# Patient Record
Sex: MALE | Race: WHITE | Employment: OTHER | URBAN - METROPOLITAN AREA
[De-identification: names, ages, dates, MRNs, and addresses within clinical notes are randomized per-mention and may not be internally consistent; named-entity substitution may affect disease eponyms.]

---

## 2017-01-03 RX ORDER — OMEPRAZOLE 40 MG/1
CAPSULE, DELAYED RELEASE ORAL
Qty: 30 CAPSULE | Refills: 2 | Status: SHIPPED | OUTPATIENT
Start: 2017-01-03 | End: 2017-03-30 | Stop reason: SDUPTHER

## 2017-01-12 RX ORDER — SIMVASTATIN 20 MG
TABLET ORAL
Qty: 30 TABLET | Refills: 2 | Status: SHIPPED | OUTPATIENT
Start: 2017-01-12 | End: 2017-03-10 | Stop reason: DRUGHIGH

## 2017-01-26 DIAGNOSIS — I10 ESSENTIAL HYPERTENSION: ICD-10-CM

## 2017-01-26 RX ORDER — LISINOPRIL 5 MG/1
5 TABLET ORAL DAILY
Qty: 30 TABLET | Refills: 3 | Status: SHIPPED | OUTPATIENT
Start: 2017-01-26 | End: 2017-06-06 | Stop reason: DRUGHIGH

## 2017-01-26 RX ORDER — LISINOPRIL 10 MG/1
10 TABLET ORAL DAILY
Qty: 30 TABLET | Refills: 3 | Status: SHIPPED | OUTPATIENT
Start: 2017-01-26 | End: 2017-06-06 | Stop reason: DRUGHIGH

## 2017-02-23 ENCOUNTER — HOSPITAL ENCOUNTER (OUTPATIENT)
Age: 62
Discharge: HOME | End: 2017-02-23
Payer: COMMERCIAL

## 2017-02-23 DIAGNOSIS — E78.5 HYPERLIPIDEMIA, UNSPECIFIED HYPERLIPIDEMIA TYPE: Primary | ICD-10-CM

## 2017-02-23 DIAGNOSIS — I10 ESSENTIAL HYPERTENSION: ICD-10-CM

## 2017-02-23 DIAGNOSIS — E11.9 TYPE 2 DIABETES MELLITUS WITHOUT COMPLICATION, WITHOUT LONG-TERM CURRENT USE OF INSULIN (HCC): ICD-10-CM

## 2017-02-23 DIAGNOSIS — E78.5 HYPERLIPIDEMIA, UNSPECIFIED HYPERLIPIDEMIA TYPE: ICD-10-CM

## 2017-02-23 LAB
ALBUMIN SERPL-MCNC: 4.5 G/DL (ref 3.5–5.2)
ALBUMIN/GLOBULIN RATIO: ABNORMAL (ref 1–2.5)
ALP BLD-CCNC: 75 U/L (ref 40–129)
ALT SERPL-CCNC: 51 U/L (ref 5–41)
ANION GAP SERPL CALCULATED.3IONS-SCNC: 12 MMOL/L (ref 9–17)
AST SERPL-CCNC: 26 U/L
BILIRUB SERPL-MCNC: 0.78 MG/DL (ref 0.3–1.2)
BUN BLDV-MCNC: 18 MG/DL (ref 8–23)
BUN/CREAT BLD: ABNORMAL (ref 9–20)
CALCIUM SERPL-MCNC: 9.5 MG/DL (ref 8.6–10.4)
CHLORIDE BLD-SCNC: 100 MMOL/L (ref 98–107)
CHOLESTEROL/HDL RATIO: 6.1
CHOLESTEROL: 212 MG/DL
CO2: 27 MMOL/L (ref 20–31)
CREAT SERPL-MCNC: 0.92 MG/DL (ref 0.7–1.2)
GFR AFRICAN AMERICAN: >60 ML/MIN
GFR NON-AFRICAN AMERICAN: >60 ML/MIN
GFR SERPL CREATININE-BSD FRML MDRD: ABNORMAL ML/MIN/{1.73_M2}
GFR SERPL CREATININE-BSD FRML MDRD: ABNORMAL ML/MIN/{1.73_M2}
GLUCOSE BLD-MCNC: 190 MG/DL (ref 70–99)
HDLC SERPL-MCNC: 35 MG/DL
LDL CHOLESTEROL: 125 MG/DL (ref 0–130)
POTASSIUM SERPL-SCNC: 4.7 MMOL/L (ref 3.7–5.3)
SODIUM BLD-SCNC: 139 MMOL/L (ref 135–144)
TOTAL PROTEIN: 7.3 G/DL (ref 6.4–8.3)
TRIGL SERPL-MCNC: 258 MG/DL
VLDLC SERPL CALC-MCNC: ABNORMAL MG/DL (ref 1–30)

## 2017-02-23 PROCEDURE — 80053 COMPREHEN METABOLIC PANEL: CPT

## 2017-02-23 PROCEDURE — 36415 COLL VENOUS BLD VENIPUNCTURE: CPT

## 2017-02-23 PROCEDURE — 80061 LIPID PANEL: CPT

## 2017-03-10 ENCOUNTER — HOSPITAL ENCOUNTER (OUTPATIENT)
Dept: GENERAL RADIOLOGY | Age: 62
Discharge: HOME | End: 2017-03-10
Payer: COMMERCIAL

## 2017-03-10 ENCOUNTER — HOSPITAL ENCOUNTER (OUTPATIENT)
Dept: PAIN MANAGEMENT | Age: 62
Discharge: HOME | End: 2017-03-10
Payer: COMMERCIAL

## 2017-03-10 VITALS
SYSTOLIC BLOOD PRESSURE: 122 MMHG | HEIGHT: 68 IN | WEIGHT: 210 LBS | HEART RATE: 112 BPM | BODY MASS INDEX: 31.83 KG/M2 | OXYGEN SATURATION: 94 % | TEMPERATURE: 98.4 F | DIASTOLIC BLOOD PRESSURE: 97 MMHG | RESPIRATION RATE: 12 BRPM

## 2017-03-10 DIAGNOSIS — M47.816 OSTEOARTHRITIS OF LUMBAR SPINE, UNSPECIFIED SPINAL OSTEOARTHRITIS COMPLICATION STATUS: Primary | ICD-10-CM

## 2017-03-10 DIAGNOSIS — M48.061 LUMBAR SPINAL STENOSIS: ICD-10-CM

## 2017-03-10 DIAGNOSIS — M51.36 DEGENERATIVE DISC DISEASE, LUMBAR: ICD-10-CM

## 2017-03-10 DIAGNOSIS — M54.16 LUMBAR RADICULOPATHY, CHRONIC: ICD-10-CM

## 2017-03-10 PROCEDURE — 3209999900 FLUORO FOR SURGICAL PROCEDURES

## 2017-03-10 PROCEDURE — 6360000002 HC RX W HCPCS

## 2017-03-10 PROCEDURE — 62327 NJX INTERLAMINAR LMBR/SAC: CPT

## 2017-03-10 PROCEDURE — 6360000002 HC RX W HCPCS: Performed by: PAIN MEDICINE

## 2017-03-10 PROCEDURE — 62323 NJX INTERLAMINAR LMBR/SAC: CPT | Performed by: PAIN MEDICINE

## 2017-03-10 PROCEDURE — 6360000004 HC RX CONTRAST MEDICATION

## 2017-03-10 RX ORDER — SIMVASTATIN 40 MG
40 TABLET ORAL NIGHTLY
COMMUNITY
End: 2017-03-28 | Stop reason: SDUPTHER

## 2017-03-10 RX ORDER — MIDAZOLAM HYDROCHLORIDE 1 MG/ML
INJECTION INTRAMUSCULAR; INTRAVENOUS
Status: COMPLETED | OUTPATIENT
Start: 2017-03-10 | End: 2017-03-10

## 2017-03-10 RX ADMIN — MIDAZOLAM 2 MG: 1 INJECTION INTRAMUSCULAR; INTRAVENOUS at 08:25

## 2017-03-10 ASSESSMENT — PAIN - FUNCTIONAL ASSESSMENT: PAIN_FUNCTIONAL_ASSESSMENT: 0-10

## 2017-03-13 ENCOUNTER — TELEPHONE (OUTPATIENT)
Dept: PAIN MANAGEMENT | Age: 62
End: 2017-03-13

## 2017-03-24 ENCOUNTER — HOSPITAL ENCOUNTER (OUTPATIENT)
Dept: PAIN MANAGEMENT | Age: 62
Discharge: HOME | End: 2017-03-24
Payer: COMMERCIAL

## 2017-03-24 VITALS
SYSTOLIC BLOOD PRESSURE: 125 MMHG | HEART RATE: 107 BPM | OXYGEN SATURATION: 96 % | HEIGHT: 68 IN | WEIGHT: 215 LBS | DIASTOLIC BLOOD PRESSURE: 90 MMHG | TEMPERATURE: 98.6 F | BODY MASS INDEX: 32.58 KG/M2 | RESPIRATION RATE: 20 BRPM

## 2017-03-24 DIAGNOSIS — M47.816 OSTEOARTHRITIS OF LUMBAR SPINE, UNSPECIFIED SPINAL OSTEOARTHRITIS COMPLICATION STATUS: Primary | ICD-10-CM

## 2017-03-24 DIAGNOSIS — M54.16 LUMBAR RADICULOPATHY, CHRONIC: ICD-10-CM

## 2017-03-24 DIAGNOSIS — M51.36 DEGENERATIVE DISC DISEASE, LUMBAR: ICD-10-CM

## 2017-03-24 DIAGNOSIS — M43.10 PARS DEFECT WITH SPONDYLOLISTHESIS: ICD-10-CM

## 2017-03-24 DIAGNOSIS — M43.00 PARS DEFECT WITH SPONDYLOLISTHESIS: ICD-10-CM

## 2017-03-24 DIAGNOSIS — M48.061 LUMBAR SPINAL STENOSIS: ICD-10-CM

## 2017-03-24 PROCEDURE — 99204 OFFICE O/P NEW MOD 45 MIN: CPT | Performed by: PAIN MEDICINE

## 2017-03-24 PROCEDURE — 99213 OFFICE O/P EST LOW 20 MIN: CPT

## 2017-03-24 ASSESSMENT — PAIN DESCRIPTION - PAIN TYPE: TYPE: CHRONIC PAIN

## 2017-03-24 ASSESSMENT — PAIN DESCRIPTION - LOCATION: LOCATION: BACK;LEG

## 2017-03-24 ASSESSMENT — PAIN DESCRIPTION - ONSET: ONSET: ON-GOING

## 2017-03-24 ASSESSMENT — PAIN DESCRIPTION - PROGRESSION: CLINICAL_PROGRESSION: GRADUALLY WORSENING

## 2017-03-24 ASSESSMENT — PAIN DESCRIPTION - FREQUENCY: FREQUENCY: CONTINUOUS

## 2017-03-24 ASSESSMENT — ENCOUNTER SYMPTOMS
BLURRED VISION: 1
BACK PAIN: 1

## 2017-03-24 ASSESSMENT — PAIN DESCRIPTION - ORIENTATION: ORIENTATION: RIGHT;LEFT;LOWER

## 2017-03-24 ASSESSMENT — PAIN SCALES - GENERAL: PAINLEVEL_OUTOF10: 1

## 2017-03-24 ASSESSMENT — PAIN DESCRIPTION - DIRECTION: RADIATING_TOWARDS: LEFT LEG

## 2017-04-18 ENCOUNTER — HOSPITAL ENCOUNTER (OUTPATIENT)
Age: 62
Discharge: HOME | End: 2017-04-18
Payer: COMMERCIAL

## 2017-04-18 DIAGNOSIS — Z12.5 PROSTATE CANCER SCREENING: ICD-10-CM

## 2017-04-18 LAB — PROSTATE SPECIFIC ANTIGEN: 2.01 UG/L

## 2017-04-18 PROCEDURE — G0103 PSA SCREENING: HCPCS

## 2017-04-18 PROCEDURE — 36415 COLL VENOUS BLD VENIPUNCTURE: CPT

## 2017-05-05 ENCOUNTER — OFFICE VISIT (OUTPATIENT)
Dept: UROLOGY | Age: 62
End: 2017-05-05
Payer: COMMERCIAL

## 2017-05-05 VITALS
BODY MASS INDEX: 33.68 KG/M2 | HEART RATE: 128 BPM | SYSTOLIC BLOOD PRESSURE: 127 MMHG | TEMPERATURE: 98.1 F | HEIGHT: 68 IN | WEIGHT: 222.2 LBS | DIASTOLIC BLOOD PRESSURE: 88 MMHG

## 2017-05-05 DIAGNOSIS — N40.1 BPH WITH OBSTRUCTION/LOWER URINARY TRACT SYMPTOMS: Primary | ICD-10-CM

## 2017-05-05 DIAGNOSIS — N13.8 BPH WITH OBSTRUCTION/LOWER URINARY TRACT SYMPTOMS: Primary | ICD-10-CM

## 2017-05-05 DIAGNOSIS — N52.8 OTHER MALE ERECTILE DYSFUNCTION: ICD-10-CM

## 2017-05-05 DIAGNOSIS — Z12.5 SPECIAL SCREENING FOR MALIGNANT NEOPLASM OF PROSTATE: ICD-10-CM

## 2017-05-05 PROCEDURE — 99214 OFFICE O/P EST MOD 30 MIN: CPT | Performed by: UROLOGY

## 2017-05-05 RX ORDER — TAMSULOSIN HYDROCHLORIDE 0.4 MG/1
0.4 CAPSULE ORAL 2 TIMES DAILY
Qty: 180 CAPSULE | Refills: 3 | Status: SHIPPED | OUTPATIENT
Start: 2017-05-05 | End: 2018-05-07 | Stop reason: SDUPTHER

## 2017-05-05 RX ORDER — FINASTERIDE 5 MG/1
5 TABLET, FILM COATED ORAL DAILY
Qty: 90 TABLET | Refills: 3 | Status: SHIPPED | OUTPATIENT
Start: 2017-05-05 | End: 2018-05-25 | Stop reason: SDUPTHER

## 2017-05-05 ASSESSMENT — ENCOUNTER SYMPTOMS
ABDOMINAL PAIN: 0
EYE PAIN: 0
BACK PAIN: 0
COLOR CHANGE: 0
NAUSEA: 0
VOMITING: 0
WHEEZING: 0
EYE REDNESS: 0
SHORTNESS OF BREATH: 0
COUGH: 0

## 2017-05-26 ENCOUNTER — HOSPITAL ENCOUNTER (OUTPATIENT)
Dept: GENERAL RADIOLOGY | Age: 62
Discharge: HOME | End: 2017-05-26
Payer: COMMERCIAL

## 2017-05-26 ENCOUNTER — HOSPITAL ENCOUNTER (OUTPATIENT)
Dept: PAIN MANAGEMENT | Age: 62
Discharge: HOME | End: 2017-05-26
Payer: COMMERCIAL

## 2017-05-26 VITALS
HEIGHT: 68 IN | HEART RATE: 112 BPM | TEMPERATURE: 98.2 F | OXYGEN SATURATION: 96 % | DIASTOLIC BLOOD PRESSURE: 92 MMHG | BODY MASS INDEX: 33.65 KG/M2 | RESPIRATION RATE: 18 BRPM | SYSTOLIC BLOOD PRESSURE: 135 MMHG | WEIGHT: 222 LBS

## 2017-05-26 DIAGNOSIS — M48.061 LUMBAR SPINAL STENOSIS: ICD-10-CM

## 2017-05-26 DIAGNOSIS — M47.816 OSTEOARTHRITIS OF LUMBAR SPINE, UNSPECIFIED SPINAL OSTEOARTHRITIS COMPLICATION STATUS: ICD-10-CM

## 2017-05-26 DIAGNOSIS — M51.36 DEGENERATIVE DISC DISEASE, LUMBAR: ICD-10-CM

## 2017-05-26 DIAGNOSIS — N40.1 BENIGN NON-NODULAR PROSTATIC HYPERPLASIA WITH LOWER URINARY TRACT SYMPTOMS: ICD-10-CM

## 2017-05-26 DIAGNOSIS — R39.12 WEAK URINARY STREAM: ICD-10-CM

## 2017-05-26 DIAGNOSIS — M54.16 LUMBAR RADICULOPATHY, CHRONIC: Primary | ICD-10-CM

## 2017-05-26 PROCEDURE — 62327 NJX INTERLAMINAR LMBR/SAC: CPT

## 2017-05-26 PROCEDURE — 3209999900 FLUORO FOR SURGICAL PROCEDURES

## 2017-05-26 PROCEDURE — 62323 NJX INTERLAMINAR LMBR/SAC: CPT | Performed by: PAIN MEDICINE

## 2017-05-26 RX ORDER — TRAMADOL HYDROCHLORIDE 50 MG/1
50 TABLET ORAL EVERY 6 HOURS PRN
Qty: 120 TABLET | Refills: 0 | Status: SHIPPED | OUTPATIENT
Start: 2017-05-26 | End: 2017-06-05

## 2017-05-26 ASSESSMENT — ACTIVITIES OF DAILY LIVING (ADL): EFFECT OF PAIN ON DAILY ACTIVITIES: UNABLE TO WALK ONE BLOCK WITHOUT SEVERE PAIN

## 2017-05-26 ASSESSMENT — PAIN - FUNCTIONAL ASSESSMENT
PAIN_FUNCTIONAL_ASSESSMENT: 0-10
PAIN_FUNCTIONAL_ASSESSMENT: 0-10

## 2017-06-06 PROBLEM — M15.9 OSTEOARTHRITIS OF MULTIPLE JOINTS: Status: ACTIVE | Noted: 2017-06-06

## 2017-06-13 ENCOUNTER — HOSPITAL ENCOUNTER (OUTPATIENT)
Dept: PAIN MANAGEMENT | Age: 62
Discharge: HOME | End: 2017-06-13
Payer: COMMERCIAL

## 2017-06-13 DIAGNOSIS — M54.16 LUMBAR RADICULOPATHY, CHRONIC: Primary | ICD-10-CM

## 2017-06-13 DIAGNOSIS — M43.00 PARS DEFECT WITH SPONDYLOLISTHESIS: ICD-10-CM

## 2017-06-13 DIAGNOSIS — M47.816 OSTEOARTHRITIS OF LUMBAR SPINE, UNSPECIFIED SPINAL OSTEOARTHRITIS COMPLICATION STATUS: ICD-10-CM

## 2017-06-13 DIAGNOSIS — M48.061 LUMBAR SPINAL STENOSIS: ICD-10-CM

## 2017-06-13 DIAGNOSIS — M43.10 PARS DEFECT WITH SPONDYLOLISTHESIS: ICD-10-CM

## 2017-06-13 DIAGNOSIS — M51.36 DEGENERATIVE DISC DISEASE, LUMBAR: ICD-10-CM

## 2017-06-13 PROCEDURE — 99213 OFFICE O/P EST LOW 20 MIN: CPT

## 2017-06-13 PROCEDURE — 99213 OFFICE O/P EST LOW 20 MIN: CPT | Performed by: NURSE PRACTITIONER

## 2017-08-31 ENCOUNTER — HOSPITAL ENCOUNTER (OUTPATIENT)
Dept: PAIN MANAGEMENT | Age: 62
Discharge: HOME | End: 2017-08-31
Payer: COMMERCIAL

## 2017-08-31 VITALS
OXYGEN SATURATION: 99 % | TEMPERATURE: 97.9 F | HEART RATE: 107 BPM | RESPIRATION RATE: 17 BRPM | HEIGHT: 68 IN | DIASTOLIC BLOOD PRESSURE: 76 MMHG | WEIGHT: 220 LBS | BODY MASS INDEX: 33.34 KG/M2 | SYSTOLIC BLOOD PRESSURE: 126 MMHG

## 2017-08-31 DIAGNOSIS — M51.36 DEGENERATIVE DISC DISEASE, LUMBAR: ICD-10-CM

## 2017-08-31 DIAGNOSIS — M47.816 OSTEOARTHRITIS OF LUMBAR SPINE, UNSPECIFIED SPINAL OSTEOARTHRITIS COMPLICATION STATUS: ICD-10-CM

## 2017-08-31 DIAGNOSIS — M43.00 PARS DEFECT WITH SPONDYLOLISTHESIS: ICD-10-CM

## 2017-08-31 DIAGNOSIS — M48.061 LUMBAR SPINAL STENOSIS: ICD-10-CM

## 2017-08-31 DIAGNOSIS — M43.10 PARS DEFECT WITH SPONDYLOLISTHESIS: ICD-10-CM

## 2017-08-31 DIAGNOSIS — M54.16 LUMBAR RADICULOPATHY, CHRONIC: Primary | ICD-10-CM

## 2017-08-31 PROCEDURE — 99213 OFFICE O/P EST LOW 20 MIN: CPT

## 2017-08-31 PROCEDURE — 99214 OFFICE O/P EST MOD 30 MIN: CPT | Performed by: PAIN MEDICINE

## 2017-08-31 ASSESSMENT — ENCOUNTER SYMPTOMS
NAUSEA: 0
HEARTBURN: 1
VOMITING: 0
BLURRED VISION: 0
COUGH: 1
CONSTIPATION: 0
PHOTOPHOBIA: 0
BACK PAIN: 1
SHORTNESS OF BREATH: 0
EYES NEGATIVE: 1

## 2017-08-31 ASSESSMENT — PAIN DESCRIPTION - PAIN TYPE: TYPE: CHRONIC PAIN

## 2017-08-31 ASSESSMENT — PAIN DESCRIPTION - LOCATION: LOCATION: BACK;LEG

## 2017-08-31 ASSESSMENT — PAIN DESCRIPTION - ORIENTATION: ORIENTATION: RIGHT;LOWER

## 2017-08-31 ASSESSMENT — PAIN DESCRIPTION - PROGRESSION: CLINICAL_PROGRESSION: GRADUALLY WORSENING

## 2017-08-31 ASSESSMENT — PAIN SCALES - GENERAL: PAINLEVEL_OUTOF10: 1

## 2017-08-31 ASSESSMENT — PAIN DESCRIPTION - ONSET: ONSET: ON-GOING

## 2017-09-20 ENCOUNTER — OFFICE VISIT (OUTPATIENT)
Dept: GASTROENTEROLOGY | Age: 62
End: 2017-09-20
Payer: COMMERCIAL

## 2017-09-20 VITALS
HEIGHT: 68 IN | SYSTOLIC BLOOD PRESSURE: 129 MMHG | BODY MASS INDEX: 31.71 KG/M2 | OXYGEN SATURATION: 96 % | TEMPERATURE: 97.5 F | HEART RATE: 109 BPM | WEIGHT: 209.2 LBS | DIASTOLIC BLOOD PRESSURE: 88 MMHG

## 2017-09-20 DIAGNOSIS — K76.0 STEATOSIS OF LIVER: Primary | ICD-10-CM

## 2017-09-20 DIAGNOSIS — K21.9 GASTROESOPHAGEAL REFLUX DISEASE WITHOUT ESOPHAGITIS: ICD-10-CM

## 2017-09-20 DIAGNOSIS — R74.8 ELEVATED LIVER ENZYMES: ICD-10-CM

## 2017-09-20 PROCEDURE — 99213 OFFICE O/P EST LOW 20 MIN: CPT | Performed by: INTERNAL MEDICINE

## 2017-09-20 ASSESSMENT — ENCOUNTER SYMPTOMS
VOICE CHANGE: 0
SHORTNESS OF BREATH: 0
ABDOMINAL DISTENTION: 0
RESPIRATORY NEGATIVE: 1
VOMITING: 0
GASTROINTESTINAL NEGATIVE: 1
WHEEZING: 0
NAUSEA: 0
CONSTIPATION: 0
SINUS PRESSURE: 0
COUGH: 0
TROUBLE SWALLOWING: 0
RECTAL PAIN: 0
SORE THROAT: 0
RHINORRHEA: 0
FACIAL SWELLING: 0
DIARRHEA: 0
BACK PAIN: 1
BLOOD IN STOOL: 0
ABDOMINAL PAIN: 0
ANAL BLEEDING: 0

## 2017-10-23 PROBLEM — I10 ESSENTIAL HYPERTENSION: Status: ACTIVE | Noted: 2017-10-23

## 2018-02-21 DIAGNOSIS — N40.1 BPH WITH OBSTRUCTION/LOWER URINARY TRACT SYMPTOMS: ICD-10-CM

## 2018-02-21 DIAGNOSIS — N13.8 BPH WITH OBSTRUCTION/LOWER URINARY TRACT SYMPTOMS: ICD-10-CM

## 2018-02-24 ENCOUNTER — HOSPITAL ENCOUNTER (OUTPATIENT)
Age: 63
Discharge: HOME | End: 2018-02-24
Payer: COMMERCIAL

## 2018-02-24 DIAGNOSIS — K21.9 GASTROESOPHAGEAL REFLUX DISEASE WITHOUT ESOPHAGITIS: ICD-10-CM

## 2018-02-24 DIAGNOSIS — E78.5 HYPERLIPIDEMIA, UNSPECIFIED HYPERLIPIDEMIA TYPE: ICD-10-CM

## 2018-02-24 DIAGNOSIS — M15.9 OSTEOARTHRITIS OF MULTIPLE JOINTS, UNSPECIFIED OSTEOARTHRITIS TYPE: ICD-10-CM

## 2018-02-24 DIAGNOSIS — E11.9 TYPE 2 DIABETES MELLITUS WITHOUT COMPLICATION, WITHOUT LONG-TERM CURRENT USE OF INSULIN (HCC): ICD-10-CM

## 2018-02-24 DIAGNOSIS — R20.9 COLD EXTREMITIES: ICD-10-CM

## 2018-02-24 DIAGNOSIS — I10 ESSENTIAL HYPERTENSION: ICD-10-CM

## 2018-02-24 LAB
ALBUMIN SERPL-MCNC: 4.6 G/DL (ref 3.5–5.2)
ALBUMIN/GLOBULIN RATIO: ABNORMAL (ref 1–2.5)
ALP BLD-CCNC: 69 U/L (ref 40–129)
ALT SERPL-CCNC: 67 U/L (ref 5–41)
ANION GAP SERPL CALCULATED.3IONS-SCNC: 11 MMOL/L (ref 9–17)
AST SERPL-CCNC: 32 U/L
BILIRUB SERPL-MCNC: 1 MG/DL (ref 0.3–1.2)
BUN BLDV-MCNC: 18 MG/DL (ref 8–23)
BUN/CREAT BLD: ABNORMAL (ref 9–20)
CALCIUM SERPL-MCNC: 10.2 MG/DL (ref 8.6–10.4)
CHLORIDE BLD-SCNC: 96 MMOL/L (ref 98–107)
CHOLESTEROL/HDL RATIO: 4.3
CHOLESTEROL: 152 MG/DL
CO2: 30 MMOL/L (ref 20–31)
CREAT SERPL-MCNC: 1.08 MG/DL (ref 0.7–1.2)
FOLATE: >20 NG/ML
GFR AFRICAN AMERICAN: >60 ML/MIN
GFR NON-AFRICAN AMERICAN: >60 ML/MIN
GFR SERPL CREATININE-BSD FRML MDRD: ABNORMAL ML/MIN/{1.73_M2}
GFR SERPL CREATININE-BSD FRML MDRD: ABNORMAL ML/MIN/{1.73_M2}
GLUCOSE BLD-MCNC: 154 MG/DL (ref 70–99)
HCT VFR BLD CALC: 47.3 % (ref 41–53)
HDLC SERPL-MCNC: 35 MG/DL
HEMOGLOBIN: 16.2 G/DL (ref 13.5–17.5)
IRON SATURATION: 38 % (ref 20–55)
IRON: 108 UG/DL (ref 59–158)
LDL CHOLESTEROL: 66 MG/DL (ref 0–130)
MCH RBC QN AUTO: 30.7 PG (ref 26–34)
MCHC RBC AUTO-ENTMCNC: 34.1 G/DL (ref 31–37)
MCV RBC AUTO: 90.1 FL (ref 80–100)
NRBC AUTOMATED: NORMAL PER 100 WBC
PDW BLD-RTO: 14.3 % (ref 11.5–14.9)
PLATELET # BLD: 206 K/UL (ref 150–450)
PMV BLD AUTO: 8.4 FL (ref 6–12)
POTASSIUM SERPL-SCNC: 4.9 MMOL/L (ref 3.7–5.3)
RBC # BLD: 5.26 M/UL (ref 4.5–5.9)
RHEUMATOID FACTOR: <10 IU/ML
SODIUM BLD-SCNC: 137 MMOL/L (ref 135–144)
THYROXINE, FREE: 1 NG/DL (ref 0.93–1.7)
TOTAL IRON BINDING CAPACITY: 287 UG/DL (ref 250–450)
TOTAL PROTEIN: 7.3 G/DL (ref 6.4–8.3)
TRIGL SERPL-MCNC: 255 MG/DL
TSH SERPL DL<=0.05 MIU/L-ACNC: 2.09 MIU/L (ref 0.3–5)
UNSATURATED IRON BINDING CAPACITY: 179 UG/DL (ref 112–347)
VITAMIN B-12: 633 PG/ML (ref 232–1245)
VLDLC SERPL CALC-MCNC: ABNORMAL MG/DL (ref 1–30)
WBC # BLD: 7.2 K/UL (ref 3.5–11)

## 2018-02-24 PROCEDURE — 85027 COMPLETE CBC AUTOMATED: CPT

## 2018-02-24 PROCEDURE — 82607 VITAMIN B-12: CPT

## 2018-02-24 PROCEDURE — 80053 COMPREHEN METABOLIC PANEL: CPT

## 2018-02-24 PROCEDURE — 84439 ASSAY OF FREE THYROXINE: CPT

## 2018-02-24 PROCEDURE — 84443 ASSAY THYROID STIM HORMONE: CPT

## 2018-02-24 PROCEDURE — 86235 NUCLEAR ANTIGEN ANTIBODY: CPT

## 2018-02-24 PROCEDURE — 80061 LIPID PANEL: CPT

## 2018-02-24 PROCEDURE — 36415 COLL VENOUS BLD VENIPUNCTURE: CPT

## 2018-02-24 PROCEDURE — 83550 IRON BINDING TEST: CPT

## 2018-02-24 PROCEDURE — 86038 ANTINUCLEAR ANTIBODIES: CPT

## 2018-02-24 PROCEDURE — 83540 ASSAY OF IRON: CPT

## 2018-02-24 PROCEDURE — 86431 RHEUMATOID FACTOR QUANT: CPT

## 2018-02-24 PROCEDURE — 82746 ASSAY OF FOLIC ACID SERUM: CPT

## 2018-02-26 RX ORDER — FINASTERIDE 5 MG/1
5 TABLET, FILM COATED ORAL DAILY
Qty: 90 TABLET | Refills: 0 | OUTPATIENT
Start: 2018-02-26

## 2018-02-28 LAB
ANTI-NUCLEAR ANTIBODY (ANA): POSITIVE
ANTI-SCLERODERMA: 12 U/ML

## 2018-04-30 ENCOUNTER — HOSPITAL ENCOUNTER (OUTPATIENT)
Dept: ULTRASOUND IMAGING | Age: 63
Discharge: HOME | End: 2018-05-02
Payer: COMMERCIAL

## 2018-04-30 DIAGNOSIS — R74.8 ELEVATED LIVER ENZYMES: ICD-10-CM

## 2018-04-30 DIAGNOSIS — E78.2 MIXED HYPERLIPIDEMIA: ICD-10-CM

## 2018-04-30 DIAGNOSIS — K76.0 STEATOSIS OF LIVER: ICD-10-CM

## 2018-04-30 PROCEDURE — 76705 ECHO EXAM OF ABDOMEN: CPT

## 2018-05-07 DIAGNOSIS — N40.1 BPH WITH OBSTRUCTION/LOWER URINARY TRACT SYMPTOMS: ICD-10-CM

## 2018-05-07 DIAGNOSIS — N13.8 BPH WITH OBSTRUCTION/LOWER URINARY TRACT SYMPTOMS: ICD-10-CM

## 2018-05-08 RX ORDER — TAMSULOSIN HYDROCHLORIDE 0.4 MG/1
CAPSULE ORAL
Qty: 180 CAPSULE | Refills: 0 | Status: SHIPPED | OUTPATIENT
Start: 2018-05-08 | End: 2018-08-04 | Stop reason: SDUPTHER

## 2018-05-10 ENCOUNTER — TELEPHONE (OUTPATIENT)
Dept: UROLOGY | Age: 63
End: 2018-05-10

## 2018-05-14 ENCOUNTER — HOSPITAL ENCOUNTER (OUTPATIENT)
Age: 63
Discharge: HOME | End: 2018-05-14
Payer: COMMERCIAL

## 2018-05-14 DIAGNOSIS — Z12.5 SCREENING FOR PROSTATE CANCER: Primary | ICD-10-CM

## 2018-05-14 LAB — PROSTATE SPECIFIC ANTIGEN: 1.99 UG/L

## 2018-05-14 PROCEDURE — G0103 PSA SCREENING: HCPCS

## 2018-05-14 PROCEDURE — 36415 COLL VENOUS BLD VENIPUNCTURE: CPT

## 2018-05-15 ENCOUNTER — OFFICE VISIT (OUTPATIENT)
Dept: UROLOGY | Age: 63
End: 2018-05-15
Payer: COMMERCIAL

## 2018-05-15 VITALS
BODY MASS INDEX: 31.98 KG/M2 | WEIGHT: 211 LBS | HEART RATE: 99 BPM | HEIGHT: 68 IN | TEMPERATURE: 98.4 F | DIASTOLIC BLOOD PRESSURE: 86 MMHG | SYSTOLIC BLOOD PRESSURE: 125 MMHG

## 2018-05-15 DIAGNOSIS — R35.1 NOCTURIA: Primary | ICD-10-CM

## 2018-05-15 DIAGNOSIS — N40.1 BPH WITH OBSTRUCTION/LOWER URINARY TRACT SYMPTOMS: ICD-10-CM

## 2018-05-15 DIAGNOSIS — N13.8 BPH WITH OBSTRUCTION/LOWER URINARY TRACT SYMPTOMS: ICD-10-CM

## 2018-05-15 DIAGNOSIS — Z12.5 ENCOUNTER FOR SCREENING FOR MALIGNANT NEOPLASM OF PROSTATE: ICD-10-CM

## 2018-05-15 PROCEDURE — 99213 OFFICE O/P EST LOW 20 MIN: CPT | Performed by: UROLOGY

## 2018-05-15 RX ORDER — GABAPENTIN 300 MG/1
CAPSULE ORAL
Refills: 1 | COMMUNITY
Start: 2018-04-16 | End: 2019-02-01

## 2018-05-15 ASSESSMENT — ENCOUNTER SYMPTOMS
COUGH: 0
WHEEZING: 0
BACK PAIN: 1
VOMITING: 0
ABDOMINAL PAIN: 0
EYE REDNESS: 0
NAUSEA: 0
EYE PAIN: 0
SHORTNESS OF BREATH: 0
COLOR CHANGE: 0

## 2018-05-25 ENCOUNTER — TELEPHONE (OUTPATIENT)
Dept: UROLOGY | Age: 63
End: 2018-05-25

## 2018-05-25 DIAGNOSIS — N13.8 BPH WITH OBSTRUCTION/LOWER URINARY TRACT SYMPTOMS: ICD-10-CM

## 2018-05-25 DIAGNOSIS — N40.1 BPH WITH OBSTRUCTION/LOWER URINARY TRACT SYMPTOMS: ICD-10-CM

## 2018-05-25 RX ORDER — FINASTERIDE 5 MG/1
5 TABLET, FILM COATED ORAL DAILY
Qty: 90 TABLET | Refills: 3 | Status: SHIPPED | OUTPATIENT
Start: 2018-05-25 | End: 2019-05-22 | Stop reason: SDUPTHER

## 2018-08-04 DIAGNOSIS — N13.8 BPH WITH OBSTRUCTION/LOWER URINARY TRACT SYMPTOMS: ICD-10-CM

## 2018-08-04 DIAGNOSIS — N40.1 BPH WITH OBSTRUCTION/LOWER URINARY TRACT SYMPTOMS: ICD-10-CM

## 2018-08-07 RX ORDER — TAMSULOSIN HYDROCHLORIDE 0.4 MG/1
CAPSULE ORAL
Qty: 180 CAPSULE | Refills: 0 | Status: SHIPPED | OUTPATIENT
Start: 2018-08-07 | End: 2018-11-11 | Stop reason: SDUPTHER

## 2018-08-28 ENCOUNTER — HOSPITAL ENCOUNTER (OUTPATIENT)
Age: 63
Setting detail: SPECIMEN
Discharge: HOME | End: 2018-08-28
Payer: COMMERCIAL

## 2018-08-28 DIAGNOSIS — E11.9 TYPE 2 DIABETES MELLITUS WITHOUT COMPLICATION, WITHOUT LONG-TERM CURRENT USE OF INSULIN (HCC): ICD-10-CM

## 2018-08-28 LAB
CREATININE URINE: 144 MG/DL (ref 39–259)
MICROALBUMIN/CREAT 24H UR: <12 MG/L
MICROALBUMIN/CREAT UR-RTO: NORMAL MCG/MG CREAT

## 2018-09-24 ENCOUNTER — HOSPITAL ENCOUNTER (OUTPATIENT)
Age: 63
Discharge: HOME | End: 2018-09-24
Payer: COMMERCIAL

## 2018-09-24 ENCOUNTER — OFFICE VISIT (OUTPATIENT)
Dept: GASTROENTEROLOGY | Age: 63
End: 2018-09-24
Payer: COMMERCIAL

## 2018-09-24 ENCOUNTER — TELEPHONE (OUTPATIENT)
Dept: GASTROENTEROLOGY | Age: 63
End: 2018-09-24

## 2018-09-24 VITALS
DIASTOLIC BLOOD PRESSURE: 87 MMHG | SYSTOLIC BLOOD PRESSURE: 145 MMHG | BODY MASS INDEX: 31.78 KG/M2 | HEART RATE: 85 BPM | WEIGHT: 209 LBS

## 2018-09-24 DIAGNOSIS — K76.0 STEATOSIS OF LIVER: Primary | ICD-10-CM

## 2018-09-24 DIAGNOSIS — K76.0 STEATOSIS OF LIVER: ICD-10-CM

## 2018-09-24 LAB
ALBUMIN SERPL-MCNC: 4.8 G/DL (ref 3.5–5.2)
ALBUMIN/GLOBULIN RATIO: ABNORMAL (ref 1–2.5)
ALP BLD-CCNC: 68 U/L (ref 40–129)
ALT SERPL-CCNC: 73 U/L (ref 5–41)
AST SERPL-CCNC: 39 U/L
BILIRUB SERPL-MCNC: 0.32 MG/DL (ref 0.3–1.2)
BILIRUBIN DIRECT: 0.11 MG/DL
BILIRUBIN, INDIRECT: 0.21 MG/DL (ref 0–1)
GLOBULIN: ABNORMAL G/DL (ref 1.5–3.8)
TOTAL PROTEIN: 7.5 G/DL (ref 6.4–8.3)

## 2018-09-24 PROCEDURE — 99213 OFFICE O/P EST LOW 20 MIN: CPT | Performed by: INTERNAL MEDICINE

## 2018-09-24 PROCEDURE — 36415 COLL VENOUS BLD VENIPUNCTURE: CPT

## 2018-09-24 PROCEDURE — 80076 HEPATIC FUNCTION PANEL: CPT

## 2018-09-24 ASSESSMENT — ENCOUNTER SYMPTOMS
DIARRHEA: 0
ABDOMINAL DISTENTION: 0
COUGH: 1
BACK PAIN: 1
BLOOD IN STOOL: 0
TROUBLE SWALLOWING: 0
ABDOMINAL PAIN: 0
RECTAL PAIN: 0
NAUSEA: 0
CONSTIPATION: 0
SINUS PRESSURE: 0
VOMITING: 0
SINUS PAIN: 0
ANAL BLEEDING: 0
SORE THROAT: 0
CHEST TIGHTNESS: 0
SHORTNESS OF BREATH: 0

## 2018-09-24 NOTE — PROGRESS NOTES
disturbance. The patient is not nervous/anxious. Objective:   Physical Exam   Constitutional: He is oriented to person, place, and time. He appears well-developed and well-nourished. No distress. HENT:   Head: Normocephalic and atraumatic. Mouth/Throat: No oropharyngeal exudate. Eyes: Pupils are equal, round, and reactive to light. Conjunctivae are normal. No scleral icterus. Neck: Normal range of motion. Neck supple. No tracheal deviation present. No thyromegaly present. Cardiovascular: Normal rate, regular rhythm, normal heart sounds and intact distal pulses. No murmur heard. Pulmonary/Chest: Effort normal and breath sounds normal. No respiratory distress. He has no wheezes. He has no rales. Abdominal: Soft. Bowel sounds are normal. He exhibits no distension, no ascites and no mass. There is no hepatomegaly. There is no tenderness. There is no guarding. No hernia. No peripheral signs of ch. Liver disease   Genitourinary: Rectum normal.   Musculoskeletal: He exhibits no edema. Lymphadenopathy:     He has no cervical adenopathy. Neurological: He is alert and oriented to person, place, and time. No cranial nerve deficit. Skin: Skin is warm and dry. No rash noted. No erythema. Psychiatric: Thought content normal.   Nursing note and vitals reviewed. Assessment:       Diagnosis Orders   1. Steatosis of liver  Hepatic Function Panel    Hepatic Function Panel           Plan:            Patient is advised to cut down calories and lose weight. Discussed with him regarding beneficial effect of daily exercise, active lifestyle etc.  Also advised to continue vitamin E therapy. Advised to have liver battery in the next 1 year. In between if he has any issues to contact me.     Sean Avery LPN

## 2018-09-24 NOTE — TELEPHONE ENCOUNTER
Writer ANGELINA for pt to get his 1 yr labs done before his appt. Writer advised if pt needs to cancel or reschedule to call our office.

## 2018-10-25 ENCOUNTER — OFFICE VISIT (OUTPATIENT)
Dept: ORTHOPEDIC SURGERY | Age: 63
End: 2018-10-25
Payer: COMMERCIAL

## 2018-10-25 VITALS — HEIGHT: 68 IN | BODY MASS INDEX: 31.67 KG/M2 | WEIGHT: 209 LBS

## 2018-10-25 DIAGNOSIS — G89.29 CHRONIC LOW BACK PAIN, UNSPECIFIED BACK PAIN LATERALITY, WITH SCIATICA PRESENCE UNSPECIFIED: ICD-10-CM

## 2018-10-25 DIAGNOSIS — M51.36 DEGENERATIVE DISC DISEASE, LUMBAR: ICD-10-CM

## 2018-10-25 DIAGNOSIS — M54.16 LUMBAR RADICULOPATHY, CHRONIC: Primary | ICD-10-CM

## 2018-10-25 DIAGNOSIS — M54.5 CHRONIC LOW BACK PAIN, UNSPECIFIED BACK PAIN LATERALITY, WITH SCIATICA PRESENCE UNSPECIFIED: ICD-10-CM

## 2018-10-25 DIAGNOSIS — M48.062 SPINAL STENOSIS OF LUMBAR REGION WITH NEUROGENIC CLAUDICATION: ICD-10-CM

## 2018-10-25 DIAGNOSIS — M51.26 LUMBAR DISC HERNIATION: ICD-10-CM

## 2018-10-25 PROBLEM — M54.50 CHRONIC LOW BACK PAIN: Status: ACTIVE | Noted: 2018-10-25

## 2018-10-25 PROCEDURE — 99203 OFFICE O/P NEW LOW 30 MIN: CPT | Performed by: ORTHOPAEDIC SURGERY

## 2018-10-25 ASSESSMENT — ENCOUNTER SYMPTOMS: BACK PAIN: 1

## 2018-10-25 NOTE — PROGRESS NOTES
Subjective:      Patient ID: Guru Guo is a 61 y.o. male. Back Pain   This is a chronic problem. The current episode started more than 1 year ago. The problem occurs constantly. The problem has been gradually worsening since onset. The pain is present in the lumbar spine and gluteal. The quality of the pain is described as aching and burning. The pain radiates to the right foot and left foot. The pain is severe. The pain is the same all the time. The symptoms are aggravated by standing, position, bending and twisting. Stiffness is present all day. Risk factors include obesity. He has tried analgesics and NSAIDs for the symptoms. The treatment provided mild relief. Patient with history of chronic low back pain. Patient's pain is progressively worsened until is been quite severe over the last year. Over the last year the patient has had intermittent right greater than left radicular leg pain to his foot. Patient approximately 1 year ago went through physical therapy with no benefit whatsoever    Patient subsequently has been trialed with a series of epidural steroid injections without substantial relief    Review of Systems   Musculoskeletal: Positive for back pain. All other systems reviewed and are negative. Objective:   Physical Exam   Constitutional: He is oriented to person, place, and time. He appears well-developed and well-nourished. HENT:   Head: Normocephalic and atraumatic. Eyes: Conjunctivae and EOM are normal.   Neck: Normal range of motion. Pulmonary/Chest: Effort normal. No respiratory distress. Neurological: He is alert and oriented to person, place, and time. He has normal strength. No sensory deficit. Normal gait   Skin: Skin is warm and dry. Psychiatric: His behavior is normal. Thought content normal.   Nursing note and vitals reviewed.     AP lateral with lateral flexion extension lumbar films obtained reviewed compared with images from 9 years ago patient was significantly progressive lumbar degenerative disc disease multilevel lumbar disc space collapse high-grade facet arthritis especially at L4 5 region    Prior MRI 2009 [facility] shows moderate to moderately severe lumbar spinal stenosis at L4 5    MRI 2016 [facility] open low-quality [facility] shows moderately severe lumbar spinal stenosis L3 4 likely severe lumbar spinal stenosis L4 5  Assessment:      Encounter Diagnoses   Name Primary?     Lumbar radiculopathy, chronic Yes    Lumbar disc herniation     Spinal stenosis of lumbar region with neurogenic claudication     Degenerative disc disease, lumbar     Chronic low back pain, unspecified back pain laterality, with sciatica presence unspecified      Significantly progressing low back pain worsening neurogenic claudication and radicular leg pain    Radiographs of shown substantial worsening of patient's lumbar spondylosis degenerative disc disease disc space collapse    MRI from 2 years ago shows likely patient to be a moderate surgical candidate secondary to stenosis at 3 for an L4 5      Plan:      Recommend CT myelogram as complementary study to 3year-old MRI    Patient likely a candidate for L3 to 5 PLIF        Gentry Jarrell MD

## 2018-11-07 ENCOUNTER — HOSPITAL ENCOUNTER (OUTPATIENT)
Dept: CT IMAGING | Age: 63
Discharge: HOME | End: 2018-11-09
Payer: COMMERCIAL

## 2018-11-07 ENCOUNTER — HOSPITAL ENCOUNTER (OUTPATIENT)
Dept: INTERVENTIONAL RADIOLOGY/VASCULAR | Age: 63
Discharge: HOME | End: 2018-11-09
Payer: COMMERCIAL

## 2018-11-07 VITALS
SYSTOLIC BLOOD PRESSURE: 127 MMHG | OXYGEN SATURATION: 95 % | HEART RATE: 100 BPM | BODY MASS INDEX: 31.22 KG/M2 | DIASTOLIC BLOOD PRESSURE: 86 MMHG | TEMPERATURE: 98.4 F | HEIGHT: 68 IN | WEIGHT: 206 LBS | RESPIRATION RATE: 17 BRPM

## 2018-11-07 VITALS
HEART RATE: 101 BPM | DIASTOLIC BLOOD PRESSURE: 91 MMHG | SYSTOLIC BLOOD PRESSURE: 125 MMHG | OXYGEN SATURATION: 98 % | RESPIRATION RATE: 14 BRPM | TEMPERATURE: 97.7 F

## 2018-11-07 DIAGNOSIS — G89.29 CHRONIC LOW BACK PAIN, UNSPECIFIED BACK PAIN LATERALITY, WITH SCIATICA PRESENCE UNSPECIFIED: ICD-10-CM

## 2018-11-07 DIAGNOSIS — M51.36 DEGENERATIVE DISC DISEASE, LUMBAR: ICD-10-CM

## 2018-11-07 DIAGNOSIS — M54.16 LUMBAR RADICULOPATHY, CHRONIC: ICD-10-CM

## 2018-11-07 DIAGNOSIS — M54.5 CHRONIC LOW BACK PAIN, UNSPECIFIED BACK PAIN LATERALITY, WITH SCIATICA PRESENCE UNSPECIFIED: ICD-10-CM

## 2018-11-07 DIAGNOSIS — M48.062 SPINAL STENOSIS OF LUMBAR REGION WITH NEUROGENIC CLAUDICATION: ICD-10-CM

## 2018-11-07 DIAGNOSIS — M51.26 LUMBAR DISC HERNIATION: ICD-10-CM

## 2018-11-07 LAB
INR BLD: 1
PARTIAL THROMBOPLASTIN TIME: 25.7 SEC (ref 23–31)
PLATELET # BLD: 207 K/UL (ref 130–400)
PROTHROMBIN TIME: 10.6 SEC (ref 9.7–11.6)

## 2018-11-07 PROCEDURE — 62304 MYELOGRAPHY LUMBAR INJECTION: CPT

## 2018-11-07 PROCEDURE — 85610 PROTHROMBIN TIME: CPT

## 2018-11-07 PROCEDURE — 72132 CT LUMBAR SPINE W/DYE: CPT

## 2018-11-07 PROCEDURE — 2709999900 IR MYELOGRAM LUMBOSACRAL

## 2018-11-07 PROCEDURE — 7100000010 HC PHASE II RECOVERY - FIRST 15 MIN

## 2018-11-07 PROCEDURE — 7100000011 HC PHASE II RECOVERY - ADDTL 15 MIN

## 2018-11-07 PROCEDURE — 2500000003 HC RX 250 WO HCPCS: Performed by: RADIOLOGY

## 2018-11-07 PROCEDURE — 85049 AUTOMATED PLATELET COUNT: CPT

## 2018-11-07 PROCEDURE — 36415 COLL VENOUS BLD VENIPUNCTURE: CPT

## 2018-11-07 PROCEDURE — 6360000004 HC RX CONTRAST MEDICATION: Performed by: RADIOLOGY

## 2018-11-07 PROCEDURE — 85730 THROMBOPLASTIN TIME PARTIAL: CPT

## 2018-11-07 PROCEDURE — 2580000003 HC RX 258: Performed by: RADIOLOGY

## 2018-11-07 RX ORDER — SODIUM CHLORIDE 9 MG/ML
INJECTION, SOLUTION INTRAVENOUS CONTINUOUS
Status: DISCONTINUED | OUTPATIENT
Start: 2018-11-07 | End: 2018-11-10 | Stop reason: HOSPADM

## 2018-11-07 RX ORDER — SODIUM CHLORIDE 9 MG/ML
INJECTION, SOLUTION INTRAVENOUS CONTINUOUS
Status: CANCELLED | OUTPATIENT
Start: 2018-11-07

## 2018-11-07 RX ORDER — SODIUM CHLORIDE 0.9 % (FLUSH) 0.9 %
10 SYRINGE (ML) INJECTION PRN
Status: DISCONTINUED | OUTPATIENT
Start: 2018-11-07 | End: 2018-11-10 | Stop reason: HOSPADM

## 2018-11-07 RX ORDER — ACETAMINOPHEN 325 MG/1
650 TABLET ORAL EVERY 4 HOURS PRN
Status: DISCONTINUED | OUTPATIENT
Start: 2018-11-07 | End: 2018-11-10 | Stop reason: HOSPADM

## 2018-11-07 RX ORDER — OXYCODONE HYDROCHLORIDE AND ACETAMINOPHEN 5; 325 MG/1; MG/1
1 TABLET ORAL EVERY 4 HOURS PRN
Status: DISCONTINUED | OUTPATIENT
Start: 2018-11-07 | End: 2018-11-10 | Stop reason: HOSPADM

## 2018-11-07 RX ORDER — OXYCODONE HYDROCHLORIDE AND ACETAMINOPHEN 5; 325 MG/1; MG/1
2 TABLET ORAL EVERY 4 HOURS PRN
Status: DISCONTINUED | OUTPATIENT
Start: 2018-11-07 | End: 2018-11-10 | Stop reason: HOSPADM

## 2018-11-07 RX ORDER — LIDOCAINE HYDROCHLORIDE 10 MG/ML
INJECTION, SOLUTION EPIDURAL; INFILTRATION; INTRACAUDAL; PERINEURAL
Status: COMPLETED | OUTPATIENT
Start: 2018-11-07 | End: 2018-11-07

## 2018-11-07 RX ORDER — SODIUM CHLORIDE 0.9 % (FLUSH) 0.9 %
10 SYRINGE (ML) INJECTION PRN
Status: CANCELLED | OUTPATIENT
Start: 2018-11-07

## 2018-11-07 RX ADMIN — SODIUM CHLORIDE: 9 INJECTION, SOLUTION INTRAVENOUS at 08:36

## 2018-11-07 RX ADMIN — IOPAMIDOL 14 ML: 408 INJECTION, SOLUTION INTRATHECAL at 10:40

## 2018-11-07 RX ADMIN — LIDOCAINE HYDROCHLORIDE 5 ML: 10 INJECTION, SOLUTION EPIDURAL; INFILTRATION; INTRACAUDAL; PERINEURAL at 10:40

## 2018-11-07 ASSESSMENT — PAIN DESCRIPTION - PAIN TYPE
TYPE: CHRONIC PAIN

## 2018-11-07 ASSESSMENT — PAIN SCALES - GENERAL
PAINLEVEL_OUTOF10: 1
PAINLEVEL_OUTOF10: 4
PAINLEVEL_OUTOF10: 1

## 2018-11-07 ASSESSMENT — PAIN DESCRIPTION - ORIENTATION
ORIENTATION: LOWER

## 2018-11-07 ASSESSMENT — PAIN DESCRIPTION - LOCATION
LOCATION: BACK

## 2018-11-07 ASSESSMENT — PAIN - FUNCTIONAL ASSESSMENT: PAIN_FUNCTIONAL_ASSESSMENT: 0-10

## 2018-11-08 ENCOUNTER — OFFICE VISIT (OUTPATIENT)
Dept: ORTHOPEDIC SURGERY | Age: 63
End: 2018-11-08
Payer: COMMERCIAL

## 2018-11-08 DIAGNOSIS — M54.5 CHRONIC LOW BACK PAIN, UNSPECIFIED BACK PAIN LATERALITY, WITH SCIATICA PRESENCE UNSPECIFIED: ICD-10-CM

## 2018-11-08 DIAGNOSIS — M43.10 PARS DEFECT WITH SPONDYLOLISTHESIS: Primary | ICD-10-CM

## 2018-11-08 DIAGNOSIS — M54.16 LUMBAR RADICULOPATHY, CHRONIC: ICD-10-CM

## 2018-11-08 DIAGNOSIS — G89.29 CHRONIC LOW BACK PAIN, UNSPECIFIED BACK PAIN LATERALITY, WITH SCIATICA PRESENCE UNSPECIFIED: ICD-10-CM

## 2018-11-08 DIAGNOSIS — M51.36 DEGENERATIVE DISC DISEASE, LUMBAR: ICD-10-CM

## 2018-11-08 DIAGNOSIS — M43.00 PARS DEFECT WITH SPONDYLOLISTHESIS: Primary | ICD-10-CM

## 2018-11-08 DIAGNOSIS — M48.061 SPINAL STENOSIS OF LUMBAR REGION, UNSPECIFIED WHETHER NEUROGENIC CLAUDICATION PRESENT: ICD-10-CM

## 2018-11-08 PROCEDURE — 99213 OFFICE O/P EST LOW 20 MIN: CPT | Performed by: ORTHOPAEDIC SURGERY

## 2018-11-28 ENCOUNTER — HOSPITAL ENCOUNTER (OUTPATIENT)
Dept: PREADMISSION TESTING | Age: 63
Discharge: HOME | End: 2018-12-02
Payer: COMMERCIAL

## 2018-11-28 VITALS
SYSTOLIC BLOOD PRESSURE: 147 MMHG | WEIGHT: 212 LBS | HEIGHT: 68 IN | TEMPERATURE: 98.1 F | RESPIRATION RATE: 16 BRPM | HEART RATE: 107 BPM | DIASTOLIC BLOOD PRESSURE: 101 MMHG | BODY MASS INDEX: 32.13 KG/M2

## 2018-11-28 LAB
ABSOLUTE EOS #: 0.2 K/UL (ref 0–0.4)
ABSOLUTE IMMATURE GRANULOCYTE: ABNORMAL K/UL (ref 0–0.3)
ABSOLUTE LYMPH #: 1.5 K/UL (ref 1–4.8)
ABSOLUTE MONO #: 0.8 K/UL (ref 0.1–1.3)
ANION GAP SERPL CALCULATED.3IONS-SCNC: 14 MMOL/L (ref 9–17)
BASOPHILS # BLD: 1 % (ref 0–2)
BASOPHILS ABSOLUTE: 0.1 K/UL (ref 0–0.2)
BUN BLDV-MCNC: 20 MG/DL (ref 8–23)
BUN/CREAT BLD: ABNORMAL (ref 9–20)
CALCIUM SERPL-MCNC: 11.4 MG/DL (ref 8.6–10.4)
CHLORIDE BLD-SCNC: 99 MMOL/L (ref 98–107)
CO2: 25 MMOL/L (ref 20–31)
CREAT SERPL-MCNC: 0.91 MG/DL (ref 0.7–1.2)
DIFFERENTIAL TYPE: ABNORMAL
EKG ATRIAL RATE: 105 BPM
EKG P AXIS: 59 DEGREES
EKG P-R INTERVAL: 148 MS
EKG Q-T INTERVAL: 328 MS
EKG QRS DURATION: 104 MS
EKG QTC CALCULATION (BAZETT): 433 MS
EKG R AXIS: 33 DEGREES
EKG T AXIS: 44 DEGREES
EKG VENTRICULAR RATE: 105 BPM
EOSINOPHILS RELATIVE PERCENT: 3 % (ref 0–4)
GFR AFRICAN AMERICAN: >60 ML/MIN
GFR NON-AFRICAN AMERICAN: >60 ML/MIN
GFR SERPL CREATININE-BSD FRML MDRD: ABNORMAL ML/MIN/{1.73_M2}
GFR SERPL CREATININE-BSD FRML MDRD: ABNORMAL ML/MIN/{1.73_M2}
GLUCOSE BLD-MCNC: 265 MG/DL (ref 70–99)
HCT VFR BLD CALC: 47 % (ref 41–53)
HEMOGLOBIN: 16 G/DL (ref 13.5–17.5)
IMMATURE GRANULOCYTES: ABNORMAL %
LYMPHOCYTES # BLD: 15 % (ref 24–44)
MCH RBC QN AUTO: 30.5 PG (ref 26–34)
MCHC RBC AUTO-ENTMCNC: 34.1 G/DL (ref 31–37)
MCV RBC AUTO: 89.5 FL (ref 80–100)
MONOCYTES # BLD: 8 % (ref 1–7)
NRBC AUTOMATED: ABNORMAL PER 100 WBC
PDW BLD-RTO: 14.2 % (ref 11.5–14.9)
PLATELET # BLD: 241 K/UL (ref 150–450)
PLATELET ESTIMATE: ABNORMAL
PMV BLD AUTO: 8.5 FL (ref 6–12)
POTASSIUM SERPL-SCNC: 4.2 MMOL/L (ref 3.7–5.3)
RBC # BLD: 5.25 M/UL (ref 4.5–5.9)
RBC # BLD: ABNORMAL 10*6/UL
SEG NEUTROPHILS: 73 % (ref 36–66)
SEGMENTED NEUTROPHILS ABSOLUTE COUNT: 7.4 K/UL (ref 1.3–9.1)
SODIUM BLD-SCNC: 138 MMOL/L (ref 135–144)
WBC # BLD: 10 K/UL (ref 3.5–11)
WBC # BLD: ABNORMAL 10*3/UL

## 2018-11-28 PROCEDURE — 36415 COLL VENOUS BLD VENIPUNCTURE: CPT

## 2018-11-28 PROCEDURE — 80048 BASIC METABOLIC PNL TOTAL CA: CPT

## 2018-11-28 PROCEDURE — 93005 ELECTROCARDIOGRAM TRACING: CPT

## 2018-11-28 PROCEDURE — 85025 COMPLETE CBC W/AUTO DIFF WBC: CPT

## 2018-11-28 ASSESSMENT — PAIN DESCRIPTION - LOCATION: LOCATION: BACK;EAR

## 2018-11-28 ASSESSMENT — PAIN DESCRIPTION - ORIENTATION: ORIENTATION: LEFT

## 2018-11-28 ASSESSMENT — PAIN SCALES - GENERAL: PAINLEVEL_OUTOF10: 4

## 2018-11-28 ASSESSMENT — PAIN DESCRIPTION - PAIN TYPE: TYPE: ACUTE PAIN;CHRONIC PAIN

## 2018-11-28 NOTE — H&P
HISTORY and [address]       NAME:  Eliu Jaimes  MRN: [id number]   YOB: 1955   Date: 11/28/2018   Age: 61 y.o. Gender: male       COMPLAINT AND PRESENT HISTORY:     Eliu Jaimes is 61 y.o.,  male, undergoing preadmission testing for Lumbar Stenosis, L3-S1. Pt has DDD, C/O of pain in the lumbar spine area, radiates to the lower limbs worse on the Rt side, lightning bolts. The symptoms started 40 years. No recent falls or trauma. No redness, swelling or rashes. Pt C/O of pain, deformity, limitation in the range of motion. Pt denies any other symptoms.     PAST MEDICAL HISTORY     Past Medical History:   Diagnosis Date    Anesthesia     phrenic nerve damaged lt side    BPH (benign prostatic hyperplasia)     Chronic back pain     Diabetes mellitus ([facility] [address]     Difficult intubation     per pt throat collapses, lt side phrenic nerve was damaged    Ear infection     LT, being treated    GERD (gastroesophageal reflux disease)     Headache(784.0)     Hypercholesteremia     Hyperlipidemia     Hypertension     Osteoarthritis     all joints    PONV (postoperative nausea and vomiting)     Sleep apnea     BIPAP    Unspecified sleep apnea      SURGICAL HISTORY       Past Surgical History:   Procedure Laterality Date    BICEPS TENDON REPAIR Left     COLONOSCOPY  11 14 14    normal    ELBOW FRACTURE SURGERY Right     right arm/fractur    ENDOSCOPY, COLON, DIAGNOSTIC      several times    FRACTURE SURGERY Right     elbow    KNEE ARTHROSCOPY Bilateral     LIVER BIOPSY  8/14/2015    moderate steatosis, grade 2, lobular and portal inflammation: Grade 1 total score =3, fibrosis score 1A    OTHER SURGICAL HISTORY  11/07/2018    lumbar myelogram    ROTATOR CUFF REPAIR Right     SPINE SURGERY      neck fustion        FAMILY HISTORY       Family History   Problem Relation Age of Onset    Heart Disease Mother     Cancer Father         bladder   

## 2018-12-17 ENCOUNTER — HOSPITAL ENCOUNTER (OUTPATIENT)
Dept: NUCLEAR MEDICINE | Age: 63
Discharge: HOME | End: 2018-12-19
Payer: COMMERCIAL

## 2018-12-17 ENCOUNTER — HOSPITAL ENCOUNTER (OUTPATIENT)
Dept: NON INVASIVE DIAGNOSTICS | Age: 63
Discharge: HOME | End: 2018-12-17
Payer: COMMERCIAL

## 2018-12-17 VITALS — WEIGHT: 212 LBS | HEIGHT: 68 IN | BODY MASS INDEX: 32.13 KG/M2

## 2018-12-17 DIAGNOSIS — Z01.818 PRE-OP EXAM: ICD-10-CM

## 2018-12-17 DIAGNOSIS — I10 HYPERTENSION, UNSPECIFIED TYPE: ICD-10-CM

## 2018-12-17 DIAGNOSIS — R06.02 SOB (SHORTNESS OF BREATH): ICD-10-CM

## 2018-12-17 LAB
LV EF: 50 %
LVEF MODALITY: NORMAL

## 2018-12-17 PROCEDURE — 3430000000 HC RX DIAGNOSTIC RADIOPHARMACEUTICAL: Performed by: INTERNAL MEDICINE

## 2018-12-17 PROCEDURE — A9500 TC99M SESTAMIBI: HCPCS | Performed by: INTERNAL MEDICINE

## 2018-12-17 PROCEDURE — 93017 CV STRESS TEST TRACING ONLY: CPT

## 2018-12-17 PROCEDURE — 2580000003 HC RX 258: Performed by: INTERNAL MEDICINE

## 2018-12-17 PROCEDURE — 2580000003 HC RX 258: Performed by: PHYSICIAN ASSISTANT

## 2018-12-17 PROCEDURE — 6360000002 HC RX W HCPCS: Performed by: PHYSICIAN ASSISTANT

## 2018-12-17 PROCEDURE — 78452 HT MUSCLE IMAGE SPECT MULT: CPT

## 2018-12-17 RX ORDER — 0.9 % SODIUM CHLORIDE 0.9 %
250 INTRAVENOUS SOLUTION INTRAVENOUS ONCE
Status: DISCONTINUED | OUTPATIENT
Start: 2018-12-17 | End: 2018-12-20 | Stop reason: HOSPADM

## 2018-12-17 RX ORDER — SODIUM CHLORIDE 0.9 % (FLUSH) 0.9 %
10 SYRINGE (ML) INJECTION PRN
Status: ACTIVE | OUTPATIENT
Start: 2018-12-17 | End: 2018-12-18

## 2018-12-17 RX ORDER — SODIUM CHLORIDE 0.9 % (FLUSH) 0.9 %
10 SYRINGE (ML) INJECTION PRN
Status: DISCONTINUED | OUTPATIENT
Start: 2018-12-17 | End: 2018-12-20 | Stop reason: HOSPADM

## 2018-12-17 RX ORDER — AMINOPHYLLINE DIHYDRATE 25 MG/ML
100 INJECTION, SOLUTION INTRAVENOUS
Status: ACTIVE | OUTPATIENT
Start: 2018-12-17 | End: 2018-12-17

## 2018-12-17 RX ORDER — METOPROLOL TARTRATE 5 MG/5ML
2.5 INJECTION INTRAVENOUS PRN
Status: ACTIVE | OUTPATIENT
Start: 2018-12-17 | End: 2018-12-18

## 2018-12-17 RX ORDER — NITROGLYCERIN 0.4 MG/1
0.4 TABLET SUBLINGUAL EVERY 5 MIN PRN
Status: ACTIVE | OUTPATIENT
Start: 2018-12-17 | End: 2018-12-18

## 2018-12-17 RX ADMIN — TETRAKIS(2-METHOXYISOBUTYLISOCYANIDE)COPPER(I) TETRAFLUOROBORATE 31.1 MILLICURIE: 1 INJECTION, POWDER, LYOPHILIZED, FOR SOLUTION INTRAVENOUS at 09:35

## 2018-12-17 RX ADMIN — Medication 10 ML: at 09:31

## 2018-12-17 RX ADMIN — REGADENOSON 0.4 MG: 0.08 INJECTION, SOLUTION INTRAVENOUS at 09:31

## 2018-12-17 RX ADMIN — TETRAKIS(2-METHOXYISOBUTYLISOCYANIDE)COPPER(I) TETRAFLUOROBORATE 12.6 MILLICURIE: 1 INJECTION, POWDER, LYOPHILIZED, FOR SOLUTION INTRAVENOUS at 07:27

## 2018-12-17 RX ADMIN — Medication 10 ML: at 07:27

## 2018-12-17 NOTE — PROCEDURES
[address] [address]                              CARDIAC STRESS TEST    PATIENT NAME: Kirt Ac                :        1955  MED REC NO:   [id number]                              ROOM:  ACCOUNT NO:   [de-identified]                           ADMIT DATE: 2018  PROVIDER:     Vera Piña    DATE OF STUDY:  2018    ORDERING PROVIDER: Sienna Peres DO  PRIMARY CARE PROVIDER: Veda Comer  INTERPRETING PHYSICIAN: Navin Perez MD  LEXISCAN STRESS TEST  INDICATION:  HTN / DYSPNEA    INTERPRETATION:  Resting heart rate:  96 bpm.  Resting blood pressure:  133/95 mmhg. Resting Ekg:  Normal.  Stress heart response:  Normal response. Blood pressure response:  Appropriate. Stress Ekgs:  Normal.  No chest pain during stress. No ischemic Ekg changes. IMPRESSION:  NEGATIVE LEXISCAN STRESS TEST. THE NUCLEAR SCAN TO FOLLOW.           Sherin Argueta    D: 2018 11:18:09       T: 2018 11:19:04     SIOBHAN/JASPER  Job#: [id number]     Doc#: Unknown    CC:    (Retain this field even if not dictated or not decipherable)

## 2019-01-02 ENCOUNTER — HOSPITAL ENCOUNTER (OUTPATIENT)
Dept: CARDIAC CATH/INVASIVE PROCEDURES | Age: 64
Discharge: HOME | End: 2019-01-03
Attending: INTERNAL MEDICINE | Admitting: INTERNAL MEDICINE
Payer: COMMERCIAL

## 2019-01-02 DIAGNOSIS — Z98.890 S/P CARDIAC CATHETERIZATION: ICD-10-CM

## 2019-01-02 LAB
BUN BLDV-MCNC: 20 MG/DL (ref 8–23)
GFR NON-AFRICAN AMERICAN: >60 ML/MIN
GFR SERPL CREATININE-BSD FRML MDRD: >60 ML/MIN
GFR SERPL CREATININE-BSD FRML MDRD: NORMAL ML/MIN/{1.73_M2}
GLUCOSE BLD-MCNC: 166 MG/DL (ref 74–100)
GLUCOSE BLD-MCNC: 167 MG/DL (ref 75–110)
PLATELET # BLD: 217 K/UL (ref 138–453)
POC CHLORIDE: 104 MMOL/L (ref 98–107)
POC CREATININE: 1.17 MG/DL (ref 0.51–1.19)
POC HEMATOCRIT: 47 % (ref 41–53)
POC HEMOGLOBIN: 16 G/DL (ref 13.5–17.5)
POC POTASSIUM: 4.9 MMOL/L (ref 3.5–4.5)
POC SODIUM: 141 MMOL/L (ref 138–146)
TROPONIN INTERP: NORMAL
TROPONIN T: NORMAL NG/ML
TROPONIN, HIGH SENSITIVITY: 18 NG/L (ref 0–22)

## 2019-01-02 PROCEDURE — C1769 GUIDE WIRE: HCPCS

## 2019-01-02 PROCEDURE — 85014 HEMATOCRIT: CPT

## 2019-01-02 PROCEDURE — 6360000004 HC RX CONTRAST MEDICATION

## 2019-01-02 PROCEDURE — 2709999900 HC NON-CHARGEABLE SUPPLY

## 2019-01-02 PROCEDURE — 2500000003 HC RX 250 WO HCPCS

## 2019-01-02 PROCEDURE — C1887 CATHETER, GUIDING: HCPCS

## 2019-01-02 PROCEDURE — 93458 L HRT ARTERY/VENTRICLE ANGIO: CPT | Performed by: INTERNAL MEDICINE

## 2019-01-02 PROCEDURE — 85049 AUTOMATED PLATELET COUNT: CPT

## 2019-01-02 PROCEDURE — 84132 ASSAY OF SERUM POTASSIUM: CPT

## 2019-01-02 PROCEDURE — 6370000000 HC RX 637 (ALT 250 FOR IP)

## 2019-01-02 PROCEDURE — C1876 STENT, NON-COA/NON-COV W/DEL: HCPCS

## 2019-01-02 PROCEDURE — 2580000003 HC RX 258: Performed by: INTERNAL MEDICINE

## 2019-01-02 PROCEDURE — 82947 ASSAY GLUCOSE BLOOD QUANT: CPT

## 2019-01-02 PROCEDURE — 7100000011 HC PHASE II RECOVERY - ADDTL 15 MIN

## 2019-01-02 PROCEDURE — C1725 CATH, TRANSLUMIN NON-LASER: HCPCS

## 2019-01-02 PROCEDURE — 92928 PRQ TCAT PLMT NTRAC ST 1 LES: CPT | Performed by: INTERNAL MEDICINE

## 2019-01-02 PROCEDURE — 7100000010 HC PHASE II RECOVERY - FIRST 15 MIN

## 2019-01-02 PROCEDURE — 82435 ASSAY OF BLOOD CHLORIDE: CPT

## 2019-01-02 PROCEDURE — 84520 ASSAY OF UREA NITROGEN: CPT

## 2019-01-02 PROCEDURE — 84484 ASSAY OF TROPONIN QUANT: CPT

## 2019-01-02 PROCEDURE — 6360000002 HC RX W HCPCS

## 2019-01-02 PROCEDURE — C1894 INTRO/SHEATH, NON-LASER: HCPCS

## 2019-01-02 PROCEDURE — 82565 ASSAY OF CREATININE: CPT

## 2019-01-02 PROCEDURE — 84295 ASSAY OF SERUM SODIUM: CPT

## 2019-01-02 RX ORDER — NORTRIPTYLINE HYDROCHLORIDE 25 MG/1
50 CAPSULE ORAL 2 TIMES DAILY
Status: DISCONTINUED | OUTPATIENT
Start: 2019-01-02 | End: 2019-01-03 | Stop reason: HOSPADM

## 2019-01-02 RX ORDER — PANTOPRAZOLE SODIUM 40 MG/1
40 TABLET, DELAYED RELEASE ORAL
Status: DISCONTINUED | OUTPATIENT
Start: 2019-01-02 | End: 2019-01-03 | Stop reason: HOSPADM

## 2019-01-02 RX ORDER — ATORVASTATIN CALCIUM 80 MG/1
80 TABLET, FILM COATED ORAL NIGHTLY
Status: DISCONTINUED | OUTPATIENT
Start: 2019-01-02 | End: 2019-01-03 | Stop reason: HOSPADM

## 2019-01-02 RX ORDER — TAMSULOSIN HYDROCHLORIDE 0.4 MG/1
0.4 CAPSULE ORAL DAILY
Status: DISCONTINUED | OUTPATIENT
Start: 2019-01-03 | End: 2019-01-03 | Stop reason: HOSPADM

## 2019-01-02 RX ORDER — METOPROLOL SUCCINATE 50 MG/1
50 TABLET, EXTENDED RELEASE ORAL DAILY
COMMUNITY
End: 2019-02-01 | Stop reason: DRUGHIGH

## 2019-01-02 RX ORDER — ACETAMINOPHEN 325 MG/1
650 TABLET ORAL EVERY 4 HOURS PRN
Status: DISCONTINUED | OUTPATIENT
Start: 2019-01-02 | End: 2019-01-03 | Stop reason: HOSPADM

## 2019-01-02 RX ORDER — FENOFIBRATE 160 MG/1
160 TABLET ORAL DAILY
Status: DISCONTINUED | OUTPATIENT
Start: 2019-01-03 | End: 2019-01-03 | Stop reason: HOSPADM

## 2019-01-02 RX ORDER — ASPIRIN 325 MG
325 TABLET ORAL ONCE
Status: COMPLETED | OUTPATIENT
Start: 2019-01-02 | End: 2019-01-02

## 2019-01-02 RX ORDER — FINASTERIDE 5 MG/1
5 TABLET, FILM COATED ORAL DAILY
Status: DISCONTINUED | OUTPATIENT
Start: 2019-01-02 | End: 2019-01-03 | Stop reason: HOSPADM

## 2019-01-02 RX ORDER — LISINOPRIL AND HYDROCHLOROTHIAZIDE 20; 12.5 MG/1; MG/1
1 TABLET ORAL DAILY
Status: DISCONTINUED | OUTPATIENT
Start: 2019-01-02 | End: 2019-01-02

## 2019-01-02 RX ORDER — ASPIRIN 325 MG
325 TABLET ORAL ONCE
Status: CANCELLED | OUTPATIENT
Start: 2019-01-02 | End: 2019-01-02

## 2019-01-02 RX ORDER — CYCLOBENZAPRINE HCL 10 MG
10 TABLET ORAL DAILY
Status: DISCONTINUED | OUTPATIENT
Start: 2019-01-02 | End: 2019-01-03 | Stop reason: HOSPADM

## 2019-01-02 RX ORDER — METOPROLOL SUCCINATE 50 MG/1
50 TABLET, EXTENDED RELEASE ORAL DAILY
Status: DISCONTINUED | OUTPATIENT
Start: 2019-01-02 | End: 2019-01-03 | Stop reason: HOSPADM

## 2019-01-02 RX ORDER — ASPIRIN 81 MG/1
81 TABLET, CHEWABLE ORAL DAILY
Status: DISCONTINUED | OUTPATIENT
Start: 2019-01-03 | End: 2019-01-03 | Stop reason: HOSPADM

## 2019-01-02 RX ORDER — SODIUM CHLORIDE 0.9 % (FLUSH) 0.9 %
10 SYRINGE (ML) INJECTION EVERY 12 HOURS SCHEDULED
Status: DISCONTINUED | OUTPATIENT
Start: 2019-01-02 | End: 2019-01-03 | Stop reason: HOSPADM

## 2019-01-02 RX ORDER — SODIUM CHLORIDE 9 MG/ML
INJECTION, SOLUTION INTRAVENOUS CONTINUOUS
Status: DISCONTINUED | OUTPATIENT
Start: 2019-01-02 | End: 2019-01-03 | Stop reason: HOSPADM

## 2019-01-02 RX ORDER — LISINOPRIL AND HYDROCHLOROTHIAZIDE 20; 12.5 MG/1; MG/1
1 TABLET ORAL DAILY
Status: DISCONTINUED | OUTPATIENT
Start: 2019-01-02 | End: 2019-01-03 | Stop reason: HOSPADM

## 2019-01-02 RX ORDER — SODIUM CHLORIDE 0.9 % (FLUSH) 0.9 %
10 SYRINGE (ML) INJECTION PRN
Status: DISCONTINUED | OUTPATIENT
Start: 2019-01-02 | End: 2019-01-03 | Stop reason: HOSPADM

## 2019-01-02 RX ORDER — GABAPENTIN 300 MG/1
300 CAPSULE ORAL 3 TIMES DAILY
Status: DISCONTINUED | OUTPATIENT
Start: 2019-01-02 | End: 2019-01-03 | Stop reason: HOSPADM

## 2019-01-02 RX ADMIN — Medication 10 ML: at 21:06

## 2019-01-02 RX ADMIN — Medication 325 MG: at 15:26

## 2019-01-02 RX ADMIN — SODIUM CHLORIDE: 9 INJECTION, SOLUTION INTRAVENOUS at 14:54

## 2019-01-03 VITALS
RESPIRATION RATE: 18 BRPM | BODY MASS INDEX: 32.78 KG/M2 | WEIGHT: 216.3 LBS | SYSTOLIC BLOOD PRESSURE: 128 MMHG | OXYGEN SATURATION: 97 % | HEIGHT: 68 IN | HEART RATE: 83 BPM | TEMPERATURE: 97.2 F | DIASTOLIC BLOOD PRESSURE: 89 MMHG

## 2019-01-03 LAB — GLUCOSE BLD-MCNC: 200 MG/DL (ref 75–110)

## 2019-01-03 PROCEDURE — 6370000000 HC RX 637 (ALT 250 FOR IP): Performed by: INTERNAL MEDICINE

## 2019-01-03 PROCEDURE — 82947 ASSAY GLUCOSE BLOOD QUANT: CPT

## 2019-01-03 PROCEDURE — 2580000003 HC RX 258: Performed by: INTERNAL MEDICINE

## 2019-01-03 RX ORDER — ASPIRIN 81 MG/1
81 TABLET, CHEWABLE ORAL DAILY
Qty: 30 TABLET | Refills: 3 | Status: ON HOLD | OUTPATIENT
Start: 2019-01-04 | End: 2022-05-31 | Stop reason: HOSPADM

## 2019-01-03 RX ORDER — ATORVASTATIN CALCIUM 80 MG/1
80 TABLET, FILM COATED ORAL NIGHTLY
Qty: 30 TABLET | Refills: 3 | Status: SHIPPED | OUTPATIENT
Start: 2019-01-03 | End: 2019-02-01 | Stop reason: ALTCHOICE

## 2019-01-03 RX ORDER — NITROGLYCERIN 0.4 MG/1
0.4 TABLET SUBLINGUAL EVERY 5 MIN PRN
Qty: 25 TABLET | Refills: 3 | Status: SHIPPED | OUTPATIENT
Start: 2019-01-03 | End: 2021-05-05

## 2019-01-03 RX ADMIN — TAMSULOSIN HYDROCHLORIDE 0.4 MG: 0.4 CAPSULE ORAL at 08:55

## 2019-01-03 RX ADMIN — NORTRIPTYLINE HYDROCHLORIDE 50 MG: 25 CAPSULE ORAL at 08:57

## 2019-01-03 RX ADMIN — METOPROLOL SUCCINATE 50 MG: 50 TABLET, EXTENDED RELEASE ORAL at 08:54

## 2019-01-03 RX ADMIN — LISINOPRIL AND HYDROCHLOROTHIAZIDE 1 TABLET: 12.5; 2 TABLET ORAL at 00:26

## 2019-01-03 RX ADMIN — CYCLOBENZAPRINE 10 MG: 10 TABLET, FILM COATED ORAL at 09:00

## 2019-01-03 RX ADMIN — FENOFIBRATE 160 MG: 160 TABLET ORAL at 08:56

## 2019-01-03 RX ADMIN — FINASTERIDE 5 MG: 5 TABLET, FILM COATED ORAL at 08:55

## 2019-01-03 RX ADMIN — Medication 10 ML: at 08:15

## 2019-01-03 RX ADMIN — GABAPENTIN 300 MG: 300 CAPSULE ORAL at 08:56

## 2019-01-03 RX ADMIN — LISINOPRIL AND HYDROCHLOROTHIAZIDE 1 TABLET: 12.5; 2 TABLET ORAL at 09:00

## 2019-01-03 RX ADMIN — PANTOPRAZOLE SODIUM 40 MG: 40 TABLET, DELAYED RELEASE ORAL at 09:01

## 2019-01-03 RX ADMIN — Medication 81 MG: at 08:58

## 2019-01-03 RX ADMIN — TICAGRELOR 90 MG: 90 TABLET ORAL at 08:58

## 2019-02-01 ENCOUNTER — HOSPITAL ENCOUNTER (OUTPATIENT)
Dept: CARDIAC REHAB | Age: 64
Setting detail: THERAPIES SERIES
Discharge: HOME | End: 2019-02-01
Payer: COMMERCIAL

## 2019-02-01 VITALS — HEIGHT: 68 IN | WEIGHT: 217.8 LBS | BODY MASS INDEX: 33.01 KG/M2

## 2019-02-01 PROCEDURE — 93798 PHYS/QHP OP CAR RHAB W/ECG: CPT

## 2019-02-01 RX ORDER — GABAPENTIN 300 MG/1
600 CAPSULE ORAL 3 TIMES DAILY
COMMUNITY

## 2019-02-01 RX ORDER — SIMVASTATIN 40 MG
40 TABLET ORAL NIGHTLY
COMMUNITY
End: 2019-02-28 | Stop reason: ALTCHOICE

## 2019-02-01 RX ORDER — METOPROLOL SUCCINATE 100 MG/1
50 TABLET, EXTENDED RELEASE ORAL DAILY
COMMUNITY
End: 2022-09-15 | Stop reason: SDUPTHER

## 2019-02-01 ASSESSMENT — PATIENT HEALTH QUESTIONNAIRE - PHQ9: SUM OF ALL RESPONSES TO PHQ QUESTIONS 1-9: 9

## 2019-02-04 ENCOUNTER — HOSPITAL ENCOUNTER (OUTPATIENT)
Dept: CARDIAC REHAB | Age: 64
Setting detail: THERAPIES SERIES
Discharge: HOME | End: 2019-02-04
Payer: COMMERCIAL

## 2019-02-04 VITALS — WEIGHT: 217.8 LBS | BODY MASS INDEX: 33.12 KG/M2

## 2019-02-04 PROCEDURE — 93798 PHYS/QHP OP CAR RHAB W/ECG: CPT

## 2019-02-06 ENCOUNTER — HOSPITAL ENCOUNTER (OUTPATIENT)
Dept: CARDIAC REHAB | Age: 64
Setting detail: THERAPIES SERIES
Discharge: HOME | End: 2019-02-06
Payer: COMMERCIAL

## 2019-02-06 VITALS — WEIGHT: 217.9 LBS | BODY MASS INDEX: 33.13 KG/M2

## 2019-02-06 PROCEDURE — 93798 PHYS/QHP OP CAR RHAB W/ECG: CPT

## 2019-02-08 ENCOUNTER — HOSPITAL ENCOUNTER (OUTPATIENT)
Dept: CARDIAC REHAB | Age: 64
Setting detail: THERAPIES SERIES
Discharge: HOME | End: 2019-02-08
Payer: COMMERCIAL

## 2019-02-08 VITALS — BODY MASS INDEX: 32.83 KG/M2 | WEIGHT: 215.9 LBS

## 2019-02-08 PROCEDURE — 93798 PHYS/QHP OP CAR RHAB W/ECG: CPT

## 2019-02-11 ENCOUNTER — HOSPITAL ENCOUNTER (OUTPATIENT)
Dept: CARDIAC REHAB | Age: 64
Setting detail: THERAPIES SERIES
Discharge: HOME | End: 2019-02-11
Payer: COMMERCIAL

## 2019-02-11 VITALS — WEIGHT: 215.2 LBS | BODY MASS INDEX: 32.72 KG/M2

## 2019-02-11 PROCEDURE — 93798 PHYS/QHP OP CAR RHAB W/ECG: CPT

## 2019-02-13 ENCOUNTER — HOSPITAL ENCOUNTER (OUTPATIENT)
Dept: CARDIAC REHAB | Age: 64
Setting detail: THERAPIES SERIES
Discharge: HOME | End: 2019-02-13
Payer: COMMERCIAL

## 2019-02-13 VITALS — BODY MASS INDEX: 32.23 KG/M2 | WEIGHT: 211.9 LBS

## 2019-02-13 PROCEDURE — 93798 PHYS/QHP OP CAR RHAB W/ECG: CPT

## 2019-02-15 ENCOUNTER — HOSPITAL ENCOUNTER (OUTPATIENT)
Dept: CARDIAC REHAB | Age: 64
Setting detail: THERAPIES SERIES
Discharge: HOME | End: 2019-02-15
Payer: COMMERCIAL

## 2019-02-15 VITALS — BODY MASS INDEX: 32.12 KG/M2 | WEIGHT: 211.2 LBS

## 2019-02-15 PROCEDURE — 93798 PHYS/QHP OP CAR RHAB W/ECG: CPT

## 2019-02-18 ENCOUNTER — HOSPITAL ENCOUNTER (OUTPATIENT)
Dept: CARDIAC REHAB | Age: 64
Setting detail: THERAPIES SERIES
Discharge: HOME | End: 2019-02-18
Payer: COMMERCIAL

## 2019-02-18 VITALS — BODY MASS INDEX: 32.24 KG/M2 | WEIGHT: 212 LBS

## 2019-02-18 PROCEDURE — 93798 PHYS/QHP OP CAR RHAB W/ECG: CPT

## 2019-02-20 ENCOUNTER — HOSPITAL ENCOUNTER (OUTPATIENT)
Dept: CARDIAC REHAB | Age: 64
Setting detail: THERAPIES SERIES
Discharge: HOME | End: 2019-02-20
Payer: COMMERCIAL

## 2019-02-20 VITALS — WEIGHT: 213 LBS | BODY MASS INDEX: 32.39 KG/M2

## 2019-02-20 PROCEDURE — 93798 PHYS/QHP OP CAR RHAB W/ECG: CPT

## 2019-02-22 ENCOUNTER — HOSPITAL ENCOUNTER (OUTPATIENT)
Dept: CARDIAC REHAB | Age: 64
Setting detail: THERAPIES SERIES
Discharge: HOME | End: 2019-02-22
Payer: COMMERCIAL

## 2019-02-22 VITALS — WEIGHT: 212 LBS | BODY MASS INDEX: 32.13 KG/M2 | HEIGHT: 68 IN

## 2019-02-22 PROCEDURE — 93798 PHYS/QHP OP CAR RHAB W/ECG: CPT

## 2019-02-26 ENCOUNTER — HOSPITAL ENCOUNTER (OUTPATIENT)
Age: 64
Discharge: HOME | End: 2019-02-26
Payer: COMMERCIAL

## 2019-02-26 PROCEDURE — 9900000061 HC CARDIAC REHAB PHASE 3 - 5 VISTS

## 2019-02-27 ENCOUNTER — APPOINTMENT (OUTPATIENT)
Dept: CARDIAC REHAB | Age: 64
End: 2019-02-27
Payer: COMMERCIAL

## 2019-03-02 ENCOUNTER — HOSPITAL ENCOUNTER (OUTPATIENT)
Age: 64
Discharge: HOME | End: 2019-03-02
Payer: COMMERCIAL

## 2019-03-02 DIAGNOSIS — E78.5 HYPERLIPIDEMIA, UNSPECIFIED HYPERLIPIDEMIA TYPE: ICD-10-CM

## 2019-03-02 DIAGNOSIS — K21.9 GASTROESOPHAGEAL REFLUX DISEASE WITHOUT ESOPHAGITIS: ICD-10-CM

## 2019-03-02 DIAGNOSIS — E11.9 TYPE 2 DIABETES MELLITUS WITHOUT COMPLICATION, WITHOUT LONG-TERM CURRENT USE OF INSULIN (HCC): ICD-10-CM

## 2019-03-02 DIAGNOSIS — M15.9 OSTEOARTHRITIS OF MULTIPLE JOINTS, UNSPECIFIED OSTEOARTHRITIS TYPE: ICD-10-CM

## 2019-03-02 DIAGNOSIS — E55.9 VITAMIN D INSUFFICIENCY: ICD-10-CM

## 2019-03-02 DIAGNOSIS — K76.0 STEATOSIS OF LIVER: ICD-10-CM

## 2019-03-02 LAB
ABSOLUTE EOS #: 0.5 K/UL (ref 0–0.4)
ABSOLUTE IMMATURE GRANULOCYTE: ABNORMAL K/UL (ref 0–0.3)
ABSOLUTE LYMPH #: 1.6 K/UL (ref 1–4.8)
ABSOLUTE MONO #: 0.6 K/UL (ref 0.1–1.3)
ALBUMIN SERPL-MCNC: 4.3 G/DL (ref 3.5–5.2)
ALBUMIN/GLOBULIN RATIO: ABNORMAL (ref 1–2.5)
ALP BLD-CCNC: 77 U/L (ref 40–129)
ALT SERPL-CCNC: 77 U/L (ref 5–41)
ANION GAP SERPL CALCULATED.3IONS-SCNC: 10 MMOL/L (ref 9–17)
AST SERPL-CCNC: 37 U/L
BASOPHILS # BLD: 1 % (ref 0–2)
BASOPHILS ABSOLUTE: 0.1 K/UL (ref 0–0.2)
BILIRUB SERPL-MCNC: 0.39 MG/DL (ref 0.3–1.2)
BUN BLDV-MCNC: 25 MG/DL (ref 8–23)
BUN/CREAT BLD: ABNORMAL (ref 9–20)
CALCIUM SERPL-MCNC: 10.9 MG/DL (ref 8.6–10.4)
CHLORIDE BLD-SCNC: 102 MMOL/L (ref 98–107)
CHOLESTEROL/HDL RATIO: 7.8
CHOLESTEROL: 194 MG/DL
CO2: 26 MMOL/L (ref 20–31)
CREAT SERPL-MCNC: 1.26 MG/DL (ref 0.7–1.2)
DIFFERENTIAL TYPE: ABNORMAL
EOSINOPHILS RELATIVE PERCENT: 6 % (ref 0–4)
GFR AFRICAN AMERICAN: >60 ML/MIN
GFR NON-AFRICAN AMERICAN: 58 ML/MIN
GFR SERPL CREATININE-BSD FRML MDRD: ABNORMAL ML/MIN/{1.73_M2}
GFR SERPL CREATININE-BSD FRML MDRD: ABNORMAL ML/MIN/{1.73_M2}
GLUCOSE BLD-MCNC: 173 MG/DL (ref 70–99)
HCT VFR BLD CALC: 48 % (ref 41–53)
HDLC SERPL-MCNC: 25 MG/DL
HEMOGLOBIN: 16 G/DL (ref 13.5–17.5)
IMMATURE GRANULOCYTES: ABNORMAL %
LDL CHOLESTEROL DIRECT: 100 MG/DL
LDL CHOLESTEROL: ABNORMAL MG/DL (ref 0–130)
LYMPHOCYTES # BLD: 17 % (ref 24–44)
MCH RBC QN AUTO: 30.4 PG (ref 26–34)
MCHC RBC AUTO-ENTMCNC: 33.3 G/DL (ref 31–37)
MCV RBC AUTO: 91.2 FL (ref 80–100)
MONOCYTES # BLD: 7 % (ref 1–7)
NRBC AUTOMATED: ABNORMAL PER 100 WBC
PDW BLD-RTO: 14.6 % (ref 11.5–14.9)
PLATELET # BLD: 261 K/UL (ref 150–450)
PLATELET ESTIMATE: ABNORMAL
PMV BLD AUTO: 8.5 FL (ref 6–12)
POTASSIUM SERPL-SCNC: 4.9 MMOL/L (ref 3.7–5.3)
RBC # BLD: 5.26 M/UL (ref 4.5–5.9)
RBC # BLD: ABNORMAL 10*6/UL
SEG NEUTROPHILS: 69 % (ref 36–66)
SEGMENTED NEUTROPHILS ABSOLUTE COUNT: 6.6 K/UL (ref 1.3–9.1)
SODIUM BLD-SCNC: 138 MMOL/L (ref 135–144)
TOTAL PROTEIN: 7.4 G/DL (ref 6.4–8.3)
TRIGL SERPL-MCNC: 603 MG/DL
VITAMIN D 25-HYDROXY: 18.2 NG/ML (ref 30–100)
VLDLC SERPL CALC-MCNC: ABNORMAL MG/DL (ref 1–30)
WBC # BLD: 9.3 K/UL (ref 3.5–11)
WBC # BLD: ABNORMAL 10*3/UL

## 2019-03-02 PROCEDURE — 85025 COMPLETE CBC W/AUTO DIFF WBC: CPT

## 2019-03-02 PROCEDURE — 83721 ASSAY OF BLOOD LIPOPROTEIN: CPT

## 2019-03-02 PROCEDURE — 80061 LIPID PANEL: CPT

## 2019-03-02 PROCEDURE — 36415 COLL VENOUS BLD VENIPUNCTURE: CPT

## 2019-03-02 PROCEDURE — 80053 COMPREHEN METABOLIC PANEL: CPT

## 2019-03-02 PROCEDURE — 82306 VITAMIN D 25 HYDROXY: CPT

## 2019-03-19 ENCOUNTER — HOSPITAL ENCOUNTER (OUTPATIENT)
Age: 64
Discharge: HOME | End: 2019-03-19
Payer: COMMERCIAL

## 2019-03-19 PROCEDURE — 9900000061 HC CARDIAC REHAB PHASE 3 - 5 VISTS

## 2019-04-11 ENCOUNTER — OFFICE VISIT (OUTPATIENT)
Dept: ORTHOPEDIC SURGERY | Age: 64
End: 2019-04-11
Payer: COMMERCIAL

## 2019-04-11 DIAGNOSIS — M54.5 CHRONIC LOW BACK PAIN, UNSPECIFIED BACK PAIN LATERALITY, WITH SCIATICA PRESENCE UNSPECIFIED: ICD-10-CM

## 2019-04-11 DIAGNOSIS — M48.061 SPINAL STENOSIS OF LUMBAR REGION, UNSPECIFIED WHETHER NEUROGENIC CLAUDICATION PRESENT: ICD-10-CM

## 2019-04-11 DIAGNOSIS — M51.26 LUMBAR DISC HERNIATION: ICD-10-CM

## 2019-04-11 DIAGNOSIS — M51.36 DEGENERATIVE DISC DISEASE, LUMBAR: Primary | ICD-10-CM

## 2019-04-11 DIAGNOSIS — G89.29 CHRONIC LOW BACK PAIN, UNSPECIFIED BACK PAIN LATERALITY, WITH SCIATICA PRESENCE UNSPECIFIED: ICD-10-CM

## 2019-04-11 DIAGNOSIS — M54.16 LUMBAR RADICULOPATHY, CHRONIC: ICD-10-CM

## 2019-04-11 PROCEDURE — 99213 OFFICE O/P EST LOW 20 MIN: CPT | Performed by: ORTHOPAEDIC SURGERY

## 2019-04-12 NOTE — PROGRESS NOTES
Subjective:      Patient ID: Maria Elena Allan is a 61 y.o. male. HPI  Refer to my previous clinic notes    Patient is here follow-up low back pain neurogenic claudication    Patient remains significantly symptomatic    Patient's surgery was canceled due to recent cardiac stent in need to be on antiplatelet drugs cardiology would not clear him    Patient is wishing for me to over -rule cardiology. Patient is miserable    Patient has failed physical therapy and lumbar epidural steroid injections in the past    Lumbar epidural steroid injections provided transient relief of pain    Insurance reportedly claimed that the patient had not done physical therapy prior to injections therefore did not pay for the injections and the patient was stuck with a $6,000  bill  Review of Systems    Objective:   Physical Exam   Constitutional: He is oriented to person, place, and time. He appears well-developed and well-nourished. HENT:   Head: Normocephalic and atraumatic. Eyes: Conjunctivae and EOM are normal.   Neck: Normal range of motion. Pulmonary/Chest: Effort normal. No respiratory distress. Neurological: He is alert and oriented to person, place, and time. He has normal strength. No sensory deficit. Normal gait   Skin: Skin is warm and dry. Psychiatric: His behavior is normal. Thought content normal.   Nursing note and vitals reviewed. Assessment:      Encounter Diagnoses   Name Primary?  Degenerative disc disease, lumbar Yes    Spinal stenosis of lumbar region, unspecified whether neurogenic claudication present     Lumbar radiculopathy, chronic     Chronic low back pain, unspecified back pain laterality, with sciatica presence unspecified     Lumbar disc herniation      Patient remains a candidate for surgical intervention.     Unfortunately I cannot over rule cardiology to move this surgery forward    Patient is scheduled to be seen by cardiology on June 13      Plan:      Follow-up on about Jazmin 15        Sparkle Mccormick MD

## 2019-04-18 ENCOUNTER — HOSPITAL ENCOUNTER (OUTPATIENT)
Age: 64
Discharge: HOME | End: 2019-04-18
Payer: COMMERCIAL

## 2019-04-18 PROCEDURE — 9900000061 HC CARDIAC REHAB PHASE 3 - 5 VISTS

## 2019-05-22 DIAGNOSIS — N13.8 BPH WITH OBSTRUCTION/LOWER URINARY TRACT SYMPTOMS: ICD-10-CM

## 2019-05-22 DIAGNOSIS — Z12.5 SCREENING FOR PROSTATE CANCER: Primary | ICD-10-CM

## 2019-05-22 DIAGNOSIS — N40.1 BPH WITH OBSTRUCTION/LOWER URINARY TRACT SYMPTOMS: ICD-10-CM

## 2019-05-24 ENCOUNTER — HOSPITAL ENCOUNTER (OUTPATIENT)
Age: 64
Discharge: HOME | End: 2019-05-24
Payer: COMMERCIAL

## 2019-05-24 DIAGNOSIS — Z12.5 SCREENING FOR PROSTATE CANCER: ICD-10-CM

## 2019-05-24 LAB — PROSTATE SPECIFIC ANTIGEN: 1.55 UG/L

## 2019-05-24 PROCEDURE — 36415 COLL VENOUS BLD VENIPUNCTURE: CPT

## 2019-05-24 PROCEDURE — G0103 PSA SCREENING: HCPCS

## 2019-05-24 RX ORDER — FINASTERIDE 5 MG/1
TABLET, FILM COATED ORAL
Qty: 90 TABLET | Refills: 3 | Status: SHIPPED | OUTPATIENT
Start: 2019-05-24 | End: 2020-07-07 | Stop reason: SDUPTHER

## 2019-05-28 ENCOUNTER — OFFICE VISIT (OUTPATIENT)
Dept: UROLOGY | Age: 64
End: 2019-05-28
Payer: COMMERCIAL

## 2019-05-28 VITALS
HEART RATE: 104 BPM | SYSTOLIC BLOOD PRESSURE: 122 MMHG | HEIGHT: 68 IN | BODY MASS INDEX: 32.1 KG/M2 | WEIGHT: 211.8 LBS | DIASTOLIC BLOOD PRESSURE: 86 MMHG | TEMPERATURE: 98.1 F

## 2019-05-28 DIAGNOSIS — Z12.5 PROSTATE CANCER SCREENING: ICD-10-CM

## 2019-05-28 DIAGNOSIS — R33.9 INCOMPLETE EMPTYING OF BLADDER: Primary | ICD-10-CM

## 2019-05-28 DIAGNOSIS — R35.1 NOCTURIA: ICD-10-CM

## 2019-05-28 DIAGNOSIS — R32 ENURESIS: ICD-10-CM

## 2019-05-28 PROCEDURE — 51798 US URINE CAPACITY MEASURE: CPT | Performed by: UROLOGY

## 2019-05-28 PROCEDURE — 99214 OFFICE O/P EST MOD 30 MIN: CPT | Performed by: UROLOGY

## 2019-05-28 ASSESSMENT — ENCOUNTER SYMPTOMS
VOMITING: 0
EYE REDNESS: 0
COLOR CHANGE: 0
SHORTNESS OF BREATH: 0
COUGH: 0
BACK PAIN: 1
EYE PAIN: 0
NAUSEA: 0
ABDOMINAL PAIN: 0
WHEEZING: 0

## 2019-05-28 NOTE — PROGRESS NOTES
Paternal Aunt     Cancer Paternal Aunt      No outpatient medications have been marked as taking for the 5/28/19 encounter (Office Visit) with Ron Payne MD.       Pcn [penicillins] and Codeine  Social History     Tobacco Use   Smoking Status Never Smoker   Smokeless Tobacco Never Used     (Ifpatient a smoker, smoking cessation counseling offered)    Social History     Substance and Sexual Activity   Alcohol Use No    Alcohol/week: 0.0 oz       REVIEW OF SYSTEMS:  Review of Systems    Physical Exam:      Vitals:    05/28/19 1049   BP: 122/86   Pulse: 104   Temp: 98.1 °F (36.7 °C)     Body mass index is 32.44 kg/m². Patient is a 61 y.o. male in no acute distress and alert and oriented to person, place and time. Physical Exam  Constitutional: Patient in no acute distress. Neuro: Alert and oriented to person, place and time. Psych: Mood normal, affect normal  Skin: No rash noted  Lungs: Respiratory effort is normal  Cardiovascular: Warm & Pink  Abdomen: Soft, non-tender, non-distended with no CVA,  No flank tenderness,  Or hepatosplenomegaly   Lymphatics: No palpablelymphadenopathy. Bladder non-tender and not distended. Musculoskeletal: Normal gait and station  Testiscles: Normal, bilaterally  Prostate:  40 grams, smooth, no nodules. Assessment and Plan      1. Incomplete emptying of bladder    2. Enuresis    3. Nocturia    4. Prostate cancer screening           Plan:          Doing well  Continue combination therapy. Restrict fluids before bed to help with nocturia and enuresis. Return in about 1 year (around 5/28/2020) for Labs. Prescriptions Ordered:  No orders of the defined types were placed in this encounter.     Orders Placed:  Orders Placed This Encounter   Procedures    PSA, Diagnostic     Standing Status:   Future     Standing Expiration Date:   5/27/2020    [place] MEASUREMENT,POST-VOID RESIDUAL VOLUME BY US,NON-IMAGING           Ron Payne MD    Agree with the ROS entered by the MA.

## 2019-05-28 NOTE — LETTER
[facility]  [facility] [place]  [address]  [address]  [address] [place] [id number]  Dept: [phone]  Dept Fax: [phone]        5/28/19    Patient: Lauro Mari  YOB: 1955    Dear Lisseth Pierce PA-C,    I had the pleasure of seeing one of your patients, Jenelle Said today in the office today. Below are the relevant portions of my assessment and plan of care. IMPRESSION:     1. Incomplete emptying of bladder    2. Enuresis    3. Nocturia    4. Prostate cancer screening        PLAN:        Doing well  Continue combination therapy. Restrict fluids before bed to help with nocturia and enuresis. Return in about 1 year (around 5/28/2020) for Labs. Prescriptions Ordered:  No orders of the defined types were placed in this encounter. Orders Placed:  Orders Placed This Encounter   Procedures    PSA, Diagnostic     Standing Status:   Future     Standing Expiration Date:   5/27/2020    AL MEASUREMENT,POST-VOID RESIDUAL VOLUME BY US,NON-IMAGING        Thank you for allowing me to participate in the care of this patient. I will keep you updated on this patient's follow up and I look forward to serving you and your patients again in the future.         Sharmaine Salcedo MD

## 2019-05-28 NOTE — PROGRESS NOTES
Review of Systems   Constitutional: Negative for appetite change, chills and fever. Eyes: Negative for pain, redness and visual disturbance. Respiratory: Negative for cough, shortness of breath and wheezing. Cardiovascular: Negative for chest pain and leg swelling. Gastrointestinal: Negative for abdominal pain, nausea and vomiting. Genitourinary: Positive for urgency (some). Negative for difficulty urinating, discharge, dysuria, flank pain, frequency, hematuria, scrotal swelling and testicular pain. Musculoskeletal: Positive for back pain and myalgias. Negative for joint swelling. Skin: Negative for color change, rash and wound. Neurological: Negative for dizziness, tremors and numbness. Hematological: Negative for adenopathy. Does not bruise/bleed easily.

## 2019-06-13 ENCOUNTER — OFFICE VISIT (OUTPATIENT)
Dept: ORTHOPEDIC SURGERY | Age: 64
End: 2019-06-13
Payer: COMMERCIAL

## 2019-06-13 DIAGNOSIS — M51.36 DEGENERATIVE DISC DISEASE, LUMBAR: Primary | ICD-10-CM

## 2019-06-13 DIAGNOSIS — M43.00 PARS DEFECT WITH SPONDYLOLISTHESIS: ICD-10-CM

## 2019-06-13 DIAGNOSIS — M48.061 SPINAL STENOSIS OF LUMBAR REGION, UNSPECIFIED WHETHER NEUROGENIC CLAUDICATION PRESENT: ICD-10-CM

## 2019-06-13 DIAGNOSIS — M51.26 LUMBAR DISC HERNIATION: ICD-10-CM

## 2019-06-13 DIAGNOSIS — M48.062 SPINAL STENOSIS OF LUMBAR REGION WITH NEUROGENIC CLAUDICATION: ICD-10-CM

## 2019-06-13 DIAGNOSIS — M54.16 LUMBAR RADICULOPATHY, CHRONIC: ICD-10-CM

## 2019-06-13 DIAGNOSIS — M54.5 CHRONIC LOW BACK PAIN, UNSPECIFIED BACK PAIN LATERALITY, WITH SCIATICA PRESENCE UNSPECIFIED: ICD-10-CM

## 2019-06-13 DIAGNOSIS — M43.10 PARS DEFECT WITH SPONDYLOLISTHESIS: ICD-10-CM

## 2019-06-13 DIAGNOSIS — G89.29 CHRONIC LOW BACK PAIN, UNSPECIFIED BACK PAIN LATERALITY, WITH SCIATICA PRESENCE UNSPECIFIED: ICD-10-CM

## 2019-06-13 PROCEDURE — 99213 OFFICE O/P EST LOW 20 MIN: CPT | Performed by: ORTHOPAEDIC SURGERY

## 2019-06-13 NOTE — PROGRESS NOTES
Subjective:      Patient ID: Dallas Noel is a 61 y.o. male. HPI  Refer to d all my previous clinic notes    History and physical exam unchanged with the exception that the patient is now been cleared by cardiology     continues to have severe low back pain neurogenic claudication and by radicular leg pain  Review of Systems    Objective:   Physical Exam   Constitutional: He is oriented to person, place, and time. He appears well-developed and well-nourished. HENT:   Head: Normocephalic and atraumatic. Eyes: Conjunctivae and EOM are normal.   Neck: Normal range of motion. Pulmonary/Chest: Effort normal. No respiratory distress. Neurological: He is alert and oriented to person, place, and time. He has normal strength. No sensory deficit. Normal gait   Skin: Skin is warm and dry. Psychiatric: His behavior is normal. Thought content normal.   Nursing note and vitals reviewed. T myelogram is again reviewed patient with severe developmental stenosis L3-4 L4-5 with a spondylitic spondylolisthesis L5-S1    Assessment:      Encounter Diagnoses   Name Primary?     Degenerative disc disease, lumbar Yes    Spinal stenosis of lumbar region, unspecified whether neurogenic claudication present     Lumbar radiculopathy, chronic     Chronic low back pain, unspecified back pain laterality, with sciatica presence unspecified     Lumbar disc herniation     Pars defect with spondylolisthesis     Spinal stenosis of lumbar region with neurogenic claudication            Plan:      Haleigh Portillo to reschedule surgery    Risk benefits complications again discussed particularly risks of catastrophic event        Rishabh Nicholas MD

## 2019-06-26 ENCOUNTER — HOSPITAL ENCOUNTER (OUTPATIENT)
Dept: PREADMISSION TESTING | Age: 64
Discharge: HOME | End: 2019-06-30
Payer: COMMERCIAL

## 2019-06-26 VITALS
DIASTOLIC BLOOD PRESSURE: 60 MMHG | HEART RATE: 101 BPM | HEIGHT: 68 IN | RESPIRATION RATE: 20 BRPM | TEMPERATURE: 98 F | SYSTOLIC BLOOD PRESSURE: 111 MMHG | WEIGHT: 207 LBS | OXYGEN SATURATION: 97 % | BODY MASS INDEX: 31.37 KG/M2

## 2019-06-26 LAB
ABSOLUTE EOS #: 0.8 K/UL (ref 0–0.4)
ABSOLUTE IMMATURE GRANULOCYTE: ABNORMAL K/UL (ref 0–0.3)
ABSOLUTE LYMPH #: 2 K/UL (ref 1–4.8)
ABSOLUTE MONO #: 0.7 K/UL (ref 0.1–1.3)
ANION GAP SERPL CALCULATED.3IONS-SCNC: 13 MMOL/L (ref 9–17)
BASOPHILS # BLD: 1 % (ref 0–2)
BASOPHILS ABSOLUTE: 0.1 K/UL (ref 0–0.2)
BUN BLDV-MCNC: 33 MG/DL (ref 8–23)
BUN/CREAT BLD: ABNORMAL (ref 9–20)
CALCIUM SERPL-MCNC: 12 MG/DL (ref 8.6–10.4)
CHLORIDE BLD-SCNC: 101 MMOL/L (ref 98–107)
CO2: 26 MMOL/L (ref 20–31)
CREAT SERPL-MCNC: 1.52 MG/DL (ref 0.7–1.2)
DIFFERENTIAL TYPE: ABNORMAL
EOSINOPHILS RELATIVE PERCENT: 10 % (ref 0–4)
GFR AFRICAN AMERICAN: 56 ML/MIN
GFR NON-AFRICAN AMERICAN: 46 ML/MIN
GFR SERPL CREATININE-BSD FRML MDRD: ABNORMAL ML/MIN/{1.73_M2}
GFR SERPL CREATININE-BSD FRML MDRD: ABNORMAL ML/MIN/{1.73_M2}
GLUCOSE BLD-MCNC: 134 MG/DL (ref 70–99)
HCT VFR BLD CALC: 51.1 % (ref 41–53)
HEMOGLOBIN: 16.9 G/DL (ref 13.5–17.5)
IMMATURE GRANULOCYTES: ABNORMAL %
LYMPHOCYTES # BLD: 24 % (ref 24–44)
MCH RBC QN AUTO: 30 PG (ref 26–34)
MCHC RBC AUTO-ENTMCNC: 33.1 G/DL (ref 31–37)
MCV RBC AUTO: 90.4 FL (ref 80–100)
MONOCYTES # BLD: 9 % (ref 1–7)
NRBC AUTOMATED: ABNORMAL PER 100 WBC
PDW BLD-RTO: 15.4 % (ref 11.5–14.9)
PLATELET # BLD: 309 K/UL (ref 150–450)
PLATELET ESTIMATE: ABNORMAL
PMV BLD AUTO: 8.7 FL (ref 6–12)
POTASSIUM SERPL-SCNC: 5.6 MMOL/L (ref 3.7–5.3)
RBC # BLD: 5.65 M/UL (ref 4.5–5.9)
RBC # BLD: ABNORMAL 10*6/UL
SEG NEUTROPHILS: 56 % (ref 36–66)
SEGMENTED NEUTROPHILS ABSOLUTE COUNT: 4.5 K/UL (ref 1.3–9.1)
SODIUM BLD-SCNC: 140 MMOL/L (ref 135–144)
WBC # BLD: 8.2 K/UL (ref 3.5–11)
WBC # BLD: ABNORMAL 10*3/UL

## 2019-06-26 PROCEDURE — 80048 BASIC METABOLIC PNL TOTAL CA: CPT

## 2019-06-26 PROCEDURE — 36415 COLL VENOUS BLD VENIPUNCTURE: CPT

## 2019-06-26 PROCEDURE — 85025 COMPLETE CBC W/AUTO DIFF WBC: CPT

## 2019-06-26 SDOH — HEALTH STABILITY: MENTAL HEALTH: HOW OFTEN DO YOU HAVE A DRINK CONTAINING ALCOHOL?: NEVER

## 2019-06-26 NOTE — H&P (VIEW-ONLY)
violence:     Fear of current or ex partner: None     Emotionally abused: None     Physically abused: None     Forced sexual activity: None   Other Topics Concern    None   Social History Narrative    None           REVIEW OF SYSTEMS      Allergies   Allergen Reactions    Pcn [Penicillins] Swelling     face    Codeine Nausea And Vomiting     Abd. pain       Current Outpatient Medications on File Prior to Encounter   Medication Sig Dispense Refill    VASCEPA 1 g CAPS capsule TAKE 2 CAPSULES BY MOUTH TWO TIMES DAILY 120 capsule 3    omeprazole (PRILOSEC) 40 MG delayed release capsule TAKE 1 CAPSULE BY MOUTH EVERY DAY 90 capsule 0    lisinopril-hydrochlorothiazide (PRINZIDE;ZESTORETIC) 20-12.5 MG per tablet TAKE 1 TABLET BY MOUTH EVERY DAY 90 tablet 0    [employer] COENZYME Q-10 100 MG CAPS capsule TAKE 1 CAPSULE BY MOUTH EVERY DAY 30 capsule 2    pravastatin (PRAVACHOL) 40 MG tablet TAKE 1 TABLET BY MOUTH EVERY DAY 30 tablet 2    finasteride (PROSCAR) 5 MG tablet TAKE 1 TABLET BY MOUTH EVERY DAY 90 tablet 3    JARDIANCE 25 MG tablet TAKE 1 TABLET BY MOUTH EVERY DAY 30 tablet 2    metFORMIN (GLUCOPHAGE) 1000 MG tablet TAKE 1 TABLET BY MOUTH TWICE A DAY (WITH MEALS) 180 tablet 0    simvastatin (ZOCOR) 40 MG tablet TAKE 1 TABLET BY MOUTH EVERY DAY AT NIGHT 30 tablet 3    gabapentin (NEURONTIN) 300 MG capsule Take 600 mg by mouth 2 times daily. [place] Seed metoprolol succinate (TOPROL XL) 100 MG extended release tablet Take 100 mg by mouth daily      aspirin 81 MG chewable tablet Take 1 tablet by mouth daily 30 tablet 3    ticagrelor (BRILINTA) 90 MG TABS tablet Take 1 tablet by mouth 2 times daily 60 tablet 12    nitroGLYCERIN (NITROSTAT) 0.4 MG SL tablet Place 1 tablet under the tongue every 5 minutes as needed for Chest pain up to max of 3 total doses.  If no relief after 1 dose, call 911. 25 tablet 3    tamsulosin (FLOMAX) 0.4 MG capsule TAKE 1 CAPSULE BY MOUTH TWICE A  capsule 2    fenofibrate 160 MG

## 2019-06-26 NOTE — H&P
HISTORY and [address]       NAME:  Sohail Benito  MRN: [id number]   YOB: 1955   Date: 6/26/2019   Age: 59 y.o. Gender: male       COMPLAINT AND PRESENT HISTORY:     Sohail Benito is 59 y.o.,  male, undergoing preadmission testing for final stenosis, lumbar radiculopathy, chronic, chronic low back pain with sciatica and lumbar disc herniation. Patient will be having LUMBAR LAMINECTOMY FUSION POSTERIOR - L3 SACRUM POSTERIOR LUMBAR DECOMPRESSION FUSION. Pt has DDD, C/O of pain in the lumbar spine area, radiates to the lower limbs worse on the Rt  side. Patient has failed physical therapy and lumbar epidural steroid injections in the past.  The symptoms started 20 years ago. Pt complains of shooting , shocky pain when it comes. States that standing up straight walking or bending over are more aggravating. Patient states that when he sits down or lies down it does help more with the pain. No recent falls or trauma.       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    Ear infection     LT, being treated    Fatty liver     Gallstones     GERD (gastroesophageal reflux disease)     Headache(784.0)     History of MI (myocardial infarction)     Hypercholesteremia     Hyperlipidemia     Hypertension     Kidney stones     Osteoarthritis     all joints    PONV (postoperative nausea and vomiting)     Sleep apnea     BIPAP    Unspecified sleep apnea        Pt denies any history of Diabetes mellitus type 2, hypertension, stroke, heart disease, COPD, Asthma, GERD, HLD, Cancer, Seizures,Thyroid disease, Kidney Disease, Hepatitis, TB, violence:     Fear of current or ex partner: None     Emotionally abused: None     Physically abused: None     Forced sexual activity: None   Other Topics Concern    None   Social History Narrative    None           REVIEW OF SYSTEMS      Allergies   Allergen Reactions    Pcn [Penicillins] Swelling     face    Codeine Nausea And Vomiting     Abd. pain       Current Outpatient Medications on File Prior to Encounter   Medication Sig Dispense Refill    VASCEPA 1 g CAPS capsule TAKE 2 CAPSULES BY MOUTH TWO TIMES DAILY 120 capsule 3    omeprazole (PRILOSEC) 40 MG delayed release capsule TAKE 1 CAPSULE BY MOUTH EVERY DAY 90 capsule 0    lisinopril-hydrochlorothiazide (PRINZIDE;ZESTORETIC) 20-12.5 MG per tablet TAKE 1 TABLET BY MOUTH EVERY DAY 90 tablet 0    [employer]S COENZYME Q-10 100 MG CAPS capsule TAKE 1 CAPSULE BY MOUTH EVERY DAY 30 capsule 2    pravastatin (PRAVACHOL) 40 MG tablet TAKE 1 TABLET BY MOUTH EVERY DAY 30 tablet 2    finasteride (PROSCAR) 5 MG tablet TAKE 1 TABLET BY MOUTH EVERY DAY 90 tablet 3    JARDIANCE 25 MG tablet TAKE 1 TABLET BY MOUTH EVERY DAY 30 tablet 2    metFORMIN (GLUCOPHAGE) 1000 MG tablet TAKE 1 TABLET BY MOUTH TWICE A DAY (WITH MEALS) 180 tablet 0    simvastatin (ZOCOR) 40 MG tablet TAKE 1 TABLET BY MOUTH EVERY DAY AT NIGHT 30 tablet 3    gabapentin (NEURONTIN) 300 MG capsule Take 600 mg by mouth 2 times daily. Rena Bee metoprolol succinate (TOPROL XL) 100 MG extended release tablet Take 100 mg by mouth daily      aspirin 81 MG chewable tablet Take 1 tablet by mouth daily 30 tablet 3    ticagrelor (BRILINTA) 90 MG TABS tablet Take 1 tablet by mouth 2 times daily 60 tablet 12    nitroGLYCERIN (NITROSTAT) 0.4 MG SL tablet Place 1 tablet under the tongue every 5 minutes as needed for Chest pain up to max of 3 total doses.  If no relief after 1 dose, call 911. 25 tablet 3    tamsulosin (FLOMAX) 0.4 MG capsule TAKE 1 CAPSULE BY MOUTH TWICE A  capsule 2    fenofibrate 160 MG

## 2019-06-27 ENCOUNTER — ANESTHESIA EVENT (OUTPATIENT)
Dept: OPERATING ROOM | Age: 64
DRG: 453 | End: 2019-06-27
Payer: COMMERCIAL

## 2019-06-27 RX ORDER — GLYCOPYRROLATE 0.2 MG/ML
0.1 INJECTION INTRAMUSCULAR; INTRAVENOUS ONCE
Status: CANCELLED | OUTPATIENT
Start: 2019-06-27 | End: 2019-06-27

## 2019-06-27 RX ORDER — SODIUM CHLORIDE, SODIUM LACTATE, POTASSIUM CHLORIDE, CALCIUM CHLORIDE 600; 310; 30; 20 MG/100ML; MG/100ML; MG/100ML; MG/100ML
INJECTION, SOLUTION INTRAVENOUS CONTINUOUS
Status: CANCELLED | OUTPATIENT
Start: 2019-06-27

## 2019-06-27 RX ORDER — SODIUM CHLORIDE 0.9 % (FLUSH) 0.9 %
10 SYRINGE (ML) INJECTION EVERY 12 HOURS SCHEDULED
Status: CANCELLED | OUTPATIENT
Start: 2019-06-27

## 2019-06-27 RX ORDER — SODIUM CHLORIDE 0.9 % (FLUSH) 0.9 %
10 SYRINGE (ML) INJECTION PRN
Status: CANCELLED | OUTPATIENT
Start: 2019-06-27

## 2019-06-27 RX ORDER — LIDOCAINE HYDROCHLORIDE 10 MG/ML
1 INJECTION, SOLUTION EPIDURAL; INFILTRATION; INTRACAUDAL; PERINEURAL
Status: CANCELLED | OUTPATIENT
Start: 2019-06-27 | End: 2019-06-27

## 2019-06-27 RX ORDER — SCOLOPAMINE TRANSDERMAL SYSTEM 1 MG/1
1 PATCH, EXTENDED RELEASE TRANSDERMAL
Status: CANCELLED | OUTPATIENT
Start: 2019-06-27 | End: 2019-06-30

## 2019-07-10 ENCOUNTER — APPOINTMENT (OUTPATIENT)
Dept: GENERAL RADIOLOGY | Age: 64
DRG: 453 | End: 2019-07-10
Attending: ORTHOPAEDIC SURGERY
Payer: COMMERCIAL

## 2019-07-10 ENCOUNTER — HOSPITAL ENCOUNTER (INPATIENT)
Age: 64
LOS: 9 days | Discharge: NURSING, REHAB OR LONG-TERM CARE | DRG: 453 | End: 2019-07-19
Attending: ORTHOPAEDIC SURGERY | Admitting: ORTHOPAEDIC SURGERY
Payer: COMMERCIAL

## 2019-07-10 ENCOUNTER — ANESTHESIA (OUTPATIENT)
Dept: OPERATING ROOM | Age: 64
DRG: 453 | End: 2019-07-10
Payer: COMMERCIAL

## 2019-07-10 VITALS — TEMPERATURE: 100 F | OXYGEN SATURATION: 95 % | SYSTOLIC BLOOD PRESSURE: 102 MMHG | DIASTOLIC BLOOD PRESSURE: 59 MMHG

## 2019-07-10 DIAGNOSIS — M51.26 LUMBAR DISC HERNIATION: Primary | ICD-10-CM

## 2019-07-10 PROBLEM — M54.9 BACK PAIN: Status: ACTIVE | Noted: 2019-07-10

## 2019-07-10 LAB
-: ABNORMAL
AMORPHOUS: ABNORMAL
BACTERIA: ABNORMAL
BILIRUBIN URINE: NEGATIVE
CASTS UA: ABNORMAL /LPF
COLOR: YELLOW
COMMENT UA: ABNORMAL
CRYSTALS, UA: ABNORMAL /HPF
EPITHELIAL CELLS UA: ABNORMAL /HPF
GLUCOSE BLD-MCNC: 200 MG/DL (ref 75–110)
GLUCOSE BLD-MCNC: 208 MG/DL (ref 75–110)
GLUCOSE URINE: ABNORMAL
KETONES, URINE: NEGATIVE
LEUKOCYTE ESTERASE, URINE: NEGATIVE
MUCUS: ABNORMAL
NITRITE, URINE: NEGATIVE
OTHER OBSERVATIONS UA: ABNORMAL
PH UA: 6.5 (ref 5–8)
POTASSIUM SERPL-SCNC: 4.6 MMOL/L (ref 3.7–5.3)
PROTEIN UA: ABNORMAL
RBC UA: ABNORMAL /HPF
RENAL EPITHELIAL, UA: ABNORMAL /HPF
SPECIFIC GRAVITY UA: 1.04 (ref 1–1.03)
TRICHOMONAS: ABNORMAL
TURBIDITY: CLEAR
URINE HGB: ABNORMAL
UROBILINOGEN, URINE: NORMAL
WBC UA: ABNORMAL /HPF
YEAST: ABNORMAL

## 2019-07-10 PROCEDURE — 2580000003 HC RX 258: Performed by: ORTHOPAEDIC SURGERY

## 2019-07-10 PROCEDURE — 3700000001 HC ADD 15 MINUTES (ANESTHESIA): Performed by: ORTHOPAEDIC SURGERY

## 2019-07-10 PROCEDURE — 2580000003 HC RX 258: Performed by: ANESTHESIOLOGY

## 2019-07-10 PROCEDURE — 22634 ARTHRD CMBN 1NTRSPC EA ADDL: CPT | Performed by: ORTHOPAEDIC SURGERY

## 2019-07-10 PROCEDURE — 0SG30AJ FUSION OF LUMBOSACRAL JOINT WITH INTERBODY FUSION DEVICE, POSTERIOR APPROACH, ANTERIOR COLUMN, OPEN APPROACH: ICD-10-PCS | Performed by: ORTHOPAEDIC SURGERY

## 2019-07-10 PROCEDURE — 2780000010 HC IMPLANT OTHER: Performed by: ORTHOPAEDIC SURGERY

## 2019-07-10 PROCEDURE — 6360000002 HC RX W HCPCS: Performed by: ORTHOPAEDIC SURGERY

## 2019-07-10 PROCEDURE — 2500000003 HC RX 250 WO HCPCS: Performed by: NURSE ANESTHETIST, CERTIFIED REGISTERED

## 2019-07-10 PROCEDURE — 2580000003 HC RX 258: Performed by: NURSE ANESTHETIST, CERTIFIED REGISTERED

## 2019-07-10 PROCEDURE — 22853 INSJ BIOMECHANICAL DEVICE: CPT | Performed by: ORTHOPAEDIC SURGERY

## 2019-07-10 PROCEDURE — 3600000003 HC SURGERY LEVEL 3 BASE: Performed by: ORTHOPAEDIC SURGERY

## 2019-07-10 PROCEDURE — 36415 COLL VENOUS BLD VENIPUNCTURE: CPT

## 2019-07-10 PROCEDURE — 3209999900 FLUORO FOR SURGICAL PROCEDURES

## 2019-07-10 PROCEDURE — 6370000000 HC RX 637 (ALT 250 FOR IP): Performed by: ORTHOPAEDIC SURGERY

## 2019-07-10 PROCEDURE — 84132 ASSAY OF SERUM POTASSIUM: CPT

## 2019-07-10 PROCEDURE — 22633 ARTHRD CMBN 1NTRSPC LUMBAR: CPT | Performed by: ORTHOPAEDIC SURGERY

## 2019-07-10 PROCEDURE — 82947 ASSAY GLUCOSE BLOOD QUANT: CPT

## 2019-07-10 PROCEDURE — 6370000000 HC RX 637 (ALT 250 FOR IP): Performed by: ANESTHESIOLOGY

## 2019-07-10 PROCEDURE — 3700000000 HC ANESTHESIA ATTENDED CARE: Performed by: ORTHOPAEDIC SURGERY

## 2019-07-10 PROCEDURE — 6360000002 HC RX W HCPCS: Performed by: ANESTHESIOLOGY

## 2019-07-10 PROCEDURE — C1713 ANCHOR/SCREW BN/BN,TIS/BN: HCPCS | Performed by: ORTHOPAEDIC SURGERY

## 2019-07-10 PROCEDURE — 0SG10AJ FUSION OF 2 OR MORE LUMBAR VERTEBRAL JOINTS WITH INTERBODY FUSION DEVICE, POSTERIOR APPROACH, ANTERIOR COLUMN, OPEN APPROACH: ICD-10-PCS | Performed by: ORTHOPAEDIC SURGERY

## 2019-07-10 PROCEDURE — 2709999900 HC NON-CHARGEABLE SUPPLY: Performed by: ORTHOPAEDIC SURGERY

## 2019-07-10 PROCEDURE — 0SG1071 FUSION OF 2 OR MORE LUMBAR VERTEBRAL JOINTS WITH AUTOLOGOUS TISSUE SUBSTITUTE, POSTERIOR APPROACH, POSTERIOR COLUMN, OPEN APPROACH: ICD-10-PCS | Performed by: ORTHOPAEDIC SURGERY

## 2019-07-10 PROCEDURE — 01NB0ZZ RELEASE LUMBAR NERVE, OPEN APPROACH: ICD-10-PCS | Performed by: ORTHOPAEDIC SURGERY

## 2019-07-10 PROCEDURE — 1200000000 HC SEMI PRIVATE

## 2019-07-10 PROCEDURE — 22842 INSERT SPINE FIXATION DEVICE: CPT | Performed by: ORTHOPAEDIC SURGERY

## 2019-07-10 PROCEDURE — 72100 X-RAY EXAM L-S SPINE 2/3 VWS: CPT

## 2019-07-10 PROCEDURE — 2720000010 HC SURG SUPPLY STERILE: Performed by: ORTHOPAEDIC SURGERY

## 2019-07-10 PROCEDURE — 7100000000 HC PACU RECOVERY - FIRST 15 MIN: Performed by: ORTHOPAEDIC SURGERY

## 2019-07-10 PROCEDURE — 0SG3071 FUSION OF LUMBOSACRAL JOINT WITH AUTOLOGOUS TISSUE SUBSTITUTE, POSTERIOR APPROACH, POSTERIOR COLUMN, OPEN APPROACH: ICD-10-PCS | Performed by: ORTHOPAEDIC SURGERY

## 2019-07-10 PROCEDURE — 7100000001 HC PACU RECOVERY - ADDTL 15 MIN: Performed by: ORTHOPAEDIC SURGERY

## 2019-07-10 PROCEDURE — 81001 URINALYSIS AUTO W/SCOPE: CPT

## 2019-07-10 PROCEDURE — 6360000002 HC RX W HCPCS: Performed by: NURSE ANESTHETIST, CERTIFIED REGISTERED

## 2019-07-10 PROCEDURE — 3600000013 HC SURGERY LEVEL 3 ADDTL 15MIN: Performed by: ORTHOPAEDIC SURGERY

## 2019-07-10 DEVICE — REVERE LOCKING CAP
Type: IMPLANTABLE DEVICE | Site: SPINE LUMBAR | Status: FUNCTIONAL
Brand: REVERE

## 2019-07-10 DEVICE — RISE SPACER 10X22MM, 8-14MM, 10&DEG;
Type: IMPLANTABLE DEVICE | Site: SPINE LUMBAR | Status: FUNCTIONAL
Brand: RISE

## 2019-07-10 DEVICE — 5.5MM CURVED ROD, 95MM
Type: IMPLANTABLE DEVICE | Site: SPINE LUMBAR | Status: FUNCTIONAL
Brand: REVERE

## 2019-07-10 DEVICE — 6.5MM REVERE PEDICLE SCREW 40MM
Type: IMPLANTABLE DEVICE | Site: SPINE LUMBAR | Status: FUNCTIONAL
Brand: REVERE

## 2019-07-10 DEVICE — 6.5MM REVERE PEDICLE SCREW 45MM
Type: IMPLANTABLE DEVICE | Site: SPINE LUMBAR | Status: FUNCTIONAL
Brand: REVERE

## 2019-07-10 DEVICE — GRAFT BNE CHIP 30 CC FD CORTICAL CANC: Type: IMPLANTABLE DEVICE | Site: SPINE LUMBAR | Status: FUNCTIONAL

## 2019-07-10 DEVICE — RISE SPACER 10X22MM, 11-17MM, 15&DEG;
Type: IMPLANTABLE DEVICE | Site: SPINE LUMBAR | Status: FUNCTIONAL
Brand: RISE

## 2019-07-10 RX ORDER — SODIUM CHLORIDE, SODIUM LACTATE, POTASSIUM CHLORIDE, CALCIUM CHLORIDE 600; 310; 30; 20 MG/100ML; MG/100ML; MG/100ML; MG/100ML
INJECTION, SOLUTION INTRAVENOUS CONTINUOUS
Status: DISCONTINUED | OUTPATIENT
Start: 2019-07-10 | End: 2019-07-10

## 2019-07-10 RX ORDER — MEPERIDINE HYDROCHLORIDE 50 MG/ML
12.5 INJECTION INTRAMUSCULAR; INTRAVENOUS; SUBCUTANEOUS EVERY 5 MIN PRN
Status: DISCONTINUED | OUTPATIENT
Start: 2019-07-10 | End: 2019-07-10 | Stop reason: HOSPADM

## 2019-07-10 RX ORDER — NORTRIPTYLINE HYDROCHLORIDE 50 MG/1
50 CAPSULE ORAL 2 TIMES DAILY
Status: DISCONTINUED | OUTPATIENT
Start: 2019-07-10 | End: 2019-07-19 | Stop reason: HOSPADM

## 2019-07-10 RX ORDER — MORPHINE SULFATE 2 MG/ML
2 INJECTION, SOLUTION INTRAMUSCULAR; INTRAVENOUS
Status: DISCONTINUED | OUTPATIENT
Start: 2019-07-10 | End: 2019-07-13

## 2019-07-10 RX ORDER — LISINOPRIL AND HYDROCHLOROTHIAZIDE 20; 12.5 MG/1; MG/1
1 TABLET ORAL DAILY
Status: DISCONTINUED | OUTPATIENT
Start: 2019-07-11 | End: 2019-07-10 | Stop reason: CLARIF

## 2019-07-10 RX ORDER — SODIUM CHLORIDE 0.9 % (FLUSH) 0.9 %
10 SYRINGE (ML) INJECTION EVERY 12 HOURS SCHEDULED
Status: DISCONTINUED | OUTPATIENT
Start: 2019-07-10 | End: 2019-07-19 | Stop reason: HOSPADM

## 2019-07-10 RX ORDER — MIDAZOLAM HYDROCHLORIDE 1 MG/ML
INJECTION INTRAMUSCULAR; INTRAVENOUS PRN
Status: DISCONTINUED | OUTPATIENT
Start: 2019-07-10 | End: 2019-07-10 | Stop reason: SDUPTHER

## 2019-07-10 RX ORDER — FINASTERIDE 5 MG/1
5 TABLET, FILM COATED ORAL DAILY
Status: DISCONTINUED | OUTPATIENT
Start: 2019-07-11 | End: 2019-07-19 | Stop reason: HOSPADM

## 2019-07-10 RX ORDER — NITROGLYCERIN 0.4 MG/1
0.4 TABLET SUBLINGUAL EVERY 5 MIN PRN
Status: DISCONTINUED | OUTPATIENT
Start: 2019-07-10 | End: 2019-07-19 | Stop reason: HOSPADM

## 2019-07-10 RX ORDER — OXYCODONE HYDROCHLORIDE 10 MG/1
10 TABLET ORAL EVERY 4 HOURS PRN
Status: DISCONTINUED | OUTPATIENT
Start: 2019-07-10 | End: 2019-07-13

## 2019-07-10 RX ORDER — ONDANSETRON 2 MG/ML
4 INJECTION INTRAMUSCULAR; INTRAVENOUS EVERY 6 HOURS PRN
Status: DISCONTINUED | OUTPATIENT
Start: 2019-07-10 | End: 2019-07-19 | Stop reason: HOSPADM

## 2019-07-10 RX ORDER — TRANEXAMIC ACID 100 MG/ML
INJECTION, SOLUTION INTRAVENOUS PRN
Status: DISCONTINUED | OUTPATIENT
Start: 2019-07-10 | End: 2019-07-10 | Stop reason: SDUPTHER

## 2019-07-10 RX ORDER — GABAPENTIN 300 MG/1
600 CAPSULE ORAL 2 TIMES DAILY
Status: DISCONTINUED | OUTPATIENT
Start: 2019-07-10 | End: 2019-07-19 | Stop reason: HOSPADM

## 2019-07-10 RX ORDER — DEXAMETHASONE SODIUM PHOSPHATE 4 MG/ML
INJECTION, SOLUTION INTRA-ARTICULAR; INTRALESIONAL; INTRAMUSCULAR; INTRAVENOUS; SOFT TISSUE PRN
Status: DISCONTINUED | OUTPATIENT
Start: 2019-07-10 | End: 2019-07-10 | Stop reason: SDUPTHER

## 2019-07-10 RX ORDER — SIMVASTATIN 40 MG
40 TABLET ORAL NIGHTLY
Status: DISCONTINUED | OUTPATIENT
Start: 2019-07-10 | End: 2019-07-19 | Stop reason: HOSPADM

## 2019-07-10 RX ORDER — SUCCINYLCHOLINE/SOD CL,ISO/PF 200MG/10ML
SYRINGE (ML) INTRAVENOUS PRN
Status: DISCONTINUED | OUTPATIENT
Start: 2019-07-10 | End: 2019-07-10 | Stop reason: SDUPTHER

## 2019-07-10 RX ORDER — ROCURONIUM BROMIDE 10 MG/ML
INJECTION, SOLUTION INTRAVENOUS PRN
Status: DISCONTINUED | OUTPATIENT
Start: 2019-07-10 | End: 2019-07-10 | Stop reason: SDUPTHER

## 2019-07-10 RX ORDER — PRAVASTATIN SODIUM 40 MG
1 TABLET ORAL DAILY
Status: DISCONTINUED | OUTPATIENT
Start: 2019-07-11 | End: 2019-07-11

## 2019-07-10 RX ORDER — ICOSAPENT ETHYL 1000 MG/1
2000 CAPSULE ORAL 2 TIMES DAILY
Status: DISCONTINUED | OUTPATIENT
Start: 2019-07-10 | End: 2019-07-11 | Stop reason: RX

## 2019-07-10 RX ORDER — MORPHINE SULFATE 2 MG/ML
2 INJECTION, SOLUTION INTRAMUSCULAR; INTRAVENOUS EVERY 5 MIN PRN
Status: DISCONTINUED | OUTPATIENT
Start: 2019-07-10 | End: 2019-07-10 | Stop reason: HOSPADM

## 2019-07-10 RX ORDER — PROPOFOL 10 MG/ML
INJECTION, EMULSION INTRAVENOUS CONTINUOUS PRN
Status: DISCONTINUED | OUTPATIENT
Start: 2019-07-10 | End: 2019-07-10 | Stop reason: SDUPTHER

## 2019-07-10 RX ORDER — CYCLOBENZAPRINE HCL 10 MG
10 TABLET ORAL 3 TIMES DAILY PRN
Status: DISCONTINUED | OUTPATIENT
Start: 2019-07-10 | End: 2019-07-14

## 2019-07-10 RX ORDER — SCOLOPAMINE TRANSDERMAL SYSTEM 1 MG/1
1 PATCH, EXTENDED RELEASE TRANSDERMAL
Status: DISCONTINUED | OUTPATIENT
Start: 2019-07-10 | End: 2019-07-13

## 2019-07-10 RX ORDER — FENOFIBRATE 160 MG/1
160 TABLET ORAL DAILY
Status: DISCONTINUED | OUTPATIENT
Start: 2019-07-11 | End: 2019-07-19 | Stop reason: HOSPADM

## 2019-07-10 RX ORDER — LIDOCAINE HYDROCHLORIDE 10 MG/ML
1 INJECTION, SOLUTION EPIDURAL; INFILTRATION; INTRACAUDAL; PERINEURAL
Status: DISCONTINUED | OUTPATIENT
Start: 2019-07-10 | End: 2019-07-10 | Stop reason: HOSPADM

## 2019-07-10 RX ORDER — FENTANYL CITRATE 50 UG/ML
INJECTION, SOLUTION INTRAMUSCULAR; INTRAVENOUS PRN
Status: DISCONTINUED | OUTPATIENT
Start: 2019-07-10 | End: 2019-07-10 | Stop reason: SDUPTHER

## 2019-07-10 RX ORDER — PANTOPRAZOLE SODIUM 40 MG/1
40 TABLET, DELAYED RELEASE ORAL
Status: DISCONTINUED | OUTPATIENT
Start: 2019-07-11 | End: 2019-07-19 | Stop reason: HOSPADM

## 2019-07-10 RX ORDER — EPHEDRINE SULFATE/0.9% NACL/PF 50 MG/5 ML
SYRINGE (ML) INTRAVENOUS PRN
Status: DISCONTINUED | OUTPATIENT
Start: 2019-07-10 | End: 2019-07-10 | Stop reason: SDUPTHER

## 2019-07-10 RX ORDER — LABETALOL 20 MG/4 ML (5 MG/ML) INTRAVENOUS SYRINGE
5 EVERY 10 MIN PRN
Status: DISCONTINUED | OUTPATIENT
Start: 2019-07-10 | End: 2019-07-10 | Stop reason: HOSPADM

## 2019-07-10 RX ORDER — ONDANSETRON 2 MG/ML
INJECTION INTRAMUSCULAR; INTRAVENOUS PRN
Status: DISCONTINUED | OUTPATIENT
Start: 2019-07-10 | End: 2019-07-10 | Stop reason: SDUPTHER

## 2019-07-10 RX ORDER — HETASTARCH 6 G/100ML
INJECTION, SOLUTION INTRAVENOUS PRN
Status: DISCONTINUED | OUTPATIENT
Start: 2019-07-10 | End: 2019-07-10 | Stop reason: SDUPTHER

## 2019-07-10 RX ORDER — GLYCOPYRROLATE 1 MG/5 ML
0.1 SYRINGE (ML) INTRAVENOUS ONCE
Status: DISCONTINUED | OUTPATIENT
Start: 2019-07-10 | End: 2019-07-10

## 2019-07-10 RX ORDER — HYDROCHLOROTHIAZIDE 12.5 MG/1
12.5 CAPSULE, GELATIN COATED ORAL DAILY
Status: DISCONTINUED | OUTPATIENT
Start: 2019-07-11 | End: 2019-07-11

## 2019-07-10 RX ORDER — OXYCODONE HYDROCHLORIDE 5 MG/1
5 TABLET ORAL EVERY 4 HOURS PRN
Status: DISCONTINUED | OUTPATIENT
Start: 2019-07-10 | End: 2019-07-13

## 2019-07-10 RX ORDER — METOPROLOL TARTRATE 5 MG/5ML
INJECTION INTRAVENOUS PRN
Status: DISCONTINUED | OUTPATIENT
Start: 2019-07-10 | End: 2019-07-10 | Stop reason: SDUPTHER

## 2019-07-10 RX ORDER — LISINOPRIL 20 MG/1
20 TABLET ORAL DAILY
Status: DISCONTINUED | OUTPATIENT
Start: 2019-07-11 | End: 2019-07-11

## 2019-07-10 RX ORDER — GLYCOPYRROLATE 0.2 MG/ML
0.1 INJECTION INTRAMUSCULAR; INTRAVENOUS ONCE
Status: DISCONTINUED | OUTPATIENT
Start: 2019-07-10 | End: 2019-07-10 | Stop reason: CLARIF

## 2019-07-10 RX ORDER — TAMSULOSIN HYDROCHLORIDE 0.4 MG/1
0.4 CAPSULE ORAL DAILY
Status: DISCONTINUED | OUTPATIENT
Start: 2019-07-11 | End: 2019-07-19 | Stop reason: HOSPADM

## 2019-07-10 RX ORDER — DIPHENHYDRAMINE HYDROCHLORIDE 50 MG/ML
12.5 INJECTION INTRAMUSCULAR; INTRAVENOUS
Status: DISCONTINUED | OUTPATIENT
Start: 2019-07-10 | End: 2019-07-10 | Stop reason: HOSPADM

## 2019-07-10 RX ORDER — SODIUM CHLORIDE 0.9 % (FLUSH) 0.9 %
10 SYRINGE (ML) INJECTION EVERY 12 HOURS SCHEDULED
Status: DISCONTINUED | OUTPATIENT
Start: 2019-07-10 | End: 2019-07-10 | Stop reason: HOSPADM

## 2019-07-10 RX ORDER — VITAMIN E 268 MG
400 CAPSULE ORAL DAILY
Status: DISCONTINUED | OUTPATIENT
Start: 2019-07-11 | End: 2019-07-19 | Stop reason: HOSPADM

## 2019-07-10 RX ORDER — DOCUSATE SODIUM 100 MG/1
100 CAPSULE, LIQUID FILLED ORAL 2 TIMES DAILY
Status: DISCONTINUED | OUTPATIENT
Start: 2019-07-10 | End: 2019-07-19 | Stop reason: HOSPADM

## 2019-07-10 RX ORDER — PHENYLEPHRINE HYDROCHLORIDE 10 MG/ML
INJECTION INTRAVENOUS PRN
Status: DISCONTINUED | OUTPATIENT
Start: 2019-07-10 | End: 2019-07-10

## 2019-07-10 RX ORDER — PROPOFOL 10 MG/ML
INJECTION, EMULSION INTRAVENOUS PRN
Status: DISCONTINUED | OUTPATIENT
Start: 2019-07-10 | End: 2019-07-10 | Stop reason: SDUPTHER

## 2019-07-10 RX ORDER — ONDANSETRON 2 MG/ML
4 INJECTION INTRAMUSCULAR; INTRAVENOUS
Status: DISCONTINUED | OUTPATIENT
Start: 2019-07-10 | End: 2019-07-10 | Stop reason: HOSPADM

## 2019-07-10 RX ORDER — SODIUM CHLORIDE 0.9 % (FLUSH) 0.9 %
10 SYRINGE (ML) INJECTION PRN
Status: DISCONTINUED | OUTPATIENT
Start: 2019-07-10 | End: 2019-07-19 | Stop reason: HOSPADM

## 2019-07-10 RX ORDER — CEFAZOLIN SODIUM 1 G/3ML
INJECTION, POWDER, FOR SOLUTION INTRAMUSCULAR; INTRAVENOUS PRN
Status: DISCONTINUED | OUTPATIENT
Start: 2019-07-10 | End: 2019-07-10 | Stop reason: SDUPTHER

## 2019-07-10 RX ORDER — METOPROLOL SUCCINATE 100 MG/1
100 TABLET, EXTENDED RELEASE ORAL DAILY
Status: DISCONTINUED | OUTPATIENT
Start: 2019-07-11 | End: 2019-07-19 | Stop reason: HOSPADM

## 2019-07-10 RX ORDER — ASPIRIN 81 MG/1
81 TABLET, CHEWABLE ORAL DAILY
Status: DISCONTINUED | OUTPATIENT
Start: 2019-07-11 | End: 2019-07-11

## 2019-07-10 RX ORDER — MORPHINE SULFATE 4 MG/ML
4 INJECTION, SOLUTION INTRAMUSCULAR; INTRAVENOUS
Status: DISCONTINUED | OUTPATIENT
Start: 2019-07-10 | End: 2019-07-13

## 2019-07-10 RX ORDER — SODIUM CHLORIDE 0.9 % (FLUSH) 0.9 %
10 SYRINGE (ML) INJECTION PRN
Status: DISCONTINUED | OUTPATIENT
Start: 2019-07-10 | End: 2019-07-10 | Stop reason: HOSPADM

## 2019-07-10 RX ORDER — HYDROMORPHONE HCL 110MG/55ML
PATIENT CONTROLLED ANALGESIA SYRINGE INTRAVENOUS PRN
Status: DISCONTINUED | OUTPATIENT
Start: 2019-07-10 | End: 2019-07-10 | Stop reason: SDUPTHER

## 2019-07-10 RX ORDER — LIDOCAINE HYDROCHLORIDE 10 MG/ML
INJECTION, SOLUTION EPIDURAL; INFILTRATION; INTRACAUDAL; PERINEURAL PRN
Status: DISCONTINUED | OUTPATIENT
Start: 2019-07-10 | End: 2019-07-10 | Stop reason: SDUPTHER

## 2019-07-10 RX ADMIN — METOROPROLOL TARTRATE 2 MG: 5 INJECTION, SOLUTION INTRAVENOUS at 14:20

## 2019-07-10 RX ADMIN — HETASTARCH IN SODIUM CHLORIDE 1 ML: 6; .9 INJECTION, SOLUTION INTRAVENOUS at 12:58

## 2019-07-10 RX ADMIN — HETASTARCH IN SODIUM CHLORIDE 100 ML: 6; .9 INJECTION, SOLUTION INTRAVENOUS at 14:30

## 2019-07-10 RX ADMIN — SODIUM CHLORIDE, POTASSIUM CHLORIDE, SODIUM LACTATE AND CALCIUM CHLORIDE: 600; 310; 30; 20 INJECTION, SOLUTION INTRAVENOUS at 17:46

## 2019-07-10 RX ADMIN — HYDROMORPHONE HYDROCHLORIDE 0.4 MG: 2 INJECTION, SOLUTION INTRAMUSCULAR; INTRAVENOUS; SUBCUTANEOUS at 14:49

## 2019-07-10 RX ADMIN — Medication 10 MG: at 12:32

## 2019-07-10 RX ADMIN — HYDROMORPHONE HYDROCHLORIDE 0.4 MG: 2 INJECTION, SOLUTION INTRAMUSCULAR; INTRAVENOUS; SUBCUTANEOUS at 14:06

## 2019-07-10 RX ADMIN — CEFAZOLIN 2000 MG: 1 INJECTION, POWDER, FOR SOLUTION INTRAMUSCULAR; INTRAVENOUS at 14:22

## 2019-07-10 RX ADMIN — PROPOFOL 75 MCG/KG/MIN: 10 INJECTION, EMULSION INTRAVENOUS at 11:05

## 2019-07-10 RX ADMIN — FENTANYL CITRATE 75 MCG: 50 INJECTION, SOLUTION INTRAMUSCULAR; INTRAVENOUS at 10:20

## 2019-07-10 RX ADMIN — MEPERIDINE HYDROCHLORIDE 12.5 MG: 50 INJECTION, SOLUTION INTRAMUSCULAR; INTRAVENOUS; SUBCUTANEOUS at 19:30

## 2019-07-10 RX ADMIN — FENTANYL CITRATE 50 MCG: 50 INJECTION, SOLUTION INTRAMUSCULAR; INTRAVENOUS at 15:36

## 2019-07-10 RX ADMIN — ONDANSETRON 4 MG: 2 INJECTION INTRAMUSCULAR; INTRAVENOUS at 16:50

## 2019-07-10 RX ADMIN — LIDOCAINE HYDROCHLORIDE 50 MG: 10 INJECTION, SOLUTION EPIDURAL; INFILTRATION; INTRACAUDAL; PERINEURAL at 10:25

## 2019-07-10 RX ADMIN — HYDROMORPHONE HYDROCHLORIDE 0.4 MG: 2 INJECTION, SOLUTION INTRAMUSCULAR; INTRAVENOUS; SUBCUTANEOUS at 13:45

## 2019-07-10 RX ADMIN — TRANEXAMIC ACID 1000 MG: 100 INJECTION, SOLUTION INTRAVENOUS at 11:07

## 2019-07-10 RX ADMIN — METOROPROLOL TARTRATE 1 MG: 5 INJECTION, SOLUTION INTRAVENOUS at 14:28

## 2019-07-10 RX ADMIN — Medication 10 ML: at 22:35

## 2019-07-10 RX ADMIN — Medication 2 G: at 22:34

## 2019-07-10 RX ADMIN — ROCURONIUM BROMIDE 5 MG: 10 INJECTION INTRAVENOUS at 10:25

## 2019-07-10 RX ADMIN — GABAPENTIN 600 MG: 300 CAPSULE ORAL at 22:34

## 2019-07-10 RX ADMIN — PHENYLEPHRINE HYDROCHLORIDE 40 MCG/MIN: 10 INJECTION INTRAVENOUS at 10:38

## 2019-07-10 RX ADMIN — SODIUM CHLORIDE, POTASSIUM CHLORIDE, SODIUM LACTATE AND CALCIUM CHLORIDE: 600; 310; 30; 20 INJECTION, SOLUTION INTRAVENOUS at 17:11

## 2019-07-10 RX ADMIN — PHENYLEPHRINE HYDROCHLORIDE 40 MCG/MIN: 10 INJECTION INTRAVENOUS at 15:35

## 2019-07-10 RX ADMIN — HYDROMORPHONE HYDROCHLORIDE 0.4 MG: 2 INJECTION, SOLUTION INTRAMUSCULAR; INTRAVENOUS; SUBCUTANEOUS at 15:03

## 2019-07-10 RX ADMIN — HETASTARCH IN SODIUM CHLORIDE 200 ML: 6; .9 INJECTION, SOLUTION INTRAVENOUS at 16:22

## 2019-07-10 RX ADMIN — FENTANYL CITRATE 50 MCG: 50 INJECTION, SOLUTION INTRAMUSCULAR; INTRAVENOUS at 15:57

## 2019-07-10 RX ADMIN — FENTANYL CITRATE 25 MCG: 50 INJECTION, SOLUTION INTRAMUSCULAR; INTRAVENOUS at 11:18

## 2019-07-10 RX ADMIN — PROPOFOL 40 MG: 10 INJECTION, EMULSION INTRAVENOUS at 10:55

## 2019-07-10 RX ADMIN — SODIUM CHLORIDE, POTASSIUM CHLORIDE, SODIUM LACTATE AND CALCIUM CHLORIDE: 600; 310; 30; 20 INJECTION, SOLUTION INTRAVENOUS at 10:40

## 2019-07-10 RX ADMIN — DEXAMETHASONE SODIUM PHOSPHATE 4 MG: 4 INJECTION, SOLUTION INTRA-ARTICULAR; INTRALESIONAL; INTRAMUSCULAR; INTRAVENOUS; SOFT TISSUE at 11:36

## 2019-07-10 RX ADMIN — SODIUM CHLORIDE, POTASSIUM CHLORIDE, SODIUM LACTATE AND CALCIUM CHLORIDE: 600; 310; 30; 20 INJECTION, SOLUTION INTRAVENOUS at 12:57

## 2019-07-10 RX ADMIN — MEPERIDINE HYDROCHLORIDE 12.5 MG: 50 INJECTION, SOLUTION INTRAMUSCULAR; INTRAVENOUS; SUBCUTANEOUS at 19:15

## 2019-07-10 RX ADMIN — TRANEXAMIC ACID 1000 MG: 100 INJECTION, SOLUTION INTRAVENOUS at 14:31

## 2019-07-10 RX ADMIN — METOROPROLOL TARTRATE 2 MG: 5 INJECTION, SOLUTION INTRAVENOUS at 14:10

## 2019-07-10 RX ADMIN — DOCUSATE SODIUM 100 MG: 100 CAPSULE, LIQUID FILLED ORAL at 22:34

## 2019-07-10 RX ADMIN — Medication 2 G: at 10:32

## 2019-07-10 RX ADMIN — ROCURONIUM BROMIDE 10 MG: 10 INJECTION INTRAVENOUS at 10:35

## 2019-07-10 RX ADMIN — MORPHINE SULFATE 2 MG: 2 INJECTION, SOLUTION INTRAMUSCULAR; INTRAVENOUS at 22:34

## 2019-07-10 RX ADMIN — MIDAZOLAM 2 MG: 1 INJECTION INTRAMUSCULAR; INTRAVENOUS at 10:20

## 2019-07-10 RX ADMIN — PROPOFOL 160 MG: 10 INJECTION, EMULSION INTRAVENOUS at 10:25

## 2019-07-10 RX ADMIN — FENTANYL CITRATE 50 MCG: 50 INJECTION, SOLUTION INTRAMUSCULAR; INTRAVENOUS at 15:41

## 2019-07-10 RX ADMIN — HYDROMORPHONE HYDROCHLORIDE 0.4 MG: 2 INJECTION, SOLUTION INTRAMUSCULAR; INTRAVENOUS; SUBCUTANEOUS at 14:32

## 2019-07-10 RX ADMIN — HETASTARCH IN SODIUM CHLORIDE 199 ML: 6; .9 INJECTION, SOLUTION INTRAVENOUS at 14:00

## 2019-07-10 RX ADMIN — Medication 160 MG: at 10:25

## 2019-07-10 RX ADMIN — Medication 5 MG: at 12:18

## 2019-07-10 RX ADMIN — FENTANYL CITRATE 50 MCG: 50 INJECTION, SOLUTION INTRAMUSCULAR; INTRAVENOUS at 16:59

## 2019-07-10 RX ADMIN — SODIUM CHLORIDE, POTASSIUM CHLORIDE, SODIUM LACTATE AND CALCIUM CHLORIDE: 600; 310; 30; 20 INJECTION, SOLUTION INTRAVENOUS at 08:04

## 2019-07-10 RX ADMIN — NORTRIPTYLINE HYDROCHLORIDE 50 MG: 50 CAPSULE ORAL at 22:34

## 2019-07-10 ASSESSMENT — PULMONARY FUNCTION TESTS
PIF_VALUE: 27
PIF_VALUE: 3
PIF_VALUE: 29
PIF_VALUE: 28
PIF_VALUE: 28
PIF_VALUE: 27
PIF_VALUE: 27
PIF_VALUE: 5
PIF_VALUE: 28
PIF_VALUE: 26
PIF_VALUE: 28
PIF_VALUE: 5
PIF_VALUE: 20
PIF_VALUE: 27
PIF_VALUE: 26
PIF_VALUE: 28
PIF_VALUE: 26
PIF_VALUE: 27
PIF_VALUE: 28
PIF_VALUE: 23
PIF_VALUE: 26
PIF_VALUE: 28
PIF_VALUE: 28
PIF_VALUE: 27
PIF_VALUE: 26
PIF_VALUE: 26
PIF_VALUE: 23
PIF_VALUE: 27
PIF_VALUE: 28
PIF_VALUE: 28
PIF_VALUE: 26
PIF_VALUE: 27
PIF_VALUE: 27
PIF_VALUE: 29
PIF_VALUE: 27
PIF_VALUE: 28
PIF_VALUE: 28
PIF_VALUE: 27
PIF_VALUE: 28
PIF_VALUE: 21
PIF_VALUE: 28
PIF_VALUE: 28
PIF_VALUE: 4
PIF_VALUE: 27
PIF_VALUE: 28
PIF_VALUE: 27
PIF_VALUE: 29
PIF_VALUE: 28
PIF_VALUE: 28
PIF_VALUE: 4
PIF_VALUE: 28
PIF_VALUE: 27
PIF_VALUE: 27
PIF_VALUE: 29
PIF_VALUE: 27
PIF_VALUE: 0
PIF_VALUE: 30
PIF_VALUE: 28
PIF_VALUE: 28
PIF_VALUE: 4
PIF_VALUE: 27
PIF_VALUE: 27
PIF_VALUE: 28
PIF_VALUE: 4
PIF_VALUE: 27
PIF_VALUE: 27
PIF_VALUE: 28
PIF_VALUE: 24
PIF_VALUE: 32
PIF_VALUE: 29
PIF_VALUE: 27
PIF_VALUE: 28
PIF_VALUE: 28
PIF_VALUE: 27
PIF_VALUE: 27
PIF_VALUE: 0
PIF_VALUE: 27
PIF_VALUE: 17
PIF_VALUE: 27
PIF_VALUE: 27
PIF_VALUE: 26
PIF_VALUE: 21
PIF_VALUE: 27
PIF_VALUE: 26
PIF_VALUE: 27
PIF_VALUE: 28
PIF_VALUE: 26
PIF_VALUE: 28
PIF_VALUE: 29
PIF_VALUE: 26
PIF_VALUE: 5
PIF_VALUE: 27
PIF_VALUE: 20
PIF_VALUE: 28
PIF_VALUE: 27
PIF_VALUE: 27
PIF_VALUE: 29
PIF_VALUE: 28
PIF_VALUE: 27
PIF_VALUE: 27
PIF_VALUE: 5
PIF_VALUE: 28
PIF_VALUE: 27
PIF_VALUE: 28
PIF_VALUE: 27
PIF_VALUE: 26
PIF_VALUE: 28
PIF_VALUE: 27
PIF_VALUE: 26
PIF_VALUE: 28
PIF_VALUE: 28
PIF_VALUE: 27
PIF_VALUE: 4
PIF_VALUE: 29
PIF_VALUE: 17
PIF_VALUE: 26
PIF_VALUE: 28
PIF_VALUE: 28
PIF_VALUE: 21
PIF_VALUE: 27
PIF_VALUE: 26
PIF_VALUE: 26
PIF_VALUE: 20
PIF_VALUE: 27
PIF_VALUE: 28
PIF_VALUE: 27
PIF_VALUE: 28
PIF_VALUE: 23
PIF_VALUE: 27
PIF_VALUE: 28
PIF_VALUE: 27
PIF_VALUE: 28
PIF_VALUE: 27
PIF_VALUE: 28
PIF_VALUE: 26
PIF_VALUE: 28
PIF_VALUE: 27
PIF_VALUE: 27
PIF_VALUE: 5
PIF_VALUE: 28
PIF_VALUE: 27
PIF_VALUE: 29
PIF_VALUE: 27
PIF_VALUE: 27
PIF_VALUE: 28
PIF_VALUE: 28
PIF_VALUE: 5
PIF_VALUE: 17
PIF_VALUE: 28
PIF_VALUE: 27
PIF_VALUE: 27
PIF_VALUE: 15
PIF_VALUE: 28
PIF_VALUE: 28
PIF_VALUE: 27
PIF_VALUE: 26
PIF_VALUE: 26
PIF_VALUE: 28
PIF_VALUE: 28
PIF_VALUE: 29
PIF_VALUE: 28
PIF_VALUE: 27
PIF_VALUE: 26
PIF_VALUE: 27
PIF_VALUE: 15
PIF_VALUE: 27
PIF_VALUE: 27
PIF_VALUE: 28
PIF_VALUE: 27
PIF_VALUE: 28
PIF_VALUE: 28
PIF_VALUE: 5
PIF_VALUE: 27
PIF_VALUE: 28
PIF_VALUE: 26
PIF_VALUE: 27
PIF_VALUE: 20
PIF_VALUE: 28
PIF_VALUE: 27
PIF_VALUE: 28
PIF_VALUE: 28
PIF_VALUE: 20
PIF_VALUE: 27
PIF_VALUE: 27
PIF_VALUE: 20
PIF_VALUE: 27
PIF_VALUE: 26
PIF_VALUE: 26
PIF_VALUE: 28
PIF_VALUE: 27
PIF_VALUE: 28
PIF_VALUE: 26
PIF_VALUE: 28
PIF_VALUE: 5
PIF_VALUE: 29
PIF_VALUE: 28
PIF_VALUE: 27
PIF_VALUE: 28
PIF_VALUE: 23
PIF_VALUE: 22
PIF_VALUE: 27
PIF_VALUE: 18
PIF_VALUE: 28
PIF_VALUE: 15
PIF_VALUE: 29
PIF_VALUE: 26
PIF_VALUE: 27
PIF_VALUE: 27
PIF_VALUE: 24
PIF_VALUE: 27
PIF_VALUE: 5
PIF_VALUE: 28
PIF_VALUE: 30
PIF_VALUE: 27
PIF_VALUE: 28
PIF_VALUE: 27
PIF_VALUE: 28
PIF_VALUE: 26
PIF_VALUE: 27
PIF_VALUE: 28
PIF_VALUE: 21
PIF_VALUE: 27
PIF_VALUE: 28
PIF_VALUE: 27
PIF_VALUE: 27
PIF_VALUE: 18
PIF_VALUE: 17
PIF_VALUE: 24
PIF_VALUE: 28
PIF_VALUE: 23
PIF_VALUE: 27
PIF_VALUE: 4
PIF_VALUE: 26
PIF_VALUE: 16
PIF_VALUE: 27
PIF_VALUE: 1
PIF_VALUE: 27
PIF_VALUE: 27
PIF_VALUE: 28
PIF_VALUE: 28
PIF_VALUE: 27
PIF_VALUE: 27
PIF_VALUE: 28
PIF_VALUE: 6
PIF_VALUE: 28
PIF_VALUE: 28
PIF_VALUE: 27
PIF_VALUE: 29
PIF_VALUE: 27
PIF_VALUE: 28
PIF_VALUE: 17
PIF_VALUE: 4
PIF_VALUE: 28
PIF_VALUE: 27
PIF_VALUE: 27
PIF_VALUE: 24
PIF_VALUE: 24
PIF_VALUE: 26
PIF_VALUE: 24
PIF_VALUE: 28
PIF_VALUE: 24
PIF_VALUE: 27
PIF_VALUE: 5
PIF_VALUE: 27
PIF_VALUE: 23
PIF_VALUE: 1
PIF_VALUE: 28
PIF_VALUE: 27
PIF_VALUE: 28
PIF_VALUE: 27
PIF_VALUE: 27
PIF_VALUE: 28
PIF_VALUE: 28
PIF_VALUE: 17
PIF_VALUE: 27
PIF_VALUE: 28
PIF_VALUE: 25
PIF_VALUE: 30
PIF_VALUE: 28
PIF_VALUE: 27
PIF_VALUE: 28
PIF_VALUE: 28
PIF_VALUE: 17
PIF_VALUE: 28
PIF_VALUE: 26
PIF_VALUE: 27
PIF_VALUE: 34
PIF_VALUE: 28
PIF_VALUE: 27
PIF_VALUE: 27
PIF_VALUE: 28
PIF_VALUE: 32
PIF_VALUE: 28
PIF_VALUE: 5
PIF_VALUE: 28
PIF_VALUE: 16
PIF_VALUE: 28
PIF_VALUE: 27
PIF_VALUE: 28
PIF_VALUE: 4
PIF_VALUE: 29
PIF_VALUE: 28
PIF_VALUE: 28
PIF_VALUE: 24
PIF_VALUE: 27
PIF_VALUE: 2
PIF_VALUE: 5
PIF_VALUE: 28
PIF_VALUE: 18
PIF_VALUE: 28
PIF_VALUE: 27
PIF_VALUE: 27
PIF_VALUE: 17
PIF_VALUE: 26
PIF_VALUE: 28
PIF_VALUE: 18
PIF_VALUE: 27
PIF_VALUE: 27
PIF_VALUE: 26
PIF_VALUE: 27
PIF_VALUE: 27
PIF_VALUE: 17
PIF_VALUE: 28
PIF_VALUE: 26
PIF_VALUE: 5
PIF_VALUE: 28
PIF_VALUE: 28
PIF_VALUE: 4
PIF_VALUE: 27
PIF_VALUE: 6
PIF_VALUE: 28
PIF_VALUE: 27
PIF_VALUE: 27
PIF_VALUE: 28
PIF_VALUE: 20
PIF_VALUE: 28
PIF_VALUE: 5
PIF_VALUE: 4
PIF_VALUE: 27
PIF_VALUE: 25
PIF_VALUE: 27
PIF_VALUE: 28
PIF_VALUE: 4
PIF_VALUE: 27
PIF_VALUE: 17
PIF_VALUE: 27
PIF_VALUE: 28
PIF_VALUE: 4
PIF_VALUE: 28
PIF_VALUE: 29
PIF_VALUE: 5
PIF_VALUE: 27
PIF_VALUE: 28
PIF_VALUE: 28
PIF_VALUE: 1
PIF_VALUE: 5
PIF_VALUE: 24
PIF_VALUE: 27
PIF_VALUE: 26
PIF_VALUE: 27
PIF_VALUE: 28
PIF_VALUE: 27
PIF_VALUE: 17
PIF_VALUE: 26
PIF_VALUE: 27
PIF_VALUE: 28
PIF_VALUE: 27
PIF_VALUE: 0
PIF_VALUE: 26
PIF_VALUE: 28
PIF_VALUE: 29
PIF_VALUE: 27
PIF_VALUE: 20
PIF_VALUE: 13
PIF_VALUE: 28
PIF_VALUE: 20
PIF_VALUE: 21
PIF_VALUE: 5
PIF_VALUE: 5
PIF_VALUE: 17
PIF_VALUE: 28
PIF_VALUE: 28
PIF_VALUE: 27
PIF_VALUE: 29
PIF_VALUE: 27
PIF_VALUE: 15
PIF_VALUE: 28
PIF_VALUE: 8
PIF_VALUE: 28
PIF_VALUE: 28
PIF_VALUE: 27
PIF_VALUE: 28
PIF_VALUE: 1
PIF_VALUE: 27
PIF_VALUE: 27

## 2019-07-10 ASSESSMENT — ENCOUNTER SYMPTOMS
STRIDOR: 0
SHORTNESS OF BREATH: 0

## 2019-07-10 ASSESSMENT — PAIN DESCRIPTION - PAIN TYPE: TYPE: SURGICAL PAIN

## 2019-07-10 ASSESSMENT — PAIN - FUNCTIONAL ASSESSMENT: PAIN_FUNCTIONAL_ASSESSMENT: 0-10

## 2019-07-10 ASSESSMENT — LIFESTYLE VARIABLES: SMOKING_STATUS: 0

## 2019-07-10 ASSESSMENT — PAIN SCALES - GENERAL
PAINLEVEL_OUTOF10: 5
PAINLEVEL_OUTOF10: 5
PAINLEVEL_OUTOF10: 6

## 2019-07-10 ASSESSMENT — PAIN DESCRIPTION - DESCRIPTORS: DESCRIPTORS: ACHING

## 2019-07-10 ASSESSMENT — PAIN DESCRIPTION - LOCATION: LOCATION: BACK

## 2019-07-10 NOTE — ANESTHESIA POSTPROCEDURE EVALUATION
Department of Anesthesiology  Postprocedure Note    Patient: Aryan Woodard  MRN: [id number]  YOB: 1955  Date of evaluation: 7/10/2019  Time:  7:21 PM     Procedure Summary     Date:  07/10/19 Room / Location:  [address] / [address]    Anesthesia Start:  1020 Anesthesia Stop:  1802    Procedure:  LUMBAR LAMINECTOMY FUSION POSTERIOR - L3 SACRUM [address] (N/A ) Diagnosis:  (SPONDYLOLISTHESIS)    Surgeon:  José Antonio Edward MD Responsible Provider:  Hernando Marshall MD    Anesthesia Type:  general ASA Status:  3          Anesthesia Type: general    Robert Phase I: Robert Score: 3    Robert Phase II:      Last vitals: Reviewed and per EMR flowsheets.        Anesthesia Post Evaluation    Comments: POST- ANESTHESIA EVALUATION       Pt Name: Aryan Woodard  MRN: [id number]  [place]: 1955  Date of evaluation: 7/10/2019  Time:  7:21 PM      /78   Pulse 110   Temp 97.9 °F (36.6 °C) (Infrared)   Resp 26   Ht 5' 8\" (1.727 m)   Wt 207 lb (93.9 kg)   SpO2 92%   BMI 31.47 kg/m²      Consciousness Level  Awake  Cardiopulmonary Status  Stable  Pain Adequately Treated YES  Nausea / Vomiting  NO  Adequate Hydration  YES  Anesthesia Related Complications NONE      Electronically signed by Ruddy Mobley MD on 7/10/2019 at 7:21 PM

## 2019-07-10 NOTE — ANESTHESIA PRE PROCEDURE
Last Dose    0.9 % sodium chloride infusion   Intravenous Continuous Diane Narvaez MD   Stopped at 08/13/15 1300       Allergies:     Allergies   Allergen Reactions    Pcn [Penicillins] Swelling     face    Codeine Nausea And Vomiting     Abd. pain       Problem List:    Patient Active Problem List   Diagnosis Code    Hyperlipidemia E78.5    Prostate disease N42.9    Osteoarthritis M19.90    Headache R51    GERD (gastroesophageal reflux disease) K21.9    Sleep apnea G47.30    Elevated liver enzymes R74.8    BPH (benign prostatic hyperplasia) N40.0    Steatosis of liver K76.0    Type 2 diabetes mellitus without complication, without long-term current use of insulin (HCC) E11.9    Lumbar radiculopathy, chronic M54.16    Osteoarthritis of lumbar spine M47.816    Osteoarthritis of multiple joints M15.9    Degenerative disc disease, lumbar M51.36    Lumbar spinal stenosis M48.061    Pars defect with spondylolisthesis M43.00, M43.10    Essential hypertension I10    Lumbar disc herniation M51.26    Chronic low back pain M54.5, G89.29    S/P cardiac catheterization Z98.890       Past Medical History:        Diagnosis Date    Abdominal hernia     Anesthesia     phrenic nerve damaged lt side    BPH (benign prostatic hyperplasia)     CAD (coronary artery disease)     Chronic back pain     Diabetes mellitus (HCC)     Type 2    Difficult intubation     per pt throat collapses, lt side phrenic nerve was damaged    Ear infection     LT, being treated    Fatty liver     Gallstones     GERD (gastroesophageal reflux disease)     Headache(784.0)     History of MI (myocardial infarction)     Hypercholesteremia     Hyperlipidemia     Hypertension     Kidney stones     Osteoarthritis     all joints    PONV (postoperative nausea and vomiting)     Sleep apnea     BIPAP    Unspecified sleep apnea        Past Surgical History:        Procedure Laterality Date    BICEPS TENDON REPAIR Left 06/26/2019    CO2 26 06/26/2019    BUN 33 06/26/2019    CREATININE 1.52 06/26/2019    GFRAA 56 06/26/2019    LABGLOM 46 06/26/2019    GLUCOSE 134 06/26/2019    GLUCOSE 116 01/28/2012    PROT 7.4 03/02/2019    CALCIUM 12.0 06/26/2019    BILITOT 0.39 03/02/2019    ALKPHOS 77 03/02/2019    AST 37 03/02/2019    ALT 77 03/02/2019       POC Tests: No results for input(s): POCGLU, POCNA, POCK, POCCL, POCBUN, POCHEMO, POCHCT in the last 72 hours. Coags:   Lab Results   Component Value Date    PROTIME 10.6 11/07/2018    INR 1.0 11/07/2018    APTT 25.7 11/07/2018       HCG (If Applicable): No results found for: PREGTESTUR, PREGSERUM, HCG, HCGQUANT     ABGs: No results found for: PHART, PO2ART, UWE9MQQ, OPU6HUE, BEART, U3EINITJ     Type & Screen (If Applicable):  No results found for: LABABO, [address]    Anesthesia Evaluation  Patient summary reviewed   history of anesthetic complications: PONV.   Airway: Mallampati: III  TM distance: >3 FB   Neck ROM: full  Mouth opening: > = 3 FB Dental: normal exam         Pulmonary:normal exam  breath sounds clear to auscultation  (+) sleep apnea:      (-) pneumonia, COPD, asthma, shortness of breath, recent URI, rhonchi, wheezes, rales, stridor and not a current smoker                           Cardiovascular:  Exercise tolerance: good (>4 METS),   (+) hypertension: no interval change, CAD: no interval change,     (-) pacemaker, valvular problems/murmurs, past MI, CABG/stent, dysrhythmias,  angina,  CHF, orthopnea, PND,  TINOCO, murmur, weak pulses,  friction rub, systolic click, carotid bruit,  JVD and peripheral edema    ECG reviewed  Rhythm: regular  Rate: normal           Beta Blocker:  Dose within 24 Hrs         Neuro/Psych:   (+) neuromuscular disease:, headaches:,    (-) seizures, TIA, CVA, psychiatric history and depression/anxiety            GI/Hepatic/Renal:   (+) GERD: no interval change, liver disease:,      (-) hiatal hernia, PUD, hepatitis, no renal disease, bowel prep and no

## 2019-07-11 LAB
ABSOLUTE EOS #: 0 K/UL (ref 0–0.4)
ABSOLUTE IMMATURE GRANULOCYTE: ABNORMAL K/UL (ref 0–0.3)
ABSOLUTE LYMPH #: 0.9 K/UL (ref 1–4.8)
ABSOLUTE MONO #: 1.2 K/UL (ref 0.1–1.3)
ALLEN TEST: ABNORMAL
ANION GAP SERPL CALCULATED.3IONS-SCNC: 9 MMOL/L (ref 9–17)
BASOPHILS # BLD: 0 % (ref 0–2)
BASOPHILS ABSOLUTE: 0 K/UL (ref 0–0.2)
BUN BLDV-MCNC: 18 MG/DL (ref 8–23)
BUN/CREAT BLD: ABNORMAL (ref 9–20)
CALCIUM SERPL-MCNC: 9.9 MG/DL (ref 8.6–10.4)
CARBOXYHEMOGLOBIN: 2.3 % (ref 0–5)
CHLORIDE BLD-SCNC: 97 MMOL/L (ref 98–107)
CO2: 27 MMOL/L (ref 20–31)
CREAT SERPL-MCNC: 1.1 MG/DL (ref 0.7–1.2)
CREAT SERPL-MCNC: 1.25 MG/DL (ref 0.7–1.2)
DIFFERENTIAL TYPE: ABNORMAL
EOSINOPHILS RELATIVE PERCENT: 0 % (ref 0–4)
FIO2: 21
GFR AFRICAN AMERICAN: >60 ML/MIN
GFR AFRICAN AMERICAN: >60 ML/MIN
GFR NON-AFRICAN AMERICAN: 58 ML/MIN
GFR NON-AFRICAN AMERICAN: >60 ML/MIN
GFR SERPL CREATININE-BSD FRML MDRD: ABNORMAL ML/MIN/{1.73_M2}
GFR SERPL CREATININE-BSD FRML MDRD: ABNORMAL ML/MIN/{1.73_M2}
GFR SERPL CREATININE-BSD FRML MDRD: NORMAL ML/MIN/{1.73_M2}
GFR SERPL CREATININE-BSD FRML MDRD: NORMAL ML/MIN/{1.73_M2}
GLUCOSE BLD-MCNC: 202 MG/DL (ref 75–110)
GLUCOSE BLD-MCNC: 245 MG/DL (ref 70–99)
HCO3 ARTERIAL: 27.6 MMOL/L (ref 22–26)
HCT VFR BLD CALC: 33 % (ref 41–53)
HCT VFR BLD CALC: 35.1 % (ref 41–53)
HEMOGLOBIN: 10.8 G/DL (ref 13.5–17.5)
HEMOGLOBIN: 11.5 G/DL (ref 13.5–17.5)
IMMATURE GRANULOCYTES: ABNORMAL %
LYMPHOCYTES # BLD: 7 % (ref 24–44)
MCH RBC QN AUTO: 29.2 PG (ref 26–34)
MCH RBC QN AUTO: 29.3 PG (ref 26–34)
MCHC RBC AUTO-ENTMCNC: 32.7 G/DL (ref 31–37)
MCHC RBC AUTO-ENTMCNC: 32.9 G/DL (ref 31–37)
MCV RBC AUTO: 89.2 FL (ref 80–100)
MCV RBC AUTO: 89.2 FL (ref 80–100)
METHEMOGLOBIN: 0.6 % (ref 0–1.9)
MODE: ABNORMAL
MONOCYTES # BLD: 10 % (ref 1–7)
NEGATIVE BASE EXCESS, ART: ABNORMAL MMOL/L (ref 0–2)
NOTIFICATION TIME: ABNORMAL
NOTIFICATION: ABNORMAL
NRBC AUTOMATED: ABNORMAL PER 100 WBC
NRBC AUTOMATED: ABNORMAL PER 100 WBC
O2 DEVICE/FLOW/%: ABNORMAL
O2 SAT, ARTERIAL: 79.9 % (ref 95–98)
OXYHEMOGLOBIN: ABNORMAL % (ref 95–98)
PATIENT TEMP: 37
PCO2 ARTERIAL: 42.9 MMHG (ref 35–45)
PCO2, ART, TEMP ADJ: ABNORMAL (ref 35–45)
PDW BLD-RTO: 14.7 % (ref 11.5–14.9)
PDW BLD-RTO: 14.8 % (ref 11.5–14.9)
PEEP/CPAP: ABNORMAL
PH ARTERIAL: 7.42 (ref 7.35–7.45)
PH, ART, TEMP ADJ: ABNORMAL (ref 7.35–7.45)
PLATELET # BLD: 232 K/UL (ref 150–450)
PLATELET # BLD: 270 K/UL (ref 150–450)
PLATELET ESTIMATE: ABNORMAL
PMV BLD AUTO: 7.7 FL (ref 6–12)
PMV BLD AUTO: 8.2 FL (ref 6–12)
PO2 ARTERIAL: 43.6 MMHG (ref 80–100)
PO2, ART, TEMP ADJ: ABNORMAL MMHG (ref 80–100)
POSITIVE BASE EXCESS, ART: 3 MMOL/L (ref 0–2)
POTASSIUM SERPL-SCNC: 4.7 MMOL/L (ref 3.7–5.3)
PSV: ABNORMAL
PT. POSITION: ABNORMAL
RBC # BLD: 3.7 M/UL (ref 4.5–5.9)
RBC # BLD: 3.93 M/UL (ref 4.5–5.9)
RBC # BLD: ABNORMAL 10*6/UL
RESPIRATORY RATE: 12
SAMPLE SITE: ABNORMAL
SEG NEUTROPHILS: 83 % (ref 36–66)
SEGMENTED NEUTROPHILS ABSOLUTE COUNT: 10.4 K/UL (ref 1.3–9.1)
SET RATE: ABNORMAL
SODIUM BLD-SCNC: 133 MMOL/L (ref 135–144)
TEXT FOR RESPIRATORY: ABNORMAL
TOTAL HB: ABNORMAL G/DL (ref 12–16)
TOTAL RATE: ABNORMAL
VT: ABNORMAL
WBC # BLD: 12.1 K/UL (ref 3.5–11)
WBC # BLD: 12.6 K/UL (ref 3.5–11)
WBC # BLD: ABNORMAL 10*3/UL

## 2019-07-11 PROCEDURE — 97162 PT EVAL MOD COMPLEX 30 MIN: CPT

## 2019-07-11 PROCEDURE — 82565 ASSAY OF CREATININE: CPT

## 2019-07-11 PROCEDURE — 97530 THERAPEUTIC ACTIVITIES: CPT

## 2019-07-11 PROCEDURE — 1200000000 HC SEMI PRIVATE

## 2019-07-11 PROCEDURE — 99223 1ST HOSP IP/OBS HIGH 75: CPT | Performed by: INTERNAL MEDICINE

## 2019-07-11 PROCEDURE — 82947 ASSAY GLUCOSE BLOOD QUANT: CPT

## 2019-07-11 PROCEDURE — 36415 COLL VENOUS BLD VENIPUNCTURE: CPT

## 2019-07-11 PROCEDURE — 85025 COMPLETE CBC W/AUTO DIFF WBC: CPT

## 2019-07-11 PROCEDURE — 2580000003 HC RX 258: Performed by: NURSE PRACTITIONER

## 2019-07-11 PROCEDURE — 97166 OT EVAL MOD COMPLEX 45 MIN: CPT

## 2019-07-11 PROCEDURE — 36600 WITHDRAWAL OF ARTERIAL BLOOD: CPT

## 2019-07-11 PROCEDURE — 82805 BLOOD GASES W/O2 SATURATION: CPT

## 2019-07-11 PROCEDURE — 6370000000 HC RX 637 (ALT 250 FOR IP): Performed by: ORTHOPAEDIC SURGERY

## 2019-07-11 PROCEDURE — 2580000003 HC RX 258: Performed by: INTERNAL MEDICINE

## 2019-07-11 PROCEDURE — 6360000002 HC RX W HCPCS: Performed by: ORTHOPAEDIC SURGERY

## 2019-07-11 PROCEDURE — 99024 POSTOP FOLLOW-UP VISIT: CPT | Performed by: ORTHOPAEDIC SURGERY

## 2019-07-11 PROCEDURE — 85027 COMPLETE CBC AUTOMATED: CPT

## 2019-07-11 PROCEDURE — 97116 GAIT TRAINING THERAPY: CPT

## 2019-07-11 PROCEDURE — 80048 BASIC METABOLIC PNL TOTAL CA: CPT

## 2019-07-11 PROCEDURE — 2500000003 HC RX 250 WO HCPCS: Performed by: INTERNAL MEDICINE

## 2019-07-11 PROCEDURE — 2580000003 HC RX 258: Performed by: ORTHOPAEDIC SURGERY

## 2019-07-11 RX ORDER — DEXTROSE MONOHYDRATE 50 MG/ML
100 INJECTION, SOLUTION INTRAVENOUS PRN
Status: DISCONTINUED | OUTPATIENT
Start: 2019-07-11 | End: 2019-07-19 | Stop reason: HOSPADM

## 2019-07-11 RX ORDER — METOPROLOL TARTRATE 5 MG/5ML
5 INJECTION INTRAVENOUS ONCE
Status: COMPLETED | OUTPATIENT
Start: 2019-07-11 | End: 2019-07-11

## 2019-07-11 RX ORDER — 0.9 % SODIUM CHLORIDE 0.9 %
1000 INTRAVENOUS SOLUTION INTRAVENOUS ONCE
Status: DISCONTINUED | OUTPATIENT
Start: 2019-07-11 | End: 2019-07-19 | Stop reason: HOSPADM

## 2019-07-11 RX ORDER — NICOTINE POLACRILEX 4 MG
15 LOZENGE BUCCAL PRN
Status: DISCONTINUED | OUTPATIENT
Start: 2019-07-11 | End: 2019-07-19 | Stop reason: HOSPADM

## 2019-07-11 RX ORDER — 0.9 % SODIUM CHLORIDE 0.9 %
500 INTRAVENOUS SOLUTION INTRAVENOUS ONCE
Status: COMPLETED | OUTPATIENT
Start: 2019-07-11 | End: 2019-07-11

## 2019-07-11 RX ORDER — DEXTROSE MONOHYDRATE 25 G/50ML
12.5 INJECTION, SOLUTION INTRAVENOUS PRN
Status: DISCONTINUED | OUTPATIENT
Start: 2019-07-11 | End: 2019-07-19 | Stop reason: HOSPADM

## 2019-07-11 RX ORDER — 0.9 % SODIUM CHLORIDE 0.9 %
1000 INTRAVENOUS SOLUTION INTRAVENOUS ONCE
Status: COMPLETED | OUTPATIENT
Start: 2019-07-11 | End: 2019-07-11

## 2019-07-11 RX ADMIN — GABAPENTIN 600 MG: 300 CAPSULE ORAL at 20:59

## 2019-07-11 RX ADMIN — OXYCODONE HYDROCHLORIDE 10 MG: 10 TABLET ORAL at 14:40

## 2019-07-11 RX ADMIN — PANTOPRAZOLE SODIUM 40 MG: 40 TABLET, DELAYED RELEASE ORAL at 06:38

## 2019-07-11 RX ADMIN — DOCUSATE SODIUM 100 MG: 100 CAPSULE, LIQUID FILLED ORAL at 08:21

## 2019-07-11 RX ADMIN — Medication 100 MG: at 10:41

## 2019-07-11 RX ADMIN — FINASTERIDE 5 MG: 5 TABLET, FILM COATED ORAL at 08:21

## 2019-07-11 RX ADMIN — HYDROCHLOROTHIAZIDE 12.5 MG: 12.5 CAPSULE ORAL at 08:21

## 2019-07-11 RX ADMIN — NORTRIPTYLINE HYDROCHLORIDE 50 MG: 50 CAPSULE ORAL at 10:39

## 2019-07-11 RX ADMIN — Medication 2 G: at 08:16

## 2019-07-11 RX ADMIN — Medication 10 ML: at 21:00

## 2019-07-11 RX ADMIN — CYCLOBENZAPRINE HYDROCHLORIDE 10 MG: 10 TABLET, FILM COATED ORAL at 03:17

## 2019-07-11 RX ADMIN — MORPHINE SULFATE 4 MG: 4 INJECTION, SOLUTION INTRAMUSCULAR; INTRAVENOUS at 06:37

## 2019-07-11 RX ADMIN — METOPROLOL TARTRATE 5 MG: 1 INJECTION, SOLUTION INTRAVENOUS at 21:00

## 2019-07-11 RX ADMIN — FENOFIBRATE 160 MG: 160 TABLET ORAL at 08:21

## 2019-07-11 RX ADMIN — OXYCODONE HYDROCHLORIDE 10 MG: 10 TABLET ORAL at 10:37

## 2019-07-11 RX ADMIN — TICAGRELOR 90 MG: 90 TABLET ORAL at 10:40

## 2019-07-11 RX ADMIN — MORPHINE SULFATE 4 MG: 4 INJECTION, SOLUTION INTRAMUSCULAR; INTRAVENOUS at 16:54

## 2019-07-11 RX ADMIN — ASPIRIN 81 MG 81 MG: 81 TABLET ORAL at 08:18

## 2019-07-11 RX ADMIN — OXYCODONE HYDROCHLORIDE 10 MG: 10 TABLET ORAL at 05:03

## 2019-07-11 RX ADMIN — SIMVASTATIN 40 MG: 40 TABLET, FILM COATED ORAL at 21:00

## 2019-07-11 RX ADMIN — GABAPENTIN 600 MG: 300 CAPSULE ORAL at 08:21

## 2019-07-11 RX ADMIN — OXYCODONE HYDROCHLORIDE 10 MG: 10 TABLET ORAL at 00:25

## 2019-07-11 RX ADMIN — METOPROLOL TARTRATE 5 MG: 1 INJECTION, SOLUTION INTRAVENOUS at 14:58

## 2019-07-11 RX ADMIN — MORPHINE SULFATE 4 MG: 4 INJECTION, SOLUTION INTRAMUSCULAR; INTRAVENOUS at 03:17

## 2019-07-11 RX ADMIN — DOCUSATE SODIUM 100 MG: 100 CAPSULE, LIQUID FILLED ORAL at 21:00

## 2019-07-11 RX ADMIN — NORTRIPTYLINE HYDROCHLORIDE 50 MG: 50 CAPSULE ORAL at 21:02

## 2019-07-11 RX ADMIN — VITAMIN E CAP 400 UNIT 400 UNITS: 400 CAP at 10:40

## 2019-07-11 RX ADMIN — METOPROLOL SUCCINATE 100 MG: 100 TABLET, EXTENDED RELEASE ORAL at 08:18

## 2019-07-11 RX ADMIN — LISINOPRIL 20 MG: 20 TABLET ORAL at 08:21

## 2019-07-11 RX ADMIN — SODIUM CHLORIDE 500 ML: 9 INJECTION, SOLUTION INTRAVENOUS at 06:38

## 2019-07-11 RX ADMIN — OXYCODONE HYDROCHLORIDE 10 MG: 10 TABLET ORAL at 20:59

## 2019-07-11 RX ADMIN — MORPHINE SULFATE 4 MG: 4 INJECTION, SOLUTION INTRAMUSCULAR; INTRAVENOUS at 01:11

## 2019-07-11 RX ADMIN — SODIUM CHLORIDE 1000 ML: 9 INJECTION, SOLUTION INTRAVENOUS at 14:42

## 2019-07-11 RX ADMIN — TAMSULOSIN HYDROCHLORIDE 0.4 MG: 0.4 CAPSULE ORAL at 08:21

## 2019-07-11 RX ADMIN — METFORMIN HYDROCHLORIDE 1000 MG: 1000 TABLET ORAL at 18:12

## 2019-07-11 RX ADMIN — MORPHINE SULFATE 2 MG: 2 INJECTION, SOLUTION INTRAMUSCULAR; INTRAVENOUS at 09:42

## 2019-07-11 ASSESSMENT — PAIN SCALES - GENERAL
PAINLEVEL_OUTOF10: 9
PAINLEVEL_OUTOF10: 10
PAINLEVEL_OUTOF10: 10
PAINLEVEL_OUTOF10: 7
PAINLEVEL_OUTOF10: 8
PAINLEVEL_OUTOF10: 8
PAINLEVEL_OUTOF10: 5
PAINLEVEL_OUTOF10: 5
PAINLEVEL_OUTOF10: 7
PAINLEVEL_OUTOF10: 5
PAINLEVEL_OUTOF10: 7
PAINLEVEL_OUTOF10: 10
PAINLEVEL_OUTOF10: 8
PAINLEVEL_OUTOF10: 8
PAINLEVEL_OUTOF10: 9
PAINLEVEL_OUTOF10: 10
PAINLEVEL_OUTOF10: 5
PAINLEVEL_OUTOF10: 7
PAINLEVEL_OUTOF10: 9

## 2019-07-11 ASSESSMENT — PAIN DESCRIPTION - DESCRIPTORS
DESCRIPTORS: ACHING;THROBBING

## 2019-07-11 ASSESSMENT — PAIN DESCRIPTION - LOCATION
LOCATION: BACK

## 2019-07-11 ASSESSMENT — PAIN DESCRIPTION - PROGRESSION
CLINICAL_PROGRESSION: NOT CHANGED

## 2019-07-11 ASSESSMENT — PAIN DESCRIPTION - PAIN TYPE
TYPE: ACUTE PAIN;SURGICAL PAIN
TYPE: SURGICAL PAIN

## 2019-07-11 ASSESSMENT — PAIN DESCRIPTION - ORIENTATION
ORIENTATION: LOWER

## 2019-07-11 ASSESSMENT — PAIN - FUNCTIONAL ASSESSMENT
PAIN_FUNCTIONAL_ASSESSMENT: PREVENTS OR INTERFERES SOME ACTIVE ACTIVITIES AND ADLS
PAIN_FUNCTIONAL_ASSESSMENT: PREVENTS OR INTERFERES SOME ACTIVE ACTIVITIES AND ADLS

## 2019-07-11 ASSESSMENT — PAIN DESCRIPTION - FREQUENCY
FREQUENCY: CONTINUOUS

## 2019-07-11 ASSESSMENT — PAIN DESCRIPTION - ONSET: ONSET: ON-GOING

## 2019-07-11 NOTE — PROGRESS NOTES
[address] [facility]    INPATIENT OCCUPATIONAL THERAPY  PROGRESS NOTE  Date: 2019  Patient Name: Braxton Simons      Room: 5600/8157-26  MRN: [id number]    : 1955  (62 y.o.) Gender: male     Discharge Recommendations:  Subacute/Skilled Nursing Facility(unless pain tolerance and mobility significantly improves by discharge)     Referring Practitioner: Dr. Raffy Hollingsworth  Diagnosis: L3-S1 PLIF  General  Chart Reviewed: Yes  Patient assessed for rehabilitation services?: Yes  Family / Caregiver Present: Yes(pt spouse present upon entry however exited room for session, spouse was educated on DC recommendation upon return)  Referring Practitioner: Dr. Raffy Hollingsworth  Diagnosis: L3-S1 PLIF    Restrictions  Restrictions/Precautions: Up as Tolerated, Fall Risk(room air. )  Implants present? : Metal implants(L3-L5 Lumbar fusion stenosis, neck fusion, 2 stents, R arm )  Other position/activity restrictions: Avoid excessive bending, lifting, and twisting  Required Braces or Orthoses?: No      Subjective  Subjective: \"well I'm glad you think so, cause I don't\" pt states in regards to therapists stating he is tolerating session well. Pt c significant improvement since AM eval.   Comments: Pt pleasant and cooperative. Patient Currently in Pain: Yes  Pain Level: 9(9 reported in supine position upon entry, decreased to 5/10 once seated EOB )  Pain Location: Back  Pain Orientation: Lower        Objective  Cognition  Overall Cognitive Status: WFL  Bed mobility  Rolling to Left: Moderate assistance  Supine to Sit: Moderate assistance;2 Person assistance  Sit to Supine: Unable to assess(pt remained in bedside chair. )  Scooting: Minimal assistance  Comment: Pt req increased time for mobility due to high level of pain; improved tolerance since this AM per evaluation and therapist report.  Use of janine pad and bed rails c slightly heightened HOB assisted pt   Balance  Sitting Balance: Contact guard assistance(initially min A; improved to SBA-CGA. Pt sat EOB ~5 min )  Standing Balance: Minimal assistance(A x 2, cues for upright posture. )  Standing Balance  Time: ~5 min, 2-3 min   Activity: static standing EOB c B UE on RW; RN changed bandage during standing trial, additional stand completed for stand pivot transfer to bedside chair. Comment: cues for proper technique and hand/foot placement. Transfers  Stand Pivot Transfers: Minimal assistance;2 Person assistance  Sit to stand: Moderate assistance;Minimal assistance;2 Person assistance  Stand to sit: Minimal assistance; Moderate assistance;2 Person assistance  Transfer Comments: Initial standing req mod A x2, progressed to min A x2 c second standing trial.     Assessment  Performance deficits / Impairments: Decreased functional mobility ; Decreased ADL status; Decreased endurance;Decreased balance;Decreased high-level IADLs  Assessment: Upon sitting pt EOB, noted significant bleeding from incision site-bandage heavily soiled. Notified RN Shannon Parents who addressed and donned clean dressing. Prognosis: Good  Discharge Recommendations: Subacute/Skilled Nursing Facility(unless pain tolerance and mobility significantly improves by)  Activity Tolerance: Patient limited by pain; Patient Tolerated treatment well  Safety Devices in place: Yes  Type of devices: Patient at risk for falls;Call light within reach; Left in chair;Nurse notified;Gait belt           Plan  Safety Devices  Safety Devices in place: Yes  Type of devices: Patient at risk for falls, Call light within reach, Left in chair, Nurse notified, Gait belt  Plan  Times per week: 5-7  Times per day: Twice a day  Current Treatment Recommendations: Balance Training, Functional Mobility Training, Endurance Training, Pain Management, Safety Education & Training, Patient/Caregiver Education & Training, Equipment Evaluation, Education, & procurement, Self-Care / ADL, Home Management Training      Goals  Short term goals  Time Frame

## 2019-07-11 NOTE — DISCHARGE INSTR - COC
REPAIR Right     SPINE SURGERY      neck fustion        Immunization History:   Immunization History   Administered Date(s) Administered    Influenza Vaccine, unspecified formulation 10/15/2018    Influenza Virus Vaccine 02/01/2016    Influenza, Taffy Mast, 3 yrs and older, IM, PF (Fluzone 3 yrs and older or Afluria 5 yrs and older) 09/06/2016, 10/23/2017    Pneumococcal Polysaccharide ([id number]) 10/23/2017       Active Problems:  Patient Active Problem List   Diagnosis Code    Hyperlipidemia E78.5    Prostate disease N42.9    Osteoarthritis M19.90    Headache R51    GERD (gastroesophageal reflux disease) K21.9    Sleep apnea G47.30    Elevated liver enzymes R74.8    BPH (benign prostatic hyperplasia) N40.0    Steatosis of liver K76.0    Type 2 diabetes mellitus without complication, without long-term current use of insulin (HCC) E11.9    Lumbar radiculopathy, chronic M54.16    Osteoarthritis of lumbar spine M47.816    Osteoarthritis of multiple joints M15.9    Degenerative disc disease, lumbar M51.36    Lumbar spinal stenosis M48.061    Pars defect with spondylolisthesis M43.00, M43.10    Essential hypertension I10    Lumbar disc herniation M51.26    Chronic low back pain M54.5, G89.29    S/P cardiac catheterization Z98.890    Back pain M54.9       Isolation/Infection:   Isolation          No Isolation            Nurse Assessment:  Last Vital Signs: /84   Pulse 128   Temp 98 °F (36.7 °C) (Oral)   Resp 20   Ht 5' 8\" (1.727 m)   Wt 217 lb 9.5 oz (98.7 kg)   SpO2 92%   BMI 33.09 kg/m²     Last documented pain score (0-10 scale): Pain Level: 7  Last Weight:   Wt Readings from Last 1 Encounters:   07/11/19 217 lb 9.5 oz (98.7 kg)     Mental Status:  oriented and alert    IV Access:  - None    Nursing Mobility/ADLs:  Walking   Assisted  Transfer  Assisted  Bathing  Assisted  Dressing  Assisted  Toileting  Assisted  Feeding  Independent  Med Admin  Independent  Med Delivery whole    Wound Care Documentation and Therapy:        Elimination:  Continence:   · Bowel: Yes  · Bladder: Yes  Urinary Catheter: None   Colostomy/Ileostomy/Ileal Conduit: No       Date of Last BM: 7/16/19    Intake/Output Summary (Last 24 hours) at 7/11/2019 1102  Last data filed at 7/11/2019 0504  Gross per 24 hour   Intake 4180 ml   Output 3300 ml   Net 880 ml     I/O last 3 completed shifts: In: 5180 [P.O.:480; I.V.:4700]  Out: 9616 [Urine:2420; Blood:900]    Safety Concerns: At Risk for Falls    Impairments/Disabilities:      None    Nutrition Therapy:  Current Nutrition Therapy:   - Oral Diet:  General    Routes of Feeding: Oral  Liquids: No Restrictions  Daily Fluid Restriction: no  Last Modified Barium Swallow with Video (Video Swallowing Test): not done    Treatments at the Time of Hospital Discharge:   Respiratory Treatments: ***  Oxygen Therapy:  is not on home oxygen therapy. Ventilator:    - No ventilator support    Rehab Therapies: Physical Therapy and Occupational Therapy  Weight Bearing Status/Restrictions: No weight bearing restirctions  Other Medical Equipment (for information only, NOT a DME order): Walker commode  Other Treatments: skilled nursing assessment per protocol medication education . Pt is S/P L3-S PLIF. Please make patient an appointment with their PCP within 7 days of discharge from your facility. Please refer patient to [facility] when discharged from your facility for home health services.     Patient's personal belongings (please select all that are sent with patient):  Glasses    RN SIGNATURE:  Electronically signed by Dieter Chris RN on 7/18/19 at 11:04 AM    CASE MANAGEMENT/SOCIAL WORK SECTION    Inpatient Status Date: 7/10/19    Readmission Risk Assessment Score:  Readmission Risk              Risk of Unplanned Readmission:        11           Discharging to Facility/ [facility]

## 2019-07-11 NOTE — PROGRESS NOTES
Physical Therapy    Facility/Department: [facility]  Initial Assessment    NAME: Yumiko Andrade  : 1955  MRN: [id number]    Date of Service: 2019    Discharge Recommendations:  Subacute/Skilled Nursing Facility(HOme if pain controll and mobility improved in 1 to 2 days. )   PT Equipment Recommendations  Equipment Needed: (TBD)    Assessment   Body structures, Functions, Activity limitations: Decreased functional mobility ; Decreased strength;Decreased balance; Increased Pain  Assessment: Pt was limited by pain in low back. Pt required max assist for bed mobility and was NOT able to successfully transfer from supine to sit with max assist x 3 due to pain. Treatment Diagnosis: Decreased functional mobility due to pain in low back due to Lumbar   Specific instructions for Next Treatment: 19: Progress bed mobility, progress trasnfers with RW, attempt gait with RW  Prognosis: Fair  Decision Making: Medium Complexity  History: per hx   Exam: ROM, MMT, attempted transfers, and bed mobility  Clinical Presentation: Pt was in bed calm and cooperative when PT entered room. Pt was on 2L of O2 via NC, and had general anesthesia. Pt has dressing on back incision. Patient Education: per POC  Barriers to Learning: none  REQUIRES PT FOLLOW UP: Yes  Activity Tolerance  Activity Tolerance: Patient limited by pain; Patient limited by fatigue;Patient limited by endurance       Patient Diagnosis(es): There were no encounter diagnoses.      has a past medical history of Abdominal hernia, Anesthesia, BPH (benign prostatic hyperplasia), CAD (coronary artery disease), Chronic back pain, Diabetes mellitus ([facility] [address] Difficult intubation, Ear infection, Fatty liver, Gallstones, GERD (gastroesophageal reflux disease), Headache(784.0), History of MI (myocardial infarction), Hypercholesteremia, Hyperlipidemia, Hypertension, Kidney stones, Osteoarthritis, PONV (postoperative nausea and vomiting), Sleep apnea, and Unspecified sleep apnea.   has a past surgical history that includes Spine surgery; Knee arthroscopy (Bilateral); Biceps tendon repair (Left); fracture surgery (Right); Colonoscopy (11 14 14); liver biopsy (8/14/2015); Rotator cuff repair (Right); Elbow fracture surgery (Right); other surgical history (11/07/2018); Endoscopy, colon, diagnostic; Cardiac catheterization (01/02/2019); and lumbar fusion (N/A, 7/10/2019). Restrictions  Restrictions/Precautions  Restrictions/Precautions: Up as Tolerated, Fall Risk(room air. )  Required Braces or Orthoses?: No  Implants present? : Metal implants(L3-L5 Lumbar fusion stenosis, neck fusion, 2 stents, R arm )  Position Activity Restriction  Other position/activity restrictions: Avoid excessive bending, lifting, and twisting  Vision/Hearing  Vision: Impaired  Vision Exceptions: Wears glasses at all times  Hearing: Exceptions to [facility]  Hearing Exceptions: Hard of hearing/hearing concerns(L ear ache)     Subjective  General  Patient assessed for rehabilitation services?: Yes  Additional Pertinent Hx: Albertina Pedraza is 59 y.o., male, admitted on 7/10/19 for stenosis, lumbar radiculopathy, chronic low back pain with sciatica, and lumbar disc herniation. Patient had LUMBAR LAMINECTOMY FUSION POSTERIOR - L3 to SACRUM POSTERIOR LUMBAR DECOMPRESSION FUSION performed by Dr. David Martínez on 7/10/19. Pt had DDD, C/O of pain in the lumbar spine area, radiates to the lower limbs worse on the Rt side.  Patient has failed physical therapy and had lumbar epidural steroid injections in the past.   Response To Previous Treatment: Not applicable  Family / Caregiver Present: No  Referring Practitioner: Dr. Manjula Sarabia  Referral Date : 07/10/19  Diagnosis: LUMBAR LAMINECTOMY FUSION POSTERIOR - L3 to SACRUM POSTERIOR LUMBAR DECOMPRESSION FUSION  Follows Commands: Within Functional Limits  General Comment  Comments: Pt reports he got his pain meds 5 to 10 minutes ago, agreeable to try to move.  Subjective  Subjective: Pt reports a few months ago he fell and pt reports he stumbles a lot. Pain Screening  Patient Currently in Pain: Yes  Pain Assessment  Pain Assessment: 0-10  Pain Level: 8  Pain Type: Surgical pain  Pain Location: Back  Pain Orientation: Lower  Pain Descriptors: Aching; Throbbing  Pain Frequency: Continuous  Pain Onset: On-going  Clinical Progression: Not changed  Functional Pain Assessment: Prevents or interferes some active activities and ADLs  Non-Pharmaceutical Pain Intervention(s): Ambulation/Increased Activity;Repositioned  Response to Pain Intervention: Patient Satisfied  Vital Signs  Patient Currently in Pain: Yes       Orientation  Orientation  Overall Orientation Status: Within Functional Limits  Social/Functional History  Social/Functional History  Lives With: Spouse  Type of Home: House  Home Layout: Two level, Able to Live on Main level with bedroom/bathroom  Home Access: Stairs to enter with rails  Entrance Stairs - Number of Steps: 5  Entrance Stairs - Rails: Both  Bathroom Shower/Tub: Walk-in shower, Doors  Bathroom Toilet: Standard  Bathroom Equipment: (pt reports is going to put hand held shower head in)  Bathroom Accessibility: Walker accessible  Home Equipment: Standard walker, Long-handled shoehorn, Sock aid  Receives Help From: Family(grandson is moving in and is going to be able to help 1x/wee)  ADL Assistance: Independent(uses sock aid sometimes )  Homemaking Assistance: Needs assistance(wife does cooking, cleaning, and laundry)  Homemaking Responsibilities: No(wife does cooking, cleaning, and laundry)  Ambulation Assistance: Independent  Transfer Assistance: Independent  Active : Yes  Mode of Transportation: SUV(Difficulty getting in and out of SUV)  Occupation: Retired  Type of occupation:    Cognition        Objective     Observation/Palpation  Posture: Fair  Observation: Pt was in bed calm and cooperative when PT entered room.  Pt was on 2L of O2 via NC, and had general anesthesia. Pt has dressing on back incision. Edema: no edema noted.     AROM RLE (degrees)  RLE AROM: WFL  RLE General AROM: Hip, knee, and ankle assessed  AROM LLE (degrees)  LLE AROM : WFL  LLE General AROM: Hip, knee, and ankle assessed  AROM RUE (degrees)  RUE General AROM: See OT assessment  AROM LUE (degrees)  LUE General AROM: See OT assessment  Strength RLE  Strength RLE: Exception  R Hip Flexion: 3/5  R Hip ABduction: 3+/5  R Hip ADduction: 3+/5  R Knee Flexion: 4-/5  R Knee Extension: 4-/5  R Ankle Dorsiflexion: 3+/5  R Ankle Plantar flexion: 3+/5  Strength LLE  Strength LLE: Exception  L Hip Flexion: 3/5  L Hip ABduction: 3+/5  L Hip ADduction: 3+/5  L Knee Flexion: 4-/5  L Knee Extension: 4-/5  L Ankle Dorsiflexion: 3+/5  L Ankle Plantar Flexion: 3+/5  Strength RUE  Comment: See OT assessment  Strength LUE  Comment: See OT assessment     Sensation  Overall Sensation Status: Impaired(Numbness and tingling in hands and feet (carpal tunnel) )  Bed mobility  Rolling to Left: Maximum assistance  Supine to Sit: Unable to assess(Attempted with Ax3 but pt unable to complete 2* pain)  Sit to Supine: Unable to assess(Attempted with Ax3 but pt unable to complete 2* pain)  Scooting: Unable to assess(due to pain and unable to sit at the EOB)  Transfers  Sit to Stand: Unable to assess(unable to assess due to pt's back pain)  Stand to sit: Unable to assess(unable to assess due to pt's back pain)  Bed to Chair: Unable to assess(unable to assess due to pt's back pain)  Ambulation  Ambulation?: No(unable to assess due to pt's back pain)  Stairs/Curb  Stairs?: No(unable to assess due to pt's back pain)     Balance  Posture: Poor(unable to assess due to pt's back pain)  Sitting - Static: Poor(unable to assess due to pt's back pain)  Sitting - Dynamic: Poor(unable to assess due to pt's back pain)  Standing - Static: Poor(unable to assess due to pt's back pain)  Standing - Dynamic: Poor(unable

## 2019-07-11 NOTE — PROGRESS NOTES
[address]    Occupational Therapy Evaluation  Date: 19  Patient Name: Shila Pavon       Room: 8872/5315-06  MRN: [id number]  Account: [de-identified]   : 1955  (62 y.o.) Gender: male     Discharge Recommendations:  Home with assist PRN(once pain controlled)    Referring Practitioner: Dr. Ayde Cash  Diagnosis: L3-S1 PLIF    Treatment Diagnosis: Impaired self-care status  Past Medical History:  has a past medical history of Abdominal hernia, Anesthesia, BPH (benign prostatic hyperplasia), CAD (coronary artery disease), Chronic back pain, Diabetes mellitus ([facility] [address] Difficult intubation, Ear infection, Fatty liver, Gallstones, GERD (gastroesophageal reflux disease), Headache(784.0), History of MI (myocardial infarction), Hypercholesteremia, Hyperlipidemia, Hypertension, Kidney stones, Osteoarthritis, PONV (postoperative nausea and vomiting), Sleep apnea, and Unspecified sleep apnea. Past Surgical History:   has a past surgical history that includes Spine surgery; Knee arthroscopy (Bilateral); Biceps tendon repair (Left); fracture surgery (Right); Colonoscopy (14); liver biopsy (2015); Rotator cuff repair (Right); Elbow fracture surgery (Right); other surgical history (2018); Endoscopy, colon, diagnostic; Cardiac catheterization (2019); and lumbar fusion (N/A, 7/10/2019).     Restrictions  Restrictions/Precautions: Up as Tolerated, Fall Risk(2L of O2 via NC)  Implants present? : Metal implants(L3-L5 Lumbar fusion stenosis, neck fusion, 2 stents, R arm )  Other position/activity restrictions: Avoid excessive bending, lifting, and twisting  Required Braces or Orthoses?: No     Vitals  Temp: 98 °F (36.7 °C)  Pulse: 128  Resp: 20  BP: 132/84  Height: 5' 8\" (172.7 cm)  Weight: 217 lb 9.5 oz (98.7 kg)  BMI (Calculated): 33.2  Oxygen Therapy  SpO2: 92 %  Pulse Oximeter Device Mode: Continuous  O2 Device: Nasal cannula  O2 Flow Rate (L/min): 4 L/min  Level of Consciousness: Decreased endurance, Decreased balance, Decreased high-level IADLs  Treatment Diagnosis: Impaired self-care status  Prognosis: Good  Decision Making: Medium Complexity  Patient Education: OT POC, log-rolling technique, pain mgt, activity promotion  REQUIRES OT FOLLOW UP: Yes  Discharge Recommendations: Home with assist PRN(once pain controlled)  Activity Tolerance: Patient limited by pain    Goals  Patient Goals   Patient goals : Return home with A as needed. Short term goals  Time Frame for Short term goals: By Discharge  Short term goal 1: Pt will complete bed mobility with Min A and tolerate sitting EOB for 5+ minutes while completing a functional task. Short term goal 2: Pt will actively participate in self-care tasks and complete UB with set-up A only and LB with SBA using AE/modified techniques as needed. Short term goal 3: Pt will complete toilet transfer and toileting with SBA and Good safety using appropriate DME. Short term goal 4: Pt will V/D good understanding of back care principles and joint protection techniques as they relate to home routines. Short term goal 5: Pt will actively participate in 15-20 minutes of therapeutic exercise/activity to promote increased independence and safety with self-care and mobility.     Plan  Safety Devices  Safety Devices in place: Yes  Type of devices: Patient at risk for falls, Left in bed, Call light within reach, Nurse notified     Plan  Times per week: 5-7  Times per day: Twice a day  Current Treatment Recommendations: Balance Training, Functional Mobility Training, Endurance Training, Pain Management, Safety Education & Training, Patient/Caregiver Education & Training, Equipment Evaluation, Education, & procurement, Self-Care / ADL, Home Management Training    Equipment Recommendations  Equipment Needed: (TBD)  OT Individual Minutes  Time In: 3427  Time Out: 5865  Minutes: 32    Electronically signed by Flakita Dey on 7/11/19 at 10:54 AM

## 2019-07-11 NOTE — PROGRESS NOTES
history that includes Spine surgery; Knee arthroscopy (Bilateral); Biceps tendon repair (Left); fracture surgery (Right); Colonoscopy (11 14 14); liver biopsy (8/14/2015); Rotator cuff repair (Right); Elbow fracture surgery (Right); other surgical history (11/07/2018); Endoscopy, colon, diagnostic; Cardiac catheterization (01/02/2019); and lumbar fusion (N/A, 7/10/2019). Restrictions  Restrictions/Precautions  Restrictions/Precautions: Up as Tolerated, Fall Risk(room air. )  Required Braces or Orthoses?: No  Implants present? : Metal implants(L3-L5 Lumbar fusion stenosis, neck fusion, 2 stents, R arm )  Position Activity Restriction  Other position/activity restrictions: Avoid excessive bending, lifting, and twisting  Subjective   General  Additional Pertinent Hx: Denver Membreno is 59 y.o., male, admitted on 7/10/19 for stenosis, lumbar radiculopathy, chronic low back pain with sciatica, and lumbar disc herniation. Patient had LUMBAR LAMINECTOMY FUSION POSTERIOR - L3 to SACRUM POSTERIOR LUMBAR DECOMPRESSION FUSION performed by Dr. Dennie Nim on 7/10/19. Pt had DDD, C/O of pain in the lumbar spine area, radiates to the lower limbs worse on the Rt side. Patient has failed physical therapy and had lumbar epidural steroid injections in the past.   Response To Previous Treatment: Not applicable  Family / Caregiver Present: No  Referring Practitioner: Dr. Foster Freitas: Pt reports a few months ago he fell and pt reports he stumbles a lot. General Comment  Comments: Pt reports he got his pain meds 5 to 10 minutes ago, agreeable to try to move. Pain Screening  Patient Currently in Pain: Yes  Pain Assessment  Pain Assessment: 0-10  Pain Level: 9  Patient's Stated Pain Goal: (Decreased to 5/10 when sat at EOB.)  Pain Type: Acute pain;Surgical pain  Pain Location: Back  Pain Orientation: Lower  Pain Descriptors: Aching; Throbbing  Pain Frequency: Continuous  Clinical Progression: Not

## 2019-07-12 ENCOUNTER — APPOINTMENT (OUTPATIENT)
Dept: GENERAL RADIOLOGY | Age: 64
DRG: 453 | End: 2019-07-12
Attending: ORTHOPAEDIC SURGERY
Payer: COMMERCIAL

## 2019-07-12 LAB
GLUCOSE BLD-MCNC: 167 MG/DL (ref 75–110)
GLUCOSE BLD-MCNC: 180 MG/DL (ref 75–110)
GLUCOSE BLD-MCNC: 188 MG/DL (ref 75–110)
GLUCOSE BLD-MCNC: 216 MG/DL (ref 75–110)
HCT VFR BLD CALC: 33.2 % (ref 41–53)
HEMOGLOBIN: 10.8 G/DL (ref 13.5–17.5)
MCH RBC QN AUTO: 29.4 PG (ref 26–34)
MCHC RBC AUTO-ENTMCNC: 32.5 G/DL (ref 31–37)
MCV RBC AUTO: 90.7 FL (ref 80–100)
NRBC AUTOMATED: ABNORMAL PER 100 WBC
PDW BLD-RTO: 14.8 % (ref 11.5–14.9)
PLATELET # BLD: 241 K/UL (ref 150–450)
PMV BLD AUTO: 7.8 FL (ref 6–12)
RBC # BLD: 3.66 M/UL (ref 4.5–5.9)
TSH SERPL DL<=0.05 MIU/L-ACNC: 0.85 MIU/L (ref 0.3–5)
WBC # BLD: 12.7 K/UL (ref 3.5–11)

## 2019-07-12 PROCEDURE — 6360000002 HC RX W HCPCS: Performed by: INTERNAL MEDICINE

## 2019-07-12 PROCEDURE — 99024 POSTOP FOLLOW-UP VISIT: CPT | Performed by: ORTHOPAEDIC SURGERY

## 2019-07-12 PROCEDURE — 71046 X-RAY EXAM CHEST 2 VIEWS: CPT

## 2019-07-12 PROCEDURE — 99233 SBSQ HOSP IP/OBS HIGH 50: CPT | Performed by: INTERNAL MEDICINE

## 2019-07-12 PROCEDURE — 85027 COMPLETE CBC AUTOMATED: CPT

## 2019-07-12 PROCEDURE — 97530 THERAPEUTIC ACTIVITIES: CPT

## 2019-07-12 PROCEDURE — 6360000002 HC RX W HCPCS: Performed by: ORTHOPAEDIC SURGERY

## 2019-07-12 PROCEDURE — 36415 COLL VENOUS BLD VENIPUNCTURE: CPT

## 2019-07-12 PROCEDURE — 1200000000 HC SEMI PRIVATE

## 2019-07-12 PROCEDURE — 6370000000 HC RX 637 (ALT 250 FOR IP): Performed by: ORTHOPAEDIC SURGERY

## 2019-07-12 PROCEDURE — 97110 THERAPEUTIC EXERCISES: CPT

## 2019-07-12 PROCEDURE — 84443 ASSAY THYROID STIM HORMONE: CPT

## 2019-07-12 PROCEDURE — 97116 GAIT TRAINING THERAPY: CPT

## 2019-07-12 PROCEDURE — 6370000000 HC RX 637 (ALT 250 FOR IP): Performed by: INTERNAL MEDICINE

## 2019-07-12 PROCEDURE — 2580000003 HC RX 258: Performed by: ORTHOPAEDIC SURGERY

## 2019-07-12 PROCEDURE — 82947 ASSAY GLUCOSE BLOOD QUANT: CPT

## 2019-07-12 PROCEDURE — 97535 SELF CARE MNGMENT TRAINING: CPT

## 2019-07-12 RX ORDER — IPRATROPIUM BROMIDE AND ALBUTEROL SULFATE 2.5; .5 MG/3ML; MG/3ML
1 SOLUTION RESPIRATORY (INHALATION)
Status: DISCONTINUED | OUTPATIENT
Start: 2019-07-13 | End: 2019-07-16

## 2019-07-12 RX ORDER — LEVOFLOXACIN 5 MG/ML
750 INJECTION, SOLUTION INTRAVENOUS EVERY 24 HOURS
Status: DISCONTINUED | OUTPATIENT
Start: 2019-07-12 | End: 2019-07-15

## 2019-07-12 RX ORDER — LEVOFLOXACIN 25 MG/ML
750 INJECTION INTRAVENOUS DAILY
Status: DISCONTINUED | OUTPATIENT
Start: 2019-07-12 | End: 2019-07-12 | Stop reason: CLARIF

## 2019-07-12 RX ADMIN — OXYCODONE HYDROCHLORIDE 10 MG: 10 TABLET ORAL at 08:06

## 2019-07-12 RX ADMIN — FINASTERIDE 5 MG: 5 TABLET, FILM COATED ORAL at 08:38

## 2019-07-12 RX ADMIN — DOCUSATE SODIUM 100 MG: 100 CAPSULE, LIQUID FILLED ORAL at 22:01

## 2019-07-12 RX ADMIN — LEVOFLOXACIN 750 MG: 5 INJECTION, SOLUTION INTRAVENOUS at 23:01

## 2019-07-12 RX ADMIN — OXYCODONE HYDROCHLORIDE 10 MG: 10 TABLET ORAL at 22:01

## 2019-07-12 RX ADMIN — SIMVASTATIN 40 MG: 40 TABLET, FILM COATED ORAL at 22:01

## 2019-07-12 RX ADMIN — MORPHINE SULFATE 4 MG: 4 INJECTION, SOLUTION INTRAMUSCULAR; INTRAVENOUS at 05:00

## 2019-07-12 RX ADMIN — INSULIN LISPRO 1 UNITS: 100 INJECTION, SOLUTION INTRAVENOUS; SUBCUTANEOUS at 08:41

## 2019-07-12 RX ADMIN — MORPHINE SULFATE 2 MG: 2 INJECTION, SOLUTION INTRAMUSCULAR; INTRAVENOUS at 09:49

## 2019-07-12 RX ADMIN — METOPROLOL SUCCINATE 100 MG: 100 TABLET, EXTENDED RELEASE ORAL at 08:38

## 2019-07-12 RX ADMIN — OXYCODONE HYDROCHLORIDE 10 MG: 10 TABLET ORAL at 12:09

## 2019-07-12 RX ADMIN — NORTRIPTYLINE HYDROCHLORIDE 50 MG: 50 CAPSULE ORAL at 08:40

## 2019-07-12 RX ADMIN — DOCUSATE SODIUM 100 MG: 100 CAPSULE, LIQUID FILLED ORAL at 08:39

## 2019-07-12 RX ADMIN — MORPHINE SULFATE 4 MG: 4 INJECTION, SOLUTION INTRAMUSCULAR; INTRAVENOUS at 23:01

## 2019-07-12 RX ADMIN — GABAPENTIN 600 MG: 300 CAPSULE ORAL at 08:39

## 2019-07-12 RX ADMIN — OXYCODONE HYDROCHLORIDE 10 MG: 10 TABLET ORAL at 03:18

## 2019-07-12 RX ADMIN — GABAPENTIN 600 MG: 300 CAPSULE ORAL at 22:01

## 2019-07-12 RX ADMIN — PANTOPRAZOLE SODIUM 40 MG: 40 TABLET, DELAYED RELEASE ORAL at 05:00

## 2019-07-12 RX ADMIN — Medication 10 ML: at 08:39

## 2019-07-12 RX ADMIN — Medication 10 ML: at 08:40

## 2019-07-12 RX ADMIN — FENOFIBRATE 160 MG: 160 TABLET ORAL at 08:38

## 2019-07-12 RX ADMIN — NORTRIPTYLINE HYDROCHLORIDE 50 MG: 50 CAPSULE ORAL at 22:03

## 2019-07-12 RX ADMIN — MAGNESIUM CITRATE 296 ML: 1.75 LIQUID ORAL at 17:52

## 2019-07-12 RX ADMIN — OXYCODONE HYDROCHLORIDE 10 MG: 10 TABLET ORAL at 16:54

## 2019-07-12 RX ADMIN — TAMSULOSIN HYDROCHLORIDE 0.4 MG: 0.4 CAPSULE ORAL at 08:38

## 2019-07-12 RX ADMIN — MORPHINE SULFATE 2 MG: 2 INJECTION, SOLUTION INTRAMUSCULAR; INTRAVENOUS at 14:39

## 2019-07-12 RX ADMIN — VITAMIN E CAP 400 UNIT 400 UNITS: 400 CAP at 08:40

## 2019-07-12 ASSESSMENT — PAIN DESCRIPTION - DESCRIPTORS
DESCRIPTORS: SHARP
DESCRIPTORS: SHARP

## 2019-07-12 ASSESSMENT — PAIN SCALES - GENERAL
PAINLEVEL_OUTOF10: 7
PAINLEVEL_OUTOF10: 4
PAINLEVEL_OUTOF10: 8
PAINLEVEL_OUTOF10: 6
PAINLEVEL_OUTOF10: 7
PAINLEVEL_OUTOF10: 6
PAINLEVEL_OUTOF10: 7
PAINLEVEL_OUTOF10: 6
PAINLEVEL_OUTOF10: 7
PAINLEVEL_OUTOF10: 6
PAINLEVEL_OUTOF10: 7
PAINLEVEL_OUTOF10: 7

## 2019-07-12 ASSESSMENT — PAIN DESCRIPTION - PROGRESSION
CLINICAL_PROGRESSION: NOT CHANGED
CLINICAL_PROGRESSION: NOT CHANGED

## 2019-07-12 ASSESSMENT — PAIN DESCRIPTION - LOCATION
LOCATION: BACK

## 2019-07-12 ASSESSMENT — PAIN DESCRIPTION - PAIN TYPE
TYPE: SURGICAL PAIN

## 2019-07-12 ASSESSMENT — PAIN DESCRIPTION - ORIENTATION
ORIENTATION: LOWER
ORIENTATION: LOWER

## 2019-07-12 ASSESSMENT — PAIN DESCRIPTION - ONSET: ONSET: ON-GOING

## 2019-07-12 ASSESSMENT — PAIN DESCRIPTION - FREQUENCY: FREQUENCY: INTERMITTENT

## 2019-07-12 ASSESSMENT — PAIN - FUNCTIONAL ASSESSMENT: PAIN_FUNCTIONAL_ASSESSMENT: PREVENTS OR INTERFERES SOME ACTIVE ACTIVITIES AND ADLS

## 2019-07-12 NOTE — PLAN OF CARE
Pt continues to move poorly in bed. Medicated with oxycodone for pain and after pt swallows pill  he requests morphine also  Explained to him that he would have to wait and hour for morphine. Safety maintained.

## 2019-07-12 NOTE — PLAN OF CARE
Problem: Pain:  Goal: Pain level will decrease  Description  Pain level will decrease  Outcome: Ongoing  Goal: Control of acute pain  Description  Control of acute pain  Outcome: Ongoing  Goal: Control of chronic pain  Description  Control of chronic pain  Outcome: Ongoing     Problem: Musculor/Skeletal Functional Status  Goal: Highest potential functional level  Outcome: Ongoing  Goal: Absence of falls  Outcome: Ongoing     Problem: Falls - Risk of:  Goal: Will remain free from falls  Description  Will remain free from falls  Outcome: Ongoing  Goal: Absence of physical injury  Description  Absence of physical injury  Outcome: Ongoing

## 2019-07-12 NOTE — PROGRESS NOTES
improved to SBA-CGA. )  Standing Balance: Minimal assistance(A x 2, cues for upright posture. )  Standing Balance  Time: ~5 min, 2-3 min   Activity: static standing EOB c B UE on RW; RN changed bandage during standing trial, additional stand completed for stand pivot transfer to bedside chair. Comment: cues for proper technique and hand/foot placement; pt prefers to tranfer c forearms on RW and demo max difficulty utilizing proper hand placement  Functional Mobility  Functional - Mobility Device: Rolling Walker  Activity: Other  Assist Level: Minimal assistance(x2)  Functional Mobility Comments: VC for safety/RW mgt   ADL  Feeding: Independent  Grooming: Setup;Stand by assistance  UE Bathing: Moderate assistance;Minimal assistance  LE Bathing: Maximum assistance;Dependent/Total  UE Dressing: Moderate assistance;Minimal assistance  LE Dressing: Maximum assistance  Toileting: Dependent/Total(Fragoso)  Additional Comments: Per clinical reasoning unless otherwise noted. Pt completed UBD while EOB c Tamera to tie gown and thread over R shoulder. Pt able to tolerate washing face c unilateral UE support. Writer A pt c grooming beard c excessive food present per pt; upon cleansing writer notd scabs within chin hairs and redness; RN notified, gentle wash provided only for increased skin protection. AROM only for footi mgt this date. Educated on weighted silver to A c self feeding 2* pt noting increased hand tremors, pt with fair insight. Transfers  Stand Pivot Transfers: Minimal assistance;2 Person assistance  Sit to stand: 2 Person assistance(MaxAx1 plus MinAx1)  Stand to sit: Minimal assistance; Moderate assistance;2 Person assistance  Transfer Comments: Initial standing req mod A x2, progressed to min A x2 c second standing trial.            Additional Activities: Education on icing before/after therapies/mobilities for pain mgt; pt V understanding and requested ice at completion of tx      Vision  Patient Visual Report: No visual complaint reported. Assessment  Performance deficits / Impairments: Decreased functional mobility ; Decreased ADL status; Decreased endurance;Decreased balance;Decreased high-level IADLs  Assessment: Upon sitting pt EOB, noted significant bleeding from incision site-bandage heavily soiled. Notified RN Jorgito Pitts who addressed and donned clean dressing. Prognosis: Good  Discharge Recommendations: Subacute/Skilled Nursing Facility(unless pain tolerance and mobility significantly improves by)  Activity Tolerance: Patient limited by pain; Patient Tolerated treatment well  Safety Devices in place: Yes  Type of devices: Patient at risk for falls;Call light within reach; Left in chair;Nurse notified;Gait belt  Equipment Recommendations  Equipment Needed: (TBD)          Patient Education:  Patient Education: OT POC, log-rolling technique, pain mgt, activity promotion  Learner:patient  Method: demonstration and explanation       Outcome: needs reinforcement     Plan  Safety Devices  Safety Devices in place: Yes  Type of devices: Patient at risk for falls, Call light within reach, Left in chair, Nurse notified, Gait belt  Plan  Times per week: 5-7  Times per day: Twice a day  Current Treatment Recommendations: Balance Training, Functional Mobility Training, Endurance Training, Pain Management, Safety Education & Training, Patient/Caregiver Education & Training, Equipment Evaluation, Education, & procurement, Self-Care / ADL, Home Management Training      Goals  Short term goals  Time Frame for Short term goals: By Discharge  Short term goal 1: Pt will complete bed mobility with Min A and tolerate sitting EOB for 5+ minutes while completing a functional task. Short term goal 2: Pt will actively participate in self-care tasks and complete UB with set-up A only and LB with SBA using AE/modified techniques as needed.   Short term goal 3: Pt will complete toilet transfer and toileting with SBA and Good safety using appropriate DME. Short term goal 4: Pt will V/D good understanding of back care principles and joint protection techniques as they relate to home routines. Short term goal 5: Pt will actively participate in 15-20 minutes of therapeutic exercise/activity to promote increased independence and safety with self-care and mobility.     OT Individual Minutes  Time In: 5019  Time Out: 5054  Minutes: 43      Electronically signed by AMBER Hui on 7/12/19 at 5:21 PM

## 2019-07-13 LAB
ABSOLUTE EOS #: 0.1 K/UL (ref 0–0.4)
ABSOLUTE IMMATURE GRANULOCYTE: ABNORMAL K/UL (ref 0–0.3)
ABSOLUTE LYMPH #: 0.7 K/UL (ref 1–4.8)
ABSOLUTE MONO #: 0.8 K/UL (ref 0.1–1.3)
ALLEN TEST: ABNORMAL
ANION GAP SERPL CALCULATED.3IONS-SCNC: 13 MMOL/L (ref 9–17)
BASOPHILS # BLD: 0 % (ref 0–2)
BASOPHILS ABSOLUTE: 0 K/UL (ref 0–0.2)
BUN BLDV-MCNC: 18 MG/DL (ref 8–23)
BUN/CREAT BLD: ABNORMAL (ref 9–20)
CALCIUM SERPL-MCNC: 10.1 MG/DL (ref 8.6–10.4)
CARBOXYHEMOGLOBIN: 1.6 % (ref 0–5)
CHLORIDE BLD-SCNC: 96 MMOL/L (ref 98–107)
CO2: 25 MMOL/L (ref 20–31)
CREAT SERPL-MCNC: 0.88 MG/DL (ref 0.7–1.2)
DIFFERENTIAL TYPE: ABNORMAL
EOSINOPHILS RELATIVE PERCENT: 1 % (ref 0–4)
FIO2: ABNORMAL
GFR AFRICAN AMERICAN: >60 ML/MIN
GFR NON-AFRICAN AMERICAN: >60 ML/MIN
GFR SERPL CREATININE-BSD FRML MDRD: ABNORMAL ML/MIN/{1.73_M2}
GFR SERPL CREATININE-BSD FRML MDRD: ABNORMAL ML/MIN/{1.73_M2}
GLUCOSE BLD-MCNC: 145 MG/DL (ref 75–110)
GLUCOSE BLD-MCNC: 147 MG/DL (ref 75–110)
GLUCOSE BLD-MCNC: 157 MG/DL (ref 75–110)
GLUCOSE BLD-MCNC: 160 MG/DL (ref 70–99)
GLUCOSE BLD-MCNC: 160 MG/DL (ref 75–110)
GLUCOSE BLD-MCNC: 205 MG/DL (ref 75–110)
GLUCOSE BLD-MCNC: 207 MG/DL (ref 75–110)
HCO3 ARTERIAL: 29.7 MMOL/L (ref 22–26)
HCT VFR BLD CALC: 32 % (ref 41–53)
HEMOGLOBIN: 10.5 G/DL (ref 13.5–17.5)
IMMATURE GRANULOCYTES: ABNORMAL %
LYMPHOCYTES # BLD: 5 % (ref 24–44)
MCH RBC QN AUTO: 29.5 PG (ref 26–34)
MCHC RBC AUTO-ENTMCNC: 32.9 G/DL (ref 31–37)
MCV RBC AUTO: 89.8 FL (ref 80–100)
METHEMOGLOBIN: 0.4 % (ref 0–1.9)
MODE: ABNORMAL
MONOCYTES # BLD: 7 % (ref 1–7)
NEGATIVE BASE EXCESS, ART: ABNORMAL MMOL/L (ref 0–2)
NOTIFICATION TIME: ABNORMAL
NOTIFICATION: ABNORMAL
NRBC AUTOMATED: ABNORMAL PER 100 WBC
O2 DEVICE/FLOW/%: ABNORMAL
O2 SAT, ARTERIAL: 91.9 % (ref 95–98)
OXYHEMOGLOBIN: ABNORMAL % (ref 95–98)
PATIENT TEMP: 37
PCO2 ARTERIAL: 51.6 MMHG (ref 35–45)
PCO2, ART, TEMP ADJ: ABNORMAL (ref 35–45)
PDW BLD-RTO: 14.6 % (ref 11.5–14.9)
PEEP/CPAP: ABNORMAL
PH ARTERIAL: 7.37 (ref 7.35–7.45)
PH, ART, TEMP ADJ: ABNORMAL (ref 7.35–7.45)
PLATELET # BLD: 257 K/UL (ref 150–450)
PLATELET ESTIMATE: ABNORMAL
PMV BLD AUTO: 7.7 FL (ref 6–12)
PO2 ARTERIAL: 68.5 MMHG (ref 80–100)
PO2, ART, TEMP ADJ: ABNORMAL MMHG (ref 80–100)
POSITIVE BASE EXCESS, ART: 4.3 MMOL/L (ref 0–2)
POTASSIUM SERPL-SCNC: 4.3 MMOL/L (ref 3.7–5.3)
PSV: ABNORMAL
PT. POSITION: ABNORMAL
RBC # BLD: 3.56 M/UL (ref 4.5–5.9)
RBC # BLD: ABNORMAL 10*6/UL
RESPIRATORY RATE: ABNORMAL
SAMPLE SITE: ABNORMAL
SEG NEUTROPHILS: 87 % (ref 36–66)
SEGMENTED NEUTROPHILS ABSOLUTE COUNT: 11.3 K/UL (ref 1.3–9.1)
SET RATE: ABNORMAL
SODIUM BLD-SCNC: 134 MMOL/L (ref 135–144)
TEXT FOR RESPIRATORY: ABNORMAL
TOTAL HB: ABNORMAL G/DL (ref 12–16)
TOTAL RATE: ABNORMAL
VT: ABNORMAL
WBC # BLD: 13 K/UL (ref 3.5–11)
WBC # BLD: ABNORMAL 10*3/UL

## 2019-07-13 PROCEDURE — 82947 ASSAY GLUCOSE BLOOD QUANT: CPT

## 2019-07-13 PROCEDURE — 85025 COMPLETE CBC W/AUTO DIFF WBC: CPT

## 2019-07-13 PROCEDURE — 2060000000 HC ICU INTERMEDIATE R&B

## 2019-07-13 PROCEDURE — 97110 THERAPEUTIC EXERCISES: CPT

## 2019-07-13 PROCEDURE — 6370000000 HC RX 637 (ALT 250 FOR IP): Performed by: ORTHOPAEDIC SURGERY

## 2019-07-13 PROCEDURE — 82805 BLOOD GASES W/O2 SATURATION: CPT

## 2019-07-13 PROCEDURE — 80048 BASIC METABOLIC PNL TOTAL CA: CPT

## 2019-07-13 PROCEDURE — 36600 WITHDRAWAL OF ARTERIAL BLOOD: CPT

## 2019-07-13 PROCEDURE — 97116 GAIT TRAINING THERAPY: CPT

## 2019-07-13 PROCEDURE — 99232 SBSQ HOSP IP/OBS MODERATE 35: CPT | Performed by: INTERNAL MEDICINE

## 2019-07-13 PROCEDURE — 36415 COLL VENOUS BLD VENIPUNCTURE: CPT

## 2019-07-13 PROCEDURE — 94640 AIRWAY INHALATION TREATMENT: CPT

## 2019-07-13 PROCEDURE — 97530 THERAPEUTIC ACTIVITIES: CPT

## 2019-07-13 PROCEDURE — 94761 N-INVAS EAR/PLS OXIMETRY MLT: CPT

## 2019-07-13 PROCEDURE — 2580000003 HC RX 258: Performed by: ORTHOPAEDIC SURGERY

## 2019-07-13 PROCEDURE — 6370000000 HC RX 637 (ALT 250 FOR IP): Performed by: INTERNAL MEDICINE

## 2019-07-13 PROCEDURE — 2700000000 HC OXYGEN THERAPY PER DAY

## 2019-07-13 PROCEDURE — 99024 POSTOP FOLLOW-UP VISIT: CPT | Performed by: ORTHOPAEDIC SURGERY

## 2019-07-13 RX ORDER — GUAIFENESIN 600 MG/1
600 TABLET, EXTENDED RELEASE ORAL 2 TIMES DAILY
Status: DISCONTINUED | OUTPATIENT
Start: 2019-07-13 | End: 2019-07-19 | Stop reason: HOSPADM

## 2019-07-13 RX ORDER — SODIUM PHOSPHATE, DIBASIC AND SODIUM PHOSPHATE, MONOBASIC 7; 19 G/133ML; G/133ML
1 ENEMA RECTAL
Status: COMPLETED | OUTPATIENT
Start: 2019-07-13 | End: 2019-07-13

## 2019-07-13 RX ADMIN — PANTOPRAZOLE SODIUM 40 MG: 40 TABLET, DELAYED RELEASE ORAL at 06:12

## 2019-07-13 RX ADMIN — GUAIFENESIN 600 MG: 600 TABLET, EXTENDED RELEASE ORAL at 22:39

## 2019-07-13 RX ADMIN — MAGNESIUM CITRATE 296 ML: 1.75 LIQUID ORAL at 12:13

## 2019-07-13 RX ADMIN — INSULIN LISPRO 1 UNITS: 100 INJECTION, SOLUTION INTRAVENOUS; SUBCUTANEOUS at 08:06

## 2019-07-13 RX ADMIN — IPRATROPIUM BROMIDE AND ALBUTEROL SULFATE 1 AMPULE: .5; 3 SOLUTION RESPIRATORY (INHALATION) at 07:22

## 2019-07-13 RX ADMIN — FINASTERIDE 5 MG: 5 TABLET, FILM COATED ORAL at 09:30

## 2019-07-13 RX ADMIN — OXYCODONE HYDROCHLORIDE 10 MG: 10 TABLET ORAL at 16:32

## 2019-07-13 RX ADMIN — INSULIN LISPRO 1 UNITS: 100 INJECTION, SOLUTION INTRAVENOUS; SUBCUTANEOUS at 22:40

## 2019-07-13 RX ADMIN — DILTIAZEM HYDROCHLORIDE 30 MG: 30 TABLET, FILM COATED ORAL at 06:12

## 2019-07-13 RX ADMIN — FENOFIBRATE 160 MG: 160 TABLET ORAL at 09:33

## 2019-07-13 RX ADMIN — GUAIFENESIN 600 MG: 600 TABLET, EXTENDED RELEASE ORAL at 12:18

## 2019-07-13 RX ADMIN — TAMSULOSIN HYDROCHLORIDE 0.4 MG: 0.4 CAPSULE ORAL at 09:30

## 2019-07-13 RX ADMIN — IPRATROPIUM BROMIDE AND ALBUTEROL SULFATE 1 AMPULE: .5; 3 SOLUTION RESPIRATORY (INHALATION) at 18:03

## 2019-07-13 RX ADMIN — DOCUSATE SODIUM 100 MG: 100 CAPSULE, LIQUID FILLED ORAL at 22:40

## 2019-07-13 RX ADMIN — DILTIAZEM HYDROCHLORIDE 30 MG: 30 TABLET, FILM COATED ORAL at 12:13

## 2019-07-13 RX ADMIN — IPRATROPIUM BROMIDE AND ALBUTEROL SULFATE 1 AMPULE: .5; 3 SOLUTION RESPIRATORY (INHALATION) at 15:00

## 2019-07-13 RX ADMIN — OXYCODONE HYDROCHLORIDE 10 MG: 10 TABLET ORAL at 10:39

## 2019-07-13 RX ADMIN — INSULIN LISPRO 2 UNITS: 100 INJECTION, SOLUTION INTRAVENOUS; SUBCUTANEOUS at 16:38

## 2019-07-13 RX ADMIN — GABAPENTIN 600 MG: 300 CAPSULE ORAL at 09:30

## 2019-07-13 RX ADMIN — NORTRIPTYLINE HYDROCHLORIDE 50 MG: 50 CAPSULE ORAL at 09:30

## 2019-07-13 RX ADMIN — INSULIN LISPRO 2 UNITS: 100 INJECTION, SOLUTION INTRAVENOUS; SUBCUTANEOUS at 12:13

## 2019-07-13 RX ADMIN — NORTRIPTYLINE HYDROCHLORIDE 50 MG: 50 CAPSULE ORAL at 22:48

## 2019-07-13 RX ADMIN — METOPROLOL SUCCINATE 100 MG: 100 TABLET, EXTENDED RELEASE ORAL at 09:29

## 2019-07-13 RX ADMIN — Medication 10 ML: at 22:41

## 2019-07-13 RX ADMIN — Medication 10 ML: at 09:35

## 2019-07-13 RX ADMIN — SODIUM PHOSPHATE 1 ENEMA: 7; 19 ENEMA RECTAL at 22:51

## 2019-07-13 RX ADMIN — CYCLOBENZAPRINE HYDROCHLORIDE 10 MG: 10 TABLET, FILM COATED ORAL at 12:13

## 2019-07-13 RX ADMIN — DILTIAZEM HYDROCHLORIDE 30 MG: 30 TABLET, FILM COATED ORAL at 18:39

## 2019-07-13 RX ADMIN — SIMVASTATIN 40 MG: 40 TABLET, FILM COATED ORAL at 22:40

## 2019-07-13 RX ADMIN — OXYCODONE HYDROCHLORIDE 10 MG: 10 TABLET ORAL at 06:11

## 2019-07-13 RX ADMIN — VITAMIN E CAP 400 UNIT 400 UNITS: 400 CAP at 09:35

## 2019-07-13 RX ADMIN — DOCUSATE SODIUM 100 MG: 100 CAPSULE, LIQUID FILLED ORAL at 09:30

## 2019-07-13 RX ADMIN — CYCLOBENZAPRINE HYDROCHLORIDE 10 MG: 10 TABLET, FILM COATED ORAL at 16:32

## 2019-07-13 RX ADMIN — GABAPENTIN 600 MG: 300 CAPSULE ORAL at 22:39

## 2019-07-13 ASSESSMENT — PAIN - FUNCTIONAL ASSESSMENT: PAIN_FUNCTIONAL_ASSESSMENT: PREVENTS OR INTERFERES WITH MANY ACTIVE NOT PASSIVE ACTIVITIES

## 2019-07-13 ASSESSMENT — PAIN SCALES - GENERAL
PAINLEVEL_OUTOF10: 2
PAINLEVEL_OUTOF10: 8
PAINLEVEL_OUTOF10: 4
PAINLEVEL_OUTOF10: 5
PAINLEVEL_OUTOF10: 6
PAINLEVEL_OUTOF10: 6
PAINLEVEL_OUTOF10: 7
PAINLEVEL_OUTOF10: 7

## 2019-07-13 ASSESSMENT — PAIN DESCRIPTION - ORIENTATION
ORIENTATION_2: RIGHT
ORIENTATION: LOWER
ORIENTATION: LOWER

## 2019-07-13 ASSESSMENT — PAIN DESCRIPTION - PAIN TYPE
TYPE_2: CHRONIC PAIN
TYPE: SURGICAL PAIN

## 2019-07-13 ASSESSMENT — PAIN DESCRIPTION - DESCRIPTORS
DESCRIPTORS: SHARP
DESCRIPTORS_2: ACHING;POUNDING;SHOOTING
DESCRIPTORS: SHARP
DESCRIPTORS: CONSTANT;SHARP;SHOOTING
DESCRIPTORS: SHARP

## 2019-07-13 ASSESSMENT — PAIN DESCRIPTION - ONSET
ONSET: ON-GOING
ONSET: ON-GOING

## 2019-07-13 ASSESSMENT — PAIN DESCRIPTION - LOCATION
LOCATION: BACK
LOCATION_2: KNEE
LOCATION: BACK

## 2019-07-13 ASSESSMENT — PAIN DESCRIPTION - DURATION: DURATION_2: INTERMITTENT

## 2019-07-13 ASSESSMENT — PAIN DESCRIPTION - FREQUENCY
FREQUENCY: INTERMITTENT

## 2019-07-13 ASSESSMENT — PAIN DESCRIPTION - INTENSITY: RATING_2: 0

## 2019-07-13 NOTE — PROGRESS NOTES
[address] [facility].    Date:   7/12/2019  Patient name:  Sohail Benito  Date of admission:  7/10/2019  7:30 AM  MRN:   [id number]  YOB: 1955      Cc post op     HPI   Pt admitted post back sx        HPI   1) Location/Symptom post op tachycardia   2) Timing/Onset: 1 day   3) Context/Setting: post op , pain 7/10 , uses metoprolol   4) Quality: ABG showed hypoxia uses cpap at home   5) Duration: continuous   6) Modifying Factors: hx of HTN CAD  7) Severity: moderate     Pt tachycardic   CXR left basilar infiltrate vs atelectasis   Low grade fever     Past Medical History:   Diagnosis Date    Abdominal hernia     Anesthesia     phrenic nerve damaged lt side    BPH (benign prostatic hyperplasia)     CAD (coronary artery disease)     Chronic back pain     Diabetes mellitus ([address]     Type 2    Difficult intubation     per pt throat collapses, lt side phrenic nerve was damaged    Ear infection     LT, being treated    Fatty liver     Gallstones     GERD (gastroesophageal reflux disease)     Headache(784.0)     History of MI (myocardial infarction)     Hypercholesteremia     Hyperlipidemia     Hypertension     Kidney stones     Osteoarthritis     all joints    PONV (postoperative nausea and vomiting)     Sleep apnea     BIPAP    Unspecified sleep apnea      Family History   Problem Relation Age of Onset    Heart Disease Mother     Cancer Father         bladder    Arthritis Sister     Cancer Paternal Aunt     Cancer Paternal Aunt       reports that he has never smoked. He has never used smokeless tobacco. He reports that he does not drink alcohol or use drugs.   Past Medical History:   Diagnosis Date    Abdominal hernia     Anesthesia     phrenic nerve damaged lt side    BPH (benign prostatic hyperplasia)     CAD (coronary artery disease)     Chronic back pain     Diabetes mellitus ([address]     Type 2    Difficult intubation     per pt throat collapses, lt side phrenic nerve was damaged    Ear infection     LT, being treated    Fatty liver     Gallstones     GERD (gastroesophageal reflux disease)     Headache(784.0)     History of MI (myocardial infarction)     Hypercholesteremia     Hyperlipidemia     Hypertension     Kidney stones     Osteoarthritis     all joints    PONV (postoperative nausea and vomiting)     Sleep apnea     BIPAP    Unspecified sleep apnea      Allergies   Allergen Reactions    Pcn [Penicillins] Swelling     face    Codeine Nausea And Vomiting     Abd. pain       Review of systems    Constitutional: Negative for fever and fatigue. Respiratory: Negative for cough and shortness of breath. Cardiovascular: Negative for chest pain, palpitations and leg swelling. Gastrointestinal: Negative for abdominal pain, diarrhea and blood in stool. Endocrine: Negative for cold intolerance. Genitourinary: Negative for dysuria and frequency. Musculoskeletal: Negative for  arthralgias. Skin: Negative for color change and rash. Neurological: Negative for dizziness and headaches. Psychiatric/Behavioral: Negative for confusion. ROS  Physical exam     /73   Pulse 120   Temp 98.8 °F (37.1 °C) (Oral)   Resp 16   Ht 5' 8\" (1.727 m)   Wt 217 lb 9.5 oz (98.7 kg)   SpO2 97%   BMI 33.09 kg/m²      General appearance: well nourished in no distress  Eyes:  JODI   Head: AT/NC  ENT NAD   Neck: Trachea midline; supple  Lungs: normal effort, clear to auscultation. CVS sinus with no murmurs. Vasc No JVP, no carotid bruit  Abdomen: Soft, non-tender; no masses or HSM,   Extremities: no edema; no digital cyanosis or clubbing. Musculoskeletal NO joint effusion or synovitis  Skin: No rash or ulcers  Neurologic: Cranial nerves II-XII grossly intact; no motor deficit.   Psych: Appropriate affect, alert and oriented to person, place and time  NO lymphadenopathy            Relevant Labs/Imaging     CBC:   Recent Labs     07/12/19  0651   WBC 12.7*   HGB 10.8*        BMP:    Recent Labs     07/11/19  0602 07/11/19 1925   NA  --  133*   K  --  4.7   CL  --  97*   CO2  --  27   BUN  --  18   CREATININE 1.10 1.25*   GLUCOSE  --  245*     S. Calcium:  Recent Labs     07/11/19 1925   CALCIUM 9.9     Glycosylated hemoglobin A1C: No results for input(s): LABA1C in the last 72 hours. BNP:No results for input(s): BNP in the last 72 hours. Assessment / Plan      Patient Active Problem List   Diagnosis    Hyperlipidemia    Prostate disease    Osteoarthritis    Headache    GERD (gastroesophageal reflux disease)    Sleep apnea    Elevated liver enzymes    BPH (benign prostatic hyperplasia)    Steatosis of liver    Type 2 diabetes mellitus without complication, without long-term current use of insulin (HCC)    Lumbar radiculopathy, chronic    Osteoarthritis of lumbar spine    Osteoarthritis of multiple joints    Degenerative disc disease, lumbar    Lumbar spinal stenosis    Pars defect with spondylolisthesis    Essential hypertension    Lumbar disc herniation    Chronic low back pain    S/P cardiac catheterization    Back pain    Postoperative hypotension       A/P  Post op   Hypotension tachycardia   IVF   Also hypoxia   Use cpap - home use     Will follow     7/12  Today [facility] pneumonia [facility] [address], [address] [place] [address].    Phone [phone]   Fax: [phone]  Answering Service: [phone]

## 2019-07-13 NOTE — PROGRESS NOTES
by Dr. David Martínez on 7/10/19. Pt had DDD, C/O of pain in the lumbar spine area, radiates to the lower limbs worse on the Rt side. Patient has failed physical therapy and had lumbar epidural steroid injections in the past.     Overall Orientation Status: Within Functional Limits  Restrictions/Precautions  Restrictions/Precautions: Up as Tolerated; Fall Risk(room air. )  Required Braces or Orthoses?: No  Implants present? : Metal implants(L3-L5 Lumbar fusion stenosis, neck fusion, 2 stents, R arm)  Position Activity Restriction  Other position/activity restrictions: Avoid excessive bending, lifting, and twisting       Comments: nursing approves treatment    Vital Signs  Patient Currently in Pain: Yes  Pain Level: 6  Pain Type: Surgical pain  Pain Location: Back  Pain Descriptors: Constant; Michelle Sherrie; Shooting  Functional Pain Assessment: Prevents or interferes with many active not passive activities  Non-Pharmaceutical Pain Intervention(s): (distraction)     Oxygen Therapy  O2 Device: Nasal cannula  O2 Flow Rate (L/min): 3 L/min          Bed Mobility:   Bed Mobility  Rolling: Minimal assistance(left handrail)  Supine to Sit: Moderate assistance(left hand rail instruction log roll)  Sit to Supine: Maximal assistance(LE clearance)  Scooting: Minimal assistance  Bed mobility  Scooting: Minimal assistance    Transfers:  Sit to Stand: Contact guard assistance;2 Person Assistance(safety, increased time instruction hand placement)  Stand to sit: Contact guard assistance;2 Person Assistance(safety instruction decreased trunk flexion)  Bed to Chair: Contact guard assistance;2 Person Assistance(safety RW slow guarded movements)      Ambulation 1  Surface: level tile  Device: Rolling Walker  Assistance: 2 Person assistance;Contact guard assistance  Quality of Gait: Pt needs extra time to stand up straight, initally moderately stooped with B hips/knees flexed, but able to improve on posture gradually, but still slight kyphotic . Pt requires extra time to complete d/t pain. Pt require vc's for RW management. Pt unable to move RW with smooth movements d/t pain and requiring BUE support. Gait Deviations: Decreased step length; Increased IDRIS; Slow Hien;Decreased step height  Distance: 4 steps to pivot and 4 steps side step  Comments: Moves very slow due to pain. Posture: Poor  Sitting - Static: Fair;-  Sitting - Dynamic: Poor;+  Standing - Static: Poor;+  Standing - Dynamic: Poor  Comments: sitting balance assessed on EOB. Standing balance assessed with RW. Other exercises?: Yes  Other exercises 1: supine AROM bilateral LE   Other exercises 2: seated AROM bilateral LE x 10  Other Activities  Comment: Pt requires increase time d/t pain. Activity Tolerance: Patient limited by pain; Patient limited by fatigue;Patient limited by endurance     Other Comments  Comments: coordinated PM treatment with meds    Assessment  Activity Tolerance: Patient limited by pain; Patient limited by fatigue;Patient limited by endurance   Body structures, Functions, Activity limitations: Decreased functional mobility ; Decreased strength;Decreased balance; Increased Pain  Assessment: Pt requiring two person assist at this time. Pt would benefit from additional Pt to increase strength, endurance, and independence with functional mobility. Prognosis: Fair  Discharge Recommendations: Subacute/Skilled Nursing Facility(Home if pain controll and mobility improved in 1 to 2 days.)     Type of devices: All fall risk precautions in place;Call light within reach; Patient at risk for falls;Gait belt;Nurse notified; Left in chair;Left in bed     Plan  Times per week: BID 2-3 days   Times per day: Twice a day  Current Treatment Recommendations: Strengthening, Functional Mobility Training, Home Exercise Program, Equipment Evaluation, Education, & procurement, Transfer Training, Gait Training, Safety Education & Training, ROM, Balance Training, Endurance Training, Stair training, Pain Management, Patient/Caregiver Education & Training, Positioning    Patient Education  New Education Provided: POC,   Learner:patient  Method: explanation       Outcome: needs reinforcement     Goals  Short term goals  Time Frame for Short term goals: BID 2-3 days   Short term goal 1: Patient will demonstrate ability to Roll to the L with mod assist x 1 to improve mobility  Short term goal 2: Patient will demonstrate ability to transfer from supine to sit with mod assist x 1 to to improve mobility  Short term goal 3: Patient will demonstrate ability to transfer from sit to stand and bed to chair with RW and mod assist x 1 to to improve mobility  Short term goal 4: Patient will demonstrate ability to ambulate at least 50ft with RW and min assist x 1 to improve stability   Short term goal 5: Patient will demonstrate Fair + static and dynamic sitting and standing balance with RW and min assist x 1 to improve stabiltiy when abulating. Short term goal 6: Patient will demonstrate ability to tolerate 30 minutes of therapy and 10 minutes of sitting at the EOB with CGA.   Short term goal 7: Patient will demonstrate ability to ascend/descend 5 steps with min assist x 1 inorder to enter home with bilateral HR    PT Individual Minutes  Time In: (P) 1410  Time Out: (P) 1440  Minutes: (P) 30    Electronically signed by Marshall Mcburney, PTA on 7/13/19 at 2:35 PM         07/13/19 1154 07/13/19 1435   PT Individual Minutes   Time In 2195 1410   Time Out 0917 1440   Minutes 45 28

## 2019-07-13 NOTE — PROGRESS NOTES
Writer woke patient up to give medications, Patient was difficult to arouse. Patient was then staring off at ceiling and mumbling words. Vital signs were checked and . Mary Kay LEWIS clinical lead notified of change and at bedside to assess patient.

## 2019-07-13 NOTE — OP NOTE
[address]                 [address] [facility], [address]                                OPERATIVE REPORT    PATIENT NAME: Héctor Torres                :        1955  MED REC NO:   [id number]                              ROOM:       2064  ACCOUNT NO:   [de-identified]                           ADMIT DATE: 07/10/2019  PROVIDER:     Chidi García    DATE OF PROCEDURE:  07/10/2019    PREOPERATIVE DIAGNOSIS:  Lumbar spinal stenosis at L3-L4 and L4-L5 with  foraminal stenosis and spondylolytic spondylolisthesis at L5-S1. POSTOPERATIVE DIAGNOSIS:  Lumbar spinal stenosis at L3-L4 and L4-L5 with  foraminal stenosis and spondylolytic spondylolisthesis at L5-S1. PROCEDURE PERFORMED:  L3-L4 and L4-L5 posterior decompression; L5-S1  Mcallister decompression L3-L4, L4-L5, and L5-S1 posterior interbody fusion,  application of vertebral body fusion cage at all three levels. The  patient with also posterior segmental instrumentation at L3 to the  sacrum, local autograft, allograft bone grafting, fluoroscopic  assistance. ESTIMATED BLOOD LOSS:  900. COMPLICATIONS ARISING DURING THE OPERATION:  None noted. FINDINGS:  1.  Standard wide decompression was essentially almost fully removing  the facet joints at L3-L4 and L4-L5 to obtain sufficiently lateral  access to perform the PLIF. 2.  Mcallister decompression with complete removal of lamina and decompression  of spondylolytic cartilage and bone at L5-S1 through the foramen. 3.  Globus screw robin construct with rise intervertebral body fusion  cages. 4.  Fluoroscopy utilized for level localization instrumentation, pedicle  screw placement. Final AP and lateral fluoroscopic images showed  acceptable disc space height restoration, lumbar lordosis restoration,  and graft hardware placement. Neuromonitoring performed throughout the case, no significant  abnormalities.   All screws were stimulated post placement, found to disc behind the cage to the  posterior aspect of the disc space. Lateral gutters were then grafted with the remainder of the local  autograft as well as crushed cortical cancellous allograft. One gram of  vancomycin was placed in the depths of wound. Deep fascia was  reapproximated with #2 Vicryl suture, subcuticular layer with 2-0 Vicryl  followed by skin staples. Sterile dressing was applied. The patient  was awakened from anesthesia, taken to recovery room in stable  condition. Complications arise during the operation, none noted.         JUDSON Guevara    D: 07/12/2019 9:09:39       T: 07/12/2019 9:20:27     [email or web address]  Job#: [id number]     Doc#: [id number]    CC:

## 2019-07-13 NOTE — PROGRESS NOTES
[facility]                                                                                  Urology Progress Note            Subjective: Follow-up urinary retention prostate hypertrophy    Patient Vitals for the past 24 hrs:   BP Temp Temp src Pulse Resp SpO2   07/13/19 0200 135/84 98 °F (36.7 °C) Oral 110 14 --   07/13/19 0014 112/72 98.2 °F (36.8 °C) Oral 128 16 94 %   07/12/19 2015 120/72 98.7 °F (37.1 °C) Oral 120 16 --   07/12/19 1823 110/73 98.8 °F (37.1 °C) Oral 120 16 97 %   07/12/19 1457 111/64 97.5 °F (36.4 °C) Oral 124 16 100 %   07/12/19 1231 90/61 98.9 °F (37.2 °C) Oral 125 16 100 %   07/12/19 0738 108/68 100.4 °F (38 °C) Oral 122 16 98 %       Intake/Output Summary (Last 24 hours) at 7/13/2019 0721  Last data filed at 7/12/2019 1410  Gross per 24 hour   Intake --   Output 800 ml   Net -800 ml       Recent Labs     07/11/19 1925 07/12/19  0651 07/13/19  0603   WBC 12.6* 12.7* 13.0*   HGB 10.8* 10.8* 10.5*   HCT 33.0* 33.2* 32.0*   MCV 89.2 90.7 89.8    241 257     Recent Labs     07/10/19  0908 07/11/19  0602 07/11/19 1925 07/13/19  0603   NA  --   --  133* 134*   K 4.6  --  4.7 4.3   CL  --   --  97* 96*   CO2  --   --  27 25   BUN  --   --  18 18   CREATININE  --  1.10 1.25* 0.88       Recent Labs     07/10/19  1710   COLORU YELLOW   PHUR 6.5   WBCUA 2 TO 5   RBCUA 50    MUCUS NOT REPORTED   TRICHOMONAS NOT REPORTED   YEAST NOT REPORTED   BACTERIA FEW*   SPECGRAV 1.040*   LEUKOCYTESUR NEGATIVE   UROBILINOGEN Normal   BILIRUBINUR NEGATIVE       Additional Lab/culture results:    Physical Exam: Patient is status post back surgery, he has a history of prostate hypertrophy and bladder outlet obstruction presently with an indwelling Fragoso no evidence of any problems associated with the Fragoso    He continues to have significant pain today and has expressed difficulty with any change of position.     Also noted significant constipation, patient unable to

## 2019-07-14 LAB
ABSOLUTE EOS #: 0.3 K/UL (ref 0–0.4)
ABSOLUTE IMMATURE GRANULOCYTE: ABNORMAL K/UL (ref 0–0.3)
ABSOLUTE LYMPH #: 0.9 K/UL (ref 1–4.8)
ABSOLUTE MONO #: 0.8 K/UL (ref 0.1–1.3)
ANION GAP SERPL CALCULATED.3IONS-SCNC: 11 MMOL/L (ref 9–17)
BASOPHILS # BLD: 0 % (ref 0–2)
BASOPHILS ABSOLUTE: 0 K/UL (ref 0–0.2)
BUN BLDV-MCNC: 29 MG/DL (ref 8–23)
BUN/CREAT BLD: ABNORMAL (ref 9–20)
CALCIUM SERPL-MCNC: 10 MG/DL (ref 8.6–10.4)
CHLORIDE BLD-SCNC: 97 MMOL/L (ref 98–107)
CO2: 27 MMOL/L (ref 20–31)
CREAT SERPL-MCNC: 1 MG/DL (ref 0.7–1.2)
DIFFERENTIAL TYPE: ABNORMAL
EOSINOPHILS RELATIVE PERCENT: 2 % (ref 0–4)
GFR AFRICAN AMERICAN: >60 ML/MIN
GFR NON-AFRICAN AMERICAN: >60 ML/MIN
GFR SERPL CREATININE-BSD FRML MDRD: ABNORMAL ML/MIN/{1.73_M2}
GFR SERPL CREATININE-BSD FRML MDRD: ABNORMAL ML/MIN/{1.73_M2}
GLUCOSE BLD-MCNC: 128 MG/DL (ref 75–110)
GLUCOSE BLD-MCNC: 131 MG/DL (ref 75–110)
GLUCOSE BLD-MCNC: 137 MG/DL (ref 75–110)
GLUCOSE BLD-MCNC: 161 MG/DL (ref 70–99)
GLUCOSE BLD-MCNC: 166 MG/DL (ref 75–110)
HCT VFR BLD CALC: 30.9 % (ref 41–53)
HEMOGLOBIN: 10.2 G/DL (ref 13.5–17.5)
IMMATURE GRANULOCYTES: ABNORMAL %
LYMPHOCYTES # BLD: 8 % (ref 24–44)
MCH RBC QN AUTO: 29.7 PG (ref 26–34)
MCHC RBC AUTO-ENTMCNC: 32.9 G/DL (ref 31–37)
MCV RBC AUTO: 90.4 FL (ref 80–100)
MONOCYTES # BLD: 6 % (ref 1–7)
NRBC AUTOMATED: ABNORMAL PER 100 WBC
PDW BLD-RTO: 14.6 % (ref 11.5–14.9)
PLATELET # BLD: 293 K/UL (ref 150–450)
PLATELET ESTIMATE: ABNORMAL
PMV BLD AUTO: 7.7 FL (ref 6–12)
POTASSIUM SERPL-SCNC: 5 MMOL/L (ref 3.7–5.3)
RBC # BLD: 3.42 M/UL (ref 4.5–5.9)
RBC # BLD: ABNORMAL 10*6/UL
SEG NEUTROPHILS: 84 % (ref 36–66)
SEGMENTED NEUTROPHILS ABSOLUTE COUNT: 10.4 K/UL (ref 1.3–9.1)
SODIUM BLD-SCNC: 135 MMOL/L (ref 135–144)
WBC # BLD: 12.4 K/UL (ref 3.5–11)
WBC # BLD: ABNORMAL 10*3/UL

## 2019-07-14 PROCEDURE — 2580000003 HC RX 258: Performed by: ORTHOPAEDIC SURGERY

## 2019-07-14 PROCEDURE — 97110 THERAPEUTIC EXERCISES: CPT

## 2019-07-14 PROCEDURE — 80048 BASIC METABOLIC PNL TOTAL CA: CPT

## 2019-07-14 PROCEDURE — 82947 ASSAY GLUCOSE BLOOD QUANT: CPT

## 2019-07-14 PROCEDURE — 97530 THERAPEUTIC ACTIVITIES: CPT

## 2019-07-14 PROCEDURE — 6370000000 HC RX 637 (ALT 250 FOR IP): Performed by: INTERNAL MEDICINE

## 2019-07-14 PROCEDURE — 94762 N-INVAS EAR/PLS OXIMTRY CONT: CPT

## 2019-07-14 PROCEDURE — 94761 N-INVAS EAR/PLS OXIMETRY MLT: CPT

## 2019-07-14 PROCEDURE — 85025 COMPLETE CBC W/AUTO DIFF WBC: CPT

## 2019-07-14 PROCEDURE — 2700000000 HC OXYGEN THERAPY PER DAY

## 2019-07-14 PROCEDURE — 36415 COLL VENOUS BLD VENIPUNCTURE: CPT

## 2019-07-14 PROCEDURE — 2580000003 HC RX 258: Performed by: INTERNAL MEDICINE

## 2019-07-14 PROCEDURE — 2060000000 HC ICU INTERMEDIATE R&B

## 2019-07-14 PROCEDURE — 94640 AIRWAY INHALATION TREATMENT: CPT

## 2019-07-14 PROCEDURE — 97535 SELF CARE MNGMENT TRAINING: CPT

## 2019-07-14 PROCEDURE — 6360000002 HC RX W HCPCS: Performed by: INTERNAL MEDICINE

## 2019-07-14 PROCEDURE — 99024 POSTOP FOLLOW-UP VISIT: CPT | Performed by: ORTHOPAEDIC SURGERY

## 2019-07-14 PROCEDURE — 99232 SBSQ HOSP IP/OBS MODERATE 35: CPT | Performed by: INTERNAL MEDICINE

## 2019-07-14 PROCEDURE — 6370000000 HC RX 637 (ALT 250 FOR IP): Performed by: ORTHOPAEDIC SURGERY

## 2019-07-14 RX ORDER — ACETAMINOPHEN 500 MG
500 TABLET ORAL EVERY 6 HOURS PRN
Status: DISCONTINUED | OUTPATIENT
Start: 2019-07-14 | End: 2019-07-19 | Stop reason: HOSPADM

## 2019-07-14 RX ORDER — SODIUM CHLORIDE 9 MG/ML
INJECTION, SOLUTION INTRAVENOUS CONTINUOUS
Status: DISCONTINUED | OUTPATIENT
Start: 2019-07-14 | End: 2019-07-16

## 2019-07-14 RX ADMIN — FINASTERIDE 5 MG: 5 TABLET, FILM COATED ORAL at 08:43

## 2019-07-14 RX ADMIN — IPRATROPIUM BROMIDE AND ALBUTEROL SULFATE 1 AMPULE: .5; 3 SOLUTION RESPIRATORY (INHALATION) at 07:03

## 2019-07-14 RX ADMIN — NORTRIPTYLINE HYDROCHLORIDE 50 MG: 50 CAPSULE ORAL at 10:06

## 2019-07-14 RX ADMIN — SODIUM CHLORIDE: 9 INJECTION, SOLUTION INTRAVENOUS at 22:16

## 2019-07-14 RX ADMIN — METOPROLOL SUCCINATE 100 MG: 100 TABLET, EXTENDED RELEASE ORAL at 08:43

## 2019-07-14 RX ADMIN — IPRATROPIUM BROMIDE AND ALBUTEROL SULFATE 1 AMPULE: .5; 3 SOLUTION RESPIRATORY (INHALATION) at 15:26

## 2019-07-14 RX ADMIN — TAMSULOSIN HYDROCHLORIDE 0.4 MG: 0.4 CAPSULE ORAL at 08:43

## 2019-07-14 RX ADMIN — FENOFIBRATE 160 MG: 160 TABLET ORAL at 08:43

## 2019-07-14 RX ADMIN — GABAPENTIN 600 MG: 300 CAPSULE ORAL at 08:43

## 2019-07-14 RX ADMIN — DILTIAZEM HYDROCHLORIDE 30 MG: 30 TABLET, FILM COATED ORAL at 00:58

## 2019-07-14 RX ADMIN — LEVOFLOXACIN 750 MG: 5 INJECTION, SOLUTION INTRAVENOUS at 23:25

## 2019-07-14 RX ADMIN — LEVOFLOXACIN 750 MG: 5 INJECTION, SOLUTION INTRAVENOUS at 00:37

## 2019-07-14 RX ADMIN — GABAPENTIN 600 MG: 300 CAPSULE ORAL at 23:18

## 2019-07-14 RX ADMIN — NORTRIPTYLINE HYDROCHLORIDE 50 MG: 50 CAPSULE ORAL at 23:18

## 2019-07-14 RX ADMIN — DILTIAZEM HYDROCHLORIDE 30 MG: 30 TABLET, FILM COATED ORAL at 23:18

## 2019-07-14 RX ADMIN — IPRATROPIUM BROMIDE AND ALBUTEROL SULFATE 1 AMPULE: .5; 3 SOLUTION RESPIRATORY (INHALATION) at 11:21

## 2019-07-14 RX ADMIN — DOCUSATE SODIUM 100 MG: 100 CAPSULE, LIQUID FILLED ORAL at 23:18

## 2019-07-14 RX ADMIN — SODIUM CHLORIDE: 9 INJECTION, SOLUTION INTRAVENOUS at 11:57

## 2019-07-14 RX ADMIN — VITAMIN E CAP 400 UNIT 400 UNITS: 400 CAP at 08:46

## 2019-07-14 RX ADMIN — DOCUSATE SODIUM 100 MG: 100 CAPSULE, LIQUID FILLED ORAL at 08:43

## 2019-07-14 RX ADMIN — IPRATROPIUM BROMIDE AND ALBUTEROL SULFATE 1 AMPULE: .5; 3 SOLUTION RESPIRATORY (INHALATION) at 19:53

## 2019-07-14 RX ADMIN — Medication 10 ML: at 08:44

## 2019-07-14 RX ADMIN — GUAIFENESIN 600 MG: 600 TABLET, EXTENDED RELEASE ORAL at 23:18

## 2019-07-14 RX ADMIN — GUAIFENESIN 600 MG: 600 TABLET, EXTENDED RELEASE ORAL at 08:43

## 2019-07-14 RX ADMIN — SIMVASTATIN 40 MG: 40 TABLET, FILM COATED ORAL at 23:18

## 2019-07-14 RX ADMIN — ACETAMINOPHEN 500 MG: 500 TABLET, FILM COATED ORAL at 16:33

## 2019-07-14 ASSESSMENT — PAIN DESCRIPTION - LOCATION
LOCATION: BACK
LOCATION: BACK

## 2019-07-14 ASSESSMENT — PAIN DESCRIPTION - PAIN TYPE
TYPE: SURGICAL PAIN
TYPE: SURGICAL PAIN

## 2019-07-14 ASSESSMENT — PAIN DESCRIPTION - ORIENTATION
ORIENTATION: LOWER
ORIENTATION: LOWER

## 2019-07-14 ASSESSMENT — PAIN SCALES - GENERAL
PAINLEVEL_OUTOF10: 0
PAINLEVEL_OUTOF10: 4
PAINLEVEL_OUTOF10: 2
PAINLEVEL_OUTOF10: 4
PAINLEVEL_OUTOF10: 4

## 2019-07-14 ASSESSMENT — PAIN DESCRIPTION - PROGRESSION: CLINICAL_PROGRESSION: NOT CHANGED

## 2019-07-14 NOTE — PROGRESS NOTES
assistance(pt utilizes rail correctly)  Comment: VCs for log rolling tech and reaching for bed rail, c F return  Balance  Sitting Balance: Stand by assistance(sitting EOB)  Standing Balance: Contact guard assistance(A x 2, VCs for upright posture. )  Standing Balance  Time: 2-3 min x 3 c RW  Activity: bed<>BSC, bed>chair transfers  Comment: VCs for proper technique and hand placement; pt prefers to tranfer c forearms on RW, no LOB noted  Functional Mobility  Functional - Mobility Device: Rolling Walker  Activity: Other  Assist Level: Minimal assistance(x2)  Functional Mobility Comments: VC for safety/RW mgt   ADL  Feeding: Independent  Grooming: Setup;Stand by assistance  UE Bathing: Moderate assistance;Minimal assistance  LE Bathing: Maximum assistance  UE Dressing: Minimal assistance  LE Dressing: Maximum assistance(footies)  Toileting: Dependent/Total([place])  Additional Comments: Per clinical reasoning unless otherwise noted. Pt washed sitting in chair. Transfers  Sit to stand: 2 Person assistance(min A x1, CGA x 1)  Stand to sit: 2 Person assistance;Contact guard assistance(CGA x 2)  Transfer Comments: VCs for proper technique and hand placement; pt prefers to tranfer c forearms on RW, no LOB noted  Toilet Transfers  Toilet - Technique: Ambulating  Equipment Used: Standard bedside commode  Toilet Transfer: Contact guard assistance(A x 2)  Toilet Transfers Comments: VCs for proper technique and hand placement                  Vision  Patient Visual Report: No visual complaint reported. UB exercises: BUE, 2# free wt, 4 exercises to promote increased independence and safety with self-care and mobility. Assessment  Performance deficits / Impairments: Decreased functional mobility ; Decreased ADL status; Decreased endurance;Decreased balance;Decreased high-level IADLs  Assessment: CGA x 2 for func mobility, pt demo hallucinations this date, RN aware  Prognosis: Good  Discharge Recommendations: Subacute/Skilled Nursing Facility(unless pain tolerance and mobility significantly improves by)  Activity Tolerance: Patient Tolerated treatment well  Safety Devices in place: Yes  Type of devices: Patient at risk for falls;Call light within reach; Left in chair;Nurse notified;Gait belt(family in room, bi PAP placed at end of tx per RN request)  Equipment Recommendations  Equipment Needed: (TBD)          Patient Education:  Patient Education: OT POC, review log-rolling technique, pain mgt, RW safety and tech durung ffunc mobility and transfers, breathing tech for pain mgt  Learner:patient  Method: demonstration and explanation       Outcome: acknowledged understanding, demonstrated understanding and needs reinforcement     Plan  Safety Devices  Safety Devices in place: Yes  Type of devices: Patient at risk for falls, Call light within reach, Left in chair, Nurse notified, Gait belt(family in room, bi PAP placed at end of tx per RN request)  Plan  Times per week: 5-7  Times per day: Twice a day  Current Treatment Recommendations: Balance Training, Functional Mobility Training, Endurance Training, Pain Management, Safety Education & Training, Patient/Caregiver Education & Training, Equipment Evaluation, Education, & procurement, Self-Care / ADL, Home Management Training      Goals  Short term goals  Time Frame for Short term goals: By Discharge  Short term goal 1: Pt will complete bed mobility with Min A and tolerate sitting EOB for 5+ minutes while completing a functional task. Short term goal 2: Pt will actively participate in self-care tasks and complete UB with set-up A only and LB with SBA using AE/modified techniques as needed. Short term goal 3: Pt will complete toilet transfer and toileting with SBA and Good safety using appropriate DME. Short term goal 4: Pt will V/D good understanding of back care principles and joint protection techniques as they relate to home routines.   Short term goal 5: Pt will actively participate in 15-20 minutes of therapeutic exercise/activity to promote increased independence and safety with self-care and mobility.         Electronically signed by AMBER Avila on 7/14/19 at 10:23 AM            07/14/19 1007 07/14/19 1008 07/14/19 1521   OT Individual Minutes   Time In [id number] 1500   Time Out 0915 [id number]   Minutes 06 31 82

## 2019-07-14 NOTE — CARE COORDINATION
ONGOING DISCHARGE PLAN:    LSW following for discharge planning to SNF [facility] with referral made 7/12. Weekend PT/OT notes are recommending SNF. POD #4 L3-S PLIF. Active orders for IV Levaquin (Pneumonia). Will continue to follow for additional discharge needs.     Electronically signed by Ramo Reyes RN on 7/14/2019 at 10:37 AM

## 2019-07-15 ENCOUNTER — APPOINTMENT (OUTPATIENT)
Dept: GENERAL RADIOLOGY | Age: 64
DRG: 453 | End: 2019-07-15
Attending: ORTHOPAEDIC SURGERY
Payer: COMMERCIAL

## 2019-07-15 LAB
GLUCOSE BLD-MCNC: 119 MG/DL (ref 75–110)
GLUCOSE BLD-MCNC: 156 MG/DL (ref 75–110)
GLUCOSE BLD-MCNC: 194 MG/DL (ref 75–110)
GLUCOSE BLD-MCNC: 200 MG/DL (ref 75–110)

## 2019-07-15 PROCEDURE — 94761 N-INVAS EAR/PLS OXIMETRY MLT: CPT

## 2019-07-15 PROCEDURE — 97110 THERAPEUTIC EXERCISES: CPT

## 2019-07-15 PROCEDURE — 97530 THERAPEUTIC ACTIVITIES: CPT

## 2019-07-15 PROCEDURE — 94640 AIRWAY INHALATION TREATMENT: CPT

## 2019-07-15 PROCEDURE — 82947 ASSAY GLUCOSE BLOOD QUANT: CPT

## 2019-07-15 PROCEDURE — 71045 X-RAY EXAM CHEST 1 VIEW: CPT

## 2019-07-15 PROCEDURE — 94760 N-INVAS EAR/PLS OXIMETRY 1: CPT

## 2019-07-15 PROCEDURE — 2700000000 HC OXYGEN THERAPY PER DAY

## 2019-07-15 PROCEDURE — 2580000003 HC RX 258: Performed by: ORTHOPAEDIC SURGERY

## 2019-07-15 PROCEDURE — 99232 SBSQ HOSP IP/OBS MODERATE 35: CPT | Performed by: INTERNAL MEDICINE

## 2019-07-15 PROCEDURE — 51798 US URINE CAPACITY MEASURE: CPT

## 2019-07-15 PROCEDURE — 6370000000 HC RX 637 (ALT 250 FOR IP): Performed by: INTERNAL MEDICINE

## 2019-07-15 PROCEDURE — 97116 GAIT TRAINING THERAPY: CPT

## 2019-07-15 PROCEDURE — 6370000000 HC RX 637 (ALT 250 FOR IP): Performed by: ORTHOPAEDIC SURGERY

## 2019-07-15 PROCEDURE — 97535 SELF CARE MNGMENT TRAINING: CPT

## 2019-07-15 PROCEDURE — 2060000000 HC ICU INTERMEDIATE R&B

## 2019-07-15 RX ORDER — LEVOFLOXACIN 750 MG/1
750 TABLET ORAL DAILY
Status: DISCONTINUED | OUTPATIENT
Start: 2019-07-15 | End: 2019-07-19 | Stop reason: HOSPADM

## 2019-07-15 RX ADMIN — INSULIN LISPRO 2 UNITS: 100 INJECTION, SOLUTION INTRAVENOUS; SUBCUTANEOUS at 11:55

## 2019-07-15 RX ADMIN — DILTIAZEM HYDROCHLORIDE 30 MG: 30 TABLET, FILM COATED ORAL at 08:58

## 2019-07-15 RX ADMIN — IPRATROPIUM BROMIDE AND ALBUTEROL SULFATE 1 AMPULE: .5; 3 SOLUTION RESPIRATORY (INHALATION) at 22:10

## 2019-07-15 RX ADMIN — DILTIAZEM HYDROCHLORIDE 30 MG: 30 TABLET, FILM COATED ORAL at 20:03

## 2019-07-15 RX ADMIN — DOCUSATE SODIUM 100 MG: 100 CAPSULE, LIQUID FILLED ORAL at 20:03

## 2019-07-15 RX ADMIN — Medication 10 ML: at 20:04

## 2019-07-15 RX ADMIN — ACETAMINOPHEN 500 MG: 500 TABLET, FILM COATED ORAL at 06:16

## 2019-07-15 RX ADMIN — METOPROLOL SUCCINATE 100 MG: 100 TABLET, EXTENDED RELEASE ORAL at 08:57

## 2019-07-15 RX ADMIN — LEVOFLOXACIN 750 MG: 750 TABLET, FILM COATED ORAL at 14:45

## 2019-07-15 RX ADMIN — INSULIN LISPRO 1 UNITS: 100 INJECTION, SOLUTION INTRAVENOUS; SUBCUTANEOUS at 17:47

## 2019-07-15 RX ADMIN — FINASTERIDE 5 MG: 5 TABLET, FILM COATED ORAL at 08:57

## 2019-07-15 RX ADMIN — VITAMIN E CAP 400 UNIT 400 UNITS: 400 CAP at 08:59

## 2019-07-15 RX ADMIN — ACETAMINOPHEN 500 MG: 500 TABLET, FILM COATED ORAL at 17:43

## 2019-07-15 RX ADMIN — NORTRIPTYLINE HYDROCHLORIDE 50 MG: 50 CAPSULE ORAL at 08:58

## 2019-07-15 RX ADMIN — IPRATROPIUM BROMIDE AND ALBUTEROL SULFATE 1 AMPULE: .5; 3 SOLUTION RESPIRATORY (INHALATION) at 08:16

## 2019-07-15 RX ADMIN — GUAIFENESIN 600 MG: 600 TABLET, EXTENDED RELEASE ORAL at 08:57

## 2019-07-15 RX ADMIN — IPRATROPIUM BROMIDE AND ALBUTEROL SULFATE 1 AMPULE: .5; 3 SOLUTION RESPIRATORY (INHALATION) at 15:31

## 2019-07-15 RX ADMIN — FENOFIBRATE 160 MG: 160 TABLET ORAL at 08:58

## 2019-07-15 RX ADMIN — INSULIN LISPRO 1 UNITS: 100 INJECTION, SOLUTION INTRAVENOUS; SUBCUTANEOUS at 20:51

## 2019-07-15 RX ADMIN — SIMVASTATIN 40 MG: 40 TABLET, FILM COATED ORAL at 20:03

## 2019-07-15 RX ADMIN — TAMSULOSIN HYDROCHLORIDE 0.4 MG: 0.4 CAPSULE ORAL at 08:57

## 2019-07-15 RX ADMIN — GUAIFENESIN 600 MG: 600 TABLET, EXTENDED RELEASE ORAL at 20:03

## 2019-07-15 RX ADMIN — GABAPENTIN 600 MG: 300 CAPSULE ORAL at 08:57

## 2019-07-15 RX ADMIN — GABAPENTIN 600 MG: 300 CAPSULE ORAL at 20:03

## 2019-07-15 RX ADMIN — NORTRIPTYLINE HYDROCHLORIDE 50 MG: 50 CAPSULE ORAL at 20:04

## 2019-07-15 RX ADMIN — DOCUSATE SODIUM 100 MG: 100 CAPSULE, LIQUID FILLED ORAL at 08:57

## 2019-07-15 RX ADMIN — PANTOPRAZOLE SODIUM 40 MG: 40 TABLET, DELAYED RELEASE ORAL at 06:16

## 2019-07-15 RX ADMIN — IPRATROPIUM BROMIDE AND ALBUTEROL SULFATE 1 AMPULE: .5; 3 SOLUTION RESPIRATORY (INHALATION) at 12:11

## 2019-07-15 ASSESSMENT — PAIN DESCRIPTION - ORIENTATION
ORIENTATION: LOWER
ORIENTATION: LOWER

## 2019-07-15 ASSESSMENT — PAIN DESCRIPTION - PAIN TYPE
TYPE: SURGICAL PAIN
TYPE: SURGICAL PAIN

## 2019-07-15 ASSESSMENT — PAIN DESCRIPTION - LOCATION
LOCATION: BACK
LOCATION: BACK

## 2019-07-15 ASSESSMENT — PAIN SCALES - GENERAL
PAINLEVEL_OUTOF10: 6
PAINLEVEL_OUTOF10: 5
PAINLEVEL_OUTOF10: 6

## 2019-07-15 ASSESSMENT — PAIN DESCRIPTION - PROGRESSION: CLINICAL_PROGRESSION: NOT CHANGED

## 2019-07-15 NOTE — PROGRESS NOTES
[email or web address]    Date:   7/15/2019  Patient name:  Asad Charles  Date of admission:  7/10/2019  7:30 AM  MRN:   [id number]  YOB: 1955      Cc post op     HPI   Pt admitted post back sx        HPI   1) Location/Symptom post op tachycardia   2) Timing/Onset: 1 day   3) Context/Setting: post op , pain 7/10 , uses metoprolol   4) Quality: ABG showed hypoxia uses cpap at home   5) Duration: continuous   6) Modifying Factors: hx of HTN CAD  7) Severity: moderate     Pt tachycardic , yesterday pco2 52  CXR left basilar infiltrate vs atelectasis   Today sleepy did not use cpap at night well      7/15     ms better   no fever   wbc 12     Past Medical History:   Diagnosis Date    Abdominal hernia     Anesthesia     phrenic nerve damaged lt side    BPH (benign prostatic hyperplasia)     CAD (coronary artery disease)     Chronic back pain     Diabetes mellitus ([address]     Type 2    Difficult intubation     per pt throat collapses, lt side phrenic nerve was damaged    Ear infection     LT, being treated    Fatty liver     Gallstones     GERD (gastroesophageal reflux disease)     Headache(784.0)     History of MI (myocardial infarction)     Hypercholesteremia     Hyperlipidemia     Hypertension     Kidney stones     Osteoarthritis     all joints    PONV (postoperative nausea and vomiting)     Sleep apnea     BIPAP    Unspecified sleep apnea        Review of systems    Constitutional: Negative for fever and fatigue. Respiratory: Negative for cough  + shortness of breath. Cardiovascular: Negative for chest pain, palpitations and leg swelling. Gastrointestinal: Negative for abdominal pain, diarrhea and blood in stool.        ROS  Physical exam     /83   Pulse 96   Temp 98.4 °F (36.9 °C) (Oral)   Resp 18   Ht 5' 8\" (1.727 m)   Wt 217 lb 9.5 oz (98.7 kg)   SpO2 97%   BMI 33.09 kg/m²      General [address]    Phone [phone]   Fax: [phone]  Answering Service: [phone]

## 2019-07-15 NOTE — PROGRESS NOTES
pain and leg swelling. Gastrointestinal: Negative for nausea, vomiting and abdominal pain. Endocrine: Negative for polydipsia and polyphagia. Genitourinary: Negative for dysuria, frequency, hematuria, flank pain and difficulty urinating. Musculoskeletal: Negative for myalgias, back pain and joint swelling. Skin: Negative for color change, rash and wound. Allergic/Immunologic: Negative for environmental allergies and food allergies. Neurological: Negative for dizziness, tremors and numbness. Hematological: Negative for adenopathy. Does not bruise/bleed easily. Psychiatric/Behavioral: Negative for confusion and dysphoric mood. The patient is not nervous/anxious. Patient Vitals for the past 24 hrs:   BP Temp Temp src Pulse Resp SpO2   07/15/19 0816 -- -- -- -- 18 95 %   07/15/19 0745 119/83 98.4 °F (36.9 °C) Oral 96 18 100 %   07/14/19 2342 109/72 98.1 °F (36.7 °C) Oral 100 16 100 %   07/14/19 1954 -- -- -- -- 16 92 %   07/14/19 1858 115/71 98.2 °F (36.8 °C) Oral 88 18 97 %   07/14/19 1526 -- -- -- -- 16 93 %   07/14/19 1354 105/70 99.6 °F (37.6 °C) Axillary 94 16 94 %   07/14/19 1121 -- -- -- -- 16 93 %       Intake/Output Summary (Last 24 hours) at 7/15/2019 1117  Last data filed at 7/15/2019 1048  Gross per 24 hour   Intake 450 ml   Output 4550 ml   Net -4100 ml       Recent Labs     07/13/19  0603 07/14/19  0418   WBC 13.0* 12.4*   HGB 10.5* 10.2*   HCT 32.0* 30.9*   MCV 89.8 90.4    293     Recent Labs     07/13/19  0603 07/14/19  0418   * 135   K 4.3 5.0   CL 96* 97*   CO2 25 27   BUN 18 29*   CREATININE 0.88 1.00       No results for input(s): COLORU, PHUR, LABCAST, WBCUA, RBCUA, MUCUS, TRICHOMONAS, YEAST, BACTERIA, CLARITYU, SPECGRAV, LEUKOCYTESUR, UROBILINOGEN, BILIRUBINUR, BLOODU in the last 72 hours. Invalid input(s): NITRATE, GLUCOSEUKETONESUAMORPHOUS    Additional Lab/culture results:    Physical Exam:  Constitutional: Patient in no acute distress;    Neuro: alert and

## 2019-07-15 NOTE — PROGRESS NOTES
for safe hand placement, vc's for upright posture  Ambulation 2  Surface - 2: level tile  Device 2: Rolling Walker  Other Apparatus 2: Wheelchair follow  Assistance 2: Minimal assistance(x1)  Quality of Gait 2: Wide IDRIS, standing outside of RW, not following safety instructions to stay inside RW, SOB with amb while on 2L, max instruction to keep RW closer as not to fall  Gait Deviations: Increased IDRIS;Shuffles;Decreased step length;Decreased step height  Distance: 15ft  Comments: max vc's for safe use of RW, pt prefers to lean over RW with forearms on RW for support     Stairs/Curb  Stairs?: No                                                     Posture: Poor  Sitting - Static: Good  Sitting - Dynamic: Fair;+  Standing - Static: Good;-  Standing - Dynamic: Fair  Comments: seated EOB, standing with RW     Other exercises 1: Dangle EOB 20-25 min. Other exercises 2: Sit to Stand x2(AM & PM)  Other exercises 3: Static Standing ~ 5min. (PM standing 2min.)  Other exercises 4: Bed Mobility with log rolling technique, HOB lowered ~25 degrees  Other Activities  Comment: Pt requires increase time d/t pain. Activity Tolerance: Patient limited by pain; Patient limited by endurance          Assessment  Activity Tolerance: Patient limited by pain; Patient limited by endurance   Body structures, Functions, Activity limitations: Decreased functional mobility ; Decreased strength;Decreased balance;Decreased endurance;Decreased safe awareness  Prognosis: Fair  Discharge Recommendations: Subacute/Skilled Nursing Facility     Type of devices: Call light within reach; Left in chair;Gait belt;Nurse notified  Restraints  Initially in place: No     Plan  Times per week: BID  Times per day: Twice a day  Current Treatment Recommendations: Balance Training, Functional Mobility Training, Endurance Training, Transfer Training, Gait Training, Safety Education & Training    Patient Education  New Education Provided: Importance of Icing

## 2019-07-15 NOTE — PROGRESS NOTES
PULMONARY PROGRESS NOTE:    Interval History: SHANT, atelectasis hypoxia    Shortness of Breath: no  Cough: no  Sputum: no  Hemoptysis: no  Chest Pain: no  Fever: no  Swelling Feet: no  Headache: no  Nausea, Emesis, Abdominal Pain: no  Diarrhea:   Constipation:     +++ back pain - with minimal movement and positioning  + Diaphoresis  Denies chills    Events since last visit:     PAST MEDICAL HISTORY:    Smoking:     PHYSICAL EXAMINATION:  tmax 99.6  General : Awake, alert, oriented to time, place, and person  Neck - supple, no lymphadenopathy, JVD not raised  Heart - regular rhythm, S1 and S2 normal; no additional sounds heard  Lungs - Air Entry- fair bilaterally; breath sounds : vesicular 96% on 3 liters - decreased to 2 liters  Abdomen - soft, no tenderness  Upper Extremities  - no cyanosis, mottling; edema : absent  Lower Extremities: no cyanosis, mottling; edema : absent    Current Laboratory, Radiologic, Microbiologic, and Diagnostic studies reviewed    ASSESSMENT / PLAN:  Dx SHANT - on BIPAP - home machine        Nocturnal hypoxia        Chronic elevated left hemidiaphragm  atelectasis  Post op day # 5 L3-L5 PLIF  Enc mobility - up in chair several hrs yesterday  Enc IS  NOX on PAP - 30 min desaturation   CXR - appears improved - still with LLL opacity     Plan of care discussed with Dr Bruno Armstrong, APRN - CNP     REASON FOR VISIT: shant, atelectasis, hypoxia  I examined the patient myself  The assessment and Plan in the note per my discussion with NP    Electronically signed by Chris Bhardwaj on 07/15/19 [employer] and print the 'Prescriptions from this Visit' Report

## 2019-07-16 LAB
GLUCOSE BLD-MCNC: 132 MG/DL (ref 75–110)
GLUCOSE BLD-MCNC: 139 MG/DL (ref 75–110)
GLUCOSE BLD-MCNC: 157 MG/DL (ref 75–110)
GLUCOSE BLD-MCNC: 252 MG/DL (ref 75–110)

## 2019-07-16 PROCEDURE — 2580000003 HC RX 258: Performed by: INTERNAL MEDICINE

## 2019-07-16 PROCEDURE — 94761 N-INVAS EAR/PLS OXIMETRY MLT: CPT

## 2019-07-16 PROCEDURE — 6370000000 HC RX 637 (ALT 250 FOR IP): Performed by: INTERNAL MEDICINE

## 2019-07-16 PROCEDURE — 6370000000 HC RX 637 (ALT 250 FOR IP): Performed by: ORTHOPAEDIC SURGERY

## 2019-07-16 PROCEDURE — 99232 SBSQ HOSP IP/OBS MODERATE 35: CPT | Performed by: INTERNAL MEDICINE

## 2019-07-16 PROCEDURE — 1200000000 HC SEMI PRIVATE

## 2019-07-16 PROCEDURE — 94640 AIRWAY INHALATION TREATMENT: CPT

## 2019-07-16 PROCEDURE — 82947 ASSAY GLUCOSE BLOOD QUANT: CPT

## 2019-07-16 PROCEDURE — 97535 SELF CARE MNGMENT TRAINING: CPT

## 2019-07-16 PROCEDURE — 99024 POSTOP FOLLOW-UP VISIT: CPT | Performed by: ORTHOPAEDIC SURGERY

## 2019-07-16 PROCEDURE — 97530 THERAPEUTIC ACTIVITIES: CPT

## 2019-07-16 PROCEDURE — 97116 GAIT TRAINING THERAPY: CPT

## 2019-07-16 PROCEDURE — 97110 THERAPEUTIC EXERCISES: CPT

## 2019-07-16 RX ORDER — HYDROCODONE BITARTRATE AND ACETAMINOPHEN 5; 325 MG/1; MG/1
1 TABLET ORAL EVERY 6 HOURS PRN
Status: DISCONTINUED | OUTPATIENT
Start: 2019-07-16 | End: 2019-07-19 | Stop reason: HOSPADM

## 2019-07-16 RX ORDER — SODIUM CHLORIDE FOR INHALATION 0.9 %
3 VIAL, NEBULIZER (ML) INHALATION EVERY 4 HOURS PRN
Status: DISCONTINUED | OUTPATIENT
Start: 2019-07-16 | End: 2019-07-19 | Stop reason: HOSPADM

## 2019-07-16 RX ORDER — LEVALBUTEROL 1.25 MG/.5ML
1.25 SOLUTION, CONCENTRATE RESPIRATORY (INHALATION) EVERY 4 HOURS PRN
Status: DISCONTINUED | OUTPATIENT
Start: 2019-07-16 | End: 2019-07-19 | Stop reason: HOSPADM

## 2019-07-16 RX ORDER — DILTIAZEM HYDROCHLORIDE 60 MG/1
60 TABLET, FILM COATED ORAL EVERY 12 HOURS SCHEDULED
Status: DISCONTINUED | OUTPATIENT
Start: 2019-07-16 | End: 2019-07-19 | Stop reason: HOSPADM

## 2019-07-16 RX ADMIN — FENOFIBRATE 160 MG: 160 TABLET ORAL at 09:35

## 2019-07-16 RX ADMIN — FINASTERIDE 5 MG: 5 TABLET, FILM COATED ORAL at 09:36

## 2019-07-16 RX ADMIN — GUAIFENESIN 600 MG: 600 TABLET, EXTENDED RELEASE ORAL at 09:36

## 2019-07-16 RX ADMIN — HYDROCODONE BITARTRATE AND ACETAMINOPHEN 1 TABLET: 5; 325 TABLET ORAL at 18:17

## 2019-07-16 RX ADMIN — TAMSULOSIN HYDROCHLORIDE 0.4 MG: 0.4 CAPSULE ORAL at 09:36

## 2019-07-16 RX ADMIN — ACETAMINOPHEN 500 MG: 500 TABLET, FILM COATED ORAL at 09:36

## 2019-07-16 RX ADMIN — GUAIFENESIN 600 MG: 600 TABLET, EXTENDED RELEASE ORAL at 22:01

## 2019-07-16 RX ADMIN — DILTIAZEM HYDROCHLORIDE 30 MG: 30 TABLET, FILM COATED ORAL at 09:36

## 2019-07-16 RX ADMIN — VITAMIN E CAP 400 UNIT 400 UNITS: 400 CAP at 09:43

## 2019-07-16 RX ADMIN — METOPROLOL SUCCINATE 100 MG: 100 TABLET, EXTENDED RELEASE ORAL at 09:35

## 2019-07-16 RX ADMIN — GABAPENTIN 600 MG: 300 CAPSULE ORAL at 09:36

## 2019-07-16 RX ADMIN — SODIUM CHLORIDE: 9 INJECTION, SOLUTION INTRAVENOUS at 09:40

## 2019-07-16 RX ADMIN — IPRATROPIUM BROMIDE AND ALBUTEROL SULFATE 1 AMPULE: .5; 3 SOLUTION RESPIRATORY (INHALATION) at 09:21

## 2019-07-16 RX ADMIN — DOCUSATE SODIUM 100 MG: 100 CAPSULE, LIQUID FILLED ORAL at 09:35

## 2019-07-16 RX ADMIN — PANTOPRAZOLE SODIUM 40 MG: 40 TABLET, DELAYED RELEASE ORAL at 06:00

## 2019-07-16 RX ADMIN — NORTRIPTYLINE HYDROCHLORIDE 50 MG: 50 CAPSULE ORAL at 09:43

## 2019-07-16 RX ADMIN — DILTIAZEM HYDROCHLORIDE 60 MG: 60 TABLET, FILM COATED ORAL at 22:01

## 2019-07-16 RX ADMIN — GABAPENTIN 600 MG: 300 CAPSULE ORAL at 22:02

## 2019-07-16 RX ADMIN — SIMVASTATIN 40 MG: 40 TABLET, FILM COATED ORAL at 22:01

## 2019-07-16 RX ADMIN — DOCUSATE SODIUM 100 MG: 100 CAPSULE, LIQUID FILLED ORAL at 22:03

## 2019-07-16 RX ADMIN — LEVOFLOXACIN 750 MG: 750 TABLET, FILM COATED ORAL at 09:36

## 2019-07-16 ASSESSMENT — PAIN DESCRIPTION - PAIN TYPE
TYPE: SURGICAL PAIN
TYPE: SURGICAL PAIN

## 2019-07-16 ASSESSMENT — PAIN SCALES - GENERAL
PAINLEVEL_OUTOF10: 7
PAINLEVEL_OUTOF10: 4
PAINLEVEL_OUTOF10: 6
PAINLEVEL_OUTOF10: 2
PAINLEVEL_OUTOF10: 6
PAINLEVEL_OUTOF10: 4

## 2019-07-16 ASSESSMENT — PAIN DESCRIPTION - PROGRESSION
CLINICAL_PROGRESSION: GRADUALLY IMPROVING
CLINICAL_PROGRESSION: GRADUALLY IMPROVING

## 2019-07-16 ASSESSMENT — PAIN DESCRIPTION - LOCATION
LOCATION: BACK
LOCATION: BACK

## 2019-07-16 ASSESSMENT — PAIN DESCRIPTION - ORIENTATION
ORIENTATION: LOWER
ORIENTATION: LOWER

## 2019-07-16 NOTE — PLAN OF CARE
Problem: Pain:  Goal: Pain level will decrease  Description  Pain level will decrease  7/16/2019 1524 by Lucila Gonzalez RN  Outcome: Ongoing  7/16/2019 0415 by Teola Curling, RN  Outcome: Ongoing  Goal: Control of acute pain  Description  Control of acute pain  7/16/2019 1524 by Lucila Gonzalez RN  Outcome: Ongoing  7/16/2019 0415 by Teola Curling, RN  Outcome: Ongoing  Goal: Control of chronic pain  Description  Control of chronic pain  7/16/2019 1524 by Lucila Gonzalez RN  Outcome: Ongoing  7/16/2019 0415 by Teola Curling, RN  Outcome: Ongoing     Problem: Musculor/Skeletal Functional Status  Goal: Highest potential functional level  7/16/2019 1524 by Lucila Gonzalez RN  Outcome: Ongoing  7/16/2019 0415 by Teola Curling, RN  Outcome: Ongoing  Goal: Absence of falls  7/16/2019 1524 by Lucila Gonzalez RN  Outcome: Ongoing  7/16/2019 0415 by Teola Curling, RN  Outcome: Ongoing     Problem: Falls - Risk of:  Goal: Will remain free from falls  Description  Will remain free from falls  7/16/2019 1524 by Lucila Gonzalez RN  Outcome: Ongoing  7/16/2019 0415 by Teola Curling, RN  Outcome: Ongoing  Goal: Absence of physical injury  Description  Absence of physical injury  7/16/2019 1524 by Lucila Gonzalez RN  Outcome: Ongoing  7/16/2019 0415 by Teola Curling, RN  Outcome: Ongoing

## 2019-07-16 NOTE — PROGRESS NOTES
demo G initiation of seqeunce; continues to require VC for breathing tech  Balance  Sitting Balance: Stand by assistance(sitting EOB)  Standing Balance: Contact guard assistance(A x 2, VCs for upright posture. )  Standing Balance  Time: 5min; 30sec unilateral support; 2sec unsupported  Activity: static stand>mobility; standing in place c RW 0-1 UE support  Comment: VCs for proper technique and hand placement; pt prefers to transfer c forearms on RW, no LOB noted; max difficulty noted standing unsupported; improved posture noted this date  Functional Mobility  Functional - Mobility Device: Rolling Walker  Activity: Other  Assist Level: Minimal assistance(x2)  Functional Mobility Comments: VC for safety/RW mgt   ADL  Feeding: Independent  Grooming: Setup;Stand by assistance  UE Bathing: Minimal assistance  LE Bathing: Maximum assistance(will require A c buttocks/below knees/scrotal thoroughness)  UE Dressing: Minimal assistance  LE Dressing: Moderate assistance(Pt will require A c threading BLE into clothing; footies SBA)  Toileting: Moderate assistance([place])  Additional Comments: LBD this date: While EOB pt completed footie management utilizing Wide plastic sock aid c single rope; VC for tech to setup including fxl use of BUE; pt able to complete c increased time and VC only. From seated EOB pt required A to stabilize handheld urinal as well as A to empty; pt is able to position handheld urinate appropriately while seate in bedside chair. Remainder per clincal reasoning. Education provided on shower safety/ADLs and progression c mobility/fxl I.      Transfers  Stand Pivot Transfers: Minimal assistance;2 Person assistance  Sit to stand: 2 Person assistance;Maximum assistance  Stand to sit: 2 Person assistance;Minimal assistance  Transfer Comments: icing completed prior to tx for pain mgt; pt continues to require VC for hand placement/breathing tech and requires increased time to transition hands to/from RW; Requiring Modx2 from EOB, Maxx2 from bedside chair           Additional Activities: Continued Education on icing before/after therapies/mobilities for pain mgt; Pt V/D G return this date; RN to enocouraged icing PRN      Vision  Patient Visual Report: No visual complaint reported. PM: transfer training to increase ease a hand transition c f return noted but increased control for stand to sits; pt returned to bed demo increased ease c log roll tech but noted increased discomfort c protective waffle mattress deflated. VC for EC a repositioning and movements of unilteral LE c G return noted    Assessment  Performance deficits / Impairments: Decreased functional mobility ; Decreased ADL status; Decreased endurance;Decreased balance;Decreased high-level IADLs  Assessment: resume OT POC  Prognosis: Good  Discharge Recommendations: Subacute/Skilled Nursing Facility  Activity Tolerance: Patient Tolerated treatment well  Safety Devices in place: Yes  Type of devices: Patient at risk for falls;Call light within reach; Left in chair;Nurse notified;Gait bel  tEquipment Recommendations  Equipment Needed: (TBD)          Patient Education:  Patient Education: OT POC, review log-rolling technique, pain mgt, RW safety and tech durung ffunc mobility and transfers  Learner:patient and significant other  Method: demonstration and explanation       Outcome: needs reinforcement     Plan  Safety Devices  Safety Devices in place: Yes  Type of devices: Patient at risk for falls, Call light within reach, Left in chair, Nurse notified, Gait belt(family in room, bi PAP placed at end of tx per RN request)  Plan  Times per week: 5-7  Times per day: Twice a day  Current Treatment Recommendations: Balance Training, Functional Mobility Training, Endurance Training, Pain Management, Safety Education & Training, Patient/Caregiver Education & Training, Equipment Evaluation, Education, & procurement, Self-Care / ADL, Home Management Training      Goals  Short

## 2019-07-16 NOTE — PROGRESS NOTES
No events O/N. Back sore. Denies any fevers chills or sweats. Indicates his noticing some improvement with PT.     /76   Pulse 103   Temp 98.6 °F (37 °C) (Oral)   Resp 18   Ht 5' 8\" (1.727 m)   Wt 217 lb 9.5 oz (98.7 kg)   SpO2 98%   BMI 33.09 kg/m²   Back dressing intact    A/P: sp L3-5 PLIF  - Better pain control.  Started on norco 5/325  - Continue to mobilize with PT  - Continue medical management  - Dispo: to SNF once arrangements finalized

## 2019-07-16 NOTE — PROGRESS NOTES
SW spoke to Mandy Kendrick from [place]. She completed onsite visit. Facility is still awaiting pre-cert. It is expected on Wednesday. This worker started 80 in [address].

## 2019-07-17 ENCOUNTER — APPOINTMENT (OUTPATIENT)
Dept: GENERAL RADIOLOGY | Age: 64
DRG: 453 | End: 2019-07-17
Attending: ORTHOPAEDIC SURGERY
Payer: COMMERCIAL

## 2019-07-17 LAB
GLUCOSE BLD-MCNC: 144 MG/DL (ref 75–110)
GLUCOSE BLD-MCNC: 153 MG/DL (ref 75–110)
GLUCOSE BLD-MCNC: 162 MG/DL (ref 75–110)
GLUCOSE BLD-MCNC: 190 MG/DL (ref 75–110)

## 2019-07-17 PROCEDURE — 97530 THERAPEUTIC ACTIVITIES: CPT

## 2019-07-17 PROCEDURE — 6370000000 HC RX 637 (ALT 250 FOR IP): Performed by: ORTHOPAEDIC SURGERY

## 2019-07-17 PROCEDURE — 6370000000 HC RX 637 (ALT 250 FOR IP): Performed by: INTERNAL MEDICINE

## 2019-07-17 PROCEDURE — 97110 THERAPEUTIC EXERCISES: CPT

## 2019-07-17 PROCEDURE — 82947 ASSAY GLUCOSE BLOOD QUANT: CPT

## 2019-07-17 PROCEDURE — 99232 SBSQ HOSP IP/OBS MODERATE 35: CPT | Performed by: INTERNAL MEDICINE

## 2019-07-17 PROCEDURE — 97116 GAIT TRAINING THERAPY: CPT

## 2019-07-17 PROCEDURE — 71045 X-RAY EXAM CHEST 1 VIEW: CPT

## 2019-07-17 PROCEDURE — 97535 SELF CARE MNGMENT TRAINING: CPT

## 2019-07-17 PROCEDURE — 2580000003 HC RX 258: Performed by: ORTHOPAEDIC SURGERY

## 2019-07-17 PROCEDURE — 1200000000 HC SEMI PRIVATE

## 2019-07-17 RX ADMIN — NORTRIPTYLINE HYDROCHLORIDE 50 MG: 50 CAPSULE ORAL at 22:15

## 2019-07-17 RX ADMIN — INSULIN LISPRO 1 UNITS: 100 INJECTION, SOLUTION INTRAVENOUS; SUBCUTANEOUS at 22:07

## 2019-07-17 RX ADMIN — GABAPENTIN 600 MG: 300 CAPSULE ORAL at 22:05

## 2019-07-17 RX ADMIN — GUAIFENESIN 600 MG: 600 TABLET, EXTENDED RELEASE ORAL at 08:35

## 2019-07-17 RX ADMIN — INSULIN LISPRO 1 UNITS: 100 INJECTION, SOLUTION INTRAVENOUS; SUBCUTANEOUS at 16:47

## 2019-07-17 RX ADMIN — HYDROCODONE BITARTRATE AND ACETAMINOPHEN 1 TABLET: 5; 325 TABLET ORAL at 08:27

## 2019-07-17 RX ADMIN — INSULIN LISPRO 1 UNITS: 100 INJECTION, SOLUTION INTRAVENOUS; SUBCUTANEOUS at 11:41

## 2019-07-17 RX ADMIN — DOCUSATE SODIUM 100 MG: 100 CAPSULE, LIQUID FILLED ORAL at 22:05

## 2019-07-17 RX ADMIN — METOPROLOL SUCCINATE 100 MG: 100 TABLET, EXTENDED RELEASE ORAL at 08:35

## 2019-07-17 RX ADMIN — INSULIN LISPRO 1 UNITS: 100 INJECTION, SOLUTION INTRAVENOUS; SUBCUTANEOUS at 08:26

## 2019-07-17 RX ADMIN — GABAPENTIN 600 MG: 300 CAPSULE ORAL at 08:34

## 2019-07-17 RX ADMIN — DOCUSATE SODIUM 100 MG: 100 CAPSULE, LIQUID FILLED ORAL at 08:34

## 2019-07-17 RX ADMIN — NORTRIPTYLINE HYDROCHLORIDE 50 MG: 50 CAPSULE ORAL at 00:33

## 2019-07-17 RX ADMIN — SIMVASTATIN 40 MG: 40 TABLET, FILM COATED ORAL at 22:06

## 2019-07-17 RX ADMIN — DILTIAZEM HYDROCHLORIDE 60 MG: 60 TABLET, FILM COATED ORAL at 22:06

## 2019-07-17 RX ADMIN — FINASTERIDE 5 MG: 5 TABLET, FILM COATED ORAL at 08:33

## 2019-07-17 RX ADMIN — DILTIAZEM HYDROCHLORIDE 60 MG: 60 TABLET, FILM COATED ORAL at 08:34

## 2019-07-17 RX ADMIN — HYDROCODONE BITARTRATE AND ACETAMINOPHEN 1 TABLET: 5; 325 TABLET ORAL at 14:49

## 2019-07-17 RX ADMIN — GUAIFENESIN 600 MG: 600 TABLET, EXTENDED RELEASE ORAL at 22:06

## 2019-07-17 RX ADMIN — VITAMIN E CAP 400 UNIT 400 UNITS: 400 CAP at 08:38

## 2019-07-17 RX ADMIN — TAMSULOSIN HYDROCHLORIDE 0.4 MG: 0.4 CAPSULE ORAL at 08:55

## 2019-07-17 RX ADMIN — NORTRIPTYLINE HYDROCHLORIDE 50 MG: 50 CAPSULE ORAL at 08:38

## 2019-07-17 RX ADMIN — PANTOPRAZOLE SODIUM 40 MG: 40 TABLET, DELAYED RELEASE ORAL at 05:49

## 2019-07-17 RX ADMIN — LEVOFLOXACIN 750 MG: 750 TABLET, FILM COATED ORAL at 08:33

## 2019-07-17 RX ADMIN — FENOFIBRATE 160 MG: 160 TABLET ORAL at 08:33

## 2019-07-17 RX ADMIN — Medication 10 ML: at 08:39

## 2019-07-17 ASSESSMENT — PAIN DESCRIPTION - PROGRESSION: CLINICAL_PROGRESSION: GRADUALLY IMPROVING

## 2019-07-17 ASSESSMENT — PAIN SCALES - GENERAL
PAINLEVEL_OUTOF10: 4
PAINLEVEL_OUTOF10: 2
PAINLEVEL_OUTOF10: 5
PAINLEVEL_OUTOF10: 4
PAINLEVEL_OUTOF10: 3

## 2019-07-17 ASSESSMENT — PAIN DESCRIPTION - ORIENTATION
ORIENTATION: LOWER

## 2019-07-17 ASSESSMENT — PAIN DESCRIPTION - LOCATION
LOCATION: BACK
LOCATION: BACK
LOCATION_2: KNEE
LOCATION: BACK

## 2019-07-17 ASSESSMENT — PAIN DESCRIPTION - FREQUENCY: FREQUENCY: INTERMITTENT

## 2019-07-17 ASSESSMENT — PAIN DESCRIPTION - PAIN TYPE
TYPE: SURGICAL PAIN
TYPE_2: CHRONIC PAIN

## 2019-07-17 ASSESSMENT — PAIN DESCRIPTION - ONSET: ONSET: ON-GOING

## 2019-07-17 ASSESSMENT — PAIN DESCRIPTION - DESCRIPTORS
DESCRIPTORS: SHARP
DESCRIPTORS: SHARP

## 2019-07-17 NOTE — CARE COORDINATION
LSW is following for patient to discharge to [facility]. If precert is obtained tonight, they will notify the nurse. The grandson is a  and the will provide transport. At discharge, call report to [phone] and fax [address] to [phone]. POD #7 L3-S PLIF. PT/OT recommends skilled nursing facility. [facility] IS SIGNED AND NEEDS COMPLETED. Will follow for discharge needs.

## 2019-07-17 NOTE — CARE COORDINATION
Social Work-spoke with [facility]. They have not received precert. They anticipate precert today.  Ray Romero

## 2019-07-17 NOTE — PLAN OF CARE
Problem: Pain:  Goal: Control of acute pain  Description  Control of acute pain  7/17/2019 0512 by Rayshawn Jaeger RN  Outcome: Ongoing  7/16/2019 1524 by Michelle Dean RN  Outcome: Ongoing     Problem: Falls - Risk of:  Goal: Will remain free from falls  Description  Will remain free from falls  7/17/2019 0512 by Rayshawn Jaeger RN  Outcome: Ongoing  7/16/2019 1524 by Michelle Dean RN  Outcome: Ongoing

## 2019-07-18 LAB
GLUCOSE BLD-MCNC: 116 MG/DL (ref 75–110)
GLUCOSE BLD-MCNC: 136 MG/DL (ref 75–110)
GLUCOSE BLD-MCNC: 145 MG/DL (ref 75–110)
GLUCOSE BLD-MCNC: 233 MG/DL (ref 75–110)

## 2019-07-18 PROCEDURE — 1200000000 HC SEMI PRIVATE

## 2019-07-18 PROCEDURE — 97110 THERAPEUTIC EXERCISES: CPT

## 2019-07-18 PROCEDURE — 6370000000 HC RX 637 (ALT 250 FOR IP): Performed by: INTERNAL MEDICINE

## 2019-07-18 PROCEDURE — 82947 ASSAY GLUCOSE BLOOD QUANT: CPT

## 2019-07-18 PROCEDURE — 99231 SBSQ HOSP IP/OBS SF/LOW 25: CPT | Performed by: INTERNAL MEDICINE

## 2019-07-18 PROCEDURE — 97530 THERAPEUTIC ACTIVITIES: CPT

## 2019-07-18 PROCEDURE — 6370000000 HC RX 637 (ALT 250 FOR IP): Performed by: ORTHOPAEDIC SURGERY

## 2019-07-18 PROCEDURE — 97116 GAIT TRAINING THERAPY: CPT

## 2019-07-18 RX ADMIN — GABAPENTIN 600 MG: 300 CAPSULE ORAL at 07:47

## 2019-07-18 RX ADMIN — HYDROCODONE BITARTRATE AND ACETAMINOPHEN 1 TABLET: 5; 325 TABLET ORAL at 06:20

## 2019-07-18 RX ADMIN — NORTRIPTYLINE HYDROCHLORIDE 50 MG: 50 CAPSULE ORAL at 21:32

## 2019-07-18 RX ADMIN — NORTRIPTYLINE HYDROCHLORIDE 50 MG: 50 CAPSULE ORAL at 07:47

## 2019-07-18 RX ADMIN — INSULIN LISPRO 1 UNITS: 100 INJECTION, SOLUTION INTRAVENOUS; SUBCUTANEOUS at 21:36

## 2019-07-18 RX ADMIN — LEVOFLOXACIN 750 MG: 750 TABLET, FILM COATED ORAL at 07:47

## 2019-07-18 RX ADMIN — METOPROLOL SUCCINATE 100 MG: 100 TABLET, EXTENDED RELEASE ORAL at 07:47

## 2019-07-18 RX ADMIN — DILTIAZEM HYDROCHLORIDE 60 MG: 60 TABLET, FILM COATED ORAL at 07:46

## 2019-07-18 RX ADMIN — DILTIAZEM HYDROCHLORIDE 60 MG: 60 TABLET, FILM COATED ORAL at 21:32

## 2019-07-18 RX ADMIN — PANTOPRAZOLE SODIUM 40 MG: 40 TABLET, DELAYED RELEASE ORAL at 06:10

## 2019-07-18 RX ADMIN — GUAIFENESIN 600 MG: 600 TABLET, EXTENDED RELEASE ORAL at 21:30

## 2019-07-18 RX ADMIN — HYDROCODONE BITARTRATE AND ACETAMINOPHEN 1 TABLET: 5; 325 TABLET ORAL at 19:04

## 2019-07-18 RX ADMIN — DOCUSATE SODIUM 100 MG: 100 CAPSULE, LIQUID FILLED ORAL at 21:30

## 2019-07-18 RX ADMIN — INSULIN LISPRO 1 UNITS: 100 INJECTION, SOLUTION INTRAVENOUS; SUBCUTANEOUS at 07:50

## 2019-07-18 RX ADMIN — GABAPENTIN 600 MG: 300 CAPSULE ORAL at 21:30

## 2019-07-18 RX ADMIN — TAMSULOSIN HYDROCHLORIDE 0.4 MG: 0.4 CAPSULE ORAL at 07:47

## 2019-07-18 RX ADMIN — HYDROCODONE BITARTRATE AND ACETAMINOPHEN 1 TABLET: 5; 325 TABLET ORAL at 12:21

## 2019-07-18 RX ADMIN — DOCUSATE SODIUM 100 MG: 100 CAPSULE, LIQUID FILLED ORAL at 07:48

## 2019-07-18 RX ADMIN — FINASTERIDE 5 MG: 5 TABLET, FILM COATED ORAL at 07:47

## 2019-07-18 RX ADMIN — GUAIFENESIN 600 MG: 600 TABLET, EXTENDED RELEASE ORAL at 07:47

## 2019-07-18 RX ADMIN — VITAMIN E CAP 400 UNIT 400 UNITS: 400 CAP at 07:47

## 2019-07-18 RX ADMIN — SIMVASTATIN 40 MG: 40 TABLET, FILM COATED ORAL at 21:30

## 2019-07-18 RX ADMIN — FENOFIBRATE 160 MG: 160 TABLET ORAL at 07:48

## 2019-07-18 ASSESSMENT — PAIN DESCRIPTION - PROGRESSION: CLINICAL_PROGRESSION: GRADUALLY IMPROVING

## 2019-07-18 ASSESSMENT — PAIN SCALES - GENERAL
PAINLEVEL_OUTOF10: 3
PAINLEVEL_OUTOF10: 7
PAINLEVEL_OUTOF10: 4
PAINLEVEL_OUTOF10: 2
PAINLEVEL_OUTOF10: 1
PAINLEVEL_OUTOF10: 6

## 2019-07-18 ASSESSMENT — PAIN DESCRIPTION - PAIN TYPE
TYPE: SURGICAL PAIN
TYPE: SURGICAL PAIN

## 2019-07-18 ASSESSMENT — PAIN DESCRIPTION - LOCATION
LOCATION: BACK
LOCATION: BACK

## 2019-07-18 ASSESSMENT — PAIN DESCRIPTION - ORIENTATION
ORIENTATION: LOWER
ORIENTATION: LOWER

## 2019-07-18 ASSESSMENT — PAIN DESCRIPTION - DIRECTION: RADIATING_TOWARDS: RIGHT BUTTOCK.

## 2019-07-19 VITALS
DIASTOLIC BLOOD PRESSURE: 69 MMHG | SYSTOLIC BLOOD PRESSURE: 117 MMHG | HEIGHT: 68 IN | RESPIRATION RATE: 18 BRPM | TEMPERATURE: 98.2 F | WEIGHT: 217.59 LBS | BODY MASS INDEX: 32.98 KG/M2 | HEART RATE: 91 BPM | OXYGEN SATURATION: 97 %

## 2019-07-19 LAB
GLUCOSE BLD-MCNC: 127 MG/DL (ref 75–110)
GLUCOSE BLD-MCNC: 144 MG/DL (ref 75–110)

## 2019-07-19 PROCEDURE — 6370000000 HC RX 637 (ALT 250 FOR IP): Performed by: INTERNAL MEDICINE

## 2019-07-19 PROCEDURE — 6370000000 HC RX 637 (ALT 250 FOR IP): Performed by: ORTHOPAEDIC SURGERY

## 2019-07-19 PROCEDURE — 99231 SBSQ HOSP IP/OBS SF/LOW 25: CPT | Performed by: INTERNAL MEDICINE

## 2019-07-19 PROCEDURE — 82947 ASSAY GLUCOSE BLOOD QUANT: CPT

## 2019-07-19 PROCEDURE — 97110 THERAPEUTIC EXERCISES: CPT

## 2019-07-19 PROCEDURE — 97116 GAIT TRAINING THERAPY: CPT

## 2019-07-19 RX ORDER — HYDROCODONE BITARTRATE AND ACETAMINOPHEN 5; 325 MG/1; MG/1
1 TABLET ORAL EVERY 6 HOURS PRN
Qty: 42 TABLET | Refills: 0 | Status: SHIPPED | OUTPATIENT
Start: 2019-07-19 | End: 2019-07-26

## 2019-07-19 RX ADMIN — PANTOPRAZOLE SODIUM 40 MG: 40 TABLET, DELAYED RELEASE ORAL at 06:11

## 2019-07-19 RX ADMIN — FINASTERIDE 5 MG: 5 TABLET, FILM COATED ORAL at 08:05

## 2019-07-19 RX ADMIN — GUAIFENESIN 600 MG: 600 TABLET, EXTENDED RELEASE ORAL at 08:06

## 2019-07-19 RX ADMIN — VITAMIN E CAP 400 UNIT 400 UNITS: 400 CAP at 08:05

## 2019-07-19 RX ADMIN — METOPROLOL SUCCINATE 100 MG: 100 TABLET, EXTENDED RELEASE ORAL at 08:05

## 2019-07-19 RX ADMIN — TAMSULOSIN HYDROCHLORIDE 0.4 MG: 0.4 CAPSULE ORAL at 08:06

## 2019-07-19 RX ADMIN — HYDROCODONE BITARTRATE AND ACETAMINOPHEN 1 TABLET: 5; 325 TABLET ORAL at 11:44

## 2019-07-19 RX ADMIN — GABAPENTIN 600 MG: 300 CAPSULE ORAL at 08:06

## 2019-07-19 RX ADMIN — HYDROCODONE BITARTRATE AND ACETAMINOPHEN 1 TABLET: 5; 325 TABLET ORAL at 06:12

## 2019-07-19 RX ADMIN — DILTIAZEM HYDROCHLORIDE 60 MG: 60 TABLET, FILM COATED ORAL at 08:05

## 2019-07-19 RX ADMIN — FENOFIBRATE 160 MG: 160 TABLET ORAL at 08:05

## 2019-07-19 RX ADMIN — LEVOFLOXACIN 750 MG: 750 TABLET, FILM COATED ORAL at 08:06

## 2019-07-19 RX ADMIN — DOCUSATE SODIUM 100 MG: 100 CAPSULE, LIQUID FILLED ORAL at 08:05

## 2019-07-19 RX ADMIN — NORTRIPTYLINE HYDROCHLORIDE 50 MG: 50 CAPSULE ORAL at 08:06

## 2019-07-19 ASSESSMENT — PAIN DESCRIPTION - ORIENTATION: ORIENTATION: LOWER;RIGHT

## 2019-07-19 ASSESSMENT — PAIN DESCRIPTION - LOCATION: LOCATION: BACK

## 2019-07-19 ASSESSMENT — PAIN SCALES - GENERAL
PAINLEVEL_OUTOF10: 4

## 2019-07-19 ASSESSMENT — PAIN DESCRIPTION - PAIN TYPE: TYPE: SURGICAL PAIN

## 2019-07-19 NOTE — CONSULTS
[address] [facility]    Date:   7/11/2019  Patient name:  Asad Charles  Date of admission:  7/10/2019  7:30 AM  MRN:   [id number]  YOB: 1955      Cc post op     HPI   Pt admitted post back sx        HPI   1) Location/Symptom post op tachycardia   2) Timing/Onset: 1 day   3) Context/Setting: post op , pain 7/10 , uses metoprolol   4) Quality: ABG showed hypoxia uses cpap at home   5) Duration: continuous   6) Modifying Factors: hx of HTN CAD  7) Severity: moderate       Past Medical History:   Diagnosis Date    Abdominal hernia     Anesthesia     phrenic nerve damaged lt side    BPH (benign prostatic hyperplasia)     CAD (coronary artery disease)     Chronic back pain     Diabetes mellitus ([address]     Type 2    Difficult intubation     per pt throat collapses, lt side phrenic nerve was damaged    Ear infection     LT, being treated    Fatty liver     Gallstones     GERD (gastroesophageal reflux disease)     Headache(784.0)     History of MI (myocardial infarction)     Hypercholesteremia     Hyperlipidemia     Hypertension     Kidney stones     Osteoarthritis     all joints    PONV (postoperative nausea and vomiting)     Sleep apnea     BIPAP    Unspecified sleep apnea      Family History   Problem Relation Age of Onset    Heart Disease Mother     Cancer Father         bladder    Arthritis Sister     Cancer Paternal Aunt     Cancer Paternal Aunt       reports that he has never smoked. He has never used smokeless tobacco. He reports that he does not drink alcohol or use drugs.   Past Medical History:   Diagnosis Date    Abdominal hernia     Anesthesia     phrenic nerve damaged lt side    BPH (benign prostatic hyperplasia)     CAD (coronary artery disease)     Chronic back pain     Diabetes mellitus ([address]     Type 2    Difficult intubation     per pt throat collapses, lt side phrenic nerve was damaged
nebulizer, HYDROcodone 5 mg - acetaminophen, acetaminophen, glucose, dextrose, glucagon (rDNA), dextrose, nitroGLYCERIN, sodium chloride flush, ondansetron    Data:     Past Medical History:   has a past medical history of Abdominal hernia, Anesthesia, BPH (benign prostatic hyperplasia), CAD (coronary artery disease), Chronic back pain, Diabetes mellitus ([address] Difficult intubation, Ear infection, Fatty liver, Gallstones, GERD (gastroesophageal reflux disease), Headache(784.0), History of MI (myocardial infarction), Hypercholesteremia, Hyperlipidemia, Hypertension, Kidney stones, Osteoarthritis, PONV (postoperative nausea and vomiting), Sleep apnea, and Unspecified sleep apnea. Social History:   reports that he has never smoked. He has never used smokeless tobacco. He reports that he does not drink alcohol or use drugs. Family History:   Family History   Problem Relation Age of Onset    Heart Disease Mother     Cancer Father         bladder    Arthritis Sister     Cancer Paternal Aunt     Cancer Paternal Aunt        Vitals:  /69   Pulse 91   Temp 98.2 °F (36.8 °C) (Oral)   Resp 18   Ht 5' 8\" (1.727 m)   Wt 217 lb 9.5 oz (98.7 kg)   SpO2 97%   BMI 33.09 kg/m²   Temp (24hrs), Av.4 °F (36.9 °C), Min:97.8 °F (36.6 °C), Max:99.1 °F (37.3 °C)    Recent Labs     19  1609 19  2132 19  0637 19  1107   POCGLU 136* 233* 127* 144*       I/O (24Hr): Intake/Output Summary (Last 24 hours) at 2019 1116  Last data filed at 2019 0657  Gross per 24 hour   Intake --   Output 2725 ml   Net -2725 ml       Labs:    Hematology:  No results for input(s): WBC, RBC, HGB, HCT, MCV, MCH, MCHC, RDW, PLT, MPV, SEGS, LYMPHOPCT, MONOPCT, EOSRELPCT, BASOPCT, SEDRATE, CRP, PROTIME, APTT, INR, DDIMER, LABABSO, CEA, , TREPG, URICACID in the last 72 hours.   Chemistry:  No results for input(s): NA, K, CL, CO2, GLUCOSE, BUN, CREATININE, MG, ANIONGAP, LABGLOM, GFRAA, CALCIUM, CAION,
Degenerative disc disease, lumbar [M51.36]     Lumbar spinal stenosis [M48.061]     Pars defect with spondylolisthesis [M43.00, M43.10]     BPH (benign prostatic hyperplasia) [N40.0] 06/26/2015       Plan:        3 60-year-old  gentleman admitted for elective surgery for laminectomy  2. Postop stay complicated by hypoxia or tachycardia treated for possible pneumonia cultures negative on antibiotics  3.  Back to baseline from pulmonary standpoint very limited mobility due to back pain I recommended DVT prophylaxis for at least 2-3 weeks or when he is low risk okay to ECF today okay with primary  Pain control is poor recommend increasing to Norco  Cardizem dose adjusted    Rodney Anand MD  7/17/2019  11:00 AM
Vitro route daily As needed.     Social History:   Social History     Socioeconomic History    Marital status:      Spouse name: Not on file    Number of children: Not on file    Years of education: Not on file    Highest education level: Not on file   Occupational History    Occupation: retired   Social Needs    Financial resource strain: Not on file    Food insecurity:     Worry: Not on file     Inability: Not on file   x.ai needs:     Medical: Not on file     Non-medical: Not on file   Tobacco Use    Smoking status: Never Smoker    Smokeless tobacco: Never Used   Substance and Sexual Activity    Alcohol use: Never     Alcohol/week: 0.0 standard drinks     Frequency: Never    Drug use: Never    Sexual activity: Not on file   Lifestyle    Physical activity:     Days per week: Not on file     Minutes per session: Not on file    Stress: Not on file   Relationships    Social connections:     Talks on phone: Not on file     Gets together: Not on file     Attends Denominational service: Not on file     Active member of club or organization: Not on file     Attends meetings of clubs or organizations: Not on file     Relationship status: Not on file    Intimate partner violence:     Fear of current or ex partner: Not on file     Emotionally abused: Not on file     Physically abused: Not on file     Forced sexual activity: Not on file   Other Topics Concern    Not on file   Social History Narrative    Not on file       Family History:   Family History   Problem Relation Age of Onset    Heart Disease Mother     Cancer Father         bladder    Arthritis Sister     Cancer Paternal Aunt     Cancer Paternal Aunt        Review of Systems  Fever/ chills - no  Chest pain - no  Cough - no  Expectoration / hemoptysis - no  shortness of breath - denies  Headache - no  Sinus drainage/ sore throat - no  abdominal pain - no  Swelling feet - no  Nausea/ vomiting/ diarrhea/ constipation -

## 2019-07-19 NOTE — PROGRESS NOTES
[facility] was able to obtain pre-cert. Pt's grandson will transport pt (he works as ).  time is noon. 7000 was previously completed. Orders sent to facility.

## 2019-07-19 NOTE — CARE COORDINATION
ONGOING DISCHARGE PLAN:    Plan remains for LSW to continue to follow for DC to SNF, [facility]. PT/OT rec SNF. Awaiting Pre-Cert, hopeful to get today & DC. Per Nursing, Family is hoping to have Pt. DC by At[facility], for [place] is a  & they want to provide transport. Will continue to follow along w/ LSW for additional discharge needs.     Electronically signed by Betina Tyler RN on 7/19/2019 at 8:48 AM

## 2019-07-23 ENCOUNTER — OFFICE VISIT (OUTPATIENT)
Dept: ORTHOPEDIC SURGERY | Age: 64
End: 2019-07-23

## 2019-07-23 ENCOUNTER — HOSPITAL ENCOUNTER (OUTPATIENT)
Age: 64
Setting detail: SPECIMEN
Discharge: HOME | End: 2019-07-23
Payer: COMMERCIAL

## 2019-07-23 DIAGNOSIS — M51.36 DEGENERATIVE DISC DISEASE, LUMBAR: Primary | ICD-10-CM

## 2019-07-23 DIAGNOSIS — M54.5 CHRONIC LOW BACK PAIN, UNSPECIFIED BACK PAIN LATERALITY, WITH SCIATICA PRESENCE UNSPECIFIED: ICD-10-CM

## 2019-07-23 DIAGNOSIS — M54.16 LUMBAR RADICULOPATHY, CHRONIC: ICD-10-CM

## 2019-07-23 DIAGNOSIS — M51.26 LUMBAR DISC HERNIATION: ICD-10-CM

## 2019-07-23 DIAGNOSIS — G89.29 CHRONIC LOW BACK PAIN, UNSPECIFIED BACK PAIN LATERALITY, WITH SCIATICA PRESENCE UNSPECIFIED: ICD-10-CM

## 2019-07-23 DIAGNOSIS — M48.061 SPINAL STENOSIS OF LUMBAR REGION, UNSPECIFIED WHETHER NEUROGENIC CLAUDICATION PRESENT: ICD-10-CM

## 2019-07-23 LAB
ANION GAP SERPL CALCULATED.3IONS-SCNC: 15 MMOL/L (ref 9–17)
BUN BLDV-MCNC: 32 MG/DL (ref 8–23)
BUN/CREAT BLD: 24 (ref 9–20)
CALCIUM SERPL-MCNC: 11.1 MG/DL (ref 8.6–10.4)
CHLORIDE BLD-SCNC: 99 MMOL/L (ref 98–107)
CO2: 24 MMOL/L (ref 20–31)
CREAT SERPL-MCNC: 1.33 MG/DL (ref 0.7–1.2)
GFR AFRICAN AMERICAN: >60 ML/MIN
GFR NON-AFRICAN AMERICAN: 54 ML/MIN
GFR SERPL CREATININE-BSD FRML MDRD: ABNORMAL ML/MIN/{1.73_M2}
GFR SERPL CREATININE-BSD FRML MDRD: ABNORMAL ML/MIN/{1.73_M2}
GLUCOSE BLD-MCNC: 203 MG/DL (ref 70–99)
HCT VFR BLD CALC: 40.4 % (ref 40.7–50.3)
HEMOGLOBIN: 12.5 G/DL (ref 13–17)
MCH RBC QN AUTO: 28.5 PG (ref 25.2–33.5)
MCHC RBC AUTO-ENTMCNC: 30.9 G/DL (ref 28.4–34.8)
MCV RBC AUTO: 92 FL (ref 82.6–102.9)
NRBC AUTOMATED: 0 PER 100 WBC
PDW BLD-RTO: 14.1 % (ref 11.8–14.4)
PLATELET # BLD: 819 K/UL (ref 138–453)
PMV BLD AUTO: 9.5 FL (ref 8.1–13.5)
POTASSIUM SERPL-SCNC: 4.6 MMOL/L (ref 3.7–5.3)
RBC # BLD: 4.39 M/UL (ref 4.21–5.77)
SODIUM BLD-SCNC: 138 MMOL/L (ref 135–144)
WBC # BLD: 12.1 K/UL (ref 3.5–11.3)

## 2019-07-23 PROCEDURE — 99024 POSTOP FOLLOW-UP VISIT: CPT | Performed by: ORTHOPAEDIC SURGERY

## 2019-07-23 PROCEDURE — 36415 COLL VENOUS BLD VENIPUNCTURE: CPT

## 2019-07-23 PROCEDURE — P9603 ONE-WAY ALLOW PRORATED MILES: HCPCS

## 2019-07-23 PROCEDURE — 85027 COMPLETE CBC AUTOMATED: CPT

## 2019-07-23 PROCEDURE — 80048 BASIC METABOLIC PNL TOTAL CA: CPT

## 2019-08-15 ENCOUNTER — TELEPHONE (OUTPATIENT)
Dept: UROLOGY | Age: 64
End: 2019-08-15

## 2019-08-21 DIAGNOSIS — M51.26 LUMBAR DISC HERNIATION: ICD-10-CM

## 2019-08-21 DIAGNOSIS — M48.061 SPINAL STENOSIS OF LUMBAR REGION, UNSPECIFIED WHETHER NEUROGENIC CLAUDICATION PRESENT: ICD-10-CM

## 2019-08-21 DIAGNOSIS — M54.16 LUMBAR RADICULOPATHY, CHRONIC: ICD-10-CM

## 2019-08-21 DIAGNOSIS — M54.5 CHRONIC LOW BACK PAIN, UNSPECIFIED BACK PAIN LATERALITY, WITH SCIATICA PRESENCE UNSPECIFIED: ICD-10-CM

## 2019-08-21 DIAGNOSIS — M51.36 DEGENERATIVE DISC DISEASE, LUMBAR: Primary | ICD-10-CM

## 2019-08-21 DIAGNOSIS — G89.29 CHRONIC LOW BACK PAIN, UNSPECIFIED BACK PAIN LATERALITY, WITH SCIATICA PRESENCE UNSPECIFIED: ICD-10-CM

## 2019-08-22 ENCOUNTER — OFFICE VISIT (OUTPATIENT)
Dept: ORTHOPEDIC SURGERY | Age: 64
End: 2019-08-22

## 2019-08-22 DIAGNOSIS — M48.061 SPINAL STENOSIS OF LUMBAR REGION, UNSPECIFIED WHETHER NEUROGENIC CLAUDICATION PRESENT: ICD-10-CM

## 2019-08-22 DIAGNOSIS — G89.29 CHRONIC LOW BACK PAIN, UNSPECIFIED BACK PAIN LATERALITY, WITH SCIATICA PRESENCE UNSPECIFIED: ICD-10-CM

## 2019-08-22 DIAGNOSIS — M51.36 DEGENERATIVE DISC DISEASE, LUMBAR: Primary | ICD-10-CM

## 2019-08-22 DIAGNOSIS — M51.26 LUMBAR DISC HERNIATION: ICD-10-CM

## 2019-08-22 DIAGNOSIS — M54.5 CHRONIC LOW BACK PAIN, UNSPECIFIED BACK PAIN LATERALITY, WITH SCIATICA PRESENCE UNSPECIFIED: ICD-10-CM

## 2019-08-22 DIAGNOSIS — M54.16 LUMBAR RADICULOPATHY, CHRONIC: ICD-10-CM

## 2019-08-22 PROCEDURE — 99024 POSTOP FOLLOW-UP VISIT: CPT | Performed by: ORTHOPAEDIC SURGERY

## 2019-09-24 DIAGNOSIS — N13.8 BPH WITH OBSTRUCTION/LOWER URINARY TRACT SYMPTOMS: ICD-10-CM

## 2019-09-24 DIAGNOSIS — N40.1 BPH WITH OBSTRUCTION/LOWER URINARY TRACT SYMPTOMS: ICD-10-CM

## 2019-09-24 RX ORDER — TAMSULOSIN HYDROCHLORIDE 0.4 MG/1
CAPSULE ORAL
Qty: 180 CAPSULE | Refills: 2 | Status: SHIPPED | OUTPATIENT
Start: 2019-09-24 | End: 2020-06-22

## 2019-09-26 ENCOUNTER — HOSPITAL ENCOUNTER (OUTPATIENT)
Age: 64
Setting detail: SPECIMEN
Discharge: HOME | End: 2019-09-26
Payer: COMMERCIAL

## 2019-09-26 ENCOUNTER — OFFICE VISIT (OUTPATIENT)
Dept: GASTROENTEROLOGY | Age: 64
End: 2019-09-26
Payer: COMMERCIAL

## 2019-09-26 VITALS
SYSTOLIC BLOOD PRESSURE: 97 MMHG | HEART RATE: 100 BPM | DIASTOLIC BLOOD PRESSURE: 74 MMHG | BODY MASS INDEX: 31.58 KG/M2 | WEIGHT: 207.7 LBS

## 2019-09-26 DIAGNOSIS — K76.0 STEATOSIS OF LIVER: ICD-10-CM

## 2019-09-26 DIAGNOSIS — K76.0 STEATOSIS OF LIVER: Primary | ICD-10-CM

## 2019-09-26 DIAGNOSIS — K21.9 GASTROESOPHAGEAL REFLUX DISEASE WITHOUT ESOPHAGITIS: ICD-10-CM

## 2019-09-26 LAB
ALBUMIN SERPL-MCNC: 4.4 G/DL (ref 3.5–5.2)
ALBUMIN/GLOBULIN RATIO: ABNORMAL (ref 1–2.5)
ALP BLD-CCNC: 105 U/L (ref 40–129)
ALT SERPL-CCNC: 46 U/L (ref 5–41)
AST SERPL-CCNC: 29 U/L
BILIRUB SERPL-MCNC: 0.21 MG/DL (ref 0.3–1.2)
BILIRUBIN DIRECT: 0.08 MG/DL
BILIRUBIN, INDIRECT: 0.13 MG/DL (ref 0–1)
GLOBULIN: ABNORMAL G/DL (ref 1.5–3.8)
TOTAL PROTEIN: 7.2 G/DL (ref 6.4–8.3)

## 2019-09-26 PROCEDURE — 99213 OFFICE O/P EST LOW 20 MIN: CPT | Performed by: INTERNAL MEDICINE

## 2019-09-26 PROCEDURE — 36415 COLL VENOUS BLD VENIPUNCTURE: CPT

## 2019-09-26 PROCEDURE — 80076 HEPATIC FUNCTION PANEL: CPT

## 2019-09-26 ASSESSMENT — ENCOUNTER SYMPTOMS
VOMITING: 0
BACK PAIN: 1
CONSTIPATION: 1
RESPIRATORY NEGATIVE: 1
WHEEZING: 0
ABDOMINAL PAIN: 0
BLOOD IN STOOL: 0
ANAL BLEEDING: 0
DIARRHEA: 1
SINUS PRESSURE: 0
ABDOMINAL DISTENTION: 0
COUGH: 0
RECTAL PAIN: 0
NAUSEA: 0
TROUBLE SWALLOWING: 0
SORE THROAT: 0
VOICE CHANGE: 0
CHOKING: 0

## 2019-09-26 NOTE — PROGRESS NOTES
light-headedness and numbness. Hematological: Bruises/bleeds easily. Psychiatric/Behavioral: Negative. Negative for sleep disturbance. The patient is not nervous/anxious. Objective:   Physical Exam   Constitutional: He is oriented to person, place, and time. He appears well-developed and well-nourished. No distress. HENT:   Head: Normocephalic and atraumatic. Mouth/Throat: No oropharyngeal exudate. Eyes: Pupils are equal, round, and reactive to light. Conjunctivae are normal. No scleral icterus. Neck: Normal range of motion. Neck supple. No tracheal deviation present. No thyromegaly present. Cardiovascular: Normal rate, regular rhythm, normal heart sounds and intact distal pulses. No murmur heard. Pulmonary/Chest: Effort normal and breath sounds normal. No respiratory distress. He has no wheezes. He has no rales. Abdominal: Soft. Bowel sounds are normal. He exhibits no distension, no ascites and no mass. There is no hepatomegaly. There is no tenderness. There is no guarding. No hernia. No peripheral signs of ch. Liver disease   Genitourinary: Rectum normal.   Musculoskeletal: He exhibits no edema. Lymphadenopathy:     He has no cervical adenopathy. Neurological: He is alert and oriented to person, place, and time. No cranial nerve deficit. Skin: Skin is warm and dry. No rash noted. No erythema. Psychiatric: Thought content normal.   Nursing note and vitals reviewed. Assessment:       Diagnosis Orders   1. Steatosis of liver  Hepatic Function Panel    Hepatic Function Panel   2. Gastroesophageal reflux disease without esophagitis             Plan:      Advised to have liver battery and to call me for the results. Discussed with the patient regarding further management of fatty liver. Discussed natural history and possible development of cirrhosis etc.  At present he is not interested to have liver biopsy done.   Advised active lifestyle, calorie restriction, daily exercise and to lose at least 10% of the body weight. If he does well we will see him in the next 1 year.

## 2019-09-27 ENCOUNTER — HOSPITAL ENCOUNTER (OUTPATIENT)
Age: 64
Setting detail: SPECIMEN
Discharge: HOME | End: 2019-09-27
Payer: COMMERCIAL

## 2019-09-27 DIAGNOSIS — E11.9 TYPE 2 DIABETES MELLITUS WITHOUT COMPLICATION, WITHOUT LONG-TERM CURRENT USE OF INSULIN (HCC): ICD-10-CM

## 2019-09-27 LAB
CREATININE URINE: 129.1 MG/DL (ref 39–259)
MICROALBUMIN/CREAT 24H UR: <12 MG/L
MICROALBUMIN/CREAT UR-RTO: NORMAL MCG/MG CREAT

## 2019-09-27 RX ORDER — DICLOFENAC SODIUM 75 MG/1
75 TABLET, DELAYED RELEASE ORAL 2 TIMES DAILY
Status: ON HOLD | COMMUNITY
End: 2022-05-18

## 2019-10-11 ENCOUNTER — TELEPHONE (OUTPATIENT)
Facility: CLINIC | Age: 64
End: 2019-10-11

## 2019-10-30 ENCOUNTER — HOSPITAL ENCOUNTER (OUTPATIENT)
Age: 64
Discharge: HOME | End: 2019-10-30
Payer: COMMERCIAL

## 2019-10-30 DIAGNOSIS — E78.2 MIXED HYPERLIPIDEMIA: ICD-10-CM

## 2019-10-30 DIAGNOSIS — I10 ESSENTIAL HYPERTENSION: ICD-10-CM

## 2019-10-30 DIAGNOSIS — E11.9 TYPE 2 DIABETES MELLITUS WITHOUT COMPLICATION, WITHOUT LONG-TERM CURRENT USE OF INSULIN (HCC): ICD-10-CM

## 2019-10-30 DIAGNOSIS — E55.9 VITAMIN D INSUFFICIENCY: ICD-10-CM

## 2019-10-30 DIAGNOSIS — K21.9 GASTROESOPHAGEAL REFLUX DISEASE WITHOUT ESOPHAGITIS: ICD-10-CM

## 2019-10-30 LAB
ABSOLUTE EOS #: 0.5 K/UL (ref 0–0.4)
ABSOLUTE IMMATURE GRANULOCYTE: ABNORMAL K/UL (ref 0–0.3)
ABSOLUTE LYMPH #: 1.39 K/UL (ref 1–4.8)
ABSOLUTE MONO #: 0.5 K/UL (ref 0.1–1.3)
ALBUMIN SERPL-MCNC: 4.5 G/DL (ref 3.5–5.2)
ALBUMIN/GLOBULIN RATIO: ABNORMAL (ref 1–2.5)
ALP BLD-CCNC: 92 U/L (ref 40–129)
ALT SERPL-CCNC: 35 U/L (ref 5–41)
ANION GAP SERPL CALCULATED.3IONS-SCNC: 10 MMOL/L (ref 9–17)
AST SERPL-CCNC: 27 U/L
BASOPHILS # BLD: 1 % (ref 0–2)
BASOPHILS ABSOLUTE: 0.06 K/UL (ref 0–0.2)
BILIRUB SERPL-MCNC: 0.59 MG/DL (ref 0.3–1.2)
BUN BLDV-MCNC: 17 MG/DL (ref 8–23)
BUN/CREAT BLD: ABNORMAL (ref 9–20)
CALCIUM SERPL-MCNC: 10.9 MG/DL (ref 8.6–10.4)
CHLORIDE BLD-SCNC: 106 MMOL/L (ref 98–107)
CHOLESTEROL/HDL RATIO: 4.7
CHOLESTEROL: 142 MG/DL
CO2: 26 MMOL/L (ref 20–31)
CREAT SERPL-MCNC: 1.02 MG/DL (ref 0.7–1.2)
DIFFERENTIAL TYPE: ABNORMAL
EOSINOPHILS RELATIVE PERCENT: 8 % (ref 0–4)
ESTIMATED AVERAGE GLUCOSE: 160 MG/DL
GFR AFRICAN AMERICAN: >60 ML/MIN
GFR NON-AFRICAN AMERICAN: >60 ML/MIN
GFR SERPL CREATININE-BSD FRML MDRD: ABNORMAL ML/MIN/{1.73_M2}
GFR SERPL CREATININE-BSD FRML MDRD: ABNORMAL ML/MIN/{1.73_M2}
GLUCOSE BLD-MCNC: 132 MG/DL (ref 70–99)
HBA1C MFR BLD: 7.2 % (ref 4–6)
HCT VFR BLD CALC: 41 % (ref 41–53)
HDLC SERPL-MCNC: 30 MG/DL
HEMOGLOBIN: 12.7 G/DL (ref 13.5–17.5)
IMMATURE GRANULOCYTES: ABNORMAL %
LDL CHOLESTEROL: 69 MG/DL (ref 0–130)
LYMPHOCYTES # BLD: 22 % (ref 24–44)
MCH RBC QN AUTO: 24.4 PG (ref 26–34)
MCHC RBC AUTO-ENTMCNC: 30.9 G/DL (ref 31–37)
MCV RBC AUTO: 78.8 FL (ref 80–100)
MONOCYTES # BLD: 8 % (ref 1–7)
MORPHOLOGY: ABNORMAL
NRBC AUTOMATED: ABNORMAL PER 100 WBC
PDW BLD-RTO: 16.6 % (ref 11.5–14.9)
PLATELET # BLD: 314 K/UL (ref 150–450)
PLATELET ESTIMATE: ABNORMAL
PMV BLD AUTO: 7.8 FL (ref 6–12)
POTASSIUM SERPL-SCNC: 5.4 MMOL/L (ref 3.7–5.3)
RBC # BLD: 5.21 M/UL (ref 4.5–5.9)
RBC # BLD: ABNORMAL 10*6/UL
SEG NEUTROPHILS: 61 % (ref 36–66)
SEGMENTED NEUTROPHILS ABSOLUTE COUNT: 3.85 K/UL (ref 1.3–9.1)
SODIUM BLD-SCNC: 142 MMOL/L (ref 135–144)
TOTAL PROTEIN: 7.2 G/DL (ref 6.4–8.3)
TRIGL SERPL-MCNC: 216 MG/DL
VITAMIN D 25-HYDROXY: 17.3 NG/ML (ref 30–100)
VLDLC SERPL CALC-MCNC: ABNORMAL MG/DL (ref 1–30)
WBC # BLD: 6.3 K/UL (ref 3.5–11)
WBC # BLD: ABNORMAL 10*3/UL

## 2019-10-30 PROCEDURE — 85025 COMPLETE CBC W/AUTO DIFF WBC: CPT

## 2019-10-30 PROCEDURE — 80061 LIPID PANEL: CPT

## 2019-10-30 PROCEDURE — 36415 COLL VENOUS BLD VENIPUNCTURE: CPT

## 2019-10-30 PROCEDURE — 83036 HEMOGLOBIN GLYCOSYLATED A1C: CPT

## 2019-10-30 PROCEDURE — 80053 COMPREHEN METABOLIC PANEL: CPT

## 2019-10-30 PROCEDURE — 82306 VITAMIN D 25 HYDROXY: CPT

## 2019-11-18 DIAGNOSIS — M54.16 LUMBAR RADICULOPATHY, CHRONIC: ICD-10-CM

## 2019-11-18 DIAGNOSIS — M51.36 DEGENERATIVE DISC DISEASE, LUMBAR: Primary | ICD-10-CM

## 2019-11-18 DIAGNOSIS — M48.061 SPINAL STENOSIS OF LUMBAR REGION, UNSPECIFIED WHETHER NEUROGENIC CLAUDICATION PRESENT: ICD-10-CM

## 2019-11-18 DIAGNOSIS — M51.26 LUMBAR DISC HERNIATION: ICD-10-CM

## 2019-11-19 ENCOUNTER — OFFICE VISIT (OUTPATIENT)
Dept: ORTHOPEDIC SURGERY | Age: 64
End: 2019-11-19
Payer: COMMERCIAL

## 2019-11-19 DIAGNOSIS — M54.16 LUMBAR RADICULOPATHY, CHRONIC: ICD-10-CM

## 2019-11-19 DIAGNOSIS — M51.36 DEGENERATIVE DISC DISEASE, LUMBAR: Primary | ICD-10-CM

## 2019-11-19 DIAGNOSIS — M51.26 LUMBAR DISC HERNIATION: ICD-10-CM

## 2019-11-19 DIAGNOSIS — M48.061 SPINAL STENOSIS OF LUMBAR REGION, UNSPECIFIED WHETHER NEUROGENIC CLAUDICATION PRESENT: ICD-10-CM

## 2019-11-19 PROCEDURE — 99213 OFFICE O/P EST LOW 20 MIN: CPT | Performed by: ORTHOPAEDIC SURGERY

## 2020-01-07 ENCOUNTER — TELEPHONE (OUTPATIENT)
Dept: ORTHOPEDIC SURGERY | Age: 65
End: 2020-01-07

## 2020-01-07 NOTE — TELEPHONE ENCOUNTER
Patient and his wife came in today to meet with me to try and help them out with a bill from [employer] for a bill for over $13,000.00. that they received for the neuromonitoring during his 7/10/2019 surgery with Dr. Bob Gallardo. I tried to call the billing number with [employer] and it gave me a machine to leave a message that someone would call me back in 48 hours. I did not leave a message. I called our scheduling number for [employer] and they transferred me to [facility] the patient advocate for them. We spoke with her and she was going to reach out to the billing office and have them call me about doing a member appeal on the claim. She stated they did an appeal and lost the appeal and now the member needs to do the appeal.    We waited a few minutes for the billing office to reach out to us, since the patient was in my office. Michelle Ramos the President of [employer] actually called us back and stated to us to disregard the bill at this time. They are going to do another appeal,as [employer] to process the claim as in network verus processing as out of network. She stated they may need the patient to sign a form giving them permission to file the appeal on the behalf of the member, Jordin Granado agreed he would be happy to sign the form. Annamaria Pina stated she will check to see if a signature is needed and if so, she would call patient's wife and let her know and then drop the form in the mail for the signature. If no signature is necessary, they will proceed with the next level of appeal.    Patient and his wife were very happy with the progress done within the time we spent together. I did let the them know to reach out again if they have any other issues and to let me know when it is finally resolved.

## 2020-01-21 ENCOUNTER — OFFICE VISIT (OUTPATIENT)
Dept: ORTHOPEDIC SURGERY | Age: 65
End: 2020-01-21
Payer: COMMERCIAL

## 2020-01-21 PROBLEM — G56.03 BILATERAL CARPAL TUNNEL SYNDROME: Status: ACTIVE | Noted: 2020-01-21

## 2020-01-21 PROBLEM — M47.12 CERVICAL SPONDYLOSIS WITH MYELOPATHY: Status: ACTIVE | Noted: 2020-01-21

## 2020-01-21 PROBLEM — M54.2 NECK PAIN: Status: ACTIVE | Noted: 2020-01-21

## 2020-01-21 PROCEDURE — 99213 OFFICE O/P EST LOW 20 MIN: CPT | Performed by: ORTHOPAEDIC SURGERY

## 2020-01-21 NOTE — PROGRESS NOTES
Patient ID: Gina Staff is a 59 y.o. male    Chief Compliant:  Chief Complaint   Patient presents with    Follow-up     lumbar fusion         History of Present Illness:  59 y.o. male who presents for 6 week history of acute low back pain. Patient is status post  L3-Sacrum PLIF on 7/10/19. Patient was putting backsplash in his kitchen in December 2019 when he exacerbated his low back pain. Patient has shooting pain across his lower back with no numbness, weakness, or tingling down his lower extremities. He has been having intermittent pain that has made daily activities difficulty, such as bending to tie his shoes. He states he has chronic numbness to his hands and fingers. He notes that he has carpal tunnel syndrome and arthritis. Numbness to the first 3 digits and partial fourth digit bilaterally. Review of Systems   Constitutional: Negative for fever, sweats, weight loss, recent injury, or recent illness  Neurological: Negative for  headaches, numbness, or focal weakness. Integumentary: Negative for abrasion, laceration, rash, ecchymosis. Musculoskeletal: Positive for Follow-up (lumbar fusion )         Physical Exam:  Constitutional: Patient is oriented to person, place, and time. Patient appears well-developed and well nourished. HENT: Negative otherwise noted  Head: Normocephalic and Atraumatic  Nose: Normal  Eyes: Conjunctivae and EOM are normal  Neck: Normal range of motion Neck supple. Respiratory/Cardio: Effort normal. No respiratory distress. Musculoskeletal: Slow to get up out of a chair. Neurological: Patient is alert and oriented to person, place, and time. Normal strenght. No sensory deficit. Skin: Skin is warm and dry  Psychiatric: Behavior is normal. Thought content normal.  Nursing note and vitals reviewed.      Physical exam reveals a rather profound median nerve dysesthesia consistent with carpal tunnel syndrome    Patient is ambulating with a short striated Disp: 60 tablet, Rfl: 12    nitroGLYCERIN (NITROSTAT) 0.4 MG SL tablet, Place 1 tablet under the tongue every 5 minutes as needed for Chest pain up to max of 3 total doses. If no relief after 1 dose, call 911., Disp: 25 tablet, Rfl: 3    E-400 400 UNITS capsule, TAKE TWO CAPSULES BY MOUTH DAILY, Disp: 60 capsule, Rfl: 10    cyclobenzaprine (FLEXERIL) 10 MG tablet, Take 10 mg by mouth 2 times daily as needed , Disp: , Rfl:     nortriptyline (PAMELOR) 50 MG capsule, Take 50 mg by mouth 2 times daily , Disp: , Rfl:     glucose blood VI test strips (ASCENSIA AUTODISC VI;ONE TOUCH ULTRA TEST VI) strip, 1 each by In Vitro route daily As needed. , Disp: 100 each, Rfl: 3  Allergies   Allergen Reactions    Pcn [Penicillins] Swelling     face    Codeine Nausea And Vomiting     Abd. pain     Social History     Socioeconomic History    Marital status:      Spouse name: Not on file    Number of children: Not on file    Years of education: Not on file    Highest education level: Not on file   Occupational History    Occupation: retired   Social Needs    Financial resource strain: Not on file    Food insecurity:     Worry: Not on file     Inability: Not on file   [place] needs:     Medical: Not on file     Non-medical: Not on file   Tobacco Use    Smoking status: Never Smoker    Smokeless tobacco: Never Used   Substance and Sexual Activity    Alcohol use: Never     Alcohol/week: 0.0 standard drinks     Frequency: Never    Drug use: Never    Sexual activity: Not on file   Lifestyle    Physical activity:     Days per week: Not on file     Minutes per session: Not on file    Stress: Not on file   Relationships    Social connections:     Talks on phone: Not on file     Gets together: Not on file     Attends Tenriism service: Not on file     Active member of club or organization: Not on file     Attends meetings of clubs or organizations: Not on file     Relationship status: Not on file   Ciarra Bio complaints predominately of low back pain with severe guarding versus weakness on sit to stand    PT    MRI of cervical and lumbar spine. Follow up after results. Scribe Attestation:  By signing my name below, Sergio Sender, attest that this documentation has been prepared under the direction and in the presence of Dr. Kayleigh Hare. Electronically signed: Estelle Kelly MD, Scribe, 1/21/20     Please note that this chart was generated using voice recognition Dragon dictation software. Although every effort was made to ensure the accuracy of this automated transcription, some errors in transcription may have occurred.

## 2020-01-23 RX ORDER — HYDROCODONE BITARTRATE AND ACETAMINOPHEN 5; 325 MG/1; MG/1
1 TABLET ORAL
Qty: 42 TABLET | Refills: 0 | Status: CANCELLED | OUTPATIENT
Start: 2020-01-23 | End: 2020-01-30

## 2020-01-23 RX ORDER — HYDROCODONE BITARTRATE AND ACETAMINOPHEN 5; 325 MG/1; MG/1
1 TABLET ORAL EVERY 6 HOURS PRN
Qty: 28 TABLET | Refills: 0 | Status: SHIPPED | OUTPATIENT
Start: 2020-01-23 | End: 2020-05-06 | Stop reason: SDUPTHER

## 2020-01-25 ENCOUNTER — HOSPITAL ENCOUNTER (OUTPATIENT)
Dept: MRI IMAGING | Age: 65
Discharge: HOME | End: 2020-01-27
Payer: COMMERCIAL

## 2020-01-25 PROCEDURE — 72141 MRI NECK SPINE W/O DYE: CPT

## 2020-01-25 PROCEDURE — 72148 MRI LUMBAR SPINE W/O DYE: CPT

## 2020-01-30 ENCOUNTER — OFFICE VISIT (OUTPATIENT)
Dept: ORTHOPEDIC SURGERY | Age: 65
End: 2020-01-30
Payer: COMMERCIAL

## 2020-01-30 PROBLEM — M48.02 SPINAL STENOSIS IN CERVICAL REGION: Status: ACTIVE | Noted: 2020-01-30

## 2020-01-30 PROBLEM — M43.16 SPONDYLOLISTHESIS, LUMBAR REGION: Status: ACTIVE | Noted: 2020-01-30

## 2020-01-30 PROBLEM — M43.12 SPONDYLOLISTHESIS, CERVICAL REGION: Status: ACTIVE | Noted: 2020-01-30

## 2020-01-30 PROBLEM — M54.12 CERVICAL RADICULOPATHY: Status: ACTIVE | Noted: 2020-01-30

## 2020-01-30 PROBLEM — M48.02 FORAMINAL STENOSIS OF CERVICAL REGION: Status: ACTIVE | Noted: 2020-01-30

## 2020-01-30 PROCEDURE — 99213 OFFICE O/P EST LOW 20 MIN: CPT | Performed by: ORTHOPAEDIC SURGERY

## 2020-01-30 NOTE — PROGRESS NOTES
Patient ID: Benjamin Dos Santos is a 59 y.o. male. Chief Complaint   Patient presents with    Follow-up     mri lumbar         HPI     Please refer to my previous clinic notes    With respect to the patient's low back is acute aggravation of pain and radicular pain seems to be quite a bit better although he is still getting pain into the right buttocks region.     Patient is having ongoing fairly profound numbness and tingling into his hands    Past Medical History:   Diagnosis Date    Abdominal hernia     Anesthesia     phrenic nerve damaged lt side    BPH (benign prostatic hyperplasia)     CAD (coronary artery disease)     Chronic back pain     Diabetes mellitus ([address]     Type 2    Difficult intubation     per pt throat collapses, lt side phrenic nerve was damaged    Ear infection     LT, being treated    Fatty liver     Gallstones     GERD (gastroesophageal reflux disease)     Headache(784.0)     History of MI (myocardial infarction)     Hypercholesteremia     Hyperlipidemia     Hypertension     Kidney stones     Osteoarthritis     all joints    PONV (postoperative nausea and vomiting)     Sleep apnea     BIPAP    Unspecified sleep apnea      Past Surgical History:   Procedure Laterality Date    BACK SURGERY  07/11/2019    BICEPS TENDON REPAIR Left     CARDIAC CATHETERIZATION  01/02/2019    with Dr. Meme Streeter, Stents X2    COLONOSCOPY  11 14 14    normal    ELBOW FRACTURE SURGERY Right     right arm/fractur    ENDOSCOPY, COLON, DIAGNOSTIC      several times    FRACTURE SURGERY Right     elbow    KNEE ARTHROSCOPY Bilateral     LIVER BIOPSY  8/14/2015    moderate steatosis, grade 2, lobular and portal inflammation: Grade 1 total score =3, fibrosis score 1A    LUMBAR FUSION N/A 7/10/2019    LUMBAR LAMINECTOMY FUSION POSTERIOR - L3 SACRUM POSTERIOR LUMBAR DECOMPRESSION  & FUSION performed by Rayo Mcneal MD at [address]  11/07/2018    lumbar myelogram Spondylolisthesis, cervical region     Foraminal stenosis of cervical region     Spondylolisthesis, lumbar region     Lumbar radiculopathy, chronic     Spinal stenosis of lumbar region, unspecified whether neurogenic claudication present     Neck pain     Bilateral carpal tunnel syndrome     Cervical radiculopathy      Improving low back and radicular leg pain    Ongoing fairly profound dysesthesia to the hands    Unclear as to whether dysesthesia is secondary to cervical foraminal stenosis or carpal tunnel syndrome        No diagnosis found. Plan:     Recommend EMGs of the upper extremity    Patient at this time reluctant to have anything done as he is having difficulties with insurance company approval of his prior scans    We will work with the patient and the insurance companies I connected him with my     Follow-up 3 weeks    No orders of the defined types were placed in this encounter. Regan Maurice MD    Please note that this chart was generated using voicerecognition Dragon dictation software. Although every effort was made to ensurethe accuracy of this automated transcription, some errors in transcription may haveoccurred.

## 2020-02-04 ENCOUNTER — TELEPHONE (OUTPATIENT)
Dept: ORTHOPEDIC SURGERY | Age: 65
End: 2020-02-04

## 2020-02-04 NOTE — TELEPHONE ENCOUNTER
When patient was here for his last visit with Dr. Lisa Pope, he brought in [place] denial for both MRI's, [id number] & [id number]. I made a few phone calls on whom to talk to in [address] [place] [address] and was told that the denial team in [facility] doesn't take in calling phone calls. Today I was working on another patient's denied surgery and found out whom the director of pre-cert is in [facility], so I emailed him the information on this patient. I called the patient wife and let her know what I did and let her know if she receives a bill for the MRI's to contact me, so I can make a few calls. She also stated she just received more information on the MRI from her insurance and something on Physical Therapy denials, she is going to drop off tomorrow to the office for me to add to his chart and look at.

## 2020-02-20 ENCOUNTER — OFFICE VISIT (OUTPATIENT)
Dept: ORTHOPEDIC SURGERY | Age: 65
End: 2020-02-20
Payer: COMMERCIAL

## 2020-02-20 PROCEDURE — 99213 OFFICE O/P EST LOW 20 MIN: CPT | Performed by: ORTHOPAEDIC SURGERY

## 2020-02-20 NOTE — PROGRESS NOTES
capsule, Rfl: 2    lisinopril-hydrochlorothiazide (PRINZIDE;ZESTORETIC) 20-12.5 MG per tablet, TAKE 1 TABLET BY MOUTH EVERY DAY, Disp: 90 tablet, Rfl: 0    finasteride (PROSCAR) 5 MG tablet, TAKE 1 TABLET BY MOUTH EVERY DAY, Disp: 90 tablet, Rfl: 3    gabapentin (NEURONTIN) 300 MG capsule, Take 600 mg by mouth 2 times daily. ., Disp: , Rfl:     metoprolol succinate (TOPROL XL) 100 MG extended release tablet, Take 25 mg by mouth daily , Disp: , Rfl:     aspirin 81 MG chewable tablet, Take 1 tablet by mouth daily, Disp: 30 tablet, Rfl: 3    ticagrelor (BRILINTA) 90 MG TABS tablet, Take 1 tablet by mouth 2 times daily, Disp: 60 tablet, Rfl: 12    nitroGLYCERIN (NITROSTAT) 0.4 MG SL tablet, Place 1 tablet under the tongue every 5 minutes as needed for Chest pain up to max of 3 total doses. If no relief after 1 dose, call 911., Disp: 25 tablet, Rfl: 3    E-400 400 UNITS capsule, TAKE TWO CAPSULES BY MOUTH DAILY, Disp: 60 capsule, Rfl: 10    cyclobenzaprine (FLEXERIL) 10 MG tablet, Take 10 mg by mouth 2 times daily as needed , Disp: , Rfl:     nortriptyline (PAMELOR) 50 MG capsule, Take 50 mg by mouth 2 times daily , Disp: , Rfl:     glucose blood VI test strips (ASCENSIA AUTODISC VI;ONE TOUCH ULTRA TEST VI) strip, 1 each by In Vitro route daily As needed. , Disp: 100 each, Rfl: 3  Allergies   Allergen Reactions    Morphine Other (See Comments)     Becomes very mean     Pcn [Penicillins] Swelling     face    Tramadol Itching    Codeine Nausea And Vomiting     Abd. pain     Social History     Socioeconomic History    Marital status:      Spouse name: Not on file    Number of children: Not on file    Years of education: Not on file    Highest education level: Not on file   Occupational History    Occupation: retired   Social Needs    Financial resource strain: Not on file    Food insecurity:     Worry: Not on file     Inability: Not on file   [place] needs:     Medical: Not on file Non-medical: Not on file   Tobacco Use    Smoking status: Never Smoker    Smokeless tobacco: Never Used   Substance and Sexual Activity    Alcohol use: Never     Alcohol/week: 0.0 standard drinks     Frequency: Never    Drug use: Never    Sexual activity: Not on file   Lifestyle    Physical activity:     Days per week: Not on file     Minutes per session: Not on file    Stress: Not on file   Relationships    Social connections:     Talks on phone: Not on file     Gets together: Not on file     Attends Yazidism service: Not on file     Active member of club or organization: Not on file     Attends meetings of clubs or organizations: Not on file     Relationship status: Not on file    Intimate partner violence:     Fear of current or ex partner: Not on file     Emotionally abused: Not on file     Physically abused: Not on file     Forced sexual activity: Not on file   Other Topics Concern    Not on file   Social History Narrative    Not on file     Past Medical History:   Diagnosis Date    Abdominal hernia     Anesthesia     phrenic nerve damaged lt side    BPH (benign prostatic hyperplasia)     CAD (coronary artery disease)     Chronic back pain     Diabetes mellitus ([address]     Type 2    Difficult intubation     per pt throat collapses, lt side phrenic nerve was damaged    Ear infection     LT, being treated    Fatty liver     Gallstones     GERD (gastroesophageal reflux disease)     Headache(784.0)     History of MI (myocardial infarction)     Hypercholesteremia     Hyperlipidemia     Hypertension     Kidney stones     Osteoarthritis     all joints    PONV (postoperative nausea and vomiting)     Sleep apnea     BIPAP    Unspecified sleep apnea      Past Surgical History:   Procedure Laterality Date    BACK SURGERY  07/11/2019    BICEPS TENDON REPAIR Left     CARDIAC CATHETERIZATION  01/02/2019    with Dr. Raven Cho, Stents X2    COLONOSCOPY  11 14 14    normal    ELBOW FRACTURE

## 2020-03-02 NOTE — TELEPHONE ENCOUNTER
Patient's wife did bring the denial from [address] for [address] 1/23/20 & 1/24/20, I just have been very busy and today was the first I was able to look at it. I just scanned into [employer]. I called over to [facility] to see if they have already sent everything over to [address] they wanted last five daily therapy and progress notes sent over to them. They also wanted the most recent therapy re-evaluation and an updated plan of care. I spoke with Shots and she has everything printed and she will fax it all over to "[employer]. I asked if there is anything that she needs from us and stated \"No\", she believes she has everything. I'm closing this encounter.

## 2020-05-01 ENCOUNTER — HOSPITAL ENCOUNTER (OUTPATIENT)
Age: 65
Discharge: HOME | End: 2020-05-01
Payer: COMMERCIAL

## 2020-05-01 LAB
ABSOLUTE EOS #: 0.59 K/UL (ref 0–0.4)
ABSOLUTE IMMATURE GRANULOCYTE: ABNORMAL K/UL (ref 0–0.3)
ABSOLUTE LYMPH #: 1.36 K/UL (ref 1–4.8)
ABSOLUTE MONO #: 0.18 K/UL (ref 0.1–1.3)
ALBUMIN SERPL-MCNC: 4.3 G/DL (ref 3.5–5.2)
ALBUMIN/GLOBULIN RATIO: ABNORMAL (ref 1–2.5)
ALP BLD-CCNC: 81 U/L (ref 40–129)
ALT SERPL-CCNC: 49 U/L (ref 5–41)
ANION GAP SERPL CALCULATED.3IONS-SCNC: 11 MMOL/L (ref 9–17)
AST SERPL-CCNC: 31 U/L
BASOPHILS # BLD: 0 % (ref 0–2)
BASOPHILS ABSOLUTE: 0 K/UL (ref 0–0.2)
BILIRUB SERPL-MCNC: 0.39 MG/DL (ref 0.3–1.2)
BUN BLDV-MCNC: 20 MG/DL (ref 8–23)
BUN/CREAT BLD: ABNORMAL (ref 9–20)
CALCIUM SERPL-MCNC: 10.5 MG/DL (ref 8.6–10.4)
CHLORIDE BLD-SCNC: 103 MMOL/L (ref 98–107)
CHOLESTEROL/HDL RATIO: 4.4
CHOLESTEROL: 141 MG/DL
CO2: 26 MMOL/L (ref 20–31)
CREAT SERPL-MCNC: 1.19 MG/DL (ref 0.7–1.2)
DIFFERENTIAL TYPE: ABNORMAL
EOSINOPHILS RELATIVE PERCENT: 10 % (ref 0–4)
ESTIMATED AVERAGE GLUCOSE: 180 MG/DL
GFR AFRICAN AMERICAN: >60 ML/MIN
GFR NON-AFRICAN AMERICAN: >60 ML/MIN
GFR SERPL CREATININE-BSD FRML MDRD: ABNORMAL ML/MIN/{1.73_M2}
GFR SERPL CREATININE-BSD FRML MDRD: ABNORMAL ML/MIN/{1.73_M2}
GLUCOSE BLD-MCNC: 129 MG/DL (ref 70–99)
HBA1C MFR BLD: 7.9 % (ref 4–6)
HCT VFR BLD CALC: 42.3 % (ref 41–53)
HDLC SERPL-MCNC: 32 MG/DL
HEMOGLOBIN: 13.1 G/DL (ref 13.5–17.5)
IMMATURE GRANULOCYTES: ABNORMAL %
LDL CHOLESTEROL: 73 MG/DL (ref 0–130)
LYMPHOCYTES # BLD: 23 % (ref 24–44)
MCH RBC QN AUTO: 23.9 PG (ref 26–34)
MCHC RBC AUTO-ENTMCNC: 31 G/DL (ref 31–37)
MCV RBC AUTO: 77.3 FL (ref 80–100)
MONOCYTES # BLD: 3 % (ref 1–7)
MORPHOLOGY: NORMAL
NRBC AUTOMATED: ABNORMAL PER 100 WBC
PDW BLD-RTO: 19.5 % (ref 11.5–14.9)
PLATELET # BLD: 303 K/UL (ref 150–450)
PLATELET ESTIMATE: ABNORMAL
PMV BLD AUTO: 8 FL (ref 6–12)
POTASSIUM SERPL-SCNC: 4.5 MMOL/L (ref 3.7–5.3)
RBC # BLD: 5.48 M/UL (ref 4.5–5.9)
RBC # BLD: ABNORMAL 10*6/UL
SEG NEUTROPHILS: 64 % (ref 36–66)
SEGMENTED NEUTROPHILS ABSOLUTE COUNT: 3.77 K/UL (ref 1.3–9.1)
SODIUM BLD-SCNC: 140 MMOL/L (ref 135–144)
TOTAL PROTEIN: 7.1 G/DL (ref 6.4–8.3)
TRIGL SERPL-MCNC: 181 MG/DL
VITAMIN D 25-HYDROXY: 21.9 NG/ML (ref 30–100)
VLDLC SERPL CALC-MCNC: ABNORMAL MG/DL (ref 1–30)
WBC # BLD: 5.9 K/UL (ref 3.5–11)
WBC # BLD: ABNORMAL 10*3/UL

## 2020-05-01 PROCEDURE — 80061 LIPID PANEL: CPT

## 2020-05-01 PROCEDURE — 80053 COMPREHEN METABOLIC PANEL: CPT

## 2020-05-01 PROCEDURE — 85025 COMPLETE CBC W/AUTO DIFF WBC: CPT

## 2020-05-01 PROCEDURE — 83036 HEMOGLOBIN GLYCOSYLATED A1C: CPT

## 2020-05-01 PROCEDURE — 36415 COLL VENOUS BLD VENIPUNCTURE: CPT

## 2020-05-01 PROCEDURE — 82306 VITAMIN D 25 HYDROXY: CPT

## 2020-05-06 RX ORDER — HYDROCODONE BITARTRATE AND ACETAMINOPHEN 5; 325 MG/1; MG/1
1 TABLET ORAL EVERY 6 HOURS PRN
Qty: 18 TABLET | Refills: 0 | Status: SHIPPED | OUTPATIENT
Start: 2020-05-06 | End: 2020-05-13

## 2020-05-06 NOTE — TELEPHONE ENCOUNTER
Patient calling in for refill on pain medication, he stated he had a bad week and wondering if he can get anything stronger.    He was last seen on 1/30/20    LRF was 1/23/20, Norco 5-325 #28 tabs for Lumbar Herniation, Stenosis, DDD-Lumbar

## 2020-06-26 RX ORDER — TAMSULOSIN HYDROCHLORIDE 0.4 MG/1
CAPSULE ORAL
Qty: 180 CAPSULE | Refills: 3 | Status: SHIPPED | OUTPATIENT
Start: 2020-06-26 | End: 2020-07-07 | Stop reason: SDUPTHER

## 2020-06-30 RX ORDER — FINASTERIDE 5 MG/1
TABLET, FILM COATED ORAL
Qty: 90 TABLET | Refills: 3 | OUTPATIENT
Start: 2020-06-30

## 2020-07-02 ENCOUNTER — TELEPHONE (OUTPATIENT)
Dept: UROLOGY | Age: 65
End: 2020-07-02

## 2020-07-02 NOTE — TELEPHONE ENCOUNTER
Called pt spoke to pt wife regarding Lab work needing to be done. Pt wife stated Ishmael Christina is getting it done tomorrow. \" informed pt wife if unable to complete appointment will need to be rescheduled. verbal understanding given call ended.

## 2020-07-03 ENCOUNTER — HOSPITAL ENCOUNTER (OUTPATIENT)
Age: 65
Discharge: HOME | End: 2020-07-03
Payer: MEDICARE

## 2020-07-03 LAB — PROSTATE SPECIFIC ANTIGEN: 1.75 UG/L

## 2020-07-03 PROCEDURE — 36415 COLL VENOUS BLD VENIPUNCTURE: CPT

## 2020-07-03 PROCEDURE — 84153 ASSAY OF PSA TOTAL: CPT

## 2020-07-07 ENCOUNTER — OFFICE VISIT (OUTPATIENT)
Dept: UROLOGY | Age: 65
End: 2020-07-07
Payer: MEDICARE

## 2020-07-07 VITALS — TEMPERATURE: 98.7 F

## 2020-07-07 PROCEDURE — 1036F TOBACCO NON-USER: CPT | Performed by: UROLOGY

## 2020-07-07 PROCEDURE — G8427 DOCREV CUR MEDS BY ELIG CLIN: HCPCS | Performed by: UROLOGY

## 2020-07-07 PROCEDURE — 99214 OFFICE O/P EST MOD 30 MIN: CPT | Performed by: UROLOGY

## 2020-07-07 PROCEDURE — 4040F PNEUMOC VAC/ADMIN/RCVD: CPT | Performed by: UROLOGY

## 2020-07-07 PROCEDURE — 3017F COLORECTAL CA SCREEN DOC REV: CPT | Performed by: UROLOGY

## 2020-07-07 PROCEDURE — 1123F ACP DISCUSS/DSCN MKR DOCD: CPT | Performed by: UROLOGY

## 2020-07-07 PROCEDURE — G8417 CALC BMI ABV UP PARAM F/U: HCPCS | Performed by: UROLOGY

## 2020-07-07 RX ORDER — LISINOPRIL 10 MG/1
TABLET ORAL
COMMUNITY
Start: 2020-07-01 | End: 2020-08-10 | Stop reason: SDUPTHER

## 2020-07-07 RX ORDER — FINASTERIDE 5 MG/1
TABLET, FILM COATED ORAL
Qty: 90 TABLET | Refills: 3 | Status: SHIPPED | OUTPATIENT
Start: 2020-07-07 | End: 2021-07-15

## 2020-07-07 RX ORDER — GABAPENTIN 300 MG/1
CAPSULE ORAL
COMMUNITY
End: 2020-07-07

## 2020-07-07 RX ORDER — TAMSULOSIN HYDROCHLORIDE 0.4 MG/1
CAPSULE ORAL
Qty: 180 CAPSULE | Refills: 3 | Status: SHIPPED | OUTPATIENT
Start: 2020-07-07 | End: 2021-07-22

## 2020-07-07 ASSESSMENT — ENCOUNTER SYMPTOMS
WHEEZING: 0
NAUSEA: 0
ABDOMINAL PAIN: 0
EYE REDNESS: 0
COUGH: 0
BACK PAIN: 0
SHORTNESS OF BREATH: 0
EYE PAIN: 0
COLOR CHANGE: 0
VOMITING: 0

## 2020-07-07 NOTE — PROGRESS NOTES
[facility]  [facility] - [facility]  [facility] [address] [address]  Dept:  Alphonso Wallace [facility] Urology Office Note - Established    Patient:  Koby Roe  YOB: 1955  Date: 7/7/2020    The patient is a 72 y.o. male who presents todayfor evaluation of the following problems:   Chief Complaint   Patient presents with    Benign Prostatic Hypertrophy     yearly psa and med refills        HPI  Here for follow up on BPH. He continues Finasteride daily and Flomax BID. He knows if he misses a pill. He doesnt feel like his urinary symptoms have worsened in the last yr. He ha sno hematuria, no dysuria, no UTI. Lifelong non-smoker. He thinks he may have passed a stone a few days ago. He has slow flow and a stinging that subsided without treatment on its own. He has no family Hx of kidney or prostate cancer. His Father and Uncle both were Dx with bladder cancer and both were smokers. He is happy with voiding on combo therapy. No side effects. Summary of old records: N/A    Additional History: N/A    Procedures Today: N/A    Urinalysis today:  No results found for this visit on 07/07/20.   Last several PSA's:  Lab Results   Component Value Date    PSA 1.75 07/03/2020    PSA 1.55 05/24/2019    PSA 1.99 05/14/2018     Last total testosterone:  No results found for: TESTOSTERONE    AUA Symptom Score (7/7/2020):  INCOMPLETE EMPTYING: How often have you had the sensation of not emptying your bladder?: Not at all  FREQUENCY: How often do you have to urinate less than every two hours?: Not at all  INTERMITTENCY: How often have you found you stopped and started again several times when you urinated?: Not at all  URGENCY: How often have you found it difficult to postpone urination?: Not at all  WEAK STREAM: How often have you had a weak urinary stream?: Less than Half the time  STRAINING: How often have you had to strain to start  urination?: Not at all  NOCTURIA: How many times did you typically get up at night to uriniate?: 2 Times  TOTAL I-PSS SCORE[de-identified] 4  How would you feel if you were to spend the rest of your life with your urinary condition?: Mixe    Last BUN and creatinine:  Lab Results   Component Value Date    BUN 20 05/01/2020     Lab Results   Component Value Date    CREATININE 1.19 05/01/2020       Additional Lab/Culture results: none    Imaging Reviewed during this Office Visit: none  (results were independently reviewed by physician and radiology report verified)    PAST MEDICAL, FAMILY AND SOCIAL HISTORY UPDATE:  Past Medical History:   Diagnosis Date    Abdominal hernia     Anesthesia     phrenic nerve damaged lt side    BPH (benign prostatic hyperplasia)     CAD (coronary artery disease)     Chronic back pain     Diabetes mellitus ([facility] [address]     Type 2    Difficult intubation     per pt throat collapses, lt side phrenic nerve was damaged    Ear infection     LT, being treated    Fatty liver     Gallstones     GERD (gastroesophageal reflux disease)     Headache(784.0)     History of MI (myocardial infarction)     Hypercholesteremia     Hyperlipidemia     Hypertension     Kidney stones     Osteoarthritis     all joints    PONV (postoperative nausea and vomiting)     Sleep apnea     BIPAP    Unspecified sleep apnea      Past Surgical History:   Procedure Laterality Date    BACK SURGERY  07/11/2019    BICEPS TENDON REPAIR Left     CARDIAC CATHETERIZATION  01/02/2019    with Dr. Rosemarie Abbott, Stents X2    COLONOSCOPY  11 15 14    normal    ELBOW FRACTURE SURGERY Right     right arm/fractur    ENDOSCOPY, COLON, DIAGNOSTIC      several times    FRACTURE SURGERY Right     elbow    KNEE ARTHROSCOPY Bilateral     LIVER BIOPSY  8/14/2015    moderate steatosis, grade 2, lobular and portal inflammation: Grade 1 total score =3, fibrosis score 1A    LUMBAR FUSION N/A 7/10/2019    LUMBAR LAMINECTOMY FUSION POSTERIOR - L3 SACRUM POSTERIOR LUMBAR DECOMPRESSION  & FUSION performed by Yaneth Mueller MD at [address]  11/07/2018    lumbar myelogram    ROTATOR CUFF REPAIR Right     SPINE SURGERY      neck fustion      Family History   Problem Relation Age of Onset    Heart Disease Mother     Cancer Father         bladder    Arthritis Sister     Cancer Paternal Aunt     Cancer Paternal Aunt      Outpatient Medications Marked as Taking for the 7/7/20 encounter (Office Visit) with Jorge Cannon MD   Medication Sig Dispense Refill    coenzyme Q-10 100 MG capsule TAKE ONE CAPSULE BY MOUTH DAILY 30 capsule 3    lisinopril (PRINIVIL;ZESTRIL) 10 MG tablet       tamsulosin (FLOMAX) 0.4 MG capsule 1 tablet PO  capsule 3    finasteride (PROSCAR) 5 MG tablet TAKE 1 TABLET BY MOUTH EVERY DAY 90 tablet 3    metFORMIN (GLUCOPHAGE) 1000 MG tablet TAKE 1 TABLET BY MOUTH TWICE A DAY (WITH MEALS) 180 tablet 2    glimepiride (AMARYL) 2 MG tablet Take 1 tablet by mouth every morning 30 tablet 3    clotrimazole-betamethasone (LOTRISONE) 1-0.05 % cream Apply topically 2 times daily. 45 g 0    vitamin D (ERGOCALCIFEROL) 1.25 MG (95884 UT) CAPS capsule TAKE 1 CAPSULE BY MOUTH ONE TIME PER WEEK 12 capsule 1    JARDIANCE 25 MG tablet TAKE 1 TABLET BY MOUTH EVERY DAY 30 tablet 2    VASCEPA 1 g CAPS capsule TAKE 2 CAPSULES BY MOUTH TWO TIMES DAILY 120 capsule 3    pravastatin (PRAVACHOL) 40 MG tablet TAKE 1 TABLET BY MOUTH EVERY DAY 90 tablet 2    fenofibrate 160 MG tablet TAKE 1 TABLET BY MOUTH EVERY DAY 90 tablet 3    omeprazole (PRILOSEC) 40 MG delayed release capsule TAKE 1 CAPSULE BY MOUTH EVERY DAY 90 capsule 3    diclofenac (VOLTAREN) 75 MG EC tablet Take 75 mg by mouth 2 times daily      lisinopril-hydrochlorothiazide (PRINZIDE;ZESTORETIC) 20-12.5 MG per tablet TAKE 1 TABLET BY MOUTH EVERY DAY 90 tablet 0    gabapentin (NEURONTIN) 300 MG capsule Take 600 mg by mouth 2 times daily. Lanae Ganser metoprolol succinate (TOPROL XL) 100 MG extended release tablet Take 25 mg by mouth daily       aspirin 81 MG chewable tablet Take 1 tablet by mouth daily 30 tablet 3    ticagrelor (BRILINTA) 90 MG TABS tablet Take 1 tablet by mouth 2 times daily 60 tablet 12    nitroGLYCERIN (NITROSTAT) 0.4 MG SL tablet Place 1 tablet under the tongue every 5 minutes as needed for Chest pain up to max of 3 total doses. If no relief after 1 dose, call 911. 25 tablet 3    E-400 400 UNITS capsule TAKE TWO CAPSULES BY MOUTH DAILY 60 capsule 10    nortriptyline (PAMELOR) 50 MG capsule Take 50 mg by mouth 2 times daily       glucose blood VI test strips (ASCENSIA AUTODISC VI;ONE TOUCH ULTRA TEST VI) strip 1 each by In Vitro route daily As needed. 100 each 3       Morphine; Pcn [penicillins]; Tramadol; and Codeine  Social History     Tobacco Use   Smoking Status Never Smoker   Smokeless Tobacco Never Used     (Ifpatient a smoker, smoking cessation counseling offered)    Social History     Substance and Sexual Activity   Alcohol Use Never    Alcohol/week: 0.0 standard drinks    Frequency: Never       REVIEW OF SYSTEMS:  Review of Systems    Physical Exam:      Vitals:    07/07/20 1317   Temp: 98.7 °F (37.1 °C)     There is no height or weight on file to calculate BMI. Patient is a 72 y.o. male in no acute distress and alert and oriented to person, place and time. Physical Exam  Constitutional: Patient in no acute distress. Neuro: Alert and oriented to person, place and time. Psych: Mood normal, affect normal  Skin: No rash noted  Lungs: Respiratory effort is normal  Cardiovascular: Warm & Pink  Abdomen: Soft, non-tender, non-distended with no CVA,  No flank tenderness,  Or hepatosplenomegaly   Lymphatics: No palpablelymphadenopathy. Bladder non-tender and not distended. Musculoskeletal: Normal gait and station  Testiscles: Normal, bilaterally  Prostate:  40 grams, smooth, no nodules. Assessment and Plan      1. Nocturia    2.  BPH with obstruction/lower urinary tract symptoms    3. Prostate cancer screening           Plan:          Continue Flomax and proscar  Restrict fluids before javier for nocturia. PSA/KAIDEN normal.  Return in about 1 year (around 2021) for Labs. Prescriptions Ordered:  Orders Placed This Encounter   Medications    tamsulosin (FLOMAX) 0.4 MG capsule     Si tablet PO BID     Dispense:  180 capsule     Refill:  3    finasteride (PROSCAR) 5 MG tablet     Sig: TAKE 1 TABLET BY MOUTH EVERY DAY     Dispense:  90 tablet     Refill:  3     Orders Placed:  Orders Placed This Encounter   Procedures    PSA, Diagnostic     Standing Status:   Future     Standing Expiration Date:   2021           Saba Shore MD    Agree with the ROS entered by the MA.

## 2020-08-31 ENCOUNTER — HOSPITAL ENCOUNTER (OUTPATIENT)
Age: 65
Discharge: HOME | End: 2020-08-31
Payer: MEDICARE

## 2020-08-31 LAB
CALCIUM IONIZED: 1.45 MMOL/L (ref 1.13–1.33)
PTH INTACT: 66.45 PG/ML (ref 15–65)
VITAMIN D 25-HYDROXY: 22.6 NG/ML (ref 30–100)

## 2020-08-31 PROCEDURE — 83970 ASSAY OF PARATHORMONE: CPT

## 2020-08-31 PROCEDURE — 36415 COLL VENOUS BLD VENIPUNCTURE: CPT

## 2020-08-31 PROCEDURE — 82306 VITAMIN D 25 HYDROXY: CPT

## 2020-08-31 PROCEDURE — 82330 ASSAY OF CALCIUM: CPT

## 2020-08-31 PROCEDURE — 83519 RIA NONANTIBODY: CPT

## 2020-09-07 LAB — PTH RELATED PEPTIDE: 2.1 PMOL/L (ref 0–2.3)

## 2020-10-23 ENCOUNTER — HOSPITAL ENCOUNTER (OUTPATIENT)
Dept: GENERAL RADIOLOGY | Age: 65
Discharge: HOME | End: 2020-10-25
Payer: MEDICARE

## 2020-10-23 ENCOUNTER — HOSPITAL ENCOUNTER (OUTPATIENT)
Dept: WOMENS IMAGING | Age: 65
Discharge: HOME | End: 2020-10-25
Payer: MEDICARE

## 2020-10-23 ENCOUNTER — HOSPITAL ENCOUNTER (OUTPATIENT)
Age: 65
Discharge: HOME | End: 2020-10-25
Payer: MEDICARE

## 2020-10-23 PROCEDURE — 72100 X-RAY EXAM L-S SPINE 2/3 VWS: CPT

## 2020-10-23 PROCEDURE — 72072 X-RAY EXAM THORAC SPINE 3VWS: CPT

## 2020-10-23 PROCEDURE — 77080 DXA BONE DENSITY AXIAL: CPT

## 2020-10-26 ENCOUNTER — HOSPITAL ENCOUNTER (OUTPATIENT)
Dept: NUCLEAR MEDICINE | Age: 65
Discharge: HOME | End: 2020-10-28
Payer: MEDICARE

## 2020-10-26 PROCEDURE — A9500 TC99M SESTAMIBI: HCPCS | Performed by: INTERNAL MEDICINE

## 2020-10-26 PROCEDURE — 78071 PARATHYRD PLANAR W/WO SUBTRJ: CPT

## 2020-10-26 PROCEDURE — 3430000000 HC RX DIAGNOSTIC RADIOPHARMACEUTICAL: Performed by: INTERNAL MEDICINE

## 2020-10-26 PROCEDURE — 2580000003 HC RX 258: Performed by: INTERNAL MEDICINE

## 2020-10-26 RX ORDER — SODIUM CHLORIDE 0.9 % (FLUSH) 0.9 %
10 SYRINGE (ML) INJECTION PRN
Status: DISCONTINUED | OUTPATIENT
Start: 2020-10-26 | End: 2020-10-29 | Stop reason: HOSPADM

## 2020-10-26 RX ADMIN — TETRAKIS(2-METHOXYISOBUTYLISOCYANIDE)COPPER(I) TETRAFLUOROBORATE 22 MILLICURIE: 1 INJECTION, POWDER, LYOPHILIZED, FOR SOLUTION INTRAVENOUS at 07:50

## 2020-10-26 RX ADMIN — SODIUM CHLORIDE, PRESERVATIVE FREE 10 ML: 5 INJECTION INTRAVENOUS at 07:50

## 2020-10-28 PROCEDURE — 6370000000 HC RX 637 (ALT 250 FOR IP)

## 2020-12-28 ENCOUNTER — HOSPITAL ENCOUNTER (OUTPATIENT)
Age: 65
Discharge: HOME | End: 2020-12-28
Payer: MEDICARE

## 2020-12-28 LAB
ABSOLUTE EOS #: 0.67 K/UL (ref 0–0.4)
ABSOLUTE IMMATURE GRANULOCYTE: ABNORMAL K/UL (ref 0–0.3)
ABSOLUTE LYMPH #: 1.26 K/UL (ref 1–4.8)
ABSOLUTE MONO #: 0.52 K/UL (ref 0.1–1.3)
ALBUMIN SERPL-MCNC: 4.4 G/DL (ref 3.5–5.2)
ALBUMIN/GLOBULIN RATIO: ABNORMAL (ref 1–2.5)
ALP BLD-CCNC: 94 U/L (ref 40–129)
ALT SERPL-CCNC: 40 U/L (ref 5–41)
ANION GAP SERPL CALCULATED.3IONS-SCNC: 8 MMOL/L (ref 9–17)
AST SERPL-CCNC: 25 U/L
BASOPHILS # BLD: 1 % (ref 0–2)
BASOPHILS ABSOLUTE: 0.07 K/UL (ref 0–0.2)
BILIRUB SERPL-MCNC: 0.4 MG/DL (ref 0.3–1.2)
BUN BLDV-MCNC: 19 MG/DL (ref 8–23)
BUN/CREAT BLD: ABNORMAL (ref 9–20)
CALCIUM SERPL-MCNC: 11 MG/DL (ref 8.6–10.4)
CALCIUM SERPL-MCNC: 11.2 MG/DL (ref 8.6–10.4)
CHLORIDE BLD-SCNC: 104 MMOL/L (ref 98–107)
CHOLESTEROL/HDL RATIO: 4
CHOLESTEROL: 135 MG/DL
CO2: 28 MMOL/L (ref 20–31)
CREAT SERPL-MCNC: 1.21 MG/DL (ref 0.7–1.2)
CREATININE URINE: 84.7 MG/DL (ref 39–259)
DIFFERENTIAL TYPE: ABNORMAL
EOSINOPHILS RELATIVE PERCENT: 9 % (ref 0–4)
ESTIMATED AVERAGE GLUCOSE: 151 MG/DL
GFR AFRICAN AMERICAN: >60 ML/MIN
GFR NON-AFRICAN AMERICAN: >60 ML/MIN
GFR SERPL CREATININE-BSD FRML MDRD: ABNORMAL ML/MIN/{1.73_M2}
GFR SERPL CREATININE-BSD FRML MDRD: ABNORMAL ML/MIN/{1.73_M2}
GLUCOSE BLD-MCNC: 154 MG/DL (ref 70–99)
HBA1C MFR BLD: 6.9 % (ref 4–6)
HCT VFR BLD CALC: 43.7 % (ref 41–53)
HDLC SERPL-MCNC: 34 MG/DL
HEMOGLOBIN: 13.5 G/DL (ref 13.5–17.5)
IMMATURE GRANULOCYTES: ABNORMAL %
LDL CHOLESTEROL: 59 MG/DL (ref 0–130)
LYMPHOCYTES # BLD: 17 % (ref 24–44)
MCH RBC QN AUTO: 24.5 PG (ref 26–34)
MCHC RBC AUTO-ENTMCNC: 31 G/DL (ref 31–37)
MCV RBC AUTO: 79 FL (ref 80–100)
MICROALBUMIN/CREAT 24H UR: <12 MG/L
MICROALBUMIN/CREAT UR-RTO: NORMAL MCG/MG CREAT
MONOCYTES # BLD: 7 % (ref 1–7)
MORPHOLOGY: ABNORMAL
NRBC AUTOMATED: ABNORMAL PER 100 WBC
PDW BLD-RTO: 18.1 % (ref 11.5–14.9)
PLATELET # BLD: 315 K/UL (ref 150–450)
PLATELET ESTIMATE: ABNORMAL
PMV BLD AUTO: 7.8 FL (ref 6–12)
POTASSIUM SERPL-SCNC: 4.5 MMOL/L (ref 3.7–5.3)
PTH INTACT: 90.48 PG/ML (ref 15–65)
RBC # BLD: 5.52 M/UL (ref 4.5–5.9)
RBC # BLD: ABNORMAL 10*6/UL
SEG NEUTROPHILS: 66 % (ref 36–66)
SEGMENTED NEUTROPHILS ABSOLUTE COUNT: 4.88 K/UL (ref 1.3–9.1)
SODIUM BLD-SCNC: 140 MMOL/L (ref 135–144)
TOTAL PROTEIN: 7 G/DL (ref 6.4–8.3)
TRIGL SERPL-MCNC: 211 MG/DL
VITAMIN D 25-HYDROXY: 21 NG/ML (ref 30–100)
VLDLC SERPL CALC-MCNC: ABNORMAL MG/DL (ref 1–30)
WBC # BLD: 7.4 K/UL (ref 3.5–11)
WBC # BLD: ABNORMAL 10*3/UL

## 2020-12-28 PROCEDURE — 82306 VITAMIN D 25 HYDROXY: CPT

## 2020-12-28 PROCEDURE — 83970 ASSAY OF PARATHORMONE: CPT

## 2020-12-28 PROCEDURE — 82570 ASSAY OF URINE CREATININE: CPT

## 2020-12-28 PROCEDURE — 83036 HEMOGLOBIN GLYCOSYLATED A1C: CPT

## 2020-12-28 PROCEDURE — 36415 COLL VENOUS BLD VENIPUNCTURE: CPT

## 2020-12-28 PROCEDURE — 82043 UR ALBUMIN QUANTITATIVE: CPT

## 2020-12-28 PROCEDURE — 82310 ASSAY OF CALCIUM: CPT

## 2020-12-28 PROCEDURE — 85025 COMPLETE CBC W/AUTO DIFF WBC: CPT

## 2020-12-28 PROCEDURE — 80061 LIPID PANEL: CPT

## 2020-12-28 PROCEDURE — 80053 COMPREHEN METABOLIC PANEL: CPT

## 2021-06-30 ENCOUNTER — TELEPHONE (OUTPATIENT)
Dept: UROLOGY | Age: 66
End: 2021-06-30

## 2021-06-30 DIAGNOSIS — N13.8 BPH WITH OBSTRUCTION/LOWER URINARY TRACT SYMPTOMS: Primary | ICD-10-CM

## 2021-06-30 DIAGNOSIS — N40.1 BPH WITH OBSTRUCTION/LOWER URINARY TRACT SYMPTOMS: Primary | ICD-10-CM

## 2021-06-30 DIAGNOSIS — Z12.5 PROSTATE CANCER SCREENING: ICD-10-CM

## 2021-07-12 DIAGNOSIS — N40.1 BPH WITH OBSTRUCTION/LOWER URINARY TRACT SYMPTOMS: ICD-10-CM

## 2021-07-12 DIAGNOSIS — N13.8 BPH WITH OBSTRUCTION/LOWER URINARY TRACT SYMPTOMS: ICD-10-CM

## 2021-07-15 RX ORDER — FINASTERIDE 5 MG/1
TABLET, FILM COATED ORAL
Qty: 90 TABLET | Refills: 2 | Status: SHIPPED | OUTPATIENT
Start: 2021-07-15 | End: 2022-04-25 | Stop reason: SDUPTHER

## 2021-07-19 ENCOUNTER — HOSPITAL ENCOUNTER (OUTPATIENT)
Age: 66
Discharge: HOME | End: 2021-07-19
Payer: MEDICARE

## 2021-07-19 DIAGNOSIS — N13.8 BPH WITH OBSTRUCTION/LOWER URINARY TRACT SYMPTOMS: ICD-10-CM

## 2021-07-19 DIAGNOSIS — Z12.5 PROSTATE CANCER SCREENING: ICD-10-CM

## 2021-07-19 DIAGNOSIS — N40.1 BPH WITH OBSTRUCTION/LOWER URINARY TRACT SYMPTOMS: ICD-10-CM

## 2021-07-19 LAB — PROSTATE SPECIFIC ANTIGEN: 1.54 UG/L

## 2021-07-19 PROCEDURE — G0103 PSA SCREENING: HCPCS

## 2021-07-19 PROCEDURE — 36415 COLL VENOUS BLD VENIPUNCTURE: CPT

## 2021-07-22 RX ORDER — TAMSULOSIN HYDROCHLORIDE 0.4 MG/1
CAPSULE ORAL
Qty: 180 CAPSULE | Refills: 2 | Status: SHIPPED | OUTPATIENT
Start: 2021-07-22 | End: 2022-06-09 | Stop reason: SDUPTHER

## 2021-08-24 ENCOUNTER — OFFICE VISIT (OUTPATIENT)
Dept: UROLOGY | Age: 66
End: 2021-08-24
Payer: MEDICARE

## 2021-08-24 VITALS
BODY MASS INDEX: 31.52 KG/M2 | TEMPERATURE: 96.5 F | HEIGHT: 68 IN | DIASTOLIC BLOOD PRESSURE: 86 MMHG | SYSTOLIC BLOOD PRESSURE: 142 MMHG | HEART RATE: 104 BPM | WEIGHT: 208 LBS

## 2021-08-24 DIAGNOSIS — N40.1 BPH WITH OBSTRUCTION/LOWER URINARY TRACT SYMPTOMS: Primary | ICD-10-CM

## 2021-08-24 DIAGNOSIS — N13.8 BPH WITH OBSTRUCTION/LOWER URINARY TRACT SYMPTOMS: Primary | ICD-10-CM

## 2021-08-24 DIAGNOSIS — Z12.5 PROSTATE CANCER SCREENING: ICD-10-CM

## 2021-08-24 PROCEDURE — 1123F ACP DISCUSS/DSCN MKR DOCD: CPT | Performed by: UROLOGY

## 2021-08-24 PROCEDURE — G8417 CALC BMI ABV UP PARAM F/U: HCPCS | Performed by: UROLOGY

## 2021-08-24 PROCEDURE — G8427 DOCREV CUR MEDS BY ELIG CLIN: HCPCS | Performed by: UROLOGY

## 2021-08-24 PROCEDURE — 3017F COLORECTAL CA SCREEN DOC REV: CPT | Performed by: UROLOGY

## 2021-08-24 PROCEDURE — 4040F PNEUMOC VAC/ADMIN/RCVD: CPT | Performed by: UROLOGY

## 2021-08-24 PROCEDURE — 1036F TOBACCO NON-USER: CPT | Performed by: UROLOGY

## 2021-08-24 PROCEDURE — 99214 OFFICE O/P EST MOD 30 MIN: CPT | Performed by: UROLOGY

## 2021-08-24 RX ORDER — CINACALCET 30 MG/1
30 TABLET, FILM COATED ORAL DAILY
COMMUNITY
End: 2022-08-02 | Stop reason: ALTCHOICE

## 2021-08-24 ASSESSMENT — ENCOUNTER SYMPTOMS
BACK PAIN: 0
VOMITING: 0
EYE REDNESS: 0
EYES NEGATIVE: 1
WHEEZING: 0
COUGH: 0
NAUSEA: 0
EYE PAIN: 0
SHORTNESS OF BREATH: 0
CONSTIPATION: 0
GASTROINTESTINAL NEGATIVE: 1
DIARRHEA: 0
ABDOMINAL PAIN: 0
RESPIRATORY NEGATIVE: 1

## 2021-08-24 NOTE — PROGRESS NOTES
(myocardial infarction)     Hypercholesteremia     Hyperlipidemia     Hypertension     Kidney stones     Osteoarthritis     all joints    PONV (postoperative nausea and vomiting)     Sleep apnea     BIPAP    Unspecified sleep apnea      Past Surgical History:   Procedure Laterality Date    BACK SURGERY  07/11/2019    BICEPS TENDON REPAIR Left     CARDIAC CATHETERIZATION  01/02/2019    with Dr. Day Mccormick, Stents X2    COLONOSCOPY  11 14 14    normal    ELBOW FRACTURE SURGERY Right     right arm/fractur    ENDOSCOPY, COLON, DIAGNOSTIC      several times    FRACTURE SURGERY Right     elbow    KNEE ARTHROSCOPY Bilateral     LIVER BIOPSY  8/14/2015    moderate steatosis, grade 2, lobular and portal inflammation: Grade 1 total score =3, fibrosis score 1A    LUMBAR FUSION N/A 7/10/2019    LUMBAR LAMINECTOMY FUSION POSTERIOR - L3 SACRUM POSTERIOR LUMBAR DECOMPRESSION  & FUSION performed by Marisela Salinas MD at [address]  11/07/2018    lumbar myelogram    ROTATOR CUFF REPAIR Right     SPINE SURGERY      neck fustion      Family History   Problem Relation Age of Onset    Heart Disease Mother     Cancer Father         bladder    Arthritis Sister     Cancer Paternal Aunt     Cancer Paternal Aunt      Outpatient Medications Marked as Taking for the 8/24/21 encounter (Office Visit) with Betzaida Watt MD   Medication Sig Dispense Refill    cinacalcet (SENSIPAR) 30 MG tablet Take 30 mg by mouth daily      tamsulosin (FLOMAX) 0.4 MG capsule TAKE ONE CAPSULE BY MOUTH TWICE A  capsule 2    finasteride (PROSCAR) 5 MG tablet TAKE ONE TABLET BY MOUTH DAILY 90 tablet 2    fenofibrate (TRIGLIDE) 160 MG tablet TAKE ONE TABLET BY MOUTH DAILY 90 tablet 0    ezetimibe (ZETIA) 10 MG tablet TAKE ONE TABLET BY MOUTH DAILY 90 tablet 1    vitamin D (ERGOCALCIFEROL) 1.25 MG (02628 UT) CAPS capsule TAKE ONE CAPSULE BY MOUTH ONCE WEEKLY 12 capsule 2    coenzyme Q-10 100 MG capsule TAKE ONE CAPSULE BY MOUTH DAILY 30 capsule 5    clopidogrel (PLAVIX) 75 MG tablet Take 75 mg by mouth daily      metFORMIN (GLUCOPHAGE) 1000 MG tablet TAKE ONE TABLET BY MOUTH TWICE A DAY WITH A MEAL 180 tablet 1    JARDIANCE 25 MG tablet TAKE ONE TABLET BY MOUTH DAILY 90 tablet 1    pravastatin (PRAVACHOL) 40 MG tablet TAKE ONE TABLET BY MOUTH DAILY 90 tablet 2    omeprazole (PRILOSEC) 40 MG delayed release capsule TAKE 1 CAPSULE BY MOUTH EVERY DAY 90 capsule 3    lisinopril (PRINIVIL;ZESTRIL) 10 MG tablet Take 1 tablet by mouth daily 30 tablet 2    diclofenac (VOLTAREN) 75 MG EC tablet Take 75 mg by mouth 2 times daily      gabapentin (NEURONTIN) 300 MG capsule Take 600 mg by mouth 2 times daily. Vonda Grates metoprolol succinate (TOPROL XL) 100 MG extended release tablet Take 25 mg by mouth daily       aspirin 81 MG chewable tablet Take 1 tablet by mouth daily 30 tablet 3    E-400 400 UNITS capsule TAKE TWO CAPSULES BY MOUTH DAILY 60 capsule 10    nortriptyline (PAMELOR) 50 MG capsule Take 50 mg by mouth 2 times daily          Morphine, Pcn [penicillins], Tramadol, and Codeine  Social History     Tobacco Use   Smoking Status Never Smoker   Smokeless Tobacco Never Used     (Ifpatient a smoker, smoking cessation counseling offered)    Social History     Substance and Sexual Activity   Alcohol Use Never    Alcohol/week: 0.0 standard drinks       REVIEW OF SYSTEMS:  Review of Systems    Physical Exam:      Vitals:    08/24/21 1111   BP: (!) 142/86   Pulse: 104   Temp: 96.5 °F (35.8 °C)     Body mass index is 31.64 kg/m². Patient is a 77 y.o. male in no acute distress and alert and oriented to person, place and time. Physical Exam  Constitutional: Patient in no acute distress. Neuro: Alert and oriented to person, place and time.   Psych: Mood normal, affect normal  Lungs: Respiratory effort is normal  Cardiovascular: Warm & Pink  Abdomen: Soft, non-tender, non-distended with no CVA,  No flank tenderness,  Or hepatosplenomegaly   Lymphatics: No palpablelymphadenopathy. Bladder non-tender and not distended. Musculoskeletal: Normal gait and station      Assessment and Plan      1. BPH with obstruction/lower urinary tract symptoms    2. Prostate cancer screening           Plan:          Doing well with Flomax and Finasteride. Continue combination therapy at this time. PSA normal.   F/U in 1 year with a PSA. Return in about 1 year (around 8/24/2022) for Labs. Prescriptions Ordered:  No orders of the defined types were placed in this encounter. Orders Placed:  Orders Placed This Encounter   Procedures    PSA, Diagnostic     Standing Status:   Future     Standing Expiration Date:   8/24/2022           Melita Tuttle MD    Agree with the ROS entered by the MA.

## 2021-08-24 NOTE — PROGRESS NOTES
Review of Systems   Constitutional: Negative. Negative for appetite change, chills and fatigue. Eyes: Negative. Negative for pain, redness and visual disturbance. Respiratory: Negative. Negative for cough, shortness of breath and wheezing. Cardiovascular: Negative. Negative for chest pain and leg swelling. Gastrointestinal: Negative. Negative for abdominal pain, constipation, diarrhea, nausea and vomiting. Genitourinary: Negative. Negative for difficulty urinating, dysuria, flank pain, frequency, hematuria and urgency. Musculoskeletal: Negative for back pain, joint swelling and myalgias. Skin: Negative. Negative for rash and wound. Neurological: Negative for dizziness, weakness and numbness. Hematological: Does not bruise/bleed easily.

## 2021-08-24 NOTE — LETTER
[address] [address]  Dept: [phone]  Dept Fax: [phone]        8/24/21    Patient: Anshu Abdi  YOB: 1955    Dear Mica Cifuentes PA-C,    I had the pleasure of seeing one of your patients, India Leon today in the office today. Below are the relevant portions of my assessment and plan of care. IMPRESSION:  1. BPH with obstruction/lower urinary tract symptoms    2. Prostate cancer screening        PLAN:  Doing well with Flomax and Finasteride. Continue combination therapy at this time. PSA normal.   F/U in 1 year with a PSA. Return in about 1 year (around 8/24/2022) for Labs. Prescriptions Ordered:  No orders of the defined types were placed in this encounter. Orders Placed:  Orders Placed This Encounter   Procedures    PSA, Diagnostic     Standing Status:   Future     Standing Expiration Date:   8/24/2022        Thank you for allowing me to participate in the care of this patient. I will keep you updated on this patient's follow up and I look forward to serving you and your patients again in the future.         Dino Reid MD

## 2021-09-03 ENCOUNTER — HOSPITAL ENCOUNTER (OUTPATIENT)
Age: 66
Discharge: HOME | End: 2021-09-03
Payer: MEDICARE

## 2021-09-03 DIAGNOSIS — K21.9 GASTROESOPHAGEAL REFLUX DISEASE WITHOUT ESOPHAGITIS: ICD-10-CM

## 2021-09-03 DIAGNOSIS — E55.9 VITAMIN D DEFICIENCY: ICD-10-CM

## 2021-09-03 DIAGNOSIS — K76.0 STEATOSIS OF LIVER: ICD-10-CM

## 2021-09-03 DIAGNOSIS — E11.9 TYPE 2 DIABETES MELLITUS WITHOUT COMPLICATION, WITHOUT LONG-TERM CURRENT USE OF INSULIN (HCC): ICD-10-CM

## 2021-09-03 DIAGNOSIS — E78.2 MIXED HYPERLIPIDEMIA: ICD-10-CM

## 2021-09-03 DIAGNOSIS — I10 ESSENTIAL HYPERTENSION: ICD-10-CM

## 2021-09-03 LAB
ABSOLUTE EOS #: 0.57 K/UL (ref 0–0.4)
ABSOLUTE IMMATURE GRANULOCYTE: ABNORMAL K/UL (ref 0–0.3)
ABSOLUTE LYMPH #: 1.07 K/UL (ref 1–4.8)
ABSOLUTE MONO #: 0.5 K/UL (ref 0.1–1.3)
ALBUMIN SERPL-MCNC: 4.6 G/DL (ref 3.5–5.2)
ALBUMIN/GLOBULIN RATIO: ABNORMAL (ref 1–2.5)
ALP BLD-CCNC: 91 U/L (ref 40–129)
ALT SERPL-CCNC: 39 U/L (ref 5–41)
ANION GAP SERPL CALCULATED.3IONS-SCNC: 8 MMOL/L (ref 9–17)
AST SERPL-CCNC: 25 U/L
BASOPHILS # BLD: 1 % (ref 0–2)
BASOPHILS ABSOLUTE: 0.06 K/UL (ref 0–0.2)
BILIRUB SERPL-MCNC: 0.41 MG/DL (ref 0.3–1.2)
BUN BLDV-MCNC: 20 MG/DL (ref 8–23)
BUN/CREAT BLD: ABNORMAL (ref 9–20)
CALCIUM SERPL-MCNC: 10.7 MG/DL (ref 8.6–10.4)
CHLORIDE BLD-SCNC: 104 MMOL/L (ref 98–107)
CHOLESTEROL/HDL RATIO: 4.8
CHOLESTEROL: 143 MG/DL
CO2: 26 MMOL/L (ref 20–31)
CREAT SERPL-MCNC: 1.16 MG/DL (ref 0.7–1.2)
DIFFERENTIAL TYPE: ABNORMAL
EOSINOPHILS RELATIVE PERCENT: 9 % (ref 0–4)
ESTIMATED AVERAGE GLUCOSE: 174 MG/DL
GFR AFRICAN AMERICAN: >60 ML/MIN
GFR NON-AFRICAN AMERICAN: >60 ML/MIN
GFR SERPL CREATININE-BSD FRML MDRD: ABNORMAL ML/MIN/{1.73_M2}
GFR SERPL CREATININE-BSD FRML MDRD: ABNORMAL ML/MIN/{1.73_M2}
GLUCOSE BLD-MCNC: 161 MG/DL (ref 70–99)
HBA1C MFR BLD: 7.7 % (ref 4–6)
HCT VFR BLD CALC: 39.3 % (ref 41–53)
HDLC SERPL-MCNC: 30 MG/DL
HEMOGLOBIN: 12.2 G/DL (ref 13.5–17.5)
IMMATURE GRANULOCYTES: ABNORMAL %
LDL CHOLESTEROL: 59 MG/DL (ref 0–130)
LYMPHOCYTES # BLD: 17 % (ref 24–44)
MCH RBC QN AUTO: 23.5 PG (ref 26–34)
MCHC RBC AUTO-ENTMCNC: 31.1 G/DL (ref 31–37)
MCV RBC AUTO: 75.5 FL (ref 80–100)
MONOCYTES # BLD: 8 % (ref 1–7)
MORPHOLOGY: ABNORMAL
NRBC AUTOMATED: ABNORMAL PER 100 WBC
PDW BLD-RTO: 19 % (ref 11.5–14.9)
PLATELET # BLD: 303 K/UL (ref 150–450)
PLATELET ESTIMATE: ABNORMAL
PMV BLD AUTO: 7.3 FL (ref 6–12)
POTASSIUM SERPL-SCNC: 5.1 MMOL/L (ref 3.7–5.3)
RBC # BLD: 5.2 M/UL (ref 4.5–5.9)
RBC # BLD: ABNORMAL 10*6/UL
SEG NEUTROPHILS: 65 % (ref 36–66)
SEGMENTED NEUTROPHILS ABSOLUTE COUNT: 4.1 K/UL (ref 1.3–9.1)
SODIUM BLD-SCNC: 138 MMOL/L (ref 135–144)
TOTAL PROTEIN: 6.9 G/DL (ref 6.4–8.3)
TRIGL SERPL-MCNC: 271 MG/DL
VITAMIN D 25-HYDROXY: 18.1 NG/ML (ref 30–100)
VLDLC SERPL CALC-MCNC: ABNORMAL MG/DL (ref 1–30)
WBC # BLD: 6.3 K/UL (ref 3.5–11)
WBC # BLD: ABNORMAL 10*3/UL

## 2021-09-03 PROCEDURE — 80061 LIPID PANEL: CPT

## 2021-09-03 PROCEDURE — 80053 COMPREHEN METABOLIC PANEL: CPT

## 2021-09-03 PROCEDURE — 36415 COLL VENOUS BLD VENIPUNCTURE: CPT

## 2021-09-03 PROCEDURE — 82306 VITAMIN D 25 HYDROXY: CPT

## 2021-09-03 PROCEDURE — 83036 HEMOGLOBIN GLYCOSYLATED A1C: CPT

## 2021-09-03 PROCEDURE — 85025 COMPLETE CBC W/AUTO DIFF WBC: CPT

## 2021-10-20 ENCOUNTER — HOSPITAL ENCOUNTER (OUTPATIENT)
Age: 66
Discharge: HOME | End: 2021-10-20
Payer: MEDICARE

## 2021-10-20 DIAGNOSIS — D64.9 ANEMIA, UNSPECIFIED TYPE: ICD-10-CM

## 2021-10-20 LAB
CALCIUM SERPL-MCNC: 10.3 MG/DL (ref 8.6–10.4)
FOLATE: 11 NG/ML
IRON SATURATION: 9 % (ref 20–55)
IRON: 36 UG/DL (ref 59–158)
PTH INTACT: 79.55 PG/ML (ref 15–65)
TOTAL IRON BINDING CAPACITY: 392 UG/DL (ref 250–450)
UNSATURATED IRON BINDING CAPACITY: 356 UG/DL (ref 112–347)
VITAMIN B-12: 426 PG/ML (ref 232–1245)

## 2021-10-20 PROCEDURE — 82607 VITAMIN B-12: CPT

## 2021-10-20 PROCEDURE — 83970 ASSAY OF PARATHORMONE: CPT

## 2021-10-20 PROCEDURE — 83540 ASSAY OF IRON: CPT

## 2021-10-20 PROCEDURE — 82746 ASSAY OF FOLIC ACID SERUM: CPT

## 2021-10-20 PROCEDURE — 36415 COLL VENOUS BLD VENIPUNCTURE: CPT

## 2021-10-20 PROCEDURE — 82310 ASSAY OF CALCIUM: CPT

## 2021-10-20 PROCEDURE — 83550 IRON BINDING TEST: CPT

## 2022-03-29 ENCOUNTER — OFFICE VISIT (OUTPATIENT)
Dept: ORTHOPEDIC SURGERY | Age: 67
End: 2022-03-29
Payer: MEDICARE

## 2022-03-29 VITALS — WEIGHT: 209 LBS | HEIGHT: 68 IN | BODY MASS INDEX: 31.67 KG/M2 | RESPIRATION RATE: 14 BRPM

## 2022-03-29 DIAGNOSIS — M48.061 SPINAL STENOSIS OF LUMBAR REGION, UNSPECIFIED WHETHER NEUROGENIC CLAUDICATION PRESENT: ICD-10-CM

## 2022-03-29 DIAGNOSIS — M54.16 LUMBAR RADICULOPATHY, CHRONIC: ICD-10-CM

## 2022-03-29 DIAGNOSIS — M51.36 DEGENERATIVE DISC DISEASE, LUMBAR: ICD-10-CM

## 2022-03-29 DIAGNOSIS — M51.26 LUMBAR DISC HERNIATION: Primary | ICD-10-CM

## 2022-03-29 PROCEDURE — 99213 OFFICE O/P EST LOW 20 MIN: CPT | Performed by: ORTHOPAEDIC SURGERY

## 2022-03-29 PROCEDURE — 1123F ACP DISCUSS/DSCN MKR DOCD: CPT | Performed by: ORTHOPAEDIC SURGERY

## 2022-03-29 PROCEDURE — G8427 DOCREV CUR MEDS BY ELIG CLIN: HCPCS | Performed by: ORTHOPAEDIC SURGERY

## 2022-03-29 PROCEDURE — G8484 FLU IMMUNIZE NO ADMIN: HCPCS | Performed by: ORTHOPAEDIC SURGERY

## 2022-03-29 PROCEDURE — 3017F COLORECTAL CA SCREEN DOC REV: CPT | Performed by: ORTHOPAEDIC SURGERY

## 2022-03-29 PROCEDURE — 1036F TOBACCO NON-USER: CPT | Performed by: ORTHOPAEDIC SURGERY

## 2022-03-29 PROCEDURE — G8417 CALC BMI ABV UP PARAM F/U: HCPCS | Performed by: ORTHOPAEDIC SURGERY

## 2022-03-29 PROCEDURE — 4040F PNEUMOC VAC/ADMIN/RCVD: CPT | Performed by: ORTHOPAEDIC SURGERY

## 2022-03-29 NOTE — PROGRESS NOTES
Patient ID: Hope Serrano is a 77 y.o. male    Chief Compliant:  Chief Complaint   Patient presents with    Lower Back Pain        Diagnostic imaging:  AP lateral lumbar spine status post L3 to sacrum PLIF some progressive L2-3 degenerative disc disease hips mild arthritis      Assessment and Plan:  1. Lumbar disc herniation    2. Spinal stenosis of lumbar region, unspecified whether neurogenic claudication present    3. Degenerative disc disease, lumbar      New onset left radicular leg pain ongoing 4 months    CT myelogram lumbar spine    Flexeril    Follow up after CT myelogram      HPI:  This is a 77 y.o. male who presents to the clinic today for lower back pain. Hx L3-sacrum PLIF 2019    Patient with lower back and left radicular leg pain ongoing since November of 2021. He reports his pain is constant and is interfering with his ability to sleep. Prior to his surgery in 2019, he had right radicular leg pain with neurogenic claudication. Patient admits occasional groin pain. Patient notes moderate relief after a steroid injection in his knee          Review of Systems   All other systems reviewed and are negative.       Past History:    Current Outpatient Medications:     vitamin D (ERGOCALCIFEROL) 1.25 MG (10378 UT) CAPS capsule, TAKE ONE CAPSULE BY MOUTH ONCE WEEKLY, Disp: 12 capsule, Rfl: 2    fenofibrate (TRIGLIDE) 160 MG tablet, TAKE ONE TABLET BY MOUTH DAILY, Disp: 90 tablet, Rfl: 1    ezetimibe (ZETIA) 10 MG tablet, TAKE ONE TABLET BY MOUTH DAILY, Disp: 90 tablet, Rfl: 1    coenzyme Q-10 100 MG capsule, TAKE ONE CAPSULE BY MOUTH DAILY, Disp: 30 capsule, Rfl: 5    metFORMIN (GLUCOPHAGE) 1000 MG tablet, TAKE ONE TABLET BY MOUTH TWICE A DAY WITH MEALS, Disp: 180 tablet, Rfl: 1    omeprazole (PRILOSEC) 40 MG delayed release capsule, TAKE ONE CAPSULE BY MOUTH DAILY, Disp: 90 capsule, Rfl: 3    pravastatin (PRAVACHOL) 40 MG tablet, TAKE ONE TABLET BY MOUTH DAILY, Disp: 90 tablet, Rfl: 2    ferrous sulfate (IRON 325) 325 (65 Fe) MG tablet, Take 1 tablet by mouth daily (with breakfast), Disp: 90 tablet, Rfl: 1    HYDROcodone-acetaminophen (NORCO) 5-325 MG per tablet, hydrocodone 5 mg-acetaminophen 325 mg tablet  TAKE ONE TABLET BY MOUTH EVERY 4 HOURS AS NEEDED, Disp: , Rfl:     cinacalcet (SENSIPAR) 30 MG tablet, Take 30 mg by mouth daily, Disp: , Rfl:     tamsulosin (FLOMAX) 0.4 MG capsule, TAKE ONE CAPSULE BY MOUTH TWICE A DAY, Disp: 180 capsule, Rfl: 2    finasteride (PROSCAR) 5 MG tablet, TAKE ONE TABLET BY MOUTH DAILY, Disp: 90 tablet, Rfl: 2    lisinopril (PRINIVIL;ZESTRIL) 10 MG tablet, Take 1 tablet by mouth daily, Disp: 30 tablet, Rfl: 2    diclofenac (VOLTAREN) 75 MG EC tablet, Take 75 mg by mouth 2 times daily, Disp: , Rfl:     gabapentin (NEURONTIN) 300 MG capsule, Take 600 mg by mouth 2 times daily. ., Disp: , Rfl:     metoprolol succinate (TOPROL XL) 100 MG extended release tablet, Take 25 mg by mouth daily , Disp: , Rfl:     aspirin 81 MG chewable tablet, Take 1 tablet by mouth daily, Disp: 30 tablet, Rfl: 3    E-400 400 UNITS capsule, TAKE TWO CAPSULES BY MOUTH DAILY, Disp: 60 capsule, Rfl: 10    nortriptyline (PAMELOR) 50 MG capsule, Take 50 mg by mouth 2 times daily , Disp: , Rfl:   Allergies   Allergen Reactions    Morphine Other (See Comments)     Becomes very mean     Pcn [Penicillins] Swelling     face    Tramadol Itching    Codeine Nausea And Vomiting     Abd. pain     Social History     Socioeconomic History    Marital status:      Spouse name: Not on file    Number of children: Not on file    Years of education: Not on file    Highest education level: Not on file   Occupational History    Occupation: retired   Tobacco Use    Smoking status: Never Smoker    Smokeless tobacco: Never Used   Vaping Use    Vaping Use: Never used   Substance and Sexual Activity    Alcohol use: Never     Alcohol/week: 0.0 standard drinks    Drug use: Never    Sexual activity: Not on file   Other Topics Concern    Not on file   Social History Narrative    Not on file     Social Determinants of Health     Financial Resource Strain: Low Risk     Difficulty of Paying Living Expenses: Not hard at all   Food Insecurity: No Food Insecurity    Worried About Running Out of Food in the Last Year: Never true    [address] in the Last Year: Never true   Transportation Needs:     Lack of Transportation (Medical): Not on file    Lack of Transportation (Non-Medical):  Not on file   Physical Activity:     Days of Exercise per Week: Not on file    Minutes of Exercise per Session: Not on file   Stress:     Feeling of Stress : Not on file   Social Connections:     Frequency of Communication with Friends and Family: Not on file    Frequency of Social Gatherings with Friends and Family: Not on file    Attends Hindu Services: Not on file    Active Member of [address] [place] [address] [employer] or Organizations: Not on file    Attends Club or Organization Meetings: Not on file    Marital Status: Not on file   Intimate Partner Violence:     Fear of Current or Ex-Partner: Not on file    Emotionally Abused: Not on file    Physically Abused: Not on file    Sexually Abused: Not on file   Housing Stability:     Unable to Pay for Housing in the Last Year: Not on file    Number of [place] in the Last Year: Not on file    Unstable Housing in the Last Year: Not on file     Past Medical History:   Diagnosis Date    Abdominal hernia     Anesthesia     phrenic nerve damaged lt side    BPH (benign prostatic hyperplasia)     CAD (coronary artery disease)     Chronic back pain     Diabetes mellitus ([facility] [address]     Type 2    Difficult intubation     per pt throat collapses, lt side phrenic nerve was damaged    Ear infection     LT, being treated    Fatty liver     Gallstones     GERD (gastroesophageal reflux disease)     Headache(784.0)     History of MI (myocardial infarction)     Hypercholesteremia     Hyperlipidemia     Hypertension     Kidney stones     Osteoarthritis     all joints    PONV (postoperative nausea and vomiting)     Sleep apnea     BIPAP    Unspecified sleep apnea      Past Surgical History:   Procedure Laterality Date    BACK SURGERY  07/11/2019    BICEPS TENDON REPAIR Left     CARDIAC CATHETERIZATION  01/02/2019    with Dr. Domenic Jaquez, Stents X2    COLONOSCOPY  11 14 14    normal    ELBOW FRACTURE SURGERY Right     right arm/fractur    ENDOSCOPY, COLON, DIAGNOSTIC      several times    FRACTURE SURGERY Right     elbow    KNEE ARTHROSCOPY Bilateral     LIVER BIOPSY  8/14/2015    moderate steatosis, grade 2, lobular and portal inflammation: Grade 1 total score =3, fibrosis score 1A    LUMBAR FUSION N/A 7/10/2019    LUMBAR LAMINECTOMY FUSION POSTERIOR - L3 SACRUM POSTERIOR LUMBAR DECOMPRESSION  & FUSION performed by Gilmer Villagran MD at [address] [facility] [address]  11/07/2018    lumbar myelogram    ROTATOR CUFF REPAIR Right     SPINE SURGERY      neck fustion      Family History   Problem Relation Age of Onset    Heart Disease Mother     Cancer Father         bladder    Arthritis Sister     Cancer Paternal Aunt     Cancer Paternal Aunt         Physical Exam:  Vitals signs and nursing note reviewed. Constitutional:       Appearance: well-developed. HENT:      Head: Normocephalic and atraumatic. Nose: Nose normal.   Eyes:      Conjunctiva/sclera: Conjunctivae normal.   Neck:      Musculoskeletal: Normal range of motion and neck supple. Pulmonary:      Effort: Pulmonary effort is normal. No respiratory distress. Musculoskeletal:      Comments: Normal gait     Skin:     General: Skin is warm and dry. Neurological:      Mental Status: Alert and oriented to person, place, and time. Sensory: No sensory deficit. Psychiatric:         Behavior: Behavior normal.         Thought Content:  Thought content normal.        Provider Attestation:  Laura Covarrubias, personally performed the services described in this documentation. All medical record entries made by the scribe were at my direction and in my presence. I have reviewed the chart and discharge instructions and agree that the records reflect my personal performance and is accurate and complete. Dahlia Austin MD 3/29/22     Scribe Attestation:  By signing my name below, Jan Maldonado, attest that this documentation has been prepared under the direction and in the presence of Dr. Terry Martell. Electronically signed: Rene Hammond, 3/29/22     Please note that this chart was generated using voice recognition Dragon dictation software. Although every effort was made to ensure the accuracy of this automated transcription, some errors in transcription may have occurred.

## 2022-04-05 ENCOUNTER — HOSPITAL ENCOUNTER (OUTPATIENT)
Age: 67
Discharge: HOME | End: 2022-04-05
Payer: MEDICARE

## 2022-04-05 DIAGNOSIS — E55.9 VITAMIN D INSUFFICIENCY: ICD-10-CM

## 2022-04-05 DIAGNOSIS — I10 ESSENTIAL HYPERTENSION: ICD-10-CM

## 2022-04-05 DIAGNOSIS — E78.2 MIXED HYPERLIPIDEMIA: ICD-10-CM

## 2022-04-05 DIAGNOSIS — E11.9 TYPE 2 DIABETES MELLITUS WITHOUT COMPLICATION, WITHOUT LONG-TERM CURRENT USE OF INSULIN (HCC): ICD-10-CM

## 2022-04-05 DIAGNOSIS — D50.8 IRON DEFICIENCY ANEMIA SECONDARY TO INADEQUATE DIETARY IRON INTAKE: ICD-10-CM

## 2022-04-05 DIAGNOSIS — K21.9 GASTROESOPHAGEAL REFLUX DISEASE WITHOUT ESOPHAGITIS: ICD-10-CM

## 2022-04-05 LAB
ABSOLUTE EOS #: 0.3 K/UL (ref 0–0.4)
ABSOLUTE LYMPH #: 1.1 K/UL (ref 1–4.8)
ABSOLUTE MONO #: 0.5 K/UL (ref 0.1–1.3)
ALBUMIN SERPL-MCNC: 4.4 G/DL (ref 3.5–5.2)
ALP BLD-CCNC: 92 U/L (ref 40–129)
ALT SERPL-CCNC: 27 U/L (ref 5–41)
ANION GAP SERPL CALCULATED.3IONS-SCNC: 10 MMOL/L (ref 9–17)
AST SERPL-CCNC: 17 U/L
BASOPHILS # BLD: 1 % (ref 0–2)
BASOPHILS ABSOLUTE: 0 K/UL (ref 0–0.2)
BILIRUB SERPL-MCNC: 0.42 MG/DL (ref 0.3–1.2)
BUN BLDV-MCNC: 22 MG/DL (ref 8–23)
CALCIUM SERPL-MCNC: 10.6 MG/DL (ref 8.6–10.4)
CHLORIDE BLD-SCNC: 103 MMOL/L (ref 98–107)
CHOLESTEROL/HDL RATIO: 3.5
CHOLESTEROL: 130 MG/DL
CO2: 28 MMOL/L (ref 20–31)
CREAT SERPL-MCNC: 1.23 MG/DL (ref 0.7–1.2)
EOSINOPHILS RELATIVE PERCENT: 5 % (ref 0–4)
ESTIMATED AVERAGE GLUCOSE: 137 MG/DL
GFR AFRICAN AMERICAN: >60 ML/MIN
GFR NON-AFRICAN AMERICAN: 59 ML/MIN
GFR SERPL CREATININE-BSD FRML MDRD: ABNORMAL ML/MIN/{1.73_M2}
GLUCOSE BLD-MCNC: 116 MG/DL (ref 70–99)
HBA1C MFR BLD: 6.4 % (ref 4–6)
HCT VFR BLD CALC: 45.4 % (ref 41–53)
HDLC SERPL-MCNC: 37 MG/DL
HEMOGLOBIN: 15.2 G/DL (ref 13.5–17.5)
IRON SATURATION: 32 % (ref 20–55)
IRON: 103 UG/DL (ref 59–158)
LDL CHOLESTEROL: 60 MG/DL (ref 0–130)
LYMPHOCYTES # BLD: 16 % (ref 24–44)
MCH RBC QN AUTO: 30 PG (ref 26–34)
MCHC RBC AUTO-ENTMCNC: 33.6 G/DL (ref 31–37)
MCV RBC AUTO: 89.5 FL (ref 80–100)
MONOCYTES # BLD: 7 % (ref 1–7)
PDW BLD-RTO: 15.2 % (ref 11.5–14.9)
PLATELET # BLD: 201 K/UL (ref 150–450)
PMV BLD AUTO: 7.9 FL (ref 6–12)
POTASSIUM SERPL-SCNC: 4.7 MMOL/L (ref 3.7–5.3)
RBC # BLD: 5.08 M/UL (ref 4.5–5.9)
SEG NEUTROPHILS: 71 % (ref 36–66)
SEGMENTED NEUTROPHILS ABSOLUTE COUNT: 4.7 K/UL (ref 1.3–9.1)
SODIUM BLD-SCNC: 141 MMOL/L (ref 135–144)
TOTAL IRON BINDING CAPACITY: 319 UG/DL (ref 250–450)
TOTAL PROTEIN: 6.5 G/DL (ref 6.4–8.3)
TRIGL SERPL-MCNC: 163 MG/DL
UNSATURATED IRON BINDING CAPACITY: 216 UG/DL (ref 112–347)
VITAMIN D 25-HYDROXY: 32.9 NG/ML
WBC # BLD: 6.6 K/UL (ref 3.5–11)

## 2022-04-05 PROCEDURE — 80053 COMPREHEN METABOLIC PANEL: CPT

## 2022-04-05 PROCEDURE — 83550 IRON BINDING TEST: CPT

## 2022-04-05 PROCEDURE — 82306 VITAMIN D 25 HYDROXY: CPT

## 2022-04-05 PROCEDURE — 83540 ASSAY OF IRON: CPT

## 2022-04-05 PROCEDURE — 83036 HEMOGLOBIN GLYCOSYLATED A1C: CPT

## 2022-04-05 PROCEDURE — 85025 COMPLETE CBC W/AUTO DIFF WBC: CPT

## 2022-04-05 PROCEDURE — 80061 LIPID PANEL: CPT

## 2022-04-05 PROCEDURE — 36415 COLL VENOUS BLD VENIPUNCTURE: CPT

## 2022-04-12 ENCOUNTER — HOSPITAL ENCOUNTER (OUTPATIENT)
Dept: CT IMAGING | Age: 67
Discharge: HOME | End: 2022-04-14
Payer: MEDICARE

## 2022-04-12 ENCOUNTER — HOSPITAL ENCOUNTER (OUTPATIENT)
Dept: INTERVENTIONAL RADIOLOGY/VASCULAR | Age: 67
Discharge: HOME | End: 2022-04-14
Payer: MEDICARE

## 2022-04-12 VITALS
HEIGHT: 68 IN | DIASTOLIC BLOOD PRESSURE: 90 MMHG | BODY MASS INDEX: 31.37 KG/M2 | RESPIRATION RATE: 12 BRPM | WEIGHT: 207 LBS | TEMPERATURE: 97.1 F | SYSTOLIC BLOOD PRESSURE: 163 MMHG | HEART RATE: 83 BPM | OXYGEN SATURATION: 97 %

## 2022-04-12 DIAGNOSIS — M51.36 DEGENERATIVE DISC DISEASE, LUMBAR: ICD-10-CM

## 2022-04-12 DIAGNOSIS — M51.26 LUMBAR DISC HERNIATION: ICD-10-CM

## 2022-04-12 DIAGNOSIS — M48.061 SPINAL STENOSIS OF LUMBAR REGION, UNSPECIFIED WHETHER NEUROGENIC CLAUDICATION PRESENT: ICD-10-CM

## 2022-04-12 DIAGNOSIS — M54.16 LUMBAR RADICULOPATHY, CHRONIC: ICD-10-CM

## 2022-04-12 LAB
INR BLD: 1
PLATELET # BLD: 203 K/UL (ref 150–450)
PROTHROMBIN TIME: 13.4 SEC (ref 11.8–14.6)
REASON FOR REJECTION: NORMAL
ZZ NTE CLEAN UP: ORDERED TEST: NORMAL
ZZ NTE WITH NAME CLEAN UP: SPECIMEN SOURCE: NORMAL

## 2022-04-12 PROCEDURE — 7100000010 HC PHASE II RECOVERY - FIRST 15 MIN

## 2022-04-12 PROCEDURE — 36415 COLL VENOUS BLD VENIPUNCTURE: CPT

## 2022-04-12 PROCEDURE — 2709999900 IR MYELOGRAM LUMBOSACRAL

## 2022-04-12 PROCEDURE — 7100000011 HC PHASE II RECOVERY - ADDTL 15 MIN

## 2022-04-12 PROCEDURE — 85610 PROTHROMBIN TIME: CPT

## 2022-04-12 PROCEDURE — 72132 CT LUMBAR SPINE W/DYE: CPT

## 2022-04-12 PROCEDURE — 85049 AUTOMATED PLATELET COUNT: CPT

## 2022-04-12 PROCEDURE — 6360000004 HC RX CONTRAST MEDICATION: Performed by: ORTHOPAEDIC SURGERY

## 2022-04-12 PROCEDURE — 62304 MYELOGRAPHY LUMBAR INJECTION: CPT

## 2022-04-12 RX ORDER — SODIUM CHLORIDE 9 MG/ML
INJECTION, SOLUTION INTRAVENOUS CONTINUOUS
Status: DISCONTINUED | OUTPATIENT
Start: 2022-04-12 | End: 2022-04-15 | Stop reason: HOSPADM

## 2022-04-12 RX ORDER — ACETAMINOPHEN 325 MG/1
650 TABLET ORAL EVERY 4 HOURS PRN
Status: DISCONTINUED | OUTPATIENT
Start: 2022-04-12 | End: 2022-04-15 | Stop reason: HOSPADM

## 2022-04-12 RX ADMIN — IOPAMIDOL 15 ML: 408 INJECTION, SOLUTION INTRATHECAL at 11:28

## 2022-04-12 ASSESSMENT — ENCOUNTER SYMPTOMS
DIARRHEA: 1
CHEST TIGHTNESS: 0
TROUBLE SWALLOWING: 0
SINUS PRESSURE: 0
CONSTIPATION: 0
APNEA: 0
BLOOD IN STOOL: 0
SHORTNESS OF BREATH: 0
WHEEZING: 0
ABDOMINAL DISTENTION: 0
COUGH: 0
RHINORRHEA: 0
NAUSEA: 0
SINUS PAIN: 0
VOMITING: 0
SORE THROAT: 0
ABDOMINAL PAIN: 0

## 2022-04-12 ASSESSMENT — PAIN DESCRIPTION - ORIENTATION: ORIENTATION: LOWER

## 2022-04-12 ASSESSMENT — PAIN DESCRIPTION - DESCRIPTORS: DESCRIPTORS: SHARP

## 2022-04-12 ASSESSMENT — PAIN - FUNCTIONAL ASSESSMENT: PAIN_FUNCTIONAL_ASSESSMENT: 0-10

## 2022-04-12 ASSESSMENT — PAIN SCALES - GENERAL
PAINLEVEL_OUTOF10: 4
PAINLEVEL_OUTOF10: 5

## 2022-04-12 ASSESSMENT — PAIN DESCRIPTION - LOCATION: LOCATION: BACK

## 2022-04-12 NOTE — H&P
HISTORY and [address] [id number]       NAME:  Becki Sprague  MRN: [id number]   YOB: 1955   Date: 4/12/2022   Age: 77 y.o. Gender: male       COMPLAINT AND PRESENT HISTORY:     Becki Sprague  is a 77 y.o. male presenting today for an IR MYELOGRAM LUMBOSACRAL. Pt reports low back pain radiating to his left leg, ongoing since fall 2021. Pt states his pain is often worse at night and he has trouble sleeping. He underwent a PLIF in 2019 for back pain with right leg radiation and neurogenic claudication. Pt rates pain is 2/10 in severity, aching in nature, with moments it can elevate to 10/10 sharp pain. He does report a few falls, the most recent was about a week ago- denies any loss of consciousness or head injury. He denies any numbness in his lower extremities. He does report loose stools for the past month, denies any bloody or tarry stools. Denies any loss of bladder control. Pt has a PMHX significant for CAD, DM2, HLD, HTN, SHANT-Bipap, MI-stents x2. Lab Results     LABA1C 6.4 (H) 04/05/2022          Pt does not wear dentures. Pt denies any hx of MRSA infection  Pt does take plavix, last dose yesterday. Volterin was last night, ASA was 5 days ago. Pt does report a hx of PONV  Pt denies any acute symptoms of illness at this time including no SOB, CP, fever, URI or UTI symptoms. RECENT IMAGING    No results found.      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    Ear infection     LT, being treated    Fatty liver     Gallstones     GERD (gastroesophageal reflux disease)     Headache(784.0)     History of MI (myocardial infarction)     Hypercholesteremia     Hyperlipidemia     Hypertension     Kidney stones     Osteoarthritis     all joints    PONV (postoperative nausea and vomiting)     Sleep apnea     BIPAP    Unspecified sleep apnea        SURGICAL HISTORY       Past Surgical History:   Procedure Laterality Date    BACK SURGERY  07/11/2019    BICEPS TENDON REPAIR Left     CARDIAC CATHETERIZATION  01/02/2019    with Dr. Heber Arthur, Stents X2    COLONOSCOPY  11 14 14    normal    ELBOW FRACTURE SURGERY Right     right arm/fractur    ENDOSCOPY, COLON, DIAGNOSTIC      several times    FRACTURE SURGERY Right     elbow    KNEE ARTHROSCOPY Bilateral     LIVER BIOPSY  8/14/2015    moderate steatosis, grade 2, lobular and portal inflammation: Grade 1 total score =3, fibrosis score 1A    LUMBAR FUSION N/A 7/10/2019    LUMBAR LAMINECTOMY FUSION POSTERIOR - L3 SACRUM POSTERIOR LUMBAR DECOMPRESSION  & FUSION performed by Monalisa Mukherjee MD at [facility]  11/07/2018    lumbar myelogram    ROTATOR CUFF REPAIR Right     SPINE SURGERY      neck fustion        FAMILY HISTORY       Family History   Problem Relation Age of Onset    Heart Disease Mother     Cancer Father         bladder    Arthritis Sister     Cancer Paternal Aunt     Cancer Paternal Aunt        SOCIAL HISTORY       Social History     Socioeconomic History    Marital status:      Spouse name: Not on file    Number of children: Not on file    Years of education: Not on file    Highest education level: Not on file   Occupational History    Occupation: retired   Tobacco Use    Smoking status: Never Smoker    Smokeless tobacco: Never Used   Vaping Use    Vaping Use: Never used   Substance and Sexual Activity    Alcohol use: Never     Alcohol/week: 0.0 standard drinks    Drug use: Never    Sexual activity: Not on file   Other Topics Concern    Not on file   Social History Narrative    Not on file     Social Determinants of Health Financial Resource Strain: Low Risk     Difficulty of Paying Living Expenses: Not hard at all   Food Insecurity: No Food Insecurity    Worried About Running Out of Food in the Last Year: Never true    Darryl of Food in the Last Year: Never true   Transportation Needs: No Transportation Needs    Lack of Transportation (Medical): No    Lack of Transportation (Non-Medical):  No   Physical Activity:     Days of Exercise per Week: Not on file    Minutes of Exercise per Session: Not on file   Stress:     Feeling of Stress : Not on file   Social Connections:     Frequency of Communication with Friends and Family: Not on file    Frequency of Social Gatherings with Friends and Family: Not on file    Attends Jehovah's witness Services: Not on file    Active Member of [address] [place] [address] [email or web address] or Organizations: Not on file    Attends Club or Organization Meetings: Not on file    Marital Status: Not on file   Intimate Partner Violence:     Fear of Current or Ex-Partner: Not on file    Emotionally Abused: Not on file    Physically Abused: Not on file    Sexually Abused: Not on file   Housing Stability:     Unable to Pay for Housing in the Last Year: Not on file    Number of [place] in the Last Year: Not on file    Unstable Housing in the Last Year: Not on file           REVIEW OF SYSTEMS      Allergies   Allergen Reactions    Morphine Other (See Comments)     Becomes very mean     Pcn [Penicillins] Swelling     face    Tramadol Itching    Codeine Nausea And Vomiting     Abd. pain       Current Outpatient Medications on File Prior to Encounter   Medication Sig Dispense Refill    clopidogrel (PLAVIX) 75 MG tablet Take 75 mg by mouth daily      vitamin D (ERGOCALCIFEROL) 1.25 MG (99119 UT) CAPS capsule TAKE ONE CAPSULE BY MOUTH ONCE WEEKLY 12 capsule 2    fenofibrate (TRIGLIDE) 160 MG tablet TAKE ONE TABLET BY MOUTH DAILY 90 tablet 1    ezetimibe (ZETIA) 10 MG tablet TAKE ONE TABLET BY MOUTH DAILY 90 tablet 1    coenzyme Q-10 100 MG capsule TAKE ONE CAPSULE BY MOUTH DAILY 30 capsule 5    metFORMIN (GLUCOPHAGE) 1000 MG tablet TAKE ONE TABLET BY MOUTH TWICE A DAY WITH MEALS 180 tablet 1    omeprazole (PRILOSEC) 40 MG delayed release capsule TAKE ONE CAPSULE BY MOUTH DAILY 90 capsule 3    pravastatin (PRAVACHOL) 40 MG tablet TAKE ONE TABLET BY MOUTH DAILY 90 tablet 2    ferrous sulfate (IRON 325) 325 (65 Fe) MG tablet Take 1 tablet by mouth daily (with breakfast) 90 tablet 1    cinacalcet (SENSIPAR) 30 MG tablet Take 30 mg by mouth daily      tamsulosin (FLOMAX) 0.4 MG capsule TAKE ONE CAPSULE BY MOUTH TWICE A  capsule 2    finasteride (PROSCAR) 5 MG tablet TAKE ONE TABLET BY MOUTH DAILY 90 tablet 2    lisinopril (PRINIVIL;ZESTRIL) 10 MG tablet Take 1 tablet by mouth daily 30 tablet 2    diclofenac (VOLTAREN) 75 MG EC tablet Take 75 mg by mouth 2 times daily      gabapentin (NEURONTIN) 300 MG capsule Take 600 mg by mouth 3 times daily.  metoprolol succinate (TOPROL XL) 100 MG extended release tablet Take 25 mg by mouth daily       aspirin 81 MG chewable tablet Take 1 tablet by mouth daily 30 tablet 3    E-400 400 UNITS capsule TAKE TWO CAPSULES BY MOUTH DAILY 60 capsule 10    nortriptyline (PAMELOR) 50 MG capsule Take 50 mg by mouth 2 times daily        No current facility-administered medications on file prior to encounter. Review of Systems   Constitutional: Negative for chills, diaphoresis, fatigue and fever. HENT: Negative for congestion, dental problem, ear pain, postnasal drip, rhinorrhea, sinus pressure, sinus pain, sore throat and trouble swallowing. Eyes: Positive for visual disturbance.        +glasses    Respiratory: Negative for apnea, cough, chest tightness, shortness of breath and wheezing. Cardiovascular: Negative for chest pain, palpitations and leg swelling. Gastrointestinal: Positive for diarrhea.  Negative for abdominal distention, abdominal pain, blood in stool, constipation, nausea and vomiting. Genitourinary: Negative for decreased urine volume, difficulty urinating, dysuria, flank pain, frequency and hematuria. Musculoskeletal:        SEE HPI   Skin: Positive for wound. Negative for rash. Scattered scabbed areas to bilateral hands. Neurological: Positive for dizziness and headaches. Negative for weakness and numbness. Sometimes when BP is low. Hematological: Bruises/bleeds easily. On blood thinners. Psychiatric/Behavioral: Negative for agitation and confusion. The patient is not nervous/anxious. See HPI    GENERAL PHYSICAL EXAM:     Vitals: BP (!) 171/99   Pulse 77   Temp 96.2 °F (35.7 °C) (Infrared)   Resp 14   Ht 5' 8\" (1.727 m)   Wt 207 lb (93.9 kg)   SpO2 96%   BMI 31.47 kg/m²  Body mass index is 31.47 kg/m². Physical Exam  Vitals (ELEVATED BP NOTED, PT DID NOT TAKE ANY BP MEDS TODAY  ) reviewed. Constitutional:       General: He is not in acute distress. Appearance: Normal appearance. He is well-developed. He is obese. He is not ill-appearing or toxic-appearing. HENT:      Head: Normocephalic and atraumatic. Mouth/Throat:      Mouth: Mucous membranes are dry. Pharynx: Oropharynx is clear. No oropharyngeal exudate or posterior oropharyngeal erythema. Eyes:      Extraocular Movements: Extraocular movements intact. Conjunctiva/sclera: Conjunctivae normal.      Pupils: Pupils are equal, round, and reactive to light. Comments: +glasses   Cardiovascular:      Rate and Rhythm: Normal rate and regular rhythm. Pulses: Normal pulses. Heart sounds: Normal heart sounds. No murmur heard. No friction rub. No gallop. Pulmonary:      Effort: Pulmonary effort is normal.      Breath sounds: Normal breath sounds. No wheezing. Abdominal:      General: Bowel sounds are normal. There is no distension. Palpations: Abdomen is soft. Tenderness:  There is no abdominal tenderness. There is no guarding or rebound. Musculoskeletal:         General: No swelling. Normal range of motion. Cervical back: Normal range of motion and neck supple. No rigidity or tenderness. Right lower leg: No edema. Left lower leg: No edema. Skin:     General: Skin is warm and dry. Findings: No erythema. Neurological:      General: No focal deficit present. Mental Status: He is alert and oriented to person, place, and time. Mental status is at baseline. Sensory: No sensory deficit. Gait: Gait abnormal.   Psychiatric:         Mood and Affect: Mood normal.         Behavior: Behavior normal.         Thought Content:  Thought content normal.         Judgment: Judgment normal.                                                                                         PROVISIONAL DIAGNOSES / SURGERY:    IR myelogram     Patient Active Problem List    Diagnosis Date Noted    Spinal stenosis in cervical region 01/30/2020    Spondylolisthesis, lumbar region 01/30/2020    Foraminal stenosis of cervical region 01/30/2020    Cervical radiculopathy 01/30/2020    Neck pain 01/21/2020    Cervical spondylosis with myelopathy 01/21/2020    Bilateral carpal tunnel syndrome 01/21/2020    Postoperative hypotension     Back pain 07/10/2019    S/P cardiac catheterization 01/02/2019    Lumbar disc herniation 10/25/2018    Chronic low back pain 10/25/2018    Essential hypertension 10/23/2017    Degenerative disc disease, lumbar     Lumbar spinal stenosis     Pars defect with spondylolisthesis     Osteoarthritis of multiple joints 06/06/2017    Osteoarthritis of lumbar spine     Lumbar radiculopathy, chronic     Type 2 diabetes mellitus without complication, without long-term current use of insulin ([facility] [address] 09/06/2016    Steatosis of liver 09/01/2015    BPH (benign prostatic hyperplasia) 06/26/2015    Elevated liver enzymes 09/23/2014    Hyperlipidemia     Prostate disease  Osteoarthritis     Headache     GERD (gastroesophageal reflux disease)     Sleep apnea                Enzo Zhang, SERA - CNP on 4/12/2022 at 8:29 AM

## 2022-04-12 NOTE — PROGRESS NOTES
Prone on table. Low back is prepped and draped. Numbed and accessed by Dr Melvin Tejada. Injected with 15 ml of Isovue-m 200. Needle removed, band aid applied. Report to [facility].  To CT

## 2022-04-12 NOTE — OP NOTE
Brief Postoperative Note    Augustina Wilkins  YOB: 1955  [id number]    Pre-operative Diagnosis: back pain    Post-operative Diagnosis: Same    Procedure: myelogram    Anesthesia: Local    Surgeons/Assistants: Saravanan Can MD     Estimated Blood Loss: minimal    Complications: none immediate    Specimens: were not obtained      Electronically signed by Saravanan Can MD on 4/12/2022 at 11:34 AM

## 2022-04-19 ENCOUNTER — OFFICE VISIT (OUTPATIENT)
Dept: ORTHOPEDIC SURGERY | Age: 67
End: 2022-04-19
Payer: MEDICARE

## 2022-04-19 VITALS — HEIGHT: 68 IN | RESPIRATION RATE: 14 BRPM | BODY MASS INDEX: 31.37 KG/M2 | WEIGHT: 207 LBS

## 2022-04-19 DIAGNOSIS — M51.26 LUMBAR DISC HERNIATION: Primary | ICD-10-CM

## 2022-04-19 DIAGNOSIS — M51.36 DEGENERATIVE DISC DISEASE, LUMBAR: ICD-10-CM

## 2022-04-19 DIAGNOSIS — M48.061 SPINAL STENOSIS OF LUMBAR REGION, UNSPECIFIED WHETHER NEUROGENIC CLAUDICATION PRESENT: ICD-10-CM

## 2022-04-19 DIAGNOSIS — M54.16 LUMBAR RADICULOPATHY, CHRONIC: ICD-10-CM

## 2022-04-19 PROCEDURE — 1036F TOBACCO NON-USER: CPT | Performed by: ORTHOPAEDIC SURGERY

## 2022-04-19 PROCEDURE — G8427 DOCREV CUR MEDS BY ELIG CLIN: HCPCS | Performed by: ORTHOPAEDIC SURGERY

## 2022-04-19 PROCEDURE — 99213 OFFICE O/P EST LOW 20 MIN: CPT | Performed by: ORTHOPAEDIC SURGERY

## 2022-04-19 PROCEDURE — G8417 CALC BMI ABV UP PARAM F/U: HCPCS | Performed by: ORTHOPAEDIC SURGERY

## 2022-04-19 PROCEDURE — 1123F ACP DISCUSS/DSCN MKR DOCD: CPT | Performed by: ORTHOPAEDIC SURGERY

## 2022-04-19 PROCEDURE — 4040F PNEUMOC VAC/ADMIN/RCVD: CPT | Performed by: ORTHOPAEDIC SURGERY

## 2022-04-19 PROCEDURE — 3017F COLORECTAL CA SCREEN DOC REV: CPT | Performed by: ORTHOPAEDIC SURGERY

## 2022-04-19 NOTE — PROGRESS NOTES
Patient ID: Komal Metz is a 77 y.o. male    Chief Compliant:  Chief Complaint   Patient presents with    Lower Back Pain        Diagnostic imaging:    CT myelogram lumbar spine reveals very very severe lumbar spinal stenosis L2-3 status post 3 to sacrum PLIF    Assessment and Plan:  1. Lumbar disc herniation    2. Spinal stenosis of lumbar region, unspecified whether neurogenic claudication present    3. Degenerative disc disease, lumbar    4. Lumbar radiculopathy, chronic          Plan for L2-3 PLIF revision instrumentation L3-5    We discussed risks of surgery and recovery process. Risks include infection, bleeding, neurologic injury, systemic and anesthetic complications, no relief of pain, need for future surgery. Follow up 2 weeks after surgery    HPI:  This is a 77 y.o. male who presents to the clinic today for CT myelogram results. Patient notes ongoing lower back and left radicular leg pain ongoing for 5 months. Patient reports his pain is interfering with his ADLs. Patient notes limited lumbar rotation. Patient with hx of failure of LESI and PT prior to his last surgery. Review of Systems   All other systems reviewed and are negative.       Past History:    Current Outpatient Medications:     clopidogrel (PLAVIX) 75 MG tablet, Take 75 mg by mouth daily, Disp: , Rfl:     vitamin D (ERGOCALCIFEROL) 1.25 MG (25317 UT) CAPS capsule, TAKE ONE CAPSULE BY MOUTH ONCE WEEKLY, Disp: 12 capsule, Rfl: 2    fenofibrate (TRIGLIDE) 160 MG tablet, TAKE ONE TABLET BY MOUTH DAILY, Disp: 90 tablet, Rfl: 1    ezetimibe (ZETIA) 10 MG tablet, TAKE ONE TABLET BY MOUTH DAILY, Disp: 90 tablet, Rfl: 1    coenzyme Q-10 100 MG capsule, TAKE ONE CAPSULE BY MOUTH DAILY, Disp: 30 capsule, Rfl: 5    metFORMIN (GLUCOPHAGE) 1000 MG tablet, TAKE ONE TABLET BY MOUTH TWICE A DAY WITH MEALS, Disp: 180 tablet, Rfl: 1    omeprazole (PRILOSEC) 40 MG delayed release capsule, TAKE ONE CAPSULE BY MOUTH DAILY, Disp: 90 capsule, Rfl: 3    pravastatin (PRAVACHOL) 40 MG tablet, TAKE ONE TABLET BY MOUTH DAILY, Disp: 90 tablet, Rfl: 2    ferrous sulfate (IRON 325) 325 (65 Fe) MG tablet, Take 1 tablet by mouth daily (with breakfast), Disp: 90 tablet, Rfl: 1    cinacalcet (SENSIPAR) 30 MG tablet, Take 30 mg by mouth daily, Disp: , Rfl:     tamsulosin (FLOMAX) 0.4 MG capsule, TAKE ONE CAPSULE BY MOUTH TWICE A DAY, Disp: 180 capsule, Rfl: 2    finasteride (PROSCAR) 5 MG tablet, TAKE ONE TABLET BY MOUTH DAILY, Disp: 90 tablet, Rfl: 2    lisinopril (PRINIVIL;ZESTRIL) 10 MG tablet, Take 1 tablet by mouth daily, Disp: 30 tablet, Rfl: 2    diclofenac (VOLTAREN) 75 MG EC tablet, Take 75 mg by mouth 2 times daily, Disp: , Rfl:     gabapentin (NEURONTIN) 300 MG capsule, Take 600 mg by mouth 3 times daily.  , Disp: , Rfl:     metoprolol succinate (TOPROL XL) 100 MG extended release tablet, Take 25 mg by mouth daily , Disp: , Rfl:     aspirin 81 MG chewable tablet, Take 1 tablet by mouth daily, Disp: 30 tablet, Rfl: 3    E-400 400 UNITS capsule, TAKE TWO CAPSULES BY MOUTH DAILY, Disp: 60 capsule, Rfl: 10    nortriptyline (PAMELOR) 50 MG capsule, Take 50 mg by mouth 2 times daily , Disp: , Rfl:   Allergies   Allergen Reactions    Morphine Other (See Comments)     Becomes very mean     Pcn [Penicillins] Swelling     face    Tramadol Itching    Codeine Nausea And Vomiting     Abd. pain     Social History     Socioeconomic History    Marital status:      Spouse name: Not on file    Number of children: Not on file    Years of education: Not on file    Highest education level: Not on file   Occupational History    Occupation: retired   Tobacco Use    Smoking status: Never Smoker    Smokeless tobacco: Never Used   Vaping Use    Vaping Use: Never used   Substance and Sexual Activity    Alcohol use: Never     Alcohol/week: 0.0 standard drinks    Drug use: Never    Sexual activity: Not on file   Other Topics Concern    Not on file   Social History Narrative    Not on file     Social Determinants of Health     Financial Resource Strain: Low Risk     Difficulty of Paying Living Expenses: Not hard at all   Food Insecurity: No Food Insecurity    Worried About Running Out of Food in the Last Year: Never true    [address] in the Last Year: Never true   Transportation Needs: No Transportation Needs    Lack of Transportation (Medical): No    Lack of Transportation (Non-Medical):  No   Physical Activity:     Days of Exercise per Week: Not on file    Minutes of Exercise per Session: Not on file   Stress:     Feeling of Stress : Not on file   Social Connections:     Frequency of Communication with Friends and Family: Not on file    Frequency of Social Gatherings with Friends and Family: Not on file    Attends Mandaen Services: Not on file    Active Member of [address] [place] [address] or Organizations: Not on file    Attends Club or Organization Meetings: Not on file    Marital Status: Not on file   Intimate Partner Violence:     Fear of Current or Ex-Partner: Not on file    Emotionally Abused: Not on file    Physically Abused: Not on file    Sexually Abused: Not on file   Housing Stability:     Unable to Pay for Housing in the Last Year: Not on file    Number of [place] in the Last Year: Not on file    Unstable Housing in the Last Year: Not on file     Past Medical History:   Diagnosis Date    Abdominal hernia     Anesthesia     phrenic nerve damaged lt side    BPH (benign prostatic hyperplasia)     CAD (coronary artery disease)     Chronic back pain     Diabetes mellitus ([facility] [address]     Type 2    Difficult intubation     per pt throat collapses, lt side phrenic nerve was damaged    Ear infection     LT, being treated    Fatty liver     Gallstones     GERD (gastroesophageal reflux disease)     Headache(784.0)     History of MI (myocardial infarction)     Hypercholesteremia     Hyperlipidemia     Hypertension     Kidney this documentation. All medical record entries made by the scribe were at my direction and in my presence. I have reviewed the chart and discharge instructions and agree that the records reflect my personal performance and is accurate and complete. Alyse Soto MD 4/19/22       Scribe Attestation:  By signing my name below, Lise Man, attest that this documentation has been prepared under the direction and in the presence of Dr. Todd Abreu. Electronically signed: Rene Echevarria, 4/19/22     Please note that this chart was generated using voice recognition Dragon dictation software. Although every effort was made to ensure the accuracy of this automated transcription, some errors in transcription may have occurred.

## 2022-04-25 DIAGNOSIS — N13.8 BPH WITH OBSTRUCTION/LOWER URINARY TRACT SYMPTOMS: ICD-10-CM

## 2022-04-25 DIAGNOSIS — N40.1 BPH WITH OBSTRUCTION/LOWER URINARY TRACT SYMPTOMS: ICD-10-CM

## 2022-04-25 RX ORDER — FINASTERIDE 5 MG/1
TABLET, FILM COATED ORAL
Qty: 90 TABLET | Refills: 2 | Status: SHIPPED | OUTPATIENT
Start: 2022-04-25

## 2022-04-25 NOTE — TELEPHONE ENCOUNTER
Orders per Dr. Olivier Moctezuma, patient completely out of medication. Patient notified that refill sent.

## 2022-05-04 ENCOUNTER — HOSPITAL ENCOUNTER (OUTPATIENT)
Dept: PREADMISSION TESTING | Age: 67
Discharge: HOME | End: 2022-05-08
Payer: MEDICARE

## 2022-05-04 VITALS
RESPIRATION RATE: 18 BRPM | DIASTOLIC BLOOD PRESSURE: 97 MMHG | HEART RATE: 78 BPM | SYSTOLIC BLOOD PRESSURE: 153 MMHG | OXYGEN SATURATION: 100 % | WEIGHT: 206 LBS | TEMPERATURE: 98.2 F | BODY MASS INDEX: 31.22 KG/M2 | HEIGHT: 68 IN

## 2022-05-04 DIAGNOSIS — Z01.818 PREOP EXAMINATION: ICD-10-CM

## 2022-05-04 LAB
ANION GAP SERPL CALCULATED.3IONS-SCNC: 7 MMOL/L (ref 9–17)
BUN BLDV-MCNC: 22 MG/DL (ref 8–23)
CALCIUM SERPL-MCNC: 10.7 MG/DL (ref 8.6–10.4)
CHLORIDE BLD-SCNC: 102 MMOL/L (ref 98–107)
CO2: 29 MMOL/L (ref 20–31)
CREAT SERPL-MCNC: 1.1 MG/DL (ref 0.7–1.2)
GFR AFRICAN AMERICAN: >60 ML/MIN
GFR NON-AFRICAN AMERICAN: >60 ML/MIN
GFR SERPL CREATININE-BSD FRML MDRD: ABNORMAL ML/MIN/{1.73_M2}
GLUCOSE BLD-MCNC: 138 MG/DL (ref 70–99)
HCT VFR BLD CALC: 44.4 % (ref 41–53)
HEMOGLOBIN: 15.2 G/DL (ref 13.5–17.5)
MCH RBC QN AUTO: 30.4 PG (ref 26–34)
MCHC RBC AUTO-ENTMCNC: 34.1 G/DL (ref 31–37)
MCV RBC AUTO: 89.2 FL (ref 80–100)
PDW BLD-RTO: 14.3 % (ref 11.5–14.9)
PLATELET # BLD: 210 K/UL (ref 150–450)
PMV BLD AUTO: 7.5 FL (ref 6–12)
POTASSIUM SERPL-SCNC: 5 MMOL/L (ref 3.7–5.3)
RBC # BLD: 4.98 M/UL (ref 4.5–5.9)
SODIUM BLD-SCNC: 138 MMOL/L (ref 135–144)
WBC # BLD: 7.6 K/UL (ref 3.5–11)

## 2022-05-04 PROCEDURE — 85027 COMPLETE CBC AUTOMATED: CPT

## 2022-05-04 PROCEDURE — 80048 BASIC METABOLIC PNL TOTAL CA: CPT

## 2022-05-04 PROCEDURE — APPSS60 APP SPLIT SHARED TIME 46-60 MINUTES: Performed by: NURSE PRACTITIONER

## 2022-05-04 PROCEDURE — 36415 COLL VENOUS BLD VENIPUNCTURE: CPT

## 2022-05-04 PROCEDURE — 93005 ELECTROCARDIOGRAM TRACING: CPT | Performed by: NURSE PRACTITIONER

## 2022-05-04 ASSESSMENT — ENCOUNTER SYMPTOMS
GASTROINTESTINAL NEGATIVE: 1
BACK PAIN: 1
APNEA: 1

## 2022-05-04 NOTE — PROGRESS NOTES
Patient had PAT appt on 5-4 for sx on 5-18. Pt has history of difficult intubation. Dr. Lenward Goodpasture examined patient during PAT visit.

## 2022-05-04 NOTE — H&P (VIEW-ONLY)
HISTORY and [address]       NAME:  Uriah Davis  MRN: [id number]   YOB: 1955   Date: 5/4/2022   Age: 77 y.o. Gender: male     COMPLAINT AND PRESENT HISTORY:   Uriah Davis is 77 y.o.,  male, presents for pre-anesthesia/admission testing for L2-3 POSTERIOR LUMBAR DECOMPRESSION & INSTRUMENTED FUSION POSSIBLE REVISION INSTRUMENTATION L3-5 per Dr. Mayelin Ascencio    Primary dx: LUMBAR [place]. HPI:  Uriah Davis is 77 y.o.,  male, undergoing preadmission testing for Lumbar Stenosis. Present today with his wife. Pt has had lower back surgery LUMBAR LAMINECTOMY FUSION POSTERIOR - L3 SACRUM POSTERIOR LUMBAR DECOMPRESSION  & FUSION performed by Mayelin Ascencio three years ago. After one year and half he feel pain in the left lower back , radiated to the left leg . Pt describe his pain as electric shock in the lower back and goes down to his legs for one second or more. Pain getting worse over the time with limitation in the range of motion. Pt denies feeling of numbness or tingling in the left leg. Pt states he had couple fall due to imbalance gait. No redness or swelling in the left lower back or left leg. Pt denies any loss of bladder or bowel control. Pain aggravated by walking or standing . Pain elevated by changing the position , pt denies taking any  pain medication some time he use heating pad which it did help. Pt had PT done after the last surgery for about one month with improving at that time. Pt states he had CT myelogram two weeks ago which showed Severe spinal stenosis L2-L3.  No significant spinal stenosis at the surgical levels. Pt denies fever/chills, chest pain or SOB.      Testing completed r/t condition:  CT LUMBAR SPINE W CONTRAST      FINDINGS:   BONES/ALIGNMENT: There appear to be 6 lumbar-type vertebral bodies.  To   maintain consistency with previous reports the lowest mobile segment will be   referred to as L5-S1 with the site of hardware fusion L3 through S1 with   apparent hypoplastic ribs at T12.  Vertebral body heights are maintained. Slight levoconvex curvature of the spine.  Previous PLIF L3-S1 with bilateral   pedicle screws, posterior fusion rods, and metallic intervertebral disc   prostheses.  There is subtle lucency surrounding the screws at L3 which could   indicate changes of loosening.  Since prior exams there is progression of   advanced disc degenerative changes at L2-L3 with near complete loss of disc   space height, endplate sclerosis, osteophytes, vacuum disc phenomena. Endplate irregularity and cystic changes are probably related to advanced   disc degenerative changes favored over an infectious process.  The vertebral   body heights are maintained. No osseous destructive lesion is seen. Degenerative changes with partial bony bridging of the SI joints.  Bilateral   pars defects at L5 as noted previously with minimal spondylolisthesis.       SOFT TISSUES: No paraspinal mass is seen.  Prominent prostate gland.  Small   amount of soft tissue density along the posterior margin of the thecal sac at   the surgical levels.       L1-L2: There is no significant disc protrusion, central spinal canal stenosis   or neural foraminal narrowing.       L2-L3: Advanced disc degenerative changes.  Diffuse disc osteophyte complex,   bilateral facet arthropathy, ligamentum flavum thickening result in severe   spinal stenosis and bilateral lateral recess stenosis.  Moderate neural   foraminal stenoses, greater on the left.  Little contrast is shown below this   level until the patient placed in an upright position.       L3-L4: Posterior osteophytes with slight mass effect on the ventral aspect of   thecal sac.  Previous posterior decompression without significant spinal   stenosis.       L4-L5: Previous posterior decompression without significant spinal stenosis.       L5-S1: Postoperative changes without significant spinal stenosis.         Impression   Lumbar spondylotic changes with previous posterior decompression and fusion   L3-S1.       Severe spinal stenosis L2-L3.  No significant spinal stenosis at the surgical   levels.       Minimal lucency adjacent to the screws at L3.           Review of additional significant medical hx:  CAD, MI, HLD, HTN, PT HAS CARDIAC CATHETERIZATION 2019 WITH STENTS x2.  Pt denies chest pain , palpitation, dizziness, or syncope Pt following up with Dr Lindon [facility] and he has an appointment with him next week. Currently medication r/t condition :  PLAVIX,  PRAVASTATIN , LISINOPRIL , TOPROL XL, ASPRIN 81 MG. 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 Dr Abimael Perdomo was in the exam room and he examine the pt     Denies hx of MRSA infection. Denies hx of blood clots. Denies hx of any personal or family hx of complications w/anesthesia. Pt has PONV.      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    Ear infection     LT, being treated    Fatty liver     Gallstones     GERD (gastroesophageal reflux disease)     Headache(784.0)     History of MI (myocardial infarction)     Hypercholesteremia     Hyperlipidemia     Hyperparathyroidism ([address]  Hypertension     Kidney stones     Osteoarthritis     all joints    PONV (postoperative nausea and vomiting)     Sleep apnea     BIPAP nightly    Unspecified sleep apnea        SURGICAL HISTORY       Past Surgical History:   Procedure Laterality Date    BACK SURGERY  07/11/2019    BICEPS TENDON REPAIR Left     CARDIAC CATHETERIZATION  01/02/2019    with Dr. Juliana Tang, Stents X2    CARPAL TUNNEL RELEASE Right     CERVICAL FUSION      COLONOSCOPY  11 14 14    normal    ELBOW FRACTURE SURGERY Right     right arm/fracture    ENDOSCOPY, COLON, DIAGNOSTIC      several times    FRACTURE SURGERY Right     elbow    KNEE ARTHROSCOPY Bilateral     LIVER BIOPSY  8/14/2015    moderate steatosis, grade 2, lobular and portal inflammation: Grade 1 total score =3, fibrosis score 1A    LUMBAR FUSION N/A 7/10/2019    LUMBAR LAMINECTOMY FUSION POSTERIOR - L3 SACRUM POSTERIOR LUMBAR DECOMPRESSION  & FUSION performed by Lizzeth Bueno MD at [facility]  11/07/2018    lumbar myelogram    ROTATOR CUFF REPAIR Right        SOCIAL HISTORY       Social History     Socioeconomic History    Marital status:      Spouse name: None    Number of children: None    Years of education: None    Highest education level: None   Occupational History    Occupation: retired   Tobacco Use    Smoking status: Never Smoker    Smokeless tobacco: Never Used   Vaping Use    Vaping Use: Never used   Substance and Sexual Activity    Alcohol use: Never     Alcohol/week: 0.0 standard drinks    Drug use: Never    Sexual activity: None   Other Topics Concern    None   Social History Narrative    None     Social Determinants of Health     Financial Resource Strain: Low Risk     Difficulty of Paying Living Expenses: Not hard at all   Food Insecurity: No Food Insecurity    Worried About Running Out of Food in the Last Year: Never true    Darryl of Food in the Last Year: Never true   Transportation Needs: No Transportation Needs    Lack of Transportation (Medical): No    Lack of Transportation (Non-Medical):  No   Physical Activity:     Days of Exercise per Week: Not on file    Minutes of Exercise per Session: Not on file   Stress:     Feeling of Stress : Not on file   Social Connections:     Frequency of Communication with Friends and Family: Not on file    Frequency of Social Gatherings with Friends and Family: Not on file    Attends Bahai Services: Not on file    Active Member of Clubs or Organizations: Not on file    Attends Club or Organization Meetings: Not on file    Marital Status: Not on file   Intimate Partner Violence:     Fear of Current or Ex-Partner: Not on file    Emotionally Abused: Not on file    Physically Abused: Not on file    Sexually Abused: Not on file   Housing Stability:     Unable to Pay for Housing in the Last Year: Not on file    Number of Places Lived in the Last Year: Not on file    Unstable Housing in the Last Year: Not on file       REVIEW OF SYSTEMS      Allergies   Allergen Reactions    Morphine Other (See Comments)     Becomes very mean     Pcn [Penicillins] Swelling     face    Tramadol Itching    Codeine Nausea And Vomiting     Abd. pain       Current Outpatient Medications on File Prior to Encounter   Medication Sig Dispense Refill    FEROSUL 325 (65 Fe) MG tablet TAKE ONE TABLET BY MOUTH DAILY WITH BREAKFAST 90 tablet 1    finasteride (PROSCAR) 5 MG tablet TAKE ONE TABLET BY MOUTH DAILY 90 tablet 2    clopidogrel (PLAVIX) 75 MG tablet Take 75 mg by mouth daily      vitamin D (ERGOCALCIFEROL) 1.25 MG (42889 UT) CAPS capsule TAKE ONE CAPSULE BY MOUTH ONCE WEEKLY 12 capsule 2    fenofibrate (TRIGLIDE) 160 MG tablet TAKE ONE TABLET BY MOUTH DAILY 90 tablet 1    ezetimibe (ZETIA) 10 MG tablet TAKE ONE TABLET BY MOUTH DAILY 90 tablet 1    coenzyme Q-10 100 MG capsule TAKE ONE CAPSULE BY MOUTH DAILY 30 capsule 5    metFORMIN (GLUCOPHAGE) 1000 MG tablet TAKE ONE TABLET BY MOUTH TWICE A DAY WITH MEALS 180 tablet 1    omeprazole (PRILOSEC) 40 MG delayed release capsule TAKE ONE CAPSULE BY MOUTH DAILY 90 capsule 3    pravastatin (PRAVACHOL) 40 MG tablet TAKE ONE TABLET BY MOUTH DAILY 90 tablet 2    cinacalcet (SENSIPAR) 30 MG tablet Take 30 mg by mouth daily      tamsulosin (FLOMAX) 0.4 MG capsule TAKE ONE CAPSULE BY MOUTH TWICE A  capsule 2    lisinopril (PRINIVIL;ZESTRIL) 10 MG tablet Take 1 tablet by mouth daily 30 tablet 2    diclofenac (VOLTAREN) 75 MG EC tablet Take 75 mg by mouth 2 times daily      gabapentin (NEURONTIN) 300 MG capsule Take 600 mg by mouth 3 times daily.  metoprolol succinate (TOPROL XL) 100 MG extended release tablet Take 50 mg by mouth daily       aspirin 81 MG chewable tablet Take 1 tablet by mouth daily 30 tablet 3    E-400 400 UNITS capsule TAKE TWO CAPSULES BY MOUTH DAILY 60 capsule 10    nortriptyline (PAMELOR) 50 MG capsule Take 50 mg by mouth 2 times daily        No current facility-administered medications on file prior to encounter. Review of Systems   Constitutional: Positive for activity change. HENT: Positive for hearing loss. RIGHT EAR    Eyes: Positive for visual disturbance. EYE GLASSES   Respiratory: Positive for apnea. Cardiovascular: Negative. Gastrointestinal: Negative. History of loose BM    Genitourinary: Negative. Musculoskeletal: Positive for arthralgias and back pain. Skin: Negative. Neurological: Positive for weakness. Hematological: Bruises/bleeds easily. Psychiatric/Behavioral: Positive for sleep disturbance. Due to back pain        GENERAL PHYSICAL EXAM     Vitals: BP (!) 153/97   Pulse 78   Temp 98.2 °F (36.8 °C)   Resp 18   Ht 5' 8\" (1.727 m)   Wt 206 lb (93.4 kg)   SpO2 100%   BMI 31.32 kg/m²               Physical Exam  Constitutional:       General: He is not in acute distress. Appearance: Normal appearance. He is obese. He is not ill-appearing. HENT:      Head: Normocephalic. Right Ear: External ear normal.      Left Ear: External ear normal.      Nose: Nose normal.      Mouth/Throat:      Mouth: Mucous membranes are moist.   Eyes:      General:         Right eye: No discharge. Left eye: No discharge. Cardiovascular:      Rate and Rhythm: Normal rate and regular rhythm. Pulses: Normal pulses. Radial pulses are 2+ on the right side and 2+ on the left side. Dorsalis pedis pulses are 2+ on the right side and 2+ on the left side. Posterior tibial pulses are 2+ on the right side and 2+ on the left side. Heart sounds: Normal heart sounds. No murmur heard. No gallop. Comments: hypertensive  Pulmonary:      Effort: Pulmonary effort is normal. No respiratory distress. Breath sounds: Normal breath sounds. No wheezing or rales. Abdominal:      General: Bowel sounds are normal. There is no distension. Palpations: Abdomen is soft. There is no mass. Tenderness: There is no abdominal tenderness. Musculoskeletal:         General: Tenderness present. No swelling. Cervical back: Normal range of motion. Right lower leg: No edema. Left lower leg: No edema. Comments: Lower back tenderness with palpitation, limited back ROM. Skin intact, no redness. Skin:     General: Skin is warm and dry. Findings: No erythema. Neurological:      General: No focal deficit present. Mental Status: He is alert and oriented to person, place, and time. Motor: No weakness. Gait: Gait abnormal.      Comments: Pt has Shuffling gait , appears as he dragging his feet as he walk.    Psychiatric:         Mood and Affect: Mood normal.         Behavior: Behavior normal.       LAB REVIEW     Lab Results   Component Value Date    WBC 7.6 05/04/2022    HGB 15.2 05/04/2022    HCT 44.4 05/04/2022    MCV 89.2 05/04/2022     05/04/2022     Lab Results Component Value Date     05/04/2022    K 5.0 05/04/2022     05/04/2022    CO2 29 05/04/2022    BUN 22 05/04/2022    CREATININE 1.10 05/04/2022    GLUCOSE 138 05/04/2022    GLUCOSE 116 01/28/2012    CALCIUM 10.7 05/04/2022        PRELIMINARY EKG REVIEW, DATE:      ECG  5/4/2022  NORMAL SINUS RHYTHM  NORMAL ECG    SURGERY / PROVISIONAL DIAGNOSES:      L2-3 POSTERIOR LUMBAR DECOMPRESSION & INSTRUMENTED FUSION POSSIBLE REVISION INSTRUMENTATION L3-5    LUMBAR DISC HERNIATION    Patient Active Problem List    Diagnosis Date Noted    Spinal stenosis in cervical region 01/30/2020    Spondylolisthesis, lumbar region 01/30/2020    Foraminal stenosis of cervical region 01/30/2020    Cervical radiculopathy 01/30/2020    Neck pain 01/21/2020    Cervical spondylosis with myelopathy 01/21/2020    Bilateral carpal tunnel syndrome 01/21/2020    Postoperative hypotension     Back pain 07/10/2019    S/P cardiac catheterization 01/02/2019    Lumbar disc herniation 10/25/2018    Chronic low back pain 10/25/2018    Essential hypertension 10/23/2017    Degenerative disc disease, lumbar     Lumbar spinal stenosis     Pars defect with spondylolisthesis     Osteoarthritis of multiple joints 06/06/2017    Osteoarthritis of lumbar spine     Lumbar radiculopathy, chronic     Type 2 diabetes mellitus without complication, without long-term current use of insulin ([address] 09/06/2016    Steatosis of liver 09/01/2015    BPH (benign prostatic hyperplasia) 06/26/2015    Elevated liver enzymes 09/23/2014    Hyperlipidemia     Prostate disease     Osteoarthritis     Headache     GERD (gastroesophageal reflux disease)     Sleep apnea            Dr. Zahra Horton, anesthesia, was contacted and informed of patient's history and planned surgery. cardiac clearance requested.   Surgery scheduling will notify Dr. Stephanie Jones office who will be responsible for making sure the clearance is obtained and is in the chart for surgery.     Total time spent on encounter- PAT provider minutes: 41-50 minutes     SERA Gaytan CNP on 5/4/2022 at 12:15 PM

## 2022-05-04 NOTE — H&P
HISTORY and [id number]       NAME:  Mohit Andre  MRN: [id number]   YOB: 1955   Date: 5/4/2022   Age: 77 y.o. Gender: male     COMPLAINT AND PRESENT HISTORY:   Mohit Andre is 77 y.o.,  male, presents for pre-anesthesia/admission testing for L2-3 POSTERIOR LUMBAR DECOMPRESSION & INSTRUMENTED FUSION POSSIBLE REVISION INSTRUMENTATION L3-5 per Dr. Sai Baker    Primary dx: LUMBAR [place]. HPI:  Mohit Andre is 77 y.o.,  male, undergoing preadmission testing for Lumbar Stenosis. Present today with his wife. Pt has had lower back surgery LUMBAR LAMINECTOMY FUSION POSTERIOR - L3 SACRUM POSTERIOR LUMBAR DECOMPRESSION  & FUSION performed by Sai Baker three years ago. After one year and half he feel pain in the left lower back , radiated to the left leg . Pt describe his pain as electric shock in the lower back and goes down to his legs for one second or more. Pain getting worse over the time with limitation in the range of motion. Pt denies feeling of numbness or tingling in the left leg. Pt states he had couple fall due to imbalance gait. No redness or swelling in the left lower back or left leg. Pt denies any loss of bladder or bowel control. Pain aggravated by walking or standing . Pain elevated by changing the position , pt denies taking any  pain medication some time he use heating pad which it did help. Pt had PT done after the last surgery for about one month with improving at that time. Pt states he had CT myelogram two weeks ago which showed Severe spinal stenosis L2-L3.  No significant spinal stenosis at the surgical levels. Pt denies fever/chills, chest pain or SOB.      Testing completed r/t condition:  CT LUMBAR SPINE W CONTRAST      FINDINGS:   BONES/ALIGNMENT: There appear to be 6 lumbar-type vertebral bodies.  To   maintain consistency with previous reports the lowest mobile segment will be   referred to as L5-S1 with the site of hardware fusion L3 through S1 with   apparent hypoplastic ribs at T12.  Vertebral body heights are maintained. Slight levoconvex curvature of the spine.  Previous PLIF L3-S1 with bilateral   pedicle screws, posterior fusion rods, and metallic intervertebral disc   prostheses.  There is subtle lucency surrounding the screws at L3 which could   indicate changes of loosening.  Since prior exams there is progression of   advanced disc degenerative changes at L2-L3 with near complete loss of disc   space height, endplate sclerosis, osteophytes, vacuum disc phenomena. Endplate irregularity and cystic changes are probably related to advanced   disc degenerative changes favored over an infectious process.  The vertebral   body heights are maintained. No osseous destructive lesion is seen. Degenerative changes with partial bony bridging of the SI joints.  Bilateral   pars defects at L5 as noted previously with minimal spondylolisthesis.       SOFT TISSUES: No paraspinal mass is seen.  Prominent prostate gland.  Small   amount of soft tissue density along the posterior margin of the thecal sac at   the surgical levels.       L1-L2: There is no significant disc protrusion, central spinal canal stenosis   or neural foraminal narrowing.       L2-L3: Advanced disc degenerative changes.  Diffuse disc osteophyte complex,   bilateral facet arthropathy, ligamentum flavum thickening result in severe   spinal stenosis and bilateral lateral recess stenosis.  Moderate neural   foraminal stenoses, greater on the left.  Little contrast is shown below this   level until the patient placed in an upright position.       L3-L4: Posterior osteophytes with slight mass effect on the ventral aspect of   thecal sac.  Previous posterior decompression without significant spinal   stenosis.       L4-L5: Previous posterior decompression without significant spinal stenosis.       L5-S1: Postoperative changes without significant spinal stenosis.         Impression   Lumbar spondylotic changes with previous posterior decompression and fusion   L3-S1.       Severe spinal stenosis L2-L3.  No significant spinal stenosis at the surgical   levels.       Minimal lucency adjacent to the screws at L3.           Review of additional significant medical hx:  CAD, MI, HLD, HTN, PT HAS CARDIAC CATHETERIZATION 2019 WITH STENTS x2.  Pt denies chest pain , palpitation, dizziness, or syncope Pt following up with Dr Wade [facility] and he has an appointment with him next week. Currently medication r/t condition :  PLAVIX,  PRAVASTATIN , LISINOPRIL , TOPROL XL, ASPRIN 81 MG. 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 Dr Saadia Brooks was in the exam room and he examine the pt     Denies hx of MRSA infection. Denies hx of blood clots. Denies hx of any personal or family hx of complications w/anesthesia. Pt has PONV.      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    Ear infection     LT, being treated    Fatty liver     Gallstones     GERD (gastroesophageal reflux disease)     Headache(784.0)     History of MI (myocardial infarction)     Hypercholesteremia     Hyperlipidemia     Hyperparathyroidism ([address]  Hypertension     Kidney stones     Osteoarthritis     all joints    PONV (postoperative nausea and vomiting)     Sleep apnea     BIPAP nightly    Unspecified sleep apnea        SURGICAL HISTORY       Past Surgical History:   Procedure Laterality Date    BACK SURGERY  07/11/2019    BICEPS TENDON REPAIR Left     CARDIAC CATHETERIZATION  01/02/2019    with Dr. Jonnie Mittal, Stents X2    CARPAL TUNNEL RELEASE Right     CERVICAL FUSION      COLONOSCOPY  11 14 14    normal    ELBOW FRACTURE SURGERY Right     right arm/fracture    ENDOSCOPY, COLON, DIAGNOSTIC      several times    FRACTURE SURGERY Right     elbow    KNEE ARTHROSCOPY Bilateral     LIVER BIOPSY  8/14/2015    moderate steatosis, grade 2, lobular and portal inflammation: Grade 1 total score =3, fibrosis score 1A    LUMBAR FUSION N/A 7/10/2019    LUMBAR LAMINECTOMY FUSION POSTERIOR - L3 SACRUM POSTERIOR LUMBAR DECOMPRESSION  & FUSION performed by Becky Le MD at [facility] [address]  11/07/2018    lumbar myelogram    ROTATOR CUFF REPAIR Right        SOCIAL HISTORY       Social History     Socioeconomic History    Marital status:      Spouse name: None    Number of children: None    Years of education: None    Highest education level: None   Occupational History    Occupation: retired   Tobacco Use    Smoking status: Never Smoker    Smokeless tobacco: Never Used   Vaping Use    Vaping Use: Never used   Substance and Sexual Activity    Alcohol use: Never     Alcohol/week: 0.0 standard drinks    Drug use: Never    Sexual activity: None   Other Topics Concern    None   Social History Narrative    None     Social Determinants of Health     Financial Resource Strain: Low Risk     Difficulty of Paying Living Expenses: Not hard at all   Food Insecurity: No Food Insecurity    Worried About Running Out of Food in the Last Year: Never true    Darryl of Food in the Last Year: Never true   Transportation Needs: No Transportation Needs    Lack of Transportation (Medical): No    Lack of Transportation (Non-Medical):  No   Physical Activity:     Days of Exercise per Week: Not on file    Minutes of Exercise per Session: Not on file   Stress:     Feeling of Stress : Not on file   Social Connections:     Frequency of Communication with Friends and Family: Not on file    Frequency of Social Gatherings with Friends and Family: Not on file    Attends Jainism Services: Not on file    Active Member of Clubs or Organizations: Not on file    Attends Club or Organization Meetings: Not on file    Marital Status: Not on file   Intimate Partner Violence:     Fear of Current or Ex-Partner: Not on file    Emotionally Abused: Not on file    Physically Abused: Not on file    Sexually Abused: Not on file   Housing Stability:     Unable to Pay for Housing in the Last Year: Not on file    Number of Places Lived in the Last Year: Not on file    Unstable Housing in the Last Year: Not on file       REVIEW OF SYSTEMS      Allergies   Allergen Reactions    Morphine Other (See Comments)     Becomes very mean     Pcn [Penicillins] Swelling     face    Tramadol Itching    Codeine Nausea And Vomiting     Abd. pain       Current Outpatient Medications on File Prior to Encounter   Medication Sig Dispense Refill    FEROSUL 325 (65 Fe) MG tablet TAKE ONE TABLET BY MOUTH DAILY WITH BREAKFAST 90 tablet 1    finasteride (PROSCAR) 5 MG tablet TAKE ONE TABLET BY MOUTH DAILY 90 tablet 2    clopidogrel (PLAVIX) 75 MG tablet Take 75 mg by mouth daily      vitamin D (ERGOCALCIFEROL) 1.25 MG (11301 UT) CAPS capsule TAKE ONE CAPSULE BY MOUTH ONCE WEEKLY 12 capsule 2    fenofibrate (TRIGLIDE) 160 MG tablet TAKE ONE TABLET BY MOUTH DAILY 90 tablet 1    ezetimibe (ZETIA) 10 MG tablet TAKE ONE TABLET BY MOUTH DAILY 90 tablet 1    coenzyme Q-10 100 MG capsule TAKE ONE CAPSULE BY MOUTH DAILY 30 capsule 5    metFORMIN (GLUCOPHAGE) 1000 MG tablet TAKE ONE TABLET BY MOUTH TWICE A DAY WITH MEALS 180 tablet 1    omeprazole (PRILOSEC) 40 MG delayed release capsule TAKE ONE CAPSULE BY MOUTH DAILY 90 capsule 3    pravastatin (PRAVACHOL) 40 MG tablet TAKE ONE TABLET BY MOUTH DAILY 90 tablet 2    cinacalcet (SENSIPAR) 30 MG tablet Take 30 mg by mouth daily      tamsulosin (FLOMAX) 0.4 MG capsule TAKE ONE CAPSULE BY MOUTH TWICE A  capsule 2    lisinopril (PRINIVIL;ZESTRIL) 10 MG tablet Take 1 tablet by mouth daily 30 tablet 2    diclofenac (VOLTAREN) 75 MG EC tablet Take 75 mg by mouth 2 times daily      gabapentin (NEURONTIN) 300 MG capsule Take 600 mg by mouth 3 times daily.  metoprolol succinate (TOPROL XL) 100 MG extended release tablet Take 50 mg by mouth daily       aspirin 81 MG chewable tablet Take 1 tablet by mouth daily 30 tablet 3    E-400 400 UNITS capsule TAKE TWO CAPSULES BY MOUTH DAILY 60 capsule 10    nortriptyline (PAMELOR) 50 MG capsule Take 50 mg by mouth 2 times daily        No current facility-administered medications on file prior to encounter. Review of Systems   Constitutional: Positive for activity change. HENT: Positive for hearing loss. RIGHT EAR    Eyes: Positive for visual disturbance. EYE GLASSES   Respiratory: Positive for apnea. Cardiovascular: Negative. Gastrointestinal: Negative. History of loose BM    Genitourinary: Negative. Musculoskeletal: Positive for arthralgias and back pain. Skin: Negative. Neurological: Positive for weakness. Hematological: Bruises/bleeds easily. Psychiatric/Behavioral: Positive for sleep disturbance. Due to back pain        GENERAL PHYSICAL EXAM     Vitals: BP (!) 153/97   Pulse 78   Temp 98.2 °F (36.8 °C)   Resp 18   Ht 5' 8\" (1.727 m)   Wt 206 lb (93.4 kg)   SpO2 100%   BMI 31.32 kg/m²               Physical Exam  Constitutional:       General: He is not in acute distress. Appearance: Normal appearance. He is obese. He is not ill-appearing. HENT:      Head: Normocephalic. Right Ear: External ear normal.      Left Ear: External ear normal.      Nose: Nose normal.      Mouth/Throat:      Mouth: Mucous membranes are moist.   Eyes:      General:         Right eye: No discharge. Left eye: No discharge. Cardiovascular:      Rate and Rhythm: Normal rate and regular rhythm. Pulses: Normal pulses. Radial pulses are 2+ on the right side and 2+ on the left side. Dorsalis pedis pulses are 2+ on the right side and 2+ on the left side. Posterior tibial pulses are 2+ on the right side and 2+ on the left side. Heart sounds: Normal heart sounds. No murmur heard. No gallop. Comments: hypertensive  Pulmonary:      Effort: Pulmonary effort is normal. No respiratory distress. Breath sounds: Normal breath sounds. No wheezing or rales. Abdominal:      General: Bowel sounds are normal. There is no distension. Palpations: Abdomen is soft. There is no mass. Tenderness: There is no abdominal tenderness. Musculoskeletal:         General: Tenderness present. No swelling. Cervical back: Normal range of motion. Right lower leg: No edema. Left lower leg: No edema. Comments: Lower back tenderness with palpitation, limited back ROM. Skin intact, no redness. Skin:     General: Skin is warm and dry. Findings: No erythema. Neurological:      General: No focal deficit present. Mental Status: He is alert and oriented to person, place, and time. Motor: No weakness. Gait: Gait abnormal.      Comments: Pt has Shuffling gait , appears as he dragging his feet as he walk.    Psychiatric:         Mood and Affect: Mood normal.         Behavior: Behavior normal.       LAB REVIEW     Lab Results   Component Value Date    WBC 7.6 05/04/2022    HGB 15.2 05/04/2022    HCT 44.4 05/04/2022    MCV 89.2 05/04/2022     05/04/2022     Lab Results Component Value Date     05/04/2022    K 5.0 05/04/2022     05/04/2022    CO2 29 05/04/2022    BUN 22 05/04/2022    CREATININE 1.10 05/04/2022    GLUCOSE 138 05/04/2022    GLUCOSE 116 01/28/2012    CALCIUM 10.7 05/04/2022        PRELIMINARY EKG REVIEW, DATE:      ECG  5/4/2022  NORMAL SINUS RHYTHM  NORMAL ECG    SURGERY / PROVISIONAL DIAGNOSES:      L2-3 POSTERIOR LUMBAR DECOMPRESSION & INSTRUMENTED FUSION POSSIBLE REVISION INSTRUMENTATION L3-5    LUMBAR DISC HERNIATION    Patient Active Problem List    Diagnosis Date Noted    Spinal stenosis in cervical region 01/30/2020    Spondylolisthesis, lumbar region 01/30/2020    Foraminal stenosis of cervical region 01/30/2020    Cervical radiculopathy 01/30/2020    Neck pain 01/21/2020    Cervical spondylosis with myelopathy 01/21/2020    Bilateral carpal tunnel syndrome 01/21/2020    Postoperative hypotension     Back pain 07/10/2019    S/P cardiac catheterization 01/02/2019    Lumbar disc herniation 10/25/2018    Chronic low back pain 10/25/2018    Essential hypertension 10/23/2017    Degenerative disc disease, lumbar     Lumbar spinal stenosis     Pars defect with spondylolisthesis     Osteoarthritis of multiple joints 06/06/2017    Osteoarthritis of lumbar spine     Lumbar radiculopathy, chronic     Type 2 diabetes mellitus without complication, without long-term current use of insulin ([address] 09/06/2016    Steatosis of liver 09/01/2015    BPH (benign prostatic hyperplasia) 06/26/2015    Elevated liver enzymes 09/23/2014    Hyperlipidemia     Prostate disease     Osteoarthritis     Headache     GERD (gastroesophageal reflux disease)     Sleep apnea            Dr. Odette Faulkner, anesthesia, was contacted and informed of patient's history and planned surgery. cardiac clearance requested.   Surgery scheduling will notify Dr. Riley Mora office who will be responsible for making sure the clearance is obtained and is in the chart for surgery.     Total time spent on encounter- PAT provider minutes: 41-50 minutes     SERA Gaytan CNP on 5/4/2022 at 12:15 PM

## 2022-05-05 LAB
EKG ATRIAL RATE: 77 BPM
EKG P AXIS: 9 DEGREES
EKG P-R INTERVAL: 152 MS
EKG Q-T INTERVAL: 380 MS
EKG QRS DURATION: 116 MS
EKG QTC CALCULATION (BAZETT): 430 MS
EKG R AXIS: 35 DEGREES
EKG T AXIS: 59 DEGREES
EKG VENTRICULAR RATE: 77 BPM

## 2022-05-05 PROCEDURE — 93010 ELECTROCARDIOGRAM REPORT: CPT | Performed by: INTERNAL MEDICINE

## 2022-05-17 ENCOUNTER — ANESTHESIA EVENT (OUTPATIENT)
Dept: OPERATING ROOM | Age: 67
End: 2022-05-17
Payer: MEDICARE

## 2022-05-18 ENCOUNTER — APPOINTMENT (OUTPATIENT)
Dept: GENERAL RADIOLOGY | Age: 67
End: 2022-05-18
Attending: ORTHOPAEDIC SURGERY
Payer: MEDICARE

## 2022-05-18 ENCOUNTER — HOSPITAL ENCOUNTER (OUTPATIENT)
Age: 67
Setting detail: OBSERVATION
Discharge: NURSING, REHAB OR LONG-TERM CARE | End: 2022-05-20
Attending: ORTHOPAEDIC SURGERY | Admitting: ORTHOPAEDIC SURGERY
Payer: MEDICARE

## 2022-05-18 ENCOUNTER — ANESTHESIA (OUTPATIENT)
Dept: OPERATING ROOM | Age: 67
End: 2022-05-18
Payer: MEDICARE

## 2022-05-18 DIAGNOSIS — M48.02 SPINAL STENOSIS IN CERVICAL REGION: Primary | ICD-10-CM

## 2022-05-18 LAB
GLUCOSE BLD-MCNC: 155 MG/DL (ref 75–110)
GLUCOSE BLD-MCNC: 177 MG/DL (ref 75–110)

## 2022-05-18 PROCEDURE — 2720000010 HC SURG SUPPLY STERILE: Performed by: ORTHOPAEDIC SURGERY

## 2022-05-18 PROCEDURE — 2580000003 HC RX 258: Performed by: NURSE ANESTHETIST, CERTIFIED REGISTERED

## 2022-05-18 PROCEDURE — 6360000002 HC RX W HCPCS: Performed by: NURSE ANESTHETIST, CERTIFIED REGISTERED

## 2022-05-18 PROCEDURE — 6370000000 HC RX 637 (ALT 250 FOR IP): Performed by: ANESTHESIOLOGY

## 2022-05-18 PROCEDURE — 2500000003 HC RX 250 WO HCPCS: Performed by: NURSE ANESTHETIST, CERTIFIED REGISTERED

## 2022-05-18 PROCEDURE — 6370000000 HC RX 637 (ALT 250 FOR IP): Performed by: ORTHOPAEDIC SURGERY

## 2022-05-18 PROCEDURE — 7100000000 HC PACU RECOVERY - FIRST 15 MIN: Performed by: ORTHOPAEDIC SURGERY

## 2022-05-18 PROCEDURE — 3209999900 FLUORO FOR SURGICAL PROCEDURES

## 2022-05-18 PROCEDURE — 3600000013 HC SURGERY LEVEL 3 ADDTL 15MIN: Performed by: ORTHOPAEDIC SURGERY

## 2022-05-18 PROCEDURE — 2580000003 HC RX 258: Performed by: ANESTHESIOLOGY

## 2022-05-18 PROCEDURE — 3600000003 HC SURGERY LEVEL 3 BASE: Performed by: ORTHOPAEDIC SURGERY

## 2022-05-18 PROCEDURE — 7100000001 HC PACU RECOVERY - ADDTL 15 MIN: Performed by: ORTHOPAEDIC SURGERY

## 2022-05-18 PROCEDURE — 6360000002 HC RX W HCPCS: Performed by: ORTHOPAEDIC SURGERY

## 2022-05-18 PROCEDURE — 88300 SURGICAL PATH GROSS: CPT

## 2022-05-18 PROCEDURE — 82947 ASSAY GLUCOSE BLOOD QUANT: CPT

## 2022-05-18 PROCEDURE — 3700000001 HC ADD 15 MINUTES (ANESTHESIA): Performed by: ORTHOPAEDIC SURGERY

## 2022-05-18 PROCEDURE — 3700000000 HC ANESTHESIA ATTENDED CARE: Performed by: ORTHOPAEDIC SURGERY

## 2022-05-18 PROCEDURE — 2709999900 HC NON-CHARGEABLE SUPPLY: Performed by: ORTHOPAEDIC SURGERY

## 2022-05-18 PROCEDURE — C1713 ANCHOR/SCREW BN/BN,TIS/BN: HCPCS | Performed by: ORTHOPAEDIC SURGERY

## 2022-05-18 PROCEDURE — 1200000000 HC SEMI PRIVATE

## 2022-05-18 PROCEDURE — 2580000003 HC RX 258: Performed by: ORTHOPAEDIC SURGERY

## 2022-05-18 PROCEDURE — G0378 HOSPITAL OBSERVATION PER HR: HCPCS

## 2022-05-18 DEVICE — REVERE LOCKING CAP
Type: IMPLANTABLE DEVICE | Site: SPINE LUMBAR | Status: FUNCTIONAL
Brand: REVERE

## 2022-05-18 DEVICE — 6.5MM REVERE PEDICLE SCREW 45MM
Type: IMPLANTABLE DEVICE | Site: SPINE LUMBAR | Status: FUNCTIONAL
Brand: REVERE

## 2022-05-18 DEVICE — 5.5MM STRAIGHT ROD, 45MM
Type: IMPLANTABLE DEVICE | Site: SPINE LUMBAR | Status: FUNCTIONAL
Brand: REVERE

## 2022-05-18 DEVICE — 5.5MM STRAIGHT ROD, 75MM
Type: IMPLANTABLE DEVICE | Site: SPINE LUMBAR | Status: FUNCTIONAL
Brand: REVERE

## 2022-05-18 RX ORDER — ONDANSETRON 2 MG/ML
INJECTION INTRAMUSCULAR; INTRAVENOUS PRN
Status: DISCONTINUED | OUTPATIENT
Start: 2022-05-18 | End: 2022-05-18 | Stop reason: SDUPTHER

## 2022-05-18 RX ORDER — METOPROLOL SUCCINATE 50 MG/1
50 TABLET, EXTENDED RELEASE ORAL DAILY
Status: DISCONTINUED | OUTPATIENT
Start: 2022-05-19 | End: 2022-05-20 | Stop reason: HOSPADM

## 2022-05-18 RX ORDER — FENOFIBRATE 160 MG/1
160 TABLET ORAL DAILY
Status: DISCONTINUED | OUTPATIENT
Start: 2022-05-18 | End: 2022-05-20 | Stop reason: HOSPADM

## 2022-05-18 RX ORDER — SCOLOPAMINE TRANSDERMAL SYSTEM 1 MG/1
1 PATCH, EXTENDED RELEASE TRANSDERMAL ONCE
Status: DISCONTINUED | OUTPATIENT
Start: 2022-05-18 | End: 2022-05-20

## 2022-05-18 RX ORDER — SODIUM CHLORIDE 9 MG/ML
INJECTION, SOLUTION INTRAVENOUS PRN
Status: DISCONTINUED | OUTPATIENT
Start: 2022-05-18 | End: 2022-05-20 | Stop reason: HOSPADM

## 2022-05-18 RX ORDER — SODIUM CHLORIDE 0.9 % (FLUSH) 0.9 %
5-40 SYRINGE (ML) INJECTION EVERY 12 HOURS SCHEDULED
Status: DISCONTINUED | OUTPATIENT
Start: 2022-05-18 | End: 2022-05-20 | Stop reason: HOSPADM

## 2022-05-18 RX ORDER — LIDOCAINE HYDROCHLORIDE 10 MG/ML
INJECTION, SOLUTION EPIDURAL; INFILTRATION; INTRACAUDAL; PERINEURAL PRN
Status: DISCONTINUED | OUTPATIENT
Start: 2022-05-18 | End: 2022-05-18 | Stop reason: SDUPTHER

## 2022-05-18 RX ORDER — OXYCODONE HYDROCHLORIDE 5 MG/1
5 TABLET ORAL EVERY 4 HOURS PRN
Status: DISCONTINUED | OUTPATIENT
Start: 2022-05-18 | End: 2022-05-20 | Stop reason: HOSPADM

## 2022-05-18 RX ORDER — GLYCOPYRROLATE 0.2 MG/ML
INJECTION INTRAMUSCULAR; INTRAVENOUS PRN
Status: DISCONTINUED | OUTPATIENT
Start: 2022-05-18 | End: 2022-05-18 | Stop reason: SDUPTHER

## 2022-05-18 RX ORDER — NEOSTIGMINE METHYLSULFATE 5 MG/5 ML
SYRINGE (ML) INTRAVENOUS PRN
Status: DISCONTINUED | OUTPATIENT
Start: 2022-05-18 | End: 2022-05-18 | Stop reason: SDUPTHER

## 2022-05-18 RX ORDER — ROCURONIUM BROMIDE 10 MG/ML
INJECTION, SOLUTION INTRAVENOUS PRN
Status: DISCONTINUED | OUTPATIENT
Start: 2022-05-18 | End: 2022-05-18 | Stop reason: SDUPTHER

## 2022-05-18 RX ORDER — ONDANSETRON 2 MG/ML
4 INJECTION INTRAMUSCULAR; INTRAVENOUS EVERY 6 HOURS PRN
Status: DISCONTINUED | OUTPATIENT
Start: 2022-05-18 | End: 2022-05-20 | Stop reason: HOSPADM

## 2022-05-18 RX ORDER — CINACALCET 30 MG/1
30 TABLET, FILM COATED ORAL DAILY
Status: DISCONTINUED | OUTPATIENT
Start: 2022-05-18 | End: 2022-05-20 | Stop reason: HOSPADM

## 2022-05-18 RX ORDER — METOCLOPRAMIDE HYDROCHLORIDE 5 MG/ML
10 INJECTION INTRAMUSCULAR; INTRAVENOUS
Status: DISCONTINUED | OUTPATIENT
Start: 2022-05-18 | End: 2022-05-18 | Stop reason: HOSPADM

## 2022-05-18 RX ORDER — SODIUM CHLORIDE 0.9 % (FLUSH) 0.9 %
5-40 SYRINGE (ML) INJECTION PRN
Status: DISCONTINUED | OUTPATIENT
Start: 2022-05-18 | End: 2022-05-18 | Stop reason: HOSPADM

## 2022-05-18 RX ORDER — PROMETHAZINE HYDROCHLORIDE 25 MG/1
12.5 TABLET ORAL EVERY 6 HOURS PRN
Status: DISCONTINUED | OUTPATIENT
Start: 2022-05-18 | End: 2022-05-20 | Stop reason: HOSPADM

## 2022-05-18 RX ORDER — KETAMINE HYDROCHLORIDE 10 MG/ML
INJECTION, SOLUTION INTRAMUSCULAR; INTRAVENOUS PRN
Status: DISCONTINUED | OUTPATIENT
Start: 2022-05-18 | End: 2022-05-18 | Stop reason: SDUPTHER

## 2022-05-18 RX ORDER — TAMSULOSIN HYDROCHLORIDE 0.4 MG/1
0.4 CAPSULE ORAL DAILY
Status: DISCONTINUED | OUTPATIENT
Start: 2022-05-18 | End: 2022-05-20 | Stop reason: HOSPADM

## 2022-05-18 RX ORDER — MIDAZOLAM HYDROCHLORIDE 1 MG/ML
INJECTION INTRAMUSCULAR; INTRAVENOUS PRN
Status: DISCONTINUED | OUTPATIENT
Start: 2022-05-18 | End: 2022-05-18 | Stop reason: SDUPTHER

## 2022-05-18 RX ORDER — SODIUM CHLORIDE 0.9 % (FLUSH) 0.9 %
5-40 SYRINGE (ML) INJECTION EVERY 12 HOURS SCHEDULED
Status: DISCONTINUED | OUTPATIENT
Start: 2022-05-18 | End: 2022-05-18 | Stop reason: HOSPADM

## 2022-05-18 RX ORDER — PRAVASTATIN SODIUM 40 MG
40 TABLET ORAL NIGHTLY
Status: DISCONTINUED | OUTPATIENT
Start: 2022-05-18 | End: 2022-05-20 | Stop reason: HOSPADM

## 2022-05-18 RX ORDER — FENTANYL CITRATE 50 UG/ML
25 INJECTION, SOLUTION INTRAMUSCULAR; INTRAVENOUS EVERY 5 MIN PRN
Status: DISCONTINUED | OUTPATIENT
Start: 2022-05-18 | End: 2022-05-18 | Stop reason: HOSPADM

## 2022-05-18 RX ORDER — EPHEDRINE SULFATE/0.9% NACL/PF 50 MG/5 ML
SYRINGE (ML) INTRAVENOUS PRN
Status: DISCONTINUED | OUTPATIENT
Start: 2022-05-18 | End: 2022-05-18 | Stop reason: SDUPTHER

## 2022-05-18 RX ORDER — FENTANYL CITRATE 50 UG/ML
INJECTION, SOLUTION INTRAMUSCULAR; INTRAVENOUS PRN
Status: DISCONTINUED | OUTPATIENT
Start: 2022-05-18 | End: 2022-05-18 | Stop reason: SDUPTHER

## 2022-05-18 RX ORDER — GABAPENTIN 300 MG/1
600 CAPSULE ORAL 3 TIMES DAILY
Status: DISCONTINUED | OUTPATIENT
Start: 2022-05-18 | End: 2022-05-20 | Stop reason: HOSPADM

## 2022-05-18 RX ORDER — NORTRIPTYLINE HYDROCHLORIDE 50 MG/1
50 CAPSULE ORAL 2 TIMES DAILY
Status: DISCONTINUED | OUTPATIENT
Start: 2022-05-18 | End: 2022-05-20 | Stop reason: HOSPADM

## 2022-05-18 RX ORDER — PHENYLEPHRINE HYDROCHLORIDE 10 MG/ML
INJECTION INTRAVENOUS PRN
Status: DISCONTINUED | OUTPATIENT
Start: 2022-05-18 | End: 2022-05-18 | Stop reason: SDUPTHER

## 2022-05-18 RX ORDER — DEXAMETHASONE SODIUM PHOSPHATE 4 MG/ML
INJECTION, SOLUTION INTRA-ARTICULAR; INTRALESIONAL; INTRAMUSCULAR; INTRAVENOUS; SOFT TISSUE PRN
Status: DISCONTINUED | OUTPATIENT
Start: 2022-05-18 | End: 2022-05-18 | Stop reason: SDUPTHER

## 2022-05-18 RX ORDER — SODIUM CHLORIDE 9 MG/ML
25 INJECTION, SOLUTION INTRAVENOUS PRN
Status: DISCONTINUED | OUTPATIENT
Start: 2022-05-18 | End: 2022-05-18 | Stop reason: HOSPADM

## 2022-05-18 RX ORDER — FERROUS SULFATE 325(65) MG
325 TABLET ORAL
Status: DISCONTINUED | OUTPATIENT
Start: 2022-05-19 | End: 2022-05-20 | Stop reason: HOSPADM

## 2022-05-18 RX ORDER — PROPOFOL 10 MG/ML
INJECTION, EMULSION INTRAVENOUS CONTINUOUS PRN
Status: DISCONTINUED | OUTPATIENT
Start: 2022-05-18 | End: 2022-05-18 | Stop reason: SDUPTHER

## 2022-05-18 RX ORDER — SODIUM CHLORIDE 9 MG/ML
INJECTION, SOLUTION INTRAVENOUS CONTINUOUS
Status: DISCONTINUED | OUTPATIENT
Start: 2022-05-18 | End: 2022-05-18 | Stop reason: HOSPADM

## 2022-05-18 RX ORDER — LISINOPRIL 10 MG/1
10 TABLET ORAL DAILY
Status: DISCONTINUED | OUTPATIENT
Start: 2022-05-18 | End: 2022-05-18

## 2022-05-18 RX ORDER — LABETALOL HYDROCHLORIDE 5 MG/ML
10 INJECTION, SOLUTION INTRAVENOUS
Status: DISCONTINUED | OUTPATIENT
Start: 2022-05-18 | End: 2022-05-18 | Stop reason: HOSPADM

## 2022-05-18 RX ORDER — PANTOPRAZOLE SODIUM 40 MG/1
40 TABLET, DELAYED RELEASE ORAL
Status: DISCONTINUED | OUTPATIENT
Start: 2022-05-19 | End: 2022-05-20 | Stop reason: HOSPADM

## 2022-05-18 RX ORDER — LISINOPRIL 10 MG/1
10 TABLET ORAL DAILY
Status: DISCONTINUED | OUTPATIENT
Start: 2022-05-19 | End: 2022-05-20 | Stop reason: HOSPADM

## 2022-05-18 RX ORDER — CEFAZOLIN SODIUM 1 G/3ML
INJECTION, POWDER, FOR SOLUTION INTRAMUSCULAR; INTRAVENOUS PRN
Status: DISCONTINUED | OUTPATIENT
Start: 2022-05-18 | End: 2022-05-18 | Stop reason: SDUPTHER

## 2022-05-18 RX ORDER — OXYCODONE HYDROCHLORIDE 10 MG/1
10 TABLET ORAL EVERY 4 HOURS PRN
Status: DISCONTINUED | OUTPATIENT
Start: 2022-05-18 | End: 2022-05-20 | Stop reason: HOSPADM

## 2022-05-18 RX ORDER — DIPHENHYDRAMINE HYDROCHLORIDE 50 MG/ML
12.5 INJECTION INTRAMUSCULAR; INTRAVENOUS
Status: DISCONTINUED | OUTPATIENT
Start: 2022-05-18 | End: 2022-05-18 | Stop reason: HOSPADM

## 2022-05-18 RX ORDER — FINASTERIDE 5 MG/1
5 TABLET, FILM COATED ORAL DAILY
Status: DISCONTINUED | OUTPATIENT
Start: 2022-05-18 | End: 2022-05-20 | Stop reason: HOSPADM

## 2022-05-18 RX ORDER — ACETAMINOPHEN 325 MG/1
650 TABLET ORAL EVERY 6 HOURS
Status: DISCONTINUED | OUTPATIENT
Start: 2022-05-18 | End: 2022-05-20 | Stop reason: HOSPADM

## 2022-05-18 RX ORDER — PROPOFOL 10 MG/ML
INJECTION, EMULSION INTRAVENOUS PRN
Status: DISCONTINUED | OUTPATIENT
Start: 2022-05-18 | End: 2022-05-18 | Stop reason: SDUPTHER

## 2022-05-18 RX ORDER — SODIUM CHLORIDE 9 MG/ML
INJECTION, SOLUTION INTRAVENOUS PRN
Status: DISCONTINUED | OUTPATIENT
Start: 2022-05-18 | End: 2022-05-18 | Stop reason: HOSPADM

## 2022-05-18 RX ORDER — ONDANSETRON 2 MG/ML
4 INJECTION INTRAMUSCULAR; INTRAVENOUS
Status: DISCONTINUED | OUTPATIENT
Start: 2022-05-18 | End: 2022-05-18 | Stop reason: HOSPADM

## 2022-05-18 RX ORDER — EZETIMIBE 10 MG/1
10 TABLET ORAL DAILY
Status: DISCONTINUED | OUTPATIENT
Start: 2022-05-18 | End: 2022-05-20 | Stop reason: HOSPADM

## 2022-05-18 RX ORDER — LIDOCAINE HYDROCHLORIDE 10 MG/ML
1 INJECTION, SOLUTION EPIDURAL; INFILTRATION; INTRACAUDAL; PERINEURAL
Status: DISCONTINUED | OUTPATIENT
Start: 2022-05-18 | End: 2022-05-18 | Stop reason: HOSPADM

## 2022-05-18 RX ORDER — SODIUM CHLORIDE 0.9 % (FLUSH) 0.9 %
5-40 SYRINGE (ML) INJECTION PRN
Status: DISCONTINUED | OUTPATIENT
Start: 2022-05-18 | End: 2022-05-20 | Stop reason: HOSPADM

## 2022-05-18 RX ORDER — TRANEXAMIC ACID 100 MG/ML
INJECTION, SOLUTION INTRAVENOUS PRN
Status: DISCONTINUED | OUTPATIENT
Start: 2022-05-18 | End: 2022-05-18 | Stop reason: SDUPTHER

## 2022-05-18 RX ORDER — VANCOMYCIN HYDROCHLORIDE 1 G/20ML
INJECTION, POWDER, LYOPHILIZED, FOR SOLUTION INTRAVENOUS PRN
Status: DISCONTINUED | OUTPATIENT
Start: 2022-05-18 | End: 2022-05-18 | Stop reason: ALTCHOICE

## 2022-05-18 RX ORDER — HYDRALAZINE HYDROCHLORIDE 20 MG/ML
10 INJECTION INTRAMUSCULAR; INTRAVENOUS
Status: DISCONTINUED | OUTPATIENT
Start: 2022-05-18 | End: 2022-05-18 | Stop reason: HOSPADM

## 2022-05-18 RX ADMIN — ACETAMINOPHEN 650 MG: 325 TABLET ORAL at 20:21

## 2022-05-18 RX ADMIN — GABAPENTIN 600 MG: 300 CAPSULE ORAL at 20:21

## 2022-05-18 RX ADMIN — ONDANSETRON 4 MG: 2 INJECTION INTRAMUSCULAR; INTRAVENOUS at 11:10

## 2022-05-18 RX ADMIN — PHENYLEPHRINE HYDROCHLORIDE 30 MCG/MIN: 10 INJECTION INTRAVENOUS at 09:04

## 2022-05-18 RX ADMIN — SODIUM CHLORIDE 25 ML: 9 INJECTION, SOLUTION INTRAVENOUS at 17:16

## 2022-05-18 RX ADMIN — PROPOFOL 50 MCG/KG/MIN: 10 INJECTION, EMULSION INTRAVENOUS at 07:58

## 2022-05-18 RX ADMIN — SODIUM CHLORIDE: 9 INJECTION, SOLUTION INTRAVENOUS at 23:51

## 2022-05-18 RX ADMIN — FENTANYL CITRATE 50 MCG: 50 INJECTION, SOLUTION INTRAMUSCULAR; INTRAVENOUS at 08:11

## 2022-05-18 RX ADMIN — NORTRIPTYLINE HYDROCHLORIDE 50 MG: 50 CAPSULE ORAL at 20:21

## 2022-05-18 RX ADMIN — CEFAZOLIN 2000 MG: 10 INJECTION, POWDER, FOR SOLUTION INTRAVENOUS at 17:18

## 2022-05-18 RX ADMIN — OXYCODONE HYDROCHLORIDE 10 MG: 10 TABLET ORAL at 22:55

## 2022-05-18 RX ADMIN — LIDOCAINE HYDROCHLORIDE 40 MG: 10 INJECTION, SOLUTION EPIDURAL; INFILTRATION; INTRACAUDAL; PERINEURAL at 07:38

## 2022-05-18 RX ADMIN — MIDAZOLAM 2 MG: 1 INJECTION INTRAMUSCULAR; INTRAVENOUS at 07:33

## 2022-05-18 RX ADMIN — SODIUM CHLORIDE: 9 INJECTION, SOLUTION INTRAVENOUS at 06:19

## 2022-05-18 RX ADMIN — FENTANYL CITRATE 50 MCG: 50 INJECTION, SOLUTION INTRAMUSCULAR; INTRAVENOUS at 11:04

## 2022-05-18 RX ADMIN — OXYCODONE HYDROCHLORIDE 10 MG: 10 TABLET ORAL at 13:22

## 2022-05-18 RX ADMIN — Medication 10 MG: at 08:45

## 2022-05-18 RX ADMIN — GLYCOPYRROLATE 0.2 MG: 0.2 INJECTION INTRAMUSCULAR; INTRAVENOUS at 11:26

## 2022-05-18 RX ADMIN — OXYCODONE HYDROCHLORIDE 10 MG: 10 TABLET ORAL at 17:07

## 2022-05-18 RX ADMIN — CEFAZOLIN 2000 MG: 1 INJECTION, POWDER, FOR SOLUTION INTRAMUSCULAR; INTRAVENOUS at 08:00

## 2022-05-18 RX ADMIN — PHENYLEPHRINE HYDROCHLORIDE 200 MCG: 10 INJECTION INTRAVENOUS at 08:55

## 2022-05-18 RX ADMIN — TAMSULOSIN HYDROCHLORIDE 0.4 MG: 0.4 CAPSULE ORAL at 15:36

## 2022-05-18 RX ADMIN — PROPOFOL 200 MG: 10 INJECTION, EMULSION INTRAVENOUS at 07:38

## 2022-05-18 RX ADMIN — EZETIMIBE 10 MG: 10 TABLET ORAL at 15:35

## 2022-05-18 RX ADMIN — TRANEXAMIC ACID 1000 MG: 100 INJECTION INTRAVENOUS at 08:06

## 2022-05-18 RX ADMIN — PHENYLEPHRINE HYDROCHLORIDE 200 MCG: 10 INJECTION INTRAVENOUS at 08:49

## 2022-05-18 RX ADMIN — FENTANYL CITRATE 50 MCG: 50 INJECTION, SOLUTION INTRAMUSCULAR; INTRAVENOUS at 09:17

## 2022-05-18 RX ADMIN — Medication 10 MG: at 08:56

## 2022-05-18 RX ADMIN — DEXAMETHASONE SODIUM PHOSPHATE 10 MG: 4 INJECTION, SOLUTION INTRAMUSCULAR; INTRAVENOUS at 08:32

## 2022-05-18 RX ADMIN — ACETAMINOPHEN 650 MG: 325 TABLET ORAL at 15:35

## 2022-05-18 RX ADMIN — FINASTERIDE 5 MG: 5 TABLET, FILM COATED ORAL at 15:35

## 2022-05-18 RX ADMIN — SODIUM CHLORIDE, PRESERVATIVE FREE 10 ML: 5 INJECTION INTRAVENOUS at 20:22

## 2022-05-18 RX ADMIN — METFORMIN HYDROCHLORIDE 1000 MG: 1000 TABLET ORAL at 17:08

## 2022-05-18 RX ADMIN — Medication 10 MG: at 08:38

## 2022-05-18 RX ADMIN — ROCURONIUM BROMIDE 50 MG: 50 INJECTION, SOLUTION INTRAVENOUS at 07:38

## 2022-05-18 RX ADMIN — FENTANYL CITRATE 50 MCG: 50 INJECTION, SOLUTION INTRAMUSCULAR; INTRAVENOUS at 09:22

## 2022-05-18 RX ADMIN — Medication 2 MG: at 11:26

## 2022-05-18 RX ADMIN — SODIUM CHLORIDE: 9 INJECTION, SOLUTION INTRAVENOUS at 09:21

## 2022-05-18 RX ADMIN — PRAVASTATIN SODIUM 40 MG: 40 TABLET ORAL at 20:21

## 2022-05-18 RX ADMIN — FENOFIBRATE 160 MG: 160 TABLET ORAL at 15:35

## 2022-05-18 RX ADMIN — NORTRIPTYLINE HYDROCHLORIDE 50 MG: 50 CAPSULE ORAL at 17:08

## 2022-05-18 RX ADMIN — GABAPENTIN 600 MG: 300 CAPSULE ORAL at 15:35

## 2022-05-18 RX ADMIN — CEFAZOLIN 2000 MG: 10 INJECTION, POWDER, FOR SOLUTION INTRAVENOUS at 23:52

## 2022-05-18 RX ADMIN — TRANEXAMIC ACID 1000 MG: 100 INJECTION INTRAVENOUS at 11:15

## 2022-05-18 RX ADMIN — Medication 10 MG: at 08:30

## 2022-05-18 RX ADMIN — FENTANYL CITRATE 100 MCG: 50 INJECTION, SOLUTION INTRAMUSCULAR; INTRAVENOUS at 07:38

## 2022-05-18 RX ADMIN — KETAMINE HYDROCHLORIDE 50 MG: 10 INJECTION INTRAMUSCULAR; INTRAVENOUS at 09:17

## 2022-05-18 ASSESSMENT — PAIN - FUNCTIONAL ASSESSMENT
PAIN_FUNCTIONAL_ASSESSMENT: 0-10
PAIN_FUNCTIONAL_ASSESSMENT: PREVENTS OR INTERFERES SOME ACTIVE ACTIVITIES AND ADLS

## 2022-05-18 ASSESSMENT — PAIN SCALES - GENERAL
PAINLEVEL_OUTOF10: 6
PAINLEVEL_OUTOF10: 7
PAINLEVEL_OUTOF10: 6
PAINLEVEL_OUTOF10: 10
PAINLEVEL_OUTOF10: 6

## 2022-05-18 ASSESSMENT — PAIN DESCRIPTION - LOCATION
LOCATION: BACK
LOCATION: BACK

## 2022-05-18 ASSESSMENT — PAIN DESCRIPTION - DESCRIPTORS
DESCRIPTORS: SHARP;SHOOTING
DESCRIPTORS: SHARP;SHOOTING;SQUEEZING

## 2022-05-18 ASSESSMENT — PAIN DESCRIPTION - ORIENTATION: ORIENTATION: LOWER

## 2022-05-18 NOTE — INTERVAL H&P NOTE
Update History & Physical    The patient's History and Physical of May 4, 2022 was reviewed with the patient and I examined the patient. There was no change. The surgical site was confirmed by the patient and me. L2-3 POSTERIOR LUMBAR DECOMPRESSION & INSTRUMENTED FUSION POSSIBLE REVISION INSTRUMENTATION L3-5 per Dr. Akbar Hawk  Pt denies fever/chills, chest pain or SOB  Pt NPO since the past midnight, pt took Prilosec lisinopril, and Toprol XL with sip of water today   Pt states he stopped taking Plavix two weeks ago   Physical exam remains unchanged  Denies hx of MRSA infection. Denies hx of blood clots. Denies hx of any personal or family hx of complications w/anesthesia. Pt has PONV. See nursing flow sheet for vital sings   Cardiac clearance obtain and in the pt's chart  Lab Results   Component Value Date    WBC 7.6 05/04/2022    HGB 15.2 05/04/2022    HCT 44.4 05/04/2022    MCV 89.2 05/04/2022     05/04/2022     Lab Results   Component Value Date     05/04/2022    K 5.0 05/04/2022     05/04/2022    CO2 29 05/04/2022    BUN 22 05/04/2022    CREATININE 1.10 05/04/2022    GLUCOSE 138 05/04/2022    GLUCOSE 116 01/28/2012    CALCIUM 10.7 05/04/2022          Plan: The risks, benefits, expected outcome, and alternative to the recommended procedure have been discussed with the patient. Patient understands and wants to proceed with the procedure.      Electronically signed by SERA Guadarrama CNP on 5/18/2022 at 5:34 AM

## 2022-05-18 NOTE — ANESTHESIA PRE PROCEDURE
Department of Anesthesiology  Preprocedure Note       Name:  Rosaura Dong   Age:  77 y.o.  :  1955                                          MRN:  [id number]         Date:  2022      Surgeon: Mis Morrison):  Lizzeth Bueno MD    Procedure: Procedure(s):  L2-3 POSTERIOR LUMBAR DECOMPRESSION & INSTRUMENTED FUSION POSSIBLE REVISION INSTRUMENTATION L3-5    Medications prior to admission:   Prior to Admission medications    Medication Sig Start Date End Date Taking?  Authorizing Provider   metFORMIN (GLUCOPHAGE) 1000 MG tablet TAKE ONE TABLET BY MOUTH TWICE A DAY WITH MEALS 22   Gwendolyn Calle PA-C   lisinopril (PRINIVIL;ZESTRIL) 10 MG tablet Take 1 tablet by mouth daily 22   Gwendolyn Calle PA-C   FEROSUL 325 (65 Fe) MG tablet TAKE ONE TABLET BY MOUTH DAILY WITH BREAKFAST 22   SERA Ayoub CNP   finasteride (PROSCAR) 5 MG tablet TAKE ONE TABLET BY MOUTH DAILY 22   Yanet Grant MD   clopidogrel (PLAVIX) 75 MG tablet Take 75 mg by mouth daily    Historical Provider, MD   vitamin D (ERGOCALCIFEROL) 1.25 MG (10724 UT) CAPS capsule TAKE ONE CAPSULE BY MOUTH ONCE WEEKLY 3/16/22   Gwendolyn Calle PA-C   fenofibrate (TRIGLIDE) 160 MG tablet TAKE ONE TABLET BY MOUTH DAILY 22   Gwendolyn Calle PA-C   ezetimibe (ZETIA) 10 MG tablet TAKE ONE TABLET BY MOUTH DAILY 22   Gwendolyn Calle PA-C   coenzyme Q-10 100 MG capsule TAKE ONE CAPSULE BY MOUTH DAILY 1/3/22   Gwendolyn Calle PA-C   omeprazole (PRILOSEC) 40 MG delayed release capsule TAKE ONE CAPSULE BY MOUTH DAILY 21   Gwendolyn Calle PA-C   pravastatin (PRAVACHOL) 40 MG tablet TAKE ONE TABLET BY MOUTH DAILY 10/22/21   Gwendolyn Calle PA-C   cinacalcet (SENSIPAR) 30 MG tablet Take 30 mg by mouth daily    Historical Provider, MD   tamsulosin (FLOMAX) 0.4 MG capsule TAKE ONE CAPSULE BY MOUTH TWICE A DAY 21   Yanet Grant MD   diclofenac (VOLTAREN) 75 MG EC tablet Take 75 mg by mouth 2 times daily    Historical Provider, MD   gabapentin (NEURONTIN) 300 MG capsule Take 600 mg by mouth 3 times daily. Historical Provider, MD   metoprolol succinate (TOPROL XL) 100 MG extended release tablet Take 50 mg by mouth daily     Historical Provider, MD   aspirin 81 MG chewable tablet Take 1 tablet by mouth daily 1/4/19   Gabriel HaddadSERA - CNP   E-400 400 UNITS capsule TAKE TWO CAPSULES BY MOUTH DAILY 9/23/16   Catherine Helton MD   nortriptyline (PAMELOR) 50 MG capsule Take 50 mg by mouth 2 times daily  3/31/16   Historical Provider, MD       Current medications:    Current Facility-Administered Medications   Medication Dose Route Frequency Provider Last Rate Last Admin    lidocaine PF 1 % injection 1 mL  1 mL IntraDERmal Once PRN Vernon Markham MD        sodium chloride flush 0.9 % injection 5-40 mL  5-40 mL IntraVENous 2 times per day Vernon Markham MD        sodium chloride flush 0.9 % injection 5-40 mL  5-40 mL IntraVENous PRN [place] MD Godwin        0.9 % sodium chloride infusion   IntraVENous PRN [place] MD Godwin        0.9 % sodium chloride infusion   IntraVENous Continuous Vernon Markham  mL/hr at 05/18/22 0619 New Bag at 05/18/22 8602       Allergies:     Allergies   Allergen Reactions    Morphine Other (See Comments)     Becomes very mean     Pcn [Penicillins] Swelling     face    Tramadol Itching    Codeine Nausea And Vomiting     Abd. pain       Problem List:    Patient Active Problem List   Diagnosis Code    Hyperlipidemia E78.5    Prostate disease N42.9    Osteoarthritis M19.90    Headache R51.9    GERD (gastroesophageal reflux disease) K21.9    Sleep apnea G47.30    Elevated liver enzymes R74.8    BPH (benign prostatic hyperplasia) N40.0    Steatosis of liver K76.0    Type 2 diabetes mellitus without complication, without long-term current use of insulin (HCC) E11.9    Lumbar radiculopathy, chronic M54.16    Osteoarthritis of lumbar spine M47.816    Osteoarthritis of multiple joints M15.9    Degenerative disc disease, lumbar M51.36    Lumbar spinal stenosis M48.061    Pars defect with spondylolisthesis M43.00, M43.10    Essential hypertension I10    Lumbar disc herniation M51.26    Chronic low back pain M54.50, G89.29    S/P cardiac catheterization Z98.890    Back pain M54.9    Postoperative hypotension I95.89    Neck pain M54.2    Cervical spondylosis with myelopathy M47.12    Bilateral carpal tunnel syndrome G56.03    Spinal stenosis in cervical region M48.02    Spondylolisthesis, lumbar region M43.16    Foraminal stenosis of cervical region M48.02    Cervical radiculopathy M54.12    Preop examination Z01.818       Past Medical History:        Diagnosis Date    Abdominal hernia     Anesthesia     phrenic nerve damaged lt side    BPH (benign prostatic hyperplasia)     CAD (coronary artery disease)     Chronic back pain     Diabetes mellitus (HCC)     Type 2    Difficult intubation     per pt throat collapses, lt side phrenic nerve was damaged    Ear infection     LT, being treated    Fatty liver     Gallstones     GERD (gastroesophageal reflux disease)     Headache(784.0)     History of MI (myocardial infarction)     Hypercholesteremia     Hyperlipidemia     Hyperparathyroidism ([address]     Hypertension     Kidney stones     Osteoarthritis     all joints    PONV (postoperative nausea and vomiting)     Sleep apnea     BIPAP nightly    Unspecified sleep apnea        Past Surgical History:        Procedure Laterality Date    BACK SURGERY  07/11/2019    BICEPS TENDON REPAIR Left     CARDIAC CATHETERIZATION  01/02/2019    with Dr. Breonna Hsu, Stents X2    CARPAL TUNNEL RELEASE Right     CERVICAL FUSION      COLONOSCOPY  11 14 14    normal    ELBOW FRACTURE SURGERY Right     right arm/fracture    ENDOSCOPY, COLON, DIAGNOSTIC      several times    FRACTURE SURGERY Right     elbow    KNEE ARTHROSCOPY Bilateral     LIVER BIOPSY  8/14/2015 moderate steatosis, grade 2, lobular and portal inflammation: Grade 1 total score =3, fibrosis score 1A    LUMBAR FUSION N/A 7/10/2019    LUMBAR LAMINECTOMY FUSION POSTERIOR - L3 SACRUM POSTERIOR LUMBAR DECOMPRESSION  & FUSION performed by Kashif Márquez MD at [address]  11/07/2018    lumbar myelogram    ROTATOR CUFF REPAIR Right        Social History:    Social History     Tobacco Use    Smoking status: Never Smoker    Smokeless tobacco: Never Used   Substance Use Topics    Alcohol use: Never     Alcohol/week: 0.0 standard drinks                                Counseling given: Not Answered      Vital Signs (Current):   Vitals:    05/18/22 0550   BP: (!) 160/97   Pulse: 79   Resp: 18   Temp: 96.9 °F (36.1 °C)   TempSrc: Infrared   SpO2: 97%   Weight: 206 lb (93.4 kg)   Height: 5' 8\" (1.727 m)                                              BP Readings from Last 3 Encounters:   05/18/22 (!) 160/97   05/04/22 (!) 153/97   04/12/22 (!) 163/90       NPO Status: Time of last liquid consumption: 1800                        Time of last solid consumption: 1800                        Date of last liquid consumption: 05/17/22                        Date of last solid food consumption: 05/17/22    BMI:   Wt Readings from Last 3 Encounters:   05/18/22 206 lb (93.4 kg)   05/04/22 206 lb (93.4 kg)   04/19/22 207 lb (93.9 kg)     Body mass index is 31.32 kg/m².     CBC:   Lab Results   Component Value Date    WBC 7.6 05/04/2022    RBC 4.98 05/04/2022    RBC 5.19 01/28/2012    HGB 15.2 05/04/2022    HCT 44.4 05/04/2022    MCV 89.2 05/04/2022    RDW 14.3 05/04/2022     05/04/2022     01/28/2012       CMP:   Lab Results   Component Value Date     05/04/2022    K 5.0 05/04/2022     05/04/2022    CO2 29 05/04/2022    BUN 22 05/04/2022    CREATININE 1.10 05/04/2022    GFRAA >60 05/04/2022    LABGLOM >60 05/04/2022    GLUCOSE 138 05/04/2022    GLUCOSE 116 01/28/2012    PROT 6.5 04/05/2022    CALCIUM 10.7 05/04/2022    BILITOT 0.42 04/05/2022    ALKPHOS 92 04/05/2022    AST 17 04/05/2022    ALT 27 04/05/2022       POC Tests:   Recent Labs     05/18/22  0617   POCGLU 155*       Coags:   Lab Results   Component Value Date    PROTIME 13.4 04/12/2022    INR 1.0 04/12/2022    APTT 25.7 11/07/2018       HCG (If Applicable): No results found for: PREGTESTUR, PREGSERUM, HCG, HCGQUANT     ABGs:   Lab Results   Component Value Date    PHART 7.368 07/13/2019    PO2ART 68.5 07/13/2019    PMT6KPV 51.6 07/13/2019    ACO5FAR 29.7 07/13/2019    D8AHJMQI 91.9 07/13/2019        Type & Screen (If Applicable):  No results found for: LABABO, LABRH    Drug/Infectious Status (If Applicable):  Lab Results   Component Value Date    HEPCAB NONREACTIVE 10/04/2014       COVID-19 Screening (If Applicable): No results found for: COVID19        Anesthesia Evaluation  Patient summary reviewed and Nursing notes reviewed   history of anesthetic complications: PONV. Airway: Mallampati: II  TM distance: >3 FB   Neck ROM: limited  Mouth opening: > = 3 FB Dental:          Pulmonary:normal exam  breath sounds clear to auscultation  (+) sleep apnea (BIPAP):                             Cardiovascular:    (+) hypertension:, past MI: > 6 months and no interval change, CAD: no interval change,       ECG reviewed  Rhythm: regular  Rate: normal           Beta Blocker:  Dose within 24 Hrs      ROS comment:  LAD 40% proximal stenosis. 80% mid stenosis reduced to 0% using 2.5x15 and 2. 5x8 mini vision bare metal stent. The distal LAD has 70% stenosis around   the apex and will be treated medically. BMS used as the patient wants to go for back surgery. Preserved LV systolic function. Recommendations   Dual anti platelet therapy and risk factor modifications.      Neuro/Psych:   (+) neuromuscular disease:, headaches:,              ROS comment: S/p c-spine fixation, lumbar fixation  Chronic back pain with LLE radiculopathy GI/Hepatic/Renal:   (+) GERD:, liver disease:,           Endo/Other:    (+) DiabetesType II DM, , : arthritis: OA., .                 Abdominal:             Vascular: Other Findings:           Anesthesia Plan      general     ASA 3     (Glidescope intubation)  Induction: intravenous. MIPS: Postoperative opioids intended and Prophylactic antiemetics administered. Anesthetic plan and risks discussed with patient. Plan discussed with CRNA.                   Michaela Freeman MD   5/18/2022

## 2022-05-18 NOTE — ANESTHESIA POSTPROCEDURE EVALUATION
POST- ANESTHESIA EVALUATION       Pt Name: Clemente Hare  MRN: [id number]  [place]: 1955  Date of evaluation: 5/18/2022  Time:  2:58 PM      /88   Pulse 96   Temp 98.2 °F (36.8 °C) (Oral)   Resp 18   Ht 5' 8\" (1.727 m)   Wt 206 lb (93.4 kg)   SpO2 93%   BMI 31.32 kg/m²      Consciousness Level  Awake  Cardiopulmonary Status  Stable  Pain Adequately Treated YES  Nausea / Vomiting  NO  Adequate Hydration  YES  Anesthesia Related Complications NONE      Electronically signed by Suhas Vasquez MD on 5/18/2022 at 2:58 PM       Department of Anesthesiology  Postprocedure Note    Patient: Clemente Hare  MRN: [id number]  [place]: 1955  Date of evaluation: 5/18/2022  Time:  2:58 PM     Procedure Summary     Date: 05/18/22 Room / Location: [address] [place] [address] Anthony Simmons  / [facility]: [facility] [place]    Anesthesia Start: 0730 Anesthesia Stop: 8544    Procedure: L2-3 POSTERIOR LUMBAR DECOMPRESSION & INSTRUMENTED FUSION POSSIBLE REVISION INSTRUMENTATION L3-5 (N/A Spine Lumbar) Diagnosis: (LUMBAR DISC HERNIATION)    Surgeons: Arlette Burkitt, MD Responsible Provider: Tiarra Huff MD    Anesthesia Type: general ASA Status: 3          Anesthesia Type: No value filed. Robert Phase I: Robert Score: 5    Robert Phase II:      Last vitals: Reviewed and per EMR flowsheets.        Anesthesia Post Evaluation

## 2022-05-18 NOTE — BRIEF OP NOTE
Brief Postoperative Note      Patient: Kj Walsh  YOB: 1955  MRN: [id number]    Date of Procedure: 5/18/2022    Pre-Op Diagnosis: LUMBAR Stenosis L2-3    Post-Op Diagnosis: Same       Procedure(s):  L2-3 POSTERIOR LUMBAR DECOMPRESSION & INSTRUMENTED FUSION POSSIBLE REVISION INSTRUMENTATION L3-5  L2-3 posterior decompression instrumented fusion  Posterior segmental instrumentation L2-L4       Surgeon(s):  Kalpana Pabon MD    Assistant:  * No surgical staff found *    Anesthesia: General    Estimated Blood Loss (mL): 428     Complications: None    Specimens:   ID Type Source Tests Collected by Time Destination   A :  [address], MD 5/18/2022 1058        Implants:  Implant Name Type Inv. Item Serial No.  Lot No. LRB No. Used Action   ENDCAP SPNL OLLIE FOR SCR REVERE - [id number]  ENDCAP SPNL OLLIE FOR SCR REVERE  [employer]  N/A 5 Implanted   WARD SPNL L45MM DIA5. 5MM THORLUM TI STR NTHRD OLLIE MECHANISM - [id number]  WARD SPNL L45MM DIA5. 5MM THORLUM TI STR NTHRD OLLIE MECHANISM  [employer]WD  N/A 1 Implanted   SCREW SPNL L45MM DIA6.5MM CANC PEDCL THORLUM TI NTHRD OLLIE - [id number]  SCREW SPNL L45MM DIA6.5MM CANC PEDCL THORLUM TI NTHRD OLLIE  [employer]WD  N/A 2 Implanted   WARD SPINAL L75MM DIA5. 5MM THORLUM TI STR NTHRD OLLIE MECHANISM - [id number]  WARD SPINAL L75MM DIA5. 5MM THORLUM TI STR NTHRD OLLIE MECHANISM  [employer]WD  N/A 1 Implanted         Drains: * No LDAs found *    Findings: see dictation    Electronically signed by Kalpana Pabon MD on 5/18/2022 at 11:51 AM

## 2022-05-19 PROCEDURE — 6370000000 HC RX 637 (ALT 250 FOR IP): Performed by: INTERNAL MEDICINE

## 2022-05-19 PROCEDURE — 99223 1ST HOSP IP/OBS HIGH 75: CPT | Performed by: INTERNAL MEDICINE

## 2022-05-19 PROCEDURE — 6370000000 HC RX 637 (ALT 250 FOR IP): Performed by: ORTHOPAEDIC SURGERY

## 2022-05-19 PROCEDURE — 6360000002 HC RX W HCPCS: Performed by: ORTHOPAEDIC SURGERY

## 2022-05-19 PROCEDURE — G0378 HOSPITAL OBSERVATION PER HR: HCPCS

## 2022-05-19 PROCEDURE — 6360000002 HC RX W HCPCS: Performed by: INTERNAL MEDICINE

## 2022-05-19 PROCEDURE — 99024 POSTOP FOLLOW-UP VISIT: CPT | Performed by: ORTHOPAEDIC SURGERY

## 2022-05-19 PROCEDURE — 2580000003 HC RX 258: Performed by: ORTHOPAEDIC SURGERY

## 2022-05-19 RX ORDER — DEXAMETHASONE SODIUM PHOSPHATE 4 MG/ML
10 INJECTION, SOLUTION INTRA-ARTICULAR; INTRALESIONAL; INTRAMUSCULAR; INTRAVENOUS; SOFT TISSUE EVERY 6 HOURS
Status: COMPLETED | OUTPATIENT
Start: 2022-05-19 | End: 2022-05-20

## 2022-05-19 RX ORDER — FENTANYL CITRATE 50 UG/ML
25 INJECTION, SOLUTION INTRAMUSCULAR; INTRAVENOUS
Status: DISCONTINUED | OUTPATIENT
Start: 2022-05-19 | End: 2022-05-20

## 2022-05-19 RX ORDER — CYCLOBENZAPRINE HCL 10 MG
10 TABLET ORAL 3 TIMES DAILY PRN
Status: DISCONTINUED | OUTPATIENT
Start: 2022-05-19 | End: 2022-05-20 | Stop reason: HOSPADM

## 2022-05-19 RX ORDER — OXYCODONE HYDROCHLORIDE AND ACETAMINOPHEN 5; 325 MG/1; MG/1
1 TABLET ORAL EVERY 6 HOURS PRN
Qty: 28 TABLET | Refills: 0 | Status: ON HOLD
Start: 2022-05-19 | End: 2022-05-31 | Stop reason: HOSPADM

## 2022-05-19 RX ORDER — SENNA PLUS 8.6 MG/1
2 TABLET ORAL 2 TIMES DAILY
Status: DISCONTINUED | OUTPATIENT
Start: 2022-05-19 | End: 2022-05-20 | Stop reason: HOSPADM

## 2022-05-19 RX ADMIN — FENTANYL CITRATE 25 MCG: 50 INJECTION, SOLUTION INTRAMUSCULAR; INTRAVENOUS at 17:02

## 2022-05-19 RX ADMIN — ACETAMINOPHEN 650 MG: 325 TABLET ORAL at 02:07

## 2022-05-19 RX ADMIN — SODIUM CHLORIDE, PRESERVATIVE FREE 10 ML: 5 INJECTION INTRAVENOUS at 21:13

## 2022-05-19 RX ADMIN — OXYCODONE HYDROCHLORIDE 10 MG: 10 TABLET ORAL at 19:09

## 2022-05-19 RX ADMIN — TAMSULOSIN HYDROCHLORIDE 0.4 MG: 0.4 CAPSULE ORAL at 08:03

## 2022-05-19 RX ADMIN — NORTRIPTYLINE HYDROCHLORIDE 50 MG: 50 CAPSULE ORAL at 21:12

## 2022-05-19 RX ADMIN — CYCLOBENZAPRINE 10 MG: 10 TABLET, FILM COATED ORAL at 23:02

## 2022-05-19 RX ADMIN — ACETAMINOPHEN 650 MG: 325 TABLET ORAL at 08:10

## 2022-05-19 RX ADMIN — GABAPENTIN 600 MG: 300 CAPSULE ORAL at 14:47

## 2022-05-19 RX ADMIN — OXYCODONE HYDROCHLORIDE 10 MG: 10 TABLET ORAL at 10:37

## 2022-05-19 RX ADMIN — SODIUM CHLORIDE, PRESERVATIVE FREE 10 ML: 5 INJECTION INTRAVENOUS at 08:12

## 2022-05-19 RX ADMIN — NORTRIPTYLINE HYDROCHLORIDE 50 MG: 50 CAPSULE ORAL at 08:02

## 2022-05-19 RX ADMIN — DEXAMETHASONE SODIUM PHOSPHATE 10 MG: 4 INJECTION, SOLUTION INTRAMUSCULAR; INTRAVENOUS at 18:02

## 2022-05-19 RX ADMIN — FENOFIBRATE 160 MG: 160 TABLET ORAL at 08:03

## 2022-05-19 RX ADMIN — SENNOSIDES 17.2 MG: 8.6 TABLET, COATED ORAL at 13:40

## 2022-05-19 RX ADMIN — OXYCODONE HYDROCHLORIDE 10 MG: 10 TABLET ORAL at 14:47

## 2022-05-19 RX ADMIN — CINACALCET HYDROCHLORIDE 30 MG: 30 TABLET, FILM COATED ORAL at 08:03

## 2022-05-19 RX ADMIN — GABAPENTIN 600 MG: 300 CAPSULE ORAL at 08:03

## 2022-05-19 RX ADMIN — SENNOSIDES 17.2 MG: 8.6 TABLET, COATED ORAL at 21:11

## 2022-05-19 RX ADMIN — PANTOPRAZOLE SODIUM 40 MG: 40 TABLET, DELAYED RELEASE ORAL at 06:20

## 2022-05-19 RX ADMIN — OXYCODONE HYDROCHLORIDE 10 MG: 10 TABLET ORAL at 06:24

## 2022-05-19 RX ADMIN — ACETAMINOPHEN 650 MG: 325 TABLET ORAL at 21:10

## 2022-05-19 RX ADMIN — FINASTERIDE 5 MG: 5 TABLET, FILM COATED ORAL at 08:03

## 2022-05-19 RX ADMIN — FENTANYL CITRATE 25 MCG: 50 INJECTION, SOLUTION INTRAMUSCULAR; INTRAVENOUS at 12:06

## 2022-05-19 RX ADMIN — FENTANYL CITRATE 25 MCG: 50 INJECTION, SOLUTION INTRAMUSCULAR; INTRAVENOUS at 13:40

## 2022-05-19 RX ADMIN — GABAPENTIN 600 MG: 300 CAPSULE ORAL at 21:11

## 2022-05-19 RX ADMIN — EZETIMIBE 10 MG: 10 TABLET ORAL at 08:03

## 2022-05-19 RX ADMIN — LISINOPRIL 10 MG: 10 TABLET ORAL at 08:03

## 2022-05-19 RX ADMIN — METFORMIN HYDROCHLORIDE 1000 MG: 1000 TABLET ORAL at 08:03

## 2022-05-19 RX ADMIN — FENTANYL CITRATE 25 MCG: 50 INJECTION, SOLUTION INTRAMUSCULAR; INTRAVENOUS at 15:55

## 2022-05-19 RX ADMIN — ACETAMINOPHEN 650 MG: 325 TABLET ORAL at 14:47

## 2022-05-19 RX ADMIN — METFORMIN HYDROCHLORIDE 1000 MG: 1000 TABLET ORAL at 17:03

## 2022-05-19 RX ADMIN — FERROUS SULFATE TAB 325 MG (65 MG ELEMENTAL FE) 325 MG: 325 (65 FE) TAB at 08:03

## 2022-05-19 RX ADMIN — PRAVASTATIN SODIUM 40 MG: 40 TABLET ORAL at 21:11

## 2022-05-19 RX ADMIN — METOPROLOL SUCCINATE 50 MG: 50 TABLET, EXTENDED RELEASE ORAL at 08:02

## 2022-05-19 ASSESSMENT — PAIN SCALES - GENERAL
PAINLEVEL_OUTOF10: 10
PAINLEVEL_OUTOF10: 10
PAINLEVEL_OUTOF10: 6
PAINLEVEL_OUTOF10: 10
PAINLEVEL_OUTOF10: 10
PAINLEVEL_OUTOF10: 9
PAINLEVEL_OUTOF10: 7
PAINLEVEL_OUTOF10: 2
PAINLEVEL_OUTOF10: 7

## 2022-05-19 NOTE — OP NOTE
[address]                 [address] [facility], [address]                                OPERATIVE REPORT    PATIENT NAME: Tia Mak                :        1955  MED REC NO:   [id number]                              ROOM:       2054  ACCOUNT NO:   [de-identified]                           ADMIT DATE: 2022  PROVIDER:     Tatum Patel    DATE OF PROCEDURE:  2022    PREOPERATIVE DIAGNOSIS:  Lumbar spinal stenosis L2-L3, history of L3 to  sacral decompression fusion. POSTOPERATIVE DIAGNOSIS:  Lumbar spinal stenosis L2-L3, history of L3 to  sacral decompression fusion. PROCEDURE PERFORMED:  1. L2-L3 decompression with laminectomy, bilateral facetectomies,  neural foraminotomies. 2.  Posterior segmental instrumentation L2 to L4.  3.  Posterior fusion L2-L3. 4.  Removal of hardware deep. 5.  Local autograft bone grafting. 6.  Fluoroscopic assistance. SURGEON:  Tatum Patel MD    ASSISTANT:  None. ANESTHESIA:  General.    ESTIMATED BLOOD LOSS:  984 mL    COMPLICATIONS:  None. SPECIMEN:  None, although the removed hardware was sent to the lab. IMPLANTS:  Globus screw nelson construct. DRAINS:  None. FINDINGS:  1.  Nelson-and-nelson connect did not look like they were going to be  acceptably connect, we therefore elected to cut. As the patient was  already ankylosed caudally, I cut the left nelson below L3 and I cut the  nelson on the right below L4 so that we could have staggered cut rods  across the previously ankylosed segment with solid construct at L2-L3. 2.  Disc space was functionally ankylosed, and after we placed the  screws, the disc space could not be distracted or compressed. 3.  Incidental durotomy from epidural scar. This required the scar to  just simply be sewed back down to the dura. No significant CSF leakage  occurred during the case and after the repair. There was no CSF leak on  Valsalva maneuver.   4. Neuromonitoring performed throughout case post placement of screws. Screws were stimulated, found to be acceptable. 5.  Complete inferior facet was removed bilaterally with as wide of  foraminal decompression as possible as we were unable to distract the  disc space and get indirect foraminal reduction. PROCEDURE IN DETAIL:  The patient was taken to the operating, placed  under general anesthesia, transferred to the [facility] table,  checked for padding and positioning. Back was prepped and draped in the  usual sterile fashion. Previous midline incision was utilized, carried  down through skin and subcutaneous tissue. Paraspinal musculature was  elevated out to the transverse process of two and then out to expose the  screws as low as L4. Lateral gutters were decorticated with a  high-speed bur and pedicle screws were placed at L2. At this juncture,  became apparent that robin-robin connectors were going to be difficult. We  therefore elected to simply cut one robin below three and the other robin  below four, so we had have staggered the rods and then a short robin was  placed at L4-L5. The robin could not be distracted or compressed. At this juncture, laminectomy was completed midline. I did get a little  bit of an incidental dural tear when I peeled some scarring that peeled  the dura with the scar. This required the scar to be sewn back to the  dura. Really very minimal CSF leakage while this small tear was in  place and no leakage after it was repaired back down. Although, after  we initially got the tear, I completed the decompression as it was not  significantly leaking CSF prior to readdressing the dural repair. This decompression included complete laminectomy, bilateral complete  inferior facetectomies, and foraminotomies as wide as I could obtain.    On the right-hand side, the patient did have some facet and epidural  scar that had adhesed to the dura that I was unable to fully disconnect  ,however after completing the foraminotomy, this was allowed to flow  posteriorly. After completing the decompression, on the left-hand side where there  was not as much epidural fibrosis the disc, attempt was made to access  the disc. Posterior disc was taken down with a bur until it was level  with the vertebral bodies. I then tried to palpate the disc space, was  unable to, and actually at this time, I attempted to distract against  the pedicles while watching the disc and no motion was observed. At  this juncture, it was elected to not perform an interbody fusion. The  rods were placed bilaterally, slightly compressed and torqued. The dura  had been repaired just prior to doing this, the lateral gutters were  then grafted with copious amounts of local autograft from the  laminectomy. A gram of vancomycin was placed in the depths of wound. Deep fascia was reapproximated with #2 Vicryl suture, subcuticular layer  with 2-0 Vicryl followed by skin staples and a sterile dressing. It  should be noted that in the area of the prior surgery where the patient  had thick epidural fibrosis, the deep fascia was reapproximated in two  layers using #2 Vicryl. Sterile dressing was applied. The patient was  awakened from anesthesia, taken to the recovery room in stable  condition. COMPLICATIONS ARISING THE OPERATION:  None noted.         JUDSON Mclain    D: 05/18/2022 12:09:27       T: 05/18/2022 12:13:31     [email or web address]  Job#: [id number]     Doc#: [id number]    CC:

## 2022-05-19 NOTE — PROGRESS NOTES
Physical Therapy Cancel Note      DATE: 2022    NAME: Agueda Malcolm  MRN: [id number]   : 1955      Patient not seen this date for Physical Therapy due to:    Strict Bedrest: Pt is on bedrest x2 days s/p L2-3 decompression and fusion and post segmental instrumentation L3-4. Will check back when appropriate.        Electronically signed by Orlando Severs, PT on 2022 at 10:42 AM

## 2022-05-19 NOTE — CONSULTS
[address] Internal Medicine  Fabiola Schroeder MD; Eloisa Gr MD; Celestine Luna MD; MD Stan Greenwood MD; MD [facility]   [address] / HISTORY AND PHYSICAL EXAMINATION            Date:   5/19/2022  Patient name:  Clemente Hare  Date of admission:  5/18/2022  5:14 AM  MRN:   [id number]  Account:  [de-identified]  YOB: 1955  PCP:    Jacklyn Rowe PA-C  Room:   [address] [place] [address]  Code Status:    Full Code    Physician Requesting Consult: Arlette Burkitt, MD    Reason for Consult: Diabetes hypertension    Chief Complaint:     No chief complaint on file.   Chronic back pain status post back surgery    History Obtained From:   Pt medical record and nursing staff    History of Present Illness:     HTN  Onset more than 2 years ago  maryellen mild to mod  Controlled with current po meds  Not associated with headaches or blurry vision  No chest pain    HLD  Onset more than 5 years ago  Severity is mild, not getting worse  Not associated with pancreatitis  Tolerating statin well no muscle pain    Post back surg pain controll suboptimal    Past Medical History:     Past Medical History:   Diagnosis Date    Abdominal hernia     Anesthesia     phrenic nerve damaged lt side    BPH (benign prostatic hyperplasia)     CAD (coronary artery disease)     Chronic back pain     Diabetes mellitus (HCC)     Type 2    Difficult intubation     per pt throat collapses, lt side phrenic nerve was damaged    Ear infection     LT, being treated    Fatty liver     Gallstones     GERD (gastroesophageal reflux disease)     Headache(784.0)     History of MI (myocardial infarction)     Hypercholesteremia     Hyperlipidemia     Hyperparathyroidism ([address]     Hypertension     Kidney stones     Osteoarthritis     all joints    PONV (postoperative nausea and vomiting)     Sleep apnea     BIPAP nightly    Unspecified sleep apnea         Past Surgical History:     Past Surgical History:   Procedure Laterality Date    BACK SURGERY  07/11/2019    BICEPS TENDON REPAIR Left     CARDIAC CATHETERIZATION  01/02/2019    with Dr. Hadley Fragoso, Stents X2    CARPAL TUNNEL RELEASE Right     CERVICAL FUSION      COLONOSCOPY  11 14 14    normal    ELBOW FRACTURE SURGERY Right     right arm/fracture    ENDOSCOPY, COLON, DIAGNOSTIC      several times    FRACTURE SURGERY Right     elbow    KNEE ARTHROSCOPY Bilateral     LIVER BIOPSY  8/14/2015    moderate steatosis, grade 2, lobular and portal inflammation: Grade 1 total score =3, fibrosis score 1A    LUMBAR FUSION N/A 7/10/2019    LUMBAR LAMINECTOMY FUSION POSTERIOR - L3 SACRUM POSTERIOR LUMBAR DECOMPRESSION  & FUSION performed by Sadaf Busby MD at [address] 5/18/2022    L2-3 POSTERIOR LUMBAR DECOMPRESSION & INSTRUMENTED FUSION POSSIBLE REVISION INSTRUMENTATION L3-5 performed by Sadaf Busby MD at [address]  11/07/2018    lumbar myelogram    ROTATOR CUFF REPAIR Right         Medications Prior to Admission:     Prior to Admission medications    Medication Sig Start Date End Date Taking?  Authorizing Provider   metFORMIN (GLUCOPHAGE) 1000 MG tablet TAKE ONE TABLET BY MOUTH TWICE A DAY WITH MEALS 5/12/22   Nicole Campbell PA-C   lisinopril (PRINIVIL;ZESTRIL) 10 MG tablet Take 1 tablet by mouth daily 5/12/22   Nicole Campbell PA-C   FEROSUL 325 (65 Fe) MG tablet TAKE ONE TABLET BY MOUTH DAILY WITH BREAKFAST 4/25/22   SERA Vaughn CNP   finasteride (PROSCAR) 5 MG tablet TAKE ONE TABLET BY MOUTH DAILY 4/25/22   Sloan Caal MD   clopidogrel (PLAVIX) 75 MG tablet Take 75 mg by mouth daily    Historical Provider, MD   vitamin D (ERGOCALCIFEROL) 1.25 MG (84768 UT) CAPS capsule TAKE ONE CAPSULE BY MOUTH ONCE WEEKLY 3/16/22   Nicole Campbell PA-C   fenofibrate (TRIGLIDE) 160 MG tablet TAKE ONE TABLET BY MOUTH DAILY 1/25/22   Norm Began, PA-C   ezetimibe (ZETIA) 10 MG tablet TAKE ONE TABLET BY MOUTH DAILY 1/7/22   Norm Began, PA-C   coenzyme Q-10 100 MG capsule TAKE ONE CAPSULE BY MOUTH DAILY 1/3/22   Norm Began, PA-C   omeprazole (PRILOSEC) 40 MG delayed release capsule TAKE ONE CAPSULE BY MOUTH DAILY 11/1/21   Norm Began, PA-C   pravastatin (PRAVACHOL) 40 MG tablet TAKE ONE TABLET BY MOUTH DAILY 10/22/21   Norm Began, PA-C   cinacalcet (SENSIPAR) 30 MG tablet Take 30 mg by mouth daily    Historical Provider, MD   tamsulosin (FLOMAX) 0.4 MG capsule TAKE ONE CAPSULE BY MOUTH TWICE A DAY 7/22/21   Zachary Zamora MD   gabapentin (NEURONTIN) 300 MG capsule Take 600 mg by mouth 3 times daily. Historical Provider, MD   metoprolol succinate (TOPROL XL) 100 MG extended release tablet Take 50 mg by mouth daily     Historical Provider, MD   aspirin 81 MG chewable tablet Take 1 tablet by mouth daily 1/4/19   Scott Painting, APRN - CNP   E-400 400 UNITS capsule TAKE TWO CAPSULES BY MOUTH DAILY 9/23/16   Mohit Bonner MD   nortriptyline (PAMELOR) 50 MG capsule Take 50 mg by mouth 2 times daily  3/31/16   Historical Provider, MD        Allergies:     Morphine, Pcn [penicillins], Tramadol, and Codeine    Social History:     Tobacco:    reports that he has never smoked. He has never used smokeless tobacco.  Alcohol:      reports no history of alcohol use. Drug Use:  reports no history of drug use. Family History:     Family History   Problem Relation Age of Onset    Heart Disease Mother     Cancer Father         bladder    Arthritis Sister     Cancer Paternal Aunt     Cancer Paternal Aunt        Review of Systems:     Positive and Negative as described in HPI.     CONSTITUTIONAL:  negative for fevers, chills, sweats, fatigue, weight loss  HEENT:  negative for vision, hearing changes, runny nose, throat pain  RESPIRATORY:  negative for shortness of breath, cough, congestion, wheezing. CARDIOVASCULAR:  negative for chest pain, palpitations. GASTROINTESTINAL:  negative for nausea, vomiting, diarrhea, constipation, change in bowel habits, abdominal pain   GENITOURINARY:  negative for difficulty of urination, burning with urination, frequency   INTEGUMENT:  negative for rash, skin lesions, easy bruising   HEMATOLOGIC/LYMPHATIC:  negative for swelling/edema   ALLERGIC/IMMUNOLOGIC:  negative for urticaria , itching  ENDOCRINE:  negative increase in drinking, increase in urination, hot or cold intolerance  MUSCULOSKELETAL:  negative joint pains, muscle aches, swelling of joints  NEUROLOGICAL:  negative for headaches, dizziness, lightheadedness, numbness, pain, tingling extremities  Status post back surgery shooting pain into the legs  BEHAVIOR/PSYCH: Discomfort and pain    Physical Exam:     /70   Pulse 98   Temp 97.5 °F (36.4 °C)   Resp 19   Ht 5' 8\" (1.727 m)   Wt 206 lb (93.4 kg)   SpO2 92%   BMI 31.32 kg/m²   Temp (24hrs), Av.8 °F (36.6 °C), Min:97.3 °F (36.3 °C), Max:98.2 °F (36.8 °C)    Recent Labs     22  0617 22  1211   POCGLU 155* 177*       Intake/Output Summary (Last 24 hours) at 2022 1142  Last data filed at 2022 1039  Gross per 24 hour   Intake 150.96 ml   Output 1925 ml   Net -1774.04 ml       General Appearance:  alert, well appearing, and in no acute distress  Mental status: oriented to person, place, and time with normal affect  Head:  normocephalic, atraumatic. Eye: no icterus, redness, pupils equal and reactive, extraocular eye movements intact, conjunctiva clear  Ear: normal external ear, no discharge, hearing intact  Nose:  no drainage noted  Mouth: mucous membranes moist  Neck: supple, no carotid bruits, thyroid not palpable  Lungs: Bilateral equal air entry, clear to ausculation, no wheezing, rales or rhonchi, normal effort  Cardiovascular: normal rate, regular rhythm, no murmur, gallop, rub.   Abdomen: Soft, nontender, nondistended, normal bowel sounds, no hepatomegaly or splenomegaly  Neurologic: There are no new focal motor or sensory deficits, normal muscle tone and bulk, no abnormal sensation, normal speech, cranial nerves II through XII grossly intact  Skin: No gross lesions, rashes, bruising or bleeding on exposed skin area  Extremities:  peripheral pulses palpable, no pedal edema or calf pain with palpation  Status post lumbar decompression surgery  Psych: In discomfort and pain    Investigations:      Laboratory Testing:  Recent Results (from the past 24 hour(s))   POC Glucose Fingerstick    Collection Time: 05/18/22 12:11 PM   Result Value Ref Range    POC Glucose 177 (H) 75 - 110 mg/dL       Imaging/Diagonstics:  No results found. Assessment :      Hospital Problems           Last Modified POA    * (Principal) Back pain 5/18/2022 Yes    GERD (gastroesophageal reflux disease) 5/19/2022 Yes    Type 2 diabetes mellitus without complication, without long-term current use of insulin ([address] 5/19/2022 Yes    Essential hypertension 5/19/2022 Yes    Cervical radiculopathy 5/19/2022 Yes          Plan:     66-year-old gentleman history of class I obesity history of chronic back pain and had back surgery 3 years ago  Status post back surgery decompression  Pain suboptimal add fentanyl 25 mcg as needed  Patient on long-acting OxyContin and short acting oxycodone  Tachycardia sinus secondary to pain  Diabetes mellitus metformin 1000 twice a day  Hypertension lisinopril 10 mg and metoprolol long-acting 50 mg  Diabetic neuropathy gabapentin 603 times a day  Hyperlipidemia on Zetia and fenofibrate  DVT prophylaxis per the surgeon              Consultations:   [address] Ru Davila MD  5/19/2022  11:42 AM    Copy sent to Dr. Dusty Dye PA-C    Please note that this chart was generated using voice recognition Dragon dictation software.   Although every effort was made to ensure the accuracy of this automated transcription, some errors in transcription may have occurred.

## 2022-05-19 NOTE — PROGRESS NOTES
Pricilla Zambrano asks Dr Larissa Curry to place OT on if patient is potentially needing placement. Dr Larissa Curry does not believe patient will need placement.

## 2022-05-19 NOTE — PROGRESS NOTES
Surgical Progress Note    POD: 2    Patient doing well  Vitals:    22 0627   BP: 121/70   Pulse: 98   Resp: 19   Temp: 97.5 °F (36.4 °C)   SpO2: 92%      Temp (24hrs), Av.9 °F (36.6 °C), Min:97.3 °F (36.3 °C), Max:98.2 °F (36.8 °C)       Pain Control moderate    No unusual nausea    Exam: no nausea new headache    Wound dressing relatively dry        Lungs:  No respiratory distress    Labs reviewed:  Labs:                I/O last 3 completed shifts: In: 0729 [I.V.:1347.6;  IV Piggyback:103.4]  Out: 1125 [Urine:1075; Blood:50]    Assessment:    Patient Active Problem List   Diagnosis    Hyperlipidemia    Prostate disease    Osteoarthritis    Headache    GERD (gastroesophageal reflux disease)    Sleep apnea    Elevated liver enzymes    BPH (benign prostatic hyperplasia)    Steatosis of liver    Type 2 diabetes mellitus without complication, without long-term current use of insulin (HCC)    Lumbar radiculopathy, chronic    Osteoarthritis of lumbar spine    Osteoarthritis of multiple joints    Degenerative disc disease, lumbar    Lumbar spinal stenosis    Pars defect with spondylolisthesis    Essential hypertension    Lumbar disc herniation    Chronic low back pain    S/P cardiac catheterization    Back pain    Postoperative hypotension    Neck pain    Cervical spondylosis with myelopathy    Bilateral carpal tunnel syndrome    Spinal stenosis in cervical region    Spondylolisthesis, lumbar region    Foraminal stenosis of cervical region    Cervical radiculopathy    Preop examination       Plan:  See my orders  Dc bed rest   PT    Siegfried Severs, MD MD  2022 12:32 PM

## 2022-05-19 NOTE — DISCHARGE INSTR - COC
Continuity of Care Form    Patient Name: Michelle Hoover   :  1955  MRN:  [id number]    Admit date:  2022  Discharge date:  ***    Code Status Order: Full Code   Advance Directives:   Advance Care Flowsheet Documentation       Date/Time Healthcare Directive Type of Healthcare Directive Copy in [address] Agent's Name Healthcare Agent's Phone Number    22 1622 Yes, patient has an advance directive for healthcare treatment Durable power of  for health care;Living will No, copy requested from family Spouse -- --            Admitting Physician:  Flex Reynolds MD  PCP: Armando Tay PA-C    Discharging Nurse: [facility] Unit/Room#: 8442/5808-09  Discharging Unit Phone Number: ***    Emergency Contact:   Extended Emergency Contact Information  Primary Emergency Contact: Amaliaroger Corky  Address: [address] [place] [address] [place]  Phone: [phone]  Mobile Phone: [phone]  Relation: Spouse    Past Surgical History:  Past Surgical History:   Procedure Laterality Date    BACK SURGERY  2019    BICEPS TENDON REPAIR Left     CARDIAC CATHETERIZATION  2019    with Dr. Ben Kumar, Stents X2    CARPAL TUNNEL RELEASE Right     CERVICAL FUSION      COLONOSCOPY  14    normal    ELBOW FRACTURE SURGERY Right     right arm/fracture    ENDOSCOPY, COLON, DIAGNOSTIC      several times    FRACTURE SURGERY Right     elbow    KNEE ARTHROSCOPY Bilateral     LIVER BIOPSY  2015    moderate steatosis, grade 2, lobular and portal inflammation: Grade 1 total score =3, fibrosis score 1A    LUMBAR FUSION N/A 7/10/2019    LUMBAR LAMINECTOMY FUSION POSTERIOR - L3 SACRUM POSTERIOR LUMBAR DECOMPRESSION  & FUSION performed by Flex Reynolds MD at [address] [facility] 2022    L2-3 POSTERIOR LUMBAR DECOMPRESSION & INSTRUMENTED FUSION POSSIBLE REVISION INSTRUMENTATION L3-5 performed by Flex Reynolds MD at [address] HISTORY  11/07/2018    lumbar myelogram    ROTATOR CUFF REPAIR Right        Immunization History:   Immunization History   Administered Date(s) Administered    COVID-19, [place] Vaccine, Vaccine Unspecified 02/19/2021, 03/19/2021, 11/02/2021    Influenza Vaccine, unspecified formulation 10/15/2018    Influenza Virus Vaccine 02/01/2016    Influenza, High-dose, Quadv, 65 yrs +, IM (Fluzone) 10/11/2021    Influenza, MDCK Quadv, IM, PF (Flucelvax 2 yrs and older) 10/01/2019    Influenza, Quadv, IM, PF (6 mo and older Fluzone, Flulaval, Fluarix, and 3 yrs and older Afluria) 09/06/2016, 10/23/2017, 10/29/2018    Influenza, Quadv, adjuvanted, 65 yrs +, IM, PF (Fluad) 10/23/2020    Pneumococcal Polysaccharide ([id number]) 10/23/2017       Active Problems:  Patient Active Problem List   Diagnosis Code    Hyperlipidemia E78.5    Prostate disease N42.9    Osteoarthritis M19.90    Headache R51.9    GERD (gastroesophageal reflux disease) K21.9    Sleep apnea G47.30    Elevated liver enzymes R74.8    BPH (benign prostatic hyperplasia) N40.0    Steatosis of liver K76.0    Type 2 diabetes mellitus without complication, without long-term current use of insulin (HCC) E11.9    Lumbar radiculopathy, chronic M54.16    Osteoarthritis of lumbar spine M47.816    Osteoarthritis of multiple joints M15.9    Degenerative disc disease, lumbar M51.36    Lumbar spinal stenosis M48.061    Pars defect with spondylolisthesis M43.00, M43.10    Essential hypertension I10    Lumbar disc herniation M51.26    Chronic low back pain M54.50, G89.29    S/P cardiac catheterization Z98.890    Back pain M54.9    Postoperative hypotension I95.89    Neck pain M54.2    Cervical spondylosis with myelopathy M47.12    Bilateral carpal tunnel syndrome G56.03    Spinal stenosis in cervical region M48.02    Spondylolisthesis, lumbar region M43.16    Foraminal stenosis of cervical region M48.02    Cervical radiculopathy M54.12    Preop examination Z01.818       Isolation/Infection:   Isolation            No Isolation          Patient Infection Status       None to display            Nurse Assessment:  Last Vital Signs: /70   Pulse 98   Temp 97.5 °F (36.4 °C)   Resp 19   Ht 5' 8\" (1.727 m)   Wt 206 lb (93.4 kg)   SpO2 92%   BMI 31.32 kg/m²     Last documented pain score (0-10 scale): Pain Level: 6  Last Weight:   Wt Readings from Last 1 Encounters:   05/18/22 206 lb (93.4 kg)     Mental Status:  oriented and alert    IV Access:  - None    Nursing Mobility/ADLs:  Walking   Dependent  Transfer  Dependent  Bathing  Dependent  Dressing  Assisted  Toileting  Assisted  Feeding  Independent  Med Admin  Assisted  Med Delivery   whole    Wound Care Documentation and Therapy:  Incision 05/18/22 Back Medial (Active)   Dressing Status Clean;Dry; Intact 05/19/22 0822   Dressing/Treatment Other (comment) 05/18/22 2024   Closure Adhesive bandage 05/19/22 0822   Margins Approximated 05/18/22 2024   Incision Assessment Other (Comment) 05/18/22 1630   Drainage Amount Scant 05/19/22 0822   Odor None 05/18/22 2024   Bettina-incision Assessment Intact 05/18/22 2024   Number of days: 1        Elimination:  Continence: Bowel: No  Bladder: {YES / TL:67477}  Urinary Catheter: None   Colostomy/Ileostomy/Ileal Conduit: {YES / TN:77521}       Date of Last BM: ***5/18/2022    Intake/Output Summary (Last 24 hours) at 5/19/2022 1134  Last data filed at 5/19/2022 1039  Gross per 24 hour   Intake 150.96 ml   Output 1925 ml   Net -1774.04 ml     I/O last 3 completed shifts: In: 6741 [I.V.:1347.6; IV Piggyback:103.4]  Out: 1125 [Urine:1075; Blood:50]    Safety Concerns:      At Risk for Falls    Impairments/Disabilities:      None    Nutrition Therapy:  Current Nutrition Therapy:   - Oral Diet:  General    Routes of Feeding: Oral  Liquids: No Restrictions  Daily Fluid Restriction: no  Last Modified Barium Swallow with Video (Video Swallowing Test): not done    Treatments at the Time of Hospital Discharge:   Respiratory Treatments: ***  Oxygen Therapy:  is not on home oxygen therapy. Ventilator:    - No ventilator support    Rehab Therapies: Physical Therapy and Occupational Therapy  Weight Bearing Status/Restrictions: No weight bearing restrictions  Other Medical Equipment (for information only, NOT a DME order):  walker; pt only able to stand at bedside  Other Treatments: Skilled Nursing assessment and monitoring. Medication education and monitoring per protocol. Patient's personal belongings (please select all that are sent with patient):  None    RN SIGNATURE:  Electronically signed by Brianne Calixto RN on 5/20/22 at 3:30 PM EDT    CASE MANAGEMENT/SOCIAL WORK SECTION    Inpatient Status Date: 5/18/2022    Readmission Risk Assessment Score:  Readmission Risk              Risk of Unplanned Readmission:  8           Discharging to Facility/ Venkata James, [address]  Phone: [phone]  Fax: [phone]  After Hours [place] [address]  [address] [facility] [address]  [address] [address]  Phone [phone]  Fax  [phone]       Dialysis Facility (if applicable)   Name:  Address:  Dialysis Schedule:  Phone:  Fax:    / signature: Electronically signed by Suha Brown RN on 5/19/22 at 11:34 AM EDT    PHYSICIAN SECTION    Prognosis: Fair    Condition at Discharge: Stable    Rehab Potential (if transferring to Rehab): Fair    Recommended Labs or Other Treatments After Discharge:     Physician Certification: I certify the above information and transfer of Santos Mccollum  is necessary for the continuing treatment of the diagnosis listed and that he requires [facility] for less 30 days.      Update Admission H&P: No change in H&P    PHYSICIAN SIGNATURE:  Electronically signed by Dorota Madrigal MD on 5/20/22 at 2:52 PM EDT

## 2022-05-19 NOTE — ANESTHESIA POSTPROCEDURE EVALUATION
Department of Anesthesiology  Postprocedure Note    Patient: Kerri Swift  MRN: [id number]  YOB: 1955  Date of evaluation: 5/19/2022  Time:  1:14 PM     Procedure Summary     Date: 05/18/22 Room / Location: [address] / [address] [facility]     Anesthesia Start: 0730 Anesthesia Stop: 2434    Procedure: L2-3 POSTERIOR LUMBAR DECOMPRESSION & INSTRUMENTED FUSION POSSIBLE REVISION INSTRUMENTATION L3-5 (N/A Spine Lumbar) Diagnosis: (LUMBAR DISC HERNIATION)    Surgeons: Kathleen Roe MD Responsible Provider: Faviola Alcantara MD    Anesthesia Type: general ASA Status: 3          Anesthesia Type: No value filed. Robret Phase I: Robert Score: 5    Robert Phase II:      Last vitals: Reviewed and per EMR flowsheets. Anesthesia Post Evaluation    Comments: POD #1 Patient seen lying in bed. Denied any anesthesia related issues.

## 2022-05-19 NOTE — CARE COORDINATION
CASE MANAGEMENT NOTE:    Admission Date:  5/18/2022 Artemus Osgood is a 77 y.o.  male    Admitted for : Back pain [M54.9]    Met with:  Patient and Family    PCP:  Iraida Livingston                                Insurance:  Medicare      Is patient alert and oriented at time of discussion:  Yes    Current Residence/ Living Arrangements:  independently at home with spouse in 2 story home with liveable 1st floor. Pt still drives. Current Services PTA:  No    Does patient go to outpatient dialysis: No  If yes, location and chair time: NA    Is patient agreeable to VNS: Yes-referral to [facility]    [place] of choice provided:  Yes    List of [address] provided: Yes    VNS chosen:  Yes- [facility]    DME:  crutches, walker and shower chair    Home Oxygen: No    Nebulizer: No    CPAP/BIPAP: No    Supplier: N/A    Potential Assistance Needed: No    SNF needed: No    Freedom of choice and list provided: No    Pharmacy:  [facility]       Is patient currently receiving oral anticoagulation therapy? No    Is the Patient an [facility] with Readmission Risk Score greater than 14%? No  If yes, pt needs a follow up appointment made within 7 days. Family Members/Caregivers that pt would like involved in their care:    Yes    If yes, list name here:  [facility]    Transportation Provider:  Family             Discharge Plan:  5/19 MEDICARE From home with spouse, independent and still drives. DME: Walker, crutches, shower chair. VNS: None but wants [facility] at discharge. Referral sent. POD#1 L2-L3 lumbar decompression with fusion. PT/OT eval ordered. ANIYA NEEDS TO BE SIGNED/COMPLETED. ORANGE HEADER 9%. Following for needs. //JF            Electronically signed by: Aidee Morgan RN on 5/19/2022 at 10:11 AM       Pt accepted by [facility] for [facility]. //JF

## 2022-05-19 NOTE — PROGRESS NOTES
Patient continuing to have sub optimal pain control despite addition of Fentanyl. Dr. Sosa Levo notified. New orders received for Decadron x 3 doses and PO Flexeril.

## 2022-05-19 NOTE — FLOWSHEET NOTE
05/19/22 1124   Encounter Summary   Service Provided For: Patient   Referral/Consult From: Palliative Care   Last Encounter    (5/19/2022)   Spiritual/Emotional needs   Type Spiritual Support   Assessment/Intervention/Outcome   Assessment Anxious; Despair   Intervention Active listening   Outcome Acceptance; Less anxious, Less agitated

## 2022-05-19 NOTE — ACP (ADVANCE CARE PLANNING)
REFILL REQUEST FROM PHARMACY                Advance Care Planning     Advance Care Planning Activator (Inpatient)  Conversation Note      Date of ACP Conversation: 5/19/2022     Conversation Conducted with: Patient with Decision Making Capacity    ACP Activator: Shelly Baum RN        Health Care Decision Maker:     Current Designated Health Care Decision Maker:     Primary Decision Maker: Merlyn Trejo - [phone]  Click here to complete Healthcare Decision Makers including section of the Healthcare Decision Maker Relationship (ie \"Primary\")  Today we documented Decision Maker(s) consistent with Legal Next of Kin hierarchy. Care Preferences    Ventilation: \"If you were in your present state of health and suddenly became very ill and were unable to breathe on your own, what would your preference be about the use of a ventilator (breathing machine) if it were available to you? \"      Would the patient desire the use of ventilator (breathing machine)?: yes    \"If your health worsens and it becomes clear that your chance of recovery is unlikely, what would your preference be about the use of a ventilator (breathing machine) if it were available to you? \"     Would the patient desire the use of ventilator (breathing machine)?: Yes      Resuscitation  \"CPR works best to restart the heart when there is a sudden event, like a heart attack, in someone who is otherwise healthy. Unfortunately, CPR does not typically restart the heart for people who have serious health conditions or who are very sick. \"    \"In the event your heart stopped as a result of an underlying serious health condition, would you want attempts to be made to restart your heart (answer \"yes\" for attempt to resuscitate) or would you prefer a natural death (answer \"no\" for do not attempt to resuscitate)? \" yes       [x] Yes   [] No   Educated Patient / John Vargas regarding differences between Advance Directives and portable DNR orders.     Length of ACP Conversation in minutes: Conversation Outcomes:  [x] ACP discussion completed  [] Existing advance directive reviewed with patient; no changes to patient's previously recorded wishes  [] New Advance Directive completed  [] Portable Do Not Rescitate prepared for Provider review and signature  [] POLST/POST/MOLST/MOST prepared for Provider review and signature      Follow-up plan:    [] Schedule follow-up conversation to continue planning  [] Referred individual to Provider for additional questions/concerns   [] Advised patient/agent/surrogate to review completed ACP document and update if needed with changes in condition, patient preferences or care setting    [] This note routed to one or more involved healthcare providers

## 2022-05-19 NOTE — PLAN OF CARE
Problem: Discharge Planning  Goal: Discharge to home or other facility with appropriate resources  Outcome: Progressing  Flowsheets (Taken 5/18/2022 1331)  Discharge to home or other facility with appropriate resources: Identify barriers to discharge with patient and caregiver     Problem: Pain  Goal: Verbalizes/displays adequate comfort level or baseline comfort level  Outcome: Progressing     Problem: Safety - Adult  Goal: Free from fall injury  Outcome: Progressing

## 2022-05-20 VITALS
WEIGHT: 217.59 LBS | DIASTOLIC BLOOD PRESSURE: 97 MMHG | HEIGHT: 68 IN | TEMPERATURE: 97.7 F | BODY MASS INDEX: 32.98 KG/M2 | OXYGEN SATURATION: 92 % | SYSTOLIC BLOOD PRESSURE: 139 MMHG | RESPIRATION RATE: 16 BRPM | HEART RATE: 110 BPM

## 2022-05-20 LAB
SARS-COV-2, RAPID: NOT DETECTED
SPECIMEN DESCRIPTION: NORMAL
SURGICAL PATHOLOGY REPORT: NORMAL

## 2022-05-20 PROCEDURE — 87635 SARS-COV-2 COVID-19 AMP PRB: CPT

## 2022-05-20 PROCEDURE — 97530 THERAPEUTIC ACTIVITIES: CPT

## 2022-05-20 PROCEDURE — 99225 PR SBSQ OBSERVATION CARE/DAY 25 MINUTES: CPT | Performed by: INTERNAL MEDICINE

## 2022-05-20 PROCEDURE — 97166 OT EVAL MOD COMPLEX 45 MIN: CPT

## 2022-05-20 PROCEDURE — G0378 HOSPITAL OBSERVATION PER HR: HCPCS

## 2022-05-20 PROCEDURE — 6360000002 HC RX W HCPCS: Performed by: ORTHOPAEDIC SURGERY

## 2022-05-20 PROCEDURE — 99024 POSTOP FOLLOW-UP VISIT: CPT | Performed by: ORTHOPAEDIC SURGERY

## 2022-05-20 PROCEDURE — 97162 PT EVAL MOD COMPLEX 30 MIN: CPT

## 2022-05-20 PROCEDURE — 6370000000 HC RX 637 (ALT 250 FOR IP): Performed by: ORTHOPAEDIC SURGERY

## 2022-05-20 PROCEDURE — 6370000000 HC RX 637 (ALT 250 FOR IP): Performed by: INTERNAL MEDICINE

## 2022-05-20 PROCEDURE — 2580000003 HC RX 258: Performed by: ORTHOPAEDIC SURGERY

## 2022-05-20 RX ORDER — RIVAROXABAN 10 MG/1
10 TABLET, FILM COATED ORAL
Qty: 30 TABLET | Refills: 0 | Status: ON HOLD | OUTPATIENT
Start: 2022-05-20 | End: 2022-05-31 | Stop reason: HOSPADM

## 2022-05-20 RX ADMIN — CINACALCET HYDROCHLORIDE 30 MG: 30 TABLET, FILM COATED ORAL at 08:34

## 2022-05-20 RX ADMIN — LISINOPRIL 10 MG: 10 TABLET ORAL at 08:32

## 2022-05-20 RX ADMIN — METFORMIN HYDROCHLORIDE 1000 MG: 1000 TABLET ORAL at 16:33

## 2022-05-20 RX ADMIN — NORTRIPTYLINE HYDROCHLORIDE 50 MG: 50 CAPSULE ORAL at 08:35

## 2022-05-20 RX ADMIN — PANTOPRAZOLE SODIUM 40 MG: 40 TABLET, DELAYED RELEASE ORAL at 05:39

## 2022-05-20 RX ADMIN — ACETAMINOPHEN 650 MG: 325 TABLET ORAL at 13:30

## 2022-05-20 RX ADMIN — EZETIMIBE 10 MG: 10 TABLET ORAL at 08:33

## 2022-05-20 RX ADMIN — DEXAMETHASONE SODIUM PHOSPHATE 10 MG: 4 INJECTION, SOLUTION INTRAMUSCULAR; INTRAVENOUS at 05:39

## 2022-05-20 RX ADMIN — OXYCODONE HYDROCHLORIDE 10 MG: 10 TABLET ORAL at 08:32

## 2022-05-20 RX ADMIN — TAMSULOSIN HYDROCHLORIDE 0.4 MG: 0.4 CAPSULE ORAL at 08:33

## 2022-05-20 RX ADMIN — METOPROLOL SUCCINATE 50 MG: 50 TABLET, EXTENDED RELEASE ORAL at 08:32

## 2022-05-20 RX ADMIN — CYCLOBENZAPRINE 10 MG: 10 TABLET, FILM COATED ORAL at 12:33

## 2022-05-20 RX ADMIN — GABAPENTIN 600 MG: 300 CAPSULE ORAL at 13:30

## 2022-05-20 RX ADMIN — ACETAMINOPHEN 650 MG: 325 TABLET ORAL at 08:32

## 2022-05-20 RX ADMIN — DEXAMETHASONE SODIUM PHOSPHATE 10 MG: 4 INJECTION, SOLUTION INTRAMUSCULAR; INTRAVENOUS at 00:44

## 2022-05-20 RX ADMIN — OXYCODONE HYDROCHLORIDE 10 MG: 10 TABLET ORAL at 12:23

## 2022-05-20 RX ADMIN — FERROUS SULFATE TAB 325 MG (65 MG ELEMENTAL FE) 325 MG: 325 (65 FE) TAB at 08:33

## 2022-05-20 RX ADMIN — SENNOSIDES 17.2 MG: 8.6 TABLET, COATED ORAL at 08:32

## 2022-05-20 RX ADMIN — FENOFIBRATE 160 MG: 160 TABLET ORAL at 08:32

## 2022-05-20 RX ADMIN — FINASTERIDE 5 MG: 5 TABLET, FILM COATED ORAL at 08:32

## 2022-05-20 RX ADMIN — OXYCODONE HYDROCHLORIDE 10 MG: 10 TABLET ORAL at 16:33

## 2022-05-20 RX ADMIN — GABAPENTIN 600 MG: 300 CAPSULE ORAL at 08:33

## 2022-05-20 RX ADMIN — SODIUM CHLORIDE, PRESERVATIVE FREE 10 ML: 5 INJECTION INTRAVENOUS at 08:38

## 2022-05-20 RX ADMIN — METFORMIN HYDROCHLORIDE 1000 MG: 1000 TABLET ORAL at 08:32

## 2022-05-20 ASSESSMENT — PAIN DESCRIPTION - ORIENTATION
ORIENTATION: MID;UPPER;LOWER
ORIENTATION: LOWER;POSTERIOR;UPPER

## 2022-05-20 ASSESSMENT — PAIN SCALES - GENERAL
PAINLEVEL_OUTOF10: 7
PAINLEVEL_OUTOF10: 6
PAINLEVEL_OUTOF10: 2
PAINLEVEL_OUTOF10: 4

## 2022-05-20 ASSESSMENT — PAIN DESCRIPTION - DESCRIPTORS
DESCRIPTORS: ACHING;SHARP
DESCRIPTORS: ACHING;SHARP

## 2022-05-20 ASSESSMENT — PAIN DESCRIPTION - LOCATION
LOCATION: BACK
LOCATION: INCISION;BACK

## 2022-05-20 NOTE — CARE COORDINATION
ONGOING DISCHARGE PLAN:    Patient is alert and oriented x4. Spoke with patient regarding discharge plan and patient confirms that plan is still home with spouse. Pt is agreeable to [facility] accepted. Pt is no longer on BR and PT eval ordered. If patient will need SNF placement will need OT eval. Pt has been to [facility] in the past following last back OR. Will continue to follow for additional discharge needs.     Electronically signed by Brie Church RN on 5/20/2022 at 9:16 AM

## 2022-05-20 NOTE — CARE COORDINATION
Called report to nurse at [facility]. Pt transferring via fire department to facility around 1800.

## 2022-05-20 NOTE — PLAN OF CARE
based on patient assessment  Taken 5/20/2022 0052 by Jennie Hodge RN  Absence of Physical Injury: Implement safety measures based on patient assessment     Problem: Chronic Conditions and Co-morbidities  Goal: Patient's chronic conditions and co-morbidity symptoms are monitored and maintained or improved  5/20/2022 1622 by Alexandro Leon RN  Outcome: Completed  5/20/2022 1431 by Alexandro Leon RN  Outcome: Progressing  Flowsheets (Taken 5/20/2022 0800)  Care Plan - Patient's Chronic Conditions and Co-Morbidity Symptoms are Monitored and Maintained or Improved: Monitor and assess patient's chronic conditions and comorbid symptoms for stability, deterioration, or improvement     Problem: Skin/Tissue Integrity - Adult  Goal: Incisions, wounds, or drain sites healing without S/S of infection  5/20/2022 1622 by Alexandro Leon RN  Outcome: Completed  5/20/2022 1431 by Alexandro Leon RN  Outcome: Progressing  Flowsheets  Taken 5/20/2022 0800 by Alexandro Leon RN  Incisions, Wounds, or Drain Sites Healing Without Sign and Symptoms of Infection: TWICE DAILY: Assess and document skin integrity  Taken 5/20/2022 0053 by Jennie Hodge RN  Incisions, Wounds, or Drain Sites Healing Without Sign and Symptoms of Infection:   TWICE DAILY: Assess and document skin integrity   TWICE DAILY: Assess and document dressing/incision, wound bed, drain sites and surrounding tissue     Problem: Musculoskeletal - Adult  Goal: Return mobility to safest level of function  5/20/2022 1622 by Alexadnro Leon RN  Outcome: Completed  5/20/2022 1431 by Alexandro Leon RN  Outcome: Progressing  Flowsheets (Taken 5/20/2022 0800)  Return Mobility to Safest Level of Function: Assess patient stability and activity tolerance for standing, transferring and ambulating with or without assistive devices  Goal: Maintain proper alignment of affected body part  5/20/2022 1622 by Alexandro Leon RN  Outcome: Completed  5/20/2022 1431 by Violet Benedict RN  Outcome: Progressing  Flowsheets (Taken 5/20/2022 0800)  Maintain proper alignment of affected body part: Support and protect limb and body alignment per provider's orders  Goal: Return ADL status to a safe level of function  5/20/2022 1622 by Violet Benedict RN  Outcome: Completed  5/20/2022 1431 by Violet Benedict RN  Outcome: Progressing  Flowsheets (Taken 5/20/2022 0800)  Return ADL Status to a Safe Level of Function: Administer medication as ordered     Problem: Gastrointestinal - Adult  Goal: Maintains or returns to baseline bowel function  5/20/2022 1622 by Violet Benedict RN  Outcome: Completed  5/20/2022 1431 by Violet Benedict RN  Outcome: Progressing  Flowsheets (Taken 5/20/2022 0800)  Maintains or returns to baseline bowel function: Assess bowel function     Problem: Infection - Adult  Goal: Absence of infection during hospitalization  5/20/2022 1622 by Violet Benedict RN  Outcome: Completed  5/20/2022 1431 by Violet Benedict RN  Outcome: Progressing  Flowsheets (Taken 5/20/2022 0800)  Absence of infection during hospitalization: Assess and monitor for signs and symptoms of infection

## 2022-05-20 NOTE — CARE COORDINATION
[facility] Reviewing. LSW will continue to follow.      Electronically signed by Pinky Martinez on 5/20/22 at 12:07 PM EDT

## 2022-05-20 NOTE — PROGRESS NOTES
fatigue;Patient limited by pain        Plan   Plan  Times per Week: 5-7  Current Treatment Recommendations: Self-Care / ADL,Strengthening,ROM,Balance training,Functional mobility training,Endurance training,Pain management,Safety education & training,Patient/Caregiver education & training,Equipment evaluation, education, & procurement,Home management training     Restrictions  Restrictions/Precautions  Restrictions/Precautions: Surgical Protocols,Fall Risk,General Precautions,Up as Tolerated  Required Braces or Orthoses?: No  Position Activity Restriction  Spinal Precautions: No Bending,No Lifting,No Twisting    Subjective   General  Chart Reviewed: Yes  Patient assessed for rehabilitation services?: Yes  Family / Caregiver Present: Yes  Referring Practitioner: Alina Bowers MD  Diagnosis: Back pain  Subjective  Subjective: Pt sitting EOB upon arrival. Pt was pleasant and agreeable to OT/PT  General Comment  Comments: ok per RN for OT/PT  Pain: pt reports \"stabbing\" pain at low back rating 3-4/10  Social/Functional History  Social/Functional History  Lives With: Spouse  Type of Home: House  Home Layout: Two level,Performs ADL's on one level,Able to Live on Main level with bedroom/bathroom  Home Access: Stairs to enter with rails  Entrance Stairs - Number of Steps: 5 ISELA   Entrance Stairs - Rails: Both  Bathroom Shower/Tub: Walk-in shower,Doors  Bathroom Toilet: Standard  Bathroom Equipment: Shower chair  Home Equipment: Chelsey Nicolleia, standard,Walker, rolling  ADL Assistance: Independent  Homemaking Assistance: Independent (Share with wife)  Homemaking Responsibilities: Yes  Ambulation Assistance: Independent  Transfer Assistance: Independent  Active : Yes  Mode of Transportation: Truck,SUV  Occupation: Retired  IADL Comments: Pt reports sleeping in flat bed on right side; pt has recliner chair  Additional Comments: Pt reports that wife is home all the time besides when volunteering and able to assist as needed Objective   Pulse: 119  Heart Rate Source: Monitor  BP: (!) 139/97  BP Location: Right upper arm  Patient Position: Supine  MAP (Calculated): 111  Resp: 16  SpO2: 92 %  O2 Device: None (Room air)  Vision Exceptions: Wears glasses at all times  Hearing: Within functional limits          Safety Devices  Type of Devices: Call light within reach; Left in chair;Patient at risk for falls           ADL  Feeding: Setup  Grooming: Setup  UE Bathing: Contact guard assistance  LE Bathing: Maximum assistance  UE Dressing: Contact guard assistance  LE Dressing: Maximum assistance  Toileting: Maximum assistance  Additional Comments: ADL scores based on clinical reasoning and skilled observation unless otherwise noted. Pt currently limited due to decreased strength, balance, activity tolerance, and pain impacting safety and independence with self care tasks  Tone RUE  RUE Tone: Normotonic  Tone LUE  LUE Tone: Normotonic  Coordination  Movements Are Fluid And Coordinated: No  Coordination and Movement Description: Gross motor impairments; Fine motor impairments;Decreased speed;Decreased accuracy; Left UE;Right UE (Limited due to pain)  Activity Tolerance  Activity Tolerance: Patient limited by pain  Bed mobility  Bed Mobility Comments: Pt sitting EOB upon arrival. Sitting in recliner chair at end of session  Transfers  Sit to stand: 2 Person assistance;Minimal assistance;Contact guard assistance (Min A x 1 and CGA x 1)  Stand to sit: 2 Person assistance;Minimal assistance;Contact guard assistance (Min A x 1 and CGA x 1)  Transfer Comments: Verbal cues for hand placement and safety.  increased time to complete     Cognition  Overall Cognitive Status: WNL        Sensation  Overall Sensation Status: Impaired (CTS in left hand and slight tingling in toes and feet recent)         Education Given To: Patient  Education Provided: Role of Therapy;Plan of Care;Transfer Training;Precautions  Education Method: Verbal  Barriers to Learning: None  Education Outcome: Continued education needed  LUE AROM (degrees)  LUE AROM : WFL  LUE General AROM: with increased time  Left Hand AROM (degrees)  Left Hand AROM: WFL  Left Hand General AROM: decreased grasp due to arthritis  RUE AROM (degrees)  RUE AROM : WFL  RUE General AROM: with increased time   Right Hand AROM (degrees)  Right Hand AROM: WFL  Right Hand General AROM: decreased  due to arthritis  LUE Strength  L Hand General: 3+/5  RUE Strength  R Hand General: 3+/5                  AM-PAC Score        AM-PAC Inpatient Daily Activity Raw Score: 15 (05/20/22 1523)  AM-PAC Inpatient ADL T-Scale Score : 34.69 (05/20/22 1523)  ADL Inpatient CMS 0-100% Score: 56.46 (05/20/22 1523)  ADL Inpatient CMS G-Code Modifier : CK (05/20/22 1523)    Goals  Short Term Goals  Time Frame for Short term goals: By discharge  Short Term Goal 1: pt will complete lower body bathing/dressing with min A and Good safety with use of AE as needed  Short Term Goal 2: Pt will complete functional transfers/mobility during self care tasks wtih CGA and Good safety  Short Term Goal 3: pt will verbalize/demonstrate Good understanding of AE/AS/DME to increase safety and independence with self care tasks  Short Term Goal 4: Pt will V/D 3 non pharmaceutical pain management intervention strategies to increase participation in daily activiies  Short Term Goal 5: Pt will participate in 15+ minutes of therapeutic exercises/functional activities to increase safety and independence with self care and mobility  Patient Goals   Patient goals :  To get better       Therapy Time   Individual Concurrent Group Co-treatment   Time In [facility]         Minutes 29         Timed Code Treatment Minutes: 15 Minutes       Katrinka Frankel, OT

## 2022-05-20 NOTE — CARE COORDINATION
SW spoke with Patient, and his mother was present. SW discussed with patient the option of going to a SNF versus home for more rehabilitation. SW expressed that we would not want him to discharge home if he cannot safety ambulate. Patient expressed understanding and stated that he was hoping that he did not have to, but he understands that he might needs to go for additional rehabilitation. Patient and his mother in agreement for SNF. Patient has been to [facility] in the past, and would like to go there again. A back up choice would be [address] [place] [address] of [address] [place] [address]. SW informed them that SW will send a referral to both, and check both for bed availability. SW informed patient that he could possibly go today since he has straight medicare, and a D/C order in. Both expressed understanding. 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 SW will continue to follow.      Electronically signed by Marilee Aguilar on 5/20/22 at 11:23 AM EDT

## 2022-05-20 NOTE — DISCHARGE INSTR - DIET

## 2022-05-20 NOTE — CARE COORDINATION
PAULO spoke with pt and pt spouse about acceptance at [facility]. Pt informed SW that pt's family wants to have pt escorted by the fire department to the facility. Kimmy informed SW that pt can admit whenever they have transport scheduled. PAULO spoke with pt and spouse and they are unsure of an estimated time. Pt will update SW when they know what time. Please complete ANIYA.     Number for report: [phone]

## 2022-05-20 NOTE — CONSULTS
[facility] Internal Medicine  Fabiola Schroeder MD; Eloisa Gr MD; Celestine Luna MD; MD Stan Greenwood MD; MD BG [facility] Internal Medicine   [address] / HISTORY AND PHYSICAL EXAMINATION            Date:   5/20/2022  Patient name:  Clemente Hare  Date of admission:  5/18/2022  5:14 AM  MRN:   [id number]  Account:  [de-identified]  YOB: 1955  PCP:    Jacklyn Rowe PA-C  Room:   [address] [place] [address]  Code Status:    Full Code    Physician Requesting Consult: Arlette Burkitt, MD    Reason for Consult: Diabetes hypertension    Chief Complaint:     No chief complaint on file.   Chronic back pain status post back surgery    History Obtained From:   Pt medical record and nursing staff    History of Present Illness:     HTN  Onset more than 2 years ago  maryellen mild to mod  Controlled with current po meds  Not associated with headaches or blurry vision  No chest pain    HLD  Onset more than 5 years ago  Severity is mild, not getting worse  Not associated with pancreatitis  Tolerating statin well no muscle pain    Post back surg pain controll suboptimal    Past Medical History:     Past Medical History:   Diagnosis Date    Abdominal hernia     Anesthesia     phrenic nerve damaged lt side    BPH (benign prostatic hyperplasia)     CAD (coronary artery disease)     Chronic back pain     Diabetes mellitus (HCC)     Type 2    Difficult intubation     per pt throat collapses, lt side phrenic nerve was damaged    Ear infection     LT, being treated    Fatty liver     Gallstones     GERD (gastroesophageal reflux disease)     Headache(784.0)     History of MI (myocardial infarction)     Hypercholesteremia     Hyperlipidemia     Hyperparathyroidism ([address]     Hypertension     Kidney stones     Osteoarthritis     all joints    PONV (postoperative nausea and vomiting)     Sleep apnea     BIPAP nightly    Unspecified sleep apnea         Past Surgical History:     Past Surgical History:   Procedure Laterality Date    BACK SURGERY  07/11/2019    BICEPS TENDON REPAIR Left     CARDIAC CATHETERIZATION  01/02/2019    with Dr. Irasema Ness, Stents X2    CARPAL TUNNEL RELEASE Right     CERVICAL FUSION      COLONOSCOPY  11 14 14    normal    ELBOW FRACTURE SURGERY Right     right arm/fracture    ENDOSCOPY, COLON, DIAGNOSTIC      several times    FRACTURE SURGERY Right     elbow    KNEE ARTHROSCOPY Bilateral     LIVER BIOPSY  8/14/2015    moderate steatosis, grade 2, lobular and portal inflammation: Grade 1 total score =3, fibrosis score 1A    LUMBAR FUSION N/A 7/10/2019    LUMBAR LAMINECTOMY FUSION POSTERIOR - L3 SACRUM POSTERIOR LUMBAR DECOMPRESSION  & FUSION performed by Deirdre Santos MD at [address] 5/18/2022    L2-3 POSTERIOR LUMBAR DECOMPRESSION & INSTRUMENTED FUSION POSSIBLE REVISION INSTRUMENTATION L3-5 performed by Deirdre Santos MD at [address]  11/07/2018    lumbar myelogram    ROTATOR CUFF REPAIR Right         Medications Prior to Admission:     Prior to Admission medications    Medication Sig Start Date End Date Taking? Authorizing Provider   oxyCODONE-acetaminophen (PERCOCET) 5-325 MG per tablet Take 1 tablet by mouth every 6 hours as needed for Pain for up to 7 days. Intended supply: 7 days.  Take lowest dose possible to manage pain 5/19/22 5/26/22 Yes Deirdre Santos MD   metFORMIN (GLUCOPHAGE) 1000 MG tablet TAKE ONE TABLET BY MOUTH TWICE A DAY WITH MEALS 5/12/22   Rosamond Sandhoff, PA-C   lisinopril (PRINIVIL;ZESTRIL) 10 MG tablet Take 1 tablet by mouth daily 5/12/22   Rosamond Sandhoff, PA-C   FEROSUL 325 (65 Fe) MG tablet TAKE ONE TABLET BY MOUTH DAILY WITH BREAKFAST 4/25/22   Danell Dakins, APRN - CNP   finasteride (PROSCAR) 5 MG tablet TAKE ONE TABLET BY MOUTH DAILY 4/25/22   Kev Harden MD   clopidogrel (PLAVIX) 75 MG tablet Take 75 mg by mouth daily    Historical Provider, MD   vitamin D (ERGOCALCIFEROL) 1.25 MG (97320 UT) CAPS capsule TAKE ONE CAPSULE BY MOUTH ONCE WEEKLY 3/16/22   [place] Chain, PA-C   fenofibrate (TRIGLIDE) 160 MG tablet TAKE ONE TABLET BY MOUTH DAILY 1/25/22   [place] Chain, PA-C   ezetimibe (ZETIA) 10 MG tablet TAKE ONE TABLET BY MOUTH DAILY 1/7/22   Aylin Chain, PA-C   coenzyme Q-10 100 MG capsule TAKE ONE CAPSULE BY MOUTH DAILY 1/3/22   [place], PA-C   omeprazole (PRILOSEC) 40 MG delayed release capsule TAKE ONE CAPSULE BY MOUTH DAILY 11/1/21   Aylin Chain, PA-C   pravastatin (PRAVACHOL) 40 MG tablet TAKE ONE TABLET BY MOUTH DAILY 10/22/21   Aylin Chain, PA-C   cinacalcet (SENSIPAR) 30 MG tablet Take 30 mg by mouth daily    Historical Provider, MD   tamsulosin (FLOMAX) 0.4 MG capsule TAKE ONE CAPSULE BY MOUTH TWICE A DAY 7/22/21   Zachary Zamora MD   gabapentin (NEURONTIN) 300 MG capsule Take 600 mg by mouth 3 times daily. Historical Provider, MD   metoprolol succinate (TOPROL XL) 100 MG extended release tablet Take 50 mg by mouth daily     Historical Provider, MD   aspirin 81 MG chewable tablet Take 1 tablet by mouth daily 1/4/19   Teddy Baca, APRN - CNP   E-400 400 UNITS capsule TAKE TWO CAPSULES BY MOUTH DAILY 9/23/16   Bernadette Stahl MD   nortriptyline (PAMELOR) 50 MG capsule Take 50 mg by mouth 2 times daily  3/31/16   Historical Provider, MD        Allergies:     Morphine, Pcn [penicillins], Tramadol, and Codeine    Social History:     Tobacco:    reports that he has never smoked. He has never used smokeless tobacco.  Alcohol:      reports no history of alcohol use. Drug Use:  reports no history of drug use.     Family History:     Family History   Problem Relation Age of Onset    Heart Disease Mother     Cancer Father         bladder    Arthritis Sister     Cancer Paternal Aunt     Cancer Paternal Aunt        Review of Systems:     Positive and Negative as described in HPI.    CONSTITUTIONAL:  negative for fevers, chills, sweats, fatigue, weight loss  HEENT:  negative for vision, hearing changes, runny nose, throat pain  RESPIRATORY:  negative for shortness of breath, cough, congestion, wheezing. CARDIOVASCULAR:  negative for chest pain, palpitations. GASTROINTESTINAL:  negative for nausea, vomiting, diarrhea, constipation, change in bowel habits, abdominal pain   GENITOURINARY:  negative for difficulty of urination, burning with urination, frequency   INTEGUMENT:  negative for rash, skin lesions, easy bruising   HEMATOLOGIC/LYMPHATIC:  negative for swelling/edema   ALLERGIC/IMMUNOLOGIC:  negative for urticaria , itching  ENDOCRINE:  negative increase in drinking, increase in urination, hot or cold intolerance  MUSCULOSKELETAL:  negative joint pains, muscle aches, swelling of joints  NEUROLOGICAL:  negative for headaches, dizziness, lightheadedness, numbness, pain, tingling extremities  Status post back surgery shooting pain into the legs  BEHAVIOR/PSYCH: Discomfort and pain    Physical Exam:     /81   Pulse 103   Temp 98.1 °F (36.7 °C)   Resp 20   Ht 5' 8\" (1.727 m)   Wt 217 lb 9.5 oz (98.7 kg)   SpO2 91%   BMI 33.09 kg/m²   Temp (24hrs), Av.2 °F (36.8 °C), Min:97.9 °F (36.6 °C), Max:98.4 °F (36.9 °C)    Recent Labs     22  0617 22  1211   POCGLU 155* 177*       Intake/Output Summary (Last 24 hours) at 2022 1238  Last data filed at 2022 1144  Gross per 24 hour   Intake 1200 ml   Output 3750 ml   Net -2550 ml       General Appearance:  alert, well appearing, and in no acute distress  Mental status: oriented to person, place, and time with normal affect  Head:  normocephalic, atraumatic.   Eye: no icterus, redness, pupils equal and reactive, extraocular eye movements intact, conjunctiva clear  Ear: normal external ear, no discharge, hearing intact  Nose:  no drainage noted  Mouth: mucous membranes moist  Neck: supple, no carotid bruits, thyroid not palpable  Lungs: Bilateral equal air entry, clear to ausculation, no wheezing, rales or rhonchi, normal effort  Cardiovascular: normal rate, regular rhythm, no murmur, gallop, rub. Abdomen: Soft, nontender, nondistended, normal bowel sounds, no hepatomegaly or splenomegaly  Neurologic: There are no new focal motor or sensory deficits, normal muscle tone and bulk, no abnormal sensation, normal speech, cranial nerves II through XII grossly intact  Skin: No gross lesions, rashes, bruising or bleeding on exposed skin area  Extremities:  peripheral pulses palpable, no pedal edema or calf pain with palpation  Status post lumbar decompression surgery  Psych: In discomfort and pain    Investigations:      Laboratory Testing:  No results found for this or any previous visit (from the past 24 hour(s)). Imaging/Diagonstics:  No results found.     Assessment :      Hospital Problems           Last Modified POA    * (Principal) Back pain 5/18/2022 Yes    GERD (gastroesophageal reflux disease) 5/19/2022 Yes    Type 2 diabetes mellitus without complication, without long-term current use of insulin ([facility] [address] 5/19/2022 Yes    Essential hypertension 5/19/2022 Yes    Cervical radiculopathy 5/19/2022 Yes          Plan:     79-year-old gentleman history of class I obesity history of chronic back pain and had back surgery 3 years ago  Status post back surgery decompression  Pain suboptimal add fentanyl 25 mcg as needed  Patient on long-acting OxyContin and short acting oxycodone  Tachycardia sinus secondary to pain  Diabetes mellitus metformin 1000 twice a day  Hypertension lisinopril 10 mg and metoprolol long-acting 50 mg  Diabetic neuropathy gabapentin 603 times a day  Hyperlipidemia on Zetia and fenofibrate  DVT prophylaxis per the surgeon              Consultations:   [address] Davila MD  5/20/2022  12:38 PM    Copy sent to Dr. Brenda Poole, DAVID    Please note that this chart was generated using voice recognition Dragon dictation software. Although every effort was made to ensure the accuracy of this automated transcription, some errors in transcription may have occurred.

## 2022-05-20 NOTE — PROGRESS NOTES
Physical Therapy  Facility/Department: [facility] MED SURG  Physical Therapy Initial Assessment    Name: Ronit Barfield  : 1955  MRN: [id number]  Date of Service: 2022    Discharge Recommendations:  Patient would benefit from continued therapy after discharge   PT Equipment Recommendations  Equipment Needed: No      Patient Diagnosis(es): The encounter diagnosis was Spinal stenosis in cervical region. Past Medical History:  has a past medical history of Abdominal hernia, Anesthesia, BPH (benign prostatic hyperplasia), CAD (coronary artery disease), Chronic back pain, Diabetes mellitus ([address] Difficult intubation, Ear infection, Fatty liver, Gallstones, GERD (gastroesophageal reflux disease), Headache(784.0), History of MI (myocardial infarction), Hypercholesteremia, Hyperlipidemia, Hyperparathyroidism ([address] Hypertension, Kidney stones, Osteoarthritis, PONV (postoperative nausea and vomiting), Sleep apnea, and Unspecified sleep apnea. Past Surgical History:  has a past surgical history that includes Knee arthroscopy (Bilateral); Biceps tendon repair (Left); fracture surgery (Right); Colonoscopy (14); liver biopsy (2015); Rotator cuff repair (Right); Elbow fracture surgery (Right); other surgical history (2018); Endoscopy, colon, diagnostic; lumbar fusion (N/A, 7/10/2019); back surgery (2019); cervical fusion; Cardiac catheterization (2019); Carpal tunnel release (Right); and lumbar fusion (N/A, 2022). Assessment   Body Structures, Functions, Activity Limitations Requiring Skilled Therapeutic Intervention: Decreased functional mobility ; Decreased ADL status; Decreased balance; Increased pain  Assessment: Impaired mobility due to spinal surgery with increased pain  Decision Making: Medium Complexity  History: previous Lumbar Fusion  Exam: decreased mobility, balance; increased pain  Clinical Presentation: evolving  Requires PT Follow-Up: Yes  Activity Tolerance  Activity Tolerance: Patient limited by pain     Plan   Plan  Plan: 2 times a day 7 days a week  Current Treatment Recommendations: Functional mobility training,Transfer training,Gait training,Safety education & training  Safety Devices  Type of Devices: Call light within reach,Left in chair,Patient at risk for falls     Restrictions  Restrictions/Precautions  Required Braces or Orthoses?: No  Position Activity Restriction  Spinal Precautions: No Bending,No Lifting,No Twisting     Subjective   Pain: pt reports \"stabbing\" pain at low back rating 3-4/10  General  Patient assessed for rehabilitation services?: Yes  Family / Caregiver Present: Yes  Follows Commands: Within Functional Limits  Subjective  Subjective: pt reporting less pain in the PM         Social/Functional History  Social/Functional History  Lives With: Spouse  Type of Home: House  Home Layout: Two level,Performs ADL's on one level,Able to Live on Main level with bedroom/bathroom  Home Access: Stairs to enter with rails  Entrance Stairs - Number of Steps: 5 ISELA   Entrance Stairs - Rails: Both  Bathroom Shower/Tub: Walk-in shower,Doors  Bathroom Toilet: Standard  Bathroom Equipment: Shower chair  Home Equipment: Kym Katya, standard,Walker, rolling  ADL Assistance: Independent  Homemaking Assistance: Independent (Share with wife)  Homemaking Responsibilities: Yes  Ambulation Assistance: Independent  Transfer Assistance: Independent  Active : Yes  Mode of Transportation: Truck,SUV  Occupation: Retired  IADL Comments: Pt reports sleeping in flat bed on right side; pt has recliner chair  Additional Comments: Pt reports that wife is home all the time besides when volunteering and able to assist as needed  Vision/Hearing  Vision Exceptions: Wears glasses at all times  Hearing: Within functional limits    Cognition   Orientation  Overall Orientation Status: Within Functional Limits  Cognition  Overall Cognitive Status: WNL     Objective   O2 Device: None (Room air) AROM RLE (degrees)  RLE AROM: WFL  AROM LLE (degrees)  LLE AROM : WFL  Strength RLE  Strength RLE: WFL  Strength LLE  Strength LLE: WFL           Bed mobility  Rolling to Left: Stand by assistance  Rolling to Right: Stand by assistance  Supine to Sit: Stand by assistance  Sit to Supine: Minimal assistance  Scooting: Stand by assistance  Transfers  Sit to Stand: Contact guard assistance  Stand to sit: Contact guard assistance;Minimal Assistance (Rhea intom lower chair)  Bed to Chair: Minimal assistance (assist with walker)  Comment: pt left up in chair in PM  Ambulation  Surface: level tile  Device: Rolling Walker  Assistance: Contact guard assistance  Gait Deviations: Slow Hien  Distance: 3ft  Comments: forward flexed posture and increased reliance on UEs  More Ambulation?: No  Stairs/Curb  Stairs?: No     Balance  Sitting - Static: Good  Sitting - Dynamic: Good  Standing - Static: Fair;+ (SBA)  Standing - Dynamic: Fair (CGA)     Goals  Short Term Goals  Time Frame for Short term goals: 1-2 days  Short term goal 1: transfers with CGA  Short term goal 2: bed mobility with SBA  Short term goal 3: ambulate with RW and CGA x 15-20ft         Therapy Time   Individual Concurrent Group Co-treatment   Time In 1325 (1005)         Time Out 1359 (1035)         Minutes 34(30)         Timed Code Treatment Minutes: [address], PT

## 2022-05-20 NOTE — PROGRESS NOTES
Pt was able to sit at the edge of the bed and stand briefly. Pt tolerated well, but did c/o increased pain with sitting up.

## 2022-05-20 NOTE — CARE COORDINATION
Pt can be accepted at [facility]; however, there are not post-op therapy notes. SW informed Bill Lugo, RN and Lizzy Rodriguez RN to inform them on this. Pt will also require COVID test prior to admission. Addendum: PAULO spoke with [facility] and they will be seeing pt within the hour. SW following for therapy notes and revision by [facility].

## 2022-05-20 NOTE — PROGRESS NOTES
Surgical Progress Note    POD: 3    Patient doing poorly  Vitals:    22 1312   BP: (!) 139/97   Pulse: 119   Resp: 16   Temp: 97.7 °F (36.5 °C)   SpO2: 92%      Temp (24hrs), Av.2 °F (36.8 °C), Min:97.7 °F (36.5 °C), Max:98.4 °F (36.9 °C)       Pain Control poor  No unusual nausea    Exam: stands with much apprehension but intact    9 day post-op with rehab after last back operration    Dressing clean dry and intact        Lungs:  No respiratory distress    Labs reviewed:  Labs:                I/O last 3 completed shifts: In: 6489 [P.O.:1440; I.V.:47.6;  IV Piggyback:103.4]  Out: 4300 [Urine:4300]    Assessment:    Patient Active Problem List   Diagnosis    Hyperlipidemia    Prostate disease    Osteoarthritis    Headache    GERD (gastroesophageal reflux disease)    Sleep apnea    Elevated liver enzymes    BPH (benign prostatic hyperplasia)    Steatosis of liver    Type 2 diabetes mellitus without complication, without long-term current use of insulin (HCC)    Lumbar radiculopathy, chronic    Osteoarthritis of lumbar spine    Osteoarthritis of multiple joints    Degenerative disc disease, lumbar    Lumbar spinal stenosis    Pars defect with spondylolisthesis    Essential hypertension    Lumbar disc herniation    Chronic low back pain    S/P cardiac catheterization    Back pain    Postoperative hypotension    Neck pain    Cervical spondylosis with myelopathy    Bilateral carpal tunnel syndrome    Spinal stenosis in cervical region    Spondylolisthesis, lumbar region    Foraminal stenosis of cervical region    Cervical radiculopathy    Preop examination       Plan:  See my orders  To Ruslan Henriquez MD MD  2022 1:32 PM

## 2022-05-23 ENCOUNTER — TELEPHONE (OUTPATIENT)
Dept: ORTHOPEDIC SURGERY | Age: 67
End: 2022-05-23

## 2022-05-23 ENCOUNTER — APPOINTMENT (OUTPATIENT)
Dept: MRI IMAGING | Age: 67
DRG: 907 | End: 2022-05-23
Payer: MEDICARE

## 2022-05-23 ENCOUNTER — HOSPITAL ENCOUNTER (INPATIENT)
Age: 67
LOS: 8 days | Discharge: HOME HEALTH | DRG: 907 | End: 2022-05-31
Attending: STUDENT IN AN ORGANIZED HEALTH CARE EDUCATION/TRAINING PROGRAM | Admitting: FAMILY MEDICINE
Payer: MEDICARE

## 2022-05-23 DIAGNOSIS — T14.8XXA HEMATOMA: ICD-10-CM

## 2022-05-23 DIAGNOSIS — S39.012A BACK STRAIN, INITIAL ENCOUNTER: Primary | ICD-10-CM

## 2022-05-23 PROBLEM — M54.50 LOW BACK PAIN: Status: ACTIVE | Noted: 2022-05-23

## 2022-05-23 LAB
ABSOLUTE EOS #: 0.4 K/UL (ref 0–0.4)
ABSOLUTE LYMPH #: 1.2 K/UL (ref 1–4.8)
ABSOLUTE MONO #: 0.9 K/UL (ref 0.1–1.3)
ANION GAP SERPL CALCULATED.3IONS-SCNC: 9 MMOL/L (ref 9–17)
BASOPHILS # BLD: 1 % (ref 0–2)
BASOPHILS ABSOLUTE: 0.1 K/UL (ref 0–0.2)
BUN BLDV-MCNC: 27 MG/DL (ref 8–23)
C-REACTIVE PROTEIN: 67.2 MG/L (ref 0–5)
CALCIUM SERPL-MCNC: 11.6 MG/DL (ref 8.6–10.4)
CHLORIDE BLD-SCNC: 104 MMOL/L (ref 98–107)
CO2: 29 MMOL/L (ref 20–31)
CREAT SERPL-MCNC: 1.07 MG/DL (ref 0.7–1.2)
EOSINOPHILS RELATIVE PERCENT: 3 % (ref 0–4)
GFR AFRICAN AMERICAN: >60 ML/MIN
GFR NON-AFRICAN AMERICAN: >60 ML/MIN
GFR SERPL CREATININE-BSD FRML MDRD: ABNORMAL ML/MIN/{1.73_M2}
GLUCOSE BLD-MCNC: 154 MG/DL (ref 75–110)
GLUCOSE BLD-MCNC: 173 MG/DL (ref 70–99)
HCT VFR BLD CALC: 43.2 % (ref 41–53)
HEMOGLOBIN: 14.5 G/DL (ref 13.5–17.5)
LYMPHOCYTES # BLD: 11 % (ref 24–44)
MCH RBC QN AUTO: 30.2 PG (ref 26–34)
MCHC RBC AUTO-ENTMCNC: 33.5 G/DL (ref 31–37)
MCV RBC AUTO: 89.9 FL (ref 80–100)
MONOCYTES # BLD: 8 % (ref 1–7)
PDW BLD-RTO: 14.4 % (ref 11.5–14.9)
PLATELET # BLD: 324 K/UL (ref 150–450)
PMV BLD AUTO: 7.4 FL (ref 6–12)
POTASSIUM SERPL-SCNC: 4.4 MMOL/L (ref 3.7–5.3)
RBC # BLD: 4.8 M/UL (ref 4.5–5.9)
REASON FOR REJECTION: NORMAL
SEDIMENTATION RATE, ERYTHROCYTE: 38 MM/HR (ref 0–20)
SEG NEUTROPHILS: 77 % (ref 36–66)
SEGMENTED NEUTROPHILS ABSOLUTE COUNT: 8.5 K/UL (ref 1.3–9.1)
SODIUM BLD-SCNC: 142 MMOL/L (ref 135–144)
WBC # BLD: 11 K/UL (ref 3.5–11)
ZZ NTE CLEAN UP: ORDERED TEST: NORMAL
ZZ NTE WITH NAME CLEAN UP: SPECIMEN SOURCE: NORMAL

## 2022-05-23 PROCEDURE — 99024 POSTOP FOLLOW-UP VISIT: CPT | Performed by: ORTHOPAEDIC SURGERY

## 2022-05-23 PROCEDURE — 36415 COLL VENOUS BLD VENIPUNCTURE: CPT

## 2022-05-23 PROCEDURE — 6370000000 HC RX 637 (ALT 250 FOR IP): Performed by: FAMILY MEDICINE

## 2022-05-23 PROCEDURE — 82947 ASSAY GLUCOSE BLOOD QUANT: CPT

## 2022-05-23 PROCEDURE — 96374 THER/PROPH/DIAG INJ IV PUSH: CPT

## 2022-05-23 PROCEDURE — 85025 COMPLETE CBC W/AUTO DIFF WBC: CPT

## 2022-05-23 PROCEDURE — 99285 EMERGENCY DEPT VISIT HI MDM: CPT

## 2022-05-23 PROCEDURE — 86140 C-REACTIVE PROTEIN: CPT

## 2022-05-23 PROCEDURE — 6360000002 HC RX W HCPCS: Performed by: STUDENT IN AN ORGANIZED HEALTH CARE EDUCATION/TRAINING PROGRAM

## 2022-05-23 PROCEDURE — 2580000003 HC RX 258: Performed by: ORTHOPAEDIC SURGERY

## 2022-05-23 PROCEDURE — 80048 BASIC METABOLIC PNL TOTAL CA: CPT

## 2022-05-23 PROCEDURE — 51701 INSERT BLADDER CATHETER: CPT

## 2022-05-23 PROCEDURE — 6370000000 HC RX 637 (ALT 250 FOR IP): Performed by: ORTHOPAEDIC SURGERY

## 2022-05-23 PROCEDURE — 51798 US URINE CAPACITY MEASURE: CPT

## 2022-05-23 PROCEDURE — 6360000002 HC RX W HCPCS: Performed by: ORTHOPAEDIC SURGERY

## 2022-05-23 PROCEDURE — 2580000003 HC RX 258: Performed by: STUDENT IN AN ORGANIZED HEALTH CARE EDUCATION/TRAINING PROGRAM

## 2022-05-23 PROCEDURE — 2060000000 HC ICU INTERMEDIATE R&B

## 2022-05-23 PROCEDURE — 6370000000 HC RX 637 (ALT 250 FOR IP): Performed by: STUDENT IN AN ORGANIZED HEALTH CARE EDUCATION/TRAINING PROGRAM

## 2022-05-23 PROCEDURE — 96376 TX/PRO/DX INJ SAME DRUG ADON: CPT

## 2022-05-23 PROCEDURE — 96375 TX/PRO/DX INJ NEW DRUG ADDON: CPT

## 2022-05-23 PROCEDURE — 85652 RBC SED RATE AUTOMATED: CPT

## 2022-05-23 PROCEDURE — 2500000003 HC RX 250 WO HCPCS: Performed by: STUDENT IN AN ORGANIZED HEALTH CARE EDUCATION/TRAINING PROGRAM

## 2022-05-23 RX ORDER — SODIUM CHLORIDE 0.9 % (FLUSH) 0.9 %
5-40 SYRINGE (ML) INJECTION PRN
Status: DISCONTINUED | OUTPATIENT
Start: 2022-05-23 | End: 2022-05-24 | Stop reason: SDUPTHER

## 2022-05-23 RX ORDER — METOPROLOL SUCCINATE 50 MG/1
50 TABLET, EXTENDED RELEASE ORAL DAILY
Status: DISCONTINUED | OUTPATIENT
Start: 2022-05-23 | End: 2022-05-31 | Stop reason: HOSPADM

## 2022-05-23 RX ORDER — OXYCODONE HYDROCHLORIDE AND ACETAMINOPHEN 5; 325 MG/1; MG/1
2 TABLET ORAL EVERY 6 HOURS PRN
Status: DISCONTINUED | OUTPATIENT
Start: 2022-05-23 | End: 2022-05-23

## 2022-05-23 RX ORDER — UBIDECARENONE 100 MG
100 CAPSULE ORAL DAILY
Status: DISCONTINUED | OUTPATIENT
Start: 2022-05-23 | End: 2022-05-31 | Stop reason: HOSPADM

## 2022-05-23 RX ORDER — ATORVASTATIN CALCIUM 10 MG/1
10 TABLET, FILM COATED ORAL DAILY
COMMUNITY
End: 2022-09-15 | Stop reason: ALTCHOICE

## 2022-05-23 RX ORDER — FENOFIBRATE 200 MG/1
200 CAPSULE ORAL
COMMUNITY
End: 2022-09-15 | Stop reason: SDUPTHER

## 2022-05-23 RX ORDER — NALOXONE HYDROCHLORIDE 0.4 MG/ML
INJECTION, SOLUTION INTRAMUSCULAR; INTRAVENOUS; SUBCUTANEOUS PRN
Status: DISCONTINUED | OUTPATIENT
Start: 2022-05-23 | End: 2022-05-23

## 2022-05-23 RX ORDER — FENTANYL CITRATE 50 UG/ML
50 INJECTION, SOLUTION INTRAMUSCULAR; INTRAVENOUS ONCE
Status: COMPLETED | OUTPATIENT
Start: 2022-05-23 | End: 2022-05-23

## 2022-05-23 RX ORDER — DEXAMETHASONE SODIUM PHOSPHATE 10 MG/ML
10 INJECTION, SOLUTION INTRAMUSCULAR; INTRAVENOUS EVERY 6 HOURS
Status: COMPLETED | OUTPATIENT
Start: 2022-05-23 | End: 2022-05-24

## 2022-05-23 RX ORDER — HYDROMORPHONE HCL 110MG/55ML
2 PATIENT CONTROLLED ANALGESIA SYRINGE INTRAVENOUS EVERY 4 HOURS PRN
Status: DISCONTINUED | OUTPATIENT
Start: 2022-05-23 | End: 2022-05-31 | Stop reason: HOSPADM

## 2022-05-23 RX ORDER — VITAMIN E 268 MG
400 CAPSULE ORAL DAILY
Status: DISCONTINUED | OUTPATIENT
Start: 2022-05-23 | End: 2022-05-31 | Stop reason: HOSPADM

## 2022-05-23 RX ORDER — KETAMINE HYDROCHLORIDE 50 MG/ML
0.3 INJECTION, SOLUTION, CONCENTRATE INTRAMUSCULAR; INTRAVENOUS ONCE
Status: COMPLETED | OUTPATIENT
Start: 2022-05-23 | End: 2022-05-23

## 2022-05-23 RX ORDER — ASPIRIN 81 MG/1
81 TABLET, CHEWABLE ORAL DAILY
Status: DISCONTINUED | OUTPATIENT
Start: 2022-05-24 | End: 2022-05-31 | Stop reason: HOSPADM

## 2022-05-23 RX ORDER — SODIUM CHLORIDE 0.9 % (FLUSH) 0.9 %
5-40 SYRINGE (ML) INJECTION EVERY 12 HOURS SCHEDULED
Status: DISCONTINUED | OUTPATIENT
Start: 2022-05-23 | End: 2022-05-26

## 2022-05-23 RX ORDER — SODIUM CHLORIDE 9 MG/ML
INJECTION, SOLUTION INTRAVENOUS PRN
Status: DISCONTINUED | OUTPATIENT
Start: 2022-05-23 | End: 2022-05-26

## 2022-05-23 RX ORDER — POLYETHYLENE GLYCOL 3350 17 G/17G
17 POWDER, FOR SOLUTION ORAL DAILY
Status: DISCONTINUED | OUTPATIENT
Start: 2022-05-23 | End: 2022-05-23

## 2022-05-23 RX ORDER — KETOROLAC TROMETHAMINE 30 MG/ML
15 INJECTION, SOLUTION INTRAMUSCULAR; INTRAVENOUS ONCE
Status: COMPLETED | OUTPATIENT
Start: 2022-05-23 | End: 2022-05-23

## 2022-05-23 RX ORDER — PANTOPRAZOLE SODIUM 40 MG/1
40 TABLET, DELAYED RELEASE ORAL
Status: DISCONTINUED | OUTPATIENT
Start: 2022-05-24 | End: 2022-05-31 | Stop reason: HOSPADM

## 2022-05-23 RX ORDER — 0.9 % SODIUM CHLORIDE 0.9 %
1000 INTRAVENOUS SOLUTION INTRAVENOUS ONCE
Status: COMPLETED | OUTPATIENT
Start: 2022-05-23 | End: 2022-05-23

## 2022-05-23 RX ORDER — ACETAMINOPHEN 325 MG/1
650 TABLET ORAL EVERY 6 HOURS
Status: DISCONTINUED | OUTPATIENT
Start: 2022-05-23 | End: 2022-05-26

## 2022-05-23 RX ORDER — FENOFIBRATE 160 MG/1
160 TABLET ORAL DAILY
Status: DISCONTINUED | OUTPATIENT
Start: 2022-05-23 | End: 2022-05-31 | Stop reason: HOSPADM

## 2022-05-23 RX ORDER — BACLOFEN 10 MG/1
10 TABLET ORAL EVERY 12 HOURS PRN
Status: DISCONTINUED | OUTPATIENT
Start: 2022-05-23 | End: 2022-05-31 | Stop reason: HOSPADM

## 2022-05-23 RX ORDER — CINACALCET 30 MG/1
30 TABLET, FILM COATED ORAL DAILY
Status: DISCONTINUED | OUTPATIENT
Start: 2022-05-23 | End: 2022-05-31 | Stop reason: HOSPADM

## 2022-05-23 RX ORDER — CLOPIDOGREL BISULFATE 75 MG/1
75 TABLET ORAL DAILY
Status: DISCONTINUED | OUTPATIENT
Start: 2022-05-23 | End: 2022-05-31 | Stop reason: HOSPADM

## 2022-05-23 RX ORDER — OMEPRAZOLE 20 MG/1
40 CAPSULE, DELAYED RELEASE ORAL DAILY
COMMUNITY
End: 2022-09-15 | Stop reason: ALTCHOICE

## 2022-05-23 RX ORDER — SODIUM CHLORIDE 9 MG/ML
INJECTION, SOLUTION INTRAVENOUS PRN
Status: DISCONTINUED | OUTPATIENT
Start: 2022-05-23 | End: 2022-05-24 | Stop reason: SDUPTHER

## 2022-05-23 RX ORDER — OXYCODONE HYDROCHLORIDE 5 MG/1
5 TABLET ORAL EVERY 4 HOURS PRN
Status: DISCONTINUED | OUTPATIENT
Start: 2022-05-23 | End: 2022-05-31 | Stop reason: HOSPADM

## 2022-05-23 RX ORDER — GABAPENTIN 300 MG/1
600 CAPSULE ORAL 3 TIMES DAILY
Status: DISCONTINUED | OUTPATIENT
Start: 2022-05-23 | End: 2022-05-31 | Stop reason: HOSPADM

## 2022-05-23 RX ORDER — OXYCODONE HYDROCHLORIDE 10 MG/1
10 TABLET ORAL EVERY 4 HOURS PRN
Status: DISCONTINUED | OUTPATIENT
Start: 2022-05-23 | End: 2022-05-31 | Stop reason: HOSPADM

## 2022-05-23 RX ORDER — FERROUS SULFATE 325(65) MG
325 TABLET ORAL
Status: DISCONTINUED | OUTPATIENT
Start: 2022-05-24 | End: 2022-05-31 | Stop reason: HOSPADM

## 2022-05-23 RX ORDER — NORTRIPTYLINE HYDROCHLORIDE 50 MG/1
50 CAPSULE ORAL 2 TIMES DAILY
Status: DISCONTINUED | OUTPATIENT
Start: 2022-05-23 | End: 2022-05-31 | Stop reason: HOSPADM

## 2022-05-23 RX ORDER — ONDANSETRON 2 MG/ML
4 INJECTION INTRAMUSCULAR; INTRAVENOUS EVERY 6 HOURS PRN
Status: DISCONTINUED | OUTPATIENT
Start: 2022-05-23 | End: 2022-05-31 | Stop reason: HOSPADM

## 2022-05-23 RX ORDER — ATORVASTATIN CALCIUM 10 MG/1
10 TABLET, FILM COATED ORAL DAILY
Status: DISCONTINUED | OUTPATIENT
Start: 2022-05-23 | End: 2022-05-31 | Stop reason: HOSPADM

## 2022-05-23 RX ORDER — ERGOCALCIFEROL 1.25 MG/1
50000 CAPSULE ORAL WEEKLY
Status: DISCONTINUED | OUTPATIENT
Start: 2022-05-23 | End: 2022-05-31 | Stop reason: HOSPADM

## 2022-05-23 RX ORDER — SENNA AND DOCUSATE SODIUM 50; 8.6 MG/1; MG/1
1 TABLET, FILM COATED ORAL 2 TIMES DAILY
Status: DISCONTINUED | OUTPATIENT
Start: 2022-05-23 | End: 2022-05-23

## 2022-05-23 RX ORDER — ONDANSETRON 4 MG/1
4 TABLET, ORALLY DISINTEGRATING ORAL EVERY 8 HOURS PRN
Status: DISCONTINUED | OUTPATIENT
Start: 2022-05-23 | End: 2022-05-31 | Stop reason: HOSPADM

## 2022-05-23 RX ORDER — EZETIMIBE 10 MG/1
10 TABLET ORAL DAILY
Status: DISCONTINUED | OUTPATIENT
Start: 2022-05-23 | End: 2022-05-31 | Stop reason: HOSPADM

## 2022-05-23 RX ORDER — SODIUM CHLORIDE 0.9 % (FLUSH) 0.9 %
5-40 SYRINGE (ML) INJECTION PRN
Status: DISCONTINUED | OUTPATIENT
Start: 2022-05-23 | End: 2022-05-26

## 2022-05-23 RX ORDER — LISINOPRIL 10 MG/1
10 TABLET ORAL DAILY
Status: DISCONTINUED | OUTPATIENT
Start: 2022-05-23 | End: 2022-05-31 | Stop reason: HOSPADM

## 2022-05-23 RX ORDER — TAMSULOSIN HYDROCHLORIDE 0.4 MG/1
0.4 CAPSULE ORAL 2 TIMES DAILY
Status: DISCONTINUED | OUTPATIENT
Start: 2022-05-23 | End: 2022-05-31 | Stop reason: HOSPADM

## 2022-05-23 RX ORDER — DIAZEPAM 5 MG/1
5 TABLET ORAL ONCE
Status: COMPLETED | OUTPATIENT
Start: 2022-05-23 | End: 2022-05-23

## 2022-05-23 RX ORDER — FINASTERIDE 5 MG/1
5 TABLET, FILM COATED ORAL DAILY
Status: DISCONTINUED | OUTPATIENT
Start: 2022-05-23 | End: 2022-05-31 | Stop reason: HOSPADM

## 2022-05-23 RX ADMIN — HYDROMORPHONE HYDROCHLORIDE 1.5 MG: 1 INJECTION, SOLUTION INTRAMUSCULAR; INTRAVENOUS; SUBCUTANEOUS at 13:29

## 2022-05-23 RX ADMIN — MAGNESIUM HYDROXIDE 30 ML: 400 SUSPENSION ORAL at 20:26

## 2022-05-23 RX ADMIN — Medication: at 14:42

## 2022-05-23 RX ADMIN — ACETAMINOPHEN 650 MG: 325 TABLET ORAL at 20:26

## 2022-05-23 RX ADMIN — FENTANYL CITRATE 50 MCG: 50 INJECTION, SOLUTION INTRAMUSCULAR; INTRAVENOUS at 11:37

## 2022-05-23 RX ADMIN — KETAMINE HYDROCHLORIDE 30 MG: 50 INJECTION, SOLUTION INTRAMUSCULAR; INTRAVENOUS at 12:58

## 2022-05-23 RX ADMIN — HYDROMORPHONE HYDROCHLORIDE 2 MG: 2 INJECTION, SOLUTION INTRAMUSCULAR; INTRAVENOUS; SUBCUTANEOUS at 20:27

## 2022-05-23 RX ADMIN — KETOROLAC TROMETHAMINE 15 MG: 30 INJECTION, SOLUTION INTRAMUSCULAR at 12:26

## 2022-05-23 RX ADMIN — DIAZEPAM 5 MG: 5 TABLET ORAL at 12:53

## 2022-05-23 RX ADMIN — SODIUM CHLORIDE 1000 ML: 9 INJECTION, SOLUTION INTRAVENOUS at 11:38

## 2022-05-23 RX ADMIN — HYDROMORPHONE HYDROCHLORIDE 2 MG: 2 INJECTION, SOLUTION INTRAMUSCULAR; INTRAVENOUS; SUBCUTANEOUS at 16:22

## 2022-05-23 RX ADMIN — DEXAMETHASONE SODIUM PHOSPHATE 10 MG: 10 INJECTION INTRAMUSCULAR; INTRAVENOUS at 22:45

## 2022-05-23 RX ADMIN — GABAPENTIN 600 MG: 300 CAPSULE ORAL at 20:21

## 2022-05-23 RX ADMIN — DEXAMETHASONE SODIUM PHOSPHATE 10 MG: 10 INJECTION INTRAMUSCULAR; INTRAVENOUS at 16:21

## 2022-05-23 RX ADMIN — NORTRIPTYLINE HYDROCHLORIDE 50 MG: 50 CAPSULE ORAL at 20:26

## 2022-05-23 RX ADMIN — OXYCODONE HYDROCHLORIDE AND ACETAMINOPHEN 2 TABLET: 5; 325 TABLET ORAL at 16:27

## 2022-05-23 RX ADMIN — MAGNESIUM CITRATE 296 ML: 1.75 LIQUID ORAL at 16:28

## 2022-05-23 RX ADMIN — HYDROMORPHONE HYDROCHLORIDE 1 MG: 1 INJECTION, SOLUTION INTRAMUSCULAR; INTRAVENOUS; SUBCUTANEOUS at 12:25

## 2022-05-23 RX ADMIN — HYDROMORPHONE HYDROCHLORIDE 1 MG: 1 INJECTION, SOLUTION INTRAMUSCULAR; INTRAVENOUS; SUBCUTANEOUS at 12:05

## 2022-05-23 RX ADMIN — TAMSULOSIN HYDROCHLORIDE 0.4 MG: 0.4 CAPSULE ORAL at 20:21

## 2022-05-23 RX ADMIN — SODIUM CHLORIDE, PRESERVATIVE FREE 10 ML: 5 INJECTION INTRAVENOUS at 20:21

## 2022-05-23 ASSESSMENT — PAIN SCALES - GENERAL
PAINLEVEL_OUTOF10: 6
PAINLEVEL_OUTOF10: 10
PAINLEVEL_OUTOF10: 7
PAINLEVEL_OUTOF10: 5
PAINLEVEL_OUTOF10: 9
PAINLEVEL_OUTOF10: 9
PAINLEVEL_OUTOF10: 2
PAINLEVEL_OUTOF10: 3
PAINLEVEL_OUTOF10: 2

## 2022-05-23 ASSESSMENT — PAIN - FUNCTIONAL ASSESSMENT
PAIN_FUNCTIONAL_ASSESSMENT: ACTIVITIES ARE NOT PREVENTED
PAIN_FUNCTIONAL_ASSESSMENT: 0-10
PAIN_FUNCTIONAL_ASSESSMENT: 0-10
PAIN_FUNCTIONAL_ASSESSMENT: PREVENTS OR INTERFERES WITH MANY ACTIVE NOT PASSIVE ACTIVITIES
PAIN_FUNCTIONAL_ASSESSMENT: PREVENTS OR INTERFERES WITH MANY ACTIVE NOT PASSIVE ACTIVITIES

## 2022-05-23 ASSESSMENT — ENCOUNTER SYMPTOMS
SHORTNESS OF BREATH: 0
CONSTIPATION: 1
ABDOMINAL PAIN: 0
NAUSEA: 0
BACK PAIN: 1
COUGH: 0
RHINORRHEA: 0
VOMITING: 0

## 2022-05-23 ASSESSMENT — PAIN DESCRIPTION - DESCRIPTORS
DESCRIPTORS: ACHING;THROBBING
DESCRIPTORS: ACHING;CRUSHING
DESCRIPTORS: CRAMPING;CRUSHING;SHARP
DESCRIPTORS: CRUSHING

## 2022-05-23 ASSESSMENT — PAIN DESCRIPTION - FREQUENCY: FREQUENCY: CONTINUOUS

## 2022-05-23 ASSESSMENT — PAIN DESCRIPTION - LOCATION
LOCATION: BACK
LOCATION: LEG;HIP
LOCATION: BACK
LOCATION: HIP;LEG

## 2022-05-23 ASSESSMENT — PAIN DESCRIPTION - PAIN TYPE: TYPE: ACUTE PAIN

## 2022-05-23 ASSESSMENT — PAIN DESCRIPTION - ORIENTATION
ORIENTATION: LOWER
ORIENTATION: LOWER
ORIENTATION: RIGHT;LEFT
ORIENTATION: RIGHT;LEFT

## 2022-05-23 ASSESSMENT — PAIN DESCRIPTION - ONSET: ONSET: ON-GOING

## 2022-05-23 NOTE — ED PROVIDER NOTES
[facility]  Emergency Department Encounter  Emergency Medicine Physician     Pt Name: Joseph Menon  MRN: [id number]  [place] 1955  Date of evaluation: 5/23/22  PCP:  Cheo Ryan PA-C    CHIEF COMPLAINT       Chief Complaint   Patient presents with    Back Pain     Surgery last Wednesday for back, L2-S1 with 25 staples in place. Surgery done by Dr. Manuela Tyler. HISTORY OF PRESENT ILLNESS  (Location/Symptom, Timing/Onset, Context/Setting, Quality, Duration, Modifying Factors, Severity.)    Joseph Menon is a 77 y.o. male who presents with low back pain with radiation to bilateral hips. Patient states that the pain starts at his sacrum and then radiates to the hips. He had a recent laminectomy on 5/18/2022 completed here at [facility]. He states that he was eventually discharged once [facility] and has not been receiving enough pain medication. He states that received single 5 mg Percocet this morning at 5 AM and then again between 8 and 9 AM.  He states that this was not adequate amount of pain control. He states that he has been constipated as well. He states that his stools are very hard. He denies any fevers or chills. He denies any nausea or vomiting. He states that he has not had an adequate appetite. Endorses decreased oral intake        PAST MEDICAL / SURGICAL / SOCIAL / FAMILY HISTORY    has a past medical history of Abdominal hernia, Anesthesia, BPH (benign prostatic hyperplasia), CAD (coronary artery disease), Chronic back pain, Diabetes mellitus ([address] Difficult intubation, Ear infection, Fatty liver, Gallstones, GERD (gastroesophageal reflux disease), Headache(784.0), History of MI (myocardial infarction), Hypercholesteremia, Hyperlipidemia, Hyperparathyroidism ([address] Hypertension, Kidney stones, Osteoarthritis, PONV (postoperative nausea and vomiting), Sleep apnea, and Unspecified sleep apnea.      has a past surgical history that includes Knee arthroscopy (Bilateral); Biceps tendon repair (Left); fracture surgery (Right); Colonoscopy (11 14 14); liver biopsy (8/14/2015); Rotator cuff repair (Right); Elbow fracture surgery (Right); other surgical history (11/07/2018); Endoscopy, colon, diagnostic; lumbar fusion (N/A, 7/10/2019); back surgery (07/11/2019); cervical fusion; Cardiac catheterization (01/02/2019); Carpal tunnel release (Right); and lumbar fusion (N/A, 5/18/2022). Social History     Socioeconomic History    Marital status:      Spouse name: Not on file    Number of children: Not on file    Years of education: Not on file    Highest education level: Not on file   Occupational History    Occupation: retired   Tobacco Use    Smoking status: Never Smoker    Smokeless tobacco: Never Used   Vaping Use    Vaping Use: Never used   Substance and Sexual Activity    Alcohol use: Never     Alcohol/week: 0.0 standard drinks    Drug use: Never    Sexual activity: Not on file   Other Topics Concern    Not on file   Social History Narrative    Not on file     Social Determinants of Health     Financial Resource Strain: Low Risk     Difficulty of Paying Living Expenses: Not hard at all   Food Insecurity: No Food Insecurity    Worried About [address] in the Last Year: Never true    [address] in the Last Year: Never true   Transportation Needs: No Transportation Needs    Lack of Transportation (Medical): No    Lack of Transportation (Non-Medical):  No   Physical Activity:     Days of Exercise per Week: Not on file    Minutes of Exercise per Session: Not on file   Stress:     Feeling of Stress : Not on file   Social Connections:     Frequency of Communication with Friends and Family: Not on file    Frequency of Social Gatherings with Friends and Family: Not on file    Attends Alevism Services: Not on file    Active Member of Clubs or Organizations: Not on file    Attends Club or Organization Meetings: Not on file  Marital Status: Not on file   Intimate Partner Violence:     Fear of Current or Ex-Partner: Not on file    Emotionally Abused: Not on file    Physically Abused: Not on file    Sexually Abused: Not on file   Housing Stability:     Unable to Pay for Housing in the Last Year: Not on file    Number of Places Lived in the Last Year: Not on file    Unstable Housing in the Last Year: Not on file       Family History   Problem Relation Age of Onset    Heart Disease Mother     Cancer Father         bladder    Arthritis Sister     Cancer Paternal Aunt     Cancer Paternal Aunt        Allergies:    Morphine, Pcn [penicillins], Tramadol, and Codeine    Home Medications:  Prior to Admission medications    Medication Sig Start Date End Date Taking? Authorizing Provider   atorvastatin (LIPITOR) 10 MG tablet Take 10 mg by mouth daily   Yes Historical Provider, MD   fenofibrate micronized (LOFIBRA) 200 MG capsule Take 200 mg by mouth every morning (before breakfast)   Yes Historical Provider, MD   omeprazole (PRILOSEC) 20 MG delayed release capsule Take 20 mg by mouth daily   Yes Historical Provider, MD   rivaroxaban (XARELTO) 10 MG TABS tablet Take 1 tablet by mouth daily (with breakfast) 5/20/22   Siegfried Severs, MD   oxyCODONE-acetaminophen (PERCOCET) 5-325 MG per tablet Take 1 tablet by mouth every 6 hours as needed for Pain for up to 7 days. Intended supply: 7 days.  Take lowest dose possible to manage pain 5/19/22 5/26/22  Siegfried Severs, MD   metFORMIN (GLUCOPHAGE) 1000 MG tablet TAKE ONE TABLET BY MOUTH TWICE A DAY WITH MEALS 5/12/22   Linda Maharaj PA-C   lisinopril (PRINIVIL;ZESTRIL) 10 MG tablet Take 1 tablet by mouth daily 5/12/22   Linda Maharaj PA-C   FEROSUL 325 (65 Fe) MG tablet TAKE ONE TABLET BY MOUTH DAILY WITH BREAKFAST 4/25/22   SERA Banuelos - CNP   finasteride (PROSCAR) 5 MG tablet TAKE ONE TABLET BY MOUTH DAILY 4/25/22   Holly Leal MD   clopidogrel (PLAVIX) 75 MG tablet Take 75 mg by mouth daily    Historical Provider, MD   vitamin D (ERGOCALCIFEROL) 1.25 MG (00613 UT) CAPS capsule TAKE ONE CAPSULE BY MOUTH ONCE WEEKLY 3/16/22   Rosamond Sandhoff, PA-C   ezetimibe (ZETIA) 10 MG tablet TAKE ONE TABLET BY MOUTH DAILY 1/7/22   Rosamond Sandhoff, PA-C   coenzyme Q-10 100 MG capsule TAKE ONE CAPSULE BY MOUTH DAILY 1/3/22   Rosamond Sandhoff, PA-C   cinacalcet (SENSIPAR) 30 MG tablet Take 30 mg by mouth daily    Historical Provider, MD   tamsulosin (FLOMAX) 0.4 MG capsule TAKE ONE CAPSULE BY MOUTH TWICE A DAY 7/22/21   Zachary Zamora MD   gabapentin (NEURONTIN) 300 MG capsule Take 600 mg by mouth 3 times daily. Historical Provider, MD   metoprolol succinate (TOPROL XL) 100 MG extended release tablet Take 50 mg by mouth daily     Historical Provider, MD   aspirin 81 MG chewable tablet Take 1 tablet by mouth daily 1/4/19   SERA Dumont - CNP   E-400 400 UNITS capsule TAKE TWO CAPSULES BY MOUTH DAILY 9/23/16   Terrance Lovett MD   nortriptyline (PAMELOR) 50 MG capsule Take 50 mg by mouth 2 times daily  3/31/16   Historical Provider, MD       REVIEW OF SYSTEMS    (2-9 systems for level 4, 10 or more for level 5)    Review of Systems   Constitutional: Negative for chills, fatigue and fever. HENT: Negative for congestion and rhinorrhea. Respiratory: Negative for cough and shortness of breath. Cardiovascular: Negative for chest pain. Gastrointestinal: Positive for constipation. Negative for abdominal pain, nausea and vomiting. Genitourinary: Negative for dysuria, flank pain, penile pain and penile swelling. Musculoskeletal: Positive for back pain. Neurological: Negative for light-headedness and headaches. Psychiatric/Behavioral: Negative for confusion. All other systems reviewed and are negative.       PHYSICAL EXAM   (up to 7 for level 4, 8 or more for level 5)    INITIAL VITALS:   ED Triage Vitals   BP Temp Temp src Pulse Resp SpO2 Height Weight   -- -- -- -- -- -- -- --       Physical Exam  Vitals and nursing note reviewed. Exam conducted with a chaperone present. Constitutional:       General: He is not in acute distress. Appearance: Normal appearance. He is ill-appearing. He is not toxic-appearing. Cardiovascular:      Rate and Rhythm: Normal rate and regular rhythm. Pulses: Normal pulses. Radial pulses are 2+ on the right side and 2+ on the left side. Heart sounds: Normal heart sounds. No murmur heard. Pulmonary:      Effort: Pulmonary effort is normal. No respiratory distress. Breath sounds: Normal breath sounds. No decreased breath sounds. Abdominal:      Palpations: Abdomen is soft. Tenderness: There is no abdominal tenderness. There is no guarding. Genitourinary:     Prostate: Enlarged. Rectum: External hemorrhoid present. No tenderness or anal fissure. Normal anal tone. Musculoskeletal:        Back:       Comments: Surgical incision to the indicated area. Nonerythematous, no purulent discharge. Staples in place. Most of the tenderness is located in the red Teller with radiation to bilateral hips   Skin:     General: Skin is warm and dry. Capillary Refill: Capillary refill takes less than 2 seconds. Neurological:      General: No focal deficit present. Mental Status: He is alert and oriented to person, place, and time. DIFFERENTIAL  DIAGNOSIS   PLAN (LABS / IMAGING / EKG):  Orders Placed This Encounter   Procedures    MRI LUMBAR SPINE W WO CONTRAST    CBC with Auto Differential    Sedimentation Rate    SPECIMEN REJECTION    Basic Metabolic Panel    C-Reactive Protein    ADULT DIET;  Regular    Vital signs per unit routine    Notify physician    Notify physician    Telemetry monitoring - continuous duration    Strict Bedrest    Place intermittent pneumatic compression device    Vital signs per unit routine    Activity as tolerated    Ambulate patient    Patient may shower    Notify physician for    Intake and output    Neurovascular checks    Monitor for signs/symptoms of urinary retention    Straight cath    Place intermittent pneumatic compression devices    Turn or assist with turn approximately every 2 hours if patient is unable to turn self. Remind patient to turn if necessary.     Assess skin per unit guidelines    Pad/offload medical devices    Maintain HOB at the lowest elevation consistent with medical plan of care    Use lift equipment for lifting patient    Maintain heels off of bed at all times    Full Code    Inpatient consult to Orthopedic Surgery    Inpatient consult to Internal Medicine    Inpatient consult to [address] [place] [address] A Inpatient consult to Internal Medicine    PT eval and treat    Initiate Oxygen Therapy Protocol    Incentive spirometry    Pulse Oximetry Spot Check    POC Glucose Fingerstick    ADMIT TO INPATIENT       MEDICATIONS ORDERED:  Orders Placed This Encounter   Medications    fentaNYL (SUBLIMAZE) injection 50 mcg    0.9 % sodium chloride bolus    HYDROmorphone (DILAUDID) injection 1 mg    ketorolac (TORADOL) injection 15 mg    HYDROmorphone (DILAUDID) injection 1 mg    ketamine (KETALAR) injection 30 mg    diazePAM (VALIUM) tablet 5 mg    HYDROmorphone (DILAUDID) injection 1.5 mg    DISCONTD: naloxone 0.4 mg in 10 mL sodium chloride syringe    DISCONTD: HYDROmorphone (DILAUDID) 1 mg/mL PCA    DISCONTD: polyethylene glycol (GLYCOLAX) packet 17 g    DISCONTD: sennosides-docusate sodium (SENOKOT-S) 8.6-50 MG tablet 1 tablet    sodium chloride flush 0.9 % injection 5-40 mL    sodium chloride flush 0.9 % injection 5-40 mL    0.9 % sodium chloride infusion    OR Linked Order Group     ondansetron (ZOFRAN-ODT) disintegrating tablet 4 mg     ondansetron (ZOFRAN) injection 4 mg    DISCONTD: HYDROmorphone (DILAUDID) 1 mg/mL PCA    sodium chloride flush 0.9 % injection 5-40 mL    sodium chloride flush 0.9 % injection 5-40 mL    0.9 % sodium chloride infusion    acetaminophen (TYLENOL) tablet 650 mg    magnesium citrate solution 296 mL    dexamethasone (PF) (DECADRON) injection 10 mg    OR Linked Order Group     oxyCODONE (ROXICODONE) immediate release tablet 5 mg     oxyCODONE HCl (OXY-IR) immediate release tablet 10 mg    HYDROmorphone (DILAUDID) injection 2 mg    baclofen (LIORESAL) tablet 10 mg    magnesium hydroxide (MILK OF MAGNESIA) 400 MG/5ML suspension 30 mL    nortriptyline (PAMELOR) capsule 50 mg    vitamin E capsule 400 Units    aspirin chewable tablet 81 mg    gabapentin (NEURONTIN) capsule 600 mg    metoprolol succinate (TOPROL XL) extended release tablet 50 mg    tamsulosin (FLOMAX) capsule 0.4 mg    cinacalcet (SENSIPAR) tablet 30 mg    coenzyme Q10 capsule 100 mg    ezetimibe (ZETIA) tablet 10 mg    vitamin D (ERGOCALCIFEROL) capsule 50,000 Units    clopidogrel (PLAVIX) tablet 75 mg    ferrous sulfate (IRON 325) tablet 325 mg    finasteride (PROSCAR) tablet 5 mg    metFORMIN (GLUCOPHAGE) tablet 1,000 mg    lisinopril (PRINIVIL;ZESTRIL) tablet 10 mg    DISCONTD: oxyCODONE-acetaminophen (PERCOCET) 5-325 MG per tablet 2 tablet    rivaroxaban (XARELTO) tablet 10 mg     Order Specific Question:   Indication of Use     Answer:   Post-Op Knee/Hip     Order Specific Question:   Indication of Use     Answer:    Other     Order Specific Question:   Other Rivaroxaban Indication     Answer:   postop lumbar surgery    atorvastatin (LIPITOR) tablet 10 mg    fenofibrate (TRIGLIDE) tablet 160 mg    pantoprazole (PROTONIX) tablet 40 mg         DIAGNOSTIC RESULTS / EMERGENCYDEPARTMENT COURSE / MDM   LABS:  Labs Reviewed   CBC WITH AUTO DIFFERENTIAL - Abnormal; Notable for the following components:       Result Value    Seg Neutrophils 77 (*)     Lymphocytes 11 (*)     Monocytes 8 (*)     All other components within normal limits   SEDIMENTATION RATE - Abnormal; Notable for the following components:    Sed Rate 38 (*)     All other components within normal limits   BASIC METABOLIC PANEL - Abnormal; Notable for the following components:    Glucose 173 (*)     BUN 27 (*)     Calcium 11.6 (*)     All other components within normal limits   C-REACTIVE PROTEIN - Abnormal; Notable for the following components:    CRP 67.2 (*)     All other components within normal limits   POC GLUCOSE FINGERSTICK - Abnormal; Notable for the following components:    POC Glucose 154 (*)     All other components within normal limits   SPECIMEN REJECTION       RADIOLOGY:  No results found. Impression:  Patient presents with low back pain status postlaminectomy on 5/18/2022. Completed by Dr. Lissett Lobo. Incision is clean, dry, intact. Minimal erythema in the surrounding area no purulent discharge. Patient actually does not have a lot of pain along the incision most of his pain is in the sacral area with radiation to bilateral hips worse on the right than on the left. Rectal tone is intact. No obvious blood seen on exam.  It appears the patient has likely not been receiving adequate pain control although patient does appear to be an considerable amount of pain at this time. For now we will provide pain control with fentanyl as he states that he is sensitive to pain medication, will give a low dose and then escalate as necessary. Will check basic labs and get a CT scan of the abdomen and pelvis with focus on the lumbar spine and speak with spinal surgeon. EMERGENCY DEPARTMENT COURSE:  ED Course as of 05/23/22 2137   Mon May 23, 2022   1200 Patient went to MRI. Fentanyl ineffective. Will use Dilaudid [AP]   1200 Patient still in 6 out of 10 pain. States that the fentanyl did not work and the Dilaudid was mildly effective. We will repeat Dilaudid with Toradol. Tolerated Dilaudid well no signs of nausea, vomiting, or any similar reactions that he gets with morphine or codeine [AP]   1245 Pain worsening again.   Patient has had 2 mg of Dilaudid and additional to 15 mg of Toradol and 50 mcg of fentanyl. We will attempt ketamine [AP]      ED Course User Index  [AP] Jona Perdomo,      After some dissociative dose of ketamine, patient's pain returned rather quickly. Gave patient another 1.5 mg of Dilaudid which did result in somewhat controlled pain. Discussed this with critical care who stated that if patient were on a patient controlled analgesia pump that they would not require admission to ICU or stepdown ICU. Discussed this with patient's primary care provider attending, Aym Greco who agreed with the Dilaudid PCA. Dilaudid PCA was ordered. Pain was moderately controlled but still present. Patient admitted to the floor. PROCEDURES:  none    CONSULTS:  IP CONSULT TO ORTHOPEDIC SURGERY  IP CONSULT TO INTERNAL MEDICINE  IP CONSULT TO CRITICAL CARE  IP CONSULT TO INTERNAL MEDICINE    CRITICAL CARE:  There was a high probability of clinically significant/life threatening deterioration in this patient's condition which required my urgent intervention. Total critical care time was 25 minutes. This excludes any time for separately reportable procedures. FINAL IMPRESSION     1. Back strain, initial encounter          DISPOSITION / [id number] Admitted 05/23/2022 02:17:48 PM      Evaluation and treatment course in the ED, and plan of care upon discharge was discussed in length with the patient/patient representative. Patient/patient representative had no further questions prior to being discharged and was instructed to return to the ED for new or worsening symptoms. Any changes to existing medications or new prescriptions were reviewed with patient/patient representative and they expressed understanding of how to correctly take their medications and the possible side effects. PATIENT REFERRED TO:  No follow-up provider specified.     DISCHARGE MEDICATIONS:  Current Discharge Medication List          Lorraine Bender DO [facility]  Emergency Medicine Attending    (Please note that portions of this note were completed with a voice recognition program.  Efforts were made to edit the dictations but occasionally words are mis-transcribed.)         Patrick Bentley DO  05/23/22 [id number]

## 2022-05-23 NOTE — ED NOTES
MRI called and stated patient was still in a lot of pain and they are unable to do the MRI. Provider notified. Orders initiated. Patient given 1mg of Dilaudid while in MRI.       Aleksandar Brady RN  05/23/22 0460

## 2022-05-23 NOTE — PLAN OF CARE
Problem: Discharge Planning  Goal: Discharge to home or other facility with appropriate resources  Outcome: Progressing     Problem: Pain  Goal: Verbalizes/displays adequate comfort level or baseline comfort level  Outcome: Progressing     Problem: ABCDS Injury Assessment  Goal: Absence of physical injury  Outcome: Progressing  Flowsheets (Taken 5/23/2022 1634 by Hilda Mcdowell RN)  Absence of Physical Injury: Implement safety measures based on patient assessment     Problem: Skin/Tissue Integrity  Goal: Absence of new skin breakdown  Description: 1. Monitor for areas of redness and/or skin breakdown  2. Assess vascular access sites hourly  3. Every 4-6 hours minimum:  Change oxygen saturation probe site  4. Every 4-6 hours:  If on nasal continuous positive airway pressure, respiratory therapy assess nares and determine need for appliance change or resting period. Outcome: Progressing   New admit today, pain is better controlled at end of shift.

## 2022-05-23 NOTE — H&P
History and Physical        CHIEF COMPLAINT:  Uncontrolled back pain with some leg pain      HISTORY OF PRESENT ILLNESS:      The patient is a 77 y.o. male  who presents with L2-3 PLIF history of L3-5 plif with 9 day hospital stay. Patient did fair but lots of back pain and spasms post operatively discharged to [facility]. Patient reports was doing fair in rehab but went 18 hours without pain medications with sever flair.      Reports no BM     Reports no numbness tingling but pressure in his legs and low back    Unable to tolerate MRI in ED and admitted for pain control    Past Medical History:    Past Medical History:   Diagnosis Date    Abdominal hernia     Anesthesia     phrenic nerve damaged lt side    BPH (benign prostatic hyperplasia)     CAD (coronary artery disease)     Chronic back pain     Diabetes mellitus (HCC)     Type 2    Difficult intubation     per pt throat collapses, lt side phrenic nerve was damaged    Ear infection     LT, being treated    Fatty liver     Gallstones     GERD (gastroesophageal reflux disease)     Headache(784.0)     History of MI (myocardial infarction)     Hypercholesteremia     Hyperlipidemia     Hyperparathyroidism ([address]     Hypertension     Kidney stones     Osteoarthritis     all joints    PONV (postoperative nausea and vomiting)     Sleep apnea     BIPAP nightly    Unspecified sleep apnea        Past Surgical History:    Past Surgical History:   Procedure Laterality Date    BACK SURGERY  07/11/2019    BICEPS TENDON REPAIR Left     CARDIAC CATHETERIZATION  01/02/2019    with Dr. Lulu Lockett, Stents X2    CARPAL TUNNEL RELEASE Right     CERVICAL FUSION      COLONOSCOPY  11 14 14    normal    ELBOW FRACTURE SURGERY Right     right arm/fracture    ENDOSCOPY, COLON, DIAGNOSTIC      several times    FRACTURE SURGERY Right     elbow    KNEE ARTHROSCOPY Bilateral     LIVER BIOPSY  8/14/2015    moderate steatosis, grade 2, lobular and portal inflammation: Grade 1 total score =3, fibrosis score 1A    LUMBAR FUSION N/A 7/10/2019    LUMBAR LAMINECTOMY FUSION POSTERIOR - L3 SACRUM POSTERIOR LUMBAR DECOMPRESSION  & FUSION performed by Kathleen Roe MD at [address] 5/18/2022    L2-3 POSTERIOR LUMBAR DECOMPRESSION & INSTRUMENTED FUSION POSSIBLE REVISION INSTRUMENTATION L3-5 performed by Kathleen Roe MD at [address] [facility]  11/07/2018    lumbar myelogram    ROTATOR CUFF REPAIR Right        Medications Prior to Admission:   Prior to Admission medications    Medication Sig Start Date End Date Taking? Authorizing Provider   atorvastatin (LIPITOR) 10 MG tablet Take 10 mg by mouth daily   Yes Historical Provider, MD   fenofibrate micronized (LOFIBRA) 200 MG capsule Take 200 mg by mouth every morning (before breakfast)   Yes Historical Provider, MD   omeprazole (PRILOSEC) 20 MG delayed release capsule Take 20 mg by mouth daily   Yes Historical Provider, MD   rivaroxaban (XARELTO) 10 MG TABS tablet Take 1 tablet by mouth daily (with breakfast) 5/20/22   Kathleen Roe MD   oxyCODONE-acetaminophen (PERCOCET) 5-325 MG per tablet Take 1 tablet by mouth every 6 hours as needed for Pain for up to 7 days. Intended supply: 7 days.  Take lowest dose possible to manage pain 5/19/22 5/26/22  Kathleen Roe MD   metFORMIN (GLUCOPHAGE) 1000 MG tablet TAKE ONE TABLET BY MOUTH TWICE A DAY WITH MEALS 5/12/22   Iraida Tejeda PA-C   lisinopril (PRINIVIL;ZESTRIL) 10 MG tablet Take 1 tablet by mouth daily 5/12/22   Iraida Tejeda PA-C   FEROSUL 325 (65 Fe) MG tablet TAKE ONE TABLET BY MOUTH DAILY WITH BREAKFAST 4/25/22   SERA King CNP   finasteride (PROSCAR) 5 MG tablet TAKE ONE TABLET BY MOUTH DAILY 4/25/22   Aracelis Mancini MD   clopidogrel (PLAVIX) 75 MG tablet Take 75 mg by mouth daily    Historical Provider, MD   vitamin D (ERGOCALCIFEROL) 1.25 MG (95055 UT) CAPS capsule TAKE ONE CAPSULE BY MOUTH ONCE WEEKLY 3/16/22   Iraida Tejeda PA-C ezetimibe (ZETIA) 10 MG tablet TAKE ONE TABLET BY MOUTH DAILY 1/7/22   Shi Rangel PA-C   coenzyme Q-10 100 MG capsule TAKE ONE CAPSULE BY MOUTH DAILY 1/3/22   Shi Rangel PA-C   cinacalcet (SENSIPAR) 30 MG tablet Take 30 mg by mouth daily    Historical Provider, MD   tamsulosin (FLOMAX) 0.4 MG capsule TAKE ONE CAPSULE BY MOUTH TWICE A DAY 7/22/21   Zachary Zamora MD   gabapentin (NEURONTIN) 300 MG capsule Take 600 mg by mouth 3 times daily.      Historical Provider, MD   metoprolol succinate (TOPROL XL) 100 MG extended release tablet Take 50 mg by mouth daily     Historical Provider, MD   aspirin 81 MG chewable tablet Take 1 tablet by mouth daily 1/4/19   SERA Azevedo - CNP   E-400 400 UNITS capsule TAKE TWO CAPSULES BY MOUTH DAILY 9/23/16   Alber Kelley MD   nortriptyline (PAMELOR) 50 MG capsule Take 50 mg by mouth 2 times daily  3/31/16   Historical Provider, MD    Scheduled Meds:   polyethylene glycol  17 g Oral Daily    sennosides-docusate sodium  1 tablet Oral BID    sodium chloride flush  5-40 mL IntraVENous 2 times per day     Continuous Infusions:   sodium chloride      HYDROmorphone       PRN Meds:.naloxone, sodium chloride flush, sodium chloride, ondansetron **OR** ondansetron      Allergies:  Morphine, Pcn [penicillins], Tramadol, and Codeine    Social History:   Social History     Occupational History    Occupation: retired   Tobacco Use    Smoking status: Never Smoker    Smokeless tobacco: Never Used   Vaping Use    Vaping Use: Never used   Substance and Sexual Activity    Alcohol use: Never     Alcohol/week: 0.0 standard drinks    Drug use: Never    Sexual activity: Not on file        Family History:  Family History   Problem Relation Age of Onset    Heart Disease Mother     Cancer Father         bladder    Arthritis Sister     Cancer Paternal Aunt     Cancer Paternal Aunt          REVIEW OF SYSTEMS:  Gen: Negative for nausea, vomiting, diarrhea, fever, chills, night sweats  Heart: Negative for HTN, palpitations, chest pain  Lungs: Negative for wheezes, asthma or SOB  GI: Negative for nausea, vomiting  Endo: Negative for diabetes  Heme: Negative for DVT       PHYSICAL EXAM:  Patient Vitals for the past 24 hrs:   BP Temp Temp src Pulse Resp SpO2 Height Weight   05/23/22 1501 -- -- -- -- -- 95 % -- --   05/23/22 1500 (!) 158/92 -- -- 105 19 95 % -- --   05/23/22 1445 (!) 159/100 -- -- 108 12 -- -- --   05/23/22 1433 -- -- -- 108 11 -- -- --   05/23/22 1430 (!) 180/113 -- -- 115 23 -- -- --   05/23/22 1401 (!) 181/107 -- -- 113 18 -- -- --   05/23/22 1331 (!) 181/105 -- -- 109 14 98 % -- --   05/23/22 1316 (!) 178/106 -- -- 109 17 97 % -- --   05/23/22 1313 -- -- -- 109 14 97 % -- --   05/23/22 1311 (!) 178/104 -- -- 113 20 98 % -- --   05/23/22 1309 -- -- -- 115 21 98 % -- --   05/23/22 1303 (!) 190/101 -- -- 122 (!) 32 99 % -- --   05/23/22 1110 (!) 156/107 98.2 °F (36.8 °C) Oral 113 22 -- 5' 8\" (1.727 m) 210 lb (95.3 kg)     Gen: alert and oriented  Head: normorcephalic, atraumatic  Neck: supple  Heart: RRR  Lungs: No audible wheezes  Abdomen: soft  Pelvis: stable  Extremity normal    Stands part way but then reported back pain to severe motor appears 5/5    Back incision healing well    DATA:  CBC:   Lab Results   Component Value Date    WBC 11.0 05/23/2022    HGB 14.5 05/23/2022     05/23/2022     01/28/2012     BMP:    Lab Results   Component Value Date     05/04/2022    K 5.0 05/04/2022     05/04/2022    CO2 29 05/04/2022    BUN 22 05/04/2022    CREATININE 1.10 05/04/2022    CALCIUM 10.7 05/04/2022    GLUCOSE 138 05/04/2022    GLUCOSE 116 01/28/2012     PT/INR:    Lab Results   Component Value Date    PROTIME 13.4 04/12/2022    INR 1.0 04/12/2022     Troponin:  No results found for: Fidela Litten    Radiology:      ASSESSMENT: Principal Problem:    Low back pain  Resolved Problems:    * No resolved hospital problems.  *     See H&P  PLAN:  1)  Steroids   2 Pain medication        Analilia Johnson MD

## 2022-05-23 NOTE — PROGRESS NOTES
Medication History completed:    New medications: atorvastatin    Medications discontinued: pravastatin    Changes to dosing:   Omeprazole changed to 20 mg daily  Fenofibrate changed to micronized formulation taking 200 mg daily    Stated allergies: As listed    Other pertinent information: Medications confirmed with facility list (Alexis Baird).      Thank you,  Everette Portillo, PharmD, [facility]  [phone]

## 2022-05-24 ENCOUNTER — APPOINTMENT (OUTPATIENT)
Dept: MRI IMAGING | Age: 67
DRG: 907 | End: 2022-05-24
Payer: MEDICARE

## 2022-05-24 PROBLEM — K59.03 DRUG-INDUCED CONSTIPATION: Status: ACTIVE | Noted: 2022-05-24

## 2022-05-24 PROCEDURE — 2580000003 HC RX 258: Performed by: ORTHOPAEDIC SURGERY

## 2022-05-24 PROCEDURE — 6370000000 HC RX 637 (ALT 250 FOR IP): Performed by: ORTHOPAEDIC SURGERY

## 2022-05-24 PROCEDURE — 6370000000 HC RX 637 (ALT 250 FOR IP): Performed by: FAMILY MEDICINE

## 2022-05-24 PROCEDURE — 6360000004 HC RX CONTRAST MEDICATION: Performed by: STUDENT IN AN ORGANIZED HEALTH CARE EDUCATION/TRAINING PROGRAM

## 2022-05-24 PROCEDURE — A9579 GAD-BASE MR CONTRAST NOS,1ML: HCPCS | Performed by: STUDENT IN AN ORGANIZED HEALTH CARE EDUCATION/TRAINING PROGRAM

## 2022-05-24 PROCEDURE — 72158 MRI LUMBAR SPINE W/O & W/DYE: CPT

## 2022-05-24 PROCEDURE — 97162 PT EVAL MOD COMPLEX 30 MIN: CPT

## 2022-05-24 PROCEDURE — 2580000003 HC RX 258: Performed by: STUDENT IN AN ORGANIZED HEALTH CARE EDUCATION/TRAINING PROGRAM

## 2022-05-24 PROCEDURE — 99024 POSTOP FOLLOW-UP VISIT: CPT | Performed by: ORTHOPAEDIC SURGERY

## 2022-05-24 PROCEDURE — 97530 THERAPEUTIC ACTIVITIES: CPT

## 2022-05-24 PROCEDURE — 6360000002 HC RX W HCPCS: Performed by: ORTHOPAEDIC SURGERY

## 2022-05-24 PROCEDURE — 2060000000 HC ICU INTERMEDIATE R&B

## 2022-05-24 RX ORDER — SODIUM CHLORIDE 0.9 % (FLUSH) 0.9 %
10 SYRINGE (ML) INJECTION PRN
Status: DISCONTINUED | OUTPATIENT
Start: 2022-05-24 | End: 2022-05-26

## 2022-05-24 RX ADMIN — ACETAMINOPHEN 650 MG: 325 TABLET ORAL at 17:59

## 2022-05-24 RX ADMIN — GADOTERIDOL 20 ML: 279.3 INJECTION, SOLUTION INTRAVENOUS at 21:16

## 2022-05-24 RX ADMIN — PANTOPRAZOLE SODIUM 40 MG: 40 TABLET, DELAYED RELEASE ORAL at 06:43

## 2022-05-24 RX ADMIN — SODIUM CHLORIDE, PRESERVATIVE FREE 10 ML: 5 INJECTION INTRAVENOUS at 21:20

## 2022-05-24 RX ADMIN — GABAPENTIN 600 MG: 300 CAPSULE ORAL at 13:22

## 2022-05-24 RX ADMIN — NORTRIPTYLINE HYDROCHLORIDE 50 MG: 50 CAPSULE ORAL at 08:44

## 2022-05-24 RX ADMIN — FENOFIBRATE 160 MG: 160 TABLET ORAL at 08:40

## 2022-05-24 RX ADMIN — RIVAROXABAN 10 MG: 10 TABLET, FILM COATED ORAL at 08:43

## 2022-05-24 RX ADMIN — ACETAMINOPHEN 650 MG: 325 TABLET ORAL at 00:33

## 2022-05-24 RX ADMIN — MAGNESIUM CITRATE 296 ML: 1.75 LIQUID ORAL at 08:46

## 2022-05-24 RX ADMIN — OXYCODONE HYDROCHLORIDE 5 MG: 5 TABLET ORAL at 22:41

## 2022-05-24 RX ADMIN — EZETIMIBE 10 MG: 10 TABLET ORAL at 08:44

## 2022-05-24 RX ADMIN — TAMSULOSIN HYDROCHLORIDE 0.4 MG: 0.4 CAPSULE ORAL at 08:42

## 2022-05-24 RX ADMIN — NORTRIPTYLINE HYDROCHLORIDE 50 MG: 50 CAPSULE ORAL at 21:17

## 2022-05-24 RX ADMIN — Medication 400 UNITS: at 08:38

## 2022-05-24 RX ADMIN — CLOPIDOGREL BISULFATE 75 MG: 75 TABLET ORAL at 08:40

## 2022-05-24 RX ADMIN — FERROUS SULFATE TAB 325 MG (65 MG ELEMENTAL FE) 325 MG: 325 (65 FE) TAB at 08:40

## 2022-05-24 RX ADMIN — TAMSULOSIN HYDROCHLORIDE 0.4 MG: 0.4 CAPSULE ORAL at 21:17

## 2022-05-24 RX ADMIN — GABAPENTIN 600 MG: 300 CAPSULE ORAL at 08:43

## 2022-05-24 RX ADMIN — METFORMIN HYDROCHLORIDE 1000 MG: 1000 TABLET, FILM COATED ORAL at 08:40

## 2022-05-24 RX ADMIN — Medication 100 MG: at 08:44

## 2022-05-24 RX ADMIN — ACETAMINOPHEN 650 MG: 325 TABLET ORAL at 06:43

## 2022-05-24 RX ADMIN — SODIUM CHLORIDE, PRESERVATIVE FREE 10 ML: 5 INJECTION INTRAVENOUS at 21:16

## 2022-05-24 RX ADMIN — DEXAMETHASONE SODIUM PHOSPHATE 10 MG: 10 INJECTION INTRAMUSCULAR; INTRAVENOUS at 05:08

## 2022-05-24 RX ADMIN — METOPROLOL SUCCINATE 50 MG: 50 TABLET, EXTENDED RELEASE ORAL at 08:45

## 2022-05-24 RX ADMIN — OXYCODONE HYDROCHLORIDE 5 MG: 5 TABLET ORAL at 13:21

## 2022-05-24 RX ADMIN — OXYCODONE HYDROCHLORIDE 5 MG: 5 TABLET ORAL at 17:59

## 2022-05-24 RX ADMIN — ACETAMINOPHEN 650 MG: 325 TABLET ORAL at 13:22

## 2022-05-24 RX ADMIN — HYDROMORPHONE HYDROCHLORIDE 2 MG: 2 INJECTION, SOLUTION INTRAMUSCULAR; INTRAVENOUS; SUBCUTANEOUS at 15:33

## 2022-05-24 RX ADMIN — ASPIRIN 81 MG: 81 TABLET, CHEWABLE ORAL at 08:41

## 2022-05-24 RX ADMIN — OXYCODONE HYDROCHLORIDE 5 MG: 5 TABLET ORAL at 09:02

## 2022-05-24 RX ADMIN — GABAPENTIN 600 MG: 300 CAPSULE ORAL at 21:17

## 2022-05-24 RX ADMIN — LISINOPRIL 10 MG: 10 TABLET ORAL at 08:40

## 2022-05-24 RX ADMIN — ATORVASTATIN CALCIUM 10 MG: 10 TABLET, FILM COATED ORAL at 08:42

## 2022-05-24 RX ADMIN — METFORMIN HYDROCHLORIDE 1000 MG: 1000 TABLET, FILM COATED ORAL at 18:00

## 2022-05-24 RX ADMIN — FINASTERIDE 5 MG: 5 TABLET, FILM COATED ORAL at 08:39

## 2022-05-24 RX ADMIN — SODIUM CHLORIDE, PRESERVATIVE FREE 10 ML: 5 INJECTION INTRAVENOUS at 08:51

## 2022-05-24 RX ADMIN — BACLOFEN 10 MG: 10 TABLET ORAL at 21:17

## 2022-05-24 RX ADMIN — OXYCODONE HYDROCHLORIDE 5 MG: 5 TABLET ORAL at 00:34

## 2022-05-24 RX ADMIN — DEXAMETHASONE SODIUM PHOSPHATE 10 MG: 10 INJECTION INTRAMUSCULAR; INTRAVENOUS at 08:50

## 2022-05-24 RX ADMIN — CINACALCET HYDROCHLORIDE 30 MG: 30 TABLET, FILM COATED ORAL at 08:41

## 2022-05-24 ASSESSMENT — PAIN SCALES - GENERAL
PAINLEVEL_OUTOF10: 1
PAINLEVEL_OUTOF10: 2
PAINLEVEL_OUTOF10: 4
PAINLEVEL_OUTOF10: 5
PAINLEVEL_OUTOF10: 8
PAINLEVEL_OUTOF10: 4
PAINLEVEL_OUTOF10: 4

## 2022-05-24 ASSESSMENT — PAIN DESCRIPTION - LOCATION
LOCATION: BACK

## 2022-05-24 ASSESSMENT — PAIN DESCRIPTION - ORIENTATION: ORIENTATION: MID;LOWER

## 2022-05-24 ASSESSMENT — PAIN DESCRIPTION - DESCRIPTORS: DESCRIPTORS: SPASM

## 2022-05-24 NOTE — PROGRESS NOTES
Physical Therapy  Facility/Department: [address] [place] [address]  Physical Therapy Initial Assessment    Name: Jose Ramon Castellano  : 1955  MRN: [id number]  Date of Service: 2022    Discharge Recommendations:  Patient would benefit from continued therapy after discharge          Patient Diagnosis(es): The encounter diagnosis was Back strain, initial encounter. Past Medical History:  has a past medical history of Abdominal hernia, Anesthesia, BPH (benign prostatic hyperplasia), CAD (coronary artery disease), Chronic back pain, Diabetes mellitus ([address] Difficult intubation, Ear infection, Fatty liver, Gallstones, GERD (gastroesophageal reflux disease), Headache(784.0), History of MI (myocardial infarction), Hypercholesteremia, Hyperlipidemia, Hyperparathyroidism ([address] Hypertension, Kidney stones, Osteoarthritis, PONV (postoperative nausea and vomiting), Sleep apnea, and Unspecified sleep apnea. Past Surgical History:  has a past surgical history that includes Knee arthroscopy (Bilateral); Biceps tendon repair (Left); fracture surgery (Right); Colonoscopy (14); liver biopsy (2015); Rotator cuff repair (Right); Elbow fracture surgery (Right); other surgical history (2018); Endoscopy, colon, diagnostic; lumbar fusion (N/A, 7/10/2019); back surgery (2019); cervical fusion; Cardiac catheterization (2019); Carpal tunnel release (Right); and lumbar fusion (N/A, 2022). Assessment   Body Structures, Functions, Activity Limitations Requiring Skilled Therapeutic Intervention: Decreased functional mobility ; Decreased endurance;Decreased safe awareness; Increased pain;Decreased posture;Decreased balance  Assessment: Pt presents to ER with increased LBP s/p L2-3 decompression and fusion, L3-4 instrumentation on 22 and elevated BP. Pt demo's improved tolerance of mobility today during PT. Will cont per POC to prepare for d/c.   Treatment Diagnosis: impaired mobilit and decreased IND restrictions: Bell RN receives verbal order to discontinue strict bedrest order from Dr Judy Rice prior to PT eval. Dr Judy Rice in room towards end of session and states pt is ok for increased activity. Subjective   Pain: 0-1/10 prior to PT, 1/10 after PT  General  Chart Reviewed: Yes  Patient assessed for rehabilitation services?: Yes  Additional Pertinent Hx: Dr Judy Rice approves PT prior to having MRI completed. Pt has been unable to lie flat to complete MRI so far. Response To Previous Treatment: Not applicable  Family / Caregiver Present: Yes (wife present)  Diagnosis: low back pain  Follows Commands: Within Functional Limits  General Comment  Comments: Bell RN ok's session. Subjective  Subjective: Pt is resting in R sidelying with HOB up partially. Reports having improved pain control and has been attempting to lie in flat position. Pt is agreeable to PT for OOB mobility attempt. Rates pain at 0-1/10 before session         Social/Functional History  Social/Functional History  Lives With: Spouse  Type of Home: House  Home Layout: Two level,Able to Live on Main level with bedroom/bathroom  Home Access: Stairs to enter with rails  Entrance Stairs - Number of Steps: 5  Entrance Stairs - Rails: Both  Bathroom Shower/Tub: Walk-in shower  Bathroom Toilet: Standard  Bathroom Equipment: Shower chair (wants a new shower chair without back)  Home Equipment: [facility], rolling,Cane  ADL Assistance: Needs assistance (since L2-3 and L3-4 decompression and fusion on 5/18/22)  Ambulation Assistance: Needs assistance (with RW)  Transfer Assistance: Needs assistance (with RW)  Active : Yes (prior to L2-3 and L3-4 lami and fusion on 5/18/22)  Additional Comments: Wife is home majority of time except when volunteering. Pt does not want to go back to [facility] d/t receiving poor care.  Pt/wife want other rehab placement  Vision/Hearing  Vision Exceptions: Wears glasses at all times  Hearing: Within functional limits    Cognition Score: 50.57 (05/24/22 1411)  Mobility Inpatient CMS G-Code Modifier : CK (05/24/22 1411)          Goals  Short Term Goals  Time Frame for Short term goals: 1 week  Short term goal 1: Pt will increase Bilat LE strength to [facility]  Short term goal 2: Pt will perform bed mobility SBA  Short term goal 3: Pt will transfer with RW SBA  Short term goal 4: Pt will ambulate with RW 50 ft SBA  Short term goal 5: Pt will perform HEP and demo BLT precautions IND  Patient Goals   Patient goals : decrease pain, increase activity       Education  Patient Education  Education Given To: Patient; Family  Education Provided: Role of Therapy; Fall Prevention Strategies;Transfer Training;Precautions  Education Method: Demonstration;Verbal  Education Outcome: Verbalized understanding;Continued education needed;Demonstrated understanding      Therapy Time   Individual Concurrent Group Co-treatment   Time In 8101         Time Out 1235 (additional: [phone])         Minutes [address], PT

## 2022-05-24 NOTE — FLOWSHEET NOTE
05/24/22 1230   Encounter Summary   Referral/Consult From: Patient; Family   Support System [employer] community  ([id number] [facility])   Plan and Referrals   Plan/Referrals Contacted support as requested per patient/family request Plan  ([address])

## 2022-05-24 NOTE — CARE COORDINATION
CASE MANAGEMENT NOTE:    Admission Date:  5/23/2022 Artemus Osgood is a 77 y.o.  male    Admitted for : Low back pain [M54.50]  Back strain, initial encounter [Z80.623H]    Met with:  Patient    PCP:  Dr. Shakila Canseco:  Medicare      Is patient alert and oriented at time of discussion:  Yes    Current Residence/ Living Arrangements:  independently at home             Current Services PTA:  No    Does patient go to outpatient dialysis: No  If yes, location and chair time:     Is patient agreeable to VNS: Yes    Freedom of choice provided:  Yes    List of [address] provided: Yes    VNS chosen:  Yes    DME:  walker    Home Oxygen: No    Nebulizer: No    CPAP/BIPAP: No    Supplier: N/A    Potential Assistance Needed: No    SNF needed: No    Freedom of choice and list provided: NA    Pharmacy:  [facility]       Is patient currently receiving oral anticoagulation therapy? No    Is the Patient an [facility] with Readmission Risk Score greater than 14%? Yes  If yes, pt needs a follow up appointment made within 7 days. Family Members/Caregivers that pt would like involved in their care:    Yes    If yes, list name here:  Juan Cifuentes Provider:  Family             Discharge Plan:  5/24/22 Medicare Pt is from home in a one story home with his spouse HONEY Quesada Referral sent Plan is to discharge to home with no needs will continue to follow for needs . //tv                 Electronically signed by:  Hobert Sever, RN on 5/24/2022 at 11:23 AM

## 2022-05-24 NOTE — PLAN OF CARE
Problem: Discharge Planning  Goal: Discharge to home or other facility with appropriate resources  5/24/2022 0340 by Hanna Solares RN  Outcome: Progressing  Note: Pain controlled with prn pain medication   5/23/2022 1833 by Serafin Jenkins RN  Outcome: Progressing     Problem: Pain  Goal: Verbalizes/displays adequate comfort level or baseline comfort level  5/24/2022 0340 by Hanna Solares RN  Outcome: Progressing  5/23/2022 1833 by Serafin Jenkins RN  Outcome: Progressing     Problem: ABCDS Injury Assessment  Goal: Absence of physical injury  5/24/2022 0340 by Hanna Solares RN  Outcome: Progressing  5/23/2022 1833 by Serafin Jenkins RN  Outcome: Progressing  Flowsheets (Taken 5/23/2022 1634 by Ayad Gonzalez RN)  Absence of Physical Injury: Implement safety measures based on patient assessment     Problem: Skin/Tissue Integrity  Goal: Absence of new skin breakdown  Description: 1. Monitor for areas of redness and/or skin breakdown  2. Assess vascular access sites hourly  3. Every 4-6 hours minimum:  Change oxygen saturation probe site  4. Every 4-6 hours:  If on nasal continuous positive airway pressure, respiratory therapy assess nares and determine need for appliance change or resting period.   5/24/2022 0340 by Hanna Solraes RN  Outcome: Progressing  5/23/2022 1833 by Serafin Jenkins RN  Outcome: Progressing     Problem: Safety - Adult  Goal: Free from fall injury  Outcome: Progressing  Note: Bed alarm on

## 2022-05-24 NOTE — ACP (ADVANCE CARE PLANNING)
Advance Care Planning     Advance Care Planning Activator (Inpatient)  Conversation Note      Date of ACP Conversation: 5/24/2022     Conversation Conducted with: Patient with Decision Making Capacity    ACP Activator: Tyree Cordoba RN        Health Care Decision Maker:     Current Designated Health Care Decision Maker:     Primary Decision Maker: Mary Delvalle - [phone]  Click here to complete Healthcare Decision Makers including section of the Healthcare Decision Maker Relationship (ie \"Primary\")      Care Preferences    Ventilation: \"If you were in your present state of health and suddenly became very ill and were unable to breathe on your own, what would your preference be about the use of a ventilator (breathing machine) if it were available to you? \"      Would the patient desire the use of ventilator (breathing machine)?: yes    \"If your health worsens and it becomes clear that your chance of recovery is unlikely, what would your preference be about the use of a ventilator (breathing machine) if it were available to you? \"     Would the patient desire the use of ventilator (breathing machine)?: Yes      Resuscitation  \"CPR works best to restart the heart when there is a sudden event, like a heart attack, in someone who is otherwise healthy. Unfortunately, CPR does not typically restart the heart for people who have serious health conditions or who are very sick. \"    \"In the event your heart stopped as a result of an underlying serious health condition, would you want attempts to be made to restart your heart (answer \"yes\" for attempt to resuscitate) or would you prefer a natural death (answer \"no\" for do not attempt to resuscitate)? \" yes       [] Yes   [] No   Educated Patient / Florene Billing regarding differences between Advance Directives and portable DNR orders.     Length of ACP Conversation in minutes:      Conversation Outcomes:  [x] ACP discussion completed  [] Existing advance directive reviewed with patient; no changes to patient's previously recorded wishes  [] New Advance Directive completed  [] Portable Do Not Rescitate prepared for Provider review and signature  [] POLST/POST/MOLST/MOST prepared for Provider review and signature      Follow-up plan:    [] Schedule follow-up conversation to continue planning  [] Referred individual to Provider for additional questions/concerns   [] Advised patient/agent/surrogate to review completed ACP document and update if needed with changes in condition, patient preferences or care setting    [x] This note routed to one or more involved healthcare providers

## 2022-05-24 NOTE — PROGRESS NOTES
Surgical Progress Note    POD:     Patient doing fairly well  Vitals:    22 0630   BP: 137/88   Pulse: 96   Resp: 16   Temp: 98.2 °F (36.8 °C)   SpO2: 95%      Temp (24hrs), Av.7 °F (36.5 °C), Min:96.8 °F (36 °C), Max:98.2 °F (36.8 °C)       Pain Control dramatically improved  No unusual nausea    Exam: moving better  in bed   Back pain improved  Aggravated with lying supine - needed for mri          Lungs:  No respiratory distress    Labs reviewed:  Labs:  WBC/Hgb/Hct/Plts:  11.0/14.5/43.2/324 ( 1140)  BUN/Cr/glu/ALT/AST/amyl/lip:  27/1.07/--/--/--/--/-- ( 153)     Na/K/Cl/CO2:  142/4.4/104/29 ( 153)    I/O last 3 completed shifts:   In: 240 [P.O.:240]  Out: 2325 [Urine:2325]    Assessment:    Patient Active Problem List   Diagnosis    Hyperlipidemia    Prostate disease    Osteoarthritis    Headache    GERD (gastroesophageal reflux disease)    Sleep apnea    Elevated liver enzymes    BPH (benign prostatic hyperplasia)    Steatosis of liver    Type 2 diabetes mellitus without complication, without long-term current use of insulin (HCC)    Lumbar radiculopathy, chronic    Osteoarthritis of lumbar spine    Osteoarthritis of multiple joints    Degenerative disc disease, lumbar    Lumbar spinal stenosis    Pars defect with spondylolisthesis    Essential hypertension    Lumbar disc herniation    Chronic low back pain    S/P cardiac catheterization    Back pain    Postoperative hypotension    Neck pain    Cervical spondylosis with myelopathy    Bilateral carpal tunnel syndrome    Spinal stenosis in cervical region    Spondylolisthesis, lumbar region    Foraminal stenosis of cervical region    Cervical radiculopathy    Preop examination    Low back pain       Plan:  See my orders  Mag citrate  Continue decadron      Juancho Maier MD MD  2022 7:02 AM

## 2022-05-24 NOTE — PLAN OF CARE
Problem: Discharge Planning  Goal: Discharge to home or other facility with appropriate resources  5/24/2022 1830 by Markie Norwood RN  Outcome: Progressing  5/24/2022 1829 by Markie Norwood RN  Outcome: Progressing     Problem: Pain  Goal: Verbalizes/displays adequate comfort level or baseline comfort level  5/24/2022 1830 by Markie Norwood RN  Outcome: Progressing  5/24/2022 1829 by Markie Norwood RN  Outcome: Progressing     Problem: ABCDS Injury Assessment  Goal: Absence of physical injury  5/24/2022 1830 by Markie Norwood RN  Outcome: Progressing  5/24/2022 [address] by Markie Norwood RN  Outcome: Progressing     Problem: Skin/Tissue Integrity  Goal: Absence of new skin breakdown  Description: 1. Monitor for areas of redness and/or skin breakdown  2. Assess vascular access sites hourly  3. Every 4-6 hours minimum:  Change oxygen saturation probe site  4. Every 4-6 hours:  If on nasal continuous positive airway pressure, respiratory therapy assess nares and determine need for appliance change or resting period.   5/24/2022 1830 by Markie Norwood RN  Outcome: Progressing  5/24/2022 1829 by Markie Norwood RN  Outcome: Progressing     Problem: Safety - Adult  Goal: Free from fall injury  5/24/2022 1830 by Markie Norwood RN  Outcome: Progressing  5/24/2022 1829 by Markie Norwood RN  Outcome: Progressing     Problem: Chronic Conditions and Co-morbidities  Goal: Patient's chronic conditions and co-morbidity symptoms are monitored and maintained or improved  5/24/2022 1830 by Markie Norwood RN  Outcome: Progressing  5/24/2022 1829 by Markie Norwood RN  Outcome: Progressing

## 2022-05-24 NOTE — PROGRESS NOTES
RN spoke to attila Mosley to discontinue bed rest orders. Patient may ambulate before the MRI is done.

## 2022-05-25 PROBLEM — G97.82 CEREBROSPINAL FLUID LEAK DUE TO LUMBAR SURGERY: Status: ACTIVE | Noted: 2022-05-25

## 2022-05-25 PROBLEM — G96.00 CEREBROSPINAL FLUID LEAK DUE TO LUMBAR SURGERY: Status: ACTIVE | Noted: 2022-05-25

## 2022-05-25 PROCEDURE — 2580000003 HC RX 258: Performed by: ORTHOPAEDIC SURGERY

## 2022-05-25 PROCEDURE — 2060000000 HC ICU INTERMEDIATE R&B

## 2022-05-25 PROCEDURE — 6370000000 HC RX 637 (ALT 250 FOR IP): Performed by: FAMILY MEDICINE

## 2022-05-25 PROCEDURE — 99232 SBSQ HOSP IP/OBS MODERATE 35: CPT | Performed by: FAMILY MEDICINE

## 2022-05-25 PROCEDURE — 6370000000 HC RX 637 (ALT 250 FOR IP): Performed by: ORTHOPAEDIC SURGERY

## 2022-05-25 RX ADMIN — METFORMIN HYDROCHLORIDE 1000 MG: 1000 TABLET, FILM COATED ORAL at 09:01

## 2022-05-25 RX ADMIN — LISINOPRIL 10 MG: 10 TABLET ORAL at 09:01

## 2022-05-25 RX ADMIN — Medication 100 MG: at 09:00

## 2022-05-25 RX ADMIN — FENOFIBRATE 160 MG: 160 TABLET ORAL at 09:01

## 2022-05-25 RX ADMIN — Medication 400 UNITS: at 09:00

## 2022-05-25 RX ADMIN — TAMSULOSIN HYDROCHLORIDE 0.4 MG: 0.4 CAPSULE ORAL at 22:27

## 2022-05-25 RX ADMIN — MAGNESIUM HYDROXIDE 30 ML: 400 SUSPENSION ORAL at 09:00

## 2022-05-25 RX ADMIN — NORTRIPTYLINE HYDROCHLORIDE 50 MG: 50 CAPSULE ORAL at 09:00

## 2022-05-25 RX ADMIN — NORTRIPTYLINE HYDROCHLORIDE 50 MG: 50 CAPSULE ORAL at 23:26

## 2022-05-25 RX ADMIN — GABAPENTIN 600 MG: 300 CAPSULE ORAL at 22:27

## 2022-05-25 RX ADMIN — ACETAMINOPHEN 650 MG: 325 TABLET ORAL at 12:22

## 2022-05-25 RX ADMIN — TAMSULOSIN HYDROCHLORIDE 0.4 MG: 0.4 CAPSULE ORAL at 09:01

## 2022-05-25 RX ADMIN — SODIUM CHLORIDE, PRESERVATIVE FREE 10 ML: 5 INJECTION INTRAVENOUS at 23:49

## 2022-05-25 RX ADMIN — METOPROLOL SUCCINATE 50 MG: 50 TABLET, EXTENDED RELEASE ORAL at 09:01

## 2022-05-25 RX ADMIN — SODIUM CHLORIDE, PRESERVATIVE FREE 10 ML: 5 INJECTION INTRAVENOUS at 09:05

## 2022-05-25 RX ADMIN — CINACALCET HYDROCHLORIDE 30 MG: 30 TABLET, FILM COATED ORAL at 09:00

## 2022-05-25 RX ADMIN — ACETAMINOPHEN 650 MG: 325 TABLET ORAL at 23:32

## 2022-05-25 RX ADMIN — ACETAMINOPHEN 650 MG: 325 TABLET ORAL at 17:44

## 2022-05-25 RX ADMIN — ACETAMINOPHEN 650 MG: 325 TABLET ORAL at 01:11

## 2022-05-25 RX ADMIN — ATORVASTATIN CALCIUM 10 MG: 10 TABLET, FILM COATED ORAL at 09:01

## 2022-05-25 RX ADMIN — FINASTERIDE 5 MG: 5 TABLET, FILM COATED ORAL at 09:00

## 2022-05-25 RX ADMIN — FERROUS SULFATE TAB 325 MG (65 MG ELEMENTAL FE) 325 MG: 325 (65 FE) TAB at 09:01

## 2022-05-25 RX ADMIN — GABAPENTIN 600 MG: 300 CAPSULE ORAL at 09:00

## 2022-05-25 RX ADMIN — METFORMIN HYDROCHLORIDE 1000 MG: 1000 TABLET, FILM COATED ORAL at 17:44

## 2022-05-25 RX ADMIN — EZETIMIBE 10 MG: 10 TABLET ORAL at 09:00

## 2022-05-25 RX ADMIN — GABAPENTIN 600 MG: 300 CAPSULE ORAL at 15:11

## 2022-05-25 ASSESSMENT — PAIN SCALES - GENERAL
PAINLEVEL_OUTOF10: 3
PAINLEVEL_OUTOF10: 4
PAINLEVEL_OUTOF10: 1
PAINLEVEL_OUTOF10: 5
PAINLEVEL_OUTOF10: 2

## 2022-05-25 ASSESSMENT — PAIN DESCRIPTION - LOCATION: LOCATION: BACK

## 2022-05-25 NOTE — FLOWSHEET NOTE
Writer met by patient's wife when she entered patient's room; wife stated patient had just been anointed by Fr. Ayon and declined visit;     05/25/22 1949   Encounter Summary   Encounter Overview/Reason  Spiritual/Emotional Needs; Rituals, Rites and Sacraments   Service Provided For: Family   Referral/Consult From: [facility]   Support System Spouse   Last Encounter  05/25/22   Complexity of Encounter Low   Spiritual/Emotional needs   Type Spiritual Support   Rituals, Rites and Sacraments   Type Anointing  (Fr. Stripe 5/25/22)   Assessment/Intervention/Outcome   Assessment Calm;Coping   Intervention Sustaining Presence/Ministry of presence   Outcome Coping;Engaged in conversation;Expressed feelings, needs, and concerns;Expressed Gratitude

## 2022-05-25 NOTE — PLAN OF CARE
Problem: Discharge Planning  Goal: Discharge to home or other facility with appropriate resources  5/25/2022 0513 by Bhaskar Rader RN  Outcome: Progressing  Flowsheets (Taken 5/25/2022 6669)  Discharge to home or other facility with appropriate resources:   Identify barriers to discharge with patient and caregiver   Arrange for needed discharge resources and transportation as appropriate   Refer to discharge planning if patient needs post-hospital services based on physician order or complex needs related to functional status, cognitive ability or social support system     Problem: Pain  Goal: Verbalizes/displays adequate comfort level or baseline comfort level  5/25/2022 0513 by Bhaskar Rader RN  Outcome: Progressing  Flowsheets (Taken 5/25/2022 0513)  Verbalizes/displays adequate comfort level or baseline comfort level:   Encourage patient to monitor pain and request assistance   Assess pain using appropriate pain scale   Administer analgesics based on type and severity of pain and evaluate response   Implement non-pharmacological measures as appropriate and evaluate response  Note: Patient verbalizes adequate pain control this shift of 4/10 and 2/10. PRN medication available and given appropriately this shift. Problem: ABCDS Injury Assessment  Goal: Absence of physical injury  5/25/2022 0513 by Bhaskar Rader RN  Outcome: Progressing  Flowsheets (Taken 5/24/2022 2130)  Absence of Physical Injury: Implement safety measures based on patient assessment     Problem: Skin/Tissue Integrity  Goal: Absence of new skin breakdown  Description: 1. Monitor for areas of redness and/or skin breakdown  2. Assess vascular access sites hourly  3. Every 4-6 hours minimum:  Change oxygen saturation probe site  4. Every 4-6 hours:  If on nasal continuous positive airway pressure, respiratory therapy assess nares and determine need for appliance change or resting period.   5/25/2022 0513 by Bhaskar Rader RN  Outcome: Progressing     Problem: Safety - Adult  Goal: Free from fall injury  5/25/2022 0513 by Kira Mccrary RN  Outcome: Progressing  Flowsheets (Taken 5/24/2022 2130)  Free From Fall Injury:   Instruct family/caregiver on patient safety   Based on caregiver fall risk screen, instruct family/caregiver to ask for assistance with transferring infant if caregiver noted to have fall risk factors

## 2022-05-25 NOTE — PLAN OF CARE
Problem: Discharge Planning  Goal: Discharge to home or other facility with appropriate resources  5/25/2022 1638 by Corina Rivera RN  Outcome: Progressing     Problem: Pain  Goal: Verbalizes/displays adequate comfort level or baseline comfort level  5/25/2022 1638 by Corina Rivera RN  Outcome: Progressing     Problem: ABCDS Injury Assessment  Goal: Absence of physical injury  5/25/2022 1638 by Corina Rivera RN  Outcome: Progressing     Problem: Skin/Tissue Integrity  Goal: Absence of new skin breakdown  Description: 1. Monitor for areas of redness and/or skin breakdown  2. Assess vascular access sites hourly  3. Every 4-6 hours minimum:  Change oxygen saturation probe site  4. Every 4-6 hours:  If on nasal continuous positive airway pressure, respiratory therapy assess nares and determine need for appliance change or resting period.   5/25/2022 1638 by Corina Rivera RN  Outcome: Progressing

## 2022-05-25 NOTE — DISCHARGE SUMMARY
List      START taking these medications    oxyCODONE-acetaminophen 5-325 MG per tablet  Commonly known as: Percocet  Take 1 tablet by mouth every 6 hours as needed for Pain for up to 7 days. Intended supply: 7 days.  Take lowest dose possible to manage pain     Xarelto 10 MG Tabs tablet  Generic drug: rivaroxaban  Take 1 tablet by mouth daily (with breakfast)        CONTINUE taking these medications    aspirin 81 MG chewable tablet  Take 1 tablet by mouth daily     cinacalcet 30 MG tablet  Commonly known as: SENSIPAR     coenzyme Q-10 100 MG capsule  TAKE ONE CAPSULE BY MOUTH DAILY     E-400 400 UNIT capsule  Generic drug: vitamin E  TAKE TWO CAPSULES BY MOUTH DAILY     ezetimibe 10 mg tablet  Commonly known as: ZETIA  TAKE ONE TABLET BY MOUTH DAILY     FeroSul 325 (65 Fe) MG tablet  Generic drug: ferrous sulfate  TAKE ONE TABLET BY MOUTH DAILY WITH BREAKFAST     finasteride 5 MG tablet  Commonly known as: PROSCAR  TAKE ONE TABLET BY MOUTH DAILY     gabapentin 300 MG capsule  Commonly known as: NEURONTIN     lisinopril 10 MG tablet  Commonly known as: PRINIVIL;ZESTRIL  Take 1 tablet by mouth daily     metFORMIN 1000 MG tablet  Commonly known as: GLUCOPHAGE  TAKE ONE TABLET BY MOUTH TWICE A DAY WITH MEALS     metoprolol succinate 100 MG extended release tablet  Commonly known as: TOPROL XL     nortriptyline 50 MG capsule  Commonly known as: PAMELOR     Plavix 75 MG tablet  Generic drug: clopidogrel     tamsulosin 0.4 mg capsule  Commonly known as: FLOMAX  TAKE ONE CAPSULE BY MOUTH TWICE A DAY     vitamin D 1.25 MG (20752 UT) Caps capsule  Commonly known as: ERGOCALCIFEROL  TAKE ONE CAPSULE BY MOUTH ONCE WEEKLY        STOP taking these medications    diclofenac 75 MG EC tablet  Commonly known as: VOLTAREN           Where to Get Your Medications      These medications were sent to [facility] [id number] [place], [place] [facility] [phone] - F [phone]  [address] [place] [address] [address]    Phone: [phone]   · oxyCODONE-acetaminophen 5-325 MG per tablet     You can get these medications from any pharmacy    Bring a paper prescription for each of these medications  · Xarelto 10 MG Tabs tablet          Discharge Condition  Stable       Activity on Discharge  As tolerated       Discharge Disposition:  [place]    Discharge Instructions  See Orders    Follow-Up Scheduled    [facility],[address], [address] [facility]  [address] [facility],[address]  [address]    In 3 weeks      Patel Carrillo MD  [place] [address], [address] [facility]  [address] [place] [address]  [phone]      If symptoms worsen      Electronically signed by Patel Carrillo MD on 5/25/2022 at 7:25 AM

## 2022-05-25 NOTE — PROGRESS NOTES
Surgical Progress Note    POD:      Patient doing well  Vitals:    22 1422   BP: 108/74   Pulse: (!) 101   Resp: 17   Temp: 98.5 °F (36.9 °C)   SpO2: 96%      Temp (24hrs), Av.4 °F (36.9 °C), Min:97.7 °F (36.5 °C), Max:99.8 °F (37.7 °C)       Pain Control better    No unusual nausea    Exam: +bm  ambulatory        Lungs:  No respiratory distress    Labs reviewed:  Labs:  WBC/Hgb/Hct/Plts:  11.0/14.5/43.2/324 ( 1140)  BUN/Cr/glu/ALT/AST/amyl/lip:  27/1.07/--/--/--/--/-- ( 153)     Na/K/Cl/CO2:  142/4.4/104/29 ( 153)    I/O last 3 completed shifts: In: 910 [P.O.:900;  I.V.:10]  Out: 3432 [Urine:3430; Stool:2]    Assessment:    Patient Active Problem List   Diagnosis    Hyperlipidemia    Prostate disease    Primary osteoarthritis involving multiple joints    Headache    GERD (gastroesophageal reflux disease)    Sleep apnea    Elevated liver enzymes    BPH (benign prostatic hyperplasia)    Steatosis of liver    Type 2 diabetes mellitus without complication, without long-term current use of insulin (HCC)    Lumbar radiculopathy, chronic    Osteoarthritis of lumbar spine    Osteoarthritis of multiple joints    Degenerative disc disease, lumbar    Lumbar spinal stenosis    Pars defect with spondylolisthesis    Essential hypertension    Lumbar disc herniation    Chronic low back pain    S/P cardiac catheterization    Back pain    Postoperative hypotension    Neck pain    Cervical spondylosis with myelopathy    Bilateral carpal tunnel syndrome    Spinal stenosis in cervical region    Spondylolisthesis, lumbar region    Foraminal stenosis of cervical region    Cervical radiculopathy    Preop examination    Low back pain    Drug-induced constipation    Cerebrospinal fluid leak due to lumbar surgery    Strain of back     MRI CSF leak with large seroma  Plan:  See my orders  To or tomorrow secondary to anti-coagulation  Becky Le MD MD  2022 2:55 PM

## 2022-05-25 NOTE — FLOWSHEET NOTE
05/25/22 1519   Encounter Summary   Encounter Overview/Reason  Volunteer Encounter   Service Provided For: Patient   Referral/Consult From: Rounding   Last Encounter  05/25/22  (V)   Complexity of Encounter Low   Spiritual/Emotional needs   Type Spiritual Support   Rituals, Rites and [address]

## 2022-05-25 NOTE — PROGRESS NOTES
FAMILY MEDICINE  - PROGRESS NOTE    Date:  5/25/2022  Sam Woods  [id number]      Chief Complaint   Patient presents with    Back Pain     Surgery last Wednesday for back, L2-S1 with 25 staples in place. Surgery done by Dr. Akbar Hawk. Interval History:  Improved, he is sleeping soundly this am. Dr. Akbar Hawk wants to take the patient back to surgery and will wait until tomorrow as he is on Xarelto, ASA & Plavix. No significant events overnight. Specialists notes, labs & imaging reviewed.       Subjective  Constitutional: positive for overweight  Gastrointestinal: positive for reflux symptoms  Musculoskeletal:positive for arthralgias, back pain, myalgias and stiff joints  Endocrine: positive for diabetic symptoms including hyperglycemia:    Objective:    /85   Pulse 95   Temp 97.7 °F (36.5 °C) (Oral)   Resp 18   Ht 5' 8\" (1.727 m)   Wt 190 lb 14.7 oz (86.6 kg)   SpO2 90%   BMI 29.03 kg/m²   General appearance - overweight  Mental status - affect appropriate to mood  Eyes - not examined  Ears - hearing grossly normal bilaterally  Nose - normal and patent, no erythema, discharge or polyps  Mouth - mucous membranes moist, pharynx normal without lesions  Neck - supple, no significant adenopathy  Lymphatics - no palpable lymphadenopathy, no hepatosplenomegaly  Chest - clear to auscultation, no wheezes, rales or rhonchi, symmetric air entry  Heart - normal rate, regular rhythm, normal S1, S2, no murmurs, rubs, clicks or gallops  Abdomen - soft, nontender, nondistended, no masses or organomegaly  Breasts - not examined  Back exam - not examined  Neurological - neck supple without rigidity  Musculoskeletal - osteoarthritic changes noted in both hands  Extremities - peripheral pulses normal, no pedal edema, no clubbing or cyanosis  Skin - normal coloration and turgor, no rashes, no suspicious skin lesions noted    Data:   Medications:   Current Facility-Administered Medications   Medication Dose Route Frequency Provider Last Rate Last Admin    sodium chloride flush 0.9 % injection 10 mL  10 mL IntraVENous PRN Karrie Florence DO   10 mL at 05/24/22 2116    sodium chloride flush 0.9 % injection 5-40 mL  5-40 mL IntraVENous 2 times per day Tulio Padilla DO        ondansetron (ZOFRAN-ODT) disintegrating tablet 4 mg  4 mg Oral Q8H PRN Karrie Florence DO        Or    ondansetron (ZOFRAN) injection 4 mg  4 mg IntraVENous Q6H PRN Karrie Florence DO        sodium chloride flush 0.9 % injection 5-40 mL  5-40 mL IntraVENous 2 times per day Juany Rothman MD   10 mL at 05/24/22 2120    sodium chloride flush 0.9 % injection 5-40 mL  5-40 mL IntraVENous PRN Juany Rothman MD   10 mL at 05/24/22 0851    0.9 % sodium chloride infusion   IntraVENous PRN Juany Rothman MD        acetaminophen (TYLENOL) tablet 650 mg  650 mg Oral Q6H Juany Rothman MD   650 mg at 05/25/22 0111    oxyCODONE (ROXICODONE) immediate release tablet 5 mg  5 mg Oral Q4H PRN Juany Rothman MD   5 mg at 05/24/22 2241    Or    oxyCODONE HCl (OXY-IR) immediate release tablet 10 mg  10 mg Oral Q4H PRN Juany Rothman MD        HYDROmorphone (DILAUDID) injection 2 mg  2 mg IntraVENous Q4H PRN Juany Rothman MD   2 mg at 05/24/22 1533    baclofen (LIORESAL) tablet 10 mg  10 mg Oral Q12H PRN Juany Rothman MD   10 mg at 05/24/22 2117    magnesium hydroxide (MILK OF MAGNESIA) 400 MG/5ML suspension 30 mL  30 mL Oral Daily Juany Rothman MD   30 mL at 05/23/22 2026    nortriptyline (PAMELOR) capsule 50 mg  50 mg Oral BID Aliza Caldwell MD   50 mg at 05/24/22 2117    vitamin E capsule 400 Units  400 Units Oral Daily Aliza Caldwell MD   400 Units at 05/24/22 9680    aspirin chewable tablet 81 mg  81 mg Oral Daily Aliza Caldwell MD   81 mg at 05/24/22 0841    gabapentin (NEURONTIN) capsule 600 mg  600 mg Oral TID Aliza Caldwell MD   600 mg at 05/24/22 2117    metoprolol succinate (TOPROL XL) extended release tablet 50 mg  50 mg Oral Daily Juno Capone MD   50 mg at 05/24/22 0845    tamsulosin (FLOMAX) capsule 0.4 mg  0.4 mg Oral BID Juno Capone MD   0.4 mg at 05/24/22 2117    cinacalcet (SENSIPAR) tablet 30 mg  30 mg Oral Daily Juno Capone MD   30 mg at 05/24/22 0841    coenzyme Q10 capsule 100 mg  100 mg Oral Daily Juno Capone MD   100 mg at 05/24/22 0844    ezetimibe (ZETIA) tablet 10 mg  10 mg Oral Daily Juno Capone MD   10 mg at 05/24/22 0844    vitamin D (ERGOCALCIFEROL) capsule 50,000 Units  50,000 Units Oral Weekly Juno Capone MD        clopidogrel (PLAVIX) tablet 75 mg  75 mg Oral Daily Juno Capone MD   75 mg at 05/24/22 0840    ferrous sulfate (IRON 325) tablet 325 mg  325 mg Oral Daily with breakfast Juno Capone MD   325 mg at 05/24/22 0840    finasteride (PROSCAR) tablet 5 mg  5 mg Oral Daily Juno Capone MD   5 mg at 05/24/22 0839    metFORMIN (GLUCOPHAGE) tablet 1,000 mg  1,000 mg Oral BID WC Juno Capone MD   1,000 mg at 05/24/22 1800    lisinopril (PRINIVIL;ZESTRIL) tablet 10 mg  10 mg Oral Daily Juno Capone MD   10 mg at 05/24/22 0840    rivaroxaban (XARELTO) tablet 10 mg  10 mg Oral Daily with breakfast Juno Capone MD   10 mg at 05/24/22 0843    atorvastatin (LIPITOR) tablet 10 mg  10 mg Oral Daily Juno Capone MD   10 mg at 05/24/22 4284    fenofibrate (TRIGLIDE) tablet 160 mg  160 mg Oral Daily Juno Capone MD   160 mg at 05/24/22 0840    pantoprazole (PROTONIX) tablet 40 mg  40 mg Oral QAM AC Juno Capone MD   40 mg at 05/24/22 1225       Intake/Output Summary (Last 24 hours) at 5/25/2022 8010  Last data filed at 5/25/2022 0006  Gross per 24 hour   Intake 670 ml   Output 1632 ml   Net -962 ml     No results found for this or any previous visit (from the past 24 hour(s)).  -----------------------------------------------------------------  RAD:  RYAN:  Micro:     Assessment & Plan: Patient Active Problem List:     Hyperlipidemia     Prostate disease     Primary osteoarthritis involving multiple joints     Headache     GERD (gastroesophageal reflux disease)     Sleep apnea     Elevated liver enzymes     BPH (benign prostatic hyperplasia)     Steatosis of liver     Type 2 diabetes mellitus without complication, without long-term current use of insulin (HCC)     Lumbar radiculopathy, chronic     Osteoarthritis of lumbar spine     Osteoarthritis of multiple joints     Degenerative disc disease, lumbar     Lumbar spinal stenosis     Pars defect with spondylolisthesis     Essential hypertension     Lumbar disc herniation     Chronic low back pain     S/P cardiac catheterization     Back pain     Postoperative hypotension     Neck pain     Cervical spondylosis with myelopathy     Bilateral carpal tunnel syndrome     Spinal stenosis in cervical region     Spondylolisthesis, lumbar region     Foraminal stenosis of cervical region     Cervical radiculopathy     Preop examination     Low back pain     Drug-induced constipation           Plan:  -S/P lumbar fusion with postop large seroma & pain - pain improved, Dr. Derek Soto to take back to the OR tomorrow because of anticoagualtion. .  -Medically stable.  -Okay for surgery tomorrow.  -Continue current treatments.  -Complete orders per chart.     See orders   Disposition:    Electronically signed by Aliza Caldwell MD on 5/25/2022 at 6:33 AM

## 2022-05-26 ENCOUNTER — ANESTHESIA (OUTPATIENT)
Dept: OPERATING ROOM | Age: 67
DRG: 907 | End: 2022-05-26
Payer: MEDICARE

## 2022-05-26 ENCOUNTER — APPOINTMENT (OUTPATIENT)
Dept: GENERAL RADIOLOGY | Age: 67
DRG: 907 | End: 2022-05-26
Payer: MEDICARE

## 2022-05-26 ENCOUNTER — TELEPHONE (OUTPATIENT)
Dept: ORTHOPEDIC SURGERY | Age: 67
End: 2022-05-26

## 2022-05-26 ENCOUNTER — ANESTHESIA EVENT (OUTPATIENT)
Dept: OPERATING ROOM | Age: 67
DRG: 907 | End: 2022-05-26
Payer: MEDICARE

## 2022-05-26 LAB
GLUCOSE BLD-MCNC: 155 MG/DL (ref 75–110)
GLUCOSE BLD-MCNC: 207 MG/DL (ref 75–110)

## 2022-05-26 PROCEDURE — 2580000003 HC RX 258: Performed by: ANESTHESIOLOGY

## 2022-05-26 PROCEDURE — 3600000012 HC SURGERY LEVEL 2 ADDTL 15MIN: Performed by: ORTHOPAEDIC SURGERY

## 2022-05-26 PROCEDURE — 99232 SBSQ HOSP IP/OBS MODERATE 35: CPT | Performed by: FAMILY MEDICINE

## 2022-05-26 PROCEDURE — 6360000002 HC RX W HCPCS: Performed by: ORTHOPAEDIC SURGERY

## 2022-05-26 PROCEDURE — 82947 ASSAY GLUCOSE BLOOD QUANT: CPT

## 2022-05-26 PROCEDURE — 6370000000 HC RX 637 (ALT 250 FOR IP): Performed by: ORTHOPAEDIC SURGERY

## 2022-05-26 PROCEDURE — 2500000003 HC RX 250 WO HCPCS: Performed by: NURSE ANESTHETIST, CERTIFIED REGISTERED

## 2022-05-26 PROCEDURE — 2709999900 HC NON-CHARGEABLE SUPPLY: Performed by: ORTHOPAEDIC SURGERY

## 2022-05-26 PROCEDURE — 2060000000 HC ICU INTERMEDIATE R&B

## 2022-05-26 PROCEDURE — 3700000000 HC ANESTHESIA ATTENDED CARE: Performed by: ORTHOPAEDIC SURGERY

## 2022-05-26 PROCEDURE — 6360000002 HC RX W HCPCS: Performed by: NURSE ANESTHETIST, CERTIFIED REGISTERED

## 2022-05-26 PROCEDURE — 2580000003 HC RX 258: Performed by: STUDENT IN AN ORGANIZED HEALTH CARE EDUCATION/TRAINING PROGRAM

## 2022-05-26 PROCEDURE — 2580000003 HC RX 258: Performed by: ORTHOPAEDIC SURGERY

## 2022-05-26 PROCEDURE — 2500000003 HC RX 250 WO HCPCS: Performed by: ANESTHESIOLOGY

## 2022-05-26 PROCEDURE — 3600000002 HC SURGERY LEVEL 2 BASE: Performed by: ORTHOPAEDIC SURGERY

## 2022-05-26 PROCEDURE — 6360000002 HC RX W HCPCS: Performed by: ANESTHESIOLOGY

## 2022-05-26 PROCEDURE — 7100000000 HC PACU RECOVERY - FIRST 15 MIN: Performed by: ORTHOPAEDIC SURGERY

## 2022-05-26 PROCEDURE — 3700000001 HC ADD 15 MINUTES (ANESTHESIA): Performed by: ORTHOPAEDIC SURGERY

## 2022-05-26 PROCEDURE — C1713 ANCHOR/SCREW BN/BN,TIS/BN: HCPCS | Performed by: ORTHOPAEDIC SURGERY

## 2022-05-26 PROCEDURE — 7100000001 HC PACU RECOVERY - ADDTL 15 MIN: Performed by: ORTHOPAEDIC SURGERY

## 2022-05-26 PROCEDURE — 6370000000 HC RX 637 (ALT 250 FOR IP): Performed by: ANESTHESIOLOGY

## 2022-05-26 PROCEDURE — 6370000000 HC RX 637 (ALT 250 FOR IP): Performed by: FAMILY MEDICINE

## 2022-05-26 PROCEDURE — 00CY0ZZ EXTIRPATION OF MATTER FROM LUMBAR SPINAL CORD, OPEN APPROACH: ICD-10-PCS | Performed by: ORTHOPAEDIC SURGERY

## 2022-05-26 RX ORDER — LIDOCAINE HYDROCHLORIDE 20 MG/ML
INJECTION, SOLUTION EPIDURAL; INFILTRATION; INTRACAUDAL; PERINEURAL PRN
Status: DISCONTINUED | OUTPATIENT
Start: 2022-05-26 | End: 2022-05-26 | Stop reason: SDUPTHER

## 2022-05-26 RX ORDER — SODIUM CHLORIDE 0.9 % (FLUSH) 0.9 %
5-40 SYRINGE (ML) INJECTION EVERY 12 HOURS SCHEDULED
Status: DISCONTINUED | OUTPATIENT
Start: 2022-05-26 | End: 2022-05-31 | Stop reason: HOSPADM

## 2022-05-26 RX ORDER — TRANEXAMIC ACID 100 MG/ML
INJECTION, SOLUTION INTRAVENOUS PRN
Status: DISCONTINUED | OUTPATIENT
Start: 2022-05-26 | End: 2022-05-26 | Stop reason: SDUPTHER

## 2022-05-26 RX ORDER — ONDANSETRON 2 MG/ML
INJECTION INTRAMUSCULAR; INTRAVENOUS PRN
Status: DISCONTINUED | OUTPATIENT
Start: 2022-05-26 | End: 2022-05-26 | Stop reason: SDUPTHER

## 2022-05-26 RX ORDER — ONDANSETRON 2 MG/ML
4 INJECTION INTRAMUSCULAR; INTRAVENOUS
Status: COMPLETED | OUTPATIENT
Start: 2022-05-26 | End: 2022-05-26

## 2022-05-26 RX ORDER — LIDOCAINE HYDROCHLORIDE 10 MG/ML
1 INJECTION, SOLUTION EPIDURAL; INFILTRATION; INTRACAUDAL; PERINEURAL
Status: DISCONTINUED | OUTPATIENT
Start: 2022-05-26 | End: 2022-05-26 | Stop reason: HOSPADM

## 2022-05-26 RX ORDER — SCOLOPAMINE TRANSDERMAL SYSTEM 1 MG/1
1 PATCH, EXTENDED RELEASE TRANSDERMAL ONCE
Status: COMPLETED | OUTPATIENT
Start: 2022-05-26 | End: 2022-05-29

## 2022-05-26 RX ORDER — ROCURONIUM BROMIDE 10 MG/ML
INJECTION, SOLUTION INTRAVENOUS PRN
Status: DISCONTINUED | OUTPATIENT
Start: 2022-05-26 | End: 2022-05-26 | Stop reason: SDUPTHER

## 2022-05-26 RX ORDER — VANCOMYCIN HYDROCHLORIDE 1 G/20ML
INJECTION, POWDER, LYOPHILIZED, FOR SOLUTION INTRAVENOUS PRN
Status: DISCONTINUED | OUTPATIENT
Start: 2022-05-26 | End: 2022-05-26 | Stop reason: ALTCHOICE

## 2022-05-26 RX ORDER — SODIUM CHLORIDE 9 MG/ML
INJECTION, SOLUTION INTRAVENOUS PRN
Status: DISCONTINUED | OUTPATIENT
Start: 2022-05-26 | End: 2022-05-31 | Stop reason: HOSPADM

## 2022-05-26 RX ORDER — FENTANYL CITRATE 50 UG/ML
INJECTION, SOLUTION INTRAMUSCULAR; INTRAVENOUS PRN
Status: DISCONTINUED | OUTPATIENT
Start: 2022-05-26 | End: 2022-05-26 | Stop reason: SDUPTHER

## 2022-05-26 RX ORDER — PHENYLEPHRINE HYDROCHLORIDE 10 MG/ML
INJECTION INTRAVENOUS PRN
Status: DISCONTINUED | OUTPATIENT
Start: 2022-05-26 | End: 2022-05-26 | Stop reason: SDUPTHER

## 2022-05-26 RX ORDER — MIDAZOLAM HYDROCHLORIDE 1 MG/ML
INJECTION INTRAMUSCULAR; INTRAVENOUS PRN
Status: DISCONTINUED | OUTPATIENT
Start: 2022-05-26 | End: 2022-05-26 | Stop reason: SDUPTHER

## 2022-05-26 RX ORDER — SODIUM CHLORIDE 0.9 % (FLUSH) 0.9 %
5-40 SYRINGE (ML) INJECTION EVERY 12 HOURS SCHEDULED
Status: DISCONTINUED | OUTPATIENT
Start: 2022-05-26 | End: 2022-05-26 | Stop reason: HOSPADM

## 2022-05-26 RX ORDER — METOPROLOL TARTRATE 5 MG/5ML
5 INJECTION INTRAVENOUS EVERY 10 MIN PRN
Status: DISCONTINUED | OUTPATIENT
Start: 2022-05-26 | End: 2022-05-26 | Stop reason: HOSPADM

## 2022-05-26 RX ORDER — SODIUM CHLORIDE, SODIUM LACTATE, POTASSIUM CHLORIDE, CALCIUM CHLORIDE 600; 310; 30; 20 MG/100ML; MG/100ML; MG/100ML; MG/100ML
INJECTION, SOLUTION INTRAVENOUS CONTINUOUS
Status: DISCONTINUED | OUTPATIENT
Start: 2022-05-26 | End: 2022-05-26 | Stop reason: HOSPADM

## 2022-05-26 RX ORDER — ACETAMINOPHEN 325 MG/1
650 TABLET ORAL EVERY 6 HOURS
Status: DISCONTINUED | OUTPATIENT
Start: 2022-05-26 | End: 2022-05-31 | Stop reason: HOSPADM

## 2022-05-26 RX ORDER — SODIUM CHLORIDE 9 MG/ML
INJECTION, SOLUTION INTRAVENOUS PRN
Status: DISCONTINUED | OUTPATIENT
Start: 2022-05-26 | End: 2022-05-26 | Stop reason: HOSPADM

## 2022-05-26 RX ORDER — PROPOFOL 10 MG/ML
INJECTION, EMULSION INTRAVENOUS PRN
Status: DISCONTINUED | OUTPATIENT
Start: 2022-05-26 | End: 2022-05-26 | Stop reason: SDUPTHER

## 2022-05-26 RX ORDER — SODIUM CHLORIDE 0.9 % (FLUSH) 0.9 %
5-40 SYRINGE (ML) INJECTION PRN
Status: DISCONTINUED | OUTPATIENT
Start: 2022-05-26 | End: 2022-05-26 | Stop reason: HOSPADM

## 2022-05-26 RX ORDER — SODIUM CHLORIDE 0.9 % (FLUSH) 0.9 %
5-40 SYRINGE (ML) INJECTION PRN
Status: DISCONTINUED | OUTPATIENT
Start: 2022-05-26 | End: 2022-05-31 | Stop reason: HOSPADM

## 2022-05-26 RX ORDER — SODIUM CHLORIDE 9 MG/ML
INJECTION, SOLUTION INTRAVENOUS CONTINUOUS
Status: DISCONTINUED | OUTPATIENT
Start: 2022-05-26 | End: 2022-05-26 | Stop reason: HOSPADM

## 2022-05-26 RX ORDER — DEXAMETHASONE SODIUM PHOSPHATE 4 MG/ML
INJECTION, SOLUTION INTRA-ARTICULAR; INTRALESIONAL; INTRAMUSCULAR; INTRAVENOUS; SOFT TISSUE PRN
Status: DISCONTINUED | OUTPATIENT
Start: 2022-05-26 | End: 2022-05-26 | Stop reason: SDUPTHER

## 2022-05-26 RX ORDER — TOBRAMYCIN SULFATE 40 MG/ML
INJECTION, SOLUTION INTRAMUSCULAR; INTRAVENOUS PRN
Status: DISCONTINUED | OUTPATIENT
Start: 2022-05-26 | End: 2022-05-26 | Stop reason: SDUPTHER

## 2022-05-26 RX ORDER — DIPHENHYDRAMINE HYDROCHLORIDE 50 MG/ML
12.5 INJECTION INTRAMUSCULAR; INTRAVENOUS
Status: COMPLETED | OUTPATIENT
Start: 2022-05-26 | End: 2022-05-26

## 2022-05-26 RX ADMIN — PHENYLEPHRINE HYDROCHLORIDE 200 MCG: 10 INJECTION INTRAVENOUS at 16:34

## 2022-05-26 RX ADMIN — HYDROMORPHONE HYDROCHLORIDE 0.5 MG: 1 INJECTION, SOLUTION INTRAMUSCULAR; INTRAVENOUS; SUBCUTANEOUS at 18:24

## 2022-05-26 RX ADMIN — PHENYLEPHRINE HYDROCHLORIDE 100 MCG: 10 INJECTION INTRAVENOUS at 16:57

## 2022-05-26 RX ADMIN — PHENYLEPHRINE HYDROCHLORIDE 100 MCG: 10 INJECTION INTRAVENOUS at 16:43

## 2022-05-26 RX ADMIN — EZETIMIBE 10 MG: 10 TABLET ORAL at 08:23

## 2022-05-26 RX ADMIN — PHENYLEPHRINE HYDROCHLORIDE 100 MCG: 10 INJECTION INTRAVENOUS at 16:23

## 2022-05-26 RX ADMIN — NORTRIPTYLINE HYDROCHLORIDE 50 MG: 50 CAPSULE ORAL at 08:23

## 2022-05-26 RX ADMIN — SODIUM CHLORIDE: 9 INJECTION, SOLUTION INTRAVENOUS at 16:00

## 2022-05-26 RX ADMIN — FENTANYL CITRATE 25 MCG: 50 INJECTION, SOLUTION INTRAMUSCULAR; INTRAVENOUS at 17:21

## 2022-05-26 RX ADMIN — LIDOCAINE HYDROCHLORIDE 80 MG: 20 INJECTION, SOLUTION EPIDURAL; INFILTRATION; INTRACAUDAL; PERINEURAL at 16:15

## 2022-05-26 RX ADMIN — MIDAZOLAM 2 MG: 1 INJECTION INTRAMUSCULAR; INTRAVENOUS at 16:09

## 2022-05-26 RX ADMIN — GABAPENTIN 600 MG: 300 CAPSULE ORAL at 08:23

## 2022-05-26 RX ADMIN — PANTOPRAZOLE SODIUM 40 MG: 40 TABLET, DELAYED RELEASE ORAL at 13:18

## 2022-05-26 RX ADMIN — ROCURONIUM BROMIDE 50 MG: 50 INJECTION, SOLUTION INTRAVENOUS at 16:15

## 2022-05-26 RX ADMIN — SUGAMMADEX 500 MG: 100 INJECTION, SOLUTION INTRAVENOUS at 17:36

## 2022-05-26 RX ADMIN — LISINOPRIL 10 MG: 10 TABLET ORAL at 08:23

## 2022-05-26 RX ADMIN — ONDANSETRON 4 MG: 2 INJECTION INTRAMUSCULAR; INTRAVENOUS at 18:59

## 2022-05-26 RX ADMIN — MAGNESIUM HYDROXIDE 30 ML: 400 SUSPENSION ORAL at 08:24

## 2022-05-26 RX ADMIN — DIPHENHYDRAMINE HYDROCHLORIDE 12.5 MG: 50 INJECTION, SOLUTION INTRAMUSCULAR; INTRAVENOUS at 18:28

## 2022-05-26 RX ADMIN — NORTRIPTYLINE HYDROCHLORIDE 50 MG: 50 CAPSULE ORAL at 21:56

## 2022-05-26 RX ADMIN — TAMSULOSIN HYDROCHLORIDE 0.4 MG: 0.4 CAPSULE ORAL at 21:54

## 2022-05-26 RX ADMIN — PHENYLEPHRINE HYDROCHLORIDE 100 MCG: 10 INJECTION INTRAVENOUS at 16:55

## 2022-05-26 RX ADMIN — GABAPENTIN 600 MG: 300 CAPSULE ORAL at 21:54

## 2022-05-26 RX ADMIN — TRANEXAMIC ACID 1000 MG: 100 INJECTION INTRAVENOUS at 16:48

## 2022-05-26 RX ADMIN — METOPROLOL SUCCINATE 50 MG: 50 TABLET, EXTENDED RELEASE ORAL at 08:23

## 2022-05-26 RX ADMIN — ONDANSETRON 4 MG: 2 INJECTION INTRAMUSCULAR; INTRAVENOUS at 17:07

## 2022-05-26 RX ADMIN — HYDROMORPHONE HYDROCHLORIDE 0.5 MG: 1 INJECTION, SOLUTION INTRAMUSCULAR; INTRAVENOUS; SUBCUTANEOUS at 18:32

## 2022-05-26 RX ADMIN — PHENYLEPHRINE HYDROCHLORIDE 200 MCG: 10 INJECTION INTRAVENOUS at 16:41

## 2022-05-26 RX ADMIN — FENOFIBRATE 160 MG: 160 TABLET ORAL at 08:24

## 2022-05-26 RX ADMIN — ATORVASTATIN CALCIUM 10 MG: 10 TABLET, FILM COATED ORAL at 08:23

## 2022-05-26 RX ADMIN — HYDROMORPHONE HYDROCHLORIDE 0.5 MG: 1 INJECTION, SOLUTION INTRAMUSCULAR; INTRAVENOUS; SUBCUTANEOUS at 19:02

## 2022-05-26 RX ADMIN — FENTANYL CITRATE 25 MCG: 50 INJECTION, SOLUTION INTRAMUSCULAR; INTRAVENOUS at 17:43

## 2022-05-26 RX ADMIN — CEFAZOLIN 2000 MG: 10 INJECTION, POWDER, FOR SOLUTION INTRAVENOUS at 16:29

## 2022-05-26 RX ADMIN — CINACALCET HYDROCHLORIDE 30 MG: 30 TABLET, FILM COATED ORAL at 08:23

## 2022-05-26 RX ADMIN — TOBRAMYCIN SULFATE 80 MG: 40 INJECTION, SOLUTION INTRAMUSCULAR; INTRAVENOUS at 16:35

## 2022-05-26 RX ADMIN — GABAPENTIN 600 MG: 300 CAPSULE ORAL at 13:19

## 2022-05-26 RX ADMIN — DEXAMETHASONE SODIUM PHOSPHATE 4 MG: 4 INJECTION, SOLUTION INTRAMUSCULAR; INTRAVENOUS at 16:23

## 2022-05-26 RX ADMIN — TAMSULOSIN HYDROCHLORIDE 0.4 MG: 0.4 CAPSULE ORAL at 08:24

## 2022-05-26 RX ADMIN — HYDROMORPHONE HYDROCHLORIDE 2 MG: 2 INJECTION, SOLUTION INTRAMUSCULAR; INTRAVENOUS; SUBCUTANEOUS at 19:43

## 2022-05-26 RX ADMIN — ACETAMINOPHEN 650 MG: 325 TABLET ORAL at 21:53

## 2022-05-26 RX ADMIN — HYDROMORPHONE HYDROCHLORIDE 2 MG: 2 INJECTION, SOLUTION INTRAMUSCULAR; INTRAVENOUS; SUBCUTANEOUS at 10:05

## 2022-05-26 RX ADMIN — SODIUM CHLORIDE, PRESERVATIVE FREE 10 ML: 5 INJECTION INTRAVENOUS at 08:26

## 2022-05-26 RX ADMIN — PROPOFOL 200 MG: 10 INJECTION, EMULSION INTRAVENOUS at 16:15

## 2022-05-26 RX ADMIN — TRANEXAMIC ACID 1000 MG: 100 INJECTION INTRAVENOUS at 17:14

## 2022-05-26 RX ADMIN — FINASTERIDE 5 MG: 5 TABLET, FILM COATED ORAL at 08:23

## 2022-05-26 RX ADMIN — FENTANYL CITRATE 50 MCG: 50 INJECTION, SOLUTION INTRAMUSCULAR; INTRAVENOUS at 16:15

## 2022-05-26 RX ADMIN — PHENYLEPHRINE HYDROCHLORIDE 200 MCG: 10 INJECTION INTRAVENOUS at 17:03

## 2022-05-26 RX ADMIN — HYDROMORPHONE HYDROCHLORIDE 0.5 MG: 1 INJECTION, SOLUTION INTRAMUSCULAR; INTRAVENOUS; SUBCUTANEOUS at 18:56

## 2022-05-26 RX ADMIN — SODIUM CHLORIDE, PRESERVATIVE FREE 10 ML: 5 INJECTION INTRAVENOUS at 08:27

## 2022-05-26 ASSESSMENT — PAIN DESCRIPTION - LOCATION
LOCATION: BACK
LOCATION: INCISION;BACK
LOCATION: INCISION;BACK
LOCATION: BACK
LOCATION: INCISION;BACK
LOCATION: GENERALIZED
LOCATION: INCISION;BACK

## 2022-05-26 ASSESSMENT — PAIN SCALES - GENERAL
PAINLEVEL_OUTOF10: 8
PAINLEVEL_OUTOF10: 9
PAINLEVEL_OUTOF10: 7
PAINLEVEL_OUTOF10: 3
PAINLEVEL_OUTOF10: 10
PAINLEVEL_OUTOF10: 7
PAINLEVEL_OUTOF10: 3
PAINLEVEL_OUTOF10: 0

## 2022-05-26 ASSESSMENT — PAIN DESCRIPTION - ORIENTATION
ORIENTATION: LEFT
ORIENTATION: LEFT;RIGHT;LOWER
ORIENTATION: LEFT

## 2022-05-26 ASSESSMENT — PAIN DESCRIPTION - PAIN TYPE
TYPE: SURGICAL PAIN

## 2022-05-26 ASSESSMENT — PAIN - FUNCTIONAL ASSESSMENT: PAIN_FUNCTIONAL_ASSESSMENT: 0-10

## 2022-05-26 ASSESSMENT — PAIN DESCRIPTION - DESCRIPTORS
DESCRIPTORS: CRUSHING
DESCRIPTORS: SHOOTING;THROBBING

## 2022-05-26 ASSESSMENT — PAIN SCALES - WONG BAKER: WONGBAKER_NUMERICALRESPONSE: 0

## 2022-05-26 NOTE — BRIEF OP NOTE
Brief Postoperative Note      Patient: Shell Hickey  YOB: 1955  MRN: [id number]    Date of Procedure: 5/26/2022    Pre-Op Diagnosis: CSF leak with resultant stenosis post lumbar decompression fusion    Post-Op Diagnosis: lumbar epidural hematoma post lumbar decompression and fusion       Procedure(s):  IRRIGATION AND DEBRIDEMENT EPIDURAL HEMATOMA    Surgeon(s):  Ceci Stuart MD    Assistant:  * No surgical staff found *    Anesthesia: General    Estimated Blood Loss (mL): 466     Complications: None    Specimens:   * No specimens in log *    Implants:  * No implants in log *      Drains:   Closed/Suction Drain Left Back Bulb (Active)   Site Description Clean, dry & intact 05/26/22 1735   Dressing Status New dressing applied 05/26/22 1735   Drain Status To bulb suction 05/26/22 1738   Output (ml) 30 ml 05/26/22 1738       Findings: see dictation    Electronically signed by Ceci Stuart MD on 5/26/2022 at 5:54 PM

## 2022-05-26 NOTE — TELEPHONE ENCOUNTER
Patients wife called and stated pt was inpt at [facility] awaiting another surgery with Dr Blanche Bustillo today- she was unsure to keep appt for Tuesday 05-31, I talked with [facility] and decided if she wanted to cancel we could and follow up with Dr Blanche Bustillo when doing his rounding with pt to see what he says.   She decided to leave the appt alone and will call Tuesday morning with an update, thank you

## 2022-05-26 NOTE — ANESTHESIA POSTPROCEDURE EVALUATION
Department of Anesthesiology  Postprocedure Note    Patient: Kassy Williamson  MRN: [id number]  YOB: 1955  Date of evaluation: 5/26/2022  Time:  6:47 PM     Procedure Summary     Date: 05/26/22 Room / Location: [address] [place] [address] Anthony Simmons 01 / [address]    Anesthesia Start: 3768 Anesthesia Stop: 9608    Procedure: IRRIGATION AND DEBRIDEMENT EPIDURAL HEMATOMA (N/A Back) Diagnosis:       Low back pain, unspecified back pain laterality, unspecified chronicity, unspecified whether sciatica present      (BACK PAIN)    Surgeons: Becky Le MD Responsible Provider: Micaela Mayo MD    Anesthesia Type: general ASA Status: 3          Anesthesia Type: No value filed. Robert Phase I: Robert Score: 5    Robert Phase II:      Last vitals: Reviewed and per EMR flowsheets.        Anesthesia Post Evaluation    Comments: POST- ANESTHESIA EVALUATION       Pt Name: Kassy Williamson  MRN: [id number]  [place]: 1955  Date of evaluation: 5/26/2022  Time:  6:47 PM      /81   Pulse 89   Temp 97.3 °F (36.3 °C) (Infrared)   Resp 13   Ht 5' 8\" (1.727 m)   Wt 194 lb 0.1 oz (88 kg)   SpO2 96%   BMI 29.50 kg/m²      Consciousness Level  Awake  Cardiopulmonary Status  Stable  Pain Adequately Treated YES  Nausea / Vomiting  NO  Adequate Hydration  YES  Anesthesia Related Complications NONE      Electronically signed by Micaela Mayo MD on 5/26/2022 at 6:47 PM

## 2022-05-26 NOTE — ANESTHESIA PRE PROCEDURE
Department of Anesthesiology  Preprocedure Note       Name:  Mario Wilkins   Age:  77 y.o.  :  1955                                          MRN:  [id number]         Date:  2022      Surgeon: Becki Cho):  Opal Calle MD    Procedure: Procedure(s):  DURAL REPAIR    Medications prior to admission:   Prior to Admission medications    Medication Sig Start Date End Date Taking? Authorizing Provider   atorvastatin (LIPITOR) 10 MG tablet Take 10 mg by mouth daily   Yes Historical Provider, MD   fenofibrate micronized (LOFIBRA) 200 MG capsule Take 200 mg by mouth every morning (before breakfast)   Yes Historical Provider, MD   omeprazole (PRILOSEC) 20 MG delayed release capsule Take 20 mg by mouth daily   Yes Historical Provider, MD   rivaroxaban (XARELTO) 10 MG TABS tablet Take 1 tablet by mouth daily (with breakfast) 22   Opal Calle MD   oxyCODONE-acetaminophen (PERCOCET) 5-325 MG per tablet Take 1 tablet by mouth every 6 hours as needed for Pain for up to 7 days. Intended supply: 7 days.  Take lowest dose possible to manage pain 22  Opal Calle MD   metFORMIN (GLUCOPHAGE) 1000 MG tablet TAKE ONE TABLET BY MOUTH TWICE A DAY WITH MEALS 22   Esequiel Qureshi PA-C   lisinopril (PRINIVIL;ZESTRIL) 10 MG tablet Take 1 tablet by mouth daily 22   Esequiel Qureshi PA-C   FEROSUL 325 (65 Fe) MG tablet TAKE ONE TABLET BY MOUTH DAILY WITH BREAKFAST 22   SERA Yip - CNP   finasteride (PROSCAR) 5 MG tablet TAKE ONE TABLET BY MOUTH DAILY 22   Melita Tuttle MD   clopidogrel (PLAVIX) 75 MG tablet Take 75 mg by mouth daily    Historical Provider, MD   vitamin D (ERGOCALCIFEROL) 1.25 MG (12876 UT) CAPS capsule TAKE ONE CAPSULE BY MOUTH ONCE WEEKLY 3/16/22   Esequiel Qureshi PA-C   ezetimibe (ZETIA) 10 MG tablet TAKE ONE TABLET BY MOUTH DAILY 22   Esequiel Qureshi PA-C   coenzyme Q-10 100 MG capsule TAKE ONE CAPSULE BY MOUTH DAILY 1/3/22   Esequiel Qureshi PA-C cinacalcet (SENSIPAR) 30 MG tablet Take 30 mg by mouth daily    Historical Provider, MD   tamsulosin (FLOMAX) 0.4 MG capsule TAKE ONE CAPSULE BY MOUTH TWICE A DAY 7/22/21   Zachary Zamora MD   gabapentin (NEURONTIN) 300 MG capsule Take 600 mg by mouth 3 times daily.      Historical Provider, MD   metoprolol succinate (TOPROL XL) 100 MG extended release tablet Take 50 mg by mouth daily     Historical Provider, MD   aspirin 81 MG chewable tablet Take 1 tablet by mouth daily 1/4/19   Gabbydale Cesar APRN - CNP   E-400 400 UNITS capsule TAKE TWO CAPSULES BY MOUTH DAILY 9/23/16   Rosa Elena Esqueda MD   nortriptyline (PAMELOR) 50 MG capsule Take 50 mg by mouth 2 times daily  3/31/16   Historical Provider, MD       Current medications:    Current Facility-Administered Medications   Medication Dose Route Frequency Provider Last Rate Last Admin    sodium chloride flush 0.9 % injection 10 mL  10 mL IntraVENous PRN John Bustillo, DO   10 mL at 05/24/22 2116    sodium chloride flush 0.9 % injection 5-40 mL  5-40 mL IntraVENous 2 times per day Tulio Padilla, DO   10 mL at 05/26/22 0827    ondansetron (ZOFRAN-ODT) disintegrating tablet 4 mg  4 mg Oral Q8H PRN Tulio Padilla DO        Or    ondansetron (ZOFRAN) injection 4 mg  4 mg IntraVENous Q6H PRN John Bustillo,         sodium chloride flush 0.9 % injection 5-40 mL  5-40 mL IntraVENous 2 times per day Ceci Stuart MD   10 mL at 05/26/22 0826    sodium chloride flush 0.9 % injection 5-40 mL  5-40 mL IntraVENous PRN Ceci Stuart MD   10 mL at 05/24/22 0851    0.9 % sodium chloride infusion   IntraVENous PRN Ceci Stuart MD        acetaminophen (TYLENOL) tablet 650 mg  650 mg Oral Q6H Ceci Stuart MD   650 mg at 05/25/22 2332    oxyCODONE (ROXICODONE) immediate release tablet 5 mg  5 mg Oral Q4H PRN Ceci Stuart MD   5 mg at 05/24/22 2241    Or    oxyCODONE HCl (OXY-IR) immediate release tablet 10 mg  10 mg Oral Q4H PRN Shereen Lowe Luz Dewey MD        HYDROmorphone (DILAUDID) injection 2 mg  2 mg IntraVENous Q4H PRN Keny Castillo MD   2 mg at 05/24/22 1533    baclofen (LIORESAL) tablet 10 mg  10 mg Oral Q12H PRN Keny Castillo MD   10 mg at 05/24/22 2117    magnesium hydroxide (MILK OF MAGNESIA) 400 MG/5ML suspension 30 mL  30 mL Oral Daily Keny Castillo MD   30 mL at 05/26/22 0824    nortriptyline (PAMELOR) capsule 50 mg  50 mg Oral BID Anson Gordon MD   50 mg at 05/26/22 2647    vitamin E capsule 400 Units  400 Units Oral Daily Anson Gordon MD   400 Units at 05/25/22 0900    [Held by provider] aspirin chewable tablet 81 mg  81 mg Oral Daily Anson Gordon MD   81 mg at 05/24/22 0841    gabapentin (NEURONTIN) capsule 600 mg  600 mg Oral TID Anson Gordon MD   600 mg at 05/26/22 6296    metoprolol succinate (TOPROL XL) extended release tablet 50 mg  50 mg Oral Daily Anson Gordon MD   50 mg at 05/26/22 0823    tamsulosin (FLOMAX) capsule 0.4 mg  0.4 mg Oral BID Anson Gordon MD   0.4 mg at 05/26/22 0824    cinacalcet (SENSIPAR) tablet 30 mg  30 mg Oral Daily Anson Gordon MD   30 mg at 05/26/22 5249    coenzyme Q10 capsule 100 mg  100 mg Oral Daily Anson Gordon MD   100 mg at 05/25/22 0900    ezetimibe (ZETIA) tablet 10 mg  10 mg Oral Daily Anson Gordon MD   10 mg at 05/26/22 6782    vitamin D (ERGOCALCIFEROL) capsule 50,000 Units  50,000 Units Oral Weekly Anson Gordon MD        [Held by provider] clopidogrel (PLAVIX) tablet 75 mg  75 mg Oral Daily Anson Gordon MD   75 mg at 05/24/22 0840    ferrous sulfate (IRON 325) tablet 325 mg  325 mg Oral Daily with breakfast Anson Gordon MD   325 mg at 05/25/22 0901    finasteride (PROSCAR) tablet 5 mg  5 mg Oral Daily Anson Gordon MD   5 mg at 05/26/22 3140    metFORMIN (GLUCOPHAGE) tablet 1,000 mg  1,000 mg Oral BID  Anson Gordon MD   1,000 mg at 05/25/22 1744    lisinopril (PRINIVIL;ZESTRIL) tablet 10 mg  10 mg Oral Daily Shani GARCIA radiculopathy M54.12    Preop examination Z01.818    Low back pain M54.50    Drug-induced constipation K59.03    Cerebrospinal fluid leak due to lumbar surgery G97.82, G96.00    Strain of back S39.012A       Past Medical History:        Diagnosis Date    Abdominal hernia     Anesthesia     phrenic nerve damaged lt side    BPH (benign prostatic hyperplasia)     CAD (coronary artery disease)     Chronic back pain     Diabetes mellitus (HCC)     Type 2    Difficult intubation     per pt throat collapses, lt side phrenic nerve was damaged    Ear infection     LT, being treated    Fatty liver     Gallstones     GERD (gastroesophageal reflux disease)     Headache(784.0)     History of MI (myocardial infarction)     Hypercholesteremia     Hyperlipidemia     Hyperparathyroidism (HCC)     Hypertension     Kidney stones     Osteoarthritis     all joints    PONV (postoperative nausea and vomiting)     Sleep apnea     BIPAP nightly    Unspecified sleep apnea        Past Surgical History:        Procedure Laterality Date    BACK SURGERY  07/11/2019    BICEPS TENDON REPAIR Left     CARDIAC CATHETERIZATION  01/02/2019    with Dr. Hadley Fragoso, Stents X2    CARPAL TUNNEL RELEASE Right     CERVICAL FUSION      COLONOSCOPY  11 14 14    normal    ELBOW FRACTURE SURGERY Right     right arm/fracture    ENDOSCOPY, COLON, DIAGNOSTIC      several times    FRACTURE SURGERY Right     elbow    KNEE ARTHROSCOPY Bilateral     LIVER BIOPSY  8/14/2015    moderate steatosis, grade 2, lobular and portal inflammation: Grade 1 total score =3, fibrosis score 1A    LUMBAR FUSION N/A 7/10/2019    LUMBAR LAMINECTOMY FUSION POSTERIOR - L3 SACRUM POSTERIOR LUMBAR DECOMPRESSION  & FUSION performed by Sadaf Busby MD at [address] [facility] [address] 5/18/2022    L2-3 POSTERIOR LUMBAR DECOMPRESSION & INSTRUMENTED FUSION POSSIBLE REVISION INSTRUMENTATION L3-5 performed by Sadaf Busby MD at [address] HISTORY  11/07/2018    lumbar myelogram    ROTATOR CUFF REPAIR Right        Social History:    Social History     Tobacco Use    Smoking status: Never Smoker    Smokeless tobacco: Never Used   Substance Use Topics    Alcohol use: Never     Alcohol/week: 0.0 standard drinks                                Counseling given: Not Answered      Vital Signs (Current):   Vitals:    05/25/22 1422 05/25/22 1846 05/25/22 2345 05/26/22 0630   BP: 108/74 126/89 136/75 138/68   Pulse: (!) 101 99 78 91   Resp: 17 18 16 16   Temp: 98.5 °F (36.9 °C) 98.8 °F (37.1 °C) 98.1 °F (36.7 °C) 98.5 °F (36.9 °C)   TempSrc: Oral Oral Oral Oral   SpO2: 96% 95% 93% 94%   Weight:   194 lb 0.1 oz (88 kg)    Height:                                                  BP Readings from Last 3 Encounters:   05/26/22 138/68   05/20/22 (!) 139/97   05/04/22 (!) 153/97       NPO Status:                                                                                 BMI:   Wt Readings from Last 3 Encounters:   05/25/22 194 lb 0.1 oz (88 kg)   05/20/22 217 lb 9.5 oz (98.7 kg)   05/04/22 206 lb (93.4 kg)     Body mass index is 29.5 kg/m².     CBC:   Lab Results   Component Value Date    WBC 11.0 05/23/2022    RBC 4.80 05/23/2022    RBC 5.19 01/28/2012    HGB 14.5 05/23/2022    HCT 43.2 05/23/2022    MCV 89.9 05/23/2022    RDW 14.4 05/23/2022     05/23/2022     01/28/2012       CMP:   Lab Results   Component Value Date     05/23/2022    K 4.4 05/23/2022     05/23/2022    CO2 29 05/23/2022    BUN 27 05/23/2022    CREATININE 1.07 05/23/2022    GFRAA >60 05/23/2022    LABGLOM >60 05/23/2022    GLUCOSE 173 05/23/2022    GLUCOSE 116 01/28/2012    PROT 6.5 04/05/2022    CALCIUM 11.6 05/23/2022    BILITOT 0.42 04/05/2022    ALKPHOS 92 04/05/2022    AST 17 04/05/2022    ALT 27 04/05/2022       POC Tests:   Recent Labs     05/23/22  1623   POCGLU 154*       Coags:   Lab Results   Component Value Date    PROTIME 13.4 04/12/2022    INR 1.0 04/12/2022    APTT 25.7 11/07/2018       HCG (If Applicable): No results found for: PREGTESTUR, PREGSERUM, HCG, HCGQUANT     ABGs:   Lab Results   Component Value Date    PHART 7.368 07/13/2019    PO2ART 68.5 07/13/2019    MKA8MIK 51.6 07/13/2019    JMI9EFJ 29.7 07/13/2019    L6EZFMCQ 91.9 07/13/2019        Type & Screen (If Applicable):  No results found for: LABABO, LABRH    Drug/Infectious Status (If Applicable):  Lab Results   Component Value Date    HEPCAB NONREACTIVE 10/04/2014       COVID-19 Screening (If Applicable):   Lab Results   Component Value Date    COVID19 Not Detected 05/20/2022           Anesthesia Evaluation  Patient summary reviewed and Nursing notes reviewed   history of anesthetic complications (h/o Difficult airway X1; Had easy intubation with Glidescope last week): PONV. Airway: Mallampati: III  TM distance: >3 FB   Neck ROM: full  Mouth opening: > = 3 FB   Dental: normal exam         Pulmonary:normal exam  breath sounds clear to auscultation  (+) sleep apnea:                             Cardiovascular:    (+) hypertension:, CAD:, CABG/stent (Stent):,       ECG reviewed  Rhythm: regular  Rate: normal                    Neuro/Psych:   (+) neuromuscular disease:, headaches:,              ROS comment: Lumbar DDD - s/p surgery on 5/18/2022; now with CSF leak GI/Hepatic/Renal:   (+) GERD:, liver disease:, renal disease (BPH):,           Endo/Other:    (+) DiabetesType II DM, , blood dyscrasia: anticoagulation therapy, arthritis: OA., .                 Abdominal:             Vascular: negative vascular ROS. Other Findings:           Anesthesia Plan      general     ASA 3       Induction: intravenous. MIPS: Postoperative opioids intended and Prophylactic antiemetics administered. Anesthetic plan and risks discussed with patient.                         Jack Meeks MD   5/26/2022

## 2022-05-26 NOTE — PROGRESS NOTES
FAMILY MEDICINE  - PROGRESS NOTE    Date:  5/26/2022  Kj Walsh  [id number]      Chief Complaint   Patient presents with    Back Pain     Surgery last Wednesday for back, L2-S1 with 25 staples in place. Surgery done by Dr. Digna Sheehan. Interval History:  Improved, he is sleeping soundly this am. Dr. Digna Sheehan taking the patient back to surgery today. No significant events overnight. Specialists notes, labs & imaging reviewed.       Subjective  Constitutional: positive for overweight  Gastrointestinal: positive for reflux symptoms  Musculoskeletal:positive for arthralgias, back pain, myalgias and stiff joints  Endocrine: positive for diabetic symptoms including hyperglycemia:    Objective:    /75   Pulse 78   Temp 98.1 °F (36.7 °C) (Oral)   Resp 16   Ht 5' 8\" (1.727 m)   Wt 194 lb 0.1 oz (88 kg)   SpO2 93%   BMI 29.50 kg/m²   General appearance - overweight  Mental status - affect appropriate to mood  Eyes - not examined  Ears - hearing grossly normal bilaterally  Nose - normal and patent, no erythema, discharge or polyps  Mouth - mucous membranes moist, pharynx normal without lesions  Neck - supple, no significant adenopathy  Lymphatics - no palpable lymphadenopathy, no hepatosplenomegaly  Chest - clear to auscultation, no wheezes, rales or rhonchi, symmetric air entry  Heart - normal rate, regular rhythm, normal S1, S2, no murmurs, rubs, clicks or gallops  Abdomen - soft, nontender, nondistended, no masses or organomegaly  Breasts - not examined  Back exam - not examined  Neurological - neck supple without rigidity  Musculoskeletal - osteoarthritic changes noted in both hands  Extremities - peripheral pulses normal, no pedal edema, no clubbing or cyanosis  Skin - normal coloration and turgor, no rashes, no suspicious skin lesions noted    Data:   Medications:   Current Facility-Administered Medications   Medication Dose Route Frequency Provider Last Rate Last Admin    sodium chloride flush 0.9 % injection 10 mL  10 mL IntraVENous PRN Devorah Person, DO   10 mL at 05/24/22 2116    sodium chloride flush 0.9 % injection 5-40 mL  5-40 mL IntraVENous 2 times per day Tulio Padilla DO        ondansetron (ZOFRAN-ODT) disintegrating tablet 4 mg  4 mg Oral Q8H PRN Devorah Person, DO        Or    ondansetron (ZOFRAN) injection 4 mg  4 mg IntraVENous Q6H PRN Devorah Person, DO        sodium chloride flush 0.9 % injection 5-40 mL  5-40 mL IntraVENous 2 times per day Siegfried Severs, MD   10 mL at 05/25/22 2349    sodium chloride flush 0.9 % injection 5-40 mL  5-40 mL IntraVENous PRN Siegfried Severs, MD   10 mL at 05/24/22 0851    0.9 % sodium chloride infusion   IntraVENous PRN Siegfried Severs, MD        acetaminophen (TYLENOL) tablet 650 mg  650 mg Oral Q6H Siegfried Severs, MD   650 mg at 05/25/22 2332    oxyCODONE (ROXICODONE) immediate release tablet 5 mg  5 mg Oral Q4H PRN Siegfried Severs, MD   5 mg at 05/24/22 2241    Or    oxyCODONE HCl (OXY-IR) immediate release tablet 10 mg  10 mg Oral Q4H PRN Siegfried Severs, MD        HYDROmorphone (DILAUDID) injection 2 mg  2 mg IntraVENous Q4H PRN Siegfried Severs, MD   2 mg at 05/24/22 1533    baclofen (LIORESAL) tablet 10 mg  10 mg Oral Q12H PRN Siegfried Severs, MD   10 mg at 05/24/22 2117    magnesium hydroxide (MILK OF MAGNESIA) 400 MG/5ML suspension 30 mL  30 mL Oral Daily Siegfried Severs, MD   30 mL at 05/25/22 0900    nortriptyline (PAMELOR) capsule 50 mg  50 mg Oral BID Gina Friend MD   50 mg at 05/25/22 2326    vitamin E capsule 400 Units  400 Units Oral Daily Gina Friend MD   400 Units at 05/25/22 0900    [Held by provider] aspirin chewable tablet 81 mg  81 mg Oral Daily Gina Friend MD   81 mg at 05/24/22 0841    gabapentin (NEURONTIN) capsule 600 mg  600 mg Oral TID Gina Friend MD   600 mg at 05/25/22 2227    metoprolol succinate (TOPROL XL) extended release tablet 50 mg  50 mg Oral Daily Jarrell Canada MD   50 mg at 05/25/22 0901    tamsulosin (FLOMAX) capsule 0.4 mg  0.4 mg Oral BID Jarrell Canada MD   0.4 mg at 05/25/22 2227    cinacalcet (SENSIPAR) tablet 30 mg  30 mg Oral Daily Jarrell Canada MD   30 mg at 05/25/22 0900    coenzyme Q10 capsule 100 mg  100 mg Oral Daily Jarrell Canada MD   100 mg at 05/25/22 0900    ezetimibe (ZETIA) tablet 10 mg  10 mg Oral Daily Jarrell Canada MD   10 mg at 05/25/22 0900    vitamin D (ERGOCALCIFEROL) capsule 50,000 Units  50,000 Units Oral Weekly Jarrell Canada MD        [Held by provider] clopidogrel (PLAVIX) tablet 75 mg  75 mg Oral Daily Jarrell Canada MD   75 mg at 05/24/22 0840    ferrous sulfate (IRON 325) tablet 325 mg  325 mg Oral Daily with breakfast Jarrell Canada MD   325 mg at 05/25/22 0901    finasteride (PROSCAR) tablet 5 mg  5 mg Oral Daily Jarrell Canada MD   5 mg at 05/25/22 0900    metFORMIN (GLUCOPHAGE) tablet 1,000 mg  1,000 mg Oral BID WC Jarrell Canada MD   1,000 mg at 05/25/22 1744    lisinopril (PRINIVIL;ZESTRIL) tablet 10 mg  10 mg Oral Daily Jarrell Canada MD   10 mg at 05/25/22 0901    [Held by provider] rivaroxaban (XARELTO) tablet 10 mg  10 mg Oral Daily with breakfast Jarrell Canada MD   10 mg at 05/24/22 0843    atorvastatin (LIPITOR) tablet 10 mg  10 mg Oral Daily Jarrell Canada MD   10 mg at 05/25/22 0901    fenofibrate (TRIGLIDE) tablet 160 mg  160 mg Oral Daily Jarrell Canada MD   160 mg at 05/25/22 0901    pantoprazole (PROTONIX) tablet 40 mg  40 mg Oral QAM AC Jarrell Canada MD   40 mg at 05/24/22 8317       Intake/Output Summary (Last 24 hours) at 5/26/2022 7129  Last data filed at 5/26/2022 0984  Gross per 24 hour   Intake 600 ml   Output 2650 ml   Net -2050 ml     No results found for this or any previous visit (from the past 24 hour(s)).  -----------------------------------------------------------------  RAD:  EKG:  Micro:     Assessment & Plan:    Patient Active Problem List:     Hyperlipidemia     Prostate disease     Primary osteoarthritis involving multiple joints     Headache     GERD (gastroesophageal reflux disease)     Sleep apnea     Elevated liver enzymes     BPH (benign prostatic hyperplasia)     Steatosis of liver     Type 2 diabetes mellitus without complication, without long-term current use of insulin (HCC)     Lumbar radiculopathy, chronic     Osteoarthritis of lumbar spine     Osteoarthritis of multiple joints     Degenerative disc disease, lumbar     Lumbar spinal stenosis     Pars defect with spondylolisthesis     Essential hypertension     Lumbar disc herniation     Chronic low back pain     S/P cardiac catheterization     Back pain     Postoperative hypotension     Neck pain     Cervical spondylosis with myelopathy     Bilateral carpal tunnel syndrome     Spinal stenosis in cervical region     Spondylolisthesis, lumbar region     Foraminal stenosis of cervical region     Cervical radiculopathy     Preop examination     Low back pain     Drug-induced constipation           Plan:  -S/P lumbar fusion with postop large seroma & pain - pain improved, Dr. Manuela Tyler to take back to the OR today. -Medically stable for surgery.  -Continue current treatments.  -Complete orders per chart.     See orders   Disposition:    Electronically signed by Jeniffer Person MD on 5/26/2022 at 6:07 AM

## 2022-05-26 NOTE — CARE COORDINATION
ONGOING DISCHARGE PLAN:    Patient is alert and oriented x4. Spoke with patient regarding discharge plan and patient confirms that plan is still to discharge to home with no needs  Patient will have surgery today at 1800  Patient will have a dural repair done     Will continue to follow for additional discharge needs.     Electronically signed by Sanam Singh RN on 5/26/2022 at 1:22 PM

## 2022-05-27 LAB
GLUCOSE BLD-MCNC: 112 MG/DL (ref 75–110)
GLUCOSE BLD-MCNC: 166 MG/DL (ref 75–110)
GLUCOSE BLD-MCNC: 172 MG/DL (ref 75–110)
GLUCOSE BLD-MCNC: 228 MG/DL (ref 75–110)

## 2022-05-27 PROCEDURE — 6360000002 HC RX W HCPCS: Performed by: ORTHOPAEDIC SURGERY

## 2022-05-27 PROCEDURE — 6370000000 HC RX 637 (ALT 250 FOR IP): Performed by: ORTHOPAEDIC SURGERY

## 2022-05-27 PROCEDURE — 97166 OT EVAL MOD COMPLEX 45 MIN: CPT

## 2022-05-27 PROCEDURE — 2580000003 HC RX 258: Performed by: ORTHOPAEDIC SURGERY

## 2022-05-27 PROCEDURE — 1200000000 HC SEMI PRIVATE

## 2022-05-27 PROCEDURE — 97164 PT RE-EVAL EST PLAN CARE: CPT

## 2022-05-27 PROCEDURE — 82947 ASSAY GLUCOSE BLOOD QUANT: CPT

## 2022-05-27 PROCEDURE — 99222 1ST HOSP IP/OBS MODERATE 55: CPT | Performed by: PHYSICAL MEDICINE & REHABILITATION

## 2022-05-27 RX ADMIN — Medication 400 UNITS: at 09:10

## 2022-05-27 RX ADMIN — OXYCODONE HYDROCHLORIDE 5 MG: 5 TABLET ORAL at 20:40

## 2022-05-27 RX ADMIN — FINASTERIDE 5 MG: 5 TABLET, FILM COATED ORAL at 09:10

## 2022-05-27 RX ADMIN — SODIUM CHLORIDE, PRESERVATIVE FREE 10 ML: 5 INJECTION INTRAVENOUS at 20:41

## 2022-05-27 RX ADMIN — CEFAZOLIN 2000 MG: 10 INJECTION, POWDER, FOR SOLUTION INTRAVENOUS at 00:52

## 2022-05-27 RX ADMIN — ATORVASTATIN CALCIUM 10 MG: 10 TABLET, FILM COATED ORAL at 09:11

## 2022-05-27 RX ADMIN — OXYCODONE HYDROCHLORIDE 5 MG: 5 TABLET ORAL at 00:58

## 2022-05-27 RX ADMIN — OXYCODONE HYDROCHLORIDE 10 MG: 10 TABLET ORAL at 07:34

## 2022-05-27 RX ADMIN — OXYCODONE HYDROCHLORIDE 10 MG: 10 TABLET ORAL at 16:16

## 2022-05-27 RX ADMIN — NORTRIPTYLINE HYDROCHLORIDE 50 MG: 50 CAPSULE ORAL at 20:44

## 2022-05-27 RX ADMIN — GABAPENTIN 600 MG: 300 CAPSULE ORAL at 20:39

## 2022-05-27 RX ADMIN — METOPROLOL SUCCINATE 50 MG: 50 TABLET, EXTENDED RELEASE ORAL at 09:11

## 2022-05-27 RX ADMIN — Medication 100 MG: at 09:10

## 2022-05-27 RX ADMIN — MAGNESIUM HYDROXIDE 30 ML: 400 SUSPENSION ORAL at 09:11

## 2022-05-27 RX ADMIN — METFORMIN HYDROCHLORIDE 1000 MG: 1000 TABLET, FILM COATED ORAL at 07:34

## 2022-05-27 RX ADMIN — CEFAZOLIN 2000 MG: 10 INJECTION, POWDER, FOR SOLUTION INTRAVENOUS at 16:20

## 2022-05-27 RX ADMIN — HYDROMORPHONE HYDROCHLORIDE 2 MG: 2 INJECTION, SOLUTION INTRAMUSCULAR; INTRAVENOUS; SUBCUTANEOUS at 09:11

## 2022-05-27 RX ADMIN — ACETAMINOPHEN 650 MG: 325 TABLET ORAL at 13:32

## 2022-05-27 RX ADMIN — TAMSULOSIN HYDROCHLORIDE 0.4 MG: 0.4 CAPSULE ORAL at 20:39

## 2022-05-27 RX ADMIN — METFORMIN HYDROCHLORIDE 1000 MG: 1000 TABLET, FILM COATED ORAL at 16:16

## 2022-05-27 RX ADMIN — GABAPENTIN 600 MG: 300 CAPSULE ORAL at 13:32

## 2022-05-27 RX ADMIN — EZETIMIBE 10 MG: 10 TABLET ORAL at 09:11

## 2022-05-27 RX ADMIN — GABAPENTIN 600 MG: 300 CAPSULE ORAL at 09:10

## 2022-05-27 RX ADMIN — TAMSULOSIN HYDROCHLORIDE 0.4 MG: 0.4 CAPSULE ORAL at 09:11

## 2022-05-27 RX ADMIN — ACETAMINOPHEN 650 MG: 325 TABLET ORAL at 02:38

## 2022-05-27 RX ADMIN — OXYCODONE HYDROCHLORIDE 10 MG: 10 TABLET ORAL at 12:11

## 2022-05-27 RX ADMIN — PANTOPRAZOLE SODIUM 40 MG: 40 TABLET, DELAYED RELEASE ORAL at 06:42

## 2022-05-27 RX ADMIN — ACETAMINOPHEN 650 MG: 325 TABLET ORAL at 07:34

## 2022-05-27 RX ADMIN — FERROUS SULFATE TAB 325 MG (65 MG ELEMENTAL FE) 325 MG: 325 (65 FE) TAB at 07:34

## 2022-05-27 RX ADMIN — NORTRIPTYLINE HYDROCHLORIDE 50 MG: 50 CAPSULE ORAL at 09:10

## 2022-05-27 RX ADMIN — SODIUM CHLORIDE, PRESERVATIVE FREE 10 ML: 5 INJECTION INTRAVENOUS at 09:11

## 2022-05-27 RX ADMIN — CINACALCET HYDROCHLORIDE 30 MG: 30 TABLET, FILM COATED ORAL at 09:10

## 2022-05-27 RX ADMIN — LISINOPRIL 10 MG: 10 TABLET ORAL at 09:10

## 2022-05-27 RX ADMIN — CEFAZOLIN 2000 MG: 10 INJECTION, POWDER, FOR SOLUTION INTRAVENOUS at 09:15

## 2022-05-27 RX ADMIN — FENOFIBRATE 160 MG: 160 TABLET ORAL at 09:11

## 2022-05-27 ASSESSMENT — PAIN SCALES - GENERAL
PAINLEVEL_OUTOF10: 7
PAINLEVEL_OUTOF10: 4
PAINLEVEL_OUTOF10: 7
PAINLEVEL_OUTOF10: 1
PAINLEVEL_OUTOF10: 3
PAINLEVEL_OUTOF10: 0
PAINLEVEL_OUTOF10: 6
PAINLEVEL_OUTOF10: 6
PAINLEVEL_OUTOF10: 4
PAINLEVEL_OUTOF10: 0

## 2022-05-27 ASSESSMENT — PAIN DESCRIPTION - LOCATION
LOCATION: BACK
LOCATION: BACK

## 2022-05-27 ASSESSMENT — PAIN SCALES - WONG BAKER
WONGBAKER_NUMERICALRESPONSE: 0
WONGBAKER_NUMERICALRESPONSE: 0

## 2022-05-27 ASSESSMENT — PAIN DESCRIPTION - ORIENTATION
ORIENTATION: LEFT;LOWER
ORIENTATION: LEFT;LOWER

## 2022-05-27 NOTE — FLOWSHEET NOTE
05/27/22 1121   Encounter Summary   Encounter Overview/Reason  Volunteer Encounter   Service Provided For: Patient   Referral/Consult From: Rounding   Last Encounter  05/27/22  (V)   Complexity of Encounter Low   Spiritual/Emotional needs   Type Spiritual Support   Assessment/Intervention/Outcome   Intervention Prayer (assurance of)/[place]

## 2022-05-27 NOTE — PROGRESS NOTES
Occupational Therapy  Facility/Department: [address] [place] [address]  Occupational Therapy Initial Assessment    Name: Sam Woods  : 1955  MRN: [id number]  Date of Service: 2022    Discharge Recommendations:  Patient would benefit from continued therapy after discharge  OT Equipment Recommendations  Other: TBD       Patient Diagnosis(es): The encounter diagnosis was Back strain, initial encounter. Past Medical History:  has a past medical history of Abdominal hernia, Anesthesia, BPH (benign prostatic hyperplasia), CAD (coronary artery disease), Chronic back pain, Diabetes mellitus ([address] Difficult intubation, Ear infection, Fatty liver, Gallstones, GERD (gastroesophageal reflux disease), Headache(784.0), History of MI (myocardial infarction), Hypercholesteremia, Hyperlipidemia, Hyperparathyroidism ([address] Hypertension, Kidney stones, Osteoarthritis, PONV (postoperative nausea and vomiting), Sleep apnea, and Unspecified sleep apnea. Past Surgical History:  has a past surgical history that includes Knee arthroscopy (Bilateral); Biceps tendon repair (Left); fracture surgery (Right); Colonoscopy (14); liver biopsy (2015); Rotator cuff repair (Right); Elbow fracture surgery (Right); other surgical history (2018); Endoscopy, colon, diagnostic; lumbar fusion (N/A, 7/10/2019); back surgery (2019); cervical fusion; Cardiac catheterization (2019); Carpal tunnel release (Right); lumbar fusion (N/A, 2022); and Spine surgery (N/A, 2022). Treatment Diagnosis: impaired self care status      Assessment   Performance deficits / Impairments: Decreased ADL status; Decreased functional mobility ; Decreased strength;Decreased endurance;Decreased safe awareness;Decreased balance;Decreased high-level IADLs;Decreased posture  Treatment Diagnosis: impaired self care status  Prognosis: Good  Decision Making: Medium Complexity  REQUIRES OT FOLLOW-UP: Yes  Activity Tolerance  Activity Tolerance: Patient Tolerated treatment well;Patient limited by pain        Plan   Plan  Times per Week: 5-7  Current Treatment Recommendations: Self-Care / ADL,Strengthening,Balance training,Functional mobility training,Endurance training,Pain management,Safety education & training,Patient/Caregiver education & training,Equipment evaluation, education, & procurement,Home management training     Restrictions  Restrictions/Precautions  Restrictions/Precautions: Fall Risk,Up as Tolerated,Other (comment),Surgical protocol,General Precautions,Surgical Protocols (BLT precautions, log roll, MIKE drain)  Required Braces or Orthoses?: No  Implants present? : Metal implants (cervical and lumbar sx, R elbow sx)  Position Activity Restriction  Spinal Precautions: No Bending,No Lifting,No Twisting  Spinal Precautions: Lifting less than 5 pounds, avoid pulling  Other position/activity restrictions: Check O2 sats during session, O2 per NC as needed, activity as tolerated order post op 5/26/22    Subjective   General  Chart Reviewed: Yes  Patient assessed for rehabilitation services?: Yes  Family / Caregiver Present: No  Referring Practitioner: Jessika Etienne MD  Diagnosis: Low back pain  Subjective  Subjective: Pt resting in bed upon arrival. Pt was pleasant and agreeable to OT eval  General Comment  Comments: Ok per [employer] for OT eval  Pain: Pt reports 4/10 pain in back while sitting EOB; Pt reports 8/10 pain with mobility.    Social/Functional History  Social/Functional History  Lives With: Spouse  Type of Home: House  Home Layout: Two level,Able to Live on Main level with bedroom/bathroom  Home Access: Stairs to enter with rails (reports going down stairs backwards)  Entrance Stairs - Number of Steps: 5  Entrance Stairs - Rails: Both  Bathroom Shower/Tub: Walk-in shower,Doors  Bathroom Toilet: Standard  Bathroom Equipment: Hand-held shower  Bathroom Accessibility: Walker accessible  Home Equipment: Malou Abdi rolling,Cane,Reacher  Has the patient had two or more falls in the past year or any fall with injury in the past year?: Yes  Receives Help From: Family  ADL Assistance: [address]: Independent (wife is primary)  Ambulation Assistance: Independent (using RW since back sx 5/18/22)  Transfer Assistance: Independent (Prior to back sx 5/27/22; A since)  Active : Yes  Mode of Transportation: Truck  IADL Comments: Pt reports sleeping lazy boy and flat bed  Additional Comments: Pt reports that wife is home all the time except when volunteering at [employer]. Pt recently at [facility] for therapy after recent back surgery. Objective   Heart Rate: 98  Heart Rate Source: Monitor  BP: 86/60  Patient Position: Supine  MAP (Calculated): 68.67  Resp: 18  SpO2: 91 %  O2 Device: Nasal cannula  Vision Exceptions: Wears glasses at all times  Hearing: Within functional limits          Safety Devices  Type of Devices: All fall risk precautions in place;Call light within reach;Gait belt;Patient at risk for falls; Left in bed;Nurse notified (Pt sitting EOB at end of session eating. RN notified)           ADL  Feeding: Setup  Grooming: Setup  UE Bathing: Stand by assistance  LE Bathing: Moderate assistance  UE Dressing: Stand by assistance  LE Dressing: Maximum assistance  Toileting: Moderate assistance  Additional Comments: ADL scores based on clinical reasoning and skilled observation unless otherwise noted. Pt attempted to complete figure 4 technique with pt unable to complete due to limited mobility and pain.  Pt currently limited due to decreased strength, balance, activity tolerance, Back precautions, and pain impacting safety and independence with self care tasks  Tone RUE  RUE Tone: Normotonic  Tone LUE  LUE Tone: Normotonic  Coordination  Movements Are Fluid And Coordinated: No  Coordination and Movement Description: Fine motor impairments;Right UE;Left UE (Limited due to arthritis)     Bed mobility  Supine to Sit: Moderate assistance (A with trunk)  Sit to Supine: Unable to assess (Pt sitting EOB at end of session. RN notified)  Scooting: Stand by assistance  Bed Mobility Comments: Bed mobility completed with HOB elevated with increased time. Mod A with trunk to sit EOB.  Pt reported increased pain with movement  Transfers  Sit to stand: Minimal assistance  Stand to sit: Contact guard assistance  Transfer Comments: Verbal cues for hand placement and safety     Cognition  Overall Cognitive Status: WFL        Sensation  Overall Sensation Status: WFL (Pt denies)            LUE AROM (degrees)  LUE AROM : WFL  Left Hand AROM (degrees)  Left Hand AROM: WFL  RUE AROM (degrees)  RUE AROM : WFL  Right Hand AROM (degrees)  Right Hand AROM: WFL  LUE Strength  Gross LUE Strength: WFL  L Hand General: 4/5  RUE Strength  Gross RUE Strength: WFL  R Hand General: 4/5                  G-Code     OutComes Score                                                  AM-PAC Score        AM-[facility] Inpatient Daily Activity Raw Score: 15 (05/27/22 1759)  AM-PAC Inpatient ADL T-Scale Score : 34.69 (05/27/22 1759)  ADL Inpatient CMS 0-100% Score: 56.46 (05/27/22 1759)  ADL Inpatient CMS G-Code Modifier : CK (05/27/22 1759)    Goals  Short Term Goals  Time Frame for Short term goals: By discharge  Short Term Goal 1: Pt will complete lower body dressing/bathing with min A and Good safety with use of AE as needed  Short Term Goal 2: Pt will complete functional transfers/mobility during self care tasks with SBA and Good safety with use of least restrictive device  Short Term Goal 3: Pt will verbalize/demonstrate Good understanding of AE/adaptive strategies/DME to increase safety and independence with self care and mobility  Short Term Goal 4: Pt will verbalize/demonstrate Good understanding of 3 non-pharmaceutical pain management interventions to increase participation in daily activities  Short Term Goal 5: Pt will participate in 15+ minutes of therapeutic exercises/functional activities to increase safety and independence with self care and mobility  Patient Goals   Patient goals :  To go to rehab to get stronger       Therapy Time   Individual Concurrent Group Co-treatment   Time In 1700         Time Out 1716         Minutes 16                 Nichol Sellers

## 2022-05-27 NOTE — PROGRESS NOTES
Physician Progress Note      PATIENT:               Rosalina Dawson  [facility] #:                  [id number]  :                       1955  ADMIT DATE:       2022 11:09 AM  DISCH DATE:  RESPONDING  PROVIDER #:        Chiara Umaña MD          QUERY TEXT:    Pt admitted with back pain and underwent lumbar decompression with fusion on   . Noted to have lumbar epidural hematoma on operative report from . ?If   possible, please document in progress notes and discharge summary:    The medical record reflects the following:  Risk Factors:  Lumbar decompression with fusion; On PO aspirin and Plavix  Clinical Indicators: per OP note--> lumbar epidural hematoma post lumbar   decompression and fusion, As we started to take down the deep fascial closure,   it became apparent quite readily that very dark fluid was obtained out of the   wound, close to 100 mL. The suture was gradually removed from the deep fascia   until we had it mostly all removed. At this juncture, initially I had planned   on sending fluid for beta-2 transferrin looking for CSF. We actually sent it,   although by the end of the case it became apparent that this was not going to   be available to us for multiple days. T  Treatment: OR for Irrigation debridement of lumbar spine with evacuation of   lumbar epidural hematoma; ortho consult, MRI lumbar spine, monitoring,   hospital admission, PO Aspirin and Plavix on hold  Options provided:  -- Lumbar epidural hematoma?is a postoperative complication  -- Lumbar epidural hematoma is not a postoperative complication, but is due to   Plavix and Aspirin patient takes. -- Lumbar epidural hematoma is not a postoperative complication, but is due to   other incidental risk factor, Please specify other incidental risk factor.   -- Other - I will add my own diagnosis  -- Disagree - Not applicable / Not valid  -- Disagree - Clinically unable to determine / Unknown  -- Refer to Clinical Documentation Reviewer    PROVIDER RESPONSE TEXT:    Patient has lumbar epidural hematoma as a postoperative complication.     Query created by: Tabby Cope on 5/27/2022 8:54 AM      Electronically signed by:  Vicky Gould MD 5/27/2022 9:00 AM

## 2022-05-27 NOTE — PROGRESS NOTES
Dr. Ana Infante notified that patient accepted to ARU, however d/t not being able to be on anticoagulation, he would need to have venous dopplers done prior to acceptance to unit- per their guidelines. Dr. Ana Infante informs Lexa Pedroza that he feels that patient will not need ARU, and will be ready for discharge to home after the drain is removed. Dr. Eros Mcdermott calls in regards to patient going to ARU, and per Dr. Ana Infante, patient will not be ready for any anticoagulation for 2 weeks, and plan per Dr. Ana Infante will be home, not ARU.

## 2022-05-27 NOTE — PROGRESS NOTES
Physical Therapy  Facility/Department: [address] [place] [address]  Physical Therapy Initial Assessment    Name: Nilesh Leal  : 1955  MRN: [id number]  Date of Service: 2022    Discharge Recommendations:  Patient would benefit from continued therapy after discharge   PT Equipment Recommendations  Equipment Needed:  (will need new shower chair if he d/c's home)      Patient Diagnosis(es): The encounter diagnosis was Back strain, initial encounter. Past Medical History:  has a past medical history of Abdominal hernia, Anesthesia, BPH (benign prostatic hyperplasia), CAD (coronary artery disease), Chronic back pain, Diabetes mellitus ([address] Difficult intubation, Ear infection, Fatty liver, Gallstones, GERD (gastroesophageal reflux disease), Headache(784.0), History of MI (myocardial infarction), Hypercholesteremia, Hyperlipidemia, Hyperparathyroidism ([address] Hypertension, Kidney stones, Osteoarthritis, PONV (postoperative nausea and vomiting), Sleep apnea, and Unspecified sleep apnea. Past Surgical History:  has a past surgical history that includes Knee arthroscopy (Bilateral); Biceps tendon repair (Left); fracture surgery (Right); Colonoscopy (14); liver biopsy (2015); Rotator cuff repair (Right); Elbow fracture surgery (Right); other surgical history (2018); Endoscopy, colon, diagnostic; lumbar fusion (N/A, 7/10/2019); back surgery (2019); cervical fusion; Cardiac catheterization (2019); Carpal tunnel release (Right); lumbar fusion (N/A, 2022); and Spine surgery (N/A, 2022). Assessment   Body Structures, Functions, Activity Limitations Requiring Skilled Therapeutic Intervention: Decreased functional mobility ; Decreased endurance;Decreased safe awareness; Increased pain;Decreased posture;Decreased balance  Assessment: Pt underwent L2-3 decompression and fusion, L3-4 instrumentation 22. Presents to ER with increased pain and elevated BP.  Underwent I & D with hematoma evacuation 5/26/22. Pt demo's good tolerance of session, mobility advanced today as compared to initial eval. Cont per POC to prepare for d/c. Treatment Diagnosis: impaired mobility and decreased IND d/t low back pain  Decision Making: Medium Complexity  History: Clemente Hare is a 77 y.o. male who presents with low back pain with radiation to bilateral hips. Patient states that the pain starts at his sacrum and then radiates to the hips. He had a recent laminectomy on 5/18/2022 completed here at [facility]. He states that he was eventually [facility] and has not been receiving enough pain medication. He states that received single 5 mg Percocet this morning at 5 AM and then again between 8 and 9 AM.  He states that this was not adequate amount of pain control. He states that he has been constipated as well. He states that his stools are very hard. He denies any fevers or chills.   Exam: ROM, balance, bed mobility, transfers, gait, Pt education  Clinical Presentation: motivated, cooperative  Barriers to Learning: none  Requires PT Follow-Up: Yes  Activity Tolerance  Activity Tolerance: Patient tolerated treatment well     Plan   Plan  Plan: 6-7 times per week  Current Treatment Recommendations: Strengthening,ROM,Balance training,Functional mobility training,Transfer training,Gait training,Endurance training,Safety education & training,Positioning,Therapeutic activities,Pain management,Home exercise program  Safety Devices  Type of Devices: Call light within reach,Nurse notified,Patient at risk for falls,Gait belt,Left in chair (2 pillows on each side for comfort)  Restraints  Restraints Initially in Place: No     Restrictions  Restrictions/Precautions  Restrictions/Precautions: Fall Risk,Up as Tolerated,Other (comment),Bed Alarm,Surgical protocol (BLT precautions, log roll, MIKE drain)  Required Braces or Orthoses?: No  Implants present? : Metal implants (cervical and lumbar sx, R elbow sx)  Position Activity Restriction  Spinal Precautions: No Bending,No Lifting,No Twisting  Spinal Precautions: Lifting less than 5 pounds, avoid pulling  Other position/activity restrictions: Check O2 sats during session, O2 per NC as needed, activity as tolerated order post op 5/26/22     Subjective   Pain: 0-1/10 prior to PT, 1/10 after PT. Reports having jolts of pain lasting brief amount of time  General  Chart Reviewed: Yes  Patient assessed for rehabilitation services?: Yes  Additional Pertinent Hx: Lumbar MRI: L2-3 severe canal stenosis and moderate Bilat foraminal narrowing with epidural fluid collection; extensive fluid collection within lami bed L2-4. Pt underwent I & D lumbar spine with evacuation of epidural hematoma 5/26/22. Response To Previous Treatment: Patient with no complaints from previous session. Family / Caregiver Present: Yes (wife present initially; does not observe PT session)  Diagnosis: low back pain  Follows Commands: Within Functional Limits  General Comment  Comments: Evan jean-baptiste RN ok's session  Subjective  Subjective: Pt is observed in partial R sidelying position while visiting with family. Pt states his back pain is under control at this time d/t pain meds. Pt reports needing to use urinal and wants to sit up on opposite side of bed.          Social/Functional History  Social/Functional History  Lives With: Spouse  Type of Home: House  Home Layout: Two level,Able to Live on Main level with bedroom/bathroom  Home Access: Stairs to enter with rails (reports going up stairs backwards)  Entrance Stairs - Number of Steps: 5  Entrance Stairs - Rails: Both  Bathroom Shower/Tub: Walk-in shower  Bathroom Toilet: Standard  Bathroom Equipment: Shower chair (wants new shower chair as current chair is too large for shower area)  Home Equipment: gaston Rossi Cane  Receives Help From: Family  ADL Assistance: Needs assistance (since back surgery on 5/18/22)  Ambulation Assistance: Needs assistance (with RW since back surgery 5/18/22)  Transfer Assistance: Needs assistance (since back surgery on 5/18/22)  Active : Yes  Additional Comments: Wife is home majority of time except when volunteering. Pt does not want to go back to [facility] d/t receiving poor care.  Pt/wife are agreeable to ARU placement; otherwise plan to d/c home with home health care  Vision/Hearing  Vision Exceptions: Wears glasses at all times  Hearing: Within functional limits    Cognition   Orientation  Overall Orientation Status: Within Functional Limits  Cognition  Overall Cognitive Status: WFL     Objective      Observation/Palpation  Posture: Fair  Observation: large back drsg with MIKE drain- unable to observe incision, log roll technique in bed, telemetry  Body Mechanics: BLT precautions  Edema: none noted  Gross Assessment  AROM: Within functional limits (UEs and LEs)  Strength:  (MMT deferred, UEs and LEs at least 3/5)  Tone: Normal  Sensation: Intact                    Bed mobility  Rolling to Left: Stand by assistance (appropriate log roll technique)  Supine to Sit: Stand by assistance (HOB up partially)  Scooting: Independent (while in seated position towards EOB)  Transfers  Sit to Stand: Contact guard assistance  Stand to sit: Contact guard assistance  Bed to Chair: Contact guard assistance (with RW)  Comment: with RW; moderate difficulty with sit to stand  Ambulation  Surface: level tile  Device: Rolling Walker  Other Apparatus:  (SpO2 93-94% without O2 during session)  Assistance: Contact guard assistance  Quality of Gait: partial flexed posture onto RW  Gait Deviations: Slow Hien  Distance: 40 ft  More Ambulation?: No  Stairs/Curb  Stairs?: No     Balance  Posture: Fair  Sitting - Static: Fair;+  Sitting - Dynamic: Fair;+  Standing - Static: Fair  Standing - Dynamic: Fair  Comments: with walker           OutComes Score                                                  AM-PAC Score  AM-PAC Inpatient Mobility Raw Score : 17 (05/27/22 1139)  AM-PAC Inpatient T-Scale Score : 42.13 (05/27/22 1139)  Mobility Inpatient CMS 0-100% Score: 50.57 (05/27/22 1139)  Mobility Inpatient CMS G-Code Modifier : CK (05/27/22 1139)          Goals  Short Term Goals  Time Frame for Short term goals: 1 week  Short term goal 1: Pt will increase Bilat LE strength to [facility]  Short term goal 2: Pt will perform bed mobility IND  Short term goal 3: Pt will transfer with RW SBA  Short term goal 4: Pt will ambulate with RW 50 ft SBA  Short term goal 5: Pt will perform HEP and demo BLT precautions IND  Patient Goals   Patient goals : decrease pain, increase activity       Education  Patient Education  Education Given To: Patient; Family  Education Provided: Role of Therapy; Fall Prevention Strategies;Transfer Training;Precautions  Education Method: Demonstration;Verbal  Education Outcome: Verbalized understanding;Continued education needed;Demonstrated understanding      Therapy Time   Individual Concurrent Group Co-treatment   Time In  1020         Time Out  [address], PT

## 2022-05-27 NOTE — OP NOTE
[address]                 [address] [facility], [address]                                OPERATIVE REPORT    PATIENT NAME: Vazquez Guy                :        1955  MED REC NO:   [id number]                              ROOM:       2086  ACCOUNT NO:   [de-identified]                           ADMIT DATE: 2022  PROVIDER:     Tanya Larson    DATE OF PROCEDURE:  2022    PREOPERATIVE DIAGNOSES:  CSF leak post lumbar decompression instrumented  fusion. POSTOPERATIVE DIAGNOSES:  Lumbar epidural hematoma, post lumbar  decompression fusion. PROCEDURE PERFORMED:  Irrigation debridement of lumbar spine with  evacuation of lumbar epidural hematoma. SURGEON:  Tanya Larson MD    ASSISTANT:  None. ANESTHESIA:  General.    BLOOD LOSS:  200. COMPLICATIONS:  None. SPECIMEN:  None. IMPLANTS:  None. DRAINS:  Luis Daniel-Olivo placed in the subfascial region, output 30 mL  during wound closure. FINDINGS:  1. The patient with 100 mL hematoma. 2.  Valsalva maneuver performed. No CSF leak detected. No  CSF-consistent fluid within the lumbar spine. INDICATIONS FOR PROCEDURE:  This is a 66-year-old male who is on  aspirin, Plavix, and Xarelto, who presented with a two-day history of  acute increased low back and leg pain. The patient was admitted to the hospital.  MRI was obtained showing a  large posterior wound fluid collection areas that were isodense with the  CSF, although the majority of it was not on closer review and comparison  with the patient's preoperative MRI. The patient was noted to have  isodense fluid collections from seromas, which were verified  intraoperatively to the seromas medial to the tulip heads of the screws. These were the areas where it was isodense, the rest of the fluid was in  fact not isodense and more consistent with hematoma.     PROCEDURE IN DETAIL:  The patient was taken to the operating room,  placed under general anesthesia, transferred to the [facility]  table, checked for padding and positioning. Back was prepped and draped  in the usual sterile fashion. A wide prep was utilized so that we could  obtain a fat graft of the left flank, as was anticipated that this was a  CSF leak. Previous midline incision was opened with excellent wound healing of the  skin. We had to get through over half of the deep fascial closure  cephaladly, as he had two-layered closure of the epidural scar. He had  thick deep fascial scar from his index procedure more proximally. As we  started to take down the deep fascial closure, it became apparent quite  readily that very dark fluid was obtained out of the wound, close to 100  mL. The suture was gradually removed from the deep fascia until we had  it mostly all removed. At this juncture, initially I had planned on  sending fluid for beta-2 transferrin looking for CSF. We actually sent  it, although by the end of the case it became apparent that this was not  going to be available to us for multiple days. The clinical appearance  was such that this was clearly a hematoma and the order was canceled. Gradually, the hematoma was aspirated until we had aspirated most of the  fluid level of the hematoma. The more current gelatinous hematoma was  gradually removed. We also pulse lavaged to try and remove as much of  the hematoma from the soft tissue as possible. After this, the dura was noted to be fully expanded. Ruthellen Grain was gently  run around the edge of the bone-dura interface to make sure that  significant adhesions blocking full expansion were not present. The  dural repair was inspected and a very long strong Valsalva maneuver was  performed. No CSF leakage was encountered. At this juncture, the patient had a little bit more oozing likely  secondary to his aspirin and Plavix than typical for wound of this  duration.     I did place some FloSeal around the periphery of the laminotomy bed  where we had freed up with a [facility] and gotten a little bit of oozing. The deep fascia was subsequently repaired. The deep fascial scar was  repaired in two layers of #2 Vicryl suture. The remainder of the deep  fascia was reapproximated with a layer of 2-0 Vicryl suture after we had  placed the MIKE in the subfascial region. We also placed a gram of  vancomycin in the subfascial region. The subcuticular layer was then  reapproximated with 2-0 Vicryl, followed by running 3-0 nylon skin  closure. Sterile dressings were applied. The patient was awakened from  anesthesia, taken to recovery room in stable condition. COMPLICATIONS ARISING DURING THE OPERATION:  None noted. Blood loss 200.         JUDSON Reinoso    D: 05/26/2022 18:12:17       T: 05/26/2022 56:38:75     [id number]  Job#: [id number]     Doc#: [id number]    CC:

## 2022-05-27 NOTE — PROGRESS NOTES
Notified [facility], per Dr Anne Devi pt is approp for ARU. Pt will need to be transitioned from IV to PO pain meds and be tolerating therapy. Pt will need BLE dopplers if chemical anticoagulation is unable to be initiated. If dopplers are negative for DVT pt may admit to ARU. If positive pt must have treatment initiated prior to ARU. Writer will complete Admission Assessment in anticipation of admit to [facility]. Admission Assessment completed and Dr Anne Devi notified.

## 2022-05-27 NOTE — PLAN OF CARE
Problem: Discharge Planning  Goal: Discharge to home or other facility with appropriate resources  Outcome: Progressing  Flowsheets (Taken 5/27/2022 0736)  Discharge to home or other facility with appropriate resources:   Identify barriers to discharge with patient and caregiver   Arrange for needed discharge resources and transportation as appropriate   Identify discharge learning needs (meds, wound care, etc)   Refer to discharge planning if patient needs post-hospital services based on physician order or complex needs related to functional status, cognitive ability or social support system     Problem: Pain  Goal: Verbalizes/displays adequate comfort level or baseline comfort level  Outcome: Progressing     Problem: Chronic Conditions and Co-morbidities  Goal: Patient's chronic conditions and co-morbidity symptoms are monitored and maintained or improved  Recent Flowsheet Documentation  Taken 5/27/2022 0736 by Manuel Moreno 34 - Patient's Chronic Conditions and Co-Morbidity Symptoms are Monitored and Maintained or Improved:   Monitor and assess patient's chronic conditions and comorbid symptoms for stability, deterioration, or improvement   Collaborate with multidisciplinary team to address chronic and comorbid conditions and prevent exacerbation or deterioration   Update acute care plan with appropriate goals if chronic or comorbid symptoms are exacerbated and prevent overall improvement and discharge     Problem: Safety - Adult  Goal: Free from fall injury  Outcome: Progressing  Flowsheets (Taken 5/27/2022 1121)  Free From Fall Injury:   Instruct family/caregiver on patient safety   Based on caregiver fall risk screen, instruct family/caregiver to ask for assistance with transferring infant if caregiver noted to have fall risk factors

## 2022-05-27 NOTE — PROGRESS NOTES
[address] [facility]   Acute Inpatient Rehab Preadmission Assessment    Patient Name: Grace Matthew        MRN: [id number]    : 1955  (77 y.o.)  Gender: male     Admitted from:   [address] [place] [address]  [][facility] [place]   [][facility]   [][facility]   []Outside Admission - Location:                                 [x]Initial         []Updated    Date of Onset / Admission to the acute hospital:  22    Inpatient Rehabilitation Admitting Diagnosis:  CSF leak      Did patient have surgery/procedures? []No  [x]Yes:      22 PROCEDURE PERFORMED:  Irrigation debridement of lumbar spine with  evacuation of lumbar epidural hematoma    22 PROCEDURE PERFORMED:  1. L2-L3 decompression with laminectomy, bilateral facetectomies,  neural foraminotomies. 2.  Posterior segmental instrumentation L2 to L4.  3.  Posterior fusion L2-L3. 4.  Removal of hardware deep. 5.  Local autograft bone grafting. 6.  Fluoroscopic assistance. Physicians: Yue Holliday (Fam Med), Marci (Ortho)    Risk for clinical complications:  High    Co-morbidities:      1. Chronic low back pain status post lumbar fusion  1. Obesity BMI 30.5  2. Neuropathy  3. Coronary artery disease  4. Hyperlipidemia  5. Hypertension  6. Type 2 diabetes  7. Sleep apnea-BiPAP nightly  8. Pain  9. Depression  10. BPH  11. Anemia  12.  Vitamin D deficiency      Financial Information  Primary insurance:  [x]Medicare [] Medicare HMO  []Commercial insurance    []Medicaid   [] Medicaid HMO []Workers Compensation   []Personal Pay    Secondary Insurance:  []Medicare [] Medicare HMO  [x]Commercial insurance    []Medicaid  []Medicaid HMO  []Workers Compensation []None    Precautions:   []Cardiac Precautions:    []Total hip precautions:    []Weight Bearing status:  [x]Safety Precautions/Concerns:  Fall Risk, General Precautions, Surgical Protocols, Spinal Precautions: No Bending,No Lifting,No Twisting, Lifting less than 5 pounds, avoid pulling, Metal implants:  cervical and lumbar sx, R elbow sx  [x]Visually impaired:  Wears glasses at all times    []Hard of Hearing:     Isolation Precautions:         []Yes  [x]No  If Yes:  [] Droplet  []Contact []Airborne     []VRE     []MRSA     []C-diff         [] TB       [] ESBL [] MDRO          [] Other:        Physiatrist:  [x]   Dr. Rita South     []   Dr. Silvana De Santiago  []   Dr. Nimco Barrios  []   Dr. Matthew Galindo    Patients Occupation: Retired    Reviewed Lab and Diagnostic reports from Current Admission: Yes    Patients Prior Functional  Level: Prior Function  Receives Help From: Family  ADL Assistance: Needs assistance (since back surgery on 5/18/22)  Ambulation Assistance: Needs assistance (with RW since back surgery 5/18/22)  Transfer Assistance: Needs assistance (since back surgery on 5/18/22)  Additional Comments: Wife is home majority of time except when volunteering. Pt does not want to go back to [facility] d/t receiving poor care. Pt/wife are agreeable to ARU placement; otherwise plan to d/c home with home health care    History of current illness, per PM&R Consult:  Mr. Jose Ramon Castellano is a 77 y.o. male who was admitted to [facility] on 5/23/2022 with Back Pain (Surgery last Wednesday for back, L2-S1 with 25 staples in place. Surgery done by Dr. Coyle. )     49-year-old male admitted [facility] with severe low back pain radiation both hips had recent lumbar fusion with Dr. Braden Maloney on 5/18/2022 and was discharged to [facility] patient notes he is not receiving enough pain medications also complains of constipation.     Family practice-status post surgery 5/18 L2-S1 by Dr. Braden Maloney     Internal medicine- history obesity and chronic low back pain history of back surgery 3 years ago and repeat surgery 5/18 and plan and return 5/26 continues on OxyContin and oxycodone for pain.   Tachycardia secondary to pain on Neurontin for neuropathy, DVT prophylaxis per surgery     Orthopedic surgery-Dr. Braden Maloney- MRI  CSF leak with large seroma-return in OR 5/26 for irrigation debridement lumbar spine with evacuation of lumbar epidural hematoma    Prognosis: Fair    Current functional status for upper extremity ADLs:  Bathing/Dressing:  SBA       Current functional status for lower extremity ADLs:  Bathing:   Moderate Assistance  Dressing:  Maximum Assistance       Current functional status for bed, chair, wheelchair transfers:  Transfers  Sit to Stand: Contact guard assistance-Minimum Assistance  Stand to sit: Contact guard assistance  Bed to Chair: Contact guard assistance (with RW)  Comment: with RW; moderate difficulty with sit to stand    Current functional status for toilet transfers:  Minimum Assistance       Current functional status for locomotion:  Ambulation  Surface: level tile  Device: Rolling Walker  Other Apparatus:  (SpO2 93-94% without O2 during session)  Assistance: Contact guard assistance  Quality of Gait: partial flexed posture onto RW  Gait Deviations: Slow Hien  Distance: 40 ft  More Ambulation?: No    Current functional status for comprehension: Complete independence    Current functional status for expression: Complete independence    Current functional status for social interaction: Complete independence    Current functional status for problem solving: Complete independence    Current functional status for memory: Complete independence    Current Deficits R/T Impairment: Impaired Functional Mobility and Decreased ADLs    Required Therapy:   [x] Physical Therapy  [x] Occupational Therapy   [] Speech Therapy, as appropriate    Additional Services:    [x]     [] Recreational Therapy, as appropriate    [x] Nutrition    [] Dialysis  [] Cultural Needs Identified  [] Special Equipment Needs  [x] Other:  O2 as ordered    Rehab Justification:  Needs 3 hrs therapy per day or 15 hours per week:  Yes  Identified Rehab Nursing needs: Yes  Intense Interdisciplinary need:  Yes  Need for 24 hr physician supervision:  Yes  Measurable improved quality of life:  Yes  Willingness to participate:  Yes  Medical Necessity:  Yes  Patient able to tolerate care proposed:  Yes    Expected Discharge Destination/Functional Level:  Home with assist  Expected length of time to achieve that level of improvement: 1-2 weeks  Expected Post Discharge Treatments: Home with possible Home Care    Other information relevant to patient's care needs:  N/A    Acute Inpatient Rehabilitation Disclosure Statement will be provided to patient upon admission to ARU with patient's verbalization of understanding. I have reviewed and concur with the findings and results of the pre-admission screening assessment completed by the Inpatient Rehabilitation Admissions Coordinator.

## 2022-05-27 NOTE — PROGRESS NOTES
Patient's family comes out to nurses station and expresses concerns about patient being discharged to home, and not ARU. They inform writer that patient would be home alone and is not compliant and will push the limits if he goes home. Writer informs them that we will continue to monitor patient and see how he does and that family will have to discuss discharge plans with Dr. Mayelin Ascencio.

## 2022-05-27 NOTE — PLAN OF CARE
Problem: Pain  Goal: Verbalizes/displays adequate comfort level or baseline comfort level  5/27/2022 0250 by Kemi Shelton RN  Outcome: Progressing  Note: Pt medicated with pain medication prn. Assessed all pain characteristics including level, type, location, frequency, and onset. Non-pharmacologic interventions offered to pt as well. Pt states pain is tolerable at this time. Will continue to monitor. Problem: ABCDS Injury Assessment  Goal: Absence of physical injury  5/27/2022 0250 by Kemi Shelton RN  Outcome: Progressing  Note: No falls noted this shift. Patient ambulates with x1 standby staff assistance without difficulty. Bed kept in low position. Safe environment maintained. Bedside table & call light in reach. Uses call light appropriately when needing assistance. Problem: Skin/Tissue Integrity  Goal: Absence of new skin breakdown  Description: 1. Monitor for areas of redness and/or skin breakdown  2. Assess vascular access sites hourly  3. Every 4-6 hours minimum:  Change oxygen saturation probe site  4. Every 4-6 hours:  If on nasal continuous positive airway pressure, respiratory therapy assess nares and determine need for appliance change or resting period. 5/27/2022 0250 by Kemi Shelton RN  Outcome: Progressing  Note: Skin assessment complete. Turned and repositioned every two hours per self. Area kept free from moisture. Proper nourishment and fluids encouraged, as appropriate. Will continue to monitor for additional needs and changes in skin breakdown.

## 2022-05-27 NOTE — PROGRESS NOTES
Surgical Progress Note    POD: 1    Patient doing well  Vitals:    22 1500   BP: 86/60   Pulse: 98   Resp: 18   Temp: 98.1 °F (36.7 °C)   SpO2:       Temp (24hrs), Av.9 °F (36.6 °C), Min:96.3 °F (35.7 °C), Max:98.8 °F (37.1 °C)       Pain Control good    No unusual nausea    Exam: reports decreased urinary urgency    Ambulating    Reports leg discomfort resolved        Lungs:  No respiratory distress    Labs reviewed:  Labs:                I/O last 3 completed shifts: In: 2426 [P.O.:500;  I.V.:1876; IV Piggyback:50]  Out: 26 [Urine:3980; Drains:80; Blood:200]    Assessment:    Patient Active Problem List   Diagnosis    Hyperlipidemia    Prostate disease    Primary osteoarthritis involving multiple joints    Headache    GERD (gastroesophageal reflux disease)    Sleep apnea    Elevated liver enzymes    BPH (benign prostatic hyperplasia)    Steatosis of liver    Type 2 diabetes mellitus without complication, without long-term current use of insulin (HCC)    Lumbar radiculopathy, chronic    Osteoarthritis of lumbar spine    Osteoarthritis of multiple joints    Degenerative disc disease, lumbar    Lumbar spinal stenosis    Pars defect with spondylolisthesis    Essential hypertension    Lumbar disc herniation    Chronic low back pain    S/P cardiac catheterization    Back pain    Postoperative hypotension    Neck pain    Cervical spondylosis with myelopathy    Bilateral carpal tunnel syndrome    Spinal stenosis in cervical region    Spondylolisthesis, lumbar region    Foraminal stenosis of cervical region    Cervical radiculopathy    Preop examination    Low back pain    Drug-induced constipation    Cerebrospinal fluid leak due to lumbar surgery    Strain of back       Plan:  See my orders  Pt  Continued antibiotics     Mateusz Guevara MD MD  2022 4:59 PM

## 2022-05-27 NOTE — CONSULTS
Physical Medicine & Rehabilitation  Consult Note      Admitting Physician: Coni Echeverria MD    Primary Care Provider: Korey Hope PA-C     Reason for Consult:  Acute Inpatient Rehabilitation    Chief Complaint: Back pain    History of Present Illness:  Referring Provider is requesting an evaluation for appropriate placement upon discharge from acute care. Mr. Stefania Delvalle is a 77 y.o. male who was admitted to [facility] on 5/23/2022 with Back Pain (Surgery last Wednesday for back, L2-S1 with 25 staples in place. Surgery done by Dr. Sharad Bruno. )    79-year-old male admitted [facility] with severe low back pain radiation both hips had recent lumbar fusion with Dr. Abimael Ball on 5/18/2022 and was discharged to [facility] patient notes he is not receiving enough pain medications also complains of constipation. Family practice-status post surgery 5/18 L2-S1 by Dr. Abimael Ball    Int medicine- history obesity and chronic low back pain history of back surgery 3 years ago and repeat surgery 5/18 and plan and return 5/26 continues on OxyContin and oxycodone for pain. Tachycardia secondary to pain on Neurontin for neuropathy, DVT prophylaxis per surgery    Orthopedic surgery-Dr. Abimael Ball- MRI  CSF leak with large seroma-return in OR 5/26 for irrigation debridement lumbar spine with evacuation of lumbar epidural hematoma    Radiology:  MRI LUMBAR SPINE W WO CONTRAST    Result Date: 5/24/2022  Since prior CT, interval instrumented posterior fusion and bilateral laminectomy defect at L2-L3. At L2-L3, grade 1 anterolisthesis, severe canal stenosis and moderate bilateral neural foraminal narrowing. 7 mm CSF isointense epidural fluid collection contributes to severe canal stenosis which is likely arising from the laminectomy bed. Extensive amount of fluid collection within the laminectomy bed extending from L2-L4 is in direct contiguity of the posterior epidural space at L2-L3 due to a rent at this level. . At L3-L4 to L5-S1 changes related to instrumented disc space and posterior fusion and moderate bilateral neural foraminal narrowing. RECOMMENDATIONS: Unavailable     Review of Systems:  Constitutional: negative for anorexia, chills, fatigue, fevers, sweats and weight loss  Eyes: negative for redness and visual disturbance  Ears, nose, mouth, throat, and face: negative for earaches, sore throat and tinnitus  Respiratory: negative for cough and shortness of breath  Cardiovascular: negative for chest pain, dyspnea and palpitations  Gastrointestinal: negative for abdominal pain, change in bowel habits, constipation, nausea and vomiting  Genitourinary:negative for dysuria, frequency, hesitancy and urinary incontinence  Integument/breast: negative for pruritus and rash  Musculoskeletal:negative for muscle weakness and stiff joints  Neurological: negative for dizziness, headaches and weakness  Behavioral/Psych: negative for decreased appetite, depression and fatigue    Functional History:  PTA: Independent with all activities.     Current:  PT:    5/24    Restrictions/Precautions: Fall Risk,Up as Tolerated,Other (comment),Bed Alarm,Surgical protocol (BLT precautions, log roll, MIKE drain)  Implants present? : Metal implants (cervical and lumbar sx, R elbow sx)  Spinal Precautions: Lifting less than 5 pounds, avoid pulling  Other position/activity restrictions: Check O2 sats during session, O2 per NC as needed, activity as tolerated order post op 5/26/22   Transfers  Sit to Stand: Minimal Assistance  Stand to sit: Contact guard assistance  Comment: with std walker and with RW  Ambulation  Surface: level tile  Device: Standard Walker  Other Apparatus: O2  Assistance: Contact guard assistance  Quality of Gait: sidestep ambulation along EOB only d/t risk od suddent onset of pain and LE weakness, small steps  Gait Deviations: Slow Hien  Distance: 4-5 side steps towards R, then towards L  More Ambulation?: Yes                     OT: 5/20  ADL  Feeding: Setup  Grooming: Setup  UE Bathing: Contact guard assistance  LE Bathing: Maximum assistance  UE Dressing: Contact guard assistance  LE Dressing: Maximum assistance  Toileting: Maximum assistance  Additional Comments: ADL scores based on clinical reasoning and skilled observation unless otherwise noted.  Pt currently limited due to decreased strength, balance, activity tolerance, and pain impacting safety and independence with self care tasks  Tone RUE    ST:     Past Medical History:        Diagnosis Date    Abdominal hernia     Anesthesia     phrenic nerve damaged lt side    BPH (benign prostatic hyperplasia)     CAD (coronary artery disease)     Chronic back pain     Diabetes mellitus (HCC)     Type 2    Difficult intubation     per pt throat collapses, lt side phrenic nerve was damaged    Ear infection     LT, being treated    Fatty liver     Gallstones     GERD (gastroesophageal reflux disease)     Headache(784.0)     History of MI (myocardial infarction)     Hypercholesteremia     Hyperlipidemia     Hyperparathyroidism ([address]     Hypertension     Kidney stones     Osteoarthritis     all joints    PONV (postoperative nausea and vomiting)     Sleep apnea     BIPAP nightly    Unspecified sleep apnea        Past Surgical History:        Procedure Laterality Date    BACK SURGERY  07/11/2019    BICEPS TENDON REPAIR Left     CARDIAC CATHETERIZATION  01/02/2019    with Dr. Day Mccormick, Stents X2    CARPAL TUNNEL RELEASE Right     CERVICAL FUSION      COLONOSCOPY  11 14 14    normal    ELBOW FRACTURE SURGERY Right     right arm/fracture    ENDOSCOPY, COLON, DIAGNOSTIC      several times    FRACTURE SURGERY Right     elbow    KNEE ARTHROSCOPY Bilateral     LIVER BIOPSY  8/14/2015    moderate steatosis, grade 2, lobular and portal inflammation: Grade 1 total score =3, fibrosis score 1A    LUMBAR FUSION N/A 7/10/2019    LUMBAR LAMINECTOMY FUSION POSTERIOR - L3 SACRUM POSTERIOR LUMBAR DECOMPRESSION  & FUSION performed by Juany Rothman MD at [address] 5/18/2022    L2-3 POSTERIOR LUMBAR DECOMPRESSION & INSTRUMENTED FUSION POSSIBLE REVISION INSTRUMENTATION L3-5 [facility]  11/07/2018    lumbar myelogram    ROTATOR CUFF REPAIR Right     SPINE SURGERY N/A 5/26/2022    IRRIGATION AND DEBRIDEMENT EPIDURAL HEMATOMA performed by Juany Rothman MD at [address]: Allergies   Allergen Reactions    Morphine Other (See Comments)     Becomes very mean.   Tolerates dilaudid and fentanyl    Pcn [Penicillins] Swelling     face    Tramadol Itching    Codeine Nausea And Vomiting     Abd. pain        Current Medications:   Current Facility-Administered Medications: scopolamine (TRANSDERM-SCOP) transdermal patch 1 patch, 1 patch, TransDERmal, Once  sodium chloride flush 0.9 % injection 5-40 mL, 5-40 mL, IntraVENous, 2 times per day  sodium chloride flush 0.9 % injection 5-40 mL, 5-40 mL, IntraVENous, PRN  0.9 % sodium chloride infusion, , IntraVENous, PRN  acetaminophen (TYLENOL) tablet 650 mg, 650 mg, Oral, Q6H  ceFAZolin (ANCEF) 2000 mg in dextrose 5 % 50 mL IVPB, 2,000 mg, IntraVENous, Q8H  ondansetron (ZOFRAN-ODT) disintegrating tablet 4 mg, 4 mg, Oral, Q8H PRN **OR** ondansetron (ZOFRAN) injection 4 mg, 4 mg, IntraVENous, Q6H PRN  oxyCODONE (ROXICODONE) immediate release tablet 5 mg, 5 mg, Oral, Q4H PRN **OR** oxyCODONE HCl (OXY-IR) immediate release tablet 10 mg, 10 mg, Oral, Q4H PRN  HYDROmorphone (DILAUDID) injection 2 mg, 2 mg, IntraVENous, Q4H PRN  baclofen (LIORESAL) tablet 10 mg, 10 mg, Oral, Q12H PRN  magnesium hydroxide (MILK OF MAGNESIA) 400 MG/5ML suspension 30 mL, 30 mL, Oral, Daily  nortriptyline (PAMELOR) capsule 50 mg, 50 mg, Oral, BID  vitamin E capsule 400 Units, 400 Units, Oral, Daily  [Held by provider] aspirin chewable tablet 81 mg, 81 mg, Oral, Daily  gabapentin (NEURONTIN) capsule 600 mg, 600 mg, Oral, TID  metoprolol succinate (TOPROL XL) extended release tablet 50 mg, 50 mg, Oral, Daily  tamsulosin (FLOMAX) capsule 0.4 mg, 0.4 mg, Oral, BID  cinacalcet (SENSIPAR) tablet 30 mg, 30 mg, Oral, Daily  coenzyme Q10 capsule 100 mg, 100 mg, Oral, Daily  ezetimibe (ZETIA) tablet 10 mg, 10 mg, Oral, Daily  vitamin D (ERGOCALCIFEROL) capsule 50,000 Units, 50,000 Units, Oral, Weekly  [Held by provider] clopidogrel (PLAVIX) tablet 75 mg, 75 mg, Oral, Daily  ferrous sulfate (IRON 325) tablet 325 mg, 325 mg, Oral, Daily with breakfast  finasteride (PROSCAR) tablet 5 mg, 5 mg, Oral, Daily  metFORMIN (GLUCOPHAGE) tablet 1,000 mg, 1,000 mg, Oral, BID WC  lisinopril (PRINIVIL;ZESTRIL) tablet 10 mg, 10 mg, Oral, Daily  atorvastatin (LIPITOR) tablet 10 mg, 10 mg, Oral, Daily  fenofibrate (TRIGLIDE) tablet 160 mg, 160 mg, Oral, Daily  pantoprazole (PROTONIX) tablet 40 mg, 40 mg, Oral, QAM AC    Social History:  Social History     Socioeconomic History    Marital status:      Spouse name: Not on file    Number of children: Not on file    Years of education: Not on file    Highest education level: Not on file   Occupational History    Occupation: retired   Tobacco Use    Smoking status: Never Smoker    Smokeless tobacco: Never Used   Vaping Use    Vaping Use: Never used   Substance and Sexual Activity    Alcohol use: Never     Alcohol/week: 0.0 standard drinks    Drug use: Never    Sexual activity: Not on file   Other Topics Concern    Not on file   Social History Narrative    Not on file     Social Determinants of Health     Financial Resource Strain: Low Risk     Difficulty of Paying Living Expenses: Not hard at all   Food Insecurity: No Food Insecurity    Worried About Running Out of Food in the Last Year: Never true    Darryl of Food in the Last Year: Never true   Transportation Needs: No Transportation Needs    Lack of Transportation (Medical):  No    Lack of Transportation (Non-Medical):  No   Physical Activity:     Days of Exercise per Week: Not on file    Minutes of Exercise per Session: Not on file   Stress:     Feeling of Stress : Not on file   Social Connections:     Frequency of Communication with Friends and Family: Not on file    Frequency of Social Gatherings with Friends and Family: Not on file    Attends [employer] Services: Not on file    Active Member of [address] [place] [address] or Organizations: Not on file    Attends Club or Organization Meetings: Not on file    Marital Status: Not on file   Intimate Partner Violence:     Fear of Current or Ex-Partner: Not on file    Emotionally Abused: Not on file    Physically Abused: Not on file    Sexually Abused: Not on file   Housing Stability:     Unable to Pay for Housing in the Last Year: Not on file    Number of [place] in the Last Year: Not on file    Unstable Housing in the Last Year: Not on file     Social/Functional History  Lives With: Spouse  Type of Home: House  Home Layout: Two level,Performs ADL's on one level,Able to Live on Main level with bedroom/bathroom  Home Access: Stairs to enter with rails  Entrance Stairs - Number of Steps: 5 ISELA   Entrance Stairs - Rails: Both  Bathroom Shower/Tub: Walk-in shower,Doors  Bathroom Toilet: Standard  Bathroom Equipment: Shower chair  Home Equipment: [place], standard,Walker, rolling  ADL Assistance: Independent  Homemaking Assistance: Independent (Share with wife)  Homemaking Responsibilities: Yes  Ambulation Assistance: Independent  Transfer Assistance: Independent  Active : Yes  Mode of Transportation: Truck,SUV  Occupation: Retired  IADL Comments: Pt reports sleeping in flat bed on right side; pt has recliner chair  Additional Comments: Pt reports that wife is home all the time besides when volunteering and able to assist as needed       Family History:       Problem Relation Age of Onset    Heart Disease Mother     Cancer Father         bladder    Arthritis Sister  Cancer Paternal Aunt     Cancer Paternal Aunt            Physical Exam:    /84   Pulse 76   Temp 98.1 °F (36.7 °C) (Oral)   Resp 20   Ht 5' 8\" (1.727 m)   Wt 200 lb 9.9 oz (91 kg)   SpO2 97%   BMI 30.50 kg/m²     General appearance: alert, appears stated age, cooperative, and no distress  HEENT: Normocephalic, without obvious abnormality, atraumatic               Eyes: conjunctivae clear. Throat: tongue normal.               Neck:  symmetrical, trachea midline. Pulm: clear to auscultation bilaterally. Cardiac: regular rate and rhythm, no murmur. Abdomen: soft, non-tender; bowel sounds normal.  MSK: extremities normal, atraumatic, no edema, normal tone. ROM: Functional range of motion upper extremities and distal lower extremities  Mental status/Psych: Alert, orientedX3, thought content appropriate. Skin:     Neuro:      Sensory: Intact in BUE and BLE to soft and pin sensation. Motor: Muscle tone and bulk are normal bilaterally. No pronator drift. 4/5 upper and distal lower extremity proximal lower extremities at least 3-4/5    Coordination: finger to nose normal bilaterally. Diagnostics:  CBC   Lab Results   Component Value Date    WBC 11.0 05/23/2022    RBC 4.80 05/23/2022    RBC 5.19 01/28/2012    HGB 14.5 05/23/2022    HCT 43.2 05/23/2022    MCV 89.9 05/23/2022    RDW 14.4 05/23/2022     05/23/2022     01/28/2012     BMP    Lab Results   Component Value Date     05/23/2022    K 4.4 05/23/2022     05/23/2022    CO2 29 05/23/2022    BUN 27 05/23/2022     Uric Acid  No components found for: URIC  VITAMIN B12 No components found for: B12  PT/INR  No results found for: PTINR      Impression: Mr. Stefania Delvalle is a 77 y.o. male with a history of Low back pain    1.  Chronic low back pain status post lumbar fusion 9/39 A3-I9, complicated by CSF leak- return in OR 5/26 for Acacian debridement lumbar spine evacuation lumbar epidurogram hematoma  2. Obesity BMI 30.5  3. Neuropathy  4. Coronary artery disease- hyperlipidemia/hypertension-Lipitor, Zetia, fenofibrate, lisinopril, metoprolol, Plavix on hold  5. Type 2 diabetes-metformin  6. Sleep apnea-BiPAP nightly  7. Pain-Dilaudid, Roxicodone, Tylenol, baclofen, Neurontin  8. Depression-Pamelor  9. BPH-Proscar, Flomax  10. Anemia-iron  11. Vitamin D supplementation    Recommendations:  1. Diagnosis: Status post lumbar fusion wit, max assist lower extremity h CSF leak  2. Therapy: N/A 4-5 steps contact-guard  3. Medical  Necessity: As above  4. Support: Clarify  5. Rehab recommendation: Currently would benefit acute inpatient rotation when medically ready, suspect will progress rapidly however.    -Need to be off IV pain medications and participate with therapies-currently receiving Dilaudid IV  -Noted Plavix on hold    6. DVT proph: Currently no anticoagulation for DVT prophylaxis due to lumbar surgery, await neurosurgery   clearance, if contraindicated will need Doppler screen  24 to 48 hours prior to rehab    Discussed with patient and wife    It was my pleasure to evaluate Shell Hickey today. Please call with questions. Georgina Kerr. Jose Webb MD          This note is created with the assistance of a speech recognition program.  While intending to generate a document that actually reflects the content of the visit, the document can still have some errors including those of syntax and sound a like substitutions which may escape proof reading.   In such instances, actual meaning can be extrapolated by contextual diversion

## 2022-05-27 NOTE — CARE COORDINATION
ONGOING DISCHARGE PLAN:    Patient is alert and oriented x4. Spoke with patient regarding discharge plan and patient confirms that plan is still to discharge to [address]  Patient would like to discharge to [address]  PM&R ordered   Possible transfer to Med surg     Will continue to follow for additional discharge needs.     Electronically signed by Kiersten Villegas RN on 5/27/2022 at 11:56 AM

## 2022-05-27 NOTE — CARE COORDINATION
was informed by Cherly Severin at [facility] that patient is appropriate.  contacted [address] about requirements for patient.  informed Britt that patient would have to be transitioned from IV medication to PO, to assess if patient is able to tolerate oral medication and therapy.  explained that patient will have to be on dopplers for 24/48 hours as well.  explained that if patient tested negative or had preliminary negative for DVT, patient can go to ARU.  explained that if patient was positive for DVT, patient would have treatment before patient can go to ARU. JOSHUA De La Torre was receptive.        Electronically signed by Carrol Montalvo on 5/27/2022 at 3:12 PM

## 2022-05-27 NOTE — PROGRESS NOTES
FAMILY MEDICINE  - PROGRESS NOTE    Date:  5/27/2022  Kj Walsh  [id number]      Chief Complaint   Patient presents with    Back Pain     Surgery last Wednesday for back, L2-S1 with 25 staples in place. Surgery done by Dr. Digna Sheehan. Interval History:  Improved, he is a little better this am. He had surgery yesterday and is sore but the pain has improved. No significant events overnight. Specialists notes, labs & imaging reviewed.       Subjective  Constitutional: positive for overweight  Gastrointestinal: positive for reflux symptoms  Musculoskeletal:positive for arthralgias, back pain, myalgias and stiff joints  Endocrine: positive for diabetic symptoms including hyperglycemia:    Objective:    /84   Pulse 76   Temp 98.1 °F (36.7 °C) (Oral)   Resp 20   Ht 5' 8\" (1.727 m)   Wt 200 lb 9.9 oz (91 kg)   SpO2 97%   BMI 30.50 kg/m²   General appearance - overweight  Mental status - affect appropriate to mood  Eyes - not examined  Ears - hearing grossly normal bilaterally  Nose - normal and patent, no erythema, discharge or polyps  Mouth - mucous membranes moist, pharynx normal without lesions  Neck - supple, no significant adenopathy  Lymphatics - no palpable lymphadenopathy, no hepatosplenomegaly  Chest - clear to auscultation, no wheezes, rales or rhonchi, symmetric air entry  Heart - normal rate, regular rhythm, normal S1, S2, no murmurs, rubs, clicks or gallops  Abdomen - soft, nontender, nondistended, no masses or organomegaly  Breasts - not examined  Back exam - not examined  Neurological - neck supple without rigidity  Musculoskeletal - osteoarthritic changes noted in both hands  Extremities - peripheral pulses normal, no pedal edema, no clubbing or cyanosis  Skin - normal coloration and turgor, no rashes, no suspicious skin lesions noted    Data:   Medications:   Current Facility-Administered Medications   Medication Dose Route Frequency Provider Last Rate Last Admin    scopolamine (TRANSDERM-SCOP) transdermal patch 1 patch  1 patch TransDERmal Once [place] MD Godwin   1 patch at 05/26/22 1559    sodium chloride flush 0.9 % injection 5-40 mL  5-40 mL IntraVENous 2 times per day Ceci Stuart MD        sodium chloride flush 0.9 % injection 5-40 mL  5-40 mL IntraVENous PRN Ceci Stuart MD        0.9 % sodium chloride infusion   IntraVENous PRN Ceci Stuart MD        acetaminophen (TYLENOL) tablet 650 mg  650 mg Oral Q6H Ceci Stuart MD   650 mg at 05/27/22 0734    ceFAZolin (ANCEF) 2000 mg in dextrose 5 % 50 mL IVPB  2,000 mg IntraVENous Q8H Ceci Stuart MD   Stopped at 05/27/22 0122    ondansetron (ZOFRAN-ODT) disintegrating tablet 4 mg  4 mg Oral Q8H PRN eCci Stuart MD        Or    ondansetron [facility]) injection 4 mg  4 mg IntraVENous Q6H PRN Ceci Stuart MD        oxyCODONE (ROXICODONE) immediate release tablet 5 mg  5 mg Oral Q4H PRN Ceci Stuart MD   5 mg at 05/27/22 0058    Or    oxyCODONE HCl (OXY-IR) immediate release tablet 10 mg  10 mg Oral Q4H PRN Ceci Stuart MD   10 mg at 05/27/22 0734    HYDROmorphone (DILAUDID) injection 2 mg  2 mg IntraVENous Q4H PRN Ceci Stuart MD   2 mg at 05/26/22 1943    baclofen (LIORESAL) tablet 10 mg  10 mg Oral Q12H PRN Ceci Stuart MD   10 mg at 05/24/22 2117    magnesium hydroxide (MILK OF MAGNESIA) 400 MG/5ML suspension 30 mL  30 mL Oral Daily Ceci Stuart MD   30 mL at 05/26/22 0824    nortriptyline (PAMELOR) capsule 50 mg  50 mg Oral BID Ceci Stuart MD   50 mg at 05/26/22 2156    vitamin E capsule 400 Units  400 Units Oral Daily Ceci Stuart MD   400 Units at 05/25/22 0900    [Held by provider] aspirin chewable tablet 81 mg  81 mg Oral Daily Ceci Stuart MD   81 mg at 05/24/22 0841    gabapentin (NEURONTIN) capsule 600 mg  600 mg Oral TID Ceci Stuart MD   600 mg at 05/26/22 2154    metoprolol succinate (TOPROL XL) extended release tablet 50 mg 50 mg Oral Daily Ezra Soto MD   50 mg at 05/26/22 2893    tamsulosin (FLOMAX) capsule 0.4 mg  0.4 mg Oral BID Ezra Soto MD   0.4 mg at 05/26/22 2154    cinacalcet (SENSIPAR) tablet 30 mg  30 mg Oral Daily Ezra Soto MD   30 mg at 05/26/22 4955    coenzyme Q10 capsule 100 mg  100 mg Oral Daily Ezra Soto MD   100 mg at 05/25/22 0900    ezetimibe (ZETIA) tablet 10 mg  10 mg Oral Daily Ezra Soto MD   10 mg at 05/26/22 1006    vitamin D (ERGOCALCIFEROL) capsule 50,000 Units  50,000 Units Oral Weekly Ezra Soto MD        [Held by provider] clopidogrel (PLAVIX) tablet 75 mg  75 mg Oral Daily Ezra Soto MD   75 mg at 05/24/22 0840    ferrous sulfate (IRON 325) tablet 325 mg  325 mg Oral Daily with breakfast Ezra Soto MD   325 mg at 05/27/22 3590    finasteride (PROSCAR) tablet 5 mg  5 mg Oral Daily Ezra Soto MD   5 mg at 05/26/22 3975    metFORMIN (GLUCOPHAGE) tablet 1,000 mg  1,000 mg Oral BID WC Ezra Soto MD   1,000 mg at 05/27/22 0734    lisinopril (PRINIVIL;ZESTRIL) tablet 10 mg  10 mg Oral Daily Ezra Soto MD   10 mg at 05/26/22 6038    atorvastatin (LIPITOR) tablet 10 mg  10 mg Oral Daily Ezra Soto MD   10 mg at 05/26/22 3907    fenofibrate (TRIGLIDE) tablet 160 mg  160 mg Oral Daily Ezra Soto MD   160 mg at 05/26/22 0824    pantoprazole (PROTONIX) tablet 40 mg  40 mg Oral QAM AC Ezra Soto MD   40 mg at 05/27/22 2693       Intake/Output Summary (Last 24 hours) at 5/27/2022 0742  Last data filed at 5/27/2022 0648  Gross per 24 hour   Intake 2426 ml   Output 2410 ml   Net 16 ml     Recent Results (from the past 24 hour(s))   POC Glucose Fingerstick    Collection Time: 05/26/22  4:04 PM   Result Value Ref Range    POC Glucose 155 (H) 75 - 110 mg/dL   POC Glucose Fingerstick    Collection Time: 05/26/22  8:47 PM   Result Value Ref Range    POC Glucose 207 (H) 75 - 110 mg/dL   POC Glucose Fingerstick    Collection Time: 05/27/22  7:17 AM Result Value Ref Range    POC Glucose 112 (H) 75 - 110 mg/dL     -----------------------------------------------------------------  RAD:  EKG:  Micro:     Assessment & Plan:    Patient Active Problem List:     Hyperlipidemia     Prostate disease     Primary osteoarthritis involving multiple joints     Headache     GERD (gastroesophageal reflux disease)     Sleep apnea     Elevated liver enzymes     BPH (benign prostatic hyperplasia)     Steatosis of liver     Type 2 diabetes mellitus without complication, without long-term current use of insulin (HCC)     Lumbar radiculopathy, chronic     Osteoarthritis of lumbar spine     Osteoarthritis of multiple joints     Degenerative disc disease, lumbar     Lumbar spinal stenosis     Pars defect with spondylolisthesis     Essential hypertension     Lumbar disc herniation     Chronic low back pain     S/P cardiac catheterization     Back pain     Postoperative hypotension     Neck pain     Cervical spondylosis with myelopathy     Bilateral carpal tunnel syndrome     Spinal stenosis in cervical region     Spondylolisthesis, lumbar region     Foraminal stenosis of cervical region     Cervical radiculopathy     Preop examination     Low back pain     Drug-induced constipation           Plan:  -S/P lumbar fusion with postop large seroma/hematoma & pain - pain improved, patient & wife requesting to D/C to ARU, PM&R consulted. -Medically stable. -Continue current treatments.  -Complete orders per chart. See orders   Disposition:    Electronically signed by Solo Milan MD on 5/27/2022 at 7:42 AM    In the background, while I am on the phone with his nurse, Dr. Juwan Connolly is not getting on the phone with me yet stating loudly, \"He is not ready for discharge and he does not know why ARU was called in\". As stated above, the patient and his wife requested discharge to ARU.

## 2022-05-27 NOTE — CARE COORDINATION
had conversation with patient, spouse, and grandson.  informed patient and family that according to the doctor patient is appropriate for [facility] acute rehab. Spouse requested that she be informed when patient moves over to [facility] acute rehab.     Electronically signed by Callum Rosales on 5/27/2022 at 3:58 PM

## 2022-05-28 ENCOUNTER — APPOINTMENT (OUTPATIENT)
Dept: VASCULAR LAB | Age: 67
DRG: 907 | End: 2022-05-28
Payer: MEDICARE

## 2022-05-28 LAB
CREAT SERPL-MCNC: 1.35 MG/DL (ref 0.7–1.2)
GFR AFRICAN AMERICAN: >60 ML/MIN
GFR NON-AFRICAN AMERICAN: 53 ML/MIN
GFR SERPL CREATININE-BSD FRML MDRD: ABNORMAL ML/MIN/{1.73_M2}
GLUCOSE BLD-MCNC: 162 MG/DL (ref 75–110)
GLUCOSE BLD-MCNC: 171 MG/DL (ref 75–110)
GLUCOSE BLD-MCNC: 178 MG/DL (ref 75–110)
GLUCOSE BLD-MCNC: 203 MG/DL (ref 75–110)

## 2022-05-28 PROCEDURE — 82565 ASSAY OF CREATININE: CPT

## 2022-05-28 PROCEDURE — 1200000000 HC SEMI PRIVATE

## 2022-05-28 PROCEDURE — 6360000002 HC RX W HCPCS: Performed by: ORTHOPAEDIC SURGERY

## 2022-05-28 PROCEDURE — 6370000000 HC RX 637 (ALT 250 FOR IP): Performed by: ORTHOPAEDIC SURGERY

## 2022-05-28 PROCEDURE — 36415 COLL VENOUS BLD VENIPUNCTURE: CPT

## 2022-05-28 PROCEDURE — 93970 EXTREMITY STUDY: CPT

## 2022-05-28 PROCEDURE — 2580000003 HC RX 258: Performed by: ORTHOPAEDIC SURGERY

## 2022-05-28 PROCEDURE — 97530 THERAPEUTIC ACTIVITIES: CPT

## 2022-05-28 PROCEDURE — 97116 GAIT TRAINING THERAPY: CPT

## 2022-05-28 PROCEDURE — 82947 ASSAY GLUCOSE BLOOD QUANT: CPT

## 2022-05-28 RX ADMIN — MAGNESIUM HYDROXIDE 30 ML: 400 SUSPENSION ORAL at 07:44

## 2022-05-28 RX ADMIN — ACETAMINOPHEN 650 MG: 325 TABLET ORAL at 22:15

## 2022-05-28 RX ADMIN — GABAPENTIN 600 MG: 300 CAPSULE ORAL at 13:28

## 2022-05-28 RX ADMIN — EZETIMIBE 10 MG: 10 TABLET ORAL at 07:43

## 2022-05-28 RX ADMIN — TAMSULOSIN HYDROCHLORIDE 0.4 MG: 0.4 CAPSULE ORAL at 22:17

## 2022-05-28 RX ADMIN — ONDANSETRON 4 MG: 2 INJECTION INTRAMUSCULAR; INTRAVENOUS at 01:04

## 2022-05-28 RX ADMIN — METFORMIN HYDROCHLORIDE 1000 MG: 1000 TABLET, FILM COATED ORAL at 07:43

## 2022-05-28 RX ADMIN — CEFAZOLIN 2000 MG: 10 INJECTION, POWDER, FOR SOLUTION INTRAVENOUS at 15:17

## 2022-05-28 RX ADMIN — CINACALCET HYDROCHLORIDE 30 MG: 30 TABLET, FILM COATED ORAL at 07:45

## 2022-05-28 RX ADMIN — OXYCODONE HYDROCHLORIDE 10 MG: 10 TABLET ORAL at 06:40

## 2022-05-28 RX ADMIN — SODIUM CHLORIDE, PRESERVATIVE FREE 10 ML: 5 INJECTION INTRAVENOUS at 08:14

## 2022-05-28 RX ADMIN — METFORMIN HYDROCHLORIDE 1000 MG: 1000 TABLET, FILM COATED ORAL at 16:53

## 2022-05-28 RX ADMIN — Medication 400 UNITS: at 07:45

## 2022-05-28 RX ADMIN — OXYCODONE HYDROCHLORIDE 10 MG: 10 TABLET ORAL at 10:48

## 2022-05-28 RX ADMIN — Medication 100 MG: at 07:45

## 2022-05-28 RX ADMIN — CEFAZOLIN 2000 MG: 10 INJECTION, POWDER, FOR SOLUTION INTRAVENOUS at 00:19

## 2022-05-28 RX ADMIN — FERROUS SULFATE TAB 325 MG (65 MG ELEMENTAL FE) 325 MG: 325 (65 FE) TAB at 07:43

## 2022-05-28 RX ADMIN — TAMSULOSIN HYDROCHLORIDE 0.4 MG: 0.4 CAPSULE ORAL at 07:43

## 2022-05-28 RX ADMIN — NORTRIPTYLINE HYDROCHLORIDE 50 MG: 50 CAPSULE ORAL at 07:45

## 2022-05-28 RX ADMIN — GABAPENTIN 600 MG: 300 CAPSULE ORAL at 07:44

## 2022-05-28 RX ADMIN — FENOFIBRATE 160 MG: 160 TABLET ORAL at 07:43

## 2022-05-28 RX ADMIN — ACETAMINOPHEN 650 MG: 325 TABLET ORAL at 07:43

## 2022-05-28 RX ADMIN — PANTOPRAZOLE SODIUM 40 MG: 40 TABLET, DELAYED RELEASE ORAL at 06:40

## 2022-05-28 RX ADMIN — METOPROLOL SUCCINATE 50 MG: 50 TABLET, EXTENDED RELEASE ORAL at 07:43

## 2022-05-28 RX ADMIN — ACETAMINOPHEN 650 MG: 325 TABLET ORAL at 02:29

## 2022-05-28 RX ADMIN — FINASTERIDE 5 MG: 5 TABLET, FILM COATED ORAL at 07:43

## 2022-05-28 RX ADMIN — CEFAZOLIN 2000 MG: 10 INJECTION, POWDER, FOR SOLUTION INTRAVENOUS at 08:14

## 2022-05-28 RX ADMIN — NORTRIPTYLINE HYDROCHLORIDE 50 MG: 50 CAPSULE ORAL at 22:14

## 2022-05-28 RX ADMIN — GABAPENTIN 600 MG: 300 CAPSULE ORAL at 22:14

## 2022-05-28 RX ADMIN — ATORVASTATIN CALCIUM 10 MG: 10 TABLET, FILM COATED ORAL at 07:44

## 2022-05-28 RX ADMIN — ACETAMINOPHEN 650 MG: 325 TABLET ORAL at 13:28

## 2022-05-28 RX ADMIN — LISINOPRIL 10 MG: 10 TABLET ORAL at 07:44

## 2022-05-28 RX ADMIN — OXYCODONE HYDROCHLORIDE 10 MG: 10 TABLET ORAL at 01:02

## 2022-05-28 ASSESSMENT — PAIN SCALES - GENERAL
PAINLEVEL_OUTOF10: 3
PAINLEVEL_OUTOF10: 3
PAINLEVEL_OUTOF10: 6
PAINLEVEL_OUTOF10: 2
PAINLEVEL_OUTOF10: 7
PAINLEVEL_OUTOF10: 7
PAINLEVEL_OUTOF10: 6
PAINLEVEL_OUTOF10: 8

## 2022-05-28 ASSESSMENT — PAIN DESCRIPTION - PAIN TYPE: TYPE: SURGICAL PAIN

## 2022-05-28 ASSESSMENT — PAIN DESCRIPTION - LOCATION: LOCATION: BACK

## 2022-05-28 ASSESSMENT — PAIN DESCRIPTION - ORIENTATION: ORIENTATION: LEFT;LOWER

## 2022-05-28 NOTE — PLAN OF CARE
Problem: Discharge Planning  Goal: Discharge to home or other facility with appropriate resources  5/28/2022 0528 by Arely Candelario RN  Outcome: Progressing  5/27/2022 1801 by Prince Stephania RN  Outcome: Progressing  Flowsheets (Taken 5/27/2022 6244)  Discharge to home or other facility with appropriate resources:   Identify barriers to discharge with patient and caregiver   Arrange for needed discharge resources and transportation as appropriate   Identify discharge learning needs (meds, wound care, etc)   Refer to discharge planning if patient needs post-hospital services based on physician order or complex needs related to functional status, cognitive ability or social support system     Problem: Pain  Goal: Verbalizes/displays adequate comfort level or baseline comfort level  5/28/2022 0528 by Arely Canedlario RN  Outcome: Progressing  5/27/2022 1801 by Prince Stephania RN  Outcome: Progressing     Problem: ABCDS Injury Assessment  Goal: Absence of physical injury  Outcome: Progressing     Problem: Skin/Tissue Integrity  Goal: Absence of new skin breakdown  Description: 1. Monitor for areas of redness and/or skin breakdown  2. Assess vascular access sites hourly  3. Every 4-6 hours minimum:  Change oxygen saturation probe site  4. Every 4-6 hours:  If on nasal continuous positive airway pressure, respiratory therapy assess nares and determine need for appliance change or resting period.   Outcome: Progressing     Problem: Safety - Adult  Goal: Free from fall injury  5/28/2022 0528 by Arely Candelario RN  Outcome: Progressing  5/27/2022 1801 by Prince Stephania RN  Outcome: Progressing  Flowsheets (Taken 5/27/2022 1121)  Free From Fall Injury:   Arlyn Goldstein family/caregiver on patient safety   Based on caregiver fall risk screen, instruct family/caregiver to ask for assistance with transferring infant if caregiver noted to have fall risk factors     Problem: Chronic Conditions and Co-morbidities  Goal: Patient's chronic conditions and co-morbidity symptoms are monitored and maintained or improved  Outcome: Progressing

## 2022-05-28 NOTE — PROGRESS NOTES
Physical Therapy  Facility/Department: formerly [facility]  Physical Therapy    Name: Uriah Davis  : 1955  MRN: [id number]  Date of Service: 2022    Discharge Recommendations:  Patient would benefit from continued therapy after discharge,Patient able to tolerate 3hrs of therapy a day   PT Equipment Recommendations  Equipment Needed: No      Patient Diagnosis(es): The encounter diagnosis was Back strain, initial encounter. Past Medical History:  has a past medical history of Abdominal hernia, Anesthesia, BPH (benign prostatic hyperplasia), CAD (coronary artery disease), Chronic back pain, Diabetes mellitus ([address] Difficult intubation, Ear infection, Fatty liver, Gallstones, GERD (gastroesophageal reflux disease), Headache(784.0), History of MI (myocardial infarction), Hypercholesteremia, Hyperlipidemia, Hyperparathyroidism ([address] Hypertension, Kidney stones, Osteoarthritis, PONV (postoperative nausea and vomiting), Sleep apnea, and Unspecified sleep apnea. Past Surgical History:  has a past surgical history that includes Knee arthroscopy (Bilateral); Biceps tendon repair (Left); fracture surgery (Right); Colonoscopy (14); liver biopsy (2015); Rotator cuff repair (Right); Elbow fracture surgery (Right); other surgical history (2018); Endoscopy, colon, diagnostic; lumbar fusion (N/A, 7/10/2019); back surgery (2019); cervical fusion; Cardiac catheterization (2019); Carpal tunnel release (Right); lumbar fusion (N/A, 2022); and Spine surgery (N/A, 2022). Assessment   Body Structures, Functions, Activity Limitations Requiring Skilled Therapeutic Intervention: Decreased functional mobility ; Increased pain;Decreased strength  Assessment: pt has increased gait distance but continues to have significant pain and at risk for falls due to B knee OA and LE weakness  Requires PT Follow-Up: Yes  Activity Tolerance  Activity Tolerance: Patient tolerated treatment well     Plan Plan  Plan: 6-7 times per week  Current Treatment Recommendations: Strengthening,ROM,Balance training,Functional mobility training,Transfer training,Gait training,Endurance training,Safety education & training,Positioning,Therapeutic activities,Pain management,Home exercise program  Safety Devices  Type of Devices: All fall risk precautions in place,Call light within reach,Gait belt,Patient at risk for falls,Left in bed,Nurse notified (Pt sitting EOB at end of session eating.  RN notified)  Restraints  Restraints Initially in Place: No     Restrictions  Restrictions/Precautions  Restrictions/Precautions: Fall Risk,Up as Tolerated,Other (comment),Surgical protocol,General Precautions,Surgical Protocols (BLT precautions, log roll, MIKE drain)  Required Braces or Orthoses?: No  Implants present? : Metal implants (cervical and lumbar sx, R elbow sx)  Position Activity Restriction  Spinal Precautions: No Bending,No Lifting,No Twisting  Spinal Precautions: Lifting less than 5 pounds, avoid pulling  Other position/activity restrictions: Check O2 sats during session, O2 per NC as needed, activity as tolerated order post op 5/26/22     Subjective   Pain: pt reports back pain at 7/10  General  Family / Caregiver Present: No  Follows Commands: Within Functional Limits  Subjective  Subjective: pt eager to walk         Social/Functional History  Social/Functional History  Lives With: Spouse  Type of Home: House  Home Layout: Two level,Able to Live on Main level with bedroom/bathroom  Home Access: Stairs to enter with rails (reports going down stairs backwards)  Entrance Stairs - Number of Steps: 5  Entrance Stairs - Rails: Both  Bathroom Shower/Tub: Walk-in shower,Doors  Bathroom Toilet: Standard  Bathroom Equipment: Hand-held shower  Bathroom Accessibility: Walker accessible  Home Equipment: [place] Rolling  Has the patient had two or more falls in the past year or any fall with injury in the past year?: Yes  Receives Help From: Family  ADL Assistance: Independent  Homemaking Assistance: Independent (wife is primary)  Ambulation Assistance: Independent (using RW since back sx 5/18/22)  Transfer Assistance: Independent (Prior to back sx 5/27/22; A since)  Active : Yes  Mode of Transportation: Truck  IADL Comments: Pt reports sleeping lazy boy and flat bed  Additional Comments: Pt reports that wife is home all the time except when volunteering at [employer]. Pt recently at [facility] for therapy after recent back surgery.          Objective   Heart Rate: 93  Heart Rate Source: Monitor  BP: 92/66  MAP (Calculated): 74.67  Resp: 18  SpO2: 91 %  O2 Device: None (Room air)      Bed mobility  Supine to Sit: Stand by assistance (head of bed elevated at 45 degrees)  Sit to Supine:  (pt left up in chair)  Scooting: Modified independent  Transfers  Sit to Stand: Stand by assistance  Stand to sit: Stand by assistance  Bed to Chair: Stand by assistance (with RW)  Ambulation  Device: Rolling Walker  Gait Deviations: Decreased step height  Distance: 80ft  Comments: pt nedds verbal cues to stay within walker and not to flex trunk  More Ambulation?: No           Goals  Short Term Goals  Time Frame for Short term goals: 1 week  Short term goal 1: Pt will increase Bilat LE strength to [facility]  Short term goal 2: Pt will perform bed mobility IND  Short term goal 3: Pt will transfer with RW SBA  Short term goal 4: Pt will ambulate with RW 50 ft SBA  Short term goal 5: Pt will perform HEP and demo BLT precautions IND  Patient Goals   Patient goals : decrease pain, increase activity       Therapy Time   Individual Concurrent Group Co-treatment   Time In 1305         Time Out 1330         Minutes [id number] [facility],

## 2022-05-28 NOTE — PLAN OF CARE
Problem: Discharge Planning  Goal: Discharge to home or other facility with appropriate resources  5/28/2022 1452 by Gill Martinez RN  Outcome: Progressing  Flowsheets (Taken 5/28/2022 0815)  Discharge to home or other facility with appropriate resources: Identify barriers to discharge with patient and caregiver  5/28/2022 0528 by Arabella Ryan RN  Outcome: Progressing     Problem: Pain  Goal: Verbalizes/displays adequate comfort level or baseline comfort level  5/28/2022 1452 by Gill Martinez RN  Outcome: Progressing  5/28/2022 0528 by Arabella Ryan RN  Outcome: Progressing     Problem: ABCDS Injury Assessment  Goal: Absence of physical injury  5/28/2022 1452 by Gill Martinez RN  Outcome: Progressing  Flowsheets (Taken 5/28/2022 1232)  Absence of Physical Injury: Implement safety measures based on patient assessment  5/28/2022 0528 by Arabella Ryan RN  Outcome: Progressing     Problem: Skin/Tissue Integrity  Goal: Absence of new skin breakdown  Description: 1. Monitor for areas of redness and/or skin breakdown  2. Assess vascular access sites hourly  3. Every 4-6 hours minimum:  Change oxygen saturation probe site  4. Every 4-6 hours:  If on nasal continuous positive airway pressure, respiratory therapy assess nares and determine need for appliance change or resting period.   5/28/2022 1452 by Gill Martinez RN  Outcome: Progressing  5/28/2022 0528 by Arabella Ryan RN  Outcome: Progressing     Problem: Safety - Adult  Goal: Free from fall injury  5/28/2022 1452 by Gill Martinez RN  Outcome: Progressing  Flowsheets (Taken 5/28/2022 1232)  Free From Fall Injury: Instruct family/caregiver on patient safety  5/28/2022 0528 by Arabella Ryan RN  Outcome: Progressing     Problem: Chronic Conditions and Co-morbidities  Goal: Patient's chronic conditions and co-morbidity symptoms are monitored and maintained or improved  5/28/2022 1452 by Gill Martinez RN  Outcome: Progressing  Flowsheets (Taken 5/28/2022 0815)  Care Plan - Patient's Chronic Conditions and Co-Morbidity Symptoms are Monitored and Maintained or Improved: Monitor and assess patient's chronic conditions and comorbid symptoms for stability, deterioration, or improvement  5/28/2022 0528 by Nilda Benjamin RN  Outcome: Progressing

## 2022-05-28 NOTE — PROGRESS NOTES
Patient transferred to room 2054 from PCU by bed. Assessment and vitals complete. Patient oriented to room.

## 2022-05-28 NOTE — PROGRESS NOTES
Writer speaks to Dr Karolina Peraza regarding d/c plans to ARU. Dr is not okay with the patient discharging to ARU over the weekend. He is concerned with the drain and expresses need to watch drain and continue on IV abx on current unit. Writer explains writer's conversation with Dr Anne-Marie Farooq regarding Dr Karolina Peraza wishes for patient to not go to ARU. Writer offers for him to speak with Dr Anne-Marie Farooq or patient, because as of now ARU is patient's wishes. Dr Karolina Peraza expresses again need for patient to remain on Med C through the weekend.

## 2022-05-28 NOTE — PROGRESS NOTES
Progress Note  Date:2022       Room:-  Patient Name:Colton Cabrera     YOB: 1955     Age:66 y.o. Subjective    Subjective:  Symptoms:  (Patient off unit having dopplers of lower extremities). Review of Systems  Objective         Vitals Last 24 Hours:  TEMPERATURE:  Temp  Av.9 °F (36.6 °C)  Min: 97.7 °F (36.5 °C)  Max: 98.1 °F (36.7 °C)  RESPIRATIONS RANGE: Resp  Av.3  Min: 18  Max: 20  PULSE OXIMETRY RANGE: SpO2  Av.8 %  Min: 91 %  Max: 97 %  PULSE RANGE: Pulse  Av.2  Min: 78  Max: 98  BLOOD PRESSURE RANGE: Systolic (29LFS), AJO:044 , Min:79 , JIY:469   ; Diastolic (07IQD), WVE:68, Min:56, Max:80    I/O (24Hr): Intake/Output Summary (Last 24 hours) at 2022 1031  Last data filed at 2022 0528  Gross per 24 hour   Intake --   Output 1508 ml   Net -1508 ml     Objective  Labs/Imaging/Diagnostics    Labs:  CBC:No results for input(s): WBC, RBC, HGB, HCT, MCV, RDW, PLT in the last 72 hours. CHEMISTRIES:  Recent Labs     22  0624   CREATININE 1.35*     PT/INR:No results for input(s): PROTIME, INR in the last 72 hours. APTT:No results for input(s): APTT in the last 72 hours. LIVER PROFILE:No results for input(s): AST, ALT, BILIDIR, BILITOT, ALKPHOS in the last 72 hours. Imaging Last 24 Hours:  No results found.   Assessment//Plan           Hospital Problems           Last Modified POA    * (Principal) Low back pain 2022 Yes    Drug-induced constipation 2022 Yes    Strain of back 2022 Yes    Primary osteoarthritis involving multiple joints 2022 Yes    GERD (gastroesophageal reflux disease) 2022 Yes    Type 2 diabetes mellitus without complication, without long-term current use of insulin ([address] 2022 Yes    Essential hypertension 2022 Yes        Assessment & Plan    Ortho wants patient to stay on med/surg over the weekend to monitor drain output and receive IV abx - no objection from family medicine for that. When ready for discharge, patient and family prefer ARU, Dopplers are being done according to ARU protocol before admission since he is not on anticoagulation.            Electronically signed by More Bland MD on 5/28/22 at 10:31 AM EDT

## 2022-05-28 NOTE — CARE COORDINATION
ONGOING DISCHARGE PLAN:    Patient is alert and oriented x4. Spoke with patient regarding discharge plan and patient confirms that plan is still ARU. Wife at bedside and is adamant that pt is going to ARU, and not home. Will continue to follow for additional discharge needs.     Electronically signed by Benoit Marlow RN on 5/28/2022 at 9:40 AM

## 2022-05-28 NOTE — PROGRESS NOTES
RN spoke in person with pt's Wife [facility] who was very concerned that pt was to be discharged on Sunday as per information given to her via a grandson that was visiting when a physician came to talk with pt. Julissa expressed her concern that pt was not strong enough nor steady enough to walk to the door of the room and that she would NOT be able to physically support pt if he was to go home this recent. Pt and wife were both agreeable to wanting pt to have physical therapy upon discharge. RN appologized to pt for making her feel left out of the decision making process but that Dr. Marino Gomez had placed a consult to Dr. Marivel Hardwick, who stated that pt will need doppler of his extremeties in order for final clearance for ARU as pt cannot be on anticoagulation at this time. RN also informed wife that those dopplers were ordered by Dr. Marino Gomez and that from Attendings point of view, we were still moving towards getting ARU upon pt's discharge. Pt wife was also confused as to the \" coming in and saying everything is taken care of. \" RN asked if this statement was made before or after physician stated pt was to go home Sunday and Julissa stated this comment was made before. RN stated she could not be 100% for sure what this specific statement meant other than at the time it was made we were unaware of physicians recommendations to discharge pt on Sunday. Pt wife thanked RN for explaining and taking the time to allow her to speak. RN assured pt and wife that we are listening to them and their concerns and that we will continue to advocate for them in their request for further services.

## 2022-05-29 LAB
GLUCOSE BLD-MCNC: 123 MG/DL (ref 75–110)
GLUCOSE BLD-MCNC: 143 MG/DL (ref 75–110)
GLUCOSE BLD-MCNC: 191 MG/DL (ref 75–110)
GLUCOSE BLD-MCNC: 212 MG/DL (ref 75–110)

## 2022-05-29 PROCEDURE — 6370000000 HC RX 637 (ALT 250 FOR IP): Performed by: ORTHOPAEDIC SURGERY

## 2022-05-29 PROCEDURE — 6360000002 HC RX W HCPCS: Performed by: ORTHOPAEDIC SURGERY

## 2022-05-29 PROCEDURE — 2580000003 HC RX 258: Performed by: ORTHOPAEDIC SURGERY

## 2022-05-29 PROCEDURE — 99231 SBSQ HOSP IP/OBS SF/LOW 25: CPT | Performed by: FAMILY MEDICINE

## 2022-05-29 PROCEDURE — 82947 ASSAY GLUCOSE BLOOD QUANT: CPT

## 2022-05-29 PROCEDURE — 1200000000 HC SEMI PRIVATE

## 2022-05-29 RX ADMIN — CEFAZOLIN 2000 MG: 10 INJECTION, POWDER, FOR SOLUTION INTRAVENOUS at 00:29

## 2022-05-29 RX ADMIN — ATORVASTATIN CALCIUM 10 MG: 10 TABLET, FILM COATED ORAL at 09:48

## 2022-05-29 RX ADMIN — SODIUM CHLORIDE, PRESERVATIVE FREE 10 ML: 5 INJECTION INTRAVENOUS at 23:40

## 2022-05-29 RX ADMIN — LISINOPRIL 10 MG: 10 TABLET ORAL at 09:48

## 2022-05-29 RX ADMIN — ACETAMINOPHEN 650 MG: 325 TABLET ORAL at 22:01

## 2022-05-29 RX ADMIN — NORTRIPTYLINE HYDROCHLORIDE 50 MG: 50 CAPSULE ORAL at 22:01

## 2022-05-29 RX ADMIN — GABAPENTIN 600 MG: 300 CAPSULE ORAL at 22:01

## 2022-05-29 RX ADMIN — TAMSULOSIN HYDROCHLORIDE 0.4 MG: 0.4 CAPSULE ORAL at 09:48

## 2022-05-29 RX ADMIN — TAMSULOSIN HYDROCHLORIDE 0.4 MG: 0.4 CAPSULE ORAL at 22:01

## 2022-05-29 RX ADMIN — MAGNESIUM HYDROXIDE 30 ML: 400 SUSPENSION ORAL at 09:48

## 2022-05-29 RX ADMIN — ACETAMINOPHEN 650 MG: 325 TABLET ORAL at 01:47

## 2022-05-29 RX ADMIN — CINACALCET HYDROCHLORIDE 30 MG: 30 TABLET, FILM COATED ORAL at 09:48

## 2022-05-29 RX ADMIN — METOPROLOL SUCCINATE 50 MG: 50 TABLET, EXTENDED RELEASE ORAL at 09:48

## 2022-05-29 RX ADMIN — ACETAMINOPHEN 650 MG: 325 TABLET ORAL at 15:10

## 2022-05-29 RX ADMIN — Medication 400 UNITS: at 09:48

## 2022-05-29 RX ADMIN — EZETIMIBE 10 MG: 10 TABLET ORAL at 09:48

## 2022-05-29 RX ADMIN — FERROUS SULFATE TAB 325 MG (65 MG ELEMENTAL FE) 325 MG: 325 (65 FE) TAB at 09:48

## 2022-05-29 RX ADMIN — ACETAMINOPHEN 650 MG: 325 TABLET ORAL at 09:48

## 2022-05-29 RX ADMIN — FENOFIBRATE 160 MG: 160 TABLET ORAL at 09:48

## 2022-05-29 RX ADMIN — Medication 100 MG: at 09:48

## 2022-05-29 RX ADMIN — CEFAZOLIN 2000 MG: 10 INJECTION, POWDER, FOR SOLUTION INTRAVENOUS at 16:55

## 2022-05-29 RX ADMIN — NORTRIPTYLINE HYDROCHLORIDE 50 MG: 50 CAPSULE ORAL at 13:06

## 2022-05-29 RX ADMIN — GABAPENTIN 600 MG: 300 CAPSULE ORAL at 09:48

## 2022-05-29 RX ADMIN — SODIUM CHLORIDE, PRESERVATIVE FREE 10 ML: 5 INJECTION INTRAVENOUS at 09:54

## 2022-05-29 RX ADMIN — METFORMIN HYDROCHLORIDE 1000 MG: 1000 TABLET, FILM COATED ORAL at 16:51

## 2022-05-29 RX ADMIN — METFORMIN HYDROCHLORIDE 1000 MG: 1000 TABLET, FILM COATED ORAL at 09:48

## 2022-05-29 RX ADMIN — FINASTERIDE 5 MG: 5 TABLET, FILM COATED ORAL at 09:48

## 2022-05-29 RX ADMIN — CEFAZOLIN 2000 MG: 10 INJECTION, POWDER, FOR SOLUTION INTRAVENOUS at 09:57

## 2022-05-29 RX ADMIN — GABAPENTIN 600 MG: 300 CAPSULE ORAL at 15:10

## 2022-05-29 ASSESSMENT — PAIN DESCRIPTION - ORIENTATION: ORIENTATION: LOWER

## 2022-05-29 ASSESSMENT — PAIN DESCRIPTION - DESCRIPTORS
DESCRIPTORS: SHARP
DESCRIPTORS: ACHING

## 2022-05-29 ASSESSMENT — PAIN DESCRIPTION - PAIN TYPE: TYPE: SURGICAL PAIN

## 2022-05-29 ASSESSMENT — PAIN SCALES - GENERAL
PAINLEVEL_OUTOF10: 6

## 2022-05-29 ASSESSMENT — PAIN DESCRIPTION - LOCATION: LOCATION: BACK

## 2022-05-29 NOTE — PROGRESS NOTES
Progress Note  Date:2022       Room:  Patient Name:Goyo Ramirez     YOB: 1955     Age:66 y.o. Subjective    Subjective:  Symptoms:  Stable. (Sitting at side of bed eating breakfast. Complains of pain that shoots up his spine intermittently. ). Diet:  Adequate intake. Activity level: Impaired due to weakness. Pain:  He complains of pain that is moderate. Review of Systems  Objective         Vitals Last 24 Hours:  TEMPERATURE:  Temp  Av.9 °F (36.6 °C)  Min: 97.8 °F (36.6 °C)  Max: 98.1 °F (36.7 °C)  RESPIRATIONS RANGE: Resp  Av.3  Min: 18  Max: 19  PULSE OXIMETRY RANGE: SpO2  Av %  Min: 89 %  Max: 92 %  PULSE RANGE: Pulse  Av.3  Min: 81  Max: 93  BLOOD PRESSURE RANGE: Systolic (78NGL), UZJ:007 , Min:92 , HCA:520   ; Diastolic (42CMN), PUF:19, Min:62, Max:72    I/O (24Hr): Intake/Output Summary (Last 24 hours) at 2022 0820  Last data filed at 2022 0746  Gross per 24 hour   Intake --   Output 1405 ml   Net -1405 ml     Objective:  General Appearance:  Comfortable. Vital signs: (most recent): Blood pressure 113/62, pulse 81, temperature 97.9 °F (36.6 °C), resp. rate 19, height 5' 8\" (1.727 m), weight 200 lb 9.9 oz (91 kg), SpO2 92 %. Vital signs are normal.      Labs/Imaging/Diagnostics    Labs:  CBC:No results for input(s): WBC, RBC, HGB, HCT, MCV, RDW, PLT in the last 72 hours. CHEMISTRIES:  Recent Labs     22  0624   CREATININE 1.35*     PT/INR:No results for input(s): PROTIME, INR in the last 72 hours. APTT:No results for input(s): APTT in the last 72 hours. LIVER PROFILE:No results for input(s): AST, ALT, BILIDIR, BILITOT, ALKPHOS in the last 72 hours.     Imaging Last 24 Hours:  VL DUP LOWER EXTREMITY VENOUS BILATERAL    Result Date: 2022    [facility]  Vascular Lower Extremities DVT Study Procedure   Patient Name   Fernandez Quivers    Date of Study           2022                 GOYO MCKENZIE Date of Birth  1955   Gender                  Male   Age            77 year(s)   Race                       Room Number    2054   Corporate ID # [id number]   Patient Acct # [de-identified]   MR #           [id number]       Eladio Poe [facility]   Accession #    [id number]   Interpreting Physician  Lacie Vora   Referring                   Referring Physician     Ashly Melgar MD  Nurse  Practitioner  Procedure Type of Study:   Veins: Lower Extremities DVT Study, Venous Scan Lower Bilateral.  Indications for Study:R/O DVT. Patient Status: In Patient. Technical Quality:Adequate visualization. Conclusions   Summary   No evidence of superficial or deep venous thrombosis in both lower  extremities. Signature   ----------------------------------------------------------------  Electronically signed by Kamilah Banuelos RVT(Sonographer) on  05/28/2022 11:19 AM  ----------------------------------------------------------------   ----------------------------------------------------------------  Electronically signed by Aleksander Menjivar(Interpreting physician)  on 05/28/2022 10:41 PM  ----------------------------------------------------------------  Findings:   Right Impression:                    Left Impression:  The common femoral, femoral,         The common femoral, femoral,  popliteal and tibial veins           popliteal and tibial veins  demonstrate normal compressibility   demonstrate normal compressibility  and augmentation. and augmentation. Normal compressibility of the great  Normal compressibility of the great  saphenous vein. saphenous vein. Normal compressibility of the small  Normal compressibility of the small  saphenous vein. saphenous vein. Heterogeneous plaque noted in the                                       popliteal artery.   Velocities are measured in cm/s ; Diameters are measured in cm Right Lower Extremities DVT Study Measurements Right 2D Measurements +------------------------------------+----------+---------------+----------+ ! Location                            ! Visualized! Compressibility! Thrombosis! +------------------------------------+----------+---------------+----------+ ! Common Femoral                      !Yes       ! Yes            ! None      ! +------------------------------------+----------+---------------+----------+ ! Prox Femoral                        !Yes       ! Yes            ! None      ! +------------------------------------+----------+---------------+----------+ ! Mid Femoral                         !Yes       ! Yes            ! None      ! +------------------------------------+----------+---------------+----------+ ! Dist Femoral                        !Yes       ! Yes            ! None      ! +------------------------------------+----------+---------------+----------+ ! Deep Femoral                        !Yes       ! Yes            ! None      ! +------------------------------------+----------+---------------+----------+ ! Popliteal                           !Yes       ! Yes            ! None      ! +------------------------------------+----------+---------------+----------+ ! Sapheno Femoral Junction            ! Yes       ! Yes            ! None      ! +------------------------------------+----------+---------------+----------+ ! PTV                                 ! Yes       ! Yes            ! None      ! +------------------------------------+----------+---------------+----------+ ! Peroneal                            !Yes       ! Yes            ! None      ! +------------------------------------+----------+---------------+----------+ ! Gastroc                             ! Yes       ! Yes            ! None      ! +------------------------------------+----------+---------------+----------+ ! GSV Thigh                           ! Yes       ! Yes            ! None      ! +------------------------------------+----------+---------------+----------+ ! GSV Knee                            ! Yes       ! Yes            ! None      ! +------------------------------------+----------+---------------+----------+ ! GSV Ankle                           ! Yes       ! Yes            ! None      ! +------------------------------------+----------+---------------+----------+ ! SSV                                 ! Yes       ! Yes            ! None      ! +------------------------------------+----------+---------------+----------+ Left Lower Extremities DVT Study Measurements Left 2D Measurements +------------------------------------+----------+---------------+----------+ ! Location                            ! Visualized! Compressibility! Thrombosis! +------------------------------------+----------+---------------+----------+ ! Common Femoral                      !Yes       ! Yes            ! None      ! +------------------------------------+----------+---------------+----------+ ! Prox Femoral                        !Yes       ! Yes            ! None      ! +------------------------------------+----------+---------------+----------+ ! Mid Femoral                         !Yes       ! Yes            ! None      ! +------------------------------------+----------+---------------+----------+ ! Dist Femoral                        !Yes       ! Yes            ! None      ! +------------------------------------+----------+---------------+----------+ ! Deep Femoral                        !Yes       ! Yes            ! None      ! +------------------------------------+----------+---------------+----------+ ! Popliteal                           !Yes       ! Yes            ! None      ! +------------------------------------+----------+---------------+----------+ ! Sapheno Femoral Junction            ! Yes       ! Yes            ! None      ! +------------------------------------+----------+---------------+----------+ ! PTV !Yes       !Yes            ! None      ! +------------------------------------+----------+---------------+----------+ ! Peroneal                            !Yes       ! Yes            ! None      ! +------------------------------------+----------+---------------+----------+ ! Gastroc                             ! Yes       ! Yes            ! None      ! +------------------------------------+----------+---------------+----------+ ! GSV Thigh                           ! Yes       ! Yes            ! None      ! +------------------------------------+----------+---------------+----------+ ! GSV Knee                            ! Yes       ! Yes            ! None      ! +------------------------------------+----------+---------------+----------+ ! GSV Ankle                           ! Yes       ! Yes            ! None      ! +------------------------------------+----------+---------------+----------+ ! SSV                                 ! Yes       ! Yes            ! None      ! +------------------------------------+----------+---------------+----------+    Assessment//Plan           Hospital Problems           Last Modified POA    * (Principal) Low back pain 5/23/2022 Yes    Drug-induced constipation 5/24/2022 Yes    Strain of back 5/25/2022 Yes    Primary osteoarthritis involving multiple joints 5/24/2022 Yes    GERD (gastroesophageal reflux disease) 5/24/2022 Yes    Type 2 diabetes mellitus without complication, without long-term current use of insulin ([address] 5/24/2022 Yes    Essential hypertension 5/24/2022 Yes        Assessment & Plan    Continue monitoring per ortho. Plan for ARU.    Electronically signed by Tal Kaminski MD on 5/29/22 at 8:20 AM EDT

## 2022-05-29 NOTE — FLOWSHEET NOTE
05/29/22 1456   Encounter Summary   Encounter Overview/Reason  Volunteer Encounter   Service Provided For: Patient   Referral/Consult From: Rounding   Last Encounter  05/29/22  (V)   Complexity of Encounter Low   Spiritual/Emotional needs   Type Spiritual Support   Assessment/Intervention/Outcome   Intervention Prayer (assurance of)/Range

## 2022-05-30 PROBLEM — T14.8XXA HEMATOMA: Status: ACTIVE | Noted: 2022-05-25

## 2022-05-30 LAB
GLUCOSE BLD-MCNC: 121 MG/DL (ref 75–110)
GLUCOSE BLD-MCNC: 125 MG/DL (ref 75–110)
GLUCOSE BLD-MCNC: 159 MG/DL (ref 75–110)
GLUCOSE BLD-MCNC: 167 MG/DL (ref 75–110)

## 2022-05-30 PROCEDURE — 6370000000 HC RX 637 (ALT 250 FOR IP): Performed by: ORTHOPAEDIC SURGERY

## 2022-05-30 PROCEDURE — 99232 SBSQ HOSP IP/OBS MODERATE 35: CPT | Performed by: PHYSICAL MEDICINE & REHABILITATION

## 2022-05-30 PROCEDURE — 82947 ASSAY GLUCOSE BLOOD QUANT: CPT

## 2022-05-30 PROCEDURE — 6360000002 HC RX W HCPCS: Performed by: ORTHOPAEDIC SURGERY

## 2022-05-30 PROCEDURE — 97116 GAIT TRAINING THERAPY: CPT

## 2022-05-30 PROCEDURE — 2580000003 HC RX 258: Performed by: ORTHOPAEDIC SURGERY

## 2022-05-30 PROCEDURE — 1200000000 HC SEMI PRIVATE

## 2022-05-30 RX ADMIN — ACETAMINOPHEN 650 MG: 325 TABLET ORAL at 02:30

## 2022-05-30 RX ADMIN — GABAPENTIN 600 MG: 300 CAPSULE ORAL at 08:04

## 2022-05-30 RX ADMIN — NORTRIPTYLINE HYDROCHLORIDE 50 MG: 50 CAPSULE ORAL at 08:04

## 2022-05-30 RX ADMIN — Medication 100 MG: at 08:05

## 2022-05-30 RX ADMIN — ACETAMINOPHEN 650 MG: 325 TABLET ORAL at 14:26

## 2022-05-30 RX ADMIN — LISINOPRIL 10 MG: 10 TABLET ORAL at 08:05

## 2022-05-30 RX ADMIN — FINASTERIDE 5 MG: 5 TABLET, FILM COATED ORAL at 08:05

## 2022-05-30 RX ADMIN — TAMSULOSIN HYDROCHLORIDE 0.4 MG: 0.4 CAPSULE ORAL at 08:05

## 2022-05-30 RX ADMIN — GABAPENTIN 600 MG: 300 CAPSULE ORAL at 14:26

## 2022-05-30 RX ADMIN — ACETAMINOPHEN 650 MG: 325 TABLET ORAL at 08:05

## 2022-05-30 RX ADMIN — PANTOPRAZOLE SODIUM 40 MG: 40 TABLET, DELAYED RELEASE ORAL at 06:07

## 2022-05-30 RX ADMIN — NORTRIPTYLINE HYDROCHLORIDE 50 MG: 50 CAPSULE ORAL at 20:48

## 2022-05-30 RX ADMIN — METFORMIN HYDROCHLORIDE 1000 MG: 1000 TABLET, FILM COATED ORAL at 17:04

## 2022-05-30 RX ADMIN — METOPROLOL SUCCINATE 50 MG: 50 TABLET, EXTENDED RELEASE ORAL at 08:05

## 2022-05-30 RX ADMIN — GABAPENTIN 600 MG: 300 CAPSULE ORAL at 20:49

## 2022-05-30 RX ADMIN — TAMSULOSIN HYDROCHLORIDE 0.4 MG: 0.4 CAPSULE ORAL at 20:49

## 2022-05-30 RX ADMIN — FENOFIBRATE 160 MG: 160 TABLET ORAL at 08:05

## 2022-05-30 RX ADMIN — ACETAMINOPHEN 650 MG: 325 TABLET ORAL at 20:48

## 2022-05-30 RX ADMIN — CINACALCET HYDROCHLORIDE 30 MG: 30 TABLET, FILM COATED ORAL at 08:05

## 2022-05-30 RX ADMIN — SODIUM CHLORIDE, PRESERVATIVE FREE 10 ML: 5 INJECTION INTRAVENOUS at 20:49

## 2022-05-30 RX ADMIN — SODIUM CHLORIDE, PRESERVATIVE FREE 10 ML: 5 INJECTION INTRAVENOUS at 08:00

## 2022-05-30 RX ADMIN — METFORMIN HYDROCHLORIDE 1000 MG: 1000 TABLET, FILM COATED ORAL at 08:05

## 2022-05-30 RX ADMIN — CEFAZOLIN 2000 MG: 10 INJECTION, POWDER, FOR SOLUTION INTRAVENOUS at 17:09

## 2022-05-30 RX ADMIN — FERROUS SULFATE TAB 325 MG (65 MG ELEMENTAL FE) 325 MG: 325 (65 FE) TAB at 08:05

## 2022-05-30 RX ADMIN — CEFAZOLIN 2000 MG: 10 INJECTION, POWDER, FOR SOLUTION INTRAVENOUS at 08:12

## 2022-05-30 RX ADMIN — CEFAZOLIN 2000 MG: 10 INJECTION, POWDER, FOR SOLUTION INTRAVENOUS at 00:44

## 2022-05-30 RX ADMIN — EZETIMIBE 10 MG: 10 TABLET ORAL at 08:05

## 2022-05-30 RX ADMIN — ATORVASTATIN CALCIUM 10 MG: 10 TABLET, FILM COATED ORAL at 08:05

## 2022-05-30 RX ADMIN — MAGNESIUM HYDROXIDE 30 ML: 400 SUSPENSION ORAL at 08:05

## 2022-05-30 RX ADMIN — Medication 400 UNITS: at 08:05

## 2022-05-30 RX ADMIN — ERGOCALCIFEROL 50000 UNITS: 1.25 CAPSULE ORAL at 08:05

## 2022-05-30 ASSESSMENT — PAIN SCALES - GENERAL
PAINLEVEL_OUTOF10: 4
PAINLEVEL_OUTOF10: 2
PAINLEVEL_OUTOF10: 6

## 2022-05-30 ASSESSMENT — PAIN DESCRIPTION - ORIENTATION
ORIENTATION: LEFT
ORIENTATION: LOWER

## 2022-05-30 ASSESSMENT — PAIN DESCRIPTION - LOCATION
LOCATION: BACK;HIP
LOCATION: GENERALIZED
LOCATION: BACK

## 2022-05-30 ASSESSMENT — PAIN DESCRIPTION - DESCRIPTORS
DESCRIPTORS: ACHING
DESCRIPTORS: ACHING

## 2022-05-30 NOTE — PROGRESS NOTES
Nutrition Assessment     Type and Reason for Visit: RD Nutrition Re-Screen/LOS    Nutrition Recommendations/Plan:   1. Will provide 5 carbohydrate choices per tray. Malnutrition Assessment:  Malnutrition Status:  no Malnutrition    Nutrition Assessment:  Chart reviewed due to length of stay. Pt is meeting nutrition needs with po more than 75%. Pt with H/O DM with (5/29) Glu 191-212. Nutrition Related Findings:     Wound Type: None    Current Nutrition Therapies:    ADULT DIET;  Regular; 5 carb choices (75 gm/meal)    Anthropometric Measures:  · Height: 5' 8\" (172.7 cm)  · Current Body Wt: 200 lb (90.7 kg)   · BMI: 30.4    Nutrition Diagnosis:   No nutrition diagnosis at this time     Nutrition Interventions:   Food and/or Nutrient Delivery: Modify Current Diet  Nutrition Education/Counseling: No recommendation at this time  Coordination of Nutrition Care: Continue to monitor while inpatient                Nutrition Monitoring and Evaluation:      Food/Nutrient Intake Outcomes: Food and Nutrient Intake             Isai Sanches, [address] [place] [address],   Contact: [phone]

## 2022-05-30 NOTE — PLAN OF CARE
Problem: Discharge Planning  Goal: Discharge to home or other facility with appropriate resources  5/30/2022 0528 by Edu Calero RN  Outcome: Progressing  5/29/2022 1735 by Renea Carreon RN  Outcome: Progressing     Problem: Pain  Goal: Verbalizes/displays adequate comfort level or baseline comfort level  5/30/2022 0528 by Edu Calero RN  Outcome: Progressing  5/29/2022 1735 by Renea Carreon RN  Outcome: Progressing     Problem: ABCDS Injury Assessment  Goal: Absence of physical injury  5/29/2022 1735 by Renea Carreon RN  Outcome: Progressing     Problem: Skin/Tissue Integrity  Goal: Absence of new skin breakdown  Description: 1. Monitor for areas of redness and/or skin breakdown  2. Assess vascular access sites hourly  3. Every 4-6 hours minimum:  Change oxygen saturation probe site  4. Every 4-6 hours:  If on nasal continuous positive airway pressure, respiratory therapy assess nares and determine need for appliance change or resting period.   5/30/2022 0528 by Edu Calero RN  Outcome: Progressing  5/29/2022 1735 by Renea Carreon RN  Outcome: Progressing     Problem: Safety - Adult  Goal: Free from fall injury  5/29/2022 1735 by Renea Carreon RN  Outcome: Progressing     Problem: Chronic Conditions and Co-morbidities  Goal: Patient's chronic conditions and co-morbidity symptoms are monitored and maintained or improved  5/29/2022 1735 by Renea Carreon RN  Outcome: Progressing

## 2022-05-30 NOTE — PROGRESS NOTES
Physical Therapy  Facility/Department: [facility] [address] [facility]  Physical Therapy    Name: Kerri Swift  : 1955  MRN: [id number]  Date of Service: 2022    Discharge Recommendations:  Patient would benefit from continued therapy after discharge   PT Equipment Recommendations  Equipment Needed: No      Patient Diagnosis(es): The encounter diagnosis was Back strain, initial encounter. Past Medical History:  has a past medical history of Abdominal hernia, Anesthesia, BPH (benign prostatic hyperplasia), CAD (coronary artery disease), Chronic back pain, Diabetes mellitus ([address] Difficult intubation, Ear infection, Fatty liver, Gallstones, GERD (gastroesophageal reflux disease), Headache(784.0), History of MI (myocardial infarction), Hypercholesteremia, Hyperlipidemia, Hyperparathyroidism ([address] Hypertension, Kidney stones, Osteoarthritis, PONV (postoperative nausea and vomiting), Sleep apnea, and Unspecified sleep apnea. Past Surgical History:  has a past surgical history that includes Knee arthroscopy (Bilateral); Biceps tendon repair (Left); fracture surgery (Right); Colonoscopy (14); liver biopsy (2015); Rotator cuff repair (Right); Elbow fracture surgery (Right); other surgical history (2018); Endoscopy, colon, diagnostic; lumbar fusion (N/A, 7/10/2019); back surgery (2019); cervical fusion; Cardiac catheterization (2019); Carpal tunnel release (Right); lumbar fusion (N/A, 2022); and Spine surgery (N/A, 2022). Assessment   Body Structures, Functions, Activity Limitations Requiring Skilled Therapeutic Intervention: Decreased functional mobility ; Increased pain;Decreased strength  Assessment: pt has increased gait distance and able to perform steps  Requires PT Follow-Up: Yes  Activity Tolerance  Activity Tolerance: Patient tolerated treatment well     Plan   Plan  Plan: 5-7 times per week  Current Treatment Recommendations: Strengthening,ROM,Balance training,Functional mobility training,Transfer training,Gait training,Endurance training,Safety education & training,Positioning,Therapeutic activities,Pain management,Home exercise program  Safety Devices  Type of Devices: Left in chair (wife present)  Restraints  Restraints Initially in Place: No     Restrictions  Restrictions/Precautions  Restrictions/Precautions: Fall Risk,Up as Tolerated,Other (comment),Surgical protocol,General Precautions,Surgical Protocols (BLT precautions, log roll, MIKE drain)  Required Braces or Orthoses?: No  Implants present? : Metal implants (cervical and lumbar sx, R elbow sx)  Position Activity Restriction  Spinal Precautions: No Bending,No Lifting,No Twisting  Spinal Precautions: Lifting less than 5 pounds, avoid pulling  Other position/activity restrictions: Check O2 sats during session, O2 per NC as needed, activity as tolerated order post op 5/26/22     Subjective   Pain: pt rates pain as 6/10 at back  General  Family / Caregiver Present: No  Follows Commands: Within Functional Limits  Subjective  Subjective: pt eager to walk             Objective   Heart Rate: 79  Heart Rate Source: Monitor  BP: 126/82  BP Location: Left upper arm  BP Method: Automatic  Patient Position: Lying left side  MAP (Calculated): 96.67  Resp: 16  SpO2: 95 %  O2 Device: None (Room air)         Bed mobility  Supine to Sit: Stand by assistance (head of bed elevated at 45 degrees)  Sit to Supine:  (pt left up in chair)  Scooting: Modified independent  Transfers  Sit to Stand: Stand by assistance  Stand to sit: Stand by assistance  Bed to Chair: Stand by assistance (with RW)  Ambulation  Surface: level tile  Device: Rolling Walker  Assistance: Supervision  Gait Deviations: Slow Hien  Distance: 200ft  More Ambulation?: No  Stairs/Curb  Stairs?: Yes  Stairs  # Steps : 2  Stairs Height: 8\"  Rails: Left ascending  Device: Single pt cane  Assistance: Stand by assistance     Balance  Sitting - Static: Good  Sitting - Dynamic: Good  Standing - Static: Good (with RW)  Standing - Dynamic: Good (with RW)       Goals  Short Term Goals  Time Frame for Short term goals: 1 week  Short term goal 1: Pt will increase Bilat LE strength to [facility]  Short term goal 2: Pt will perform bed mobility mod-I  Short term goal 3: Pt will transfer with RW mod-I  Short term goal 4: Pt will ambulate with  ft SBA  Short term goal 5: Pt will perform HEP and demo BLT precautions IND  Patient Goals   Patient goals : decrease pain, increase activity         Therapy Time   Individual Concurrent Group Co-treatment   Time In 1145         Time Out 1200         Minutes [address] [facility],  [address] [email or web address], PT

## 2022-05-30 NOTE — PROGRESS NOTES
Progress Note  Date:2022       Room:  Patient Name:Colton Flores     YOB: 1955     Age:66 y.o. Subjective    Subjective:  Symptoms:  Stable. (Patient states Dr. Valentin Youngblood was in to see him earlier. ). Diet:  Adequate intake. Activity level: Returning to normal.       Review of Systems  Objective         Vitals Last 24 Hours:  TEMPERATURE:  Temp  Av.2 °F (36.8 °C)  Min: 97.8 °F (36.6 °C)  Max: 98.6 °F (37 °C)  RESPIRATIONS RANGE: Resp  Av  Min: 16  Max: 16  PULSE OXIMETRY RANGE: SpO2  Av.3 %  Min: 93 %  Max: 95 %  PULSE RANGE: Pulse  Av  Min: 79  Max: 96  BLOOD PRESSURE RANGE: Systolic (63NKZ), AWF:078 , Min:109 , ZOY:708   ; Diastolic (47HGR), QLV:99, Min:73, Max:82    I/O (24Hr): Intake/Output Summary (Last 24 hours) at 2022 1044  Last data filed at 2022 9670  Gross per 24 hour   Intake 550 ml   Output 1760 ml   Net -1210 ml     Objective:  General Appearance:  Comfortable. Vital signs: (most recent): Blood pressure 126/82, pulse 79, temperature 97.8 °F (36.6 °C), temperature source Oral, resp. rate 16, height 5' 8\" (1.727 m), weight 200 lb 9.9 oz (91 kg), SpO2 95 %. Vital signs are normal.      Labs/Imaging/Diagnostics    Labs:  CBC:No results for input(s): WBC, RBC, HGB, HCT, MCV, RDW, PLT in the last 72 hours. CHEMISTRIES:  Recent Labs     22  0624   CREATININE 1.35*     PT/INR:No results for input(s): PROTIME, INR in the last 72 hours. APTT:No results for input(s): APTT in the last 72 hours. LIVER PROFILE:No results for input(s): AST, ALT, BILIDIR, BILITOT, ALKPHOS in the last 72 hours. Imaging Last 24 Hours:  No results found.   Assessment//Plan           Hospital Problems           Last Modified POA    * (Principal) Low back pain 2022 Yes    Drug-induced constipation 2022 Yes    Strain of back 2022 Yes    Primary osteoarthritis involving multiple joints 2022 Yes    GERD (gastroesophageal reflux disease) 5/24/2022 Yes    Type 2 diabetes mellitus without complication, without long-term current use of insulin ([facility] [facility] 5/24/2022 Yes    Essential hypertension 5/24/2022 Yes        Assessment & Plan    Plan is for discharge to home, not to ARU, because function has increased    D/c plan per ortho      Electronically signed by Beatrice Gutierrez MD on 5/30/22 at 10:44 AM EDT

## 2022-05-30 NOTE — PROGRESS NOTES
Surgical Progress Note    POD:      Patient doing fairly well  Vitals:    22 1243   BP: 133/82   Pulse: 91   Resp: 18   Temp: 98.2 °F (36.8 °C)   SpO2: 96%      Temp (24hrs), Av °F (36.7 °C), Min:97.8 °F (36.6 °C), Max:98.2 °F (36.8 °C)       Pain Control good    No unusual nausea    Exam: doing well ambulates way        Lungs:  No respiratory distress    Labs reviewed:  Labs:     BUN/Cr/glu/ALT/AST/amyl/lip:  --/1.35/--/--/--/--/-- ( 8739)          I/O last 3 completed shifts:   In: 65 [P.O.:850]  Out: 8277 [Urine:2475; Drains:70]    Assessment:    Patient Active Problem List   Diagnosis    Hyperlipidemia    Prostate disease    Primary osteoarthritis involving multiple joints    Headache    GERD (gastroesophageal reflux disease)    Sleep apnea    Elevated liver enzymes    BPH (benign prostatic hyperplasia)    Steatosis of liver    Type 2 diabetes mellitus without complication, without long-term current use of insulin (HCC)    Lumbar radiculopathy, chronic    Osteoarthritis of lumbar spine    Osteoarthritis of multiple joints    Degenerative disc disease, lumbar    Lumbar spinal stenosis    Pars defect with spondylolisthesis    Essential hypertension    Lumbar disc herniation    Chronic low back pain    S/P cardiac catheterization    Back pain    Postoperative hypotension    Neck pain    Cervical spondylosis with myelopathy    Bilateral carpal tunnel syndrome    Spinal stenosis in cervical region    Spondylolisthesis, lumbar region    Foraminal stenosis of cervical region    Cervical radiculopathy    Preop examination    Low back pain    Drug-induced constipation    Hematoma    Strain of back       Plan:  See my orders    Drain discontinued   Iv abx until tomorrow   Likely discharge tomorrow    Sadaf Busby MD MD  2022 3:27 PM

## 2022-05-30 NOTE — PLAN OF CARE
Problem: Pain  Goal: Verbalizes/displays adequate comfort level or baseline comfort level  5/30/2022 1727 by Savanah Whitfield RN  Outcome: Progressing, Pt educated on non-pharmacological pain interventions. PRN pain medications administered. Problem: Skin/Tissue Integrity  Goal: Absence of new skin breakdown  Description: 1. Monitor for areas of redness and/or skin breakdown  2. Assess vascular access sites hourly  3. Every 4-6 hours minimum:  Change oxygen saturation probe site  4. Every 4-6 hours:  If on nasal continuous positive airway pressure, respiratory therapy assess nares and determine need for appliance change or resting period. 5/30/2022 1727 by Savanah Whitfield RN  Outcome: Progressing, Pt remain free of skin breakdown. Educated on the importance of turning and alternating position. Problem: Safety - Adult  Goal: Free from fall injury  Outcome: Progressing, Patient remains free of incidence/ injury. Bed remains in low position. Side rails up x2.

## 2022-05-30 NOTE — PROGRESS NOTES
Physical Medicine & Rehabilitation  Progress Note    5/30/2022 10:34 AM     CC: Ambulatory and ADL dysfunction due to     Subjective:   Complaints. Feels well. ROS:  Denies fevers, chills, sweats. No chest pain, palpitations, lightheadedness. Denies coughing, wheezing or shortness of breath. Denies abdominal pain, nausea, diarrhea or constipation. No new areas of joint pain. Denies new areas of numbness or weakness. Denies new anxiety or depression issues. No new skin problems. Rehabilitation:     5/28  PT:  Restrictions/Precautions: Fall Risk,Up as Tolerated,Other (comment),Surgical protocol,General Precautions,Surgical Protocols (BLT precautions, log roll, MIKE drain)  Implants present? : Metal implants (cervical and lumbar sx, R elbow sx)  Spinal Precautions: Lifting less than 5 pounds, avoid pulling  Other position/activity restrictions: Check O2 sats during session, O2 per NC as needed, activity as tolerated order post op 5/26/22   Bed mobility  Supine to Sit: Stand by assistance (head of bed elevated at 45 degrees)  Sit to Supine:  (pt left up in chair)  Scooting: Modified independent  Transfers  Sit to Stand: Stand by assistance  Stand to sit: Stand by assistance  Bed to Chair: Stand by assistance (with RW)  Ambulation  Device: Rolling Walker  Gait Deviations: Decreased step height  Distance: 80ft  Comments: pt nedds verbal cues to stay within walker and not to flex trunk  More Ambulation?: No    5/28  OT:  ADL  Feeding: Setup  Grooming: Setup  UE Bathing: Stand by assistance  LE Bathing: Moderate assistance  UE Dressing: Stand by assistance  LE Dressing: Maximum assistance  Toileting: Moderate assistance  Additional Comments: ADL scores based on clinical reasoning and skilled observation unless otherwise noted. Pt attempted to complete figure 4 technique with pt unable to complete due to limited mobility and pain.  Pt currently limited due to decreased strength, balance, activity tolerance, Back precautions, and pain impacting safety and independence with self care tasks         Balance  Sitting Balance: Stand by assistance  Standing Balance: Contact guard assistance      Functional Mobility  Functional - Mobility Device: Rolling Walker  Activity: Other (Small steps fowards and back; and lateral towards HOB)  Assist Level: Contact guard assistance  Functional Mobility Comments: Verbal cues for hand placement and safety. Fowards flexed position. Verbal cues for posture     Bed mobility  Rolling to Left: Stand by assistance (appropriate log roll technique)  Supine to Sit: Stand by assistance (head of bed elevated at 45 degrees)  Sit to Supine:  (pt left up in chair)  Scooting: Modified independent  Bed Mobility Comments: Bed mobility completed with HOB elevated with increased time. Mod A with trunk to sit EOB. Pt reported increased pain with movement  Transfers  Sit to stand: Minimal assistance  Stand to sit: Contact guard assistance  Transfer Comments: Verbal cues for hand placement and safety                   ST:            Objective:  /82   Pulse 79   Temp 97.8 °F (36.6 °C) (Oral)   Resp 16   Ht 5' 8\" (1.727 m)   Wt 200 lb 9.9 oz (91 kg)   SpO2 95%   BMI 30.50 kg/m²  I Body mass index is 30.5 kg/m². I   Wt Readings from Last 1 Encounters:   22 200 lb 9.9 oz (91 kg)      Temp (24hrs), Av.2 °F (36.8 °C), Min:97.8 °F (36.6 °C), Max:98.6 °F (37 °C)         GEN: well developed, well nourished, no acute distress  HEENT: Normocephalic atraumatic, EOMI, mucous membranes pink and moist  CV: RRR, no murmurs, rubs or gallops  PULM: CTAB, no rales or rhonchi. Respirations WNL and unlabored  ABD: soft, NT, ND, +BS and equal  NEURO: A&O x3. Sensation intact to light touch. MSK: 4/5 upper and distal lower 3-4/5 lower  Due to back pain? Still has MIKE drain  EXTREMITIES: No calf tenderness to palpation bilaterally.  No edema BLEs  SKIN: warm dry and intact with good turgor  PSYCH: appropriately interactive. Affect WNL. Good spirits        Medications   Scheduled Meds:   sodium chloride flush  5-40 mL IntraVENous 2 times per day    acetaminophen  650 mg Oral Q6H    ceFAZolin  2,000 mg IntraVENous Q8H    magnesium hydroxide  30 mL Oral Daily    nortriptyline  50 mg Oral BID    vitamin E  400 Units Oral Daily    [Held by provider] aspirin  81 mg Oral Daily    gabapentin  600 mg Oral TID    metoprolol succinate  50 mg Oral Daily    tamsulosin  0.4 mg Oral BID    cinacalcet  30 mg Oral Daily    coenzyme Q10  100 mg Oral Daily    ezetimibe  10 mg Oral Daily    vitamin D  50,000 Units Oral Weekly    [Held by provider] clopidogrel  75 mg Oral Daily    ferrous sulfate  325 mg Oral Daily with breakfast    finasteride  5 mg Oral Daily    metFORMIN  1,000 mg Oral BID WC    lisinopril  10 mg Oral Daily    atorvastatin  10 mg Oral Daily    fenofibrate  160 mg Oral Daily    pantoprazole  40 mg Oral QAM AC     Continuous Infusions:   sodium chloride       PRN Meds:.sodium chloride flush, sodium chloride, ondansetron **OR** ondansetron, oxyCODONE **OR** oxyCODONE, HYDROmorphone, baclofen     Diagnostics:     CBC: No results for input(s): WBC, RBC, HGB, HCT, MCV, RDW, PLT in the last 72 hours. BMP:   Recent Labs     05/28/22  0624   CREATININE 1.35*     BNP: No results for input(s): BNP in the last 72 hours. PT/INR: No results for input(s): PROTIME, INR in the last 72 hours. APTT: No results for input(s): APTT in the last 72 hours. CARDIAC ENZYMES: No results for input(s): CKMB, CKMBINDEX, TROPONINT in the last 72 hours. Invalid input(s): CKTOTAL;3  FASTING LIPID PANEL:  Lab Results   Component Value Date    CHOL 130 04/05/2022    HDL 37 (L) 04/05/2022    TRIG 163 (H) 04/05/2022     LIVER PROFILE: No results for input(s): AST, ALT, ALB, BILIDIR, BILITOT, ALKPHOS in the last 72 hours. I/O (24Hr):     Intake/Output Summary (Last 24 hours) at 5/30/2022 1034  Last data filed at 5/30/2022 8580  Gross per 24 hour   Intake 550 ml   Output 1720 ml   Net -1170 ml       Glu last 24 hour  Recent Labs     05/29/22  1052 05/29/22  1606 05/29/22 2014 05/30/22  0558   POCGLU 212* 123* 191* 121*       No results for input(s): CLARITYU, COLORU, PHUR, SPECGRAV, PROTEINU, RBCUA, BLOODU, BACTERIA, NITRU, WBCUA, LEUKOCYTESUR, YEAST, GLUCOSEU, BILIRUBINUR in the last 72 hours. 5/28/22 venous Dopplers   No evidence of superficial or deep venous thrombosis in both lower    extremities. Impression: Mr. Corrine Wei is a 77 y.o. male with a history of Low back pain     1. Chronic low back pain status post lumbar fusion 3/11 G2-P4, complicated by CSF leak- return in OR 5/26 for  debridement lumbar spine evacuation lumbar epidural hematoma Ancef IV every 8 hours-questionable length  2. Obesity BMI 30.5  3. Neuropathy  4. Coronary artery disease- hyperlipidemia/hypertension-Lipitor, Zetia, fenofibrate, lisinopril, metoprolol, --ASA/Plavix on hold  5. Type 2 diabetes-metformin  6. Sleep apnea-BiPAP nightly  7. Pain-Dilaudid not using, Roxicodone, Tylenol, baclofen, Neurontin  8. Depression-Pamelor  9. BPH-Proscar, Flomax  10. Anemia-iron  11. Vitamin D supplementation     Recommendations:  1. Diagnosis: Status post lumbar fusion wit, max assist lower extremity h CSF leak  2. Therapy: 5/28 standby assist transfers ambulate 80 feet questionable assist, nursing notes patient ambulated in hallway with nursing to nursing station, 5/28 max assist lower extremity  3. Medical  Necessity: As above  4. Support: Clarify  5. Rehab recommendation:  Transfers standby assist, ambulating 80 feet questionable assist clarify, walked to nursing station with nursing, may be too high level for acute inpatient rehabilitation as minimal PT needs, await updated PT/OT notes     -Clarify length of of antibiotic  -Noted ASA/Plavix on hold     6.  DVT proph: Currently no anticoagulation for DVT prophylaxis due to lumbar surgery, await

## 2022-05-30 NOTE — PROGRESS NOTES
Patient ambulated in [place] with RN to both nurses station. Patient tolerated well. No distress noted.

## 2022-05-31 ENCOUNTER — TELEPHONE (OUTPATIENT)
Dept: ORTHOPEDIC SURGERY | Age: 67
End: 2022-05-31

## 2022-05-31 VITALS
OXYGEN SATURATION: 95 % | DIASTOLIC BLOOD PRESSURE: 78 MMHG | WEIGHT: 200.62 LBS | BODY MASS INDEX: 30.41 KG/M2 | HEART RATE: 92 BPM | HEIGHT: 68 IN | SYSTOLIC BLOOD PRESSURE: 130 MMHG | RESPIRATION RATE: 18 BRPM | TEMPERATURE: 97.7 F

## 2022-05-31 LAB
GLUCOSE BLD-MCNC: 130 MG/DL (ref 75–110)
GLUCOSE BLD-MCNC: 166 MG/DL (ref 75–110)

## 2022-05-31 PROCEDURE — 82947 ASSAY GLUCOSE BLOOD QUANT: CPT

## 2022-05-31 PROCEDURE — 97110 THERAPEUTIC EXERCISES: CPT

## 2022-05-31 PROCEDURE — 6370000000 HC RX 637 (ALT 250 FOR IP): Performed by: ORTHOPAEDIC SURGERY

## 2022-05-31 PROCEDURE — 97116 GAIT TRAINING THERAPY: CPT

## 2022-05-31 PROCEDURE — 99239 HOSP IP/OBS DSCHRG MGMT >30: CPT | Performed by: FAMILY MEDICINE

## 2022-05-31 PROCEDURE — 6360000002 HC RX W HCPCS: Performed by: ORTHOPAEDIC SURGERY

## 2022-05-31 PROCEDURE — 2580000003 HC RX 258: Performed by: ORTHOPAEDIC SURGERY

## 2022-05-31 PROCEDURE — 99024 POSTOP FOLLOW-UP VISIT: CPT | Performed by: ORTHOPAEDIC SURGERY

## 2022-05-31 RX ORDER — CEPHALEXIN 500 MG/1
500 CAPSULE ORAL 4 TIMES DAILY
Qty: 28 CAPSULE | Refills: 0 | Status: SHIPPED | OUTPATIENT
Start: 2022-05-31 | End: 2022-06-07

## 2022-05-31 RX ORDER — OXYCODONE HYDROCHLORIDE AND ACETAMINOPHEN 5; 325 MG/1; MG/1
1 TABLET ORAL EVERY 6 HOURS PRN
Qty: 28 TABLET | Refills: 0 | Status: SHIPPED | OUTPATIENT
Start: 2022-05-31 | End: 2022-06-07

## 2022-05-31 RX ADMIN — GABAPENTIN 600 MG: 300 CAPSULE ORAL at 07:44

## 2022-05-31 RX ADMIN — OXYCODONE HYDROCHLORIDE 5 MG: 5 TABLET ORAL at 07:45

## 2022-05-31 RX ADMIN — FENOFIBRATE 160 MG: 160 TABLET ORAL at 07:45

## 2022-05-31 RX ADMIN — PANTOPRAZOLE SODIUM 40 MG: 40 TABLET, DELAYED RELEASE ORAL at 05:52

## 2022-05-31 RX ADMIN — CEFAZOLIN 2000 MG: 10 INJECTION, POWDER, FOR SOLUTION INTRAVENOUS at 01:38

## 2022-05-31 RX ADMIN — CEFAZOLIN 2000 MG: 10 INJECTION, POWDER, FOR SOLUTION INTRAVENOUS at 10:08

## 2022-05-31 RX ADMIN — NORTRIPTYLINE HYDROCHLORIDE 50 MG: 50 CAPSULE ORAL at 07:47

## 2022-05-31 RX ADMIN — LISINOPRIL 10 MG: 10 TABLET ORAL at 07:45

## 2022-05-31 RX ADMIN — OXYCODONE HYDROCHLORIDE 5 MG: 5 TABLET ORAL at 03:37

## 2022-05-31 RX ADMIN — METOPROLOL SUCCINATE 50 MG: 50 TABLET, EXTENDED RELEASE ORAL at 07:45

## 2022-05-31 RX ADMIN — Medication 100 MG: at 07:46

## 2022-05-31 RX ADMIN — ATORVASTATIN CALCIUM 10 MG: 10 TABLET, FILM COATED ORAL at 07:45

## 2022-05-31 RX ADMIN — FINASTERIDE 5 MG: 5 TABLET, FILM COATED ORAL at 07:44

## 2022-05-31 RX ADMIN — EZETIMIBE 10 MG: 10 TABLET ORAL at 07:45

## 2022-05-31 RX ADMIN — CINACALCET HYDROCHLORIDE 30 MG: 30 TABLET, FILM COATED ORAL at 07:47

## 2022-05-31 RX ADMIN — FERROUS SULFATE TAB 325 MG (65 MG ELEMENTAL FE) 325 MG: 325 (65 FE) TAB at 07:44

## 2022-05-31 RX ADMIN — Medication 400 UNITS: at 07:47

## 2022-05-31 RX ADMIN — SODIUM CHLORIDE, PRESERVATIVE FREE 10 ML: 5 INJECTION INTRAVENOUS at 07:48

## 2022-05-31 RX ADMIN — ACETAMINOPHEN 650 MG: 325 TABLET ORAL at 05:54

## 2022-05-31 RX ADMIN — METFORMIN HYDROCHLORIDE 1000 MG: 1000 TABLET, FILM COATED ORAL at 07:45

## 2022-05-31 RX ADMIN — TAMSULOSIN HYDROCHLORIDE 0.4 MG: 0.4 CAPSULE ORAL at 07:45

## 2022-05-31 ASSESSMENT — PAIN DESCRIPTION - DESCRIPTORS
DESCRIPTORS: ACHING
DESCRIPTORS: ACHING

## 2022-05-31 ASSESSMENT — PAIN DESCRIPTION - ORIENTATION
ORIENTATION: LOWER
ORIENTATION: LOWER
ORIENTATION: RIGHT

## 2022-05-31 ASSESSMENT — PAIN DESCRIPTION - LOCATION
LOCATION: BACK
LOCATION: BACK
LOCATION: HIP

## 2022-05-31 ASSESSMENT — PAIN SCALES - GENERAL
PAINLEVEL_OUTOF10: 5
PAINLEVEL_OUTOF10: 4
PAINLEVEL_OUTOF10: 2

## 2022-05-31 NOTE — PLAN OF CARE
Problem: Discharge Planning  Goal: Discharge to home or other facility with appropriate resources  5/31/2022 0422 by Tiana Estevez RN  Outcome: Progressing     Problem: Pain  Goal: Verbalizes/displays adequate comfort level or baseline comfort level  5/31/2022 0422 by Tiana Estevez RN  Outcome: Progressing     Problem: ABCDS Injury Assessment  Goal: Absence of physical injury  5/31/2022 0422 by Tiana Estevez RN  Outcome: Progressing     Problem: Skin/Tissue Integrity  Goal: Absence of new skin breakdown  Description: 1. Monitor for areas of redness and/or skin breakdown  2. Assess vascular access sites hourly  3. Every 4-6 hours minimum:  Change oxygen saturation probe site  4. Every 4-6 hours:  If on nasal continuous positive airway pressure, respiratory therapy assess nares and determine need for appliance change or resting period.   5/31/2022 0422 by Tiana Estevez RN  Outcome: Progressing     Problem: Chronic Conditions and Co-morbidities  Goal: Patient's chronic conditions and co-morbidity symptoms are monitored and maintained or improved  5/31/2022 0422 by Tiana Estevez RN  Outcome: Progressing

## 2022-05-31 NOTE — PLAN OF CARE
Problem: Discharge Planning  Goal: Discharge to home or other facility with appropriate resources  5/31/2022 1112 by Brijesh Bravo RN  Outcome: Adequate for Discharge     Problem: Pain  Goal: Verbalizes/displays adequate comfort level or baseline comfort level  5/31/2022 1112 by Brijesh Bravo RN  Outcome: Adequate for Discharge     Problem: ABCDS Injury Assessment  Goal: Absence of physical injury  5/31/2022 1112 by Brijesh Bravo RN  Outcome: Adequate for Discharge     Problem: Skin/Tissue Integrity  Goal: Absence of new skin breakdown  Description: 1. Monitor for areas of redness and/or skin breakdown  2. Assess vascular access sites hourly  3. Every 4-6 hours minimum:  Change oxygen saturation probe site  4. Every 4-6 hours:  If on nasal continuous positive airway pressure, respiratory therapy assess nares and determine need for appliance change or resting period.   5/31/2022 1112 by Brijesh Bravo RN  Outcome: Adequate for Discharge     Problem: Safety - Adult  Goal: Free from fall injury  5/31/2022 1112 by Brijesh Bravo RN  Outcome: Adequate for Discharge     Problem: Chronic Conditions and Co-morbidities  Goal: Patient's chronic conditions and co-morbidity symptoms are monitored and maintained or improved  5/31/2022 1112 by Brijesh Bravo RN  Outcome: Adequate for Discharge

## 2022-05-31 NOTE — TELEPHONE ENCOUNTER
Sim Hernandez /[facility] health care called, pt being released from hospital today, she would like to know if Dr Luz Dewey will be following for home care.     Sim Hernandez can be reached at [phone]  Ext [phone]

## 2022-05-31 NOTE — PROGRESS NOTES
Surgical Progress Note    POD:      Patient doing well  Vitals:    22 0625   BP: 130/78   Pulse: 92   Resp: 18   Temp: 97.7 °F (36.5 °C)   SpO2: 95%      Temp (24hrs), Av °F (36.7 °C), Min:97.7 °F (36.5 °C), Max:98.2 °F (36.8 °C)       Pain Control good  No unusual nausea    Exam: wound healing well appears drainage from drain site stopped        Lungs:  No respiratory distress    Labs reviewed:  Labs:                I/O last 3 completed shifts:   In: 21 [I.V.:20]  Out: 1492 [Urine:1150; Drains:40]    Assessment:    Patient Active Problem List   Diagnosis    Hyperlipidemia    Prostate disease    Primary osteoarthritis involving multiple joints    Headache    GERD (gastroesophageal reflux disease)    Sleep apnea    Elevated liver enzymes    BPH (benign prostatic hyperplasia)    Steatosis of liver    Type 2 diabetes mellitus without complication, without long-term current use of insulin (HCC)    Lumbar radiculopathy, chronic    Osteoarthritis of lumbar spine    Osteoarthritis of multiple joints    Degenerative disc disease, lumbar    Lumbar spinal stenosis    Pars defect with spondylolisthesis    Essential hypertension    Lumbar disc herniation    Chronic low back pain    S/P cardiac catheterization    Back pain    Postoperative hypotension    Neck pain    Cervical spondylosis with myelopathy    Bilateral carpal tunnel syndrome    Spinal stenosis in cervical region    Spondylolisthesis, lumbar region    Foraminal stenosis of cervical region    Cervical radiculopathy    Preop examination    Low back pain    Drug-induced constipation    Hematoma    Strain of back       Plan:  See my orders    Juancho Maier MD MD  2022 7:27 AM

## 2022-05-31 NOTE — DISCHARGE INSTR - COC
Continuity of Care Form    Patient Name: Uriah Davis   :  1955  MRN:  [id number]    Admit date:  2022  Discharge date:  22    Code Status Order: Full Code   Advance Directives:   [facility] Documentation       Date/Time Healthcare Directive Type of Healthcare Directive Copy in [address] Agent's Name Healthcare Agent's Phone Number    22 1546 Yes, patient has an advance directive for healthcare treatment Living will Yes, copy in chart Spouse wife Julissa --    22 1419 -- -- -- Spouse -- --            Admitting Physician:  Gina Friend MD  PCP: Linda Maharaj PA-C    Discharging Nurse:   [address] Unit/Room#: 1105/0659-43  Discharging Unit Phone Number: [phone]    Emergency Contact:   Extended Emergency Contact Information  Primary Emergency Contact: [facility]  Address: [address] [place] [address] Phone: [phone]  Mobile Phone: [phone]  Relation: Spouse    Past Surgical History:  Past Surgical History:   Procedure Laterality Date    BACK SURGERY  2019    BICEPS TENDON REPAIR Left     CARDIAC CATHETERIZATION  2019    with Dr. Rocio Tenorio, Stents X2    CARPAL TUNNEL RELEASE Right     CERVICAL FUSION      COLONOSCOPY  11 14 14    normal    ELBOW FRACTURE SURGERY Right     right arm/fracture    ENDOSCOPY, COLON, DIAGNOSTIC      several times    FRACTURE SURGERY Right     elbow    KNEE ARTHROSCOPY Bilateral     LIVER BIOPSY  2015    moderate steatosis, grade 2, lobular and portal inflammation: Grade 1 total score =3, fibrosis score 1A    LUMBAR FUSION N/A 7/10/2019    LUMBAR LAMINECTOMY FUSION POSTERIOR - L3 SACRUM POSTERIOR LUMBAR DECOMPRESSION  & FUSION performed by Siegfried Severs, MD at [address] [facility] 2022    L2-3 POSTERIOR LUMBAR DECOMPRESSION & INSTRUMENTED FUSION POSSIBLE REVISION INSTRUMENTATION L3-5 performed by Siegfried Severs, MD at [facility] HISTORY  11/07/2018    lumbar myelogram    ROTATOR CUFF REPAIR Right     SPINE SURGERY N/A 5/26/2022    IRRIGATION AND DEBRIDEMENT EPIDURAL HEMATOMA performed by Pablito Schultz MD at [address]       Immunization History:   Immunization History   Administered Date(s) Administered    COVID-19, [place] Vaccine, Vaccine Unspecified 02/19/2021, 03/19/2021, 11/02/2021    Influenza Vaccine, unspecified formulation 10/15/2018    Influenza Virus Vaccine 02/01/2016    Influenza, High-dose, Quadv, 65 yrs +, IM (Fluzone) 10/11/2021    Influenza, MDCK Quadv, IM, PF (Flucelvax 2 yrs and older) 10/01/2019    Influenza, Quadv, IM, PF (6 mo and older Fluzone, Flulaval, Fluarix, and 3 yrs and older Afluria) 09/06/2016, 10/23/2017, 10/29/2018    Influenza, Quadv, adjuvanted, 65 yrs +, IM, PF (Fluad) 10/23/2020    Pneumococcal Polysaccharide ([id number]) 10/23/2017       Active Problems:  Patient Active Problem List   Diagnosis Code    Hyperlipidemia E78.5    Prostate disease N42.9    Primary osteoarthritis involving multiple joints M89.49    Headache R51.9    GERD (gastroesophageal reflux disease) K21.9    Sleep apnea G47.30    Elevated liver enzymes R74.8    BPH (benign prostatic hyperplasia) N40.0    Steatosis of liver K76.0    Type 2 diabetes mellitus without complication, without long-term current use of insulin (HCC) E11.9    Lumbar radiculopathy, chronic M54.16    Osteoarthritis of lumbar spine M47.816    Osteoarthritis of multiple joints M15.9    Degenerative disc disease, lumbar M51.36    Lumbar spinal stenosis M48.061    Pars defect with spondylolisthesis M43.00, M43.10    Essential hypertension I10    Lumbar disc herniation M51.26    Chronic low back pain M54.50, G89.29    S/P cardiac catheterization Z98.890    Back pain M54.9    Postoperative hypotension I95.89    Neck pain M54.2    Cervical spondylosis with myelopathy M47.12    Bilateral carpal tunnel syndrome G56.03    Spinal stenosis in cervical region M48.02    Spondylolisthesis, lumbar region M43.16    Foraminal stenosis of cervical region M48.02    Cervical radiculopathy M54.12    Preop examination Z01.818    Low back pain M54.50    Drug-induced constipation K59.03    Hematoma T14. 8XXA    Strain of back S39.012A       Isolation/Infection:   Isolation            No Isolation          Patient Infection Status       None to display            Nurse Assessment:  Last Vital Signs: /78   Pulse 92   Temp 97.7 °F (36.5 °C)   Resp 18   Ht 5' 8\" (1.727 m)   Wt 200 lb 9.9 oz (91 kg)   SpO2 95%   BMI 30.50 kg/m²     Last documented pain score (0-10 scale): Pain Level: 2  Last Weight:   Wt Readings from Last 1 Encounters:   05/27/22 200 lb 9.9 oz (91 kg)     Mental Status:  oriented and alert    IV Access:  - None    Nursing Mobility/ADLs:  Walking   Assisted  Transfer  Assisted  Bathing  Independent  Dressing  Independent  Toileting  Independent  Feeding  Independent  Med Admin  Independent  Med Delivery   whole    Wound Care Documentation and Therapy:  Incision 05/26/22 Back (Active)   Dressing Status Intact 05/31/22 0739   Dressing/Treatment Foam 05/31/22 0739   Closure Sutures 05/31/22 0739   Incision Assessment Other (Comment) 05/30/22 0800   Drainage Amount Small 05/30/22 2102   Number of days: 4        Elimination:  Continence: Bowel: Yes  Bladder: Yes  Urinary Catheter: None   Colostomy/Ileostomy/Ileal Conduit: No       Date of Last BM: 05/31/2022    Intake/Output Summary (Last 24 hours) at 5/31/2022 1057  Last data filed at 5/31/2022 0903  Gross per 24 hour   Intake 250 ml   Output 350 ml   Net -100 ml     I/O last 3 completed shifts: In: 20 [I.V.:20]  Out: 5084 [Urine:1150; Drains:40]    Safety Concerns:      At Risk for Falls    Impairments/Disabilities:      None    Nutrition Therapy:  Current Nutrition Therapy:   - Oral Diet:  General    Routes of Feeding: Oral  Liquids: No Restrictions  Daily Fluid Restriction: no  Last Modified Barium Swallow with Video (Video Swallowing Test): not done    Treatments at the Time of Hospital Discharge:   Respiratory Treatments: n/a  Oxygen Therapy:  is not on home oxygen therapy. Ventilator:    - No ventilator support    Rehab Therapies: Physical Therapy and Occupational Therapy  Weight Bearing Status/Restrictions: No weight bearing restrictions  Other Medical Equipment (for information only, NOT a DME order):  n/a  Other Treatments: skilled nursing assessment, medication education and monitoring, resume previous orders    Patient's personal belongings (please select all that are sent with patient):  Shirt, pants, shoes, undergarments    RN SIGNATURE:  Electronically signed by Oriana Barroso RN on 5/31/22 at 11:00 AM EDT    CASE MANAGEMENT/SOCIAL WORK SECTION    Inpatient Status Date: 5/23/22    Readmission Risk Assessment Score:  Readmission Risk              Risk of Unplanned Readmission:  13           Discharging to Facility/ . Car[facility]ales [address] [address]  Phone [phone]  Fax  [phone]        / signature: Electronically signed by Cash Romero RN on 5/31/22 at 11:43 AM EDT    PHYSICIAN SECTION    Prognosis: Good    Condition at Discharge: Stable    Rehab Potential (if transferring to Rehab): Good    Recommended Labs or Other Treatments After Discharge: see above    Physician Certification: I certify the above information and transfer of Agueda Malcolm  is necessary for the continuing treatment of the diagnosis listed and that he requires Home Care for less 30 days.      Update Admission H&P: No change in H&P    PHYSICIAN SIGNATURE:  Verbal order Shania Salazar MD. Electronically signed by Issa Jennings RN on 5/31/22 at 11:09 AM EDT

## 2022-05-31 NOTE — TELEPHONE ENCOUNTER
Dr. Tobin Solorio,     Please see message below. Do you have any issues with ordering the home care?

## 2022-05-31 NOTE — DISCHARGE INSTR - DIET
Good nutrition is important when healing from an illness, injury, or surgery. Follow any nutrition recommendations given to you during your hospital stay. If you were given an oral nutrition supplement while in the hospital, continue to take this supplement at home. You can take it with meals, in-between meals, and/or before bedtime. These supplements can be purchased at most local grocery stores, pharmacies, and chain [employer]-stores. If you have any questions about your diet or nutrition, call the hospital and ask for the dietitian.   Regular Adult Diet, 5 carb choices

## 2022-05-31 NOTE — CARE COORDINATION
Continuity of Care Form    Patient Name: Kayce Kennedy   :  1955  MRN:  [id number]    Admit date:  2022  Discharge date:  22    Code Status Order: Full Code   Advance Directives:   [facility] Documentation       Date/Time Healthcare Directive Type of Healthcare Directive Copy in [address] Agent's Name Healthcare Agent's Phone Number    22 1543 Yes, patient has an advance directive for healthcare treatment Living will Yes, copy in chart Spouse wife Ki Mo --    22 1419 -- -- -- Spouse -- --            Admitting Physician:  Marcy Lozoya MD  PCP: Leonard Peck PA-C    Discharging Nurse:   [address] Unit/Room#: 1321/9590-50  Discharging Unit Phone Number: [phone]    Emergency Contact:   Extended Emergency Contact Information  Primary Emergency Contact: Malina Payne  Address: [address] [place] [address] Phone: [phone]  Mobile Phone: [phone]  Relation: Spouse    Past Surgical History:  Past Surgical History:   Procedure Laterality Date    BACK SURGERY  2019    BICEPS TENDON REPAIR Left     CARDIAC CATHETERIZATION  2019    with Dr. Heber Arthur, Stents X2    CARPAL TUNNEL RELEASE Right     CERVICAL FUSION      COLONOSCOPY   14    normal    ELBOW FRACTURE SURGERY Right     right arm/fracture    ENDOSCOPY, COLON, DIAGNOSTIC      several times    FRACTURE SURGERY Right     elbow    KNEE ARTHROSCOPY Bilateral     LIVER BIOPSY  2015    moderate steatosis, grade 2, lobular and portal inflammation: Grade 1 total score =3, fibrosis score 1A    LUMBAR FUSION N/A 7/10/2019    LUMBAR LAMINECTOMY FUSION POSTERIOR - L3 SACRUM POSTERIOR LUMBAR DECOMPRESSION  & FUSION performed by Monalisa Mukherjee MD at [place] [address] N/A 2022    L2-3 POSTERIOR LUMBAR DECOMPRESSION & INSTRUMENTED FUSION POSSIBLE REVISION INSTRUMENTATION L3-5 performed by Kishor Lewis MD at [address] OTHER SURGICAL HISTORY  11/07/2018    lumbar myelogram    ROTATOR CUFF REPAIR Right     SPINE SURGERY N/A 5/26/2022    IRRIGATION AND DEBRIDEMENT EPIDURAL HEMATOMA performed by Kishor Lewis MD at [address]       Immunization History:   Immunization History   Administered Date(s) Administered    COVID-19, US Vaccine, Vaccine Unspecified 02/19/2021, 03/19/2021, 11/02/2021    Influenza Vaccine, unspecified formulation 10/15/2018    Influenza Virus Vaccine 02/01/2016    Influenza, High-dose, Huber Reasons, 65 yrs +, IM (Fluzone) 10/11/2021    Influenza, MDCK Quadv, IM, PF (Flucelvax 2 yrs and older) 10/01/2019    Influenza, Huber Reasons, IM, PF (6 mo and older Fluzone, Flulaval, Fluarix, and 3 yrs and older Afluria) 09/06/2016, 10/23/2017, 10/29/2018    Influenza, Huber Reasons, adjuvanted, 65 yrs +, IM, PF (Fluad) 10/23/2020    Pneumococcal Polysaccharide ([id number]) 10/23/2017       Active Problems:  Patient Active Problem List   Diagnosis Code    Hyperlipidemia E78.5    Prostate disease N42.9    Primary osteoarthritis involving multiple joints M89.49    Headache R51.9    GERD (gastroesophageal reflux disease) K21.9    Sleep apnea G47.30    Elevated liver enzymes R74.8    BPH (benign prostatic hyperplasia) N40.0    Steatosis of liver K76.0    Type 2 diabetes mellitus without complication, without long-term current use of insulin (HCC) E11.9    Lumbar radiculopathy, chronic M54.16    Osteoarthritis of lumbar spine M47.816    Osteoarthritis of multiple joints M15.9    Degenerative disc disease, lumbar M51.36    Lumbar spinal stenosis M48.061    Pars defect with spondylolisthesis M43.00, M43.10    Essential hypertension I10    Lumbar disc herniation M51.26    Chronic low back pain M54.50, G89.29    S/P cardiac catheterization Z98.890    Back pain M54.9    Postoperative hypotension I95.89    Neck pain M54.2    Cervical spondylosis with myelopathy M47.12    Bilateral carpal tunnel syndrome G56.03    Spinal stenosis in cervical region M48.02    Spondylolisthesis, lumbar region M43.16    Foraminal stenosis of cervical region M48.02    Cervical radiculopathy M54.12    Preop examination Z01.818    Low back pain M54.50    Drug-induced constipation K59.03    Hematoma T14. 8XXA    Strain of back S39.012A       Isolation/Infection:   Isolation            No Isolation          Patient Infection Status       None to display            Nurse Assessment:  Last Vital Signs: /78   Pulse 92   Temp 97.7 °F (36.5 °C)   Resp 18   Ht 5' 8\" (1.727 m)   Wt 200 lb 9.9 oz (91 kg)   SpO2 95%   BMI 30.50 kg/m²     Last documented pain score (0-10 scale): Pain Level: 2  Last Weight:   Wt Readings from Last 1 Encounters:   05/27/22 200 lb 9.9 oz (91 kg)     Mental Status:  oriented and alert    IV Access:  - None    Nursing Mobility/ADLs:  Walking   Assisted  Transfer  Assisted  Bathing  Independent  Dressing  Independent  Toileting  Independent  Feeding  Independent  Med Admin  Independent  Med Delivery   whole    Wound Care Documentation and Therapy:  Incision 05/26/22 Back (Active)   Dressing Status Intact 05/31/22 0739   Dressing/Treatment Foam 05/31/22 0739   Closure Sutures 05/31/22 0739   Incision Assessment Other (Comment) 05/30/22 0800   Drainage Amount Small 05/30/22 2102   Number of days: 4        Elimination:  Continence:   · Bowel: Yes  · Bladder: Yes  Urinary Catheter: None   Colostomy/Ileostomy/Ileal Conduit: No       Date of Last BM: 05/31/2022    Intake/Output Summary (Last 24 hours) at 5/31/2022 1057  Last data filed at 5/31/2022 0903  Gross per 24 hour   Intake 250 ml   Output 350 ml   Net -100 ml     I/O last 3 completed shifts: In: 20 [I.V.:20]  Out: 5711 [Urine:1150; Drains:40]    Safety Concerns:      At Risk for Falls    Impairments/Disabilities:      None    Nutrition Therapy:  Current Nutrition Therapy:   - Oral Diet:  General    Routes of Feeding: Oral  Liquids: No Restrictions  Daily Fluid Restriction: no  Last Modified Barium Swallow with Video (Video Swallowing Test): not done    Treatments at the Time of Hospital Discharge:   Respiratory Treatments: n/a  Oxygen Therapy:  is not on home oxygen therapy. Ventilator:    - No ventilator support    Rehab Therapies: Physical Therapy and Occupational Therapy  Weight Bearing Status/Restrictions: No weight bearing restrictions  Other Medical Equipment (for information only, NOT a DME order):  n/a  Other Treatments: skilled nursing assessment, medication education and monitoring, resume previous orders    Patient's personal belongings (please select all that are sent with patient):  Shirt, pants, shoes, undergarments    RN SIGNATURE:  Electronically signed by Mary Floyd RN on 5/31/22 at 11:00 AM EDT    CASE MANAGEMENT/SOCIAL WORK SECTION    Inpatient Status Date: 5/23/22    Readmission Risk Assessment Score:  Readmission Risk              Risk of Unplanned Readmission:  13           Discharging to Facility/ [address] [place] [address]  Phone [phone]  Fax  [phone]        / signature: Electronically signed by Bree Bryant RN on 5/31/22 at 11:43 AM EDT    PHYSICIAN SECTION    Prognosis: Good    Condition at Discharge: Stable    Rehab Potential (if transferring to Rehab): Good    Recommended Labs or Other Treatments After Discharge: see above    Physician Certification: I certify the above information and transfer of Sona Holbrook  is necessary for the continuing treatment of the diagnosis listed and that he requires Home Care for less 30 days.      Update Admission H&P: No change in H&P    PHYSICIAN SIGNATURE:  Verbal order Julieta Das MD. Electronically signed by Bishop Sandro RN on 5/31/22 at 11:09 AM EDT    Medication List      START taking these medications    START taking these medications   cephALEXin 500 MG capsule  Commonly known as: KEFLEX  Take 1 capsule by mouth 4 times daily for 7 days     CONTINUE taking these medications    CONTINUE taking these medications   atorvastatin 10 MG tablet  Commonly known as: LIPITOR  Take 10 mg by mouth daily   cinacalcet 30 MG tablet  Commonly known as: SENSIPAR  Take 30 mg by mouth daily   coenzyme Q-10 100 MG capsule  TAKE ONE CAPSULE BY MOUTH DAILY   E-400 400 UNIT capsule  Generic drug: vitamin E  TAKE TWO CAPSULES BY MOUTH DAILY   ezetimibe 10 mg tablet  Commonly known as: ZETIA  TAKE ONE TABLET BY MOUTH DAILY   fenofibrate micronized 200 MG capsule  Commonly known as: LOFIBRA  Take 200 mg by mouth every morning (before breakfast)   FeroSul 325 (65 Fe) MG tablet  Generic drug: ferrous sulfate  TAKE ONE TABLET BY MOUTH DAILY WITH BREAKFAST   finasteride 5 MG tablet  Commonly known as: PROSCAR  TAKE ONE TABLET BY MOUTH DAILY   gabapentin 300 MG capsule  Commonly known as: NEURONTIN  Take 600 mg by mouth 3 times daily. lisinopril 10 MG tablet  Commonly known as: PRINIVIL;ZESTRIL  Take 1 tablet by mouth daily   metFORMIN 1000 MG tablet  Commonly known as: GLUCOPHAGE  TAKE ONE TABLET BY MOUTH TWICE A DAY WITH MEALS   metoprolol succinate 100 MG extended release tablet  Commonly known as: TOPROL XL  Take 50 mg by mouth daily   nortriptyline 50 MG capsule  Commonly known as: PAMELOR  Take 50 mg by mouth 2 times daily   omeprazole 20 MG delayed release capsule  Commonly known as: PRILOSEC  Take 20 mg by mouth daily   oxyCODONE-acetaminophen 5-325 MG per tablet  Commonly known as: Percocet  Take 1 tablet by mouth every 6 hours as needed for Pain for up to 7 days. Intended supply: 7 days.  Take lowest dose possible to manage pain   tamsulosin 0.4 mg capsule  Commonly known as: FLOMAX  TAKE ONE CAPSULE BY MOUTH TWICE A DAY   vitamin D 1.25 MG (84197 UT) Caps capsule  Commonly known as: ERGOCALCIFEROL  TAKE ONE CAPSULE BY MOUTH ONCE WEEKLY     STOP taking these medications    STOP taking these medications   aspirin 81 MG chewable tablet   Plavix 75 MG tablet  Generic drug: clopidogrel   Xarelto 10 MG Tabs tablet  Generic drug: rivaroxaban   Where to Get Your Medications      You can get these medications from any pharmacy    Bring a paper prescription for each of these medications       cephALEXin 500 MG capsule         oxyCODONE-acetaminophen 5-325 MG per tablet

## 2022-05-31 NOTE — CARE COORDINATION
ONGOING DISCHARGE PLAN:    Patient is alert and oriented x4. Spoke with patient regarding discharge plan and patient confirms that plan is still home with [facility]. Plan is no longer for patient to go to ARU. Spouse at bedside and agreeable to plan. Pt will be discharged home today. Notified Regine from [facility] that pt will be discharged home today. Will continue to follow for additional discharge needs.     Electronically signed by Beverley Daniel RN on 5/31/2022 at 11:41 AM

## 2022-05-31 NOTE — PROGRESS NOTES
5/30/2022  PT  Bed mobility  Supine to Sit: Stand by assistance (head of bed elevated at 45 degrees)  Sit to Supine:  (pt left up in chair)  Scooting: Modified independent  Transfers  Sit to Stand: Stand by assistance  Stand to sit: Stand by assistance  Bed to Chair: Stand by assistance (with RW)  Ambulation  Surface: level tile  Device: Rolling Walker  Assistance: Supervision  Gait Deviations: Slow Hien  Distance: 200ft  More Ambulation?: No  Stairs/Curb  Stairs?: Yes  Stairs  # Steps : 2  Stairs Height: 8\"  Rails: Left ascending  Device: Single pt cane  Assistance: Stand by assistance      Patient high level for acute inpatient rehabilitation, recommend home with assist versus skilled nurse facility at this time

## 2022-05-31 NOTE — PROGRESS NOTES
Physical Therapy  Facility/Department: [facility] MED SURG  Daily Treatment Note  NAME: Santos Mccollum  : 1955  MRN: [id number]    Date of Service: 2022    Discharge Recommendations:  Patient would benefit from continued therapy after discharge   PT Equipment Recommendations  Equipment Needed: No    Patient Diagnosis(es): The primary encounter diagnosis was Back strain, initial encounter. A diagnosis of Hematoma was also pertinent to this visit. Assessment   Activity Tolerance: Patient tolerated treatment well  Equipment Needed: No     Plan    Plan  Plan: 5-7 times per week     Restrictions  Restrictions/Precautions  Restrictions/Precautions: Fall Risk,Up as Tolerated,Other (comment),Surgical protocol,General Precautions,Surgical Protocols (BLT precautions, log roll)  Required Braces or Orthoses?: No  Implants present? : Metal implants (cervical and lumbar sx, R elbow sx)  Position Activity Restriction  Spinal Precautions: No Bending,No Lifting,No Twisting  Spinal Precautions: Lifting less than 5 pounds, avoid pulling  Other position/activity restrictions: Check O2 sats during session, O2 per NC as needed, activity as tolerated order post op 22     Subjective    Subjective  Subjective: Pt positioned seated EOB, agreeable to tx. Orientation  Overall Orientation Status: Within Functional Limits  Cognition  Overall Cognitive Status: WFL     Objective   Vitals     Balance  Sitting: Without support  Standing: With support  Transfer Training  Transfer Training: Yes  Overall Level of Assistance: Stand-by assistance  Interventions: Verbal cues; Safety awareness training;Demonstration  Sit to Stand: Stand-by assistance  Stand to Sit: Stand-by assistance  Stand Pivot Transfers: Stand-by assistance  Bed to Chair: Stand-by assistance  Toilet Transfer: Supervision  Gait Training  Gait Training: Yes  Gait  Overall Level of Assistance: Stand-by assistance  Interventions: Safety awareness training;Verbal cues  Base of Support: Widened  Speed/Hien: Slow  Gait Abnormalities: Other  (FWD flexed posture unable  To maintain erect posture d/t pain)  Distance (ft): 120 Feet x2  Assistive Device: Walker, rolling  Rail Use: Both  Stairs - Level of Assistance: Contact-guard assistance  Number of Stairs Trained: 10 (6\"/8\", Descending  in retro safely )     PT Exercises  Resistive Exercises: B LE seated ex's with Blue theraband. Goals  Short Term Goals  Time Frame for Short term goals: 1 week  Short term goal 1: Pt will increase Bilat LE strength to [facility]  Short term goal 2: Pt will perform bed mobility mod-I  Short term goal 3: Pt will transfer with RW mod-I  Short term goal 4: Pt will ambulate with  ft SBA  Short term goal 5: Pt will perform HEP and demo BLT precautions IND  Patient Goals   Patient goals : decrease pain, increase activity    Education  Patient Education  Education Given To: Patient; Family (Wife)  Education Provided: Precautions; Energy Conservation;Transfer Training; Fall Prevention Strategies; Equipment;Home Exercise Program;Role of Therapy  Education Provided Comments: Extensive education on importance of precautions throughout tx, Pt continues to demo twisting and bending of the spine despite education.    Education Method: Demonstration;Verbal  Barriers to Learning: None  Education Outcome: Verbalized understanding    Therapy Time   Individual Concurrent Group Co-treatment   Time In 0913         Time Out 1002         Minutes [address], [place]

## 2022-05-31 NOTE — DISCHARGE SUMMARY
Discharge Summary    Attending Physician: Pinky Davidson MD  Admit Date: 5/23/2022  Discharge Date:    Primary Care Physician: Narcisa Vieyra PA-C    Admitting Diagnosis:  Principal Problem:    Low back pain  Active Problems:    Drug-induced constipation    Strain of back    Primary osteoarthritis involving multiple joints    GERD (gastroesophageal reflux disease)    Type 2 diabetes mellitus without complication, without long-term current use of insulin ([address]    Essential hypertension  Resolved Problems:    * No resolved hospital problems. *        Discharge Diagnoses:  Principal Problem:    Low back pain  Active Problems:    Drug-induced constipation    Strain of back    Primary osteoarthritis involving multiple joints    GERD (gastroesophageal reflux disease)    Type 2 diabetes mellitus without complication, without long-term current use of insulin (HCC)    Essential hypertension  Resolved Problems:    * No resolved hospital problems.  *         Past Medical History:   Diagnosis Date    Abdominal hernia     Anesthesia     phrenic nerve damaged lt side    BPH (benign prostatic hyperplasia)     CAD (coronary artery disease)     Chronic back pain     Diabetes mellitus (HCC)     Type 2    Difficult intubation     per pt throat collapses, lt side phrenic nerve was damaged    Ear infection     LT, being treated    Fatty liver     Gallstones     GERD (gastroesophageal reflux disease)     Headache(784.0)     History of MI (myocardial infarction)     Hypercholesteremia     Hyperlipidemia     Hyperparathyroidism (HCC)     Hypertension     Kidney stones     Osteoarthritis     all joints    PONV (postoperative nausea and vomiting)     Sleep apnea     BIPAP nightly    Unspecified sleep apnea        Procedures Performed and Findings  Procedure(s):  IRRIGATION AND DEBRIDEMENT EPIDURAL HEMATOMA     Consultations Obtained  IP CONSULT TO ORTHOPEDIC SURGERY  IP CONSULT TO INTERNAL MEDICINE  IP CONSULT TO CRITICAL CARE  IP CONSULT TO INTERNAL MEDICINE  IP CONSULT TO PHYSICAL MEDICINE [facility]  Admitted  Pain control to obtain mri for several days  MRI revealed possible csf leak/ large fluid collection  OR revealed hematoma  Drain maintained several days     Discharge Medications       Medication List      START taking these medications    cephALEXin 500 MG capsule  Commonly known as: KEFLEX  Take 1 capsule by mouth 4 times daily for 7 days        CONTINUE taking these medications    atorvastatin 10 MG tablet  Commonly known as: LIPITOR     cinacalcet 30 MG tablet  Commonly known as: SENSIPAR     coenzyme Q-10 100 MG capsule  TAKE ONE CAPSULE BY MOUTH DAILY     E-400 400 UNIT capsule  Generic drug: vitamin E  TAKE TWO CAPSULES BY MOUTH DAILY     ezetimibe 10 mg tablet  Commonly known as: ZETIA  TAKE ONE TABLET BY MOUTH DAILY     fenofibrate micronized 200 MG capsule  Commonly known as: LOFIBRA     FeroSul 325 (65 Fe) MG tablet  Generic drug: ferrous sulfate  TAKE ONE TABLET BY MOUTH DAILY WITH BREAKFAST     finasteride 5 MG tablet  Commonly known as: PROSCAR  TAKE ONE TABLET BY MOUTH DAILY     gabapentin 300 MG capsule  Commonly known as: NEURONTIN     lisinopril 10 MG tablet  Commonly known as: PRINIVIL;ZESTRIL  Take 1 tablet by mouth daily     metFORMIN 1000 MG tablet  Commonly known as: GLUCOPHAGE  TAKE ONE TABLET BY MOUTH TWICE A DAY WITH MEALS     metoprolol succinate 100 MG extended release tablet  Commonly known as: TOPROL XL     nortriptyline 50 MG capsule  Commonly known as: PAMELOR     omeprazole 20 MG delayed release capsule  Commonly known as: PRILOSEC     oxyCODONE-acetaminophen 5-325 MG per tablet  Commonly known as: Percocet  Take 1 tablet by mouth every 6 hours as needed for Pain for up to 7 days. Intended supply: 7 days.  Take lowest dose possible to manage pain     tamsulosin 0.4 mg capsule  Commonly known as: FLOMAX  TAKE ONE CAPSULE BY MOUTH TWICE A DAY     vitamin D 1.25 MG (90760 UT) Caps capsule  Commonly known as: ERGOCALCIFEROL  TAKE ONE CAPSULE BY MOUTH ONCE WEEKLY        STOP taking these medications    aspirin 81 MG chewable tablet     Plavix 75 MG tablet  Generic drug: clopidogrel     Xarelto 10 MG Tabs tablet  Generic drug: rivaroxaban           Where to Get Your Medications      You can get these medications from any pharmacy    Bring a paper prescription for each of these medications  · cephALEXin 500 MG capsule  · oxyCODONE-acetaminophen 5-325 MG per tablet          Discharge Condition  Stable       Activity on Discharge  As tolerated       Discharge Disposition:  Home    Discharge Instructions  See Orders    Follow-Up Scheduled    [address]          Electronically signed by Marisela Salinas MD on 5/31/2022 at 7:30 AM

## 2022-06-01 ENCOUNTER — TELEPHONE (OUTPATIENT)
Dept: ORTHOPEDIC SURGERY | Age: 67
End: 2022-06-01

## 2022-06-01 ENCOUNTER — CARE COORDINATION (OUTPATIENT)
Dept: CASE MANAGEMENT | Age: 67
End: 2022-06-01

## 2022-06-01 DIAGNOSIS — M54.40 ACUTE RIGHT-SIDED LOW BACK PAIN WITH SCIATICA, SCIATICA LATERALITY UNSPECIFIED: Primary | ICD-10-CM

## 2022-06-01 PROCEDURE — 1111F DSCHRG MED/CURRENT MED MERGE: CPT | Performed by: PHYSICIAN ASSISTANT

## 2022-06-01 NOTE — TELEPHONE ENCOUNTER
Cameron Odonnell from [facility] called in requesting to have Dr. Karolina Peraza follow this patient for home care. Please advise and address thank you!

## 2022-06-01 NOTE — CARE COORDINATION
[facility] Transitions Initial Follow Up Call    Call within 2 business days of discharge: Yes    Patient: Kerri Swift Patient : 1955    MRN: [id number]  Reason for Admission: Low back pain    Discharge Date: 22 RARS: Readmission Risk Score: 9.1 ( )      Last Discharge [facility]       Complaint Diagnosis Description Type Department Provider    22 Back Pain Back strain, initial encounter . .. ED to Hosp-Admission (Discharged) (ADMITTED) [facility] Josemanuel Perdomo MD; Sam Donato. .. Spoke with: Woody Pallas patients wife states things are \"going so far so good\" back pain is controlled, using walker as directed, incision covered no leaking to bandage, denies chest pain, SOB, fever, dizziness, chills. Nausea. Is eating and drinking well normal bowel and bladder elimination. [facility] was in this am. Medications reviewed and taking as directed. Has no concerns. Facility: [facility]    Non-face-to-face services provided:  Obtained and reviewed discharge summary and/or continuity of care documents  Transitions of Care Initial Call    Was this an external facility discharge? Yes, [facility]  Discharge Facility:   Challenges to be reviewed by the provider   Additional needs identified to be addressed with provider: No  none             Method of communication with provider : none    Advance Care Planning:   Does patient have an Advance Directive: reviewed and current, reviewed and needs to be updated, not on file; education provided, patient declined education, decision maker updated and referral to internal ACP facilitator. LPN Care Coordinator contacted the patient by telephone to perform post hospital discharge assessment. Verified name and  with patient as identifiers. Provided introduction to self, and explanation of the LPN CC role. LPN CC reviewed discharge instructions, medical action plan and red flags with family who verbalized understanding.  Family given an opportunity to ask questions and does not have any further questions or concerns at this time. Were discharge instructions available to patient? Yes. Reviewed appropriate site of care based on symptoms and resources available to patient including: PCP  Specialist  Home health. The family agrees to contact the PCP office for questions related to their healthcare. Medication reconciliation was performed with family, who verbalizes understanding of administration of home medications. Advised obtaining a 90-day supply of all daily and as-needed medications. Was patient discharged with a pulse oximeter? no    LPN CC provided contact information. Plan for follow-up call in 5-7 days based on severity of symptoms and risk factors.   Plan for next call: symptom management-back pain controlled incision healing no s/s of infection      Care Transitions 24 Hour Call    Do you have a copy of your discharge instructions?: Yes  Do you have all of your prescriptions and are they filled?: Yes  Have you been contacted by a [employer] [address]?: No  Have you scheduled your follow up appointment?: Yes  How are you going to get to your appointment?: Car - family or friend to transport  Do you feel like you have everything you need to keep you well at home?: Yes  Care Transitions Interventions         Follow Up  Future Appointments   Date Time Provider Henry Durant   8/5/2022  8:15 AM DAVID Abreu [address], [place]

## 2022-06-03 PROBLEM — Z01.818 PREOP EXAMINATION: Status: RESOLVED | Noted: 2022-05-04 | Resolved: 2022-06-03

## 2022-06-07 ENCOUNTER — OFFICE VISIT (OUTPATIENT)
Dept: ORTHOPEDIC SURGERY | Age: 67
End: 2022-06-07

## 2022-06-07 VITALS — HEIGHT: 68 IN | BODY MASS INDEX: 30.31 KG/M2 | RESPIRATION RATE: 14 BRPM | WEIGHT: 200 LBS

## 2022-06-07 DIAGNOSIS — M51.26 LUMBAR DISC HERNIATION: Primary | ICD-10-CM

## 2022-06-07 DIAGNOSIS — M48.061 SPINAL STENOSIS OF LUMBAR REGION, UNSPECIFIED WHETHER NEUROGENIC CLAUDICATION PRESENT: ICD-10-CM

## 2022-06-07 DIAGNOSIS — M54.16 LUMBAR RADICULOPATHY, CHRONIC: ICD-10-CM

## 2022-06-07 DIAGNOSIS — M51.36 DEGENERATIVE DISC DISEASE, LUMBAR: ICD-10-CM

## 2022-06-07 PROCEDURE — 99024 POSTOP FOLLOW-UP VISIT: CPT | Performed by: ORTHOPAEDIC SURGERY

## 2022-06-07 NOTE — PROGRESS NOTES
Patient ID: Uyen Bradshaw is a 77 y.o. male    Chief Compliant:  Chief Complaint   Patient presents with    Post-Op Check        Diagnostic imaging:    AP lateral lumbar spine status post L3 to sacrum PLIF L2-3 posterior instrumented fusion patient is solidly ankylosed L3 to 5-3 is a recent add-on we therefore cut the rods asymmetrically    Assessment and Plan:  1. Lumbar disc herniation    2. Spinal stenosis of lumbar region, unspecified whether neurogenic claudication present    3. Degenerative disc disease, lumbar    4. Lumbar radiculopathy, chronic      12 days post revision PLIF with subsequent lumbar I&D patient recovering well, leg pain has resolved. Moderate back soreness with walking or standing    Follow up 4 weeks    HPI:  This is a 77 y.o. male who presents to the clinic today for 12 days post lumbar I&D epidural hematoma 5/26/22 4 weeks s/p L2-3 PLIF revision instrumentation L3-5 5/18/22. Patient is recovering well post operatively with excellent relief of his leg pain. Patient reports lower back soreness when walking or standing. He is ambulating via walker    Review of Systems   All other systems reviewed and are negative. Past History:    Current Outpatient Medications:     cephALEXin (KEFLEX) 500 MG capsule, Take 1 capsule by mouth 4 times daily for 7 days, Disp: 28 capsule, Rfl: 0    oxyCODONE-acetaminophen (PERCOCET) 5-325 MG per tablet, Take 1 tablet by mouth every 6 hours as needed for Pain for up to 7 days. Intended supply: 7 days.  Take lowest dose possible to manage pain, Disp: 28 tablet, Rfl: 0    atorvastatin (LIPITOR) 10 MG tablet, Take 10 mg by mouth daily, Disp: , Rfl:     fenofibrate micronized (LOFIBRA) 200 MG capsule, Take 200 mg by mouth every morning (before breakfast), Disp: , Rfl:     omeprazole (PRILOSEC) 20 MG delayed release capsule, Take 20 mg by mouth daily, Disp: , Rfl:     metFORMIN (GLUCOPHAGE) 1000 MG tablet, TAKE ONE TABLET BY MOUTH TWICE A DAY WITH MEALS, Disp: 180 tablet, Rfl: 1    lisinopril (PRINIVIL;ZESTRIL) 10 MG tablet, Take 1 tablet by mouth daily, Disp: 90 tablet, Rfl: 1    FEROSUL 325 (65 Fe) MG tablet, TAKE ONE TABLET BY MOUTH DAILY WITH BREAKFAST, Disp: 90 tablet, Rfl: 1    finasteride (PROSCAR) 5 MG tablet, TAKE ONE TABLET BY MOUTH DAILY, Disp: 90 tablet, Rfl: 2    vitamin D (ERGOCALCIFEROL) 1.25 MG (87508 UT) CAPS capsule, TAKE ONE CAPSULE BY MOUTH ONCE WEEKLY, Disp: 12 capsule, Rfl: 2    ezetimibe (ZETIA) 10 MG tablet, TAKE ONE TABLET BY MOUTH DAILY, Disp: 90 tablet, Rfl: 1    coenzyme Q-10 100 MG capsule, TAKE ONE CAPSULE BY MOUTH DAILY, Disp: 30 capsule, Rfl: 5    cinacalcet (SENSIPAR) 30 MG tablet, Take 30 mg by mouth daily, Disp: , Rfl:     tamsulosin (FLOMAX) 0.4 MG capsule, TAKE ONE CAPSULE BY MOUTH TWICE A DAY, Disp: 180 capsule, Rfl: 2    gabapentin (NEURONTIN) 300 MG capsule, Take 600 mg by mouth 3 times daily. , Disp: , Rfl:     metoprolol succinate (TOPROL XL) 100 MG extended release tablet, Take 50 mg by mouth daily , Disp: , Rfl:     E-400 400 UNITS capsule, TAKE TWO CAPSULES BY MOUTH DAILY, Disp: 60 capsule, Rfl: 10    nortriptyline (PAMELOR) 50 MG capsule, Take 50 mg by mouth 2 times daily , Disp: , Rfl:   Allergies   Allergen Reactions    Morphine Other (See Comments)     Becomes very mean.   Tolerates dilaudid and fentanyl    Pcn [Penicillins] Swelling     face    Tramadol Itching    Codeine Nausea And Vomiting     Abd. pain     Social History     Socioeconomic History    Marital status:      Spouse name: Not on file    Number of children: Not on file    Years of education: Not on file    Highest education level: Not on file   Occupational History    Occupation: retired   Tobacco Use    Smoking status: Never Smoker    Smokeless tobacco: Never Used   Vaping Use    Vaping Use: Never used   Substance and Sexual Activity    Alcohol use: Never     Alcohol/week: 0.0 standard drinks    Drug use: Never    Sexual activity: Not on file   Other Topics Concern    Not on file   Social History Narrative    Not on file     Social Determinants of Health     Financial Resource Strain: Low Risk     Difficulty of Paying Living Expenses: Not hard at all   Food Insecurity: No Food Insecurity    Worried About Running Out of Food in the Last Year: Never true    [address] in the Last Year: Never true   Transportation Needs: No Transportation Needs    Lack of Transportation (Medical): No    Lack of Transportation (Non-Medical):  No   Physical Activity:     Days of Exercise per Week: Not on file    Minutes of Exercise per Session: Not on file   Stress:     Feeling of Stress : Not on file   Social Connections:     Frequency of Communication with Friends and Family: Not on file    Frequency of Social Gatherings with Friends and Family: Not on file    Attends Catholic Services: Not on file    Active Member of [address] [place] [address] or Organizations: Not on file    Attends Club or Organization Meetings: Not on file    Marital Status: Not on file   Intimate Partner Violence:     Fear of Current or Ex-Partner: Not on file    Emotionally Abused: Not on file    Physically Abused: Not on file    Sexually Abused: Not on file   Housing Stability:     Unable to Pay for Housing in the Last Year: Not on file    Number of [place] in the Last Year: Not on file    Unstable Housing in the Last Year: Not on file     Past Medical History:   Diagnosis Date    Abdominal hernia     Anesthesia     phrenic nerve damaged lt side    BPH (benign prostatic hyperplasia)     CAD (coronary artery disease)     Chronic back pain     Diabetes mellitus ([facility] [address]     Type 2    Difficult intubation     per pt throat collapses, lt side phrenic nerve was damaged    Ear infection     LT, being treated    Fatty liver     Gallstones     GERD (gastroesophageal reflux disease)     Headache(784.0)     History of MI (myocardial infarction)     Hypercholesteremia     Hyperlipidemia     Hyperparathyroidism ([facility] [address]     Hypertension     Kidney stones     Osteoarthritis     all joints    PONV (postoperative nausea and vomiting)     Sleep apnea     BIPAP nightly    Unspecified sleep apnea      Past Surgical History:   Procedure Laterality Date    BACK SURGERY  07/11/2019    BICEPS TENDON REPAIR Left     CARDIAC CATHETERIZATION  01/02/2019    with Dr. Breonna Hsu, Stents X2    CARPAL TUNNEL RELEASE Right     CERVICAL FUSION      COLONOSCOPY  11 14 14    normal    ELBOW FRACTURE SURGERY Right     right arm/fracture    ENDOSCOPY, COLON, DIAGNOSTIC      several times    FRACTURE SURGERY Right     elbow    KNEE ARTHROSCOPY Bilateral     LIVER BIOPSY  8/14/2015    moderate steatosis, grade 2, lobular and portal inflammation: Grade 1 total score =3, fibrosis score 1A    LUMBAR FUSION N/A 7/10/2019    LUMBAR LAMINECTOMY FUSION POSTERIOR - L3 SACRUM POSTERIOR LUMBAR DECOMPRESSION  & FUSION performed by Kalpana Pabon MD at [place] [address] N/A 5/18/2022    L2-3 POSTERIOR LUMBAR DECOMPRESSION & INSTRUMENTED FUSION POSSIBLE REVISION INSTRUMENTATION L3-5 performed by Kalpana Pabon MD at [facility]  11/07/2018    lumbar myelogram    ROTATOR CUFF REPAIR Right     SPINE SURGERY N/A 5/26/2022    IRRIGATION AND DEBRIDEMENT EPIDURAL HEMATOMA performed by Kalpana aPbon MD at [facility] History   Problem Relation Age of Onset    Heart Disease Mother     Cancer Father         bladder    Arthritis Sister     Cancer Paternal Aunt     Cancer Paternal Aunt         Physical Exam:  Vitals signs and nursing note reviewed. Constitutional:       Appearance: well-developed. HENT:      Head: Normocephalic and atraumatic. Nose: Nose normal.   Eyes:      Conjunctiva/sclera: Conjunctivae normal.   Neck:      Musculoskeletal: Normal range of motion and neck supple.    Pulmonary:      Effort: Pulmonary effort is normal. No respiratory distress. Musculoskeletal:      Comments: Normal gait     Skin:     General: Skin is warm and dry. Neurological:      Mental Status: Alert and oriented to person, place, and time. Sensory: No sensory deficit. Psychiatric:         Behavior: Behavior normal.         Thought Content: Thought content normal.    Stitches discontinued Incision is healing well. No signs of infection    Provider Attestation:  Thetoy Covarrubias, personally performed the services described in this documentation. All medical record entries made by the scribe were at my direction and in my presence. I have reviewed the chart and discharge instructions and agree that the records reflect my personal performance and is accurate and complete. Soraida Heredia MD 6/7/22       Scribe Attestation:  By signing my name below, Godfrey Smith, attest that this documentation has been prepared under the direction and in the presence of Dr. Tatum Patel. Electronically signed: Rene Rocha, 6/7/22     Please note that this chart was generated using voice recognition Dragon dictation software. Although every effort was made to ensure the accuracy of this automated transcription, some errors in transcription may have occurred.

## 2022-06-07 NOTE — TELEPHONE ENCOUNTER
Spoke with Cameron Odonnell from [facility] and provided her with the verbal that Dr. Karolina Peraza is goodwith following the patients home care

## 2022-06-08 ENCOUNTER — CARE COORDINATION (OUTPATIENT)
Dept: CASE MANAGEMENT | Age: 67
End: 2022-06-08

## 2022-06-08 NOTE — CARE COORDINATION
[facility] Transitions Follow Up Call    2022    Patient: Clemente Hare  Patient : 1955    MRN: [id number]  Reason for Admission: Low back pain    Discharge Date: 22 RARS: Readmission Risk Score: 9.1 ( )         Spoke with: Called to speak with patient for update with transition of care. Left HIPPA compliant voice message with contact information [phone] for a call  Back with an update. Care Transitions Subsequent and Final Call    Subsequent and Final Calls  Care Transitions Interventions  Other Interventions:            Follow Up  Future Appointments   Date Time Provider Henry Durant   6/10/2022  3:00 PM [place] Mendez [facility]   2022  1:10 PM Arlette Burkitt, MD [facility]   2022  8:15 AM Jensramón Mendez [facility]   9/15/2022  1:30 PM Gabe Delacruz MD  wilder Whitman LPN

## 2022-06-09 ENCOUNTER — TELEPHONE (OUTPATIENT)
Dept: UROLOGY | Age: 67
End: 2022-06-09

## 2022-06-09 DIAGNOSIS — N40.1 BPH WITH OBSTRUCTION/LOWER URINARY TRACT SYMPTOMS: ICD-10-CM

## 2022-06-09 DIAGNOSIS — N13.8 BPH WITH OBSTRUCTION/LOWER URINARY TRACT SYMPTOMS: ICD-10-CM

## 2022-06-09 RX ORDER — TAMSULOSIN HYDROCHLORIDE 0.4 MG/1
CAPSULE ORAL
Qty: 180 CAPSULE | Refills: 2 | Status: SHIPPED | OUTPATIENT
Start: 2022-06-09

## 2022-06-10 ENCOUNTER — CARE COORDINATION (OUTPATIENT)
Dept: CASE MANAGEMENT | Age: 67
End: 2022-06-10

## 2022-06-10 NOTE — CARE COORDINATION
contact information for future needs. No further follow-up call indicated based on severity of symptoms and risk factors. Care Transitions Subsequent and Final Call    Subsequent and Final Calls  Do you have any ongoing symptoms?: No  Have your medications changed?: No  Do you have any questions related to your medications?: No  Do you currently have any active services?: No  Do you have any needs or concerns that I can assist you with?: No  Identified Barriers: Other  Care Transitions Interventions  Other Interventions:            Follow Up  Future Appointments   Date Time Provider Henry Durant   6/21/2022  1:10 PM Justin Hartman MD [facility]   8/5/2022  8:15 AM DAVID Herrmann [facility]   9/15/2022  1:30 PM MD linda Weeks LPN

## 2022-06-21 ENCOUNTER — OFFICE VISIT (OUTPATIENT)
Dept: ORTHOPEDIC SURGERY | Age: 67
End: 2022-06-21

## 2022-06-21 VITALS — HEIGHT: 68 IN | WEIGHT: 205 LBS | BODY MASS INDEX: 31.07 KG/M2 | RESPIRATION RATE: 14 BRPM

## 2022-06-21 DIAGNOSIS — M51.36 DEGENERATIVE DISC DISEASE, LUMBAR: ICD-10-CM

## 2022-06-21 DIAGNOSIS — M48.061 SPINAL STENOSIS OF LUMBAR REGION, UNSPECIFIED WHETHER NEUROGENIC CLAUDICATION PRESENT: ICD-10-CM

## 2022-06-21 DIAGNOSIS — M51.26 LUMBAR DISC HERNIATION: Primary | ICD-10-CM

## 2022-06-21 DIAGNOSIS — M54.16 LUMBAR RADICULOPATHY, CHRONIC: ICD-10-CM

## 2022-06-21 PROCEDURE — 99024 POSTOP FOLLOW-UP VISIT: CPT | Performed by: ORTHOPAEDIC SURGERY

## 2022-06-21 NOTE — PROGRESS NOTES
Patient ID: Louie Palacio is a 77 y.o. male    Chief Compliant:  Chief Complaint   Patient presents with    Post-Op Check        Diagnostic imaging:        Assessment and Plan:  1. Lumbar disc herniation    2. Spinal stenosis of lumbar region, unspecified whether neurogenic claudication present    3. Degenerative disc disease, lumbar    4. Lumbar radiculopathy, chronic      Post revision PLIF with subsequent lumbar I&D patient recovering well, leg pain has resolved. Moderate back soreness with walking or standing     Has minor difficulty with balance. Follow up 6 weeks with new x-rays    Repeat x-rays at follow up    HPI:  This is a 77 y.o. male who presents to the clinic today for 1 month follow up post lumbar I&D epidural hematoma 5/26/22, s/p L2-5 PLIF 5/18/22. Patient reports significant improvement of pain, only has minor pain across the low back in the morning. He occasionally has difficulties with balance upon standing. Review of Systems   All other systems reviewed and are negative.       Past History:    Current Outpatient Medications:     tamsulosin (FLOMAX) 0.4 mg capsule, TAKE ONE CAPSULE BY MOUTH TWICE A DAY, Disp: 180 capsule, Rfl: 2    atorvastatin (LIPITOR) 10 MG tablet, Take 10 mg by mouth daily, Disp: , Rfl:     fenofibrate micronized (LOFIBRA) 200 MG capsule, Take 200 mg by mouth every morning (before breakfast), Disp: , Rfl:     omeprazole (PRILOSEC) 20 MG delayed release capsule, Take 20 mg by mouth daily, Disp: , Rfl:     metFORMIN (GLUCOPHAGE) 1000 MG tablet, TAKE ONE TABLET BY MOUTH TWICE A DAY WITH MEALS, Disp: 180 tablet, Rfl: 1    lisinopril (PRINIVIL;ZESTRIL) 10 MG tablet, Take 1 tablet by mouth daily, Disp: 90 tablet, Rfl: 1    FEROSUL 325 (65 Fe) MG tablet, TAKE ONE TABLET BY MOUTH DAILY WITH BREAKFAST, Disp: 90 tablet, Rfl: 1    finasteride (PROSCAR) 5 MG tablet, TAKE ONE TABLET BY MOUTH DAILY, Disp: 90 tablet, Rfl: 2    vitamin D (ERGOCALCIFEROL) 1.25 MG (23270 UT) CAPS capsule, TAKE ONE CAPSULE BY MOUTH ONCE WEEKLY, Disp: 12 capsule, Rfl: 2    ezetimibe (ZETIA) 10 MG tablet, TAKE ONE TABLET BY MOUTH DAILY, Disp: 90 tablet, Rfl: 1    coenzyme Q-10 100 MG capsule, TAKE ONE CAPSULE BY MOUTH DAILY, Disp: 30 capsule, Rfl: 5    cinacalcet (SENSIPAR) 30 MG tablet, Take 30 mg by mouth daily, Disp: , Rfl:     gabapentin (NEURONTIN) 300 MG capsule, Take 600 mg by mouth 3 times daily. , Disp: , Rfl:     metoprolol succinate (TOPROL XL) 100 MG extended release tablet, Take 50 mg by mouth daily , Disp: , Rfl:     E-400 400 UNITS capsule, TAKE TWO CAPSULES BY MOUTH DAILY, Disp: 60 capsule, Rfl: 10    nortriptyline (PAMELOR) 50 MG capsule, Take 50 mg by mouth 2 times daily , Disp: , Rfl:   Allergies   Allergen Reactions    Morphine Other (See Comments)     Becomes very mean.   Tolerates dilaudid and fentanyl    Pcn [Penicillins] Swelling     face    Tramadol Itching    Codeine Nausea And Vomiting     Abd. pain     Social History     Socioeconomic History    Marital status:      Spouse name: Not on file    Number of children: Not on file    Years of education: Not on file    Highest education level: Not on file   Occupational History    Occupation: retired   Tobacco Use    Smoking status: Never Smoker    Smokeless tobacco: Never Used   Vaping Use    Vaping Use: Never used   Substance and Sexual Activity    Alcohol use: Never     Alcohol/week: 0.0 standard drinks    Drug use: Never    Sexual activity: Not on file   Other Topics Concern    Not on file   Social History Narrative    Not on file     Social Determinants of Health     Financial Resource Strain: Low Risk     Difficulty of Paying Living Expenses: Not hard at all   Food Insecurity: No Food Insecurity    Worried About [facility] in the Last Year: Never true    Darryl of Food in the Last Year: Never true   Transportation Needs: No Transportation Needs    Lack of Transportation (Medical): No    Lack of Transportation (Non-Medical):  No   Physical Activity:     Days of Exercise per Week: Not on file    Minutes of Exercise per Session: Not on file   Stress:     Feeling of Stress : Not on file   Social Connections:     Frequency of Communication with Friends and Family: Not on file    Frequency of Social Gatherings with Friends and Family: Not on file    Attends Roman Catholic Services: Not on file    Active Member of [address] [place] [address] or Organizations: Not on file    Attends Club or Organization Meetings: Not on file    Marital Status: Not on file   Intimate Partner Violence:     Fear of Current or Ex-Partner: Not on file    Emotionally Abused: Not on file    Physically Abused: Not on file    Sexually Abused: Not on file   Housing Stability:     Unable to Pay for Housing in the Last Year: Not on file    Number of [place] in the Last Year: Not on file    Unstable Housing in the Last Year: Not on file     Past Medical History:   Diagnosis Date    Abdominal hernia     Anesthesia     phrenic nerve damaged lt side    BPH (benign prostatic hyperplasia)     CAD (coronary artery disease)     Chronic back pain     Diabetes mellitus ([address]     Type 2    Difficult intubation     per pt throat collapses, lt side phrenic nerve was damaged    Ear infection     LT, being treated    Fatty liver     Gallstones     GERD (gastroesophageal reflux disease)     Headache(784.0)     History of MI (myocardial infarction)     Hypercholesteremia     Hyperlipidemia     Hyperparathyroidism ([address]     Hypertension     Kidney stones     Osteoarthritis     all joints    PONV (postoperative nausea and vomiting)     Sleep apnea     BIPAP nightly    Unspecified sleep apnea      Past Surgical History:   Procedure Laterality Date    BACK SURGERY  07/11/2019    BICEPS TENDON REPAIR Left     CARDIAC CATHETERIZATION  01/02/2019    with Dr. Cindy Arana, Stents X2    CARPAL TUNNEL RELEASE Right     CERVICAL FUSION      COLONOSCOPY  11 14 14    normal    ELBOW FRACTURE SURGERY Right     right arm/fracture    ENDOSCOPY, COLON, DIAGNOSTIC      several times    FRACTURE SURGERY Right     elbow    KNEE ARTHROSCOPY Bilateral     LIVER BIOPSY  8/14/2015    moderate steatosis, grade 2, lobular and portal inflammation: Grade 1 total score =3, fibrosis score 1A    LUMBAR FUSION N/A 7/10/2019    LUMBAR LAMINECTOMY FUSION POSTERIOR - L3 SACRUM POSTERIOR LUMBAR DECOMPRESSION  & FUSION [facility] 5/18/2022    L2-3 POSTERIOR LUMBAR DECOMPRESSION & INSTRUMENTED FUSION POSSIBLE REVISION INSTRUMENTATION L3-5 performed by Claudio Hsu MD at [facility]  11/07/2018    lumbar myelogram    ROTATOR CUFF REPAIR Right     SPINE SURGERY N/A 5/26/2022    IRRIGATION AND DEBRIDEMENT EPIDURAL HEMATOMA performed by Claudio Hsu MD at [address]     Family History   Problem Relation Age of Onset    Heart Disease Mother     Cancer Father         bladder    Arthritis Sister     Cancer Paternal Aunt     Cancer Paternal Aunt         Physical Exam:  Vitals signs and nursing note reviewed. Constitutional:       Appearance: well-developed. HENT:      Head: Normocephalic and atraumatic. Nose: Nose normal.   Eyes:      Conjunctiva/sclera: Conjunctivae normal.   Neck:      Musculoskeletal: Normal range of motion and neck supple. Pulmonary:      Effort: Pulmonary effort is normal. No respiratory distress. Musculoskeletal:      Comments: Normal gait     Skin:     General: Skin is warm and dry. Neurological:      Mental Status: Alert and oriented to person, place, and time. Sensory: No sensory deficit. Psychiatric:         Behavior: Behavior normal.         Thought Content: Thought content normal.        Provider Attestation:  Saurabh Covarrubias, personally performed the services described in this documentation.  All medical record entries made by the scribe were at my direction and in my presence. I have reviewed the chart and discharge instructions and agree that the records reflect my personal performance and is accurate and complete. Ronit Rice MD 6/21/22   Malachi López Attestation:  By signing my name below, Bj Estrella, attest that this documentation has been prepared under the direction and in the presence of Dr. Geno Shah. Electronically signed: Rene Schultz, 6/21/22     Please note that this chart was generated using voice recognition Dragon dictation software. Although every effort was made to ensure the accuracy of this automated transcription, some errors in transcription may have occurred.

## 2022-08-02 ENCOUNTER — OFFICE VISIT (OUTPATIENT)
Dept: ORTHOPEDIC SURGERY | Age: 67
End: 2022-08-02

## 2022-08-02 VITALS — RESPIRATION RATE: 14 BRPM | WEIGHT: 205 LBS | BODY MASS INDEX: 31.07 KG/M2 | HEIGHT: 68 IN

## 2022-08-02 DIAGNOSIS — M51.26 LUMBAR DISC HERNIATION: Primary | ICD-10-CM

## 2022-08-02 DIAGNOSIS — M54.16 LUMBAR RADICULOPATHY, CHRONIC: ICD-10-CM

## 2022-08-02 DIAGNOSIS — M51.36 DEGENERATIVE DISC DISEASE, LUMBAR: ICD-10-CM

## 2022-08-02 DIAGNOSIS — M48.061 SPINAL STENOSIS OF LUMBAR REGION, UNSPECIFIED WHETHER NEUROGENIC CLAUDICATION PRESENT: ICD-10-CM

## 2022-08-02 PROCEDURE — 99024 POSTOP FOLLOW-UP VISIT: CPT | Performed by: ORTHOPAEDIC SURGERY

## 2022-08-02 RX ORDER — CINACALCET 90 MG/1
90 TABLET, FILM COATED ORAL DAILY
COMMUNITY
Start: 2022-07-19 | End: 2022-09-15 | Stop reason: SDUPTHER

## 2022-08-02 NOTE — PROGRESS NOTES
Date    Abdominal hernia     Anesthesia     phrenic nerve damaged lt side    BPH (benign prostatic hyperplasia)     CAD (coronary artery disease)     Chronic back pain     Diabetes mellitus (HCC)     Type 2    Difficult intubation     per pt throat collapses, lt side phrenic nerve was damaged    Ear infection     LT, being treated    Fatty liver     Gallstones     GERD (gastroesophageal reflux disease)     Headache(784.0)     History of MI (myocardial infarction)     Hypercholesteremia     Hyperlipidemia     Hyperparathyroidism (HCC)     Hypertension     Kidney stones     Osteoarthritis     all joints    PONV (postoperative nausea and vomiting)     Sleep apnea     BIPAP nightly    Unspecified sleep apnea      Past Surgical History:   Procedure Laterality Date    BACK SURGERY  07/11/2019    BICEPS TENDON REPAIR Left     CARDIAC CATHETERIZATION  01/02/2019    with Dr. Grider [place], Stents X2    CARPAL TUNNEL RELEASE Right     CERVICAL FUSION      COLONOSCOPY  11 14 14    normal    ELBOW FRACTURE SURGERY Right     right arm/fracture    ENDOSCOPY, COLON, DIAGNOSTIC      several times    FRACTURE SURGERY Right     elbow    KNEE ARTHROSCOPY Bilateral     LIVER BIOPSY  8/14/2015    moderate steatosis, grade 2, lobular and portal inflammation: Grade 1 total score =3, fibrosis score 1A    LUMBAR FUSION N/A 7/10/2019    LUMBAR LAMINECTOMY FUSION POSTERIOR - L3 SACRUM POSTERIOR LUMBAR DECOMPRESSION  & FUSION performed by Luci Baca MD at [facility] 5/18/2022    L2-3 POSTERIOR LUMBAR DECOMPRESSION & INSTRUMENTED FUSION POSSIBLE REVISION INSTRUMENTATION L3-5 performed by Luci Baca MD at [address]  [facility]  11/07/2018    lumbar myelogram    ROTATOR CUFF REPAIR Right     SPINE SURGERY N/A 5/26/2022    IRRIGATION AND DEBRIDEMENT EPIDURAL HEMATOMA performed by Luci Baca MD at [address]     Family History   Problem Relation Age of Onset    Heart Disease Mother     Cancer Father         bladder Arthritis Sister     Cancer Paternal Aunt     Cancer Paternal Aunt         Physical Exam:  Vitals signs and nursing note reviewed. Constitutional:       Appearance: well-developed. HENT:      Head: Normocephalic and atraumatic. Nose: Nose normal.   Eyes:      Conjunctiva/sclera: Conjunctivae normal.   Neck:      Musculoskeletal: Normal range of motion and neck supple. Pulmonary:      Effort: Pulmonary effort is normal. No respiratory distress. Musculoskeletal:      Comments: Normal gait     Skin:     General: Skin is warm and dry. Neurological:      Mental Status: Alert and oriented to person, place, and time. Sensory: No sensory deficit. Psychiatric:         Behavior: Behavior normal.         Thought Content: Thought content normal.    Patient standing nearly straight up    Provider Attestation:  Lauro Covarrubias, personally performed the services described in this documentation. All medical record entries made by the scribe were at my direction and in my presence. I have reviewed the chart and discharge instructions and agree that the records reflect my personal performance and is accurate and complete. Pito Agudelo MD 8/2/22       Scribe Attestation:  By signing my name below, Romie Steele, attest that this documentation has been prepared under the direction and in the presence of Dr. Ayden Junior. Electronically signed: Rene Cotton, 8/2/22     Please note that this chart was generated using voice recognition Dragon dictation software. Although every effort was made to ensure the accuracy of this automated transcription, some errors in transcription may have occurred.

## 2022-08-05 ENCOUNTER — HOSPITAL ENCOUNTER (OUTPATIENT)
Age: 67
Setting detail: SPECIMEN
Discharge: HOME | End: 2022-08-05

## 2022-08-05 DIAGNOSIS — E11.9 TYPE 2 DIABETES MELLITUS WITHOUT COMPLICATION, WITHOUT LONG-TERM CURRENT USE OF INSULIN (HCC): ICD-10-CM

## 2022-08-05 LAB
CREATININE URINE: 114.2 MG/DL (ref 39–259)
MICROALBUMIN/CREAT 24H UR: 20 MG/L
MICROALBUMIN/CREAT UR-RTO: 18 MCG/MG CREAT

## 2022-09-15 ENCOUNTER — OFFICE VISIT (OUTPATIENT)
Dept: FAMILY MEDICINE CLINIC | Age: 67
End: 2022-09-15
Payer: MEDICARE

## 2022-09-15 VITALS
OXYGEN SATURATION: 96 % | HEART RATE: 76 BPM | DIASTOLIC BLOOD PRESSURE: 120 MMHG | HEIGHT: 68 IN | TEMPERATURE: 98.6 F | BODY MASS INDEX: 31.83 KG/M2 | SYSTOLIC BLOOD PRESSURE: 190 MMHG | WEIGHT: 210 LBS

## 2022-09-15 DIAGNOSIS — I12.9 BENIGN HYPERTENSION WITH CKD (CHRONIC KIDNEY DISEASE) STAGE III (HCC): ICD-10-CM

## 2022-09-15 DIAGNOSIS — N18.31 STAGE 3A CHRONIC KIDNEY DISEASE (HCC): ICD-10-CM

## 2022-09-15 DIAGNOSIS — I25.10 CORONARY ARTERY DISEASE INVOLVING NATIVE CORONARY ARTERY OF NATIVE HEART WITHOUT ANGINA PECTORIS: ICD-10-CM

## 2022-09-15 DIAGNOSIS — E11.22 TYPE 2 DIABETES MELLITUS WITH STAGE 3A CHRONIC KIDNEY DISEASE, WITHOUT LONG-TERM CURRENT USE OF INSULIN (HCC): ICD-10-CM

## 2022-09-15 DIAGNOSIS — E21.3 HYPERPARATHYROIDISM (HCC): ICD-10-CM

## 2022-09-15 DIAGNOSIS — R06.09 DOE (DYSPNEA ON EXERTION): Primary | ICD-10-CM

## 2022-09-15 DIAGNOSIS — N18.30 BENIGN HYPERTENSION WITH CKD (CHRONIC KIDNEY DISEASE) STAGE III (HCC): ICD-10-CM

## 2022-09-15 DIAGNOSIS — M17.0 PRIMARY OSTEOARTHRITIS OF BOTH KNEES: ICD-10-CM

## 2022-09-15 DIAGNOSIS — N18.31 TYPE 2 DIABETES MELLITUS WITH STAGE 3A CHRONIC KIDNEY DISEASE, WITHOUT LONG-TERM CURRENT USE OF INSULIN (HCC): ICD-10-CM

## 2022-09-15 DIAGNOSIS — E78.2 MIXED HYPERLIPIDEMIA: ICD-10-CM

## 2022-09-15 DIAGNOSIS — K21.9 GASTROESOPHAGEAL REFLUX DISEASE WITHOUT ESOPHAGITIS: ICD-10-CM

## 2022-09-15 LAB — HBA1C MFR BLD: 6.8 %

## 2022-09-15 PROCEDURE — G8417 CALC BMI ABV UP PARAM F/U: HCPCS | Performed by: FAMILY MEDICINE

## 2022-09-15 PROCEDURE — 1036F TOBACCO NON-USER: CPT | Performed by: FAMILY MEDICINE

## 2022-09-15 PROCEDURE — 83036 HEMOGLOBIN GLYCOSYLATED A1C: CPT | Performed by: FAMILY MEDICINE

## 2022-09-15 PROCEDURE — 1123F ACP DISCUSS/DSCN MKR DOCD: CPT | Performed by: FAMILY MEDICINE

## 2022-09-15 PROCEDURE — 3017F COLORECTAL CA SCREEN DOC REV: CPT | Performed by: FAMILY MEDICINE

## 2022-09-15 PROCEDURE — G8427 DOCREV CUR MEDS BY ELIG CLIN: HCPCS | Performed by: FAMILY MEDICINE

## 2022-09-15 PROCEDURE — 2022F DILAT RTA XM EVC RTNOPTHY: CPT | Performed by: FAMILY MEDICINE

## 2022-09-15 PROCEDURE — 99215 OFFICE O/P EST HI 40 MIN: CPT | Performed by: FAMILY MEDICINE

## 2022-09-15 PROCEDURE — 3044F HG A1C LEVEL LT 7.0%: CPT | Performed by: FAMILY MEDICINE

## 2022-09-15 RX ORDER — DICLOFENAC SODIUM 75 MG/1
TABLET, DELAYED RELEASE ORAL
COMMUNITY
Start: 2022-08-09 | End: 2022-09-15 | Stop reason: ALTCHOICE

## 2022-09-15 RX ORDER — ASPIRIN 81 MG/1
81 TABLET ORAL DAILY
Qty: 90 TABLET | Refills: 1
Start: 2022-09-15

## 2022-09-15 RX ORDER — ACETAMINOPHEN 500 MG
500 TABLET ORAL EVERY 6 HOURS PRN
Qty: 120 TABLET | Refills: 3
Start: 2022-09-15

## 2022-09-15 RX ORDER — CLOPIDOGREL BISULFATE 75 MG/1
75 TABLET ORAL DAILY
Qty: 90 TABLET | Refills: 3
Start: 2022-09-15

## 2022-09-15 RX ORDER — PANTOPRAZOLE SODIUM 20 MG/1
20 TABLET, DELAYED RELEASE ORAL
Qty: 90 TABLET | Refills: 1 | Status: SHIPPED | OUTPATIENT
Start: 2022-09-15

## 2022-09-15 RX ORDER — CINACALCET 30 MG/1
30 TABLET, FILM COATED ORAL DAILY
Qty: 90 TABLET | Refills: 0
Start: 2022-09-15

## 2022-09-15 RX ORDER — METOPROLOL SUCCINATE 100 MG/1
100 TABLET, EXTENDED RELEASE ORAL DAILY
Qty: 90 TABLET | Refills: 0 | Status: SHIPPED | OUTPATIENT
Start: 2022-09-15

## 2022-09-15 ASSESSMENT — ENCOUNTER SYMPTOMS
COUGH: 0
WHEEZING: 0
BLOOD IN STOOL: 0
VOMITING: 0
DIARRHEA: 1
ABDOMINAL DISTENTION: 0
ABDOMINAL PAIN: 0
SHORTNESS OF BREATH: 1
CHEST TIGHTNESS: 0
CONSTIPATION: 0

## 2022-09-15 NOTE — PROGRESS NOTES
Rajani Nice (:  1955) is a 79 y.o. male,New patient, here for evaluation of the following chief complaint(s): Hypertension (NTP, patient states that for the past month bp has been running high), New Patient (Transferring from another office), Shortness of Breath, Diabetes, and Hyperlipidemia      ASSESSMENT/PLAN:    1. TINOCO (dyspnea on exertion)  Failing to resolve  Has multiple cardiovascular risk factors,will see cardiology   He says he will get EKG with Dr. Cassandra Boyle. The patient verbalizes understanding and agrees with the plan. Rule out CHF and pulmonary disease    -     Brain Natriuretic Peptide; Future  -     XR CHEST (2 VW); Future      Careful review of urgent symptoms and need for immediate medical attention if condition worsens. Patient expressed understanding. Advised to go to ED if severe symptoms develop. Patient expressed understanding    2. Benign hypertension with CKD (chronic kidney disease) stage III (HCC)  worsening Chronic kidney disease stage 3, GFR 53 on 22, was over 60 before, 59 on 2022  Stop oral NSAIDs  Inadequately controlled HTN    Continue Lisinopril 10 mg  - increase    metoprolol succinate (TOPROL XL) 100 MG extended release tablet; Take 1 tablet by mouth daily Dose increased 9/15/2022 . Goal BP bellow 135/85 and above 115/65, heart rate 56 to 90 bpm, Disp-90 tablet, R-0Normal  Discussed low salt diet and BP and pulse monitoring. Check labs    -     CBC; Future  -     Comprehensive Metabolic Panel; Future  -     Uric Acid; Future  -     TSH; Future  -     Magnesium; Future  -     Phosphorus; Future    3.  Type 2 diabetes mellitus with stage 3a chronic kidney disease, without long-term current use of insulin ([facility] [address]  Worsening diabetes mellitus type 2  Well controlled diabetes mellitus type 2   worsening Chronic kidney disease stage 3  -     POCT glycosylated hemoglobin (Hb A1C)  Lab Results   Component Value Date    LABA1C 6.8 09/15/2022    LABA1C 6.4 (H) 04/05/2022    LABA1C 7.7 (H) 09/03/2021       -     CBC; Future  -     Comprehensive Metabolic Panel; Future  -     Vitamin B12 & Folate; Future  -     Uric Acid; Future  -     TSH; Future  Declines home testing  Check A1c every 3-6 months  Stop drinking pop    -daily feet exam, Foot care: advised to wash feet daily, pat dry and apply lotion at night, avoiding between toes. Need to look at feet daily and report to a physician any signs of inflammation or skin damage  -annual dilated eye exam  -Low carb, low fat diet, increase fruits and vegetables, and exercise 4-5 times a day 30-40 minutes a day discussed  -continue current treatment Metformin 1000 mg BID    -continue Aspirin  81 mg  -continue Lisinopril ACEI and statin Pravastatin    4. Mixed hyperlipidemia  Improved  Stop fenofibrate  Continue Zetia and Pravastatin  Lab Results   Component Value Date    LDLCHOLESTEROL 60 04/05/2022    LDLDIRECT 100 (H) 03/02/2019       5. Coronary artery disease involving native coronary artery of native heart without angina pectoris  Stable  Improve BP control  Continue Lisinopril 10 mg, Pravastatin 40 mg  -Dose increased 9/18/2022       metoprolol succinate (TOPROL XL) 100 MG extended release tablet; Take 1 tablet by mouth daily Dose increased 9/15/2022 . Goal BP bellow 135/85 and above 115/65, heart rate 56 to 90 bpm, Disp-90 tablet, R-0Normal  -     aspirin EC 81 MG EC tablet; Take 1 tablet by mouth daily, Disp-90 tablet, R-1NO PRINT  -     clopidogrel (PLAVIX) 75 MG tablet; Take 1 tablet by mouth daily, Disp-90 tablet, R-3NO PRINT    6. Hyperparathyroidism ([address]  Improved with meds  Seeing Dr. Katie James  -continue     cinacalcet (SENSIPAR) 30 MG tablet; Take 1 tablet by mouth daily Per Dr. Katie James, Disp-90 tablet, R-0NO PRINT    PTH 33 on 9/12/2022    7. Primary osteoarthritis of both knees  worsening    - start    acetaminophen (TYLENOL) 500 MG tablet;  Take 1 tablet by mouth every 6 hours as needed for Pain, Disp-120 tablet, R-3NO PRINT  - could try     diclofenac sodium (VOLTAREN) 1 % GEL; Apply 2 g topically 4 times daily as needed for Pain STOP DICLOFENAC TABLETS DUE TO MILD KIDNEY DISEASE, Topical, 4 TIMES DAILY PRN Starting Thu 9/15/2022, Disp-100 g, R-0, Normal  -   start  camphor-menthol-methyl salicylate (BENGAY ULTRA STRENGTH) 4-10-30 % CREA cream; Apply topically 3 times daily as needed for Pain, Apply externally, 3 TIMES DAILY PRN Starting Thu 9/15/2022, Disp-113 g, R-0, NO PRINT  Also on Nortryptiline  and Gabapentin for chronic pain    8. Stage 3a chronic kidney disease (HCC)  Worsening   Stop oral NSAIDs  Try topical creams for pain  Check labs  Increase fluids    -     CBC; Future  -     Comprehensive Metabolic Panel; Future  -     Uric Acid; Future  -     TSH; Future  -     Magnesium; Future  -     Phosphorus; Future  9. Gastroesophageal reflux disease without esophagitis  Improved with meds    -  start   pantoprazole (PROTONIX) 20 MG tablet; Take 1 tablet by mouth every morning (before breakfast) ** stop Omeprazole due to interaction with plavix**, Disp-90 tablet, R-1Normal      Stop pop  Could stop iron and vitamin E too, pending labs  .       Medications Discontinued During This Encounter   Medication Reason    diclofenac (VOLTAREN) 75 MG EC tablet Alternate therapy    fenofibrate (TRIGLIDE) 160 MG tablet Stop Taking at Discharge    fenofibrate micronized (LOFIBRA) 618 MG capsule DUPLICATE    atorvastatin (LIPITOR) 10 MG tablet Alternate therapy    vitamin D (ERGOCALCIFEROL) 1.25 MG (05577 UT) CAPS capsule Stop Taking at Discharge    omeprazole (PRILOSEC) 20 MG delayed release capsule Alternate therapy    metoprolol succinate (TOPROL XL) 100 MG extended release tablet REORDER    cinacalcet (SENSIPAR) 90 MG tablet REORDER         Juan Carlos Pittman received counseling on the following healthy behaviors: nutrition, exercise, medication adherence, and weight loss    Reviewed prior labs and health maintenance  Discussed use, benefit, and side effects of prescribed medications. Barriers to medication compliance addressed. Patient given educational materials - see patient instructions  All patient questions answered. Patient voiced understanding. The patient's past medical,surgical, social, and family history as well as his current medications and allergies were reviewed as documented in today's encounter. Medications, labs, diagnostic studies, consultations and follow-up as documented in this encounter. Return in about 3 months (around 12/15/2022) for Visit type diabetes, **POC A1C, DM2, HTN, HLP. PLEASE OBTAIN COLONOSCOPY REPORT DONE AT [facility] 2014 2015    Future Appointments   Date Time Provider Henry Durant   12[facility]15/2022  2:00 PM [facility]   [facility]/16/2023  8:30 AM [facility]           Prior to Visit Medications    Medication Sig Taking? Authorizing Provider   diclofenac (VOLTAREN) 75 MG EC tablet  Yes Historical Provider, MD   fenofibrate (TRIGLIDE) 160 MG tablet TAKE ONE TABLET BY MOUTH DAILY Yes Francesca Bray PA-C   pravastatin (PRAVACHOL) 40 MG tablet TAKE ONE TABLET BY MOUTH DAILY Yes Francesca Bray PA-C   cinacalcet (SENSIPAR) 90 MG tablet Take 90 mg by mouth in the morning.  Yes Historical Provider, MD   ezetimibe (ZETIA) 10 MG tablet TAKE ONE TABLET BY MOUTH DAILY Yes Francesca Bray PA-C   coenzyme Q-10 100 MG capsule TAKE ONE CAPSULE BY MOUTH DAILY Yes Francesca Bray PA-C   tamsulosin (FLOMAX) 0.4 mg capsule TAKE ONE CAPSULE BY MOUTH TWICE A DAY Yes Monica Nuñez MD   fenofibrate micronized (LOFIBRA) 200 MG capsule Take 200 mg by mouth every morning (before breakfast) Yes Historical Provider, MD   omeprazole (PRILOSEC) 20 MG delayed release capsule Take 40 mg by mouth daily Yes Historical Provider, MD   metFORMIN (GLUCOPHAGE) 1000 MG tablet TAKE ONE TABLET BY MOUTH TWICE A DAY WITH MEALS Yes Francesca Bray PA-C   lisinopril (PRINIVIL;ZESTRIL) 10 MG tablet Take 1 tablet by mouth daily Yes Can Gee PA-C   FEROSUL 325 (65 Fe) MG tablet TAKE ONE TABLET BY MOUTH DAILY WITH BREAKFAST Yes SERA Wagner CNP   finasteride (PROSCAR) 5 MG tablet TAKE ONE TABLET BY MOUTH DAILY Yes Zachary Zamora MD   vitamin D (ERGOCALCIFEROL) 1.25 MG (81241 UT) CAPS capsule TAKE ONE CAPSULE BY MOUTH ONCE WEEKLY Yes Can Gee PA-C   gabapentin (NEURONTIN) 300 MG capsule Take 600 mg by mouth 3 times daily. Yes Historical Provider, MD   metoprolol succinate (TOPROL XL) 100 MG extended release tablet Take 50 mg by mouth daily  Yes Historical Provider, MD   E-400 400 UNITS capsule TAKE TWO CAPSULES BY MOUTH DAILY Yes Mervat Allen MD   nortriptyline (PAMELOR) 50 MG capsule Take 50 mg by mouth 2 times daily  Yes Historical Provider, MD   atorvastatin (LIPITOR) 10 MG tablet Take 10 mg by mouth daily  Patient not taking: Reported on 9/15/2022  Historical Provider, MD       Allergies   Allergen Reactions    Pcn [Penicillins] Swelling     face    Morphine Other (See Comments)     Becomes very mean.  Tolerates dilaudid and fentanyl    Codeine Nausea And Vomiting     Abd. pain    Tramadol Itching       Past Medical History:   Diagnosis Date    Abdominal hernia     Anesthesia     phrenic nerve damaged lt side    BPH (benign prostatic hyperplasia)     CAD (coronary artery disease)     Chronic back pain     Diabetes mellitus ([facility] [address]     Type 2    Difficult intubation     per pt throat collapses, lt side phrenic nerve was damaged    Drug-induced constipation 5/24/2022    Ear infection     LT, being treated    Essential hypertension 10/23/2017    Fatty liver     Gallstones     GERD (gastroesophageal reflux disease)     Headache(784.0)     Hematoma 5/25/2022    History of MI (myocardial infarction)     Hypercholesteremia     Hyperlipidemia     Hyperparathyroidism (HCC)     Hypertension     Kidney stones     Osteoarthritis     all joints    PONV (postoperative nausea and vomiting) S/P cardiac catheterization 1/2/2019    Sleep apnea     BIPAP nightly    Type 2 diabetes mellitus with stage 3a chronic kidney disease, without long-term current use of insulin ([facility] [address] 9/15/2022    Unspecified sleep apnea        Past Surgical History:   Procedure Laterality Date    BACK SURGERY  07/11/2019    BICEPS TENDON REPAIR Left     CARDIAC CATHETERIZATION  01/02/2019    with Dr. Duard Favre, Stents [facility] Right     CERVICAL FUSION      COLONOSCOPY  11 14 14    normal    ELBOW FRACTURE SURGERY Right     right arm/fracture    ENDOSCOPY, COLON, DIAGNOSTIC      several times    FRACTURE SURGERY Right     elbow    KNEE ARTHROSCOPY Bilateral     LIVER BIOPSY  8/14/2015    moderate steatosis, grade 2, lobular and portal inflammation: Grade 1 total score =3, fibrosis score 1A    LUMBAR FUSION N/A 7/10/2019    LUMBAR LAMINECTOMY FUSION POSTERIOR - L3 SACRUM POSTERIOR LUMBAR DECOMPRESSION  & FUSION performed by Unique Aguilar MD at [facility] 5/18/2022    L2-3 POSTERIOR LUMBAR DECOMPRESSION & INSTRUMENTED FUSION POSSIBLE REVISION INSTRUMENTATION L3-5 performed by Unique Aguilar MD at [facility]  11/07/2018    lumbar myelogram    ROTATOR CUFF REPAIR Right     SPINE SURGERY N/A 5/26/2022    IRRIGATION AND DEBRIDEMENT EPIDURAL HEMATOMA performed by Unique Aguilar MD at [address] History   Problem Relation Age of Onset    Heart Disease Mother     Cancer Father         bladder    Arthritis Sister     Cancer Paternal Aunt     Cancer Paternal Aunt        Social History     Tobacco Use    Smoking status: Never    Smokeless tobacco: Never   Vaping Use    Vaping Use: Never used   Substance Use Topics    Alcohol use: Never     Alcohol/week: 0.0 standard drinks    Drug use: Never         SUBJECTIVE/OBJECTIVE:    Prior PCP: Cailin Butler reports he is getting shortness of breath once a day at least  Denies chest pain   Occasionally gets back pain when having shortness of breath   episodes last for several seconds, associated with sweating  Denies new  numbness or lightheadedness when having the shortness of breath    Had food poisoning 1 months ago  Gets fullness sensation and nausea  Having heartburn, but takes Omeprazole which interacts with Plavix  Denies constipation or abdominal pain. Has chronic diarrhea  Says he is up to date with colonoscopy , will request report    Hypertension, coronary artery disease s/p 3 stents 3 years ago  He says he has been seeing Dr. Destiney Wilkes every 6 months   he  is not exercising , but staying active, and is adherent to low salt diet. Blood pressure is NOT well controlled at home. Cardiac symptoms dyspnea and fatigue. Patient denies chest pain, chest pressure/discomfort, claudication, exertional chest pressure/discomfort, irregular heart beat, lower extremity edema, near-syncope, orthopnea, palpitations, paroxysmal nocturnal dyspnea, syncope, and tachypnea. Cardiovascular risk factors: advanced age (older than 54 for men, 72 for women), diabetes mellitus, dyslipidemia, hypertension, male gender, and obesity (BMI >= 30 kg/m2). Use of agents associated with hypertension: NSAIDS. History of target organ damage: angina/ prior myocardial infarction, chronic kidney disease, and prior coronary revascularization. Has Chronic kidney disease stage 3, he was not aware. blood pressure is Elevated.     He says the BP is fluctuating, especially when he is in pain, and has been high lately    Taking : Lisinopril 10 mg and Metoprolol XR 50 mg, denies side effects     BP is very high today  BP Readings from Last 3 Encounters:   09/15/22 (!) 190/120   08/05/22 132/86   06/10/22 130/84        Pulse is Normal.    Pulse Readings from Last 3 Encounters:   09/15/22 76   08/05/22 (!) 106   06/10/22 (!) 103       Weight is stable  Wt Readings from Last 3 Encounters:   09/15/22 210 lb (95.3 kg)   08/05/22 211 lb 6.4 oz (95.9 kg)   08/02/22 205 lb (93 kg)       Has known diabetes mellitus type 2   Reports compliance with Metformin BID 1000 mg, tolerates the medication well, denies side effects    He has not been checking HOME Blood Glucose      Still drinking pop 1-2 a day  A1c worsening    Lab Results   Component Value Date    LABA1C 6.8 09/15/2022    LABA1C 6.4 (H) 04/05/2022    LABA1C 7.7 (H) 09/03/2021   Mildly high urine microalbumin    Lab Results   Component Value Date    LABMICR 18 (H) 08/05/2022         Reports having worsening osteoarthritis in his knees and he sees rheumatology  Takes Gabapentin 600 mg BID, makes him sleepy, and  Nortriptyline   Also on Diclofenac daily. Has hyperparathyroidism     Seeing Dr. Trisha Morelos before, he was having High calcium and high PTH  Taking Sensipar, tolerates the medication well   Denies muscle cramps    Lab Results   Component Value Date    VITD25 32.9 04/05/2022             Hyperlipidemia:  No new myalgias or GI upset on fenofibrate (Tricor, Trilipix) and pravastatin (Pravachol) and Zetia  . Medication compliance: compliant all of the time. Patient is  following a low fat, low cholesterol diet. LDL is improved  High triglycerides     Lab Results   Component Value Date    LDLCHOLESTEROL 60 04/05/2022    LDLDIRECT 100 (H) 03/02/2019     Lab Results   Component Value Date    TRIG 163 (H) 04/05/2022    TRIG 271 (H) 09/03/2021    TRIG 211 (H) 12/28/2020       Review of Systems   Constitutional:  Positive for fatigue. Negative for activity change, appetite change, chills, diaphoresis, fever and unexpected weight change. Eyes:  Positive for visual disturbance (stable, chronic, has glasses). Respiratory:  Positive for shortness of breath (TINOCO). Negative for cough, chest tightness and wheezing. Cardiovascular:  Negative for chest pain, palpitations and leg swelling. Gastrointestinal:  Positive for diarrhea (1-3 bowel movements a day) and nausea.  Negative for abdominal distention, abdominal pain, blood in stool, constipation and vomiting. Endocrine: Negative for cold intolerance, heat intolerance, polydipsia, polyphagia and polyuria. Genitourinary:  Negative for difficulty urinating and frequency. Musculoskeletal:  Positive for arthralgias (knees), back pain (chronic, seeing Dr. Cynthia Holliday) and gait problem (due to chronic knee pain). Neurological:  Positive for light-headedness. Negative for weakness and numbness. Hematological:  Does not bruise/bleed easily.       -vital signs stable and within normal limits except Obesity per BMI and high BP.      BP (!) 190/120   Pulse 76   Temp 98.6 °F (37 °C)   Ht 5' 8\" (1.727 m)   Wt 210 lb (95.3 kg)   SpO2 96%   BMI 31.93 kg/m²        Physical Exam  Vitals and nursing note reviewed. Constitutional:       General: He is not in acute distress. Appearance: Normal appearance. He is well-developed. He is obese. He is not diaphoretic. HENT:      Head: Normocephalic and atraumatic. Right Ear: External ear normal.      Left Ear: External ear normal.      Mouth/Throat:      Comments: I did not examine the mouth due to coronavirus pandemic and wearing masks. Eyes:      General: Lids are normal. No scleral icterus. Right eye: No discharge. Left eye: No discharge. Extraocular Movements: Extraocular movements intact. Conjunctiva/sclera: Conjunctivae normal.   Neck:      Thyroid: No thyromegaly. Cardiovascular:      Rate and Rhythm: Normal rate and regular rhythm. Heart sounds: Normal heart sounds. No murmur heard. Pulmonary:      Effort: Pulmonary effort is normal. No respiratory distress. Breath sounds: Normal breath sounds. No wheezing or rales. Chest:      Chest wall: No tenderness. Abdominal:      General: Bowel sounds are normal. There is no distension. Palpations: Abdomen is soft. There is no hepatomegaly or splenomegaly. Tenderness: There is no abdominal tenderness. Comments: Obese abdomen. Musculoskeletal:      Cervical back: Normal range of motion and neck supple. Right knee: Bony tenderness and crepitus present. Decreased range of motion. Tenderness present. Left knee: Bony tenderness and crepitus present. Decreased range of motion. Tenderness present. Right lower leg: No edema. Left lower leg: No edema. Comments: Walks with severe bilateral  genu varum   Lymphadenopathy:      Cervical: No cervical adenopathy. Skin:     General: Skin is warm and dry. Capillary Refill: Capillary refill takes less than 2 seconds. Findings: No rash. Neurological:      Mental Status: He is alert and oriented to person, place, and time. Motor: No weakness. Gait: Gait abnormal.      Deep Tendon Reflexes: Reflexes are normal and symmetric. Psychiatric:         Mood and Affect: Mood normal.         Behavior: Behavior normal.         Thought Content: Thought content normal.         Judgment: Judgment normal.         I personally reviewed testing with patient, and all questions answered.   worsening Chronic kidney disease stage 3  Hyperglycemia  Hyperlipidemia  hypertriglyceridemia      Otherwise labs within normal limits       Lab Results   Component Value Date    WBC 11.0 05/23/2022    HGB 14.5 05/23/2022    HCT 43.2 05/23/2022    MCV 89.9 05/23/2022     05/23/2022       Lab Results   Component Value Date/Time     05/23/2022 03:30 PM    K 4.4 05/23/2022 03:30 PM     05/23/2022 03:30 PM    CO2 29 05/23/2022 03:30 PM    BUN 27 05/23/2022 03:30 PM    CREATININE 1.35 05/28/2022 06:24 AM    GLUCOSE 173 05/23/2022 03:30 PM    GLUCOSE 116 01/28/2012 09:16 AM    CALCIUM 11.6 05/23/2022 03:30 PM        Lab Results   Component Value Date    ALT 27 04/05/2022    AST 17 04/05/2022    ALKPHOS 92 04/05/2022    BILITOT 0.42 04/05/2022       Lab Results   Component Value Date    TSH 0.85 07/12/2019       Lab Results   Component Value Date    CHOL 130 04/05/2022    CHOL 143 09/03/2021    CHOL 135 12/28/2020     Lab Results   Component Value Date    TRIG 163 (H) 04/05/2022    TRIG 271 (H) 09/03/2021    TRIG 211 (H) 12/28/2020     Lab Results   Component Value Date    HDL 37 (L) 04/05/2022    HDL 30 (L) 09/03/2021    HDL 34 (L) 12/28/2020     Lab Results   Component Value Date    LDLCHOLESTEROL 60 04/05/2022    LDLCHOLESTEROL 59 09/03/2021    LDLCHOLESTEROL 59 12/28/2020     Lab Results   Component Value Date    CHOLHDLRATIO 3.5 04/05/2022    CHOLHDLRATIO 4.8 09/03/2021    CHOLHDLRATIO 4.0 12/28/2020         Lab Results   Component Value Date    BFLCICQQ75 426 10/20/2021     Lab Results   Component Value Date    FOLATE 11.0 10/20/2021     Lab Results   Component Value Date    VITD25 32.9 04/05/2022         Orders Placed This Encounter   Medications    acetaminophen (TYLENOL) 500 MG tablet     Sig: Take 1 tablet by mouth every 6 hours as needed for Pain     Dispense:  120 tablet     Refill:  3    diclofenac sodium (VOLTAREN) 1 % GEL     Sig: Apply 2 g topically 4 times daily as needed for Pain STOP DICLOFENAC TABLETS DUE TO MILD KIDNEY DISEASE     Dispense:  100 g     Refill:  0    camphor-menthol-methyl salicylate (BENGAY ULTRA STRENGTH) 4-10-30 % CREA cream     Sig: Apply topically 3 times daily as needed for Pain     Dispense:  113 g     Refill:  0    metoprolol succinate (TOPROL XL) 100 MG extended release tablet     Sig: Take 1 tablet by mouth daily Dose increased 9/15/2022 .  Goal BP bellow 135/85 and above 115/65, heart rate 56 to 90 bpm     Dispense:  90 tablet     Refill:  0    aspirin EC 81 MG EC tablet     Sig: Take 1 tablet by mouth daily     Dispense:  90 tablet     Refill:  1    clopidogrel (PLAVIX) 75 MG tablet     Sig: Take 1 tablet by mouth daily     Dispense:  90 tablet     Refill:  3    cinacalcet (SENSIPAR) 30 MG tablet     Sig: Take 1 tablet by mouth daily Per Dr. Llanes Sake:  90 tablet     Refill:  0    pantoprazole (PROTONIX) 20 MG tablet     Sig: Take 1 tablet by mouth every morning (before breakfast) ** stop Omeprazole due to interaction with plavix**     Dispense:  90 tablet     Refill:  1       Orders Placed This Encounter   Procedures    XR CHEST (2 VW)     Standing Status:   Future     Standing Expiration Date:   9/15/2023     Order Specific Question:   Reason for exam:     Answer:   Cough    Brain Natriuretic Peptide     Standing Status:   Future     Standing Expiration Date:   9/15/2023    CBC     Standing Status:   Future     Standing Expiration Date:   9/15/2023    Comprehensive Metabolic Panel     Standing Status:   Future     Standing Expiration Date:   11/12/2022    Vitamin B12 & Folate     Standing Status:   Future     Standing Expiration Date:   11/12/2022    Uric Acid     Standing Status:   Future     Standing Expiration Date:   9/15/2023    TSH     Standing Status:   Future     Standing Expiration Date:   9/15/2023    Magnesium     Standing Status:   Future     Standing Expiration Date:   9/15/2023    Phosphorus     Standing Status:   Future     Standing Expiration Date:   9/15/2023    POCT glycosylated hemoglobin (Hb A1C)       Medications Discontinued During This Encounter   Medication Reason    diclofenac (VOLTAREN) 75 MG EC tablet Alternate therapy    fenofibrate (TRIGLIDE) 160 MG tablet Stop Taking at Discharge    fenofibrate micronized (LOFIBRA) 952 MG capsule DUPLICATE    atorvastatin (LIPITOR) 10 MG tablet Alternate therapy    vitamin D (ERGOCALCIFEROL) 1.25 MG (64771 UT) CAPS capsule Stop Taking at Discharge    omeprazole (PRILOSEC) 20 MG delayed release capsule Alternate therapy    metoprolol succinate (TOPROL XL) 100 MG extended release tablet REORDER    cinacalcet (SENSIPAR) 90 MG tablet REORDER         On this date 9/15/2022 I have spent 45 minutes reviewing previous notes, test results and face to face with the patient discussing the diagnosis and importance of compliance with the treatment plan as well as documenting on the day of the visit and to establishing care . This note was completed by using the assistance of a speech-recognition program. However, inadvertent computerized transcription errors may be present. Although every effort was made to ensure accuracy, no guarantees can be provided that every mistake has been identified and corrected by editing. An electronic signature was used to authenticate this note.   Electronically signed by Nataly Wilburn MD on 9/18/2022 at 10:18 PM

## 2022-09-15 NOTE — RESULT ENCOUNTER NOTE
Addressed during office visit today, A1c 6.8, worsening diabetes but well controlled, continue treatment recommended during the office visit.      Future Appointments  12/15/2022 2:00 PM    Madison Hatchet, MD     [facility]  5/16/2023  8:30 AM    [facility]            Rony Fernandez

## 2022-09-18 PROBLEM — R06.09 DOE (DYSPNEA ON EXERTION): Status: ACTIVE | Noted: 2022-09-18

## 2022-09-18 PROBLEM — E21.3 HYPERPARATHYROIDISM (HCC): Status: ACTIVE | Noted: 2022-09-18

## 2022-09-18 PROBLEM — M54.9 BACK PAIN: Status: RESOLVED | Noted: 2019-07-10 | Resolved: 2022-09-18

## 2022-09-18 PROBLEM — I25.10 CORONARY ARTERY DISEASE INVOLVING NATIVE CORONARY ARTERY OF NATIVE HEART WITHOUT ANGINA PECTORIS: Status: ACTIVE | Noted: 2022-09-18

## 2022-09-18 PROBLEM — I10 ESSENTIAL HYPERTENSION: Status: RESOLVED | Noted: 2017-10-23 | Resolved: 2022-09-18

## 2022-09-18 PROBLEM — N18.31 TYPE 2 DIABETES MELLITUS WITH STAGE 3A CHRONIC KIDNEY DISEASE, WITHOUT LONG-TERM CURRENT USE OF INSULIN (HCC): Status: ACTIVE | Noted: 2022-09-15

## 2022-09-18 PROBLEM — M54.50 LOW BACK PAIN: Status: RESOLVED | Noted: 2022-05-23 | Resolved: 2022-09-18

## 2022-09-18 PROBLEM — T14.8XXA HEMATOMA: Status: RESOLVED | Noted: 2022-05-25 | Resolved: 2022-09-18

## 2022-09-18 PROBLEM — I12.9 BENIGN HYPERTENSION WITH CKD (CHRONIC KIDNEY DISEASE) STAGE III (HCC): Status: ACTIVE | Noted: 2022-09-15

## 2022-09-18 PROBLEM — M54.2 NECK PAIN: Status: RESOLVED | Noted: 2020-01-21 | Resolved: 2022-09-18

## 2022-09-18 PROBLEM — Z98.890 S/P CARDIAC CATHETERIZATION: Status: RESOLVED | Noted: 2019-01-02 | Resolved: 2022-09-18

## 2022-09-18 PROBLEM — K59.03 DRUG-INDUCED CONSTIPATION: Status: RESOLVED | Noted: 2022-05-24 | Resolved: 2022-09-18

## 2022-09-18 ASSESSMENT — ENCOUNTER SYMPTOMS
BACK PAIN: 1
NAUSEA: 1

## 2022-09-21 ENCOUNTER — HOSPITAL ENCOUNTER (OUTPATIENT)
Age: 67
Discharge: HOME | End: 2022-09-21
Payer: MEDICARE

## 2022-09-21 ENCOUNTER — HOSPITAL ENCOUNTER (OUTPATIENT)
Age: 67
Discharge: HOME | End: 2022-09-23
Payer: MEDICARE

## 2022-09-21 ENCOUNTER — HOSPITAL ENCOUNTER (OUTPATIENT)
Dept: GENERAL RADIOLOGY | Age: 67
Discharge: HOME | End: 2022-09-23
Payer: MEDICARE

## 2022-09-21 DIAGNOSIS — R06.09 DOE (DYSPNEA ON EXERTION): ICD-10-CM

## 2022-09-21 DIAGNOSIS — N18.31 STAGE 3A CHRONIC KIDNEY DISEASE (HCC): ICD-10-CM

## 2022-09-21 DIAGNOSIS — E11.22 TYPE 2 DIABETES MELLITUS WITH STAGE 3A CHRONIC KIDNEY DISEASE, WITHOUT LONG-TERM CURRENT USE OF INSULIN (HCC): ICD-10-CM

## 2022-09-21 DIAGNOSIS — I12.9 BENIGN HYPERTENSION WITH CKD (CHRONIC KIDNEY DISEASE) STAGE III (HCC): ICD-10-CM

## 2022-09-21 DIAGNOSIS — I51.7 CARDIOMEGALY: Primary | ICD-10-CM

## 2022-09-21 DIAGNOSIS — N18.30 BENIGN HYPERTENSION WITH CKD (CHRONIC KIDNEY DISEASE) STAGE III (HCC): ICD-10-CM

## 2022-09-21 DIAGNOSIS — N18.31 TYPE 2 DIABETES MELLITUS WITH STAGE 3A CHRONIC KIDNEY DISEASE, WITHOUT LONG-TERM CURRENT USE OF INSULIN (HCC): ICD-10-CM

## 2022-09-21 DIAGNOSIS — J92.9 THICKENING OF PLEURA: ICD-10-CM

## 2022-09-21 LAB
ALBUMIN SERPL-MCNC: 4.9 G/DL (ref 3.5–5.2)
ALP BLD-CCNC: 111 U/L (ref 40–129)
ALT SERPL-CCNC: 33 U/L (ref 5–41)
ANION GAP SERPL CALCULATED.3IONS-SCNC: 10 MMOL/L (ref 9–17)
AST SERPL-CCNC: 20 U/L
BILIRUB SERPL-MCNC: 0.5 MG/DL (ref 0.3–1.2)
BUN BLDV-MCNC: 22 MG/DL (ref 8–23)
CALCIUM SERPL-MCNC: 12 MG/DL (ref 8.6–10.4)
CHLORIDE BLD-SCNC: 102 MMOL/L (ref 98–107)
CO2: 29 MMOL/L (ref 20–31)
CREAT SERPL-MCNC: 1.19 MG/DL (ref 0.7–1.2)
FOLATE: 8.3 NG/ML
GFR AFRICAN AMERICAN: >60 ML/MIN
GFR NON-AFRICAN AMERICAN: >60 ML/MIN
GFR SERPL CREATININE-BSD FRML MDRD: ABNORMAL ML/MIN/{1.73_M2}
GLUCOSE BLD-MCNC: 177 MG/DL (ref 70–99)
HCT VFR BLD CALC: 45.2 % (ref 41–53)
HEMOGLOBIN: 15.4 G/DL (ref 13.5–17.5)
MAGNESIUM: 2.1 MG/DL (ref 1.6–2.6)
MCH RBC QN AUTO: 30.1 PG (ref 26–34)
MCHC RBC AUTO-ENTMCNC: 34 G/DL (ref 31–37)
MCV RBC AUTO: 88.5 FL (ref 80–100)
PDW BLD-RTO: 14.9 % (ref 11.5–14.9)
PHOSPHORUS: 3 MG/DL (ref 2.5–4.5)
PLATELET # BLD: 241 K/UL (ref 150–450)
PMV BLD AUTO: 7.6 FL (ref 6–12)
POTASSIUM SERPL-SCNC: 4.9 MMOL/L (ref 3.7–5.3)
PRO-BNP: 49 PG/ML
RBC # BLD: 5.1 M/UL (ref 4.5–5.9)
SODIUM BLD-SCNC: 141 MMOL/L (ref 135–144)
TOTAL PROTEIN: 7.5 G/DL (ref 6.4–8.3)
TSH SERPL DL<=0.05 MIU/L-ACNC: 1.98 UIU/ML (ref 0.3–5)
URIC ACID: 6.2 MG/DL (ref 3.4–7)
VITAMIN B-12: 474 PG/ML (ref 232–1245)
WBC # BLD: 6.9 K/UL (ref 3.5–11)

## 2022-09-21 PROCEDURE — 71046 X-RAY EXAM CHEST 2 VIEWS: CPT

## 2022-09-21 PROCEDURE — 83735 ASSAY OF MAGNESIUM: CPT

## 2022-09-21 PROCEDURE — 82746 ASSAY OF FOLIC ACID SERUM: CPT

## 2022-09-21 PROCEDURE — 36415 COLL VENOUS BLD VENIPUNCTURE: CPT

## 2022-09-21 PROCEDURE — 84100 ASSAY OF PHOSPHORUS: CPT

## 2022-09-21 PROCEDURE — 85027 COMPLETE CBC AUTOMATED: CPT

## 2022-09-21 PROCEDURE — 84443 ASSAY THYROID STIM HORMONE: CPT

## 2022-09-21 PROCEDURE — 83880 ASSAY OF NATRIURETIC PEPTIDE: CPT

## 2022-09-21 PROCEDURE — 82607 VITAMIN B-12: CPT

## 2022-09-21 PROCEDURE — 84550 ASSAY OF BLOOD/URIC ACID: CPT

## 2022-09-21 PROCEDURE — 80053 COMPREHEN METABOLIC PANEL: CPT

## 2022-09-21 NOTE — RESULT ENCOUNTER NOTE
Please notify patient: Blood glucose 177, it is advised low-carb diet  Calcium is very high worse than 4 months ago and worsening, calcium is 12, this is dangerous for him. To make sure none of his vitamins has calcium  or vitamin D. To make an appointment with Dr. David Kay as soon as possible, he might need Sensipar higher dosage  To drink plenty of fluids 8 x 8 ounce glasses of water every day  When is the appointment with Dr. David Kay?   Well    Kidney function is improved    Otherwise labs within normal limits  continue current treatment      Lab Results       Component                Value               Date                       LABA1C                   6.8                 09/15/2022                 LABA1C                   6.4 (H)             04/05/2022                 LABA1C                   7.7 (H)             09/03/2021                Future Appointments  12/15/2022 2:00 PM    Justice Rodriguez MD     [facility]               [facility]  5/16/2023  8:30 AM    Yakov Bob MD          [facility]

## 2022-09-21 NOTE — RESULT ENCOUNTER NOTE
Please notify patient: Chest x-ray shows heart is enlarged and there is scar tissue on the left pleura which covers the lungs    Please fax this report to Dr. Ba Pierre, might need a CT lung and a breathing test, I will place an order for pulmonary test in the computer, to schedule  I will also refer him to cardiologist, please give him contact information for Dr. Kenisha Correa whom he has seen before      Future Appointments  12/15/2022 2:00 PM    Nba Whalen MD     [facility] [place]               [facility]  5/16/2023  8:30 AM    Renee Sapp MD          [id number]

## 2022-09-27 ENCOUNTER — HOSPITAL ENCOUNTER (OUTPATIENT)
Dept: PULMONOLOGY | Age: 67
Discharge: HOME | End: 2022-09-27
Payer: MEDICARE

## 2022-09-27 DIAGNOSIS — R06.09 DOE (DYSPNEA ON EXERTION): ICD-10-CM

## 2022-09-27 DIAGNOSIS — J92.9 THICKENING OF PLEURA: ICD-10-CM

## 2022-09-27 LAB
DLCO %PRED: NORMAL
DLCO PRED: NORMAL
DLCO/VA %PRED: NORMAL
DLCO/VA PRED: NORMAL
DLCO/VA: NORMAL
DLCO: NORMAL
EXPIRATORY TIME: NORMAL
FEF 25-75% %PRED-PRE: NORMAL
FEF 25-75% PRED: NORMAL
FEF 25-75%-PRE: NORMAL
FEV1 %PRED-PRE: NORMAL
FEV1 PRED: NORMAL
FEV1/FVC %PRED-PRE: NORMAL
FEV1/FVC PRED: NORMAL
FEV1/FVC: NORMAL
FEV1: NORMAL
FVC %PRED-PRE: NORMAL
FVC PRED: NORMAL
FVC: NORMAL
GAW %PRED: NORMAL
GAW PRED: NORMAL
GAW: NORMAL
IC %PRED: NORMAL
IC PRED: NORMAL
IC: NORMAL
MVV %PRED-PRE: NORMAL
MVV PRED: NORMAL
MVV-PRE: NORMAL
PEF %PRED-PRE: NORMAL
PEF PRED: NORMAL
PEF-PRE: NORMAL
RAW %PRED: NORMAL
RAW PRED: NORMAL
RAW: NORMAL
RV %PRED: NORMAL
RV PRED: NORMAL
RV: NORMAL
SVC %PRED: NORMAL
SVC PRED: NORMAL
SVC: NORMAL
TLC %PRED: NORMAL
TLC PRED: NORMAL
TLC: NORMAL
VA %PRED: NORMAL
VA PRED: NORMAL
VA: NORMAL
VTG %PRED: NORMAL
VTG PRED: NORMAL
VTG: NORMAL

## 2022-09-27 PROCEDURE — 6370000000 HC RX 637 (ALT 250 FOR IP): Performed by: FAMILY MEDICINE

## 2022-09-27 PROCEDURE — 94375 RESPIRATORY FLOW VOLUME LOOP: CPT

## 2022-09-27 PROCEDURE — 94726 PLETHYSMOGRAPHY LUNG VOLUMES: CPT

## 2022-09-27 PROCEDURE — 94060 EVALUATION OF WHEEZING: CPT

## 2022-09-27 PROCEDURE — 94664 DEMO&/EVAL PT USE INHALER: CPT

## 2022-09-27 PROCEDURE — 94729 DIFFUSING CAPACITY: CPT

## 2022-09-27 RX ORDER — ALBUTEROL SULFATE 90 UG/1
2 AEROSOL, METERED RESPIRATORY (INHALATION) ONCE
Status: COMPLETED | OUTPATIENT
Start: 2022-09-27 | End: 2022-09-27

## 2022-09-27 RX ADMIN — ALBUTEROL SULFATE 2 PUFF: 90 AEROSOL, METERED RESPIRATORY (INHALATION) at 08:25

## 2022-09-27 NOTE — PROCEDURES
PFT Interpretation:    Normal Lung Mechanics, Volumes,and Diffusion Capacity is noted. Evidence of air trapping / hyperinflation is not noted. No Significant improvement is noted after bronchodilators.     Mellisa Cote MD

## 2022-09-28 NOTE — RESULT ENCOUNTER NOTE
Please notify patient: Pulmonary test is within normal limits follow-up with cardiologist as scheduled    Patient does need a nurse visit for blood pressure recheck in the office, and if he would like to sign up for my chart that would be great    BP Readings from Last 3 Encounters:  09/15/22 : (!) 190/120  08/05/22 : 132/86  06/10/22 : 130/84          Future Appointments  12/15/2022 2:00 PM    Ly Head MD     [facility]               [facility]  5/16/2023  8:30 AM    Marianela Jimenez MD          [facility]            [facility]

## 2022-10-03 ENCOUNTER — NURSE ONLY (OUTPATIENT)
Dept: FAMILY MEDICINE CLINIC | Age: 67
End: 2022-10-03
Payer: MEDICARE

## 2022-10-03 VITALS — DIASTOLIC BLOOD PRESSURE: 80 MMHG | SYSTOLIC BLOOD PRESSURE: 130 MMHG | HEART RATE: 87 BPM | OXYGEN SATURATION: 97 %

## 2022-10-03 DIAGNOSIS — N18.30 BENIGN HYPERTENSION WITH CKD (CHRONIC KIDNEY DISEASE) STAGE III (HCC): Primary | ICD-10-CM

## 2022-10-03 DIAGNOSIS — I12.9 BENIGN HYPERTENSION WITH CKD (CHRONIC KIDNEY DISEASE) STAGE III (HCC): Primary | ICD-10-CM

## 2022-10-03 PROCEDURE — 99211 OFF/OP EST MAY X REQ PHY/QHP: CPT | Performed by: FAMILY MEDICINE

## 2022-10-03 NOTE — PROGRESS NOTES
Chief Complaint   Patient presents with    Hypertension    Blood Pressure Check        Patient is here for blood pressure recheck. 1. Benign hypertension with CKD (chronic kidney disease) stage III (HCC)        Well controlled BP   Continue current treatment.        BP Readings from Last 3 Encounters:   10/03/22 130/80   09/15/22 (!) 190/120   08/05/22 132/86       Pulse Readings from Last 3 Encounters:   10/03/22 87   09/15/22 76   08/05/22 (!) 106       Future Appointments   Date Time Provider Henry Ferrerai   12/15/2022  2:00 PM [facility]   5/16/2023  8:30 AM Amaya Shrestha MD [facility] Parviz Moy

## 2022-10-03 NOTE — PROGRESS NOTES
Chief Complaint   Patient presents with    Hypertension    Blood Pressure Check      Luis Angel Hwang is here for BP check    BP Readings from Last 3 Encounters:   10/03/22 130/80   09/15/22 (!) 190/120   08/05/22 132/86       BP is 130/80      Future Appointments   Date Time Provider Henry Durant   10/3/2022  2:00 PM SCHEDULE, [facility]   12/15/2022  2:00 PM Pascual Beckman MD [facility]   5/16/2023  8:30 AM Oleg Sauceda MD [facility] Randee Parth

## 2022-10-24 ENCOUNTER — TELEPHONE (OUTPATIENT)
Dept: FAMILY MEDICINE CLINIC | Age: 67
End: 2022-10-24

## 2022-10-24 DIAGNOSIS — G47.33 OSA TREATED WITH BIPAP: ICD-10-CM

## 2022-10-24 DIAGNOSIS — M54.16 LUMBAR RADICULOPATHY, CHRONIC: ICD-10-CM

## 2022-10-24 DIAGNOSIS — I25.10 CORONARY ARTERY DISEASE INVOLVING NATIVE CORONARY ARTERY OF NATIVE HEART WITHOUT ANGINA PECTORIS: Primary | ICD-10-CM

## 2022-10-24 NOTE — TELEPHONE ENCOUNTER
Noted. Thank you!     Future Appointments   Date Time Provider Henry Durant   10/25/2022  7:30 AM STC NUC MED WHOLE BODY INJ RM STCZ NUC MED ST [facility]   10/25/2022  8:00 AM STC NUC MED PORTABLE TC RM STCZ NUC MED STC [facility]   10/25/2022 10:00 AM STC STRESS RM B STCZ STRESS [place]   10/25/2022 11:30 AM STC NUC MED PORTABLE TC RM STCZ NUC MED STC [facility]   12/15/2022  2:00 PM Lissett Garza MD  [facility]   5/16/2023  8:30 AM [facility]

## 2022-10-24 NOTE — TELEPHONE ENCOUNTER
Assessment & Plan   Riley is a 43 yo M with quadriplegia (1995), chronic constipation, esinophilic esophagitis, spaciticty from cervical cord injury.    Spasm of abdominal muscles    Has bad spasms, been doing baclofen and tizanidine but not helping; discussed doing short term diazepam    Back muscle spasm  - diazepam (VALIUM) 5 MG tablet; 1/2 to 1 tablet, 1 to 2 times daily as needed for severe spasms    Spasticity/CervicalSpine injury   -  Resulting in spasms    Return in about 1 week (around 8/23/2021) for Follow up for symptoms recheck.    Cyrus Duffy MD  [facility] [place]    Subjective   Riley is a 44 year old who presents for the following health issues   HPI   Chief Complaint   Patient presents with     Abdominal Pain     Patient is here today for muscle spasms in the abdominal area as well as everywhere else occasionally patient states its been going on for a while but over the weekend it was the worst its ever pain      Health Maintenance     BMP, TDAP, Eye Exam, Hep C Screening, DAP      Has muscle spasm; were bad yesterday, moving around triggers the spasms  Usually drnks enough water.  Had a BM this morning, not as much as should, a littlle bloated       Review of Systems         Objective    /88   Pulse 95   Temp 98.2  F (36.8  C) (Oral)   Resp 18   SpO2 95%   There is no height or weight on file to calculate BMI.  Physical Exam   GENERAL: on scooter, alert and moderate distress  RESP: lungs clear to auscultation - no rales, rhonchi or wheezes  CV: regular rate and rhythm, no murmur, click or rub, no peripheral edema  MS: Muscle spasm abdominal.             Would like a handicap placard order, would like to pick it up, tomorrow.   He also states he is on a bipap machine and the manufacture has stopped making some of its parts would like to know what to do, he does not see a lung doctor and his next appointment is in December

## 2022-10-24 NOTE — TELEPHONE ENCOUNTER
To make appointment with pulmonologist, will need for sleep study, does he know who did his sleep study before? Regarding his BiPAP, he needs to contact the medical equipment who gave him the BiPAP and see what replacements do they have for that. They might also have his sleep study, to ask them to fax it to us    I did not find sleep study under procedures, other orders, or referrals or media in [facility] or [facility]       Diagnosis Orders   1. Coronary artery disease involving native coronary artery of native heart without angina pectoris  Handicap Placard MISC      2. Lumbar radiculopathy, chronic  Handicap Placard MISC      3.  SHANT treated with Benson Garcia MD, Pulmonology, [place]           Future Appointments   Date Time Provider Henry Durant   10/25/2022  7:30 AM STC NUC MED WHOLE BODY INJ RM STCZ NUC  [facility]   10/25/2022  8:00 AM STC NUC MED PORTABLE TC RM STCZ NUC MED STC [facility]   10/25/2022 10:00 AM STC STRESS RM B STCZ STRESS [place]   10/25/2022 11:30 AM STC NUC MED PORTABLE TC RM STCZ NUC MED STC Radiolog   12/15/2022  2:00 PM Alistair Garcia MD  [facility]   5/16/2023  8:30 AM Sanju Ivan MD [facility] [address] [facility]

## 2022-10-25 ENCOUNTER — HOSPITAL ENCOUNTER (OUTPATIENT)
Dept: NUCLEAR MEDICINE | Age: 67
Discharge: HOME | End: 2022-10-27
Payer: MEDICARE

## 2022-10-25 ENCOUNTER — HOSPITAL ENCOUNTER (OUTPATIENT)
Dept: NON INVASIVE DIAGNOSTICS | Age: 67
Discharge: HOME | End: 2022-10-25
Payer: MEDICARE

## 2022-10-25 DIAGNOSIS — Z01.810 PRE-OPERATIVE CARDIOVASCULAR EXAMINATION: ICD-10-CM

## 2022-10-25 DIAGNOSIS — R06.00 DYSPNEA, UNSPECIFIED TYPE: ICD-10-CM

## 2022-10-25 DIAGNOSIS — Z98.61 POSTSURGICAL PERCUTANEOUS TRANSLUMINAL CORONARY ANGIOPLASTY STATUS: ICD-10-CM

## 2022-10-25 DIAGNOSIS — R94.31 ABNORMAL EKG: ICD-10-CM

## 2022-10-25 DIAGNOSIS — R06.02 SOB (SHORTNESS OF BREATH): ICD-10-CM

## 2022-10-25 DIAGNOSIS — I25.10 ATHEROSCLEROSIS OF NATIVE CORONARY ARTERY WITHOUT ANGINA PECTORIS, UNSPECIFIED WHETHER NATIVE OR TRANSPLANTED HEART: ICD-10-CM

## 2022-10-25 LAB
LV EF: 34 %
LVEF MODALITY: NORMAL

## 2022-10-25 PROCEDURE — 6360000002 HC RX W HCPCS: Performed by: INTERNAL MEDICINE

## 2022-10-25 PROCEDURE — 2580000003 HC RX 258: Performed by: INTERNAL MEDICINE

## 2022-10-25 PROCEDURE — 93017 CV STRESS TEST TRACING ONLY: CPT

## 2022-10-25 PROCEDURE — 78452 HT MUSCLE IMAGE SPECT MULT: CPT

## 2022-10-25 PROCEDURE — A9500 TC99M SESTAMIBI: HCPCS | Performed by: INTERNAL MEDICINE

## 2022-10-25 PROCEDURE — 3430000000 HC RX DIAGNOSTIC RADIOPHARMACEUTICAL: Performed by: INTERNAL MEDICINE

## 2022-10-25 RX ORDER — ATROPINE SULFATE 0.1 MG/ML
0.5 INJECTION INTRAVENOUS EVERY 5 MIN PRN
Status: DISCONTINUED | OUTPATIENT
Start: 2022-10-25 | End: 2022-10-25 | Stop reason: HOSPADM

## 2022-10-25 RX ORDER — ALBUTEROL SULFATE 90 UG/1
2 AEROSOL, METERED RESPIRATORY (INHALATION) PRN
Status: DISCONTINUED | OUTPATIENT
Start: 2022-10-25 | End: 2022-10-25 | Stop reason: HOSPADM

## 2022-10-25 RX ORDER — SODIUM CHLORIDE 9 MG/ML
500 INJECTION, SOLUTION INTRAVENOUS CONTINUOUS PRN
Status: DISCONTINUED | OUTPATIENT
Start: 2022-10-25 | End: 2022-10-25 | Stop reason: HOSPADM

## 2022-10-25 RX ORDER — AMINOPHYLLINE DIHYDRATE 25 MG/ML
50 INJECTION, SOLUTION INTRAVENOUS PRN
Status: DISCONTINUED | OUTPATIENT
Start: 2022-10-25 | End: 2022-10-25 | Stop reason: HOSPADM

## 2022-10-25 RX ORDER — SODIUM CHLORIDE 0.9 % (FLUSH) 0.9 %
5-40 SYRINGE (ML) INJECTION PRN
Status: DISCONTINUED | OUTPATIENT
Start: 2022-10-25 | End: 2022-10-25 | Stop reason: HOSPADM

## 2022-10-25 RX ORDER — METOPROLOL TARTRATE 5 MG/5ML
5 INJECTION INTRAVENOUS EVERY 5 MIN PRN
Status: DISCONTINUED | OUTPATIENT
Start: 2022-10-25 | End: 2022-10-25 | Stop reason: HOSPADM

## 2022-10-25 RX ORDER — SODIUM CHLORIDE 0.9 % (FLUSH) 0.9 %
10 SYRINGE (ML) INJECTION PRN
Status: DISCONTINUED | OUTPATIENT
Start: 2022-10-25 | End: 2022-10-28 | Stop reason: HOSPADM

## 2022-10-25 RX ORDER — NITROGLYCERIN 0.4 MG/1
0.4 TABLET SUBLINGUAL EVERY 5 MIN PRN
Status: DISCONTINUED | OUTPATIENT
Start: 2022-10-25 | End: 2022-10-25 | Stop reason: HOSPADM

## 2022-10-25 RX ADMIN — Medication 10 ML: at 07:59

## 2022-10-25 RX ADMIN — TETRAKIS(2-METHOXYISOBUTYLISOCYANIDE)COPPER(I) TETRAFLUOROBORATE 34.4 MILLICURIE: 1 INJECTION, POWDER, LYOPHILIZED, FOR SOLUTION INTRAVENOUS at 09:39

## 2022-10-25 RX ADMIN — TETRAKIS(2-METHOXYISOBUTYLISOCYANIDE)COPPER(I) TETRAFLUOROBORATE 10.7 MILLICURIE: 1 INJECTION, POWDER, LYOPHILIZED, FOR SOLUTION INTRAVENOUS at 07:59

## 2022-10-25 RX ADMIN — REGADENOSON 0.4 MG: 0.08 INJECTION, SOLUTION INTRAVENOUS at 09:39

## 2022-10-25 NOTE — PROCEDURES
[address] [place], [place] [id number]                              CARDIAC STRESS TEST    PATIENT NAME: Ishmael Coburn                :        1955  MED REC NO:   [id number]                              ROOM:  ACCOUNT NO:   [de-identified]                           ADMIT DATE: 10/25/2022  PROVIDER:     Iris Albarran    DATE OF STUDY:  10/25/2022    ORDERING PROVIDER:  Chip Douglas DO    PRIMARY CARE PROVIDER:  Ramya Ashby MD    INTERPRETING PHYSICIAN:  Berry Cruz MD    _____ STRESS TESTING    TEST TYPE: LEXISCAN CARDIOLYTE STRESS TEST  INDICATION: EKG ABNORMALITIES, SHORTNESS OF BREATH  REFERRING PHYSICIAN: RYANNE LAMA DO    RESTING HEART RATE: 79 BEATS PER MINUTE  RESTING BLOOD PRESSURE: 153/101    MEDICATION(S) GIVEN: 0.4MG IV LEXISCAN  REASON FOR TERMINATION: MEDICATION INFUSION COMPLETE    RESTING EKG: NORMAL, SR, HEART RATE 78 BEATS PER MINUTE  STRESS HEART RESPONSE: NORMAL RESPONSE  BLOOD PRESSURE RESPONSE: APPROPRIATE  STRESS EKGs: NORMAL  CHEST DISCOMFORT: NO PAIN DURING STRESS  ISCHEMIC EKG CHANGES: NONE    EKG IMPRESSION: ELECTROCARDIOGRAPHICALLY NEGATIVE LEXISCAN STRESS TEST. RADIOISOTOPE RESULTS TO FOLLOW FROM THE DEPARTMENT OF NUCLEAR MEDICINE.     COMMENTS: NO ARRHYTHMIA          HU ANTHONY    D: 10/25/2022 9:58:08       T: 10/25/2022 10:03:47     AS/TYRON  Job#: [id number]     Doc#: Unknown    CC:    (Retain this field even if not dictated or not decipherable)

## 2022-10-25 NOTE — PROGRESS NOTES
CST Lexiscan/.  Stress Tech performs patient preparation of physical comfort, review test procedures, pre-stress EKG. Lung Sounds clear t/o. Consent verified by pt. .  Educated patient on test procedure and possible side effects of Lexiscan as well as s/s to report. Cardiologist reviewed pre-test EKG and is present for test.  Patient tolerated test well with minor SOB  which resolved to baseline after test with caffeine. EKG portion of testing is completed and nuc. med. portion is still pending.

## 2022-10-25 NOTE — PROCEDURES
[address] [place], [place] [address]                              CARDIAC STRESS TEST    PATIENT NAME: Justin Kingston                :        1955  MED REC NO:   [id number]                              ROOM:  ACCOUNT NO:   [de-identified]                           ADMIT DATE: 10/25/2022  PROVIDER:     Loren Long    DATE OF STUDY:  10/25/2022    TEST TYPE: LEXISCAN CARDIOLYTE STRESS TEST  INDICATION: EKG ABNORMALITIES, SHORTNESS OF BREATH  REFERRING PHYSICIAN: RYANNE LAMA    RESTING HEART RATE: 79 BEATS PER MINUTE  RESTING BLOOD PRESSURE: 153/101    MEDICATION(S) GIVEN: 0.4MG IV LEXISCAN  REASON FOR TERMINATION: MEDICATION INFUSION COMPLETE    RESTING EKG: NORMAL, SINUS RHYTHM, 78 BEATS PER MINUTE  STRESS HEART RESPONSE: NORMAL RESPONSE  BLOOD PRESSURE RESPONSE: APPROPRIATE  STRESS EKGs: NORMAL  CHEST DISCOMFORT: NO PAIN DURING STRESS  ISCHEMIC EKG CHANGES: NONE    EKG IMPRESSION: ELECTROCARDIOGRAPHICALLY NEGATIVE LEXISCAN STRESS TEST. RADIOISOTOPE RESULTS TO FOLLOW FROM THE DEPARTMENT OF NUCLEAR MEDICINE.     COMMENTS: NO ARRHYTHMIA    HU ANTHONY    D: 10/25/2022 10:59:19       T: 10/25/2022 11:00:30     AS/HIRA  Job#: [id number]     Doc#: Unknown    CC:    (Retain this field even if not dictated or not decipherable)

## 2022-10-28 ENCOUNTER — HOSPITAL ENCOUNTER (OUTPATIENT)
Dept: CARDIAC CATH/INVASIVE PROCEDURES | Age: 67
Discharge: HOME | End: 2022-10-28
Payer: MEDICARE

## 2022-10-28 VITALS
HEIGHT: 68 IN | OXYGEN SATURATION: 95 % | HEART RATE: 100 BPM | DIASTOLIC BLOOD PRESSURE: 103 MMHG | WEIGHT: 217 LBS | BODY MASS INDEX: 32.89 KG/M2 | TEMPERATURE: 98.1 F | SYSTOLIC BLOOD PRESSURE: 166 MMHG | RESPIRATION RATE: 15 BRPM

## 2022-10-28 LAB — GLUCOSE BLD-MCNC: 168 MG/DL (ref 75–110)

## 2022-10-28 PROCEDURE — 7100000000 HC PACU RECOVERY - FIRST 15 MIN

## 2022-10-28 PROCEDURE — 2580000003 HC RX 258: Performed by: INTERNAL MEDICINE

## 2022-10-28 PROCEDURE — 2500000003 HC RX 250 WO HCPCS

## 2022-10-28 PROCEDURE — 2709999900 HC NON-CHARGEABLE SUPPLY

## 2022-10-28 PROCEDURE — 7100000001 HC PACU RECOVERY - ADDTL 15 MIN

## 2022-10-28 PROCEDURE — 6360000002 HC RX W HCPCS

## 2022-10-28 PROCEDURE — 82947 ASSAY GLUCOSE BLOOD QUANT: CPT

## 2022-10-28 PROCEDURE — 6360000004 HC RX CONTRAST MEDICATION

## 2022-10-28 PROCEDURE — C1894 INTRO/SHEATH, NON-LASER: HCPCS

## 2022-10-28 PROCEDURE — 99153 MOD SED SAME PHYS/QHP EA: CPT

## 2022-10-28 PROCEDURE — 99152 MOD SED SAME PHYS/QHP 5/>YRS: CPT

## 2022-10-28 PROCEDURE — 93458 L HRT ARTERY/VENTRICLE ANGIO: CPT

## 2022-10-28 RX ORDER — SODIUM CHLORIDE 9 MG/ML
INJECTION, SOLUTION INTRAVENOUS CONTINUOUS
Status: DISCONTINUED | OUTPATIENT
Start: 2022-10-28 | End: 2022-10-29 | Stop reason: HOSPADM

## 2022-10-28 RX ORDER — AMLODIPINE BESYLATE 5 MG/1
5 TABLET ORAL DAILY
COMMUNITY

## 2022-10-28 RX ADMIN — SODIUM CHLORIDE: 9 INJECTION, SOLUTION INTRAVENOUS at 08:15

## 2022-10-28 ASSESSMENT — PAIN DESCRIPTION - FREQUENCY: FREQUENCY: CONTINUOUS

## 2022-10-28 ASSESSMENT — PAIN DESCRIPTION - ORIENTATION: ORIENTATION: RIGHT;LEFT;LOWER

## 2022-10-28 ASSESSMENT — PAIN DESCRIPTION - LOCATION: LOCATION: BACK;KNEE

## 2022-10-28 ASSESSMENT — PAIN SCALES - GENERAL: PAINLEVEL_OUTOF10: 2

## 2022-10-28 ASSESSMENT — PAIN DESCRIPTION - DESCRIPTORS: DESCRIPTORS: ACHING

## 2022-10-28 ASSESSMENT — PAIN DESCRIPTION - PAIN TYPE: TYPE: CHRONIC PAIN

## 2022-10-28 NOTE — PROGRESS NOTES
All discharge instructions reviewed, questions answered, paper signed and given copy. Patient discharged per ambulation with wife and belongings.

## 2022-10-28 NOTE — H&P
[facility]  Procedure History and Physical Update          Patient Name: Jesus Alberto Baird  MRN:    [id number]  YOB: 1955  Date of evaluation:  10/28/2022    Procedure:    Cardiac cath +/- PCI    Indication for procedure:  Abnormal stress test      Please refer to the office note completed by Dr. Nicole Landaverde on 10/20/2022 in the medical record and note that:    [x] I have examined the patient and reviewed the H&P/Consult and there are no changes to be made to the assessment or plan.     [] I have examined the patient and reviewed the H&P/Consult and have noted the following changes:    Past Medical History:   Diagnosis Date    Abdominal hernia     Anesthesia     phrenic nerve damaged lt side    BPH (benign prostatic hyperplasia)     CAD (coronary artery disease)     Chronic back pain     Diabetes mellitus ([address]     Type 2    Difficult intubation     per pt throat collapses, lt side phrenic nerve was damaged    Drug-induced constipation 05/24/2022    Ear infection     LT, being treated    Essential hypertension 10/23/2017    Fatty liver     Gallstones     GERD (gastroesophageal reflux disease)     Headache(784.0)     Hematoma 05/25/2022    History of MI (myocardial infarction)     Hypercholesteremia     Hyperlipidemia     Hyperparathyroidism ([address]     Hypertension     Kidney stones     Osteoarthritis     all joints    PONV (postoperative nausea and vomiting)     S/P cardiac catheterization 01/02/2019    Sleep apnea     BIPAP nightly    Type 2 diabetes mellitus with stage 3a chronic kidney disease, without long-term current use of insulin ([address] 09/15/2022    Unspecified sleep apnea        Past Surgical History:   Procedure Laterality Date    BACK SURGERY  07/11/2019    BICEPS TENDON REPAIR Left     CARDIAC CATHETERIZATION  01/02/2019    with Dr. Zelalem Mata, Stents X2    CARPAL TUNNEL RELEASE Right     CERVICAL FUSION      COLONOSCOPY  11 14 14    normal    ELBOW FRACTURE SURGERY Right right arm/fracture    ENDOSCOPY, COLON, DIAGNOSTIC      several times    FRACTURE SURGERY Right     elbow    KNEE ARTHROSCOPY Bilateral     LIVER BIOPSY  8/14/2015    moderate steatosis, grade 2, lobular and portal inflammation: Grade 1 total score =3, fibrosis score 1A    LUMBAR FUSION N/A 7/10/2019    LUMBAR LAMINECTOMY FUSION POSTERIOR - L3 SACRUM POSTERIOR LUMBAR DECOMPRESSION  & FUSION performed by Darion Carreon MD at [address] 5/18/2022    L2-3 POSTERIOR LUMBAR DECOMPRESSION & INSTRUMENTED FUSION POSSIBLE REVISION INSTRUMENTATION L3-5 performed by Darion Carreon MD at [place] [address]  11/07/2018    lumbar myelogram    ROTATOR CUFF REPAIR Right     SPINE SURGERY N/A 5/26/2022    IRRIGATION AND DEBRIDEMENT EPIDURAL HEMATOMA performed by Darion Carreon MD at [address] History   Problem Relation Age of Onset    Heart Disease Mother     Cancer Father         bladder    Arthritis Sister     Cancer Paternal Aunt     Cancer Paternal Aunt        Allergies   Allergen Reactions    Pcn [Penicillins] Swelling     face    Morphine Other (See Comments)     Becomes very mean. Tolerates dilaudid and fentanyl    Codeine Nausea And Vomiting     Abd. pain    Tramadol Itching       Prior to Admission medications    Medication Sig Start Date End Date Taking? Authorizing Provider   Handicap Placard MISC by Does not apply route Can't walk greater than 200 feet. Expires in 5 years.  10/24/22   Kenneth Pinto MD   acetaminophen (TYLENOL) 500 MG tablet Take 1 tablet by mouth every 6 hours as needed for Pain 9/15/22   Kenneth Pinto MD   diclofenac sodium (VOLTAREN) 1 % GEL Apply 2 g topically 4 times daily as needed for Pain STOP DICLOFENAC TABLETS DUE TO MILD KIDNEY DISEASE 9/15/22   Kenneth Pinto MD   camphor-menthol-methyl salicylate (BENGAY ULTRA STRENGTH) 4-10-30 % CREA cream Apply topically 3 times daily as needed for Pain 9/15/22   Kenneth Pinto MD   metoprolol succinate (TOPROL XL) 100 MG extended release tablet Take 1 tablet by mouth daily Dose increased 9/15/2022 . Goal BP bellow 135/85 and above 115/65, heart rate 56 to 90 bpm 9/15/22   Lissett Garza MD   aspirin EC 81 MG EC tablet Take 1 tablet by mouth daily 9/15/22   Lissett Garza MD   clopidogrel (PLAVIX) 75 MG tablet Take 1 tablet by mouth daily 9/15/22   Lissett Garza MD   cinacalcet (SENSIPAR) 30 MG tablet Take 1 tablet by mouth daily Per Dr. Tomás Cleaning 9/15/22   Lissett Garza MD   pantoprazole (PROTONIX) 20 MG tablet Take 1 tablet by mouth every morning (before breakfast) ** stop Omeprazole due to interaction with plavix** 9/15/22   Lissett Garza MD   pravastatin (PRAVACHOL) 40 MG tablet TAKE ONE TABLET BY MOUTH DAILY 8/9/22   Delilah Cabrera PA-C   ezetimibe (ZETIA) 10 MG tablet TAKE ONE TABLET BY MOUTH DAILY 8/1/22   Delilah Cabrera PA-C   coenzyme Q-10 100 MG capsule TAKE ONE CAPSULE BY MOUTH DAILY 8/1/22   Delilah Cabrera PA-C   tamsulosin (FLOMAX) 0.4 mg capsule TAKE ONE CAPSULE BY MOUTH TWICE A DAY 6/9/22   Zachary Zamora MD   metFORMIN (GLUCOPHAGE) 1000 MG tablet TAKE ONE TABLET BY MOUTH TWICE A DAY WITH MEALS 5/12/22   Delilah Cabrera PA-C   lisinopril (PRINIVIL;ZESTRIL) 10 MG tablet Take 1 tablet by mouth daily 5/12/22   Delilah Cabrera PA-C   FEROSUL 325 (65 Fe) MG tablet TAKE ONE TABLET BY MOUTH DAILY WITH BREAKFAST 4/25/22   Kelton Mack, APRN - CNP   finasteride (PROSCAR) 5 MG tablet TAKE ONE TABLET BY MOUTH DAILY 4/25/22   Nova Muhammad MD   gabapentin (NEURONTIN) 300 MG capsule Take 600 mg by mouth 3 times daily. Historical Provider, MD   E-400 400 UNITS capsule TAKE TWO CAPSULES BY MOUTH DAILY 9/23/16   Jose Muñoz MD   nortriptyline (PAMELOR) 50 MG capsule Take 50 mg by mouth 2 times daily  3/31/16   Historical Provider, MD         There were no vitals filed for this visit.     Constitutional and General Appearance:   alert, cooperative, no distress and appears stated age  [de-identified]:  PERRL, EOMI  Respiratory:  Normal excursion and expansion without use of accessory muscles  Resp Auscultation:  Good respiratory effort. No for increased work of breathing. On auscultation: clear to auscultation bilaterally  Cardiovascular:  Regular rate and rhythm. S1/S2  No murmurs. The apical impulse is not displaced  Abdomen:  Soft  Bowel sounds present  Non-tender to palpation  Extremities:  No cyanosis or clubbing  Lower extremity edema: No.  Skin:  Warm and dry  Neurological:  Alert and oriented. Moves all extremities well          1/2/19 cath - LAD 40% proximal stenosis. 80% mid stenosis reduced to 0% using 2 BMS stents (BMS used as the patient wants to go for back surgery and refuse to delay, I did discuss with him the difference between BMS and SHARAN and he requested BMS). The distal LAD around the apex has 70% stenosis and will treat medically. LCx mild dz, RCA normal. LVEF 50%    TINOCO with abnormal stress test   Pre op   HTN   HLD     Plan:  Proceed with planned procedure. Further orders to follow. Pre Procedure Conscious Sedation Data:     ASA Class:                  [] I [] II [x] III [] IV     Mallampati Class:       [] I [] II [x] III [] IV    Risks, benefits, and alternatives of cardiac catheterization were discussed, in detail, with patient. Risks include, but not limited to, bleeding, requiring blood transfusion, vascular complication requiring surgery, renal failure with need of dialysis, CVA, MI, death and anesthesia complications including intubation were discussed. Patient verbalized understanding and agreed to proceed with the procedure understanding the above risks and alternatives to the procedure. Risks, benefits, alternatives, and details discussed extensively. Accepts and consents.       Electronically signed on 10/28/22 at 7:22 AM by:    Usama Gill MD, MD   Fellow, [address]    Attending Physician Statement  I have discussed the care of Justin Sweeney, including pertinent history and exam findings,  with the cardiology fellow/resident. I have seen and examined the patient and the key elements of all parts of the encounter have been performed by me. I have completed at least one if not all key elements of the E/M (history, physical exam, and MDM).      Shawn Almodovar MD, [facility]

## 2022-10-28 NOTE — PROGRESS NOTES
Patient received post cath to room 4. Assessment obtained. Post cath pathway initiated. Right radial site with VASC BAND intact / 10 cc air. No hematoma noted. Restrictions reviewed with patient and wife. Patient without complaints.

## 2022-10-28 NOTE — PROGRESS NOTES
Patient admitted, consent signed and questions answered. Patient ready for procedure. Call light to reach with side rails up 2 of 2. Right wrist and bilat groin hairs clipped with writer and Chris Griffin present. Wife at bedside with patient. History and physical completed.

## 2022-10-28 NOTE — PROGRESS NOTES
VASC BAND removed and site viewed by patient and wife as reviewed wound care with both. Site clean soft and dry. Pressure dressing applied. Radial pulse palpable. Ambulates in halls / restroom without distress.

## 2022-10-28 NOTE — DISCHARGE INSTRUCTIONS
DISCHARGE INSTRUCTIONS / ARM CARE POST CATHERIZATION        ENCOURAGE FLUIDS    NO STRENUOUS LIFTING WITH AFFECTED ARM FOR 3 DAYS ANYTHING HEAVIER THAN 8 TO 10 POUNDS    REMOVE BAND-AID/PRESSURE DRESSING THE FOLLOWING DAY AND DO NOT APPLY ANY FURTHER BAND-AIDS    KEEP INCISION CLEAN DRY AND OPEN TO THE AIR / NO HAND LOTION NEAR PUNCTURE SITE    WATCH FOR SIGNS OF INFECTION /  REDNESS / SWELLING / DRAINAGE / Batres Shannan / TEMPERATURE GREATER THAN 101    IF BLEEDING OCCURS HOLD MANUAL PRESSURE DIRECTLY OVER SITE  (YOU WILL FEEL PULSATION OF ARTERY) AND IF BLEEDING DOES NOT STOP AFTER 2 MINUTES CALL 911    OK TO SHOWER THE NEXT DAY, NO TUB BATHING OR HOT TUBS/SWIMMING FOR 7 DAYS    IF AREA BECOMES HARD AND SWOLLEN AND IF YOU ARE AT ALL CONCERNED SEEK HELP IMMEDIATELY    SEEK HELP IMMEDIATELY IF AFFECTED ARM BECOMES COLD / Josph Johns / SEVERE PAIN / NAILBEDS TURN BLUE     IF ON METFORMIN / GLUCOPHAGE DO NOT RESTART MEDICATION FOR 48 HOURS    PLEASE PRACTICE GOOD HAND WASHING AND INCLUDE PUNCTURE SITE ESPECIALLY AFTER USING THE RESTROOM    AVOID USING ALCOHOL BASED HAND SANITIZERS FOR ONE WEEK      CALL 911 if you have symptoms including:   Drooping facial muscles   Changes in vision or speech   Difficulty walking or using your limbs   Change in sensation to affected leg, including numbness, feeling cold, or change in color   Extreme sweating, nausea or vomiting   Dizziness or lightheadedness   Chest pain   Rapid, irregular heartbeat   Palpitations   Cough, shortness of breath, or difficulty breathing   Weakness or fainting   If you think you have an emergency, CALL 911 . SEDATION / ANALGESIA INFORMATION / Cathy Hernandez 85 have received the sedation/analgesia medication during your visit    Sedation/analgesia is used during short medical procedures under controlled supervision. The medication will produce a strong relaxation.  You will be able to hear, speak and follow instructions, but your memory and alertness will be decreased. You will be able to swallow and breathe on your own. During sedation/analgesia your blood pressure, heart and breathing will be watched closely. After the procedure, you may not remember what was said or done. You may have the following effects from the medication. \" Drowsiness, dizziness, sleepiness or confusion. \" Difficulty remembering or delayed reaction times. \" Loss of fine muscle control or difficulty with your balance especially while walking. \" Difficulty focusing or blurred vision. You may not be aware of slight changes in your behavior and/or your reaction time because of the medication used during the procedure. Therefore you should follow these instructions. \" Have someone responsible help you with your care. \" Do not drive for 24 hours. \" Do not operate equipment for 24 hours (lawnmowers, power tools, kitchen accessories, stove). \" Do not drink any alcoholic beverages for a minimum of 24 hours. \" Do not make important personal, legal or business decisions for 24 hours. \" You may experience dizziness or lightheadedness. Move slowly and carefully, do not make sudden position changes. \" Drink extra amounts of fluids today. \" Increase your diet as tolerated (unless you have received specific instructions from your doctor). \" If you feel nauseated, continue with liquids until the nausea is gone. \" Notify your physician if you have not urinated within 8 hours after the procedure. \" Resume your medications unless otherwise instructed. Coronary artery disease (CAD) occurs when plaque builds up in the arteries that bring oxygen-rich blood to your heart. Plaque is a fatty substance made of cholesterol, calcium, and other substances in the blood. This process is called hardening of the arteries, or atherosclerosis. What happens when you have coronary artery disease? Plaque may narrow the coronary arteries. Narrowed arteries cause poor blood flow.  This can lead to angina symptoms such as chest pain or discomfort. If blood flow is completely blocked, you could have a heart attack. You can slow CAD and reduce the risk of future problems by making changes in your lifestyle. These include quitting smoking and eating heart-healthy foods. Treatments for CAD, along with changes in your lifestyle, can help you live a longer and healthier life. How can you prevent coronary artery disease? Do not smoke. It may be the best thing you can do to prevent heart disease. If you need help quitting, talk to your doctor about stop-smoking programs and medicines. These can increase your chances of quitting for good. Be active. Get at least 30 minutes of exercise on most days of the week. Walking is a good choice. You also may want to do other activities, such as running, swimming, cycling, or playing tennis or team sports. Eat heart-healthy foods. Eat more fruits and vegetables and less foods that contain saturated and trans fats. Limit alcohol, sodium, and sweets. Stay at a healthy weight. Lose weight if you need to. Manage other health problems such as diabetes, high blood pressure, and high cholesterol. Talk to your doctor about taking a daily aspirin. Manage stress. Stress can hurt your heart. To keep stress low, talk about your problems and feelings. Don't keep your feelings hidden. How is coronary artery disease treated? Your doctor will suggest that you make lifestyle changes. For example, your doctor may ask you to eat healthy foods, quit smoking, lose extra weight, and be more active. You will have to take medicines. Your doctor may suggest a procedure to open narrowed or blocked arteries. This is called angioplasty. Or your doctor may suggest using healthy blood vessels to create detours around narrowed or blocked arteries. This is called bypass surgery. Follow-up care is a key part of your treatment and safety.  Be sure to make and go to all appointments, and call your doctor if you are having problems. It's also a good idea to know your test results and keep a list of the medicines you take. Where can you learn more? Go to [email or web address] org and sign in to your [employer] account. Enter [phone] in the [employer] box to learn more about Learning About Coronary Artery Disease (CAD).     If you do not have an account, please click on the Sign Up Now link.

## 2022-10-28 NOTE — OP NOTE
[facility]  Cardiac catheterization note        Date:   10/28/2022  Patient name: Alban Davis  Date of admission:  10/28/2022  7:30 AM  MRN:   [id number]  YOB: 1955    Operators:  Hiram Burden MD (Attending Physician)     Raad Zuñiga              (CV Fellow)      Pre Procedure Conscious Sedation Data:    ASA Class:    [] I [x] II [] III [] IV    Mallampati Class:  [] I [x] II [] III [] IV      Indications:    - Pre-op cardiac evaluation       - Abnormal nuclear perfusion study       - Prior PCI with stent placement     Procedure:   Left heart catheterization   Selective left and right coronary angiography   Left ventriculography       Access:  [] Femoral  [x] Radial  artery       [x] Right  [] Left    Procedure: After informed consent was obtained with explanation of the risks and benefits, patient was brought to the cath lab. The access area was prepped and draped in sterile fashion. 1% lidocaine was used for local block. The artery was cannulated with 6  Fr sheath with brisk arterial blood return. The side port was frequently flushed and aspirated with normal saline. Cardiac Arteries and Lesion Findings       LMCA: Normal 0% stenosis. LAD: Mid 20-30% stenosis   Patent stent in the distal segment with mild instent restenosis   Apically vessel is small with diffuse 50-60% stenosis, unchanged from last angiogram D1 is large and normal     LCx: Mild irregularities 10-20%. OM 1 and OM2 are large and normal     RCA: Normal 0% stenosis. RPDA has very distal 70% stenosis (small territory)   RPL is large with mild disease      Coronary Tree        Dominance: Right       LV Analysis   LV function assessed as:Normal.   Ejection Fraction   +----------------------------------------------------------------------+---+   ! Method                                                                ! EF%! +----------------------------------------------------------------------+---+   ! LV gram                                                               !50 !   +----------------------------------------------------------------------+---+     Procedure Summary        1. Patent stent in distal LAD    2. Mild nonobstructive CAD otherwise, unchanged from last angiogram in 2019    3. Normal LV systolic function. Recommendations        Routine Post Diagnostic Cath orders. Continue medical therapy    Patient will be intermediate risk for knee surgery    Risk factor modification. Estimated Blood Loss:            [x] Minimal 10 cc   [] Minimal < 25 cc      [] Moderate 25-50 cc         [] >50 cc      Electronically signed by Usama Gill MD on 10/28/2022 at 9:36 AM  Cardiovascular fellow  [address] [place] [address]    Attending Physician  I was present during the entire procedure and performed all the critical elements of it.      Alejandro Suarez MD, Bala Ibrahim

## 2022-11-22 DIAGNOSIS — M54.16 LUMBAR RADICULOPATHY, CHRONIC: ICD-10-CM

## 2022-11-22 DIAGNOSIS — E78.2 MIXED HYPERLIPIDEMIA: ICD-10-CM

## 2022-11-22 DIAGNOSIS — D50.8 IRON DEFICIENCY ANEMIA SECONDARY TO INADEQUATE DIETARY IRON INTAKE: ICD-10-CM

## 2022-11-22 DIAGNOSIS — M47.12 CERVICAL SPONDYLOSIS WITH MYELOPATHY: ICD-10-CM

## 2022-11-22 RX ORDER — FERROUS SULFATE 325(65) MG
325 TABLET ORAL
Qty: 90 TABLET | Refills: 1 | OUTPATIENT
Start: 2022-11-22

## 2022-11-22 RX ORDER — PRAVASTATIN SODIUM 40 MG
40 TABLET ORAL
Qty: 90 TABLET | Refills: 3 | Status: SHIPPED | OUTPATIENT
Start: 2022-11-22

## 2022-11-22 RX ORDER — CHOLECALCIFEROL (VITAMIN D3) 125 MCG
100 CAPSULE ORAL EVERY EVENING
Qty: 90 CAPSULE | Refills: 3 | Status: SHIPPED | OUTPATIENT
Start: 2022-11-22

## 2022-11-22 RX ORDER — EZETIMIBE 10 MG/1
10 TABLET ORAL DAILY
Qty: 90 TABLET | Refills: 3 | Status: SHIPPED | OUTPATIENT
Start: 2022-11-22

## 2022-11-22 NOTE — TELEPHONE ENCOUNTER
Please Approve or Refuse.   Send to Pharmacy per Pt's Request: [facility] ([phone])      Next Visit Date:  12/15/2022   Last Visit Date: 9/15/2022    Hemoglobin A1C (%)   Date Value   09/15/2022 6.8   04/05/2022 6.4 (H)   09/03/2021 7.7 (H)             ( goal A1C is < 7)   BP Readings from Last 3 Encounters:   10/28/22 (!) 166/103   10/03/22 130/80   09/15/22 (!) 190/120          (goal 120/80)  BUN   Date Value Ref Range Status   09/21/2022 22 8 - 23 mg/dL Final     Creatinine   Date Value Ref Range Status   09/21/2022 1.19 0.70 - 1.20 mg/dL Final     Potassium   Date Value Ref Range Status   09/21/2022 4.9 3.7 - 5.3 mmol/L Final

## 2022-11-22 NOTE — TELEPHONE ENCOUNTER
To stop iron anemia has resolved      Future Appointments   Date Time Provider Henry Ferrerai   12/15/2022  2:00 PM Pascual Beckman MD [facility]   1/18/2023  2:30 PM Prosper Major MD [facility]   5/16/2023  8:30 AM Oleg Sauceda MD [facility]       Lab Results   Component Value Date    WBC 6.9 09/21/2022    HGB 15.4 09/21/2022    HCT 45.2 09/21/2022    MCV 88.5 09/21/2022     09/21/2022

## 2022-11-23 ENCOUNTER — TELEPHONE (OUTPATIENT)
Dept: FAMILY MEDICINE CLINIC | Age: 67
End: 2022-11-23

## 2022-11-23 NOTE — TELEPHONE ENCOUNTER
Patient states he is drinking Mountain dew and will stop. Patient states no to steroids, he has been takinghis metformin and also eating a lot of fruit which he will cut down on.  He states he will watch his diet over the next few days and touch base on Monday to let us know how his sugars are - it has come down to 200

## 2022-11-23 NOTE — TELEPHONE ENCOUNTER
PATIENT CALLED STATING HE HAD KNEE REPLACEMENT WITH DR YOUNGER AT [facility]. STATES BLOOD SUGAR READINGS IN THE HOSPITAL WERE ALL OVER THE PLACE. BLOOD SUGAR IN HOSPITAL HE STATES WERE: 200, 180,451.  TODAY HE TOOK HIS SUGAR READINGS BEFORE ANY MEALS OR INSULIN BLOOD SUGAR READIN [address] ON ACCIDENT*

## 2022-11-23 NOTE — TELEPHONE ENCOUNTER
Is he drinking any pop or Gatorade? Is he taking any prednisone? He supposed to take metformin 1000 Mg twice a day    Is his glucometer working well?   I could send insulin sliding scale to the pharmacy for him and to check his sugars 3 times a day and to cover with short-acting insulin depending on the numbers, let me know if he agrees and then I will put other instructions    Lab Results   Component Value Date    LABA1C 6.8 09/15/2022    LABA1C 6.4 (H) 04/05/2022    LABA1C 7.7 (H) 09/03/2021

## 2022-12-15 ENCOUNTER — TELEPHONE (OUTPATIENT)
Dept: FAMILY MEDICINE CLINIC | Age: 67
End: 2022-12-15

## 2022-12-15 DIAGNOSIS — N18.30 BENIGN HYPERTENSION WITH CKD (CHRONIC KIDNEY DISEASE) STAGE III (HCC): Primary | ICD-10-CM

## 2022-12-15 DIAGNOSIS — E11.9 TYPE 2 DIABETES MELLITUS WITHOUT COMPLICATION, WITHOUT LONG-TERM CURRENT USE OF INSULIN (HCC): ICD-10-CM

## 2022-12-15 DIAGNOSIS — I12.9 BENIGN HYPERTENSION WITH CKD (CHRONIC KIDNEY DISEASE) STAGE III (HCC): Primary | ICD-10-CM

## 2022-12-15 NOTE — TELEPHONE ENCOUNTER
What medication does he need refilled?     Future Appointments   Date Time Provider Henry Carleen   12/15/2022  2:00 PM Zhao Ledezma MD [facility]   1/18/2023  2:30 PM Khang Lind MD [facility]   5/16/2023  8:30 AM Bandar Abreu MD [facility]

## 2022-12-15 NOTE — TELEPHONE ENCOUNTER
Please Approve or Refuse.   Send to Pharmacy per Pt's Request: [facility]     Next Visit Date:  12/15/2022   Last Visit Date: 9/15/2022    Hemoglobin A1C (%)   Date Value   09/15/2022 6.8   04/05/2022 6.4 (H)   09/03/2021 7.7 (H)             ( goal A1C is < 7)   BP Readings from Last 3 Encounters:   10/28/22 (!) 166/103   10/03/22 130/80   09/15/22 (!) 190/120          (goal 120/80)  BUN   Date Value Ref Range Status   09/21/2022 22 8 - 23 mg/dL Final     Creatinine   Date Value Ref Range Status   09/21/2022 1.19 0.70 - 1.20 mg/dL Final     Potassium   Date Value Ref Range Status   09/21/2022 4.9 3.7 - 5.3 mmol/L Final

## 2022-12-16 RX ORDER — LISINOPRIL 10 MG/1
10 TABLET ORAL EVERY EVENING
Qty: 90 TABLET | Refills: 3 | Status: SHIPPED | OUTPATIENT
Start: 2022-12-16

## 2022-12-16 NOTE — TELEPHONE ENCOUNTER
Please Approve or Refuse.   Send to Pharmacy per Pt's Request: METFORMIN 1000 MG BID/  LISINOPRIL 10 MG ONCE DAILY      Next Visit Date:  12/15/2022   Last Visit Date: 9/15/2022    Hemoglobin A1C (%)   Date Value   09/15/2022 6.8   04/05/2022 6.4 (H)   09/03/2021 7.7 (H)             ( goal A1C is < 7)   BP Readings from Last 3 Encounters:   10/28/22 (!) 166/103   10/03/22 130/80   09/15/22 (!) 190/120          (goal 120/80)  BUN   Date Value Ref Range Status   09/21/2022 22 8 - 23 mg/dL Final     Creatinine   Date Value Ref Range Status   09/21/2022 1.19 0.70 - 1.20 mg/dL Final     Potassium   Date Value Ref Range Status   09/21/2022 4.9 3.7 - 5.3 mmol/L Final

## 2022-12-16 NOTE — TELEPHONE ENCOUNTER
Please let the patient know to  prescription from the pharmacy. Please schedule the patient for a nurse visit for blood pressure check    BP Readings from Last 3 Encounters:   10/28/22 (!) 166/103   10/03/22 130/80   09/15/22 (!) 190/120     . Orders Placed This Encounter   Medications    lisinopril (PRINIVIL;ZESTRIL) 10 MG tablet     Sig: Take 1 tablet by mouth every evening     Dispense:  90 tablet     Refill:  3    metFORMIN (GLUCOPHAGE) 1000 MG tablet     Sig: Take 1 tablet by mouth 2 times daily (with meals)     Dispense:  180 tablet     Refill:  3         [facility] [id number] [address] [address]  Phone: [phone] Fax: [phone]      No orders of the defined types were placed in this encounter. Future Appointments   Date Time Provider Henry Durant   1/18/2023  2:30 PM Gregoria Hughes MD [facility]   5/16/2023  8:30 AM [facility]       Thank you!

## 2022-12-23 DIAGNOSIS — E78.2 MIXED HYPERLIPIDEMIA: ICD-10-CM

## 2022-12-23 DIAGNOSIS — N18.30 BENIGN HYPERTENSION WITH CKD (CHRONIC KIDNEY DISEASE) STAGE III (HCC): ICD-10-CM

## 2022-12-23 DIAGNOSIS — I25.10 CORONARY ARTERY DISEASE INVOLVING NATIVE CORONARY ARTERY OF NATIVE HEART WITHOUT ANGINA PECTORIS: ICD-10-CM

## 2022-12-23 DIAGNOSIS — I12.9 BENIGN HYPERTENSION WITH CKD (CHRONIC KIDNEY DISEASE) STAGE III (HCC): ICD-10-CM

## 2022-12-23 RX ORDER — METOPROLOL SUCCINATE 100 MG/1
100 TABLET, EXTENDED RELEASE ORAL DAILY
Qty: 90 TABLET | Refills: 3 | Status: SHIPPED | OUTPATIENT
Start: 2022-12-23

## 2022-12-23 RX ORDER — CHOLECALCIFEROL (VITAMIN D3) 125 MCG
100 CAPSULE ORAL EVERY EVENING
Qty: 90 CAPSULE | Refills: 3 | Status: SHIPPED | OUTPATIENT
Start: 2022-12-23

## 2022-12-23 NOTE — TELEPHONE ENCOUNTER
Please Approve or Refuse.   Send to Pharmacy per Pt's Request:      Next Visit Date:  1/12/2023   Last Visit Date: 9/15/2022    Hemoglobin A1C (%)   Date Value   09/15/2022 6.8   04/05/2022 6.4 (H)   09/03/2021 7.7 (H)             ( goal A1C is < 7)   BP Readings from Last 3 Encounters:   10/28/22 (!) 166/103   10/03/22 130/80   09/15/22 (!) 190/120          (goal 120/80)  BUN   Date Value Ref Range Status   09/21/2022 22 8 - 23 mg/dL Final     Creatinine   Date Value Ref Range Status   09/21/2022 1.19 0.70 - 1.20 mg/dL Final     Potassium   Date Value Ref Range Status   09/21/2022 4.9 3.7 - 5.3 mmol/L Final

## 2022-12-27 DIAGNOSIS — E78.2 MIXED HYPERLIPIDEMIA: ICD-10-CM

## 2022-12-27 RX ORDER — CHOLECALCIFEROL (VITAMIN D3) 125 MCG
100 CAPSULE ORAL EVERY EVENING
Qty: 90 CAPSULE | Refills: 3 | Status: SHIPPED | OUTPATIENT
Start: 2022-12-27

## 2022-12-27 NOTE — TELEPHONE ENCOUNTER
Please Approve or Refuse.   Send to Pharmacy per Pt's Request: [facility]     Next Visit Date:  1/12/2023   Last Visit Date: 9/15/2022    Hemoglobin A1C (%)   Date Value   09/15/2022 6.8   04/05/2022 6.4 (H)   09/03/2021 7.7 (H)             ( goal A1C is < 7)   BP Readings from Last 3 Encounters:   10/28/22 (!) 166/103   10/03/22 130/80   09/15/22 (!) 190/120          (goal 120/80)  BUN   Date Value Ref Range Status   09/21/2022 22 8 - 23 mg/dL Final     Creatinine   Date Value Ref Range Status   09/21/2022 1.19 0.70 - 1.20 mg/dL Final     Potassium   Date Value Ref Range Status   09/21/2022 4.9 3.7 - 5.3 mmol/L Final

## 2022-12-30 ENCOUNTER — NURSE ONLY (OUTPATIENT)
Dept: FAMILY MEDICINE CLINIC | Age: 67
End: 2022-12-30
Payer: MEDICARE

## 2022-12-30 VITALS — HEART RATE: 96 BPM | OXYGEN SATURATION: 98 % | SYSTOLIC BLOOD PRESSURE: 138 MMHG | DIASTOLIC BLOOD PRESSURE: 88 MMHG

## 2022-12-30 DIAGNOSIS — N18.30 BENIGN HYPERTENSION WITH CKD (CHRONIC KIDNEY DISEASE) STAGE III (HCC): Primary | ICD-10-CM

## 2022-12-30 DIAGNOSIS — I12.9 BENIGN HYPERTENSION WITH CKD (CHRONIC KIDNEY DISEASE) STAGE III (HCC): Primary | ICD-10-CM

## 2022-12-30 PROCEDURE — 99211 OFF/OP EST MAY X REQ PHY/QHP: CPT | Performed by: FAMILY MEDICINE

## 2022-12-30 NOTE — PROGRESS NOTES
Chief Complaint   Patient presents with    Hypertension    Blood Pressure Check      Molina De Guzman is here for BP check    BP Readings from Last 3 Encounters:   12/30/22 138/88   10/28/22 (!) 166/103   10/03/22 130/80       BP is 138/88      Future Appointments   Date Time Provider Henry Durant   1/12/2023 11:45 AM Leticia Ellsworth MD [facility]   1/18/2023  2:30 PM Deny Arnett MD [facility]   5/16/2023  8:30 AM Re Warren MD [facility]

## 2022-12-30 NOTE — PROGRESS NOTES
Chief Complaint   Patient presents with    Hypertension    Blood Pressure Check        Patient is here for blood pressure recheck. 1. Benign hypertension with CKD (chronic kidney disease) stage III (HCC)        Well controlled BP   Continue current treatment.        BP Readings from Last 3 Encounters:   12/30/22 138/88   10/28/22 (!) 166/103   10/03/22 130/80       Pulse Readings from Last 3 Encounters:   12/30/22 96   10/28/22 100   10/03/22 87       Future Appointments   Date Time Provider Henry Ferrerai   1/12/2023 11:45 AM [facility]   1/18/2023  2:30 [facility]   5/16/2023  8:30 AM Amaya Shrestha MD [facility]

## 2023-01-12 ENCOUNTER — OFFICE VISIT (OUTPATIENT)
Dept: FAMILY MEDICINE CLINIC | Age: 68
End: 2023-01-12

## 2023-01-12 VITALS
WEIGHT: 209 LBS | HEIGHT: 68 IN | HEART RATE: 87 BPM | TEMPERATURE: 97.1 F | SYSTOLIC BLOOD PRESSURE: 136 MMHG | BODY MASS INDEX: 31.67 KG/M2 | OXYGEN SATURATION: 98 % | DIASTOLIC BLOOD PRESSURE: 88 MMHG

## 2023-01-12 DIAGNOSIS — N18.31 TYPE 2 DIABETES MELLITUS WITH STAGE 3A CHRONIC KIDNEY DISEASE, WITHOUT LONG-TERM CURRENT USE OF INSULIN (HCC): Primary | ICD-10-CM

## 2023-01-12 DIAGNOSIS — N18.30 BENIGN HYPERTENSION WITH CKD (CHRONIC KIDNEY DISEASE) STAGE III (HCC): ICD-10-CM

## 2023-01-12 DIAGNOSIS — I12.9 BENIGN HYPERTENSION WITH CKD (CHRONIC KIDNEY DISEASE) STAGE III (HCC): ICD-10-CM

## 2023-01-12 DIAGNOSIS — M13.0 ARTHRITIS OF MULTIPLE SITES: ICD-10-CM

## 2023-01-12 DIAGNOSIS — E21.3 HYPERPARATHYROIDISM (HCC): ICD-10-CM

## 2023-01-12 DIAGNOSIS — Z23 ENCOUNTER FOR IMMUNIZATION: ICD-10-CM

## 2023-01-12 DIAGNOSIS — Z96.653 S/P TOTAL KNEE REPLACEMENT, BILATERAL: ICD-10-CM

## 2023-01-12 DIAGNOSIS — Z12.5 PROSTATE CANCER SCREENING: ICD-10-CM

## 2023-01-12 DIAGNOSIS — R13.19 ESOPHAGEAL DYSPHAGIA: ICD-10-CM

## 2023-01-12 DIAGNOSIS — E11.22 TYPE 2 DIABETES MELLITUS WITH STAGE 3A CHRONIC KIDNEY DISEASE, WITHOUT LONG-TERM CURRENT USE OF INSULIN (HCC): Primary | ICD-10-CM

## 2023-01-12 LAB — HBA1C MFR BLD: 6.9 %

## 2023-01-12 RX ORDER — YEAST,DRIED (S. CEREVISIAE)
1 POWDER (GRAM) ORAL DAILY
Qty: 90 TABLET | Refills: 0
Start: 2023-01-12

## 2023-01-12 ASSESSMENT — ENCOUNTER SYMPTOMS
WHEEZING: 0
BACK PAIN: 1
VOMITING: 0
CONSTIPATION: 0
NAUSEA: 0
DIARRHEA: 0
SHORTNESS OF BREATH: 0
CHEST TIGHTNESS: 0
COUGH: 0
ABDOMINAL PAIN: 0
ABDOMINAL DISTENTION: 1

## 2023-01-12 ASSESSMENT — PATIENT HEALTH QUESTIONNAIRE - PHQ9
1. LITTLE INTEREST OR PLEASURE IN DOING THINGS: 0
SUM OF ALL RESPONSES TO PHQ QUESTIONS 1-9: 1
2. FEELING DOWN, DEPRESSED OR HOPELESS: 1
SUM OF ALL RESPONSES TO PHQ QUESTIONS 1-9: 1
SUM OF ALL RESPONSES TO PHQ9 QUESTIONS 1 & 2: 1
SUM OF ALL RESPONSES TO PHQ QUESTIONS 1-9: 1
SUM OF ALL RESPONSES TO PHQ QUESTIONS 1-9: 1

## 2023-01-12 NOTE — PROGRESS NOTES
Visit Information    Have you changed or started any medications since your last visit including any over-the-counter medicines, vitamins, or herbal medicines? no   Are you having any side effects from any of your medications? -  no  Have you stopped taking any of your medications? Is so, why? -  no    Have you seen any other physician or provider since your last visit? Yes - Records Obtained  Have you had any other diagnostic tests since your last visit? Yes - Records Obtained  Have you been seen in the emergency room and/or had an admission to a hospital since we last saw you? Yes - Records Obtained  Have you had your routine dental cleaning in the past 6 months? yes     Have you activated your [employer] account? If not, what are your barriers?  Yes     Patient Care Team:  Latoya Perdue MD as PCP - General (Family Medicine)  Latoya Perdue MD as PCP - [facility]  Ina Segura MD as Consulting Physician (Gastroenterology)  Jeffry Sinclair MD as Surgeon (Cardiology)  Lele Corral MD as Consulting Physician (Endocrinology)  Kim Whalen MD as Surgeon (Orthopedic Surgery)  Felice Balderas MD as Consulting Physician (Rheumatology)  Ina Segura MD as Consulting Physician (Gastroenterology)  Pepe Monroe MD as Consulting Physician (Urology)  More Flores MD as Consulting Physician (Neurology)  Caridad Keen as Consulting Physician (Orthopedic Surgery)    Medical History Review  Past Medical, Family, and Social History reviewed and does contribute to the patient presenting condition    Health Maintenance   Topic Date Due    Diabetic retinal exam  Never done    DTaP/Tdap/Td vaccine (1 - Tdap) Never done    Shingles vaccine (1 of 2) Never done    Diabetic foot exam  04/05/2023    Lipids  04/05/2023    Diabetic Alb to Cr ratio (uACR) test  08/05/2023    Depression Screen  08/05/2023    Pneumococcal 65+ years Vaccine (3 - PPSV23 if available, else PCV20) 08/05/2023    Annual Wellness Visit (AWV)  08/06/2023    A1C test (Diabetic or Prediabetic)  09/15/2023    GFR test (Diabetes, CKD 3-4, OR last GFR 15-59)  09/21/2023    Colorectal Cancer Screen  11/14/2024    Flu vaccine  Completed    COVID-19 Vaccine  Completed    Hepatitis C screen  Completed    Hepatitis A vaccine  Aged Out    Hib vaccine  Aged Out    Meningococcal (ACWY) vaccine  Aged Out

## 2023-01-12 NOTE — RESULT ENCOUNTER NOTE
Please notify patient: Hemoglobin A1c 6.9, I did not inform him  Well-controlled diabetes stable    Future Appointments  1/18/2023  2:30 PM    Loyd Miranda MD        [facility] [place]         Guillermo Mira  2/8/2023   3:30 PM    [facility]  5/16/2023  8:30 AM    Óscar Vinson MD          [facility]            [facility]  8/15/2023  8:30 AM    Mariposa Luciano MD     [facility]

## 2023-01-12 NOTE — PROGRESS NOTES
Justin Sweeney (:  1955) is a 79 y.o. male,Established patient, here for evaluation of the following chief complaint(s): Diabetes, Hypertension, and Toe Pain (Big toes bilateral )      ASSESSMENT/PLAN:    1. Type 2 diabetes mellitus with stage 3a chronic kidney disease, without long-term current use of insulin (HCC)  Stable diabetes mellitus type 2  Improved chronic kidney disease stage III  -     POCT glycosylated hemoglobin (Hb A1C 6.9, stable  Lab Results   Component Value Date    LABA1C 6.9 2023    LABA1C 6.8 09/15/2022    LABA1C 6.4 (H) 2022       -     CBC; Future  -     Comprehensive Metabolic Panel; Future  -     TSH; Future      -daily feet exam, Foot care: advised to wash feet daily, pat dry and apply lotion at night, avoiding between toes. Need to look at feet daily and report to a physician any signs of inflammation or skin damage  -annual dilated eye exam  -Low carb, low fat diet, increase fruits and vegetables, and exercise 4-5 times a day 30-40 minutes a day discussed  -continue current treatment metformin 1000 Mg twice a day  -continue Aspirin 81 Mg  -continue lisinopril ACEI and statin pravastatin 40 Mg    2. Benign hypertension with CKD (chronic kidney disease) stage III ([facility])  Improved chronic kidney disease stage III since he has stopped NSAIDs  Borderline high hypertension. Continue current treatment. Lisinopril 10 Mg daily, metoprolol  Mg daily, amlodipine 5 Mg daily  Will recheck labs. Discussed low salt diet and BP and pulse monitoring.      -     CBC; Future  -     Comprehensive Metabolic Panel; Future  -     Magnesium; Future  -     TSH; Future  -     Uric Acid; Future  3. Hyperparathyroidism ([address]  Improved  Continue Sensipar 30 Mg daily  -     PTH, Intact with Ionized Calcium; Future  -     TSH; Future  -     Vitamin D 25 Hydroxy; Future  4.  Arthritis of multiple sites  Worsening due to wear-and-tear nature of the disease with pain, especially in his great toes  Follows with podiatry, advised wide shoes, he will also try a special insert to see if it will help, per his podiatry advised  Try supplements from over-the-counter, Bengay ultra strength  He is already on nortriptyline 50 Mg twice a day, and gabapentin 600 Mg 3 times a day  Also advised Tylenol Extra Strength 500 Mg every 6 hours as needed for pain  -     Glucosamine-Chondroitin-MSM (TRIPLE FLEX) 750-400-375 MG TABS; Take 2 tablets by mouth daily With food. NATURE MADE brand. Or BIOFLEX., Disp-180 tablet, R-3NO PRINT  -     Collagen-Boron-Hyaluronic Acid (MOVE FREE ULTRA JOINT HEALTH) 40-5-3.3 MG TABS; Take 1 tablet by mouth daily, Disp-90 tablet, R-0NO PRINT  5. Esophageal dysphagia  Failing to improve  -     Ethel Kimbrough MD, Gastroenterology, [place]  6. S/p total knee replacement, bilateral  Improved pain bilaterally, doing physical therapy  7. Encounter for immunization  -     zoster recombinant adjuvanted vaccine [facility]) 50 MCG/0.5ML SUSR injection; Inject 0.5 mLs into the muscle once for 1 dose, Disp-1 each, R-1Normal  8. Prostate cancer screening  -     PSA Screening; Future  Lab Results   Component Value Date    PSA 1.54 07/19/2021    PSA 1.75 07/03/2020    PSA 1.55 05/24/2019       The natural history of prostate cancer and ongoing controversy regarding screening and potential treatment outcomes of prostate cancer has been discussed with the patient. The meaning of a false positive PSA and a false negative PSA has been discussed. He indicates understanding of the limitations of this screening test and wishes to proceed with screening PSA testing. Anthony Ac received counseling on the following healthy behaviors: nutrition, exercise, and medication adherence and weight loss  Reviewed prior labs and health maintenance  Discussed use, benefit, and side effects of prescribed medications. Barriers to medication compliance addressed.    Patient given educational materials - see patient instructions  Was a self-tracking handout given in paper form or via [employer]? Yes  All patient questions answered. Patient voiced understanding. The patient's past medical,surgical, social, and family history as well as his current medications and allergies were reviewed as documented in today's encounter. Medications, labs, diagnostic studies, consultations and follow-up as documented in this encounter. Return in about 7 months (around 8/6/2023) for Visit type MEDICARE, [facility], [address] [place] [address], [address] [place] [address] [employer]. Please obtain records [facility]    Future Appointments   Date Time Provider Henry Carleen   1/18/2023  2:30 PM Kimi Cisse MD [facility] [address] [facility]   2/8/2023  3:30 PM Efren Bowen MD [facility] [address] [place] [address]   5/16/2023  8:30 AM Kristin Poole MD [facility]   8/15/2023  8:30 AM Prosper Mobley MD [facility]         SUBJECTIVE/OBJECTIVE:    Comes with his wife Army Hatch. Diabetes Mellitus Type II, Follow-up:    Current symptoms/problems include hyperglycemia and visual disturbances. Symptoms have been present for several years. Known diabetic complications: nephropathy and cardiovascular disease  Cardiovascular risk factors: advanced age (older than 54 for men, 72 for women), diabetes mellitus, dyslipidemia, hypertension, male gender, obesity (BMI >= 30 kg/m2), and CKD stage 3    Medication compliance:  compliant all of the time  Current diabetic medications include oral agent (monotherapy): Glucophage. Eye exam current (within one year): yes, will request the report  Current diet: in general, a \"healthy\" diet  , diabetic, low fat/ cholesterol, low salt    Barriers: impairment:  physical: Chronic pain, and osteoarthritis    Current monitoring regimen: office lab tests - Hemoglobin A1c every 3 to 6 months  Is He on ACE inhibitor or angiotensin II receptor blocker? Yes      reports that he has never smoked.  He has never used smokeless tobacco. Counseling given: Yes      Daily Aspirin? Yes      A1c is stable  Lab Results   Component Value Date    LABA1C 6.9 01/12/2023    LABA1C 6.8 09/15/2022    LABA1C 6.4 (H) 04/05/2022       Urine microabumin is Elevated. Lab Results   Component Value Date    LABMICR 18 (H) 08/05/2022        Stable weight  Wt Readings from Last 3 Encounters:   01/12/23 209 lb (94.8 kg)   10/28/22 217 lb (98.4 kg)   09/15/22 210 lb (95.3 kg)         Hypertension, chronic kidney disease stage III, coronary artery disease:   Jaden Plaza   doing physical therapy  , and is adherent to low salt diet. Blood pressure is well controlled at home. Cardiac symptoms  fatigue. Patient denies  chest pain, chest pressure/discomfort, claudication, dyspnea, exertional chest pressure/discomfort, irregular heart beat, lower extremity edema, near-syncope, orthopnea, palpitations, paroxysmal nocturnal dyspnea, syncope, and tachypnea. Use of agents associated with hypertension: none. History of target organ damage: angina/ prior myocardial infarction, chronic kidney disease, and prior coronary revascularization. has 3 stents      10/28/2022 cardiac cath:  \"1. Patent stent in distal LAD   2. Mild nonobstructive CAD otherwise, unchanged from last angiogram in   2019   3. Normal LV systolic function. EF 50%\"     BP is borderline high but he is in more pain today. Jaden Plaza reports compliance with BP medications, and tolerates them well, denies side effects. BP Readings from Last 3 Encounters:   01/12/23 136/88   12/30/22 138/88   10/28/22 (!) 166/103        Pulse is Normal.    Pulse Readings from Last 3 Encounters:   01/12/23 87   12/30/22 96   10/28/22 100       Patient complains of worsening pain in his joints especially in both great toes, right great toe worse  Doing PT For bilateral knee replacements  Had knees replacements on 11/08/2022 and 11/22/2022 by Dr. Josh Charles.   He is already on Nortriptiline 50 mg BID and Gabapentin 600 mg TID, per rheumatologist.  Didn't try bengay  Big toes have been hurting for 3 months since diclofenac out, right worse, and he is upset with me that we removed diclofenac, due to worsening kidney function. He understands. He has seen Dr. Jed Whitten his podiatrist, who recommended a special type of inserts, which he is willing to try. He also wears wide shoes. He denies swelling of the great toes or redness of the great toes, he is still drinking pop. Last uric acid was normal  Lab Results   Component Value Date    URICACID 6.2 09/21/2022       Had shingles on the Left forehead  Taking both Pamelor and gabapentin   Advised to get shingles vaccine at the pharmacy    Hyperparathyroidism     Has been seeing Dr. Silvana Greenberg, his next appointment is in March  Taking Sensipar which was recently increased. Last calcium level was normal.      Patient reports Food gets stuck, does not go down, Sits in the esophagus as he shows mesometimes bloating of the abdomen, it has been happening for awhile  Food doesn't taste good anymore. There is no weight loss  Denies nausea and vomiting        [x]1-4 = Minimal depression  PHQ Scores 1/12/2023 8/5/2022 4/5/2022 1/5/2021 5/29/2020 1/28/2020 9/27/2019   PHQ2 Score 1 1 0 0 0 2 0   PHQ9 Score 1 1 0 0 0 2 0       Prior to Visit Medications    Medication Sig Taking? Authorizing Provider   coenzyme Q-10 100 MG capsule Take 1 capsule by mouth every evening Yes Chai Rae MD   metoprolol succinate (TOPROL XL) 100 MG extended release tablet Take 1 tablet by mouth daily Dose increased 9/15/2022 .  Goal BP bellow 135/85 and above 115/65, heart rate 56 to 90 bpm Yes Rosa Lauren MD   lisinopril (PRINIVIL;ZESTRIL) 10 MG tablet Take 1 tablet by mouth every evening Yes Chai Rae MD   metFORMIN (GLUCOPHAGE) 1000 MG tablet Take 1 tablet by mouth 2 times daily (with meals) Yes Chai Rae MD   pravastatin (PRAVACHOL) 40 MG tablet Take 1 tablet by mouth Daily with supper Yes Kyleigh Sesay MD   ezetimibe (ZETIA) 10 MG tablet Take 1 tablet by mouth daily Yes Kyleigh Sesay MD   amLODIPine (NORVASC) 5 MG tablet Take 5 mg by mouth daily Yes Historical Provider, MD   Handicap Placard MISC by Does not apply route Can't walk greater than 200 feet. Expires in 5 years. Yes Kyleigh Sesay MD   acetaminophen (TYLENOL) 500 MG tablet Take 1 tablet by mouth every 6 hours as needed for Pain Yes Kyleigh Sesay MD   aspirin EC 81 MG EC tablet Take 1 tablet by mouth daily Yes Kyleigh Sesay MD   clopidogrel (PLAVIX) 75 MG tablet Take 1 tablet by mouth daily Yes Kyleigh Sesay MD   cinacalcet (SENSIPAR) 30 MG tablet Take 1 tablet by mouth daily Per Dr. Daquan Crawford  Patient taking differently: Take 30 mg by mouth every evening Per Dr. Bernard Gannon MD   pantoprazole (PROTONIX) 20 MG tablet Take 1 tablet by mouth every morning (before breakfast) ** stop Omeprazole due to interaction with plavix** Yes Kyleigh Sesay MD   tamsulosin (FLOMAX) 0.4 mg capsule TAKE ONE CAPSULE BY MOUTH TWICE A DAY Yes Parker Tanner MD   finasteride (PROSCAR) 5 MG tablet TAKE ONE TABLET BY MOUTH DAILY Yes Parker Tanner MD   gabapentin (NEURONTIN) 300 MG capsule Take 600 mg by mouth 3 times daily.   Yes Historical Provider, MD   E-400 400 UNITS capsule TAKE TWO CAPSULES BY MOUTH DAILY Yes Cande Dorado MD   nortriptyline (PAMELOR) 50 MG capsule Take 50 mg by mouth 2 times daily  Yes Historical Provider, MD   diclofenac sodium (VOLTAREN) 1 % GEL Apply 2 g topically 4 times daily as needed for Pain STOP DICLOFENAC TABLETS DUE TO MILD KIDNEY DISEASE  Patient not taking: Reported on 1/12/2023  Kyleigh Sesay MD   camphor-menthol-methyl salicylate (BENGAY ULTRA STRENGTH) 4-10-30 % CREA cream Apply topically 3 times daily as needed for Pain  Patient not taking: Reported on 1/12/2023  Kyleigh Sesay MD            Social History     Tobacco Use    Smoking status: Never Smokeless tobacco: Never   Vaping Use    Vaping Use: Never used   Substance Use Topics    Alcohol use: Never     Alcohol/week: 0.0 standard drinks    Drug use: Never         Review of Systems   Constitutional:  Positive for fatigue. Negative for activity change, appetite change, chills, diaphoresis, fever and unexpected weight change. HENT:  Positive for trouble swallowing. Eyes:  Positive for visual disturbance. Respiratory:  Negative for cough, chest tightness, shortness of breath and wheezing. Cardiovascular:  Negative for chest pain, palpitations and leg swelling. Gastrointestinal:  Positive for abdominal distention. Negative for abdominal pain, blood in stool, constipation, diarrhea, nausea and vomiting. Endocrine: Negative for cold intolerance, heat intolerance, polydipsia, polyphagia and polyuria. Genitourinary:  Negative for difficulty urinating. Musculoskeletal:  Positive for arthralgias (great toes), back pain (chronic) and gait problem. Had both knees replaced bilateral    Neurological:  Negative for numbness. Hematological:  Does not bruise/bleed easily. Psychiatric/Behavioral:  Positive for dysphoric mood. -vital signs stable and within normal limits except borderline high BP and Obesity per BMI. /88   Pulse 87   Temp 97.1 °F (36.2 °C)   Ht 5' 8\" (1.727 m)   Wt 209 lb (94.8 kg)   SpO2 98%   BMI 31.78 kg/m²         Physical Exam  Vitals and nursing note reviewed. Constitutional:       General: He is not in acute distress. Appearance: Normal appearance. He is well-developed. He is obese. He is not diaphoretic. HENT:      Head: Normocephalic and atraumatic. Right Ear: External ear normal.      Left Ear: External ear normal.      Mouth/Throat:      Comments: I did not examine the mouth due to coronavirus pandemic and wearing masks. Eyes:      General: Lids are normal. No scleral icterus. Right eye: No discharge.          Left eye: No discharge. Extraocular Movements: Extraocular movements intact. Conjunctiva/sclera: Conjunctivae normal.   Neck:      Thyroid: No thyromegaly. Cardiovascular:      Rate and Rhythm: Normal rate and regular rhythm. Heart sounds: Normal heart sounds. No murmur heard. Pulmonary:      Effort: Pulmonary effort is normal. No respiratory distress. Breath sounds: Normal breath sounds. No wheezing or rales. Chest:      Chest wall: No tenderness. Abdominal:      General: Bowel sounds are normal. There is distension. Palpations: Abdomen is soft. There is no hepatomegaly or splenomegaly. Tenderness: There is no abdominal tenderness. Comments: Ventral rectus diastasis   Musculoskeletal:         General: Tenderness present. Cervical back: Normal range of motion and neck supple. Right lower leg: No edema. Left lower leg: No edema. Right foot: Decreased range of motion. Deformity present. Left foot: Decreased range of motion. Deformity present. Comments: Recent bilateral knee surgical incisions well healed   Feet:      Comments: Very deformed right 1st MTP joint, likely osteoarthritis   Will get inserts per Dr. Elizabeth Mejía  Lymphadenopathy:      Cervical: No cervical adenopathy. Skin:     General: Skin is warm and dry. Capillary Refill: Capillary refill takes less than 2 seconds. Findings: No rash. Neurological:      Mental Status: He is alert and oriented to person, place, and time. Gait: Gait abnormal.      Deep Tendon Reflexes: Reflexes are normal and symmetric. Comments: Antalgic gait   Psychiatric:         Mood and Affect: Mood normal.         Behavior: Behavior normal.         Thought Content: Thought content normal.         Judgment: Judgment normal.         I personally reviewed testing with patient.   Hyperglycemia  Chronic kidney disease stage 3  High triglycerides    Otherwise labs within normal limits    Hospital Outpatient Visit on 10/28/2022   Component Date Value Ref Range Status    POC Glucose 10/28/2022 168 (A)  75 - 110 mg/dL Final       Lab Results   Component Value Date    WBC 6.9 09/21/2022    HGB 15.4 09/21/2022    HCT 45.2 09/21/2022    MCV 88.5 09/21/2022     09/21/2022       Lab Results   Component Value Date/Time     09/21/2022 08:47 AM    K 4.9 09/21/2022 08:47 AM     09/21/2022 08:47 AM    CO2 29 09/21/2022 08:47 AM    BUN 22 09/21/2022 08:47 AM    CREATININE 1.19 09/21/2022 08:47 AM    GLUCOSE 177 09/21/2022 08:47 AM    GLUCOSE 116 01/28/2012 09:16 AM    CALCIUM 12.0 09/21/2022 08:47 AM        Lab Results   Component Value Date    ALT 33 09/21/2022    AST 20 09/21/2022    ALKPHOS 111 09/21/2022    BILITOT 0.5 09/21/2022       Lab Results   Component Value Date    TSH 1.98 09/21/2022       Lab Results   Component Value Date    CHOL 130 04/05/2022    CHOL 143 09/03/2021    CHOL 135 12/28/2020     Lab Results   Component Value Date    TRIG 163 (H) 04/05/2022    TRIG 271 (H) 09/03/2021    TRIG 211 (H) 12/28/2020     Lab Results   Component Value Date    HDL 37 (L) 04/05/2022    HDL 30 (L) 09/03/2021    HDL 34 (L) 12/28/2020     Lab Results   Component Value Date    LDLCHOLESTEROL 60 04/05/2022    LDLCHOLESTEROL 59 09/03/2021    LDLCHOLESTEROL 59 12/28/2020     Lab Results   Component Value Date    CHOLHDLRATIO 3.5 04/05/2022    CHOLHDLRATIO 4.8 09/03/2021    CHOLHDLRATIO 4.0 12/28/2020     Lab Results   Component Value Date    PSA 1.54 07/19/2021    PSA 1.75 07/03/2020    PSA 1.55 05/24/2019         Lab Results   Component Value Date    JTBSHQZQ83 313 09/21/2022     Lab Results   Component Value Date    FOLATE 8.3 09/21/2022           Orders Placed This Encounter   Medications    Glucosamine-Chondroitin-MSM (TRIPLE FLEX) 750-400-375 MG TABS     Sig: Take 2 tablets by mouth daily With food. NATURE MADE brand. Or BIOFLEX.      Dispense:  180 tablet     Refill:  3    Collagen-Boron-Hyaluronic Acid (MOVE FREE ULTRA JOINT Venuemob) 40-5-3.3 MG TABS     Sig: Take 1 tablet by mouth daily     Dispense:  90 tablet     Refill:  0    zoster recombinant adjuvanted vaccine (SHINGRIX) 50 MCG/0.5ML SUSR injection     Sig: Inject 0.5 mLs into the muscle once for 1 dose     Dispense:  1 each     Refill:  1       Orders Placed This Encounter   Procedures    CBC     Standing Status:   Future     Standing Expiration Date:   1/12/2024    Comprehensive Metabolic Panel     Standing Status:   Future     Standing Expiration Date:   1/12/2024    Magnesium     Standing Status:   Future     Standing Expiration Date:   1/12/2024    PTH, Intact with Ionized Calcium     Standing Status:   Future     Standing Expiration Date:   1/12/2024    PSA Screening     Standing Status:   Future     Standing Expiration Date:   1/13/2024    TSH     Standing Status:   Future     Standing Expiration Date:   1/12/2024    Uric Acid     Standing Status:   Future     Standing Expiration Date:   1/12/2024    Vitamin D 25 Hydroxy     Standing Status:   Future     Standing Expiration Date:   1/12/2024    Hemoglobin A1C     Standing Status:   Future     Standing Expiration Date:   1/12/2024    Nkechi Mcbride MD, Gastroenterology, [place]     Referral Priority:   Routine     Referral Type:   Eval and Treat     Referral Reason:   Specialty Services Required     Referred to Provider:   Kylie Payne MD     Requested Specialty:   Gastroenterology     Number of Visits Requested:   1    POCT glycosylated hemoglobin (Hb A1C)        Medications Discontinued During This Encounter   Medication Reason    diclofenac sodium (VOLTAREN) 1 % GEL Alternate therapy         On this date 1/12/2023 I have spent 35 minutes reviewing previous notes, test results and face to face with the patient discussing the diagnosis and importance of compliance with the treatment plan as well as documenting on the day of the visit.       This note was completed by using the assistance of a speech-recognition program. However, inadvertent computerized transcription errors may be present. Although every effort was made to ensure accuracy, no guarantees can be provided that every mistake has been identified and corrected by editing . An electronic signature was used to authenticate this note.   Electronically signed by Alistair Garcia MD on 1/15/2023 at 10:10 PM

## 2023-01-15 PROBLEM — R06.09 DOE (DYSPNEA ON EXERTION): Status: RESOLVED | Noted: 2022-09-18 | Resolved: 2023-01-15

## 2023-01-15 PROBLEM — M17.0 PRIMARY OSTEOARTHRITIS OF BOTH KNEES: Status: RESOLVED | Noted: 2022-09-13 | Resolved: 2023-01-15

## 2023-01-15 PROBLEM — R13.19 ESOPHAGEAL DYSPHAGIA: Status: ACTIVE | Noted: 2023-01-15

## 2023-01-15 PROBLEM — Z96.653 S/P TOTAL KNEE REPLACEMENT, BILATERAL: Status: ACTIVE | Noted: 2023-01-15

## 2023-01-15 PROBLEM — M13.0 ARTHRITIS OF MULTIPLE SITES: Status: ACTIVE | Noted: 2023-01-15

## 2023-01-15 ASSESSMENT — ENCOUNTER SYMPTOMS
BLOOD IN STOOL: 0
TROUBLE SWALLOWING: 1

## 2023-01-18 ENCOUNTER — OFFICE VISIT (OUTPATIENT)
Dept: PULMONOLOGY | Age: 68
End: 2023-01-18
Payer: MEDICARE

## 2023-01-18 VITALS — RESPIRATION RATE: 16 BRPM | HEIGHT: 68 IN | WEIGHT: 209 LBS | BODY MASS INDEX: 31.67 KG/M2

## 2023-01-18 DIAGNOSIS — G47.33 OSA TREATED WITH BIPAP: Primary | ICD-10-CM

## 2023-01-18 PROCEDURE — 1123F ACP DISCUSS/DSCN MKR DOCD: CPT | Performed by: INTERNAL MEDICINE

## 2023-01-18 PROCEDURE — 99214 OFFICE O/P EST MOD 30 MIN: CPT | Performed by: INTERNAL MEDICINE

## 2023-01-18 PROCEDURE — G8417 CALC BMI ABV UP PARAM F/U: HCPCS | Performed by: INTERNAL MEDICINE

## 2023-01-18 PROCEDURE — 3017F COLORECTAL CA SCREEN DOC REV: CPT | Performed by: INTERNAL MEDICINE

## 2023-01-18 PROCEDURE — G8427 DOCREV CUR MEDS BY ELIG CLIN: HCPCS | Performed by: INTERNAL MEDICINE

## 2023-01-18 PROCEDURE — 1036F TOBACCO NON-USER: CPT | Performed by: INTERNAL MEDICINE

## 2023-01-18 PROCEDURE — G8484 FLU IMMUNIZE NO ADMIN: HCPCS | Performed by: INTERNAL MEDICINE

## 2023-01-18 ASSESSMENT — SLEEP AND FATIGUE QUESTIONNAIRES
HOW LIKELY ARE YOU TO NOD OFF OR FALL ASLEEP IN A CAR, WHILE STOPPED FOR A FEW MINUTES IN TRAFFIC: 1
HOW LIKELY ARE YOU TO NOD OFF OR FALL ASLEEP WHILE SITTING AND TALKING TO SOMEONE: 0
HOW LIKELY ARE YOU TO NOD OFF OR FALL ASLEEP WHEN YOU ARE A PASSENGER IN A CAR FOR AN HOUR WITHOUT A BREAK: 0
HOW LIKELY ARE YOU TO NOD OFF OR FALL ASLEEP WHILE SITTING QUIETLY AFTER LUNCH WITHOUT ALCOHOL: 1
ESS TOTAL SCORE: 4
HOW LIKELY ARE YOU TO NOD OFF OR FALL ASLEEP WHILE LYING DOWN TO REST IN THE AFTERNOON WHEN CIRCUMSTANCES PERMIT: 0
HOW LIKELY ARE YOU TO NOD OFF OR FALL ASLEEP WHILE WATCHING TV: 1
HOW LIKELY ARE YOU TO NOD OFF OR FALL ASLEEP WHILE SITTING INACTIVE IN A PUBLIC PLACE: 0
HOW LIKELY ARE YOU TO NOD OFF OR FALL ASLEEP WHILE SITTING AND READING: 1

## 2023-01-18 NOTE — PROGRESS NOTES
REASON FOR THE CONSULTATION:  Obstructive sleep apnea syndrome  Obesity  Hypertension   Diabetes  Coronary artery disease    HISTORY OF PRESENT ILLNESS:    Santos Mccollum is a 79y.o. year old male here for evaluation of obstructive sleep apnea syndrome. The gentleman was diagnosed to have sleep apnea syndrome about 10 to 15 years back. He was advised to use CPAP that he has been using regularly. However had to 3 years back the CPAP machine was with recall that since then he has not gotten the new machine and in fact has not been using the machine regularly. He does not follow-up pathologically. He does stop breathing frequently at night does not have to go to the bathroom frequently does not feel excessively hot. But does complain of daytime sleepiness which is significant. Does complain of morning headaches. No cataplexy no sleep paralysis no hallucination. He has not noted any pedal edema no thromboembolic process. He is overweight. He has been trying to lose weight. He is hard of hearing. He has not noted any pedal edema no thromboembolic process. Patient noted to have systemic hypertension. He also has diabetes which is under good control. He also had coronary event but he did not have any pain. He had 2 stents placed. He recently had a cardiac cath done which did reveal open stents. No symptoms of heart failure. He has no history of any stroke. No history of thyroid dysfunction. No peptic ulcer disease. 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 He denies any dyspnea cough or sputum production he has been non-smoker no alcohol abuse no drug abuse. He have had no significant occupational exposure of any kind. He did work for his [employer] and me he if he thinks he may have had exposure to asbestos but that is relatively remote and unlikely.   He had a chest x-ray done about 5 years back which did not reveal any abnormality.   Ready had her COVID-vaccine, flu vaccine, and flu and Pneumovax        LUNG CANCER SCREENING     CRITERIA MET    []     CT ORDERED  []      CRITERIA NOT MET   [x]      REFUSED                    []        REASON CRITERIA NOT MET     SMOKING LESS THAN 30 PY  []      AGE LESS THAN 55 or GREATER 77 YEARS  []      QUIT SMOKING 15 YEARS OR GREATER   []      RECENT CT WITH IN 11 MONTHS    []      LIFE EXPECTANCY < 5 YEARS   []      SIGNS  AND SYMPTOMS OF LUNG CANCER   []         Immunization   Immunization History   Administered Date(s) Administered    COVID-19, MODERNA BLUE border, Primary or Immunocompromised, (age 12y+), IM, 100 mcg/0.5mL 02/19/2021, 03/19/2021, 11/02/2021    COVID-19, MODERNA Bivalent BOOSTER, (age 12y+), IM, 48 mcg/0.5 mL 09/13/2022    COVID-19, [place] Vaccine, Vaccine Unspecified 02/19/2021, 03/19/2021, 11/02/2021    Influenza Vaccine, unspecified formulation 10/15/2018    Influenza Virus Vaccine 02/01/2016, 10/29/2018    Influenza, FLUAD, (age 72 y+), Adjuvanted, 0.5mL 10/23/2020, 09/13/2022    Influenza, FLUARIX, FLULAVAL, FLUZONE (age 10 mo+) AND AFLURIA, (age 1 y+), PF, 0.5mL 09/06/2016, 10/23/2017, 10/29/2018    Influenza, FLUCELVAX, (age 10 mo+), MDCK, PF, 0.5mL 10/01/2019    Influenza, FLUZONE (age 72 y+), High Dose, 0.7mL 10/11/2021    Pneumococcal Conjugate 13-valent ([id number]) 08/05/2022    Pneumococcal Polysaccharide ([id number]) 10/23/2017    Zoster Recombinant (Shingrix) 01/17/2023        Pneumococcal Vaccine     [x] Up to date    [] Indicated   [] Refused  [] Contraindicated       Influenza Vaccine   [x] Up to date    [] Indicated   [] Refused  [] Contraindicated          PAST MEDICAL HISTORY:       Diagnosis Date    Abdominal hernia     Abnormal cardiovascular stress test 10/2022    Anesthesia     phrenic nerve damaged lt side    BPH (benign prostatic hyperplasia)     CAD (coronary artery disease)     Chronic back pain     Diabetes mellitus ([facility] [address]     Type 2 Difficult intubation     per pt throat collapses, lt side phrenic nerve was damaged    TINOCO (dyspnea on exertion) 9/18/2022    Drug-induced constipation 05/24/2022    Ear infection     LT, being treated    Essential hypertension 10/23/2017    Fatty liver     Gallstones     GERD (gastroesophageal reflux disease)     Headache(784.0)     Hematoma 05/25/2022    History of MI (myocardial infarction)     Hypercholesteremia     Hyperlipidemia     Hyperparathyroidism ([address]     Hypertension     Kidney stones     Osteoarthritis     all joints    Osteoarthritis     PONV (postoperative nausea and vomiting)     Primary osteoarthritis of both knees 9/13/2022    Formatting of this note might be different from the original. Added automatically from request for surgery [id number]    S/P cardiac catheterization 01/02/2019    Sleep apnea     BIPAP nightly    Type 2 diabetes mellitus with stage 3a chronic kidney disease, without long-term current use of insulin ([address] 09/15/2022         Family History:       Problem Relation Age of Onset    Heart Disease Mother     Cancer Father         bladder    Arthritis Sister     Cancer Paternal Aunt     Cancer Paternal Aunt        SURGICAL HISTORY:   Past Surgical History:   Procedure Laterality Date    BACK SURGERY  07/11/2019    BICEPS TENDON REPAIR Left     CARDIAC CATHETERIZATION  10/28/2022    Mild nonobstructive CAD otherwise, unchanged from last angiogram in 2019  /  PATENT STENT IN DISTAL LAD    CARPAL TUNNEL RELEASE Right     CERVICAL FUSION      COLONOSCOPY  11/14/2014    normal    CORONARY ANGIOPLASTY WITH STENT PLACEMENT  01/02/2019    LAD: 40% proximal stenosis. 80% mid stenosis reduced to 0% using 2.5x15 and 2. 5x8 mini vision bare metal stent.  The distal LAD has 70% stenosis around the apex and will be treated medically    ELBOW FRACTURE SURGERY Right     right arm/fracture    ENDOSCOPY, COLON, DIAGNOSTIC      several times    FRACTURE SURGERY Right     elbow    KNEE ARTHROSCOPY Bilateral     LIVER BIOPSY  08/14/2015    moderate steatosis, grade 2, lobular and portal inflammation: Grade 1 total score =3, fibrosis score 1A    LUMBAR FUSION N/A 07/10/2019    LUMBAR LAMINECTOMY FUSION POSTERIOR - L3 SACRUM POSTERIOR LUMBAR DECOMPRESSION  & FUSION performed by Ceci Stuart MD at [address] 05/18/2022    L2-3 POSTERIOR LUMBAR DECOMPRESSION & INSTRUMENTED FUSION POSSIBLE REVISION INSTRUMENTATION L3-5 performed by Ceci Stuart MD at [facility]  11/07/2018    lumbar myelogram    ROTATOR CUFF REPAIR Right     SPINE SURGERY N/A 05/26/2022    IRRIGATION AND DEBRIDEMENT EPIDURAL HEMATOMA performed by Ceci Stuart MD at [address] Left 11/22/2022    Done in [address], Dr. Val Hickey Right 11/08/2022    Done in [address], Dr. Hensley Rios:      There is no history of TB or TB exposure. There is no is no asbestos or silica dust exposure. The patient reports is no coal, foundry, quarry or [employer] exposure. Travel history reveals negative. There is no history of recreational or IV drug use. There no hot tube exposure. Pets no    Occupational history no significant occupational history. He may have had minor exposure to asbestos but he is absolutely not sure about that. TOBACCO:   reports that he has never smoked. He has never used smokeless tobacco.  ETOH:   reports no history of alcohol use. ALLERGIES:      Allergies   Allergen Reactions    Pcn [Penicillins] Swelling     face    Morphine Other (See Comments)     Becomes very mean. Tolerates dilaudid and fentanyl    Adhesive Tape Rash    Codeine Nausea And Vomiting     Abd. pain    Tramadol Itching         Home Meds:   Prior to Admission medications    Medication Sig Start Date End Date Taking?  Authorizing Provider   Glucosamine-Chondroitin-MSM (TRIPLE FLEX) 750-400-375 MG TABS Take 2 tablets by mouth daily With food. NATURE MADE brand. Or BIOFLEX. 1/12/23  Yes Gabe Delacruz MD   Collagen-Boron-Hyaluronic Acid (MOVE FREE ULTRA JOINT HEALTH) 40-5-3.3 MG TABS Take 1 tablet by mouth daily 1/12/23  Yes Gabe Delacruz MD   coenzyme Q-10 100 MG capsule Take 1 capsule by mouth every evening 12/27/22  Yes Gabe Delacruz MD   metoprolol succinate (TOPROL XL) 100 MG extended release tablet Take 1 tablet by mouth daily Dose increased 9/15/2022 . Goal BP bellow 135/85 and above 115/65, heart rate 56 to 90 bpm 12/23/22  Yes Gabe Delacruz MD   lisinopril (PRINIVIL;ZESTRIL) 10 MG tablet Take 1 tablet by mouth every evening 12/16/22  Yes Gbae Delacruz MD   metFORMIN (GLUCOPHAGE) 1000 MG tablet Take 1 tablet by mouth 2 times daily (with meals) 12/16/22  Yes Gabe Delacruz MD   pravastatin (PRAVACHOL) 40 MG tablet Take 1 tablet by mouth Daily with supper 11/22/22  Yes Gabe Delacruz MD   ezetimibe (ZETIA) 10 MG tablet Take 1 tablet by mouth daily 11/22/22  Yes Gabe Delacruz MD   amLODIPine (NORVASC) 5 MG tablet Take 5 mg by mouth daily   Yes Historical Provider, MD   Handicap Placard MISC by Does not apply route Can't walk greater than 200 feet. Expires in 5 years.  10/24/22  Yes Gabe Delacruz MD   acetaminophen (TYLENOL) 500 MG tablet Take 1 tablet by mouth every 6 hours as needed for Pain 9/15/22  Yes Gabe Delacruz MD   camphor-menthol-methyl salicylate (BENGAY ULTRA STRENGTH) 4-10-30 % CREA cream Apply topically 3 times daily as needed for Pain 9/15/22  Yes Gabe Delacruz MD   aspirin EC 81 MG EC tablet Take 1 tablet by mouth daily 9/15/22  Yes Gabe Delacruz MD   clopidogrel (PLAVIX) 75 MG tablet Take 1 tablet by mouth daily 9/15/22  Yes Gabe Delacruz MD   cinacalcet (SENSIPAR) 30 MG tablet Take 1 tablet by mouth daily Per Dr. Shaver Sees  Patient taking differently: Take 30 mg by mouth every evening Per Dr. Shaver Sees 9/15/22  Yes Gabe Delacruz MD pantoprazole (PROTONIX) 20 MG tablet Take 1 tablet by mouth every morning (before breakfast) ** stop Omeprazole due to interaction with plavix** 9/15/22  Yes Beverly Whelan MD   tamsulosin (FLOMAX) 0.4 mg capsule TAKE ONE CAPSULE BY MOUTH TWICE A DAY 6/9/22  Yes Juan Thompson MD   finasteride (PROSCAR) 5 MG tablet TAKE ONE TABLET BY MOUTH DAILY 4/25/22  Yes Juan Thompson MD   gabapentin (NEURONTIN) 300 MG capsule Take 600 mg by mouth 3 times daily.     Yes Historical Provider, MD   E-400 400 UNITS capsule TAKE TWO CAPSULES BY MOUTH DAILY 9/23/16  Yes Galen Ackerman MD   nortriptyline (PAMELOR) 50 MG capsule Take 50 mg by mouth 2 times daily  3/31/16  Yes Historical Provider, MD              REVIEW OF SYSTEMS: Review the system was conducted for all other 10 system no additional information was obtained    CONSTITUTIONAL:  negative for  fevers, chills, sweats, fatigue, malaise, anorexia and weight loss  EYES:  negative for  double vision, blurred vision, dry eyes, eye discharge and redness  HEENT:  negative for  hearing loss, tinnitus, ear drainage, earaches and nasal congestion  RESPIRATORY:  See hpi  CARDIOVASCULAR:  negative for  chest pain,, palpitations, orthopnea, PND  GASTROINTESTINAL:  negative for nausea, vomiting, change in bowel habits, diarrhea, constipation, abdominal pain, pruritus, abdominal mass and abdominal distention  GENITOURINARY:  negative for frequency, dysuria, nocturia, urinary incontinence and hesitancy  INTEGUMENT  negative for rash, skin lesion(s), dryness, skin color change, changes in lesion, pruritus and changes in hair  HEMATOLOGIC/LYMPHATIC:  negative for easy bruising, bleeding, lymphadenopathy, petechiae and swelling/edema  ALLERGIC/IMMUNOLOGIC:  negative for recurrent infections, urticaria and drug reactions  ENDOCRINE:  negative for heat intolerance, cold intolerance, tremor, weight changes and change in bowel habits  MUSCULOSKELETAL:  negative for  myalgias, arthralgias, pain, joint swelling, stiff joints and decreased range of motion  NEUROLOGICAL:  negative for headaches, dizziness, seizures, memory problems, speech problems, visual disturbance and coordination problems  BEHAVIOR/PSYCH:  negative for poor appetite, increased appetite, decreased sleep, increased sleep, decreased energy level, increased energy level and poor concentration  Skin no rash no dermatitis  Vitals:  Resp 16   Ht 5' 8\" (1.727 m)   Wt 209 lb (94.8 kg)   BMI 31.78 kg/m²     PHYSICAL EXAM:  General Appearance:    Alert, cooperative, no distress, appears stated age   Head:    Normocephalic, without obvious abnormality, atraumatic      Eyes:    PERRL, conjunctiva/corneas clear, EOM's intact   Ears:    Normal  external ear canals, both ears   Nose:   Nares normal, septum midline, mucosa normal, no drainage        or sinus tenderness   Throat:   Lips, mucosa, and tongue normal; teeth and gums normal   Neck:   Supple, symmetrical, trachea midline, no adenopathy;     thyroid:  no enlargement/tenderness/nodules; no carotid    bruit , no JVD regular reflux negative neck is short and somewhat fat   Back:     Symmetric, no curvature, ROM normal, no CVA tenderness   Lungs:   AP diameter is not increased percussion noted normally resonant breathing is vesicular expiration not prolonged no rales or rhonchi audible he does have a decreased movement of the left arm of the diaphragm.    Chest Wall:    No tenderness or deformity      Heart:    Regular rate and rhythm, S1 and S2 normal, no murmur, rub        or gallop no rvh                           Abdomen:                                                 Pulses:                              Skin:                  Lymph nodes:                    Neurologic:                  Soft, non-tender, bowel sounds active all four quadrants,     no masses, no organomegaly         2+ and symmetric all extremities     Skin color, texture, turgor normal, no rashes or lesions       Cervical, supraclavicular not enlarged or matted or tender      CNII-XII intact, normal strength 5/5 . Sensation grossly normal  and reflexes normal 2+  throughout     Clubbing No  Lower ext edema no  Upper ext edema no         Musculoskeletal no synovitis. No joint swelling or tenderness        CBC: No results for input(s): WBC, HGB, PLT in the last 72 hours. BMP:  No results for input(s): NA, K, CL, CO2, BUN, CREATININE, GLUCOSE in the last 72 hours. Hepatic: No results for input(s): AST, ALT, ALB, BILITOT, ALKPHOS in the last 72 hours. Amylase: No results found for: AMYLASE  Lipase: No results found for: LIPASE  Troponin: No results for input(s): TROPONINI in the last 72 hours. BNP: No results for input(s): BNP in the last 72 hours. Lipids: No results for input(s): CHOL, HDL in the last 72 hours. Invalid input(s): LDLCALCU  ABGs:   Lab Results   Component Value Date/Time    PHART 7.368 07/13/2019 07:30 PM    PO2ART 68.5 07/13/2019 07:30 PM    XWE5KQO 51.6 07/13/2019 07:30 PM     INR: No results for input(s): INR in the last 72 hours. Thyroid:   Lab Results   Component Value Date/Time    TSH 1.98 09/21/2022 08:47 AM     Urinalysis: No results for input(s): BACTERIA, BLOODU, CLARITYU, COLORU, PHUR, PROTEINU, RBCUA, SPECGRAV, BILIRUBINUR, NITRU, WBCUA, LEUKOCYTESUR, GLUCOSEU in the last 72 hours. Cultures:-  No cultures    CXR  No recent chest x-ray      CT Scans  No recent CT scan    Echo    No recent echo but had a cardiac cath done recently which revealed patent stents and good ejection fraction. IMPRESSION:      Obstructive sleep apnea syndrome   Obesity  Hypertension  Diabetes  Coronary artery disease  Patient has a known  Plan:    Patient is accompanied by his wife. I discussed the situation with the patient. I advised him that he should undergo repeat sleep studies and so we can be titrated pressure he has not used the machine for many years.   And a sleep study was also done many years back. He obviously has significant symptoms related to sleep apnea syndrome. He does need definite treatment for the sleep apnea. We did discuss about the inspire technology  I encouraged him to lose weight    Continue treatment of hypertension  And the treatment of diabetes  I plan to see in follow-up after the sleep study is performed. All their questions were answered. Dated with Dr. Ruth Porter MD dictation over thank you                PLAN:             Requested Prescriptions      No prescriptions requested or ordered in this encounter       There are no discontinued medications. Diane Pérez received counseling on the following healthy behaviors: nutrition, exercise and medication adherence    Patient given educational materials : see patient instruction       Discussed use, benefit, and side effects of prescribed medications. Barriers to medication compliance addressed. All patient questions answered. Pt voiced understanding. I hope this updates you on my evaluation and clinical thinking. Thank you for allowing me to participate in his care. Sincerely,      Electronically signed by Gopal Guevara MD on   1/18/23 at 3:40 PM EST       Please note that this chart was generated using voice recognition Dragon dictation software. Although every effort was made to ensure the accuracy of this automated transcription, some errors in transcription may have occurred.

## 2023-01-23 ENCOUNTER — HOSPITAL ENCOUNTER (OUTPATIENT)
Dept: SLEEP CENTER | Age: 68
Discharge: HOME | End: 2023-01-25
Payer: MEDICARE

## 2023-01-23 DIAGNOSIS — G47.33 OSA TREATED WITH BIPAP: ICD-10-CM

## 2023-01-23 PROCEDURE — 95810 POLYSOM 6/> YRS 4/> PARAM: CPT

## 2023-01-24 VITALS
HEIGHT: 68 IN | BODY MASS INDEX: 31.67 KG/M2 | HEART RATE: 76 BPM | OXYGEN SATURATION: 93 % | RESPIRATION RATE: 16 BRPM | WEIGHT: 209 LBS

## 2023-01-24 ASSESSMENT — SLEEP AND FATIGUE QUESTIONNAIRES
HOW LIKELY ARE YOU TO NOD OFF OR FALL ASLEEP WHILE LYING DOWN TO REST IN THE AFTERNOON WHEN CIRCUMSTANCES PERMIT: 3
ESS TOTAL SCORE: 16
HOW LIKELY ARE YOU TO NOD OFF OR FALL ASLEEP WHILE SITTING AND READING: 2
HOW LIKELY ARE YOU TO NOD OFF OR FALL ASLEEP WHILE WATCHING TV: 2
HOW LIKELY ARE YOU TO NOD OFF OR FALL ASLEEP WHILE SITTING INACTIVE IN A PUBLIC PLACE: 2
HOW LIKELY ARE YOU TO NOD OFF OR FALL ASLEEP IN A CAR, WHILE STOPPED FOR A FEW MINUTES IN TRAFFIC: 2
HOW LIKELY ARE YOU TO NOD OFF OR FALL ASLEEP WHILE SITTING AND TALKING TO SOMEONE: 1
HOW LIKELY ARE YOU TO NOD OFF OR FALL ASLEEP WHILE SITTING QUIETLY AFTER LUNCH WITHOUT ALCOHOL: 2
HOW LIKELY ARE YOU TO NOD OFF OR FALL ASLEEP WHEN YOU ARE A PASSENGER IN A CAR FOR AN HOUR WITHOUT A BREAK: 2

## 2023-01-24 NOTE — PAYOR INFORMATION
Verified Demographics with Patient? No   If no why? Already verified  [facility] Completed? No    If not why? Already completed  Verified Insurance through: Already verified  [employer] Completed? Not required  Auth Verification #:[id number]  Liability Due: $0  Discount Offered: na%       Previous Balance: $ 0   Discount Offered: na%  Site Collect Status: no liability  Patient Response: no liability  Financial Aid Offered?  Yes Pt declined  Cards Scanned: yes

## 2023-01-28 ENCOUNTER — HOSPITAL ENCOUNTER (OUTPATIENT)
Age: 68
Discharge: HOME | End: 2023-01-28
Payer: MEDICARE

## 2023-01-28 DIAGNOSIS — N18.31 TYPE 2 DIABETES MELLITUS WITH STAGE 3A CHRONIC KIDNEY DISEASE, WITHOUT LONG-TERM CURRENT USE OF INSULIN (HCC): ICD-10-CM

## 2023-01-28 DIAGNOSIS — Z12.5 PROSTATE CANCER SCREENING: ICD-10-CM

## 2023-01-28 DIAGNOSIS — E11.22 TYPE 2 DIABETES MELLITUS WITH STAGE 3A CHRONIC KIDNEY DISEASE, WITHOUT LONG-TERM CURRENT USE OF INSULIN (HCC): ICD-10-CM

## 2023-01-28 DIAGNOSIS — E21.3 HYPERPARATHYROIDISM (HCC): ICD-10-CM

## 2023-01-28 DIAGNOSIS — N18.30 BENIGN HYPERTENSION WITH CKD (CHRONIC KIDNEY DISEASE) STAGE III (HCC): ICD-10-CM

## 2023-01-28 DIAGNOSIS — I12.9 BENIGN HYPERTENSION WITH CKD (CHRONIC KIDNEY DISEASE) STAGE III (HCC): ICD-10-CM

## 2023-01-28 LAB
ALBUMIN SERPL-MCNC: 4.4 G/DL (ref 3.5–5.2)
ALP BLD-CCNC: 126 U/L (ref 40–129)
ALT SERPL-CCNC: 25 U/L (ref 5–41)
ANION GAP SERPL CALCULATED.3IONS-SCNC: 10 MMOL/L (ref 9–17)
AST SERPL-CCNC: 17 U/L
BILIRUB SERPL-MCNC: 0.4 MG/DL (ref 0.3–1.2)
BUN BLDV-MCNC: 13 MG/DL (ref 8–23)
CALCIUM IONIZED: 1.45 MMOL/L (ref 1.13–1.33)
CALCIUM SERPL-MCNC: 10.7 MG/DL (ref 8.6–10.4)
CHLORIDE BLD-SCNC: 103 MMOL/L (ref 98–107)
CO2: 28 MMOL/L (ref 20–31)
CREAT SERPL-MCNC: 0.97 MG/DL (ref 0.7–1.2)
GFR SERPL CREATININE-BSD FRML MDRD: >60 ML/MIN/1.73M2
GLUCOSE BLD-MCNC: 189 MG/DL (ref 70–99)
HCT VFR BLD CALC: 43 % (ref 41–53)
HEMOGLOBIN: 14.5 G/DL (ref 13.5–17.5)
MAGNESIUM: 1.8 MG/DL (ref 1.6–2.6)
MCH RBC QN AUTO: 29.4 PG (ref 26–34)
MCHC RBC AUTO-ENTMCNC: 33.8 G/DL (ref 31–37)
MCV RBC AUTO: 86.9 FL (ref 80–100)
PDW BLD-RTO: 16 % (ref 11.5–14.9)
PLATELET # BLD: 241 K/UL (ref 150–450)
PMV BLD AUTO: 7.3 FL (ref 6–12)
POTASSIUM SERPL-SCNC: 4.8 MMOL/L (ref 3.7–5.3)
PROSTATE SPECIFIC ANTIGEN: 1.04 NG/ML
PTH INTACT: 47.7 PG/ML (ref 14–72)
RBC # BLD: 4.94 M/UL (ref 4.5–5.9)
SODIUM BLD-SCNC: 141 MMOL/L (ref 135–144)
TOTAL PROTEIN: 7.3 G/DL (ref 6.4–8.3)
TSH SERPL DL<=0.05 MIU/L-ACNC: 2.18 UIU/ML (ref 0.3–5)
URIC ACID: 5.8 MG/DL (ref 3.4–7)
VITAMIN D 25-HYDROXY: 20.4 NG/ML
WBC # BLD: 6.6 K/UL (ref 3.5–11)

## 2023-01-28 PROCEDURE — 82330 ASSAY OF CALCIUM: CPT

## 2023-01-28 PROCEDURE — 80053 COMPREHEN METABOLIC PANEL: CPT

## 2023-01-28 PROCEDURE — 84443 ASSAY THYROID STIM HORMONE: CPT

## 2023-01-28 PROCEDURE — 83036 HEMOGLOBIN GLYCOSYLATED A1C: CPT

## 2023-01-28 PROCEDURE — 85027 COMPLETE CBC AUTOMATED: CPT

## 2023-01-28 PROCEDURE — 82306 VITAMIN D 25 HYDROXY: CPT

## 2023-01-28 PROCEDURE — 84550 ASSAY OF BLOOD/URIC ACID: CPT

## 2023-01-28 PROCEDURE — 36415 COLL VENOUS BLD VENIPUNCTURE: CPT

## 2023-01-28 PROCEDURE — G0103 PSA SCREENING: HCPCS

## 2023-01-28 PROCEDURE — 83735 ASSAY OF MAGNESIUM: CPT

## 2023-01-28 PROCEDURE — 83970 ASSAY OF PARATHORMONE: CPT

## 2023-01-29 LAB
ESTIMATED AVERAGE GLUCOSE: 157 MG/DL
HBA1C MFR BLD: 7.1 % (ref 4–6)

## 2023-01-29 NOTE — RESULT ENCOUNTER NOTE
Please notify patient: Stable type 2 diabetes, hemoglobin A1c 7.1. Blood Glucose 189  PLEASE FAX THESE RESULTS TO DR. Neil Woods AND LET PATIENT KNOW    Improved PTH parathormone hormone, but calcium is still high. 10.7, was 12 before, but improving, continue Sensipar per endocrinologist, to make sure he does not take any calcium supplements or multivitamin with calcium. AND TO DRINK 6X8 OZ GLASSES OF WATER A DAY  Low vitamin D to tell Dr. Trever Hyde , as he doesn't usually want vitamin D supplement when calcium is high.       Otherwise labs within normal limits  continue current treatment    Future Appointments  2/8/2023   3:30 PM    Shonna Bowesr MD    [facility]        [facility]  5/16/2023  8:30 AM    Woody Felder MD          [facility]            [facility]  8/15/2023  8:30 AM    Benjamin Zee MD     [facility]               Clare Jay

## 2023-02-01 ENCOUNTER — HOSPITAL ENCOUNTER (OUTPATIENT)
Dept: SLEEP CENTER | Age: 68
Discharge: HOME | End: 2023-02-03
Payer: MEDICARE

## 2023-02-01 DIAGNOSIS — G47.33 OSA TREATED WITH BIPAP: ICD-10-CM

## 2023-02-01 PROCEDURE — 95811 POLYSOM 6/>YRS CPAP 4/> PARM: CPT

## 2023-02-02 VITALS
WEIGHT: 209 LBS | HEIGHT: 68 IN | RESPIRATION RATE: 16 BRPM | HEART RATE: 78 BPM | BODY MASS INDEX: 31.67 KG/M2 | OXYGEN SATURATION: 93 %

## 2023-02-08 ENCOUNTER — OFFICE VISIT (OUTPATIENT)
Dept: GASTROENTEROLOGY | Age: 68
End: 2023-02-08
Payer: MEDICARE

## 2023-02-08 VITALS
HEIGHT: 68 IN | HEART RATE: 88 BPM | DIASTOLIC BLOOD PRESSURE: 71 MMHG | BODY MASS INDEX: 32.58 KG/M2 | SYSTOLIC BLOOD PRESSURE: 123 MMHG | WEIGHT: 215 LBS

## 2023-02-08 DIAGNOSIS — R13.19 ESOPHAGEAL DYSPHAGIA: Primary | ICD-10-CM

## 2023-02-08 PROCEDURE — G8484 FLU IMMUNIZE NO ADMIN: HCPCS | Performed by: INTERNAL MEDICINE

## 2023-02-08 PROCEDURE — 99204 OFFICE O/P NEW MOD 45 MIN: CPT | Performed by: INTERNAL MEDICINE

## 2023-02-08 PROCEDURE — 1036F TOBACCO NON-USER: CPT | Performed by: INTERNAL MEDICINE

## 2023-02-08 PROCEDURE — 3017F COLORECTAL CA SCREEN DOC REV: CPT | Performed by: INTERNAL MEDICINE

## 2023-02-08 PROCEDURE — G8427 DOCREV CUR MEDS BY ELIG CLIN: HCPCS | Performed by: INTERNAL MEDICINE

## 2023-02-08 PROCEDURE — G8417 CALC BMI ABV UP PARAM F/U: HCPCS | Performed by: INTERNAL MEDICINE

## 2023-02-08 PROCEDURE — 1123F ACP DISCUSS/DSCN MKR DOCD: CPT | Performed by: INTERNAL MEDICINE

## 2023-02-08 ASSESSMENT — ENCOUNTER SYMPTOMS
ALLERGIC/IMMUNOLOGIC NEGATIVE: 1
RESPIRATORY NEGATIVE: 1
ABDOMINAL PAIN: 1
TROUBLE SWALLOWING: 1
NAUSEA: 1

## 2023-02-08 NOTE — PROGRESS NOTES
GI OFFICE FOLLOW UP    INTERVAL HISTORY:   No referring provider defined for this encounter. Chief Complaint   Patient presents with    Dysphagia     Pt states whenever he eats it feels like it gets stuck and he feels like he has to vomit        1. Esophageal dysphagia              HISTORY OF PRESENT ILLNESS:   Patient seen with a history of dysphagia. Patient states that after eating food he feels full and he feels as if the food is staying in the esophagus. Sometimes soon after eating food he regurgitates food. Also states he feels food getting stuck in the substernal area. Even though patient has constant symptoms, once or twice a week the symptoms get worse and has to stop eating food for several hours. No symptom of complete obstruction of esophagus. Usually has the symptoms with solid food. He does not have weight loss. He does have GERD symptoms. 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 He denies abdominal pain. He has abdominal bloating and distention and this appears to be chronic issue. Bowel movements satisfactory. No melena or hematochezia. Previous records reviewed. This patient had screening colonoscopy done in November 2014 and no abnormal findings identified. Also in August 2015 this patient had a liver biopsy done that revealed moderate steatosis. He had inflammation stage I, fibrosis grade 2. Patient had coronary artery stents placed in 2021 and has been on Plavix and aspirin. He denies chest pain, shortness of breath, palpitations etc.  He has chronic back pain. Past Medical,Family, and Social History reviewed and does contribute to the patient presenting condition. Patient's PMH/PSH,SH,PSYCH Hx, MEDs, ALLERGIES, and ROS were all reviewed and updated in the appropriate sections.  Yes      PAST MEDICAL HISTORY:  Past Medical History:   Diagnosis Date    Abdominal hernia     Abnormal cardiovascular stress test 10/2022    Anesthesia     phrenic nerve damaged lt side    BPH (benign prostatic hyperplasia)     CAD (coronary artery disease)     Chronic back pain     Diabetes mellitus ([address]     Type 2    Difficult intubation     per pt throat collapses, lt side phrenic nerve was damaged    TINOCO (dyspnea on exertion) 9/18/2022    Drug-induced constipation 05/24/2022    Ear infection     LT, being treated    Essential hypertension 10/23/2017    Fatty liver     Gallstones     GERD (gastroesophageal reflux disease)     Headache(784.0)     Hematoma 05/25/2022    History of MI (myocardial infarction)     Hypercholesteremia     Hyperlipidemia     Hyperparathyroidism ([address]     Hypertension     Kidney stones     Osteoarthritis     all joints    Osteoarthritis     PONV (postoperative nausea and vomiting)     Primary osteoarthritis of both knees 9/13/2022    Formatting of this note might be different from the original. Added automatically from request for surgery [id number]    S/P cardiac catheterization 01/02/2019    Sleep apnea     BIPAP nightly    Type 2 diabetes mellitus with stage 3a chronic kidney disease, without long-term current use of insulin ([address] 09/15/2022       Past Surgical History:   Procedure Laterality Date    BACK SURGERY  07/11/2019    BICEPS TENDON REPAIR Left     CARDIAC CATHETERIZATION  10/28/2022    Mild nonobstructive CAD otherwise, unchanged from last angiogram in 2019  /  PATENT STENT IN DISTAL LAD    CARPAL TUNNEL RELEASE Right     CERVICAL FUSION      COLONOSCOPY  11/14/2014    normal    CORONARY ANGIOPLASTY WITH STENT PLACEMENT  01/02/2019    LAD: 40% proximal stenosis. 80% mid stenosis reduced to 0% using 2.5x15 and 2. 5x8 mini vision bare metal stent.  The distal LAD has 70% stenosis around the apex and will be treated medically    ELBOW FRACTURE SURGERY Right     right arm/fracture    ENDOSCOPY, COLON, DIAGNOSTIC      several times    FRACTURE SURGERY Right     elbow    KNEE ARTHROSCOPY Bilateral     LIVER BIOPSY  08/14/2015    moderate steatosis, grade 2, lobular and portal inflammation: Grade 1 total score =3, fibrosis score 1A    LUMBAR FUSION N/A 07/10/2019    LUMBAR LAMINECTOMY FUSION POSTERIOR - L3 SACRUM POSTERIOR LUMBAR DECOMPRESSION  & FUSION performed by Kori Dubon MD at [address] 05/18/2022    L2-3 POSTERIOR LUMBAR DECOMPRESSION & INSTRUMENTED FUSION POSSIBLE REVISION INSTRUMENTATION L3-5 performed by Kori Dubon MD at [facility]  11/07/2018    lumbar myelogram    ROTATOR CUFF REPAIR Right     SPINE SURGERY N/A 05/26/2022    IRRIGATION AND DEBRIDEMENT EPIDURAL HEMATOMA performed by Kori Dubon MD at [address] [place] [address] 83 Left 11/22/2022    Done in [address], Dr. Srinath Allen Right 11/08/2022    Done in [address], Dr. Josh Steward:    Current Outpatient Medications:     Glucosamine-Chondroitin-MSM (TRIPLE FLEX) 750-400-375 MG TABS, Take 2 tablets by mouth daily With food. NATURE MADE brand. Or BIOFLEX., Disp: 180 tablet, Rfl: 3    Collagen-Boron-Hyaluronic Acid (MOVE FREE [employer] [facility]) 40-5-3.3 MG TABS, Take 1 tablet by mouth daily, Disp: 90 tablet, Rfl: 0    coenzyme Q-10 100 MG capsule, Take 1 capsule by mouth every evening, Disp: 90 capsule, Rfl: 3    metoprolol succinate (TOPROL XL) 100 MG extended release tablet, Take 1 tablet by mouth daily Dose increased 9/15/2022 .  Goal BP bellow 135/85 and above 115/65, heart rate 56 to 90 bpm, Disp: 90 tablet, Rfl: 3    lisinopril (PRINIVIL;ZESTRIL) 10 MG tablet, Take 1 tablet by mouth every evening, Disp: 90 tablet, Rfl: 3    metFORMIN (GLUCOPHAGE) 1000 MG tablet, Take 1 tablet by mouth 2 times daily (with meals), Disp: 180 tablet, Rfl: 3    pravastatin (PRAVACHOL) 40 MG tablet, Take 1 tablet by mouth Daily with supper, Disp: 90 tablet, Rfl: 3    ezetimibe ([place]) 10 MG tablet, Take 1 tablet by mouth daily, Disp: 90 tablet, Rfl: 3    amLODIPine (NORVASC) 5 MG tablet, Take 5 mg by mouth daily, Disp: , Rfl:     Handicap Placard MISC, by Does not apply route Can't walk greater than 200 feet. Expires in 5 years. , Disp: 1 each, Rfl: 0    acetaminophen (TYLENOL) 500 MG tablet, Take 1 tablet by mouth every 6 hours as needed for Pain, Disp: 120 tablet, Rfl: 3    camphor-menthol-methyl salicylate (BENGAY ULTRA STRENGTH) 4-10-30 % CREA cream, Apply topically 3 times daily as needed for Pain, Disp: 113 g, Rfl: 0    aspirin EC 81 MG EC tablet, Take 1 tablet by mouth daily, Disp: 90 tablet, Rfl: 1    clopidogrel (PLAVIX) 75 MG tablet, Take 1 tablet by mouth daily, Disp: 90 tablet, Rfl: 3    cinacalcet (SENSIPAR) 30 MG tablet, Take 1 tablet by mouth daily Per Dr. Abdias Lewis (Patient taking differently: Take 30 mg by mouth every evening Per Dr. Abdias Lewis), Disp: 90 tablet, Rfl: 0    pantoprazole (PROTONIX) 20 MG tablet, Take 1 tablet by mouth every morning (before breakfast) ** stop Omeprazole due to interaction with plavix**, Disp: 90 tablet, Rfl: 1    tamsulosin (FLOMAX) 0.4 mg capsule, TAKE ONE CAPSULE BY MOUTH TWICE A DAY, Disp: 180 capsule, Rfl: 2    finasteride (PROSCAR) 5 MG tablet, TAKE ONE TABLET BY MOUTH DAILY, Disp: 90 tablet, Rfl: 2    gabapentin (NEURONTIN) 300 MG capsule, Take 600 mg by mouth 3 times daily. , Disp: , Rfl:     E-400 400 UNITS capsule, TAKE TWO CAPSULES BY MOUTH DAILY, Disp: 60 capsule, Rfl: 10    nortriptyline (PAMELOR) 50 MG capsule, Take 50 mg by mouth 2 times daily , Disp: , Rfl:     ALLERGIES:   Allergies   Allergen Reactions    Pcn [Penicillins] Swelling     face    Morphine Other (See Comments)     Becomes very mean.  Tolerates dilaudid and fentanyl    Adhesive Tape Rash    Codeine Nausea And Vomiting     Abd. pain    Tramadol Itching       FAMILY HISTORY:       Problem Relation Age of Onset    Heart Disease Mother     Cancer Father         bladder    Arthritis Sister     Cancer Paternal Aunt     Cancer Paternal Aunt          SOCIAL HISTORY:   Social History     Socioeconomic History    Marital status:      Spouse name: Not on file    Number of children: Not on file    Years of education: Not on file    Highest education level: Not on file   Occupational History    Occupation: retired   Tobacco Use    Smoking status: Never    Smokeless tobacco: Never   Vaping Use    Vaping Use: Never used   Substance and Sexual Activity    Alcohol use: Never     Alcohol/week: 0.0 standard drinks    Drug use: Never    Sexual activity: Not on file   Other Topics Concern    Not on file   Social History Narrative    Not on file     Social Determinants of Health     Financial Resource Strain: Low Risk     Difficulty of Paying Living Expenses: Not hard at all   Food Insecurity: No Food Insecurity    Worried About Running Out of Food in the Last Year: Never true    [address] in the Last Year: Never true   Transportation Needs: No Transportation Needs    Lack of Transportation (Medical): No    Lack of Transportation (Non-Medical): No   Physical Activity: Inactive    Days of Exercise per Week: 0 days    Minutes of Exercise per Session: 0 min   Stress: Not on file   Social Connections: Not on file   Intimate Partner Violence: Not on file   Housing Stability: Not on file         REVIEW OF SYSTEMS:         Review of Systems   Constitutional: Negative. HENT:  Positive for trouble swallowing. Eyes:  Positive for visual disturbance (glasses). Respiratory: Negative. Cardiovascular: Negative. Gastrointestinal:  Positive for abdominal pain (discomfort) and nausea. Endocrine: Negative. Genitourinary: Negative. Musculoskeletal: Negative. Skin: Negative. Allergic/Immunologic: Negative. Neurological: Negative. Hematological: Negative. Psychiatric/Behavioral: Negative. PHYSICAL EXAMINATION:     Vital signs reviewed per the nursing documentation.      BP 123/71   Pulse 88   Ht 5' 8\" (1.727 m)   Wt 215 lb (97.5 kg)   BMI 32.69 kg/m²   Body mass index is 32.69 kg/m². Physical Exam  Vitals reviewed. Constitutional:       Appearance: Normal appearance. Comments: Moderately overweight patient. HENT:      Head: Normocephalic and atraumatic. Eyes:      Extraocular Movements: Extraocular movements intact. Conjunctiva/sclera: Conjunctivae normal.   Cardiovascular:      Rate and Rhythm: Normal rate and regular rhythm. Heart sounds: Normal heart sounds. Pulmonary:      Effort: Pulmonary effort is normal.      Breath sounds: Normal breath sounds. Abdominal:      General: Bowel sounds are normal. There is no distension. Palpations: Abdomen is soft. There is no mass. Tenderness: There is no abdominal tenderness. There is no guarding. Hernia: No hernia is present. Comments: Obese abdomen. Difficult to examine because of obesity. Skin:     General: Skin is warm and dry. Neurological:      General: No focal deficit present. Mental Status: He is alert and oriented to person, place, and time.    Psychiatric:         Mood and Affect: Mood normal.         Behavior: Behavior normal.         LABORATORY DATA: Reviewed  Lab Results   Component Value Date    WBC 6.6 01/28/2023    HGB 14.5 01/28/2023    HCT 43.0 01/28/2023    MCV 86.9 01/28/2023     01/28/2023     01/28/2023    K 4.8 01/28/2023     01/28/2023    CO2 28 01/28/2023    BUN 13 01/28/2023    CREATININE 0.97 01/28/2023    LABALBU 4.4 01/28/2023    BILITOT 0.4 01/28/2023    ALKPHOS 126 01/28/2023    AST 17 01/28/2023    ALT 25 01/28/2023    INR 1.0 04/12/2022         Lab Results   Component Value Date    RBC 4.94 01/28/2023    HGB 14.5 01/28/2023    MCV 86.9 01/28/2023    MCH 29.4 01/28/2023    MCHC 33.8 01/28/2023    RDW 16.0 (H) 01/28/2023    MPV 7.3 01/28/2023    BASOPCT 1 05/23/2022    LYMPHSABS 1.20 05/23/2022    MONOSABS 0.90 05/23/2022    NEUTROABS 8.50 05/23/2022    EOSABS 0.40 05/23/2022    BASOSABS 0.10 05/23/2022         DIAGNOSTIC TESTING:     No results found. Assessment  1. Esophageal dysphagia        Plan  This patient has dysphagia. Need to evaluate esophageal issues such as stricture, malignancy, hiatal hernia, Schatzki's ring etc.  Discussed with the patient and requests further investigations. Advised EGD. Also advised to stay on liquid diet for couple of days prior to EGD. To hold aspirin and Plavix for 6 days. Patient understood and verbalized her consent. Thank you for allowing me to participate in the care of Mr. Miguel Hayes. For any further questions please do not hesitate to contact me. I have reviewed and agree with the ROS entered by the MA/LPN. Note is dictated utilizing voice recognition software. Unfortunately this leads to occasional typographical errors.  Please contact our office if you have any questions        Jaimie Funez MD,FACP, [facility]  Board Certified in Gastroenterology and [address] [place] [address] Gastroenterology  Office #: [phone]

## 2023-02-09 ENCOUNTER — TELEPHONE (OUTPATIENT)
Dept: GASTROENTEROLOGY | Age: 68
End: 2023-02-09

## 2023-02-09 NOTE — TELEPHONE ENCOUNTER
Writer spoke to patient regarding his PAT with [facility]. ([email or web address] if any questions please contact office.

## 2023-02-10 ENCOUNTER — HOSPITAL ENCOUNTER (OUTPATIENT)
Dept: PREADMISSION TESTING | Age: 68
Discharge: HOME | End: 2023-02-14

## 2023-02-10 VITALS — WEIGHT: 215 LBS | HEIGHT: 68 IN | BODY MASS INDEX: 32.58 KG/M2

## 2023-02-10 NOTE — PROGRESS NOTES
Dr. Vanessa Macdonald, anesthesia, was contacted and informed of patient's history, phrenic nerve damage and planned surgery. Orders received and no clearance required.

## 2023-02-10 NOTE — PROGRESS NOTES
Pre-op Instructions For Out-Patient Endoscopy Surgery    Medication Instructions:  Please stop herbs and any supplements now (includes vitamins and minerals). Please contact your surgeon and prescribing physician for pre-op instructions for any blood thinners. Aspirin, Plavix and Diclofenac    If you have inhalers/aerosol treatments at home, please use them the morning of your surgery and bring the inhalers with you to the hospital.    Please take the following medications the morning of your surgery with a sip of water:    Metoprolol and Amlodipine Teo Serrano takes his Lisinopril at night)     Surgery Instructions:  After midnight before surgery:  Do not eat or drink anything, including water, mints, gum, and hard candy. You may brush your teeth without swallowing. No smoking, chewing tobacco, or street drugs. Please shower or bathe before surgery. Please do not wear any cologne, lotion, powder, jewelry, piercings, perfume, makeup, nail polish, hair accessories, or hair spray on the day of surgery. Wear loose comfortable clothing. Leave your valuables at home. Bring a storage case for any glasses/contacts. An adult who is responsible for you MUST drive you home and should be with you for the first 24 hours after surgery. The Day of Surgery:  Arrive at [facility] AT [facility] Surgery Entrance at the time directed by your surgeon and check in at the desk. If you have a living will or healthcare power of , please bring a copy. You will be taken to the pre-op holding area where you will be prepared for surgery. A physical assessment will be performed by a nurse practitioner or house officer. Your IV will be started and you will meet your anesthesiologist.    When you go to surgery, your family will be directed to the surgical waiting room, where the doctor should speak with them after your surgery.     After surgery, you will be taken to the recovery room then when you are awake and stable you will go to the short stay unit for preparation to be discharged. Instructions read to Jose Bermeo and understanding verbalized.      2/17/23 EGD

## 2023-02-13 RX ORDER — GABAPENTIN 300 MG/1
600 CAPSULE ORAL 3 TIMES DAILY
Qty: 90 CAPSULE | OUTPATIENT
Start: 2023-02-13

## 2023-02-13 NOTE — TELEPHONE ENCOUNTER
Gabapentin is given by his rheumatologist please let him know    01/05/2023 09/13/2022   1  Gabapentin 600 Mg Tablet 90.00  30  Mo Abu  [id number]   The ([id number])  0  2.01 LME  Medicare [place]     12/05/2022 12/05/2022   1  Hydrocodone-Acetamin 5-325 Mg 28.00  7  Ch Perez [facility]  [id number]   [facility] ([id number])  0  20.00 MME  Medicare [place]

## 2023-02-13 NOTE — TELEPHONE ENCOUNTER
Please Approve or Refuse.   Send to Pharmacy per Pt's Request:      Next Visit Date:  Visit date not found   Last Visit Date: 1/12/2023    Hemoglobin A1C (%)   Date Value   01/28/2023 7.1 (H)   01/12/2023 6.9   09/15/2022 6.8             ( goal A1C is < 7)   BP Readings from Last 3 Encounters:   02/08/23 123/71   01/12/23 136/88   12/30/22 138/88          (goal 120/80)  BUN   Date Value Ref Range Status   01/28/2023 13 8 - 23 mg/dL Final     Creatinine   Date Value Ref Range Status   01/28/2023 0.97 0.70 - 1.20 mg/dL Final     Potassium   Date Value Ref Range Status   01/28/2023 4.8 3.7 - 5.3 mmol/L Final

## 2023-02-14 DIAGNOSIS — I25.10 CORONARY ARTERY DISEASE INVOLVING NATIVE CORONARY ARTERY OF NATIVE HEART WITHOUT ANGINA PECTORIS: ICD-10-CM

## 2023-02-14 RX ORDER — ASPIRIN 81 MG/1
81 TABLET ORAL DAILY
Qty: 90 TABLET | Refills: 1 | Status: SHIPPED | OUTPATIENT
Start: 2023-02-14

## 2023-02-14 NOTE — TELEPHONE ENCOUNTER
Please Approve or Refuse.   Send to Pharmacy per Pt's Request: [email or web address]     Next Visit Date:  Visit date not found   Last Visit Date: 1/12/2023    Hemoglobin A1C (%)   Date Value   01/28/2023 7.1 (H)   01/12/2023 6.9   09/15/2022 6.8             ( goal A1C is < 7)   BP Readings from Last 3 Encounters:   02/08/23 123/71   01/12/23 136/88   12/30/22 138/88          (goal 120/80)  BUN   Date Value Ref Range Status   01/28/2023 13 8 - 23 mg/dL Final     Creatinine   Date Value Ref Range Status   01/28/2023 0.97 0.70 - 1.20 mg/dL Final     Potassium   Date Value Ref Range Status   01/28/2023 4.8 3.7 - 5.3 mmol/L Final

## 2023-02-15 ENCOUNTER — OFFICE VISIT (OUTPATIENT)
Dept: UROLOGY | Age: 68
End: 2023-02-15
Payer: MEDICARE

## 2023-02-15 VITALS
DIASTOLIC BLOOD PRESSURE: 71 MMHG | TEMPERATURE: 98.1 F | WEIGHT: 219 LBS | HEART RATE: 86 BPM | SYSTOLIC BLOOD PRESSURE: 123 MMHG | HEIGHT: 68 IN | BODY MASS INDEX: 33.19 KG/M2

## 2023-02-15 DIAGNOSIS — N40.1 BPH WITH OBSTRUCTION/LOWER URINARY TRACT SYMPTOMS: Primary | ICD-10-CM

## 2023-02-15 DIAGNOSIS — N13.8 BPH WITH OBSTRUCTION/LOWER URINARY TRACT SYMPTOMS: Primary | ICD-10-CM

## 2023-02-15 DIAGNOSIS — Z12.5 PROSTATE CANCER SCREENING: ICD-10-CM

## 2023-02-15 PROCEDURE — 99214 OFFICE O/P EST MOD 30 MIN: CPT | Performed by: NURSE PRACTITIONER

## 2023-02-15 PROCEDURE — G8417 CALC BMI ABV UP PARAM F/U: HCPCS | Performed by: NURSE PRACTITIONER

## 2023-02-15 PROCEDURE — G8427 DOCREV CUR MEDS BY ELIG CLIN: HCPCS | Performed by: NURSE PRACTITIONER

## 2023-02-15 PROCEDURE — G8484 FLU IMMUNIZE NO ADMIN: HCPCS | Performed by: NURSE PRACTITIONER

## 2023-02-15 PROCEDURE — 1036F TOBACCO NON-USER: CPT | Performed by: NURSE PRACTITIONER

## 2023-02-15 PROCEDURE — 3017F COLORECTAL CA SCREEN DOC REV: CPT | Performed by: NURSE PRACTITIONER

## 2023-02-15 PROCEDURE — 1123F ACP DISCUSS/DSCN MKR DOCD: CPT | Performed by: NURSE PRACTITIONER

## 2023-02-15 RX ORDER — TAMSULOSIN HYDROCHLORIDE 0.4 MG/1
CAPSULE ORAL
Qty: 180 CAPSULE | Refills: 3 | Status: SHIPPED | OUTPATIENT
Start: 2023-02-15

## 2023-02-15 RX ORDER — FINASTERIDE 5 MG/1
TABLET, FILM COATED ORAL
Qty: 90 TABLET | Refills: 3 | Status: SHIPPED | OUTPATIENT
Start: 2023-02-15

## 2023-02-15 ASSESSMENT — ENCOUNTER SYMPTOMS
DIARRHEA: 0
CONSTIPATION: 0
NAUSEA: 0
WHEEZING: 0
COUGH: 0
SHORTNESS OF BREATH: 0
EYE REDNESS: 0
VOMITING: 0
EYE PAIN: 0
ABDOMINAL PAIN: 0
BACK PAIN: 0

## 2023-02-15 NOTE — PROGRESS NOTES
Review of Systems   Constitutional:  Negative for chills, fatigue and fever. Eyes:  Negative for pain, redness and visual disturbance. Respiratory:  Negative for cough, shortness of breath and wheezing. Cardiovascular:  Negative for chest pain and leg swelling. Gastrointestinal:  Negative for abdominal pain (Patient states his stomach is bloated), constipation, diarrhea, nausea and vomiting. Genitourinary:  Negative for difficulty urinating, dysuria, flank pain, frequency, hematuria, scrotal swelling, testicular pain and urgency. Musculoskeletal:  Negative for back pain, joint swelling and myalgias. Skin:  Negative for rash and wound. Neurological:  Negative for dizziness, tremors, weakness and numbness. Hematological:  Does not bruise/bleed easily.

## 2023-02-15 NOTE — PROGRESS NOTES
[address] [address]  Dept: [address] [facility] Urology Office Note - Established    Patient:  Rajani Nice  YOB: 1955  Date: 2/15/2023    The patient is a 79 y.o. male who presents todayfor evaluation of the following problems:   Chief Complaint   Patient presents with    Medication Refill       HPI  Patient is presenting for f/u BPH and prostate cancer screening. He continues combo therapy with finasteride and flomax BID. AUA SS is 10, he is satisfied. He ran out of medication and has noticed more frequency and a weaker stream.  Does consume coffee and tea on a regular basis. PSA is 1.04, which is stable. Denies any hematuria or dysuria, no recent  infections. No other  concerns today. Summary of old records: N/A    Additional History: N/A    Procedures Today: N/A    Urinalysis today:  No results found for this visit on 02/15/23. Last several PSA's:  Lab Results   Component Value Date    PSA 1.04 01/28/2023    PSA 1.54 07/19/2021    PSA 1.75 07/03/2020     Last total testosterone:  No results found for: TESTOSTERONE    AUA Symptom Score (2/15/2023):   INCOMPLETE EMPTYING: How often have you had the sensation of not emptying your bladder?: Not at all  FREQUENCY: How often do you have to urinate less than every two hours?: Less than 1 to 5 times  INTERMITTENCY: How often have you found you stopped and started again several times when you urinated?: Less than 1 to 5 times  URGENCY: How often have you found it difficult to postpone urination?: Less than 1 to 5 times  WEAK STREAM: How often have you had a weak urinary stream?: More than Half the time  STRAINING: How often have you had to strain to start  urination?: Not at all  NOCTURIA: How many times did you typically get up at night to uriniate?: 3 Times  TOTAL I-PSS SCORE[de-identified] 10  How would you feel if you were to spend the rest of your life with your urinary condition?: Mostly Satisfied    Last BUN and creatinine:  Lab Results   Component Value Date    BUN 13 01/28/2023     Lab Results   Component Value Date    CREATININE 0.97 01/28/2023       Additional Lab/Culture results: none    Imaging Reviewed during this Office Visit: none  (results were independently reviewed by physician and radiology report verified)    PAST MEDICAL, FAMILY AND SOCIAL HISTORY UPDATE:  Past Medical History:   Diagnosis Date    Abdominal hernia     Abnormal cardiovascular stress test 10/2022    Anesthesia     phrenic nerve damaged lt side    BPH (benign prostatic hyperplasia)     CAD (coronary artery disease)     Chronic back pain     Diabetes mellitus ([address]     Type 2    Difficult intubation     per pt throat collapses, lt side phrenic nerve was damaged    TINOCO (dyspnea on exertion) 09/18/2022    Drug-induced constipation 05/24/2022    Ear infection     LT, being treated    Essential hypertension 10/23/2017    Fatty liver     Gallstones     GERD (gastroesophageal reflux disease)     Headache(784.0)     Hematoma 05/25/2022    History of MI (myocardial infarction)     Hypercholesteremia     Hyperlipidemia     Hyperparathyroidism ([address]     Hypertension     Kidney stones     Osteoarthritis     all joints    Osteoarthritis     PONV (postoperative nausea and vomiting)     Primary osteoarthritis of both knees 09/13/2022    Formatting of this note might be different from the original. Added automatically from request for surgery [id number]    Prolonged emergence from general anesthesia     S/P cardiac catheterization 01/02/2019    Sleep apnea     BIPAP nightly    Type 2 diabetes mellitus with stage 3a chronic kidney disease, without long-term current use of insulin ([address] 09/15/2022     Past Surgical History:   Procedure Laterality Date    BACK SURGERY  07/11/2019    BICEPS TENDON REPAIR Left     CARDIAC CATHETERIZATION  10/28/2022    Mild nonobstructive CAD otherwise, unchanged from last angiogram in 2019  /  PATENT STENT IN DISTAL LAD    CARPAL TUNNEL RELEASE Right     CERVICAL FUSION      COLONOSCOPY  11/14/2014    normal    CORONARY ANGIOPLASTY WITH STENT PLACEMENT  01/02/2019    LAD: 40% proximal stenosis. 80% mid stenosis reduced to 0% using 2.5x15 and 2. 5x8 mini vision bare metal stent.  The distal LAD has 70% stenosis around the apex and will be treated medically    ELBOW FRACTURE SURGERY Right     right arm/fracture    ENDOSCOPY, COLON, DIAGNOSTIC      several times    FRACTURE SURGERY Right     elbow    KNEE ARTHROSCOPY Bilateral     LIVER BIOPSY  08/14/2015    moderate steatosis, grade 2, lobular and portal inflammation: Grade 1 total score =3, fibrosis score 1A    LUMBAR FUSION N/A 07/10/2019    LUMBAR LAMINECTOMY FUSION POSTERIOR - L3 SACRUM POSTERIOR LUMBAR DECOMPRESSION  & FUSION performed by Unique Aguilar MD at [address] [facility] 05/18/2022    L2-3 POSTERIOR LUMBAR DECOMPRESSION & INSTRUMENTED FUSION POSSIBLE REVISION INSTRUMENTATION L3-5 performed by Unique Aguilar MD at [address] [address]  11/07/2018    lumbar myelogram    ROTATOR CUFF REPAIR Right     SPINE SURGERY N/A 05/26/2022    IRRIGATION AND DEBRIDEMENT EPIDURAL HEMATOMA performed by Unique Aguilar MD at [address] [facility] [address] Left 11/22/2022    Done in [facility], Dr. Krish Barroso Right 11/08/2022    Done in [facility], Dr. Maricel Nazario     Family History   Problem Relation Age of Onset    Heart Disease Mother     Cancer Father         bladder    Arthritis Sister     Cancer Paternal Aunt     Cancer Paternal Aunt      Outpatient Medications Marked as Taking for the 2/15/23 encounter (Office Visit) with SERA Lewis CNP   Medication Sig Dispense Refill    tamsulosin (FLOMAX) 0.4 MG capsule TAKE ONE CAPSULE BY MOUTH TWICE A  capsule 3    finasteride (PROSCAR) 5 MG tablet TAKE ONE TABLET BY MOUTH DAILY 90 tablet 3 aspirin EC 81 MG EC tablet Take 1 tablet by mouth daily 90 tablet 1    DICLOFENAC PO Take 75 mg by mouth 2 times daily      Ferrous Sulfate (FEROSUL PO) Take by mouth      Glucosamine-Chondroitin-MSM (TRIPLE FLEX) 750-400-375 MG TABS Take 2 tablets by mouth daily With food. NATURE MADE brand. Or BIOFLEX. 180 tablet 3    Collagen-Boron-Hyaluronic Acid (MOVE FREE ULTRA JOINT HEALTH) 40-5-3.3 MG TABS Take 1 tablet by mouth daily 90 tablet 0    coenzyme Q-10 100 MG capsule Take 1 capsule by mouth every evening 90 capsule 3    metoprolol succinate (TOPROL XL) 100 MG extended release tablet Take 1 tablet by mouth daily Dose increased 9/15/2022 . Goal BP bellow 135/85 and above 115/65, heart rate 56 to 90 bpm 90 tablet 3    lisinopril (PRINIVIL;ZESTRIL) 10 MG tablet Take 1 tablet by mouth every evening 90 tablet 3    metFORMIN (GLUCOPHAGE) 1000 MG tablet Take 1 tablet by mouth 2 times daily (with meals) 180 tablet 3    pravastatin (PRAVACHOL) 40 MG tablet Take 1 tablet by mouth Daily with supper 90 tablet 3    ezetimibe (ZETIA) 10 MG tablet Take 1 tablet by mouth daily 90 tablet 3    amLODIPine (NORVASC) 5 MG tablet Take 5 mg by mouth daily      Handicap Placard MISC by Does not apply route Can't walk greater than 200 feet. Expires in 5 years. 1 each 0    acetaminophen (TYLENOL) 500 MG tablet Take 1 tablet by mouth every 6 hours as needed for Pain 120 tablet 3    clopidogrel (PLAVIX) 75 MG tablet Take 1 tablet by mouth daily 90 tablet 3    cinacalcet (SENSIPAR) 30 MG tablet Take 1 tablet by mouth daily Per Dr. Cano Prude (Patient taking differently: Take 30 mg by mouth every evening Per Dr. Cano Prude) 90 tablet 0    pantoprazole (PROTONIX) 20 MG tablet Take 1 tablet by mouth every morning (before breakfast) ** stop Omeprazole due to interaction with plavix** 90 tablet 1    gabapentin (NEURONTIN) 300 MG capsule Take 600 mg by mouth 3 times daily.        E-400 400 UNITS capsule TAKE TWO CAPSULES BY MOUTH DAILY 60 capsule 10 nortriptyline (PAMELOR) 50 MG capsule Take 50 mg by mouth 2 times daily          Pcn [penicillins], Morphine, Adhesive tape, Codeine, and Tramadol  Social History     Tobacco Use   Smoking Status Never   Smokeless Tobacco Never     (Ifpatient a smoker, smoking cessation counseling offered)    Social History     Substance and Sexual Activity   Alcohol Use Never    Alcohol/week: 0.0 standard drinks       REVIEW OF SYSTEMS:  Review of Systems    Physical Exam:      Vitals:    02/15/23 1303   BP: 123/71   Pulse: 86   Temp: 98.1 °F (36.7 °C)     Body mass index is 33.3 kg/m². Patient is a 79 y.o. male in no acute distress and alert and oriented to person, place and time. Physical Exam  Constitutional: Patient in no acute distress. Neuro: Alert and oriented to person, place and time. Psych: Mood normal, affect normal  Skin: No rash noted  Lungs: Respiratory effort is normal  Cardiovascular: Warm & Pink  Abdomen: Soft, non-tender, non-distended with no CVA,  No flank tenderness  Bladder non-tender and not distended. Musculoskeletal: Normal gait and station    Assessment and Plan      1. BPH with obstruction/lower urinary tract symptoms    2. Prostate cancer screening           Plan:   BPH symptoms stable on combo therapy. Refills sent. PSA stable, repeat 1 year. Watch bladder irritants. Return in 1 year or sooner if needed. Return in about 1 year (around 2/15/2024), or if symptoms worsen or fail to improve, for PSA.     Prescriptions Ordered:  Orders Placed This Encounter   Medications    tamsulosin (FLOMAX) 0.4 MG capsule     Sig: TAKE ONE CAPSULE BY MOUTH TWICE A DAY     Dispense:  180 capsule     Refill:  3    finasteride (PROSCAR) 5 MG tablet     Sig: TAKE ONE TABLET BY MOUTH DAILY     Dispense:  90 tablet     Refill:  3       Orders Placed:  Orders Placed This Encounter   Procedures    PSA Screening     Standing Status:   Future     Standing Expiration Date:   8/15/2024             Jerica Mcknight SERA - CNP    Reviewed and agree with the ROS entered by the MA.

## 2023-03-08 NOTE — PRE-PROCEDURE INSTRUCTIONS
Have you received your Prep? Any questions with prep instructions? NA  Only Clear Liquid Diet day before. NA  Nothing to eat after midnight day before procedure. Y  Are you taking any blood thinners? If so, you need to Stop. STOPPED  Remove any jewelry and body piercings. Y  Do you wear glasses? If so, please bring a case to store them in. Are you having any Covid symptoms? N  Do you have any new rashes, infections, etc. that we should be aware of? N  Do you have a ride home the day of surgery? It cannot be a cab or medical transportation.  Y  Verify surgery time/date and what time to arrive at hospital. 0900

## 2023-03-09 ENCOUNTER — ANESTHESIA EVENT (OUTPATIENT)
Dept: ENDOSCOPY | Age: 68
End: 2023-03-09
Payer: MEDICARE

## 2023-03-10 ENCOUNTER — ANESTHESIA (OUTPATIENT)
Dept: ENDOSCOPY | Age: 68
End: 2023-03-10
Payer: MEDICARE

## 2023-03-10 ENCOUNTER — HOSPITAL ENCOUNTER (OUTPATIENT)
Age: 68
Setting detail: OUTPATIENT SURGERY
Discharge: HOME | End: 2023-03-10
Attending: INTERNAL MEDICINE | Admitting: INTERNAL MEDICINE
Payer: MEDICARE

## 2023-03-10 VITALS
OXYGEN SATURATION: 95 % | HEART RATE: 86 BPM | TEMPERATURE: 97.3 F | RESPIRATION RATE: 18 BRPM | HEIGHT: 68 IN | BODY MASS INDEX: 33.19 KG/M2 | SYSTOLIC BLOOD PRESSURE: 106 MMHG | WEIGHT: 219 LBS | DIASTOLIC BLOOD PRESSURE: 67 MMHG

## 2023-03-10 DIAGNOSIS — R13.19 ESOPHAGEAL DYSPHAGIA: ICD-10-CM

## 2023-03-10 LAB — GLUCOSE BLD-MCNC: 95 MG/DL (ref 75–110)

## 2023-03-10 PROCEDURE — 2580000003 HC RX 258: Performed by: ANESTHESIOLOGY

## 2023-03-10 PROCEDURE — 43450 DILATE ESOPHAGUS 1/MULT PASS: CPT | Performed by: INTERNAL MEDICINE

## 2023-03-10 PROCEDURE — 3700000001 HC ADD 15 MINUTES (ANESTHESIA): Performed by: INTERNAL MEDICINE

## 2023-03-10 PROCEDURE — 2500000003 HC RX 250 WO HCPCS: Performed by: NURSE ANESTHETIST, CERTIFIED REGISTERED

## 2023-03-10 PROCEDURE — 7100000011 HC PHASE II RECOVERY - ADDTL 15 MIN: Performed by: INTERNAL MEDICINE

## 2023-03-10 PROCEDURE — 2709999900 HC NON-CHARGEABLE SUPPLY: Performed by: INTERNAL MEDICINE

## 2023-03-10 PROCEDURE — 6360000002 HC RX W HCPCS: Performed by: NURSE ANESTHETIST, CERTIFIED REGISTERED

## 2023-03-10 PROCEDURE — 43239 EGD BIOPSY SINGLE/MULTIPLE: CPT | Performed by: INTERNAL MEDICINE

## 2023-03-10 PROCEDURE — 7100000010 HC PHASE II RECOVERY - FIRST 15 MIN: Performed by: INTERNAL MEDICINE

## 2023-03-10 PROCEDURE — 3609012400 HC EGD TRANSORAL BIOPSY SINGLE/MULTIPLE: Performed by: INTERNAL MEDICINE

## 2023-03-10 PROCEDURE — 82947 ASSAY GLUCOSE BLOOD QUANT: CPT

## 2023-03-10 PROCEDURE — 3700000000 HC ANESTHESIA ATTENDED CARE: Performed by: INTERNAL MEDICINE

## 2023-03-10 PROCEDURE — 88305 TISSUE EXAM BY PATHOLOGIST: CPT

## 2023-03-10 RX ORDER — LIDOCAINE HYDROCHLORIDE 20 MG/ML
INJECTION, SOLUTION EPIDURAL; INFILTRATION; INTRACAUDAL; PERINEURAL PRN
Status: DISCONTINUED | OUTPATIENT
Start: 2023-03-10 | End: 2023-03-10 | Stop reason: SDUPTHER

## 2023-03-10 RX ORDER — SODIUM CHLORIDE 0.9 % (FLUSH) 0.9 %
5-40 SYRINGE (ML) INJECTION EVERY 12 HOURS SCHEDULED
Status: DISCONTINUED | OUTPATIENT
Start: 2023-03-10 | End: 2023-03-10 | Stop reason: HOSPADM

## 2023-03-10 RX ORDER — SODIUM CHLORIDE 0.9 % (FLUSH) 0.9 %
5-40 SYRINGE (ML) INJECTION PRN
Status: DISCONTINUED | OUTPATIENT
Start: 2023-03-10 | End: 2023-03-10 | Stop reason: HOSPADM

## 2023-03-10 RX ORDER — LIDOCAINE HYDROCHLORIDE 10 MG/ML
1 INJECTION, SOLUTION EPIDURAL; INFILTRATION; INTRACAUDAL; PERINEURAL
Status: DISCONTINUED | OUTPATIENT
Start: 2023-03-10 | End: 2023-03-10 | Stop reason: HOSPADM

## 2023-03-10 RX ORDER — PROPOFOL 10 MG/ML
INJECTION, EMULSION INTRAVENOUS PRN
Status: DISCONTINUED | OUTPATIENT
Start: 2023-03-10 | End: 2023-03-10 | Stop reason: SDUPTHER

## 2023-03-10 RX ORDER — SODIUM CHLORIDE 9 MG/ML
INJECTION, SOLUTION INTRAVENOUS CONTINUOUS
Status: DISCONTINUED | OUTPATIENT
Start: 2023-03-10 | End: 2023-03-10 | Stop reason: HOSPADM

## 2023-03-10 RX ORDER — SODIUM CHLORIDE 9 MG/ML
INJECTION, SOLUTION INTRAVENOUS PRN
Status: DISCONTINUED | OUTPATIENT
Start: 2023-03-10 | End: 2023-03-10 | Stop reason: HOSPADM

## 2023-03-10 RX ADMIN — PROPOFOL 20 MG: 10 INJECTION, EMULSION INTRAVENOUS at 11:07

## 2023-03-10 RX ADMIN — PROPOFOL 20 MG: 10 INJECTION, EMULSION INTRAVENOUS at 10:53

## 2023-03-10 RX ADMIN — PROPOFOL 80 MG: 10 INJECTION, EMULSION INTRAVENOUS at 10:49

## 2023-03-10 RX ADMIN — SODIUM CHLORIDE: 9 INJECTION, SOLUTION INTRAVENOUS at 10:04

## 2023-03-10 RX ADMIN — PROPOFOL 20 MG: 10 INJECTION, EMULSION INTRAVENOUS at 10:59

## 2023-03-10 RX ADMIN — PROPOFOL 20 MG: 10 INJECTION, EMULSION INTRAVENOUS at 10:55

## 2023-03-10 RX ADMIN — LIDOCAINE HYDROCHLORIDE 80 MG: 20 INJECTION, SOLUTION EPIDURAL; INFILTRATION; INTRACAUDAL; PERINEURAL at 10:49

## 2023-03-10 RX ADMIN — PROPOFOL 20 MG: 10 INJECTION, EMULSION INTRAVENOUS at 11:05

## 2023-03-10 RX ADMIN — PROPOFOL 20 MG: 10 INJECTION, EMULSION INTRAVENOUS at 11:03

## 2023-03-10 RX ADMIN — PROPOFOL 20 MG: 10 INJECTION, EMULSION INTRAVENOUS at 10:57

## 2023-03-10 RX ADMIN — PROPOFOL 20 MG: 10 INJECTION, EMULSION INTRAVENOUS at 10:51

## 2023-03-10 RX ADMIN — PROPOFOL 20 MG: 10 INJECTION, EMULSION INTRAVENOUS at 11:01

## 2023-03-10 ASSESSMENT — ENCOUNTER SYMPTOMS
ANAL BLEEDING: 0
APNEA: 1
SHORTNESS OF BREATH: 1
CHOKING: 0
NAUSEA: 1
BLOOD IN STOOL: 0
SHORTNESS OF BREATH: 0
CONSTIPATION: 0
DIARRHEA: 1
COUGH: 1
ABDOMINAL DISTENTION: 1
TROUBLE SWALLOWING: 1
ABDOMINAL PAIN: 1
BACK PAIN: 1
STRIDOR: 0

## 2023-03-10 ASSESSMENT — PAIN - FUNCTIONAL ASSESSMENT: PAIN_FUNCTIONAL_ASSESSMENT: 0-10

## 2023-03-10 NOTE — OP NOTE
ESOPHAGOGASTRODUODENOSCOPY   ( EGD )  DATE OF PROCEDURE: 3/10/2023     SURGEON: Holly Luu MD    ASSISTANT: None    PREOPERATIVE DIAGNOSIS: Patient has dysphagia for solids. Procedure performed evaluate upper GI lesions    POSTOPERATIVE DIAGNOSIS: Probable esophageal motility issues. No esophageal stricture seen. Small gastric polyps. OPERATION: Upper GI endoscopy with Biopsy    ANESTHESIA: MAC    ESTIMATED BLOOD LOSS: None    COMPLICATIONS: None. SPECIMENS:  Was Obtained: Gastric polyps, looks benign    Biopsies from the esophagus to rule out eosinophilic esophagitis    HISTORY: The patient is a 79y.o. year old male with history of above preop diagnosis. I recommended esophagogastroduodenoscopy with possible biopsy and I explained the risk, benefits, expected outcome, and alternatives to the procedure. Risks included but are not limited to bleeding, infection, respiratory distress, hypotension, and perforation of the esophagus, stomach, or duodenum. Patient understands and is in agreement. PROCEDURE: The patient was given IV conscious sedation. The patient's SPO2 remained above 90% throughout the procedure. Cetacaine spray given. Patient placed in left lateral position. Olympus  videogastroscope was inserted orally under vision into the esophagus without difficulty and advanced into the stomach then through the pylorus up to the second part of duodenum. Findings:    Retropharyngeal area was grossly normal appearing    Esophagus: normal.  No stricture seen. No signs of peptic esophagitis or Ocampo's mucosa. Squamocolumnar junction is at about 40 cm from incisor teeth. No hiatal hernia. May have esophageal motility issues. Stomach:    Fundus and Cardia Examined in Retroflexed View: normal    Body: abnormal: Multiple 2 to 3 mm, benign looking gastric polyps.   Biopsies taken for histology    Antrum: normal    Duodenum:     Descending: normal    Bulb: normal    As patient is having consistent symptom of dysphagia with solid, I did dilate the esophagus with 54 Sinhala Rich dilator. The dilatation was carried out in the usual fashion. Following the dilatation I checked the esophagus with the gastroscope no mucosal stretch marks seen. Following the dilatation I did take biopsies from the esophagus to check for eosinophilic esophagitis          While withdrawing the scope the above findings were verified and the scope was removed. The patient has tolerated the procedure without unusual events.          Recommendations/Plan:   F/U Biopsies  F/U In Office as instructed  Discussed with the family                   Electronically signed by Michael Melgar MD  on 3/10/2023 at 11:17 AM

## 2023-03-10 NOTE — DISCHARGE INSTRUCTIONS
Hold clopidogrel for 4 days    Make food into small pieces, chew well and swallow    To see me in the office in the next 6 weeks            EGD DISCHARGE INSTRUCTIONS    Activity:  Rest today. No driving, operating machinery, or making any important decisions today. May resume normal activity tomorrow. Diet:  Following EGD eat slowly, chew food well, cut food into small pieces-Avoid greasy, spicy, \"crunchy\" foods X 48 hours. Call your Doctor if you have any of the following:  -Passing blood rectally or vomiting blood (it may be red or black).   -Persistent nausea/vomiting  -Severe abdominal or chest pain not relieved with passing gas  -Fever of 100 degrees or more  -Redness or swelling at the IV site  -Severe sore throat or neck pain

## 2023-03-10 NOTE — ANESTHESIA POSTPROCEDURE EVALUATION
Department of Anesthesiology  Postprocedure Note    Patient: Sherrell Pepper  MRN: [id number]  YOB: 1955  Date of evaluation: 3/10/2023      Procedure Summary     Date: 03/10/23 Room / Location: [address] [address] [address] / [facility]    Anesthesia Start: 8979 Anesthesia Stop: 9433    Procedure: EGD BIOPSY AND DILATATION (56 FR MARTINEZ DILATOR) (Esophagus) Diagnosis:       Esophageal dysphagia      (ESOPHAGEAL DYSPHAGIA)    Surgeons: Jazmyn Mancini MD Responsible Provider: Alexus Carter MD    Anesthesia Type: general ASA Status: 3          Anesthesia Type: No value filed.     Robert Phase I:      Robert Phase II: Robert Score: 10      Anesthesia Post Evaluation    Comments: POST- ANESTHESIA EVALUATION       Pt Name: Sherrell Pepper  MRN: [id number]  [place]: 1955  Date of evaluation: 3/10/2023  Time:  12:05 PM      /67   Pulse 86   Temp 97.3 °F (36.3 °C)   Resp 18   Ht 5' 8\" (1.727 m)   Wt 219 lb (99.3 kg)   SpO2 95%   BMI 33.30 kg/m²      Consciousness Level  Awake  Cardiopulmonary Status  Stable  Pain Adequately Treated YES  Nausea / Vomiting  NO  Adequate Hydration  YES  Anesthesia Related Complications NONE      Electronically signed by Alexus Carter MD on 3/10/2023 at 12:05 PM

## 2023-03-10 NOTE — ANESTHESIA PRE PROCEDURE
Department of Anesthesiology  Preprocedure Note       Name:  Arminda Castro   Age:  79 y.o.  :  1955                                          MRN:  [id number]         Date:  3/10/2023      Surgeon: Eugene Rivas):  Lorne Monet MD    Procedure: Procedure(s):  EGD DILATATION    Medications prior to admission:   Prior to Admission medications    Medication Sig Start Date End Date Taking? Authorizing Provider   tamsulosin (FLOMAX) 0.4 MG capsule TAKE ONE CAPSULE BY MOUTH TWICE A DAY 2/15/23   SERA Villarreal CNP   finasteride (PROSCAR) 5 MG tablet TAKE ONE TABLET BY MOUTH DAILY 2/15/23   SERA Villarreal CNP   aspirin EC 81 MG EC tablet Take 1 tablet by mouth daily 23   Cherri Spurling, MD   DICLOFENAC PO Take 75 mg by mouth 2 times daily    Historical Provider, MD   Ferrous Sulfate (FEROSUL PO) Take by mouth  Patient not taking: Reported on 3/10/2023    Historical Provider, MD   Glucosamine-Chondroitin-MSM (TRIPLE FLEX) [id number] MG TABS Take 2 tablets by mouth daily With food. NATURE MADE brand. Or BIOFLEX. 23   Cherri Spurling, MD   Collagen-Boron-Hyaluronic Acid (MOVE FREE ULTRA JOINT HEALTH) 40-5-3.3 MG TABS Take 1 tablet by mouth daily 23   Cherri Spurling, MD   coenzyme Q-10 100 MG capsule Take 1 capsule by mouth every evening 22   Cherri Spurling, MD   metoprolol succinate (TOPROL XL) 100 MG extended release tablet Take 1 tablet by mouth daily Dose increased 9/15/2022 .  Goal BP bellow 135/85 and above 115/65, heart rate 56 to 90 bpm 22   Cherri Spurling, MD   lisinopril (PRINIVIL;ZESTRIL) 10 MG tablet Take 1 tablet by mouth every evening 22   Cherri Spurling, MD   metFORMIN (GLUCOPHAGE) 1000 MG tablet Take 1 tablet by mouth 2 times daily (with meals) 22   Cherri Spurling, MD   pravastatin (PRAVACHOL) 40 MG tablet Take 1 tablet by mouth Daily with supper 22   Cherri Spurling, MD   ezetimibe (ZETIA) 10 MG tablet Take 1 tablet by mouth daily 11/22/22   Judith Dey MD   amLODIPine (NORVASC) 5 MG tablet Take 5 mg by mouth daily    Historical Provider, MD   Handicap Placard MISC by Does not apply route Can't walk greater than 200 feet. Expires in 5 years. 10/24/22   Judith Dey MD   acetaminophen (TYLENOL) 500 MG tablet Take 1 tablet by mouth every 6 hours as needed for Pain 9/15/22   Judith Dey MD   camphor-menthol-methyl salicylate (BENGAY ULTRA STRENGTH) 4-10-30 % CREA cream Apply topically 3 times daily as needed for Pain  Patient not taking: No sig reported 9/15/22   Judith Dey MD   clopidogrel (PLAVIX) 75 MG tablet Take 1 tablet by mouth daily 9/15/22   Judith Dey MD   cinacalcet (SENSIPAR) 30 MG tablet Take 1 tablet by mouth daily Per Dr. Chrystal Mcdowell  Patient taking differently: Take 30 mg by mouth every evening Per Dr. Chrystal Mcdowell 9/15/22   Judith Dey MD   pantoprazole (PROTONIX) 20 MG tablet Take 1 tablet by mouth every morning (before breakfast) ** stop Omeprazole due to interaction with plavix** 9/15/22   Judith Dey MD   gabapentin (NEURONTIN) 300 MG capsule Take 600 mg by mouth 3 times daily.      Historical Provider, MD   E-400 400 UNITS capsule TAKE TWO CAPSULES BY MOUTH DAILY 9/23/16   Loi Juarez MD   nortriptyline (PAMELOR) 50 MG capsule Take 50 mg by mouth 2 times daily  3/31/16   Historical Provider, MD       Current medications:    Current Facility-Administered Medications   Medication Dose Route Frequency Provider Last Rate Last Admin    lidocaine PF 1 % injection 1 mL  1 mL IntraDERmal Once PRN Marionisiah Connelly MD        sodium chloride flush 0.9 % injection 5-40 mL  5-40 mL IntraVENous 2 times per day Shakila Cavanaugh MD        sodium chloride flush 0.9 % injection 5-40 mL  5-40 mL IntraVENous PRN Tami Connelly MD        0.9 % sodium chloride infusion   IntraVENous PRN Marionmoni Connelly MD        0.9 % sodium chloride infusion   IntraVENous Continuous Mitzyrios Stein  mL/hr at 03/10/23 1004 New Bag at 03/10/23 1004       Allergies: Allergies   Allergen Reactions    Pcn [Penicillins] Swelling     face    Morphine Other (See Comments)     Becomes very mean.  Tolerates dilaudid and fentanyl    Adhesive Tape Rash    Codeine Nausea And Vomiting     Abd. pain    Tramadol Itching       Problem List:    Patient Active Problem List   Diagnosis Code    Hyperlipidemia E78.5    Prostate disease N42.9    Primary osteoarthritis involving multiple joints M15.9    Headache R51.9    GERD (gastroesophageal reflux disease) K21.9    SHANT treated with BiPAP G47.33    BPH (benign prostatic hyperplasia) N40.0    Steatosis of liver K76.0    Lumbar radiculopathy, chronic M54.16    Osteoarthritis of lumbar spine M47.816    Osteoarthritis of multiple joints M15.9    Degenerative disc disease, lumbar M51.36    Lumbar spinal stenosis M48.061    Pars defect with spondylolisthesis M43.00, M43.10    Lumbar disc herniation M51.26    Chronic low back pain M54.50, G89.29    Cervical spondylosis with myelopathy M47.12    Bilateral carpal tunnel syndrome G56.03    Spinal stenosis in cervical region M48.02    Spondylolisthesis, lumbar region M43.16    Foraminal stenosis of cervical region M48.02    Cervical radiculopathy M54.12    Benign hypertension with CKD (chronic kidney disease) stage III ([facility]) I12.9, N18.30    Type 2 diabetes mellitus with stage 3a chronic kidney disease, without long-term current use of insulin (HCC) E11.22, N18.31    Hyperparathyroidism ([facility]) E21.3    Coronary artery disease involving native coronary artery of native heart without angina pectoris I25.10    Thickening of pleura J92.9    Cardiomegaly I51.7    S/p total knee replacement, bilateral Z96.653    Esophageal dysphagia R13.19    Arthritis of multiple sites M13.0       Past Medical History:        Diagnosis Date    Abdominal hernia     Abnormal cardiovascular stress test 10/2022   • Anesthesia     phrenic nerve damaged lt side   • BPH (benign prostatic hyperplasia)    • CAD (coronary artery disease)    • Chronic back pain    • Diabetes mellitus (HCC)     Type 2   • Difficult intubation     per pt throat collapses, lt side phrenic nerve was damaged   • TINOCO (dyspnea on exertion) 09/18/2022   • Drug-induced constipation 05/24/2022   • Ear infection     LT, being treated   • Essential hypertension 10/23/2017   • Fatty liver    • Gallstones    • GERD (gastroesophageal reflux disease)    • Headache(784.0)    • Hematoma 05/25/2022   • History of MI (myocardial infarction)    • Hypercholesteremia    • Hyperlipidemia    • Hyperparathyroidism (HCC)    • Hypertension    • Kidney stones    • Osteoarthritis     all joints   • Osteoarthritis    • PONV (postoperative nausea and vomiting)    • Primary osteoarthritis of both knees 09/13/2022    Formatting of this note might be different from the original. Added automatically from request for surgery [id number]   • Prolonged emergence from general anesthesia    • S/P cardiac catheterization 01/02/2019   • Sleep apnea     BIPAP nightly   • Type 2 diabetes mellitus with stage 3a chronic kidney disease, without long-term current use of insulin ([facility]) 09/15/2022       Past Surgical History:        Procedure Laterality Date   • BACK SURGERY  07/11/2019   • BICEPS TENDON REPAIR Left    • CARDIAC CATHETERIZATION  10/28/2022    Mild nonobstructive CAD otherwise, unchanged from last angiogram in 2019  /  PATENT STENT IN DISTAL LAD   • CARPAL TUNNEL RELEASE Right    • CERVICAL FUSION     • COLONOSCOPY  11/14/2014    normal   • CORONARY ANGIOPLASTY WITH STENT PLACEMENT  01/02/2019    LAD: 40% proximal stenosis. 80% mid stenosis reduced to 0% using 2.5x15 and 2.5x8 mini vision bare metal stent. The distal LAD has 70% stenosis around the apex and will be treated medically   • ELBOW FRACTURE SURGERY Right     right arm/fracture   • ENDOSCOPY, COLON,  DIAGNOSTIC      several times    FRACTURE SURGERY Right     elbow    KNEE ARTHROSCOPY Bilateral     LIVER BIOPSY  08/14/2015    moderate steatosis, grade 2, lobular and portal inflammation: Grade 1 total score =3, fibrosis score 1A    LUMBAR FUSION N/A 07/10/2019    LUMBAR LAMINECTOMY FUSION POSTERIOR - L3 SACRUM POSTERIOR LUMBAR DECOMPRESSION  & FUSION performed by Erica Núñez MD at [address] 05/18/2022    L2-3 POSTERIOR LUMBAR DECOMPRESSION & INSTRUMENTED FUSION POSSIBLE REVISION INSTRUMENTATION L3-5 performed by Erica Núñez MD at [address] [place] [address]  11/07/2018    lumbar myelogram    ROTATOR CUFF REPAIR Right     SPINE SURGERY N/A 05/26/2022    IRRIGATION AND DEBRIDEMENT EPIDURAL HEMATOMA performed by Erica Núñez MD at [place] [id number] Left 11/22/2022    Done in [address], Dr. Korey Khanna Right 11/08/2022    Done in [address], Dr. Sheyla Noel       Social History:    Social History     Tobacco Use    Smoking status: Never    Smokeless tobacco: Never   Substance Use Topics    Alcohol use: Never     Alcohol/week: 0.0 standard drinks                                Counseling given: Not Answered      Vital Signs (Current):   Vitals:    03/10/23 0933   BP: (!) 146/97   Pulse: 83   Resp: 18   Temp: 96.9 °F (36.1 °C)   TempSrc: Infrared   SpO2: 96%   Weight: 219 lb (99.3 kg)   Height: 5' 8\" (1.727 m)                                              BP Readings from Last 3 Encounters:   03/10/23 (!) 146/97   02/15/23 123/71   02/08/23 123/71       NPO Status: Time of last liquid consumption: 2100                        Time of last solid consumption: 2100                        Date of last liquid consumption: 03/09/23                        Date of last solid food consumption: 03/07/23    BMI:   Wt Readings from Last 3 Encounters:   03/10/23 219 lb (99.3 kg)   02/15/23 219 lb (99.3 kg)   02/10/23 215 lb (97.5 kg)     Body mass index is 33.3 kg/m².     CBC:   Lab Results   Component Value Date/Time    WBC 6.6 01/28/2023 09:40 AM    RBC 4.94 01/28/2023 09:40 AM    RBC 5.19 01/28/2012 09:16 AM    HGB 14.5 01/28/2023 09:40 AM    HCT 43.0 01/28/2023 09:40 AM    MCV 86.9 01/28/2023 09:40 AM    RDW 16.0 01/28/2023 09:40 AM     01/28/2023 09:40 AM     01/28/2012 09:16 AM       CMP:   Lab Results   Component Value Date/Time     01/28/2023 09:40 AM    K 4.8 01/28/2023 09:40 AM     01/28/2023 09:40 AM    CO2 28 01/28/2023 09:40 AM    BUN 13 01/28/2023 09:40 AM    CREATININE 0.97 01/28/2023 09:40 AM    GFRAA >60 09/21/2022 08:47 AM    LABGLOM >60 01/28/2023 09:40 AM    GLUCOSE 189 01/28/2023 09:40 AM    GLUCOSE 116 01/28/2012 09:16 AM    PROT 7.3 01/28/2023 09:40 AM    CALCIUM 10.7 01/28/2023 09:40 AM    BILITOT 0.4 01/28/2023 09:40 AM    ALKPHOS 126 01/28/2023 09:40 AM    AST 17 01/28/2023 09:40 AM    ALT 25 01/28/2023 09:40 AM       POC Tests:   Recent Labs     03/10/23  1003   POCGLU 95       Coags:   Lab Results   Component Value Date/Time    PROTIME 13.4 04/12/2022 09:55 AM    INR 1.0 04/12/2022 09:55 AM    APTT 25.7 11/07/2018 08:23 AM       HCG (If Applicable): No results found for: PREGTESTUR, PREGSERUM, HCG, HCGQUANT     ABGs:   Lab Results   Component Value Date/Time    PHART 7.368 07/13/2019 07:30 PM    PO2ART 68.5 07/13/2019 07:30 PM    TXR0LZZ 51.6 07/13/2019 07:30 PM    FGG5BWE 29.7 07/13/2019 07:30 PM    C7EEQGAP 91.9 07/13/2019 07:30 PM        Type & Screen (If Applicable):  No results found for: LABABO, LABRH    Drug/Infectious Status (If Applicable):  Lab Results   Component Value Date/Time    HEPCAB NONREACTIVE 10/04/2014 08:36 AM       COVID-19 Screening (If Applicable):   Lab Results   Component Value Date/Time    COVID19 Not Detected 05/20/2022 02:48 PM           Anesthesia Evaluation  Patient summary reviewed and Nursing notes reviewed   history of anesthetic complications: PONV and history of difficult intubation. Airway: Mallampati: III  TM distance: >3 FB   Neck ROM: limited  Mouth opening: > = 3 FB   Dental:          Pulmonary:normal exam  breath sounds clear to auscultation  (+) shortness of breath: no interval change,  sleep apnea: on CPAP,                            ROS comment: Left phrenic nerve paralysis (diaphragm paralysis)   Cardiovascular:    (+) hypertension:, CAD:, CABG/stent:, CHF (EF 34%):, hyperlipidemia      ECG reviewed  Rhythm: regular  Rate: normal    Stress test reviewed                Neuro/Psych:   (+) neuromuscular disease:, headaches:,             GI/Hepatic/Renal:   (+) GERD:, liver disease:,           Endo/Other:    (+) DiabetesType II DM, , : arthritis: OA., .                 Abdominal:   (+) obese,           Vascular: Other Findings:           Anesthesia Plan      general     ASA 3     (TIVA)  Induction: intravenous. MIPS: Prophylactic antiemetics administered. Anesthetic plan and risks discussed with patient. Plan discussed with CRNA.                     Hebert Marie MD   3/10/2023

## 2023-03-10 NOTE — H&P
HISTORY and [id number]       NAME:  Emilie Perdomo  MRN: [id number]   YOB: 1955   Date: 3/10/2023   Age: 79 y.o. Gender: male       COMPLAINT AND PRESENT HISTORY:     Emilie Perdomo is 79 y.o.,  male, presents for EGD DILATATION   Primary dx: ESOPHAGEAL DYSPHAGIA. HPI         See portion of the note below per dr Jona Spears from office visit on 2/8/2023    HISTORY OF PRESENT ILLNESS:   Patient seen with a history of dysphagia. Patient states that after eating food he feels full and he feels as if the food is staying in the esophagus. Sometimes soon after eating food he regurgitates food. Also states he feels food getting stuck in the substernal area. Even though patient has constant symptoms, once or twice a week the symptoms get worse and has to stop eating food for several hours. No symptom of complete obstruction of esophagus. Usually has the symptoms with solid food. He does not have weight loss. He does have GERD symptoms. 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 He denies abdominal pain. He has abdominal bloating and distention and this appears to be chronic issue. Bowel movements satisfactory. No melena or hematochezia. Previous records reviewed. This patient had screening colonoscopy done in November 2014 and no abnormal findings identified. Also in August 2015 this patient had a liver biopsy done that revealed moderate steatosis. He had inflammation stage I, fibrosis grade 2. Patient had coronary artery stents placed in 2021 and has been on Plavix and aspirin. He denies chest pain, shortness of breath, palpitations etc.  He has chronic back pain. Past Medical,Family, and Social History reviewed and does contribute to the patient presenting condition. Update HPI:  Emilie Perdomo is 79 y.o.,  male, undergoing for EGD.    Patient states he had previous endoscopies done 20 years ago. Patient denies any FH of Esophogeal Cancer. Pt has hx of GERD  and is taking  Protonix   Patient  states he notice since last summer every time after he eat or drink , soft or hard foods appear to get stuck in his esophagus,drinking fluids help some times. Pt also complain of nausea and sensation of vomiting, abdominal bloating. Pt complain of intermittent dull/ache epigastric pains. With diarrhea daily  2-4 time per day , with pale light color stool started several months ago . Pt denies fever/chills,chest pain or SOB. Review of additional significant medical hx:  (See chart for additional detail, including current medications /see ROS for current S/S):     CAD,  MI, HTN, HLD  Current medication r/t condition :   Toprol Xl, Lisinopril, Pravastatin , Zetia , Norvasc, ASA, Plavix     BP Readings from Last 3 Encounters:   02/15/23 123/71   02/08/23 123/71   01/12/23 136/88     NM Cardiac Stress Test Nuclear Imaging [[id number]]      Impression       1. Small apical reversible defect representing stress-induced ischemia. 2. Left ventricular chamber dilatation along with global hypokinesis. LVEF   34%. 3. The transient ischemic dilatation ratio is at borderline normal/elevated. 4. Risk stratification: High risk. The findings were sent to the Radiology Results [address] at 3:39   pm on 10/25/2022 to be communicated to a licensed caregiver. ECG 2022  Normal sinus rhythm  Normal ECG  When compared with ECG of 28-NOV-2018 11:33,  No significant change was found      DMII   Current medication r/t condition : Metformin   Hemoglobin A1C   Date Value Ref Range Status   01/28/2023 7.1 (H) 4.0 - 6.0 % Final     Sleep apnea  using BIPAP nightly     NPO status: pt Npo since the past midnight   Medications taken TODAY (with sip of water): none  Anticoagulation status: pt stopped taking ASA one week ago and Plavix last Friday. Denies personal hx of blood clots.   Denies personal hx of MRSA infection. Denies any personal or family hx of previous complications w/anesthesia.  Pt has PONV and prolonged emergence form general anesthesia     PAST MEDICAL HISTORY     Past Medical History:   Diagnosis Date    Abdominal hernia     Abnormal cardiovascular stress test 10/2022    Anesthesia     phrenic nerve damaged lt side    BPH (benign prostatic hyperplasia)     CAD (coronary artery disease)     Chronic back pain     Diabetes mellitus ([address]     Type 2    Difficult intubation     per pt throat collapses, lt side phrenic nerve was damaged    TINOCO (dyspnea on exertion) 09/18/2022    Drug-induced constipation 05/24/2022    Ear infection     LT, being treated    Essential hypertension 10/23/2017    Fatty liver     Gallstones     GERD (gastroesophageal reflux disease)     Headache(784.0)     Hematoma 05/25/2022    History of MI (myocardial infarction)     Hypercholesteremia     Hyperlipidemia     Hyperparathyroidism ([address]     Hypertension     Kidney stones     Osteoarthritis     all joints    Osteoarthritis     PONV (postoperative nausea and vomiting)     Primary osteoarthritis of both knees 09/13/2022    Formatting of this note might be different from the original. Added automatically from request for surgery [id number]    Prolonged emergence from general anesthesia     S/P cardiac catheterization 01/02/2019    Sleep apnea     BIPAP nightly    Type 2 diabetes mellitus with stage 3a chronic kidney disease, without long-term current use of insulin ([address] 09/15/2022       SURGICAL HISTORY       Past Surgical History:   Procedure Laterality Date    BACK SURGERY  07/11/2019    BICEPS TENDON REPAIR Left     CARDIAC CATHETERIZATION  10/28/2022    Mild nonobstructive CAD otherwise, unchanged from last angiogram in 2019  /  PATENT STENT IN DISTAL LAD    CARPAL TUNNEL RELEASE Right     CERVICAL FUSION      COLONOSCOPY  11/14/2014    normal    CORONARY ANGIOPLASTY WITH STENT PLACEMENT  01/02/2019    LAD: 40% proximal stenosis. 80% mid stenosis reduced to 0% using 2.5x15 and 2. 5x8 mini vision bare metal stent.  The distal LAD has 70% stenosis around the apex and will be treated medically    ELBOW FRACTURE SURGERY Right     right arm/fracture    ENDOSCOPY, COLON, DIAGNOSTIC      several times    FRACTURE SURGERY Right     elbow    KNEE ARTHROSCOPY Bilateral     LIVER BIOPSY  08/14/2015    moderate steatosis, grade 2, lobular and portal inflammation: Grade 1 total score =3, fibrosis score 1A    LUMBAR FUSION N/A 07/10/2019    LUMBAR LAMINECTOMY FUSION POSTERIOR - L3 SACRUM POSTERIOR LUMBAR DECOMPRESSION  & FUSION performed by Danya Mattson MD at [address] 05/18/2022    L2-3 POSTERIOR LUMBAR DECOMPRESSION & INSTRUMENTED FUSION POSSIBLE REVISION INSTRUMENTATION L3-5 performed by Danya Mattson MD at [address]  [facility]  11/07/2018    lumbar myelogram    ROTATOR CUFF REPAIR Right     SPINE SURGERY N/A 05/26/2022    IRRIGATION AND DEBRIDEMENT EPIDURAL HEMATOMA performed by Danya Mattson MD at [place] [address] Left 11/22/2022    Done in [place], Dr. Sobeida Treviño Right 11/08/2022    Done in [place], Dr. Strange Son History   Problem Relation Age of Onset    Heart Disease Mother     Cancer Father         bladder    Arthritis Sister     Cancer Paternal Aunt     Cancer Paternal Aunt        SOCIAL HISTORY       Social History     Socioeconomic History    Marital status:    Occupational History    Occupation: retired   Tobacco Use    Smoking status: Never    Smokeless tobacco: Never   Vaping Use    Vaping Use: Never used   Substance and Sexual Activity    Alcohol use: Never     Alcohol/week: 0.0 standard drinks    Drug use: Never     Social Determinants of Health     Financial Resource Strain: Low Risk     Difficulty of Paying Living Expenses: Not hard at all   Food Insecurity: No Food Insecurity    Worried About Running Out of Food in the Last Year: Never true    Ran Out of Food in the Last Year: Never true   Transportation Needs: No Transportation Needs    Lack of Transportation (Medical): No    Lack of Transportation (Non-Medical): No   Physical Activity: Inactive    Days of Exercise per Week: 0 days    Minutes of Exercise per Session: 0 min           REVIEW OF SYSTEMS      Allergies   Allergen Reactions    Pcn [Penicillins] Swelling     face    Morphine Other (See Comments)     Becomes very mean. Tolerates dilaudid and fentanyl    Adhesive Tape Rash    Codeine Nausea And Vomiting     Abd. pain    Tramadol Itching       No current facility-administered medications on file prior to encounter. Current Outpatient Medications on File Prior to Encounter   Medication Sig Dispense Refill    DICLOFENAC PO Take 75 mg by mouth 2 times daily      Ferrous Sulfate (FEROSUL PO) Take by mouth      Glucosamine-Chondroitin-MSM (TRIPLE FLEX) 750-400-375 MG TABS Take 2 tablets by mouth daily With food. NATURE MADE brand. Or BIOFLEX. 180 tablet 3    Collagen-Boron-Hyaluronic Acid (MOVE FREE ULTRA JOINT HEALTH) 40-5-3.3 MG TABS Take 1 tablet by mouth daily 90 tablet 0    coenzyme Q-10 100 MG capsule Take 1 capsule by mouth every evening 90 capsule 3    metoprolol succinate (TOPROL XL) 100 MG extended release tablet Take 1 tablet by mouth daily Dose increased 9/15/2022 .  Goal BP bellow 135/85 and above 115/65, heart rate 56 to 90 bpm 90 tablet 3    lisinopril (PRINIVIL;ZESTRIL) 10 MG tablet Take 1 tablet by mouth every evening 90 tablet 3    metFORMIN (GLUCOPHAGE) 1000 MG tablet Take 1 tablet by mouth 2 times daily (with meals) 180 tablet 3    pravastatin (PRAVACHOL) 40 MG tablet Take 1 tablet by mouth Daily with supper 90 tablet 3    ezetimibe (ZETIA) 10 MG tablet Take 1 tablet by mouth daily 90 tablet 3    amLODIPine (NORVASC) 5 MG tablet Take 5 mg by mouth daily      Handicap Placard MISC by Does not apply route Can't walk greater than 200 feet. Expires in 5 years. 1 each 0    acetaminophen (TYLENOL) 500 MG tablet Take 1 tablet by mouth every 6 hours as needed for Pain 120 tablet 3    camphor-menthol-methyl salicylate (BENGAY ULTRA STRENGTH) 4-10-30 % CREA cream Apply topically 3 times daily as needed for Pain (Patient not taking: Reported on 2/10/2023) 113 g 0    clopidogrel (PLAVIX) 75 MG tablet Take 1 tablet by mouth daily 90 tablet 3    cinacalcet (SENSIPAR) 30 MG tablet Take 1 tablet by mouth daily Per Dr. Chrystal Mcdowell (Patient taking differently: Take 30 mg by mouth every evening Per Dr. Chrystal Mcdowell) 90 tablet 0    pantoprazole (PROTONIX) 20 MG tablet Take 1 tablet by mouth every morning (before breakfast) ** stop Omeprazole due to interaction with plavix** 90 tablet 1    gabapentin (NEURONTIN) 300 MG capsule Take 600 mg by mouth 3 times daily. E-400 400 UNITS capsule TAKE TWO CAPSULES BY MOUTH DAILY 60 capsule 10    nortriptyline (PAMELOR) 50 MG capsule Take 50 mg by mouth 2 times daily          Review of Systems   HENT:  Positive for trouble swallowing. Eyes:  Positive for visual disturbance. Eye glasses    Respiratory:  Positive for apnea and cough. Negative for choking, shortness of breath and stridor. With sputum    Cardiovascular:  Positive for leg swelling. Gastrointestinal:  Positive for abdominal distention, abdominal pain, diarrhea and nausea. Negative for anal bleeding, blood in stool and constipation. Genitourinary: Negative. Musculoskeletal:  Positive for back pain and neck pain. Bilateral knee pain    Skin: Negative. Neurological: Negative. Hematological:  Bruises/bleeds easily. Psychiatric/Behavioral: Negative. GENERAL PHYSICAL EXAM     Vitals: see nursing flow sheet for vial signs     GENERAL APPEARANCE:   Burton Grimes is 79 y.o.,  male, mildly obese, nourished, conscious, alert. Does not appear to be distress or pain at this time.                             Physical Exam  Constitutional:       General: He is not in acute distress. Appearance: Normal appearance. He is obese. He is not ill-appearing. HENT:      Head: Normocephalic. Right Ear: External ear normal.      Left Ear: External ear normal.      Nose: Nose normal.      Mouth/Throat:      Mouth: Mucous membranes are dry. Eyes:      General:         Right eye: No discharge. Left eye: No discharge. Cardiovascular:      Rate and Rhythm: Normal rate and regular rhythm. Pulses: Normal pulses. Heart sounds: Normal heart sounds. No murmur heard. Pulmonary:      Effort: Pulmonary effort is normal.      Breath sounds: Normal breath sounds. No wheezing or rhonchi. Abdominal:      General: Bowel sounds are normal. There is distension. Palpations: Abdomen is soft. Tenderness: There is no abdominal tenderness. Comments: Obese abdomen   Musculoskeletal:         General: Normal range of motion. Cervical back: Normal range of motion and neck supple. Right lower leg: Edema present. Left lower leg: Edema present. Skin:     General: Skin is warm and dry. Findings: No bruising or erythema. Neurological:      General: No focal deficit present. Mental Status: He is alert and oriented to person, place, and time. Motor: No weakness.       Gait: Gait normal.   Psychiatric:         Mood and Affect: Mood normal.         Behavior: Behavior normal.                 PROVISIONAL DIAGNOSES / SURGERY:      ESOPHAGEAL DYSPHAGIA    EGD DILATATION     Patient Active Problem List    Diagnosis Date Noted    S/p total knee replacement, bilateral 01/15/2023    Esophageal dysphagia 01/15/2023    Arthritis of multiple sites 01/15/2023    Thickening of pleura 09/21/2022    Cardiomegaly 09/21/2022    Hyperparathyroidism ([address] 09/18/2022    Coronary artery disease involving native coronary artery of native heart without angina pectoris 09/18/2022    Benign hypertension with CKD (chronic kidney disease) stage III [address] 09/15/2022    Type 2 diabetes mellitus with stage 3a chronic kidney disease, without long-term current use of insulin ([facility] 09/15/2022    Spinal stenosis in cervical region 01/30/2020    Spondylolisthesis, lumbar region 01/30/2020    Foraminal stenosis of cervical region 01/30/2020    Cervical radiculopathy 01/30/2020    Cervical spondylosis with myelopathy 01/21/2020    Bilateral carpal tunnel syndrome 01/21/2020    Lumbar disc herniation 10/25/2018    Chronic low back pain 10/25/2018    Degenerative disc disease, lumbar     Lumbar spinal stenosis     Pars defect with spondylolisthesis     Osteoarthritis of multiple joints 06/06/2017    Osteoarthritis of lumbar spine     Lumbar radiculopathy, chronic     Steatosis of liver 09/01/2015    BPH (benign prostatic hyperplasia) 06/26/2015    Hyperlipidemia     Prostate disease     Primary osteoarthritis involving multiple joints     Headache     GERD (gastroesophageal reflux disease)     SHANT treated with BiPAP            SERA Gaytan CNP on 3/10/2023 at 9:03 AM

## 2023-03-13 ENCOUNTER — HOSPITAL ENCOUNTER (OUTPATIENT)
Age: 68
Discharge: HOME | End: 2023-03-13
Payer: MEDICARE

## 2023-03-13 DIAGNOSIS — K21.9 GASTROESOPHAGEAL REFLUX DISEASE WITHOUT ESOPHAGITIS: ICD-10-CM

## 2023-03-13 LAB
25(OH)D3 SERPL-MCNC: 22 NG/ML
CALCIUM SERPL-MCNC: 9.4 MG/DL (ref 8.6–10.4)
PTH-INTACT SERPL-MCNC: 22.5 PG/ML (ref 14–72)
SURGICAL PATHOLOGY REPORT: NORMAL

## 2023-03-13 PROCEDURE — 83970 ASSAY OF PARATHORMONE: CPT

## 2023-03-13 PROCEDURE — 82310 ASSAY OF CALCIUM: CPT

## 2023-03-13 PROCEDURE — 82306 VITAMIN D 25 HYDROXY: CPT

## 2023-03-13 PROCEDURE — 36415 COLL VENOUS BLD VENIPUNCTURE: CPT

## 2023-03-13 RX ORDER — PANTOPRAZOLE SODIUM 20 MG/1
20 TABLET, DELAYED RELEASE ORAL
Qty: 90 TABLET | Refills: 1 | Status: SHIPPED | OUTPATIENT
Start: 2023-03-13

## 2023-03-13 NOTE — TELEPHONE ENCOUNTER
Please Approve or Refuse.   Send to Pharmacy per Pt's Request:      Next Visit Date:  8/15/2023   Last Visit Date: 1/12/2023    Hemoglobin A1C (%)   Date Value   01/28/2023 7.1 (H)   01/12/2023 6.9   09/15/2022 6.8             ( goal A1C is < 7)   BP Readings from Last 3 Encounters:   03/10/23 106/67   02/15/23 123/71   02/08/23 123/71          (goal 120/80)  BUN   Date Value Ref Range Status   01/28/2023 13 8 - 23 mg/dL Final     Creatinine   Date Value Ref Range Status   01/28/2023 0.97 0.70 - 1.20 mg/dL Final     Potassium   Date Value Ref Range Status   01/28/2023 4.8 3.7 - 5.3 mmol/L Final

## 2023-03-14 NOTE — RESULT ENCOUNTER NOTE
Management per GI, stomach polyp, benign    Future Appointments  5/16/2023  8:30 AM    MD [facility]            [id number]  8/15/2023  8:30 AM    Chai Rae MD      [facility]

## 2023-04-14 ENCOUNTER — HOSPITAL ENCOUNTER (OUTPATIENT)
Dept: WOMENS IMAGING | Age: 68
Discharge: HOME | End: 2023-04-16
Payer: MEDICARE

## 2023-04-14 DIAGNOSIS — M85.80 OSTEOPENIA, UNSPECIFIED LOCATION: ICD-10-CM

## 2023-04-14 DIAGNOSIS — E83.52 HYPERCALCEMIA: ICD-10-CM

## 2023-04-14 DIAGNOSIS — E21.3 HYPERPARATHYROIDISM (HCC): ICD-10-CM

## 2023-04-14 DIAGNOSIS — M19.90 LOCALIZED OSTEOARTHROSIS UNCERTAIN IF PRIMARY OR SECONDARY: ICD-10-CM

## 2023-04-14 PROCEDURE — 77080 DXA BONE DENSITY AXIAL: CPT

## 2023-04-25 ENCOUNTER — HOSPITAL ENCOUNTER (OUTPATIENT)
Dept: NON INVASIVE DIAGNOSTICS | Age: 68
Discharge: HOME | End: 2023-04-25
Payer: MEDICARE

## 2023-04-25 DIAGNOSIS — Z98.61 S/P CORONARY ANGIOPLASTY: ICD-10-CM

## 2023-04-25 DIAGNOSIS — I25.10 ATHEROSCLEROSIS OF NATIVE CORONARY ARTERY, UNSPECIFIED WHETHER ANGINA PRESENT, UNSPECIFIED WHETHER NATIVE OR TRANSPLANTED HEART: ICD-10-CM

## 2023-04-25 DIAGNOSIS — I20.9 AP (ANGINA PECTORIS) (HCC): ICD-10-CM

## 2023-04-25 LAB
LV EF: 40 %
LVEF MODALITY: NORMAL

## 2023-04-25 PROCEDURE — 93306 TTE W/DOPPLER COMPLETE: CPT

## 2023-04-26 ENCOUNTER — OFFICE VISIT (OUTPATIENT)
Dept: GASTROENTEROLOGY | Age: 68
End: 2023-04-26
Payer: MEDICARE

## 2023-04-26 VITALS
DIASTOLIC BLOOD PRESSURE: 79 MMHG | WEIGHT: 221 LBS | SYSTOLIC BLOOD PRESSURE: 139 MMHG | HEIGHT: 68 IN | BODY MASS INDEX: 33.49 KG/M2

## 2023-04-26 DIAGNOSIS — R13.19 ESOPHAGEAL DYSPHAGIA: Primary | ICD-10-CM

## 2023-04-26 DIAGNOSIS — K31.7 FUNDIC GLAND POLYPOSIS OF STOMACH: ICD-10-CM

## 2023-04-26 PROCEDURE — 1123F ACP DISCUSS/DSCN MKR DOCD: CPT | Performed by: NURSE PRACTITIONER

## 2023-04-26 PROCEDURE — 99214 OFFICE O/P EST MOD 30 MIN: CPT | Performed by: NURSE PRACTITIONER

## 2023-04-26 PROCEDURE — 1036F TOBACCO NON-USER: CPT | Performed by: NURSE PRACTITIONER

## 2023-04-26 PROCEDURE — G8417 CALC BMI ABV UP PARAM F/U: HCPCS | Performed by: NURSE PRACTITIONER

## 2023-04-26 PROCEDURE — 3017F COLORECTAL CA SCREEN DOC REV: CPT | Performed by: NURSE PRACTITIONER

## 2023-04-26 PROCEDURE — G8427 DOCREV CUR MEDS BY ELIG CLIN: HCPCS | Performed by: NURSE PRACTITIONER

## 2023-04-26 ASSESSMENT — ENCOUNTER SYMPTOMS
ABDOMINAL PAIN: 1
TROUBLE SWALLOWING: 1
ALLERGIC/IMMUNOLOGIC NEGATIVE: 1
RESPIRATORY NEGATIVE: 1
NAUSEA: 1

## 2023-04-26 NOTE — PROGRESS NOTES
GI CLINIC FOLLOW UP    INTERVAL HISTORY:   No referring provider defined for this encounter. Chief Complaint   Patient presents with    Dysphagia     Pt here for egd f/u. Pt states he still is having trouble swallowing, states the little food he ate this morning feels stuck in his throat        HISTORY OF PRESENT ILLNESS:     Patient being seen for follow up EGD. Patient had EGD to evaluate dysphagia for solids. Small gastric polyps noted. Biopsies obtained and these were fundic gland polyps. Has probable esophageal motility issues. No esophageal stricture was seen. Biopsies from the esophagus negative for eosinophilic esophagitis. Patient reports intermittent early satiety, intermittent dysphagia with solids. Tolerates liquids. After further discussion patient does have history of phrenic nerve injury which may be contributing to his symptoms. Patient denies any regurgitation of foods. No s/sx of obstruction. Bolus passes with time and/or fluids. Weight is stable  Appetite is good. Bowel movements satisfactory. Past Medical,Family, and Social History reviewed and does contribute to the patient presentingcondition. Patient's PMH/PSH,SH,PSYCH Hx, MEDs, ALLERGIES, and ROS were all reviewed and updated in the appropriate sections.     PAST MEDICAL HISTORY:  Past Medical History:   Diagnosis Date    Abdominal hernia     Abnormal cardiovascular stress test 10/2022    Anesthesia     phrenic nerve damaged lt side    BPH (benign prostatic hyperplasia)     CAD (coronary artery disease)     Chronic back pain     Diabetes mellitus ([facility]     Type 2    Difficult intubation     per pt throat collapses, lt side phrenic nerve was damaged    TINOCO (dyspnea on exertion) 09/18/2022    Drug-induced constipation 05/24/2022    Ear infection     LT, being treated    Essential hypertension 10/23/2017    Fatty liver     Gallstones     GERD (gastroesophageal reflux disease)     Headache(784.0)     Hematoma

## 2023-05-10 ENCOUNTER — TELEPHONE (OUTPATIENT)
Dept: INFECTIOUS DISEASES | Age: 68
End: 2023-05-10

## 2023-05-10 NOTE — PLAN OF CARE
Patient: Leonel Lai                MRN: [id number]       SSN: [id number]  YOB: 1959        AGE: 61 y.o. SEX: female      PCP: Margi Castro DO  05/10/23    Chief Complaint   Patient presents with    Knee Pain     Right       HISTORY:  Leonel Lai is a 61 y.o. female who is seen for right knee pain. She responded well to a Euflexxa injection series for her knees completed on 11/09/2022, but her R knee pain has returned. She has been experiencing bilateral knee pain for the past  several years. She does not recall any injury. She feels pain with standing, walking. Her ambulation is quite limited due to her severe knee arthritis. She experiences startup pain after sitting. She has been followed in the past for knee pain by  Dr. Marquis Rodriguez. She presented for a second orthopedic opinion. She has a h/o breast cancer and is s/p mastectomy. Occupation, etc: Ms. Sakshi Rolle is retired on Social Security. She was a  for [employer] in [place]. She is not very active. She is a minimal household ambulator. She lives in [place] with her  and 13year old grandson over whom she  has guardianship. She has one son. Ms. Sakshi Rolle weighs 233 lbs and is 4'11\" tall. She is a breast cancer survivor and no longer on chemotherapy or radiation. Wt Readings from Last 3 Encounters:   05/10/23 232 lb (105.2 kg)   01/11/23 232 lb (105.2 kg)   11/17/22 231 lb (104.8 kg)      Body mass index is 46.86 kg/m². There is no problem list on file for this patient.       Social History     Tobacco Use    Smoking status: Never    Smokeless tobacco: Never   Substance Use Topics    Alcohol use: Never    Drug use: Never        Allergies   Allergen Reactions    Penicillin G Hives and Rash        Current Outpatient Medications   Medication Sig    butalbital-APAP-caffeine -40 MG CAPS per capsule     lactulose (CHRONULAC) 10 GM/15ML solution Take by mouth Problem: Discharge Planning  Goal: Discharge to home or other facility with appropriate resources  Outcome: Progressing  Flowsheets (Taken 5/20/2022 0800)  Discharge to home or other facility with appropriate resources: Identify barriers to discharge with patient and caregiver     Problem: Pain  Goal: Verbalizes/displays adequate comfort level or baseline comfort level  Outcome: Progressing  Note: Pt tolerating pain and calls out when in need. Problem: Safety - Adult  Goal: Free from fall injury  Outcome: Progressing  Flowsheets (Taken 5/20/2022 0052 by Irish Crigler, RN)  Free From Fall Injury: Instruct family/caregiver on patient safety  Note: Christina Budd in reach. Bed alarm on. Pt aware to call out if need to get out of bed. Problem: Skin/Tissue Integrity  Goal: Absence of new skin breakdown  Description: 1. Monitor for areas of redness and/or skin breakdown  2. Assess vascular access sites hourly  3. Every 4-6 hours minimum:  Change oxygen saturation probe site  4. Every 4-6 hours:  If on nasal continuous positive airway pressure, respiratory therapy assess nares and determine need for appliance change or resting period. Outcome: Progressing  Note: No signs of skin integrity.  Pt turns self     Problem: ABCDS Injury Assessment  Goal: Absence of physical injury  Outcome: Progressing  Flowsheets  Taken 5/20/2022 1431 by Warren Painter RN  Absence of Physical Injury: Implement safety measures based on patient assessment  Taken 5/20/2022 0052 by Irish Crigler, RN  Absence of Physical Injury: Implement safety measures based on patient assessment     Problem: Chronic Conditions and Co-morbidities  Goal: Patient's chronic conditions and co-morbidity symptoms are monitored and maintained or improved  Outcome: Progressing  Flowsheets (Taken 5/20/2022 0800)  Care Plan - Patient's Chronic Conditions and Co-Morbidity Symptoms are Monitored and Maintained or Improved: Monitor and assess patient's chronic conditions and comorbid symptoms for stability, deterioration, or improvement     Problem: Skin/Tissue Integrity - Adult  Goal: Incisions, wounds, or drain sites healing without S/S of infection  Outcome: Progressing  Flowsheets  Taken 5/20/2022 0800 by Loreto Baird RN  Incisions, Wounds, or Drain Sites Healing Without Sign and Symptoms of Infection: TWICE DAILY: Assess and document skin integrity  Taken 5/20/2022 0053 by Lucy Merrill RN  Incisions, Wounds, or Drain Sites Healing Without Sign and Symptoms of Infection:   TWICE DAILY: Assess and document skin integrity   TWICE DAILY: Assess and document dressing/incision, wound bed, drain sites and surrounding tissue     Problem: Musculoskeletal - Adult  Goal: Return mobility to safest level of function  Outcome: Progressing  Flowsheets (Taken 5/20/2022 0800)  Return Mobility to Safest Level of Function: Assess patient stability and activity tolerance for standing, transferring and ambulating with or without assistive devices  Goal: Maintain proper alignment of affected body part  Outcome: Progressing  Flowsheets (Taken 5/20/2022 0800)  Maintain proper alignment of affected body part: Support and protect limb and body alignment per provider's orders  Goal: Return ADL status to a safe level of function  Outcome: Progressing  Flowsheets (Taken 5/20/2022 0800)  Return ADL Status to a Safe Level of Function: Administer medication as ordered     Problem: Gastrointestinal - Adult  Goal: Maintains or returns to baseline bowel function  Outcome: Progressing  Flowsheets (Taken 5/20/2022 0800)  Maintains or returns to baseline bowel function: Assess bowel function     Problem: Infection - Adult  Goal: Absence of infection during hospitalization  Outcome: Progressing  Flowsheets (Taken 5/20/2022 0800)  Absence of infection during hospitalization: Assess and monitor for signs and symptoms of infection

## 2023-05-11 ENCOUNTER — OFFICE VISIT (OUTPATIENT)
Dept: PULMONOLOGY | Age: 68
End: 2023-05-11

## 2023-05-11 VITALS
HEIGHT: 68 IN | OXYGEN SATURATION: 96 % | BODY MASS INDEX: 33.49 KG/M2 | SYSTOLIC BLOOD PRESSURE: 156 MMHG | WEIGHT: 221 LBS | HEART RATE: 90 BPM | DIASTOLIC BLOOD PRESSURE: 95 MMHG

## 2023-05-11 DIAGNOSIS — I25.10 CORONARY ARTERY DISEASE INVOLVING NATIVE CORONARY ARTERY OF NATIVE HEART WITHOUT ANGINA PECTORIS: ICD-10-CM

## 2023-05-11 DIAGNOSIS — I12.9 BENIGN HYPERTENSION WITH CKD (CHRONIC KIDNEY DISEASE) STAGE III (HCC): ICD-10-CM

## 2023-05-11 DIAGNOSIS — G47.33 OSA TREATED WITH BIPAP: Primary | ICD-10-CM

## 2023-05-11 DIAGNOSIS — N18.30 BENIGN HYPERTENSION WITH CKD (CHRONIC KIDNEY DISEASE) STAGE III (HCC): ICD-10-CM

## 2023-05-11 RX ORDER — GABAPENTIN 600 MG/1
600 TABLET ORAL 3 TIMES DAILY
COMMUNITY
Start: 2023-04-04

## 2023-05-11 RX ORDER — ERGOCALCIFEROL 1.25 MG/1
50000 CAPSULE ORAL WEEKLY
COMMUNITY
Start: 2023-03-20

## 2023-05-11 RX ORDER — LOSARTAN POTASSIUM 25 MG/1
25 TABLET ORAL DAILY
COMMUNITY
Start: 2023-04-10

## 2023-05-11 ASSESSMENT — SLEEP AND FATIGUE QUESTIONNAIRES
HOW LIKELY ARE YOU TO NOD OFF OR FALL ASLEEP WHILE SITTING INACTIVE IN A PUBLIC PLACE: 0
HOW LIKELY ARE YOU TO NOD OFF OR FALL ASLEEP WHILE SITTING QUIETLY AFTER LUNCH WITHOUT ALCOHOL: 0
HOW LIKELY ARE YOU TO NOD OFF OR FALL ASLEEP WHILE WATCHING TV: 0
HOW LIKELY ARE YOU TO NOD OFF OR FALL ASLEEP IN A CAR, WHILE STOPPED FOR A FEW MINUTES IN TRAFFIC: 0
HOW LIKELY ARE YOU TO NOD OFF OR FALL ASLEEP WHILE SITTING AND READING: 0
HOW LIKELY ARE YOU TO NOD OFF OR FALL ASLEEP WHILE SITTING AND TALKING TO SOMEONE: 0
HOW LIKELY ARE YOU TO NOD OFF OR FALL ASLEEP WHILE LYING DOWN TO REST IN THE AFTERNOON WHEN CIRCUMSTANCES PERMIT: 0
ESS TOTAL SCORE: 0
HOW LIKELY ARE YOU TO NOD OFF OR FALL ASLEEP WHEN YOU ARE A PASSENGER IN A CAR FOR AN HOUR WITHOUT A BREAK: 0

## 2023-05-11 NOTE — PROGRESS NOTES
REASON FOR THE CONSULTATION:  Obstructive sleep apnea syndrome  Obesity  Hypertension   Diabetes  Coronary artery disease    HISTORY OF PRESENT ILLNESS:    Michelle Mustafa has obstructive sleep apnea syndrome that is responding very well to the use of CPAP that he uses regularly every night 6 to 8 hours. He does not feel sleepy tired fatigue during the daytime. Patient did not complete the [place] Sleepiness Scale. His weight has been stable. He however is continually obese. He has been trying to lose weight. He is somewhat hard of hearing also not noted any pedal edema or thromboembolic process. He has systemic hypertension which is under good control no angina no PND    He also has diabetes which is also well controlled. He has history of coronary artery disease no angina no PND he had 2 stents placed in the past.  He recently had a cardiac cath repeated which revealed open stents. No heart failure no stroke no thyroid dysfunction no peptic ulcer disease. He denies any effort dyspnea. 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 Already had COVID-vaccine flu vaccine Pneumovax.     LUNG CANCER SCREENING     CRITERIA MET    []     CT ORDERED  []      CRITERIA NOT MET   [x]      REFUSED                    []        REASON CRITERIA NOT MET     SMOKING LESS THAN 30 PY  []      AGE LESS THAN 55 or GREATER 77 YEARS  []      QUIT SMOKING 15 YEARS OR GREATER   []      RECENT CT WITH IN 11 MONTHS    []      LIFE EXPECTANCY < 5 YEARS   []      SIGNS  AND SYMPTOMS OF LUNG CANCER   []         Immunization   Immunization History   Administered Date(s) Administered    COVID-19, MODERNA BLUE border, Primary or Immunocompromised, (age 12y+), IM, 100 mcg/0.5mL 02/19/2021, 03/19/2021, 11/02/2021    COVID-19, MODERNA Bivalent BOOSTER, (age 12y+), IM, 48 mcg/0.5 mL 09/13/2022    COVID-19, US Vaccine, Vaccine Unspecified 02/19/2021, 03/19/2021, 11/02/2021

## 2023-05-15 ENCOUNTER — HOSPITAL ENCOUNTER (OUTPATIENT)
Age: 68
Discharge: HOME | End: 2023-05-15
Payer: MEDICARE

## 2023-05-15 LAB
25(OH)D3 SERPL-MCNC: 27.2 NG/ML
CALCIUM SERPL-MCNC: 10 MG/DL (ref 8.6–10.4)
CREAT SERPL-MCNC: 1.14 MG/DL (ref 0.7–1.2)
GFR SERPL CREATININE-BSD FRML MDRD: >60 ML/MIN/1.73M2
PTH-INTACT SERPL-MCNC: 108.6 PG/ML (ref 14–72)

## 2023-05-15 PROCEDURE — 36415 COLL VENOUS BLD VENIPUNCTURE: CPT

## 2023-05-15 PROCEDURE — 82565 ASSAY OF CREATININE: CPT

## 2023-05-15 PROCEDURE — 82306 VITAMIN D 25 HYDROXY: CPT

## 2023-05-15 PROCEDURE — 82310 ASSAY OF CALCIUM: CPT

## 2023-05-15 PROCEDURE — 83970 ASSAY OF PARATHORMONE: CPT

## 2023-05-16 ENCOUNTER — OFFICE VISIT (OUTPATIENT)
Dept: ORTHOPEDIC SURGERY | Age: 68
End: 2023-05-16
Payer: MEDICARE

## 2023-05-16 VITALS — BODY MASS INDEX: 33.49 KG/M2 | HEIGHT: 68 IN | WEIGHT: 221 LBS | RESPIRATION RATE: 12 BRPM

## 2023-05-16 DIAGNOSIS — M51.26 LUMBAR DISC HERNIATION: Primary | ICD-10-CM

## 2023-05-16 DIAGNOSIS — M48.061 SPINAL STENOSIS OF LUMBAR REGION, UNSPECIFIED WHETHER NEUROGENIC CLAUDICATION PRESENT: ICD-10-CM

## 2023-05-16 PROCEDURE — 3017F COLORECTAL CA SCREEN DOC REV: CPT | Performed by: ORTHOPAEDIC SURGERY

## 2023-05-16 PROCEDURE — 99213 OFFICE O/P EST LOW 20 MIN: CPT | Performed by: ORTHOPAEDIC SURGERY

## 2023-05-16 PROCEDURE — G8427 DOCREV CUR MEDS BY ELIG CLIN: HCPCS | Performed by: ORTHOPAEDIC SURGERY

## 2023-05-16 PROCEDURE — 1123F ACP DISCUSS/DSCN MKR DOCD: CPT | Performed by: ORTHOPAEDIC SURGERY

## 2023-05-16 PROCEDURE — G8417 CALC BMI ABV UP PARAM F/U: HCPCS | Performed by: ORTHOPAEDIC SURGERY

## 2023-05-16 PROCEDURE — 1036F TOBACCO NON-USER: CPT | Performed by: ORTHOPAEDIC SURGERY

## 2023-05-16 NOTE — PROGRESS NOTES
05/26/2022    IRRIGATION AND DEBRIDEMENT EPIDURAL HEMATOMA performed by Karl Caal MD at [address] Left 11/22/2022    Done in [address], Dr. Chasity Olguin Right 11/08/2022    Done in [address], Dr. Margarette Cisneros N/A 3/10/2023    EGD BIOPSY AND DILATATION ([address]) performed by Morales Ryan MD at [facility]     Family History   Problem Relation Age of Onset    Heart Disease Mother     Cancer Father         bladder    Arthritis Sister     Cancer Paternal Aunt     Cancer Paternal Aunt         Physical Exam:  Vitals signs and nursing note reviewed. Constitutional:       Appearance: well-developed. HENT:      Head: Normocephalic and atraumatic. Nose: Nose normal.   Eyes:      Conjunctiva/sclera: Conjunctivae normal.   Neck:      Musculoskeletal: Normal range of motion and neck supple. Pulmonary:      Effort: Pulmonary effort is normal. No respiratory distress. Musculoskeletal:      Comments: Normal gait     Skin:     General: Skin is warm and dry. Neurological:      Mental Status: Alert and oriented to person, place, and time. Sensory: No sensory deficit. Psychiatric:         Behavior: Behavior normal.         Thought Content: Thought content normal.        Provider Attestation:  Torri Covarrubias, personally performed the services described in this documentation. All medical record entries made by the scribe were at my direction and in my presence. I have reviewed the chart and discharge instructions and agree that the records reflect my personal performance and is accurate and complete. Randal Salas MD 5/16/23       Scribe Attestation:  By signing my name below, Cara Massey attest that this documentation has been prepared under the direction and in the presence of Dr. Duyen Villagomez.  Electronically signed: Rene Lion, 5/16/23     Please note that this chart was generated using

## 2023-05-18 ENCOUNTER — HOSPITAL ENCOUNTER (OUTPATIENT)
Age: 68
Setting detail: SPECIMEN
Discharge: HOME | End: 2023-05-18
Payer: MEDICARE

## 2023-05-18 LAB
CALCIUM UR-MCNC: 18.5 MG/DL
CALCIUM, URINE: 315 MG/24 H (ref 25–300)
COLLECT DURATION TIME SPEC: 24 H
SPECIMEN VOL UR: 1700 ML

## 2023-05-18 PROCEDURE — 82340 ASSAY OF CALCIUM IN URINE: CPT

## 2023-05-18 PROCEDURE — 81050 URINALYSIS VOLUME MEASURE: CPT

## 2023-07-18 ENCOUNTER — HOSPITAL ENCOUNTER (OUTPATIENT)
Age: 68
Discharge: HOME | End: 2023-07-18
Payer: MEDICARE

## 2023-07-18 LAB
25(OH)D3 SERPL-MCNC: 26.5 NG/ML
CALCIUM SERPL-MCNC: 9.7 MG/DL (ref 8.6–10.4)
PTH-INTACT SERPL-MCNC: 49 PG/ML (ref 14–72)

## 2023-07-18 PROCEDURE — 83970 ASSAY OF PARATHORMONE: CPT

## 2023-07-18 PROCEDURE — 82306 VITAMIN D 25 HYDROXY: CPT

## 2023-07-18 PROCEDURE — 36415 COLL VENOUS BLD VENIPUNCTURE: CPT

## 2023-07-18 PROCEDURE — 82310 ASSAY OF CALCIUM: CPT

## 2023-08-15 ENCOUNTER — TELEPHONE (OUTPATIENT)
Dept: PHARMACY | Age: 68
End: 2023-08-15

## 2023-08-15 ENCOUNTER — OFFICE VISIT (OUTPATIENT)
Dept: FAMILY MEDICINE CLINIC | Age: 68
End: 2023-08-15
Payer: MEDICARE

## 2023-08-15 VITALS
HEIGHT: 68 IN | OXYGEN SATURATION: 96 % | HEART RATE: 80 BPM | SYSTOLIC BLOOD PRESSURE: 132 MMHG | WEIGHT: 231 LBS | BODY MASS INDEX: 35.01 KG/M2 | TEMPERATURE: 97.1 F | DIASTOLIC BLOOD PRESSURE: 82 MMHG

## 2023-08-15 DIAGNOSIS — E66.01 SEVERE OBESITY (BMI 35.0-39.9) WITH COMORBIDITY (HCC): ICD-10-CM

## 2023-08-15 DIAGNOSIS — L98.9 SKIN LESION OF SCALP: ICD-10-CM

## 2023-08-15 DIAGNOSIS — R60.0 BILATERAL LEG EDEMA: ICD-10-CM

## 2023-08-15 DIAGNOSIS — E11.22 TYPE 2 DIABETES MELLITUS WITH STAGE 3A CHRONIC KIDNEY DISEASE, WITHOUT LONG-TERM CURRENT USE OF INSULIN (HCC): ICD-10-CM

## 2023-08-15 DIAGNOSIS — N18.30 BENIGN HYPERTENSION WITH CKD (CHRONIC KIDNEY DISEASE) STAGE III (HCC): ICD-10-CM

## 2023-08-15 DIAGNOSIS — I12.9 BENIGN HYPERTENSION WITH CKD (CHRONIC KIDNEY DISEASE) STAGE III (HCC): ICD-10-CM

## 2023-08-15 DIAGNOSIS — B02.29 POST HERPETIC NEURALGIA: ICD-10-CM

## 2023-08-15 DIAGNOSIS — H60.392 OTHER INFECTIVE ACUTE OTITIS EXTERNA OF LEFT EAR: ICD-10-CM

## 2023-08-15 DIAGNOSIS — H91.93 BILATERAL HEARING LOSS, UNSPECIFIED HEARING LOSS TYPE: ICD-10-CM

## 2023-08-15 DIAGNOSIS — Z23 ENCOUNTER FOR IMMUNIZATION: ICD-10-CM

## 2023-08-15 DIAGNOSIS — F33.0 MILD EPISODE OF RECURRENT MAJOR DEPRESSIVE DISORDER (HCC): ICD-10-CM

## 2023-08-15 DIAGNOSIS — E78.2 MIXED HYPERLIPIDEMIA: ICD-10-CM

## 2023-08-15 DIAGNOSIS — N18.31 TYPE 2 DIABETES MELLITUS WITH STAGE 3A CHRONIC KIDNEY DISEASE, WITHOUT LONG-TERM CURRENT USE OF INSULIN (HCC): ICD-10-CM

## 2023-08-15 DIAGNOSIS — Z00.00 MEDICARE ANNUAL WELLNESS VISIT, SUBSEQUENT: Primary | ICD-10-CM

## 2023-08-15 PROBLEM — K80.20 CALCULUS OF GALLBLADDER WITHOUT CHOLECYSTITIS WITHOUT OBSTRUCTION: Status: ACTIVE | Noted: 2022-05-20

## 2023-08-15 PROBLEM — I25.2 OLD MYOCARDIAL INFARCTION: Status: ACTIVE | Noted: 2022-05-20

## 2023-08-15 PROBLEM — E78.00 PURE HYPERCHOLESTEROLEMIA, UNSPECIFIED: Status: ACTIVE | Noted: 2022-05-20

## 2023-08-15 PROBLEM — N20.0 CALCULUS OF KIDNEY: Status: ACTIVE | Noted: 2022-05-20

## 2023-08-15 PROBLEM — R26.2 DIFFICULTY IN WALKING, NOT ELSEWHERE CLASSIFIED: Status: ACTIVE | Noted: 2022-05-20

## 2023-08-15 PROBLEM — R53.1 WEAKNESS: Status: ACTIVE | Noted: 2022-05-20

## 2023-08-15 PROBLEM — Z99.89 DEPENDENCE ON OTHER ENABLING MACHINES AND DEVICES: Status: ACTIVE | Noted: 2022-05-20

## 2023-08-15 PROBLEM — R29.6 REPEATED FALLS: Status: ACTIVE | Noted: 2022-05-20

## 2023-08-15 PROBLEM — F33.9 MAJOR DEPRESSIVE DISORDER, RECURRENT, UNSPECIFIED (HCC): Status: ACTIVE | Noted: 2019-07-26

## 2023-08-15 LAB — HBA1C MFR BLD: 8.9 %

## 2023-08-15 PROCEDURE — 2022F DILAT RTA XM EVC RTNOPTHY: CPT | Performed by: FAMILY MEDICINE

## 2023-08-15 PROCEDURE — G0439 PPPS, SUBSEQ VISIT: HCPCS | Performed by: FAMILY MEDICINE

## 2023-08-15 PROCEDURE — 3052F HG A1C>EQUAL 8.0%<EQUAL 9.0%: CPT | Performed by: FAMILY MEDICINE

## 2023-08-15 PROCEDURE — 1123F ACP DISCUSS/DSCN MKR DOCD: CPT | Performed by: FAMILY MEDICINE

## 2023-08-15 PROCEDURE — 4130F TOPICAL PREP RX AOE: CPT | Performed by: FAMILY MEDICINE

## 2023-08-15 PROCEDURE — 99214 OFFICE O/P EST MOD 30 MIN: CPT | Performed by: FAMILY MEDICINE

## 2023-08-15 PROCEDURE — 83037 HB GLYCOSYLATED A1C HOME DEV: CPT | Performed by: FAMILY MEDICINE

## 2023-08-15 PROCEDURE — 3017F COLORECTAL CA SCREEN DOC REV: CPT | Performed by: FAMILY MEDICINE

## 2023-08-15 PROCEDURE — G8417 CALC BMI ABV UP PARAM F/U: HCPCS | Performed by: FAMILY MEDICINE

## 2023-08-15 PROCEDURE — G8427 DOCREV CUR MEDS BY ELIG CLIN: HCPCS | Performed by: FAMILY MEDICINE

## 2023-08-15 PROCEDURE — 1036F TOBACCO NON-USER: CPT | Performed by: FAMILY MEDICINE

## 2023-08-15 RX ORDER — CINACALCET 90 MG/1
90 TABLET, FILM COATED ORAL DAILY
COMMUNITY
Start: 2023-08-03

## 2023-08-15 RX ORDER — GLUCOSAMINE HCL/CHONDROITIN SU 500-400 MG
CAPSULE ORAL
Qty: 100 STRIP | Refills: 3 | Status: SHIPPED | OUTPATIENT
Start: 2023-08-15

## 2023-08-15 RX ORDER — CIPROFLOXACIN AND DEXAMETHASONE 3; 1 MG/ML; MG/ML
4 SUSPENSION/ DROPS AURICULAR (OTIC) 2 TIMES DAILY
Qty: 7.5 ML | Refills: 0 | Status: SHIPPED | OUTPATIENT
Start: 2023-08-15

## 2023-08-15 RX ORDER — PNEUMOCOCCAL VACCINE POLYVALENT 25; 25; 25; 25; 25; 25; 25; 25; 25; 25; 25; 25; 25; 25; 25; 25; 25; 25; 25; 25; 25; 25; 25 UG/.5ML; UG/.5ML; UG/.5ML; UG/.5ML; UG/.5ML; UG/.5ML; UG/.5ML; UG/.5ML; UG/.5ML; UG/.5ML; UG/.5ML; UG/.5ML; UG/.5ML; UG/.5ML; UG/.5ML; UG/.5ML; UG/.5ML; UG/.5ML; UG/.5ML; UG/.5ML; UG/.5ML; UG/.5ML; UG/.5ML
0.5 INJECTION, SOLUTION INTRAMUSCULAR; SUBCUTANEOUS ONCE
Qty: 0.5 ML | Refills: 0 | Status: SHIPPED | OUTPATIENT
Start: 2023-08-15 | End: 2023-08-15

## 2023-08-15 RX ORDER — LIDOCAINE 40 MG/G
CREAM TOPICAL
Qty: 120 G | Refills: 3 | Status: SHIPPED | OUTPATIENT
Start: 2023-08-15

## 2023-08-15 RX ORDER — BLOOD-GLUCOSE METER
KIT MISCELLANEOUS
Qty: 1 KIT | Refills: 0 | Status: SHIPPED | OUTPATIENT
Start: 2023-08-15

## 2023-08-15 RX ORDER — DICLOFENAC SODIUM 75 MG/1
75 TABLET, DELAYED RELEASE ORAL
COMMUNITY
Start: 2023-06-05

## 2023-08-15 RX ORDER — FUROSEMIDE 20 MG/1
20 TABLET ORAL DAILY
Qty: 90 TABLET | Refills: 3 | Status: SHIPPED | OUTPATIENT
Start: 2023-08-15

## 2023-08-15 SDOH — ECONOMIC STABILITY: FOOD INSECURITY: WITHIN THE PAST 12 MONTHS, YOU WORRIED THAT YOUR FOOD WOULD RUN OUT BEFORE YOU GOT MONEY TO BUY MORE.: NEVER TRUE

## 2023-08-15 SDOH — ECONOMIC STABILITY: INCOME INSECURITY: HOW HARD IS IT FOR YOU TO PAY FOR THE VERY BASICS LIKE FOOD, HOUSING, MEDICAL CARE, AND HEATING?: NOT HARD AT ALL

## 2023-08-15 SDOH — ECONOMIC STABILITY: HOUSING INSECURITY
IN THE LAST 12 MONTHS, WAS THERE A TIME WHEN YOU DID NOT HAVE A STEADY PLACE TO SLEEP OR SLEPT IN A SHELTER (INCLUDING NOW)?: NO

## 2023-08-15 SDOH — ECONOMIC STABILITY: FOOD INSECURITY: WITHIN THE PAST 12 MONTHS, THE FOOD YOU BOUGHT JUST DIDN'T LAST AND YOU DIDN'T HAVE MONEY TO GET MORE.: NEVER TRUE

## 2023-08-15 ASSESSMENT — ENCOUNTER SYMPTOMS
CHEST TIGHTNESS: 0
BACK PAIN: 1
DIARRHEA: 0
VOMITING: 0
WHEEZING: 0
SHORTNESS OF BREATH: 0
NAUSEA: 0
CONSTIPATION: 0
COUGH: 0
ABDOMINAL DISTENTION: 0
ABDOMINAL PAIN: 0

## 2023-08-15 ASSESSMENT — PATIENT HEALTH QUESTIONNAIRE - PHQ9
9. THOUGHTS THAT YOU WOULD BE BETTER OFF DEAD, OR OF HURTING YOURSELF: 0
SUM OF ALL RESPONSES TO PHQ QUESTIONS 1-9: 5
8. MOVING OR SPEAKING SO SLOWLY THAT OTHER PEOPLE COULD HAVE NOTICED. OR THE OPPOSITE, BEING SO FIGETY OR RESTLESS THAT YOU HAVE BEEN MOVING AROUND A LOT MORE THAN USUAL: 0
3. TROUBLE FALLING OR STAYING ASLEEP: 0
10. IF YOU CHECKED OFF ANY PROBLEMS, HOW DIFFICULT HAVE THESE PROBLEMS MADE IT FOR YOU TO DO YOUR WORK, TAKE CARE OF THINGS AT HOME, OR GET ALONG WITH OTHER PEOPLE: 1
7. TROUBLE CONCENTRATING ON THINGS, SUCH AS READING THE NEWSPAPER OR WATCHING TELEVISION: 0
2. FEELING DOWN, DEPRESSED OR HOPELESS: 0
SUM OF ALL RESPONSES TO PHQ9 QUESTIONS 1 & 2: 2
4. FEELING TIRED OR HAVING LITTLE ENERGY: 3
6. FEELING BAD ABOUT YOURSELF - OR THAT YOU ARE A FAILURE OR HAVE LET YOURSELF OR YOUR FAMILY DOWN: 0
1. LITTLE INTEREST OR PLEASURE IN DOING THINGS: 2
5. POOR APPETITE OR OVEREATING: 0
SUM OF ALL RESPONSES TO PHQ QUESTIONS 1-9: 5

## 2023-08-15 ASSESSMENT — LIFESTYLE VARIABLES
HOW MANY STANDARD DRINKS CONTAINING ALCOHOL DO YOU HAVE ON A TYPICAL DAY: PATIENT DOES NOT DRINK
HOW OFTEN DO YOU HAVE A DRINK CONTAINING ALCOHOL: NEVER

## 2023-08-15 ASSESSMENT — VISUAL ACUITY
OS_CC: 20/25
OD_CC: 20/25

## 2023-08-15 NOTE — RESULT ENCOUNTER NOTE
Addressed during office visit today, hemoglobin A1c 8.9, worsening diabetes, continue treatment recommended during the office visit.

## 2023-08-15 NOTE — PROGRESS NOTES
Medicare Annual Wellness Visit    Gee Savage is here for Medicare AWV, Diabetes (Dm eye exam/DONE REQUESTING RESULTS), Hypertension, and Obesity    Assessment & Plan   Medicare annual wellness visit, subsequent  Type 2 diabetes mellitus with stage 3a chronic kidney disease, without long-term current use of insulin ([address])  -      DIABETES FOOT EXAM  -     POCT glycosylated hemoglobin (Hb A1C)  -     Microalbumin / Creatinine Urine Ratio; Future  -     glucose monitoring (FREESTYLE FREEDOM) kit; Disp-1 kit, R-0, NormalPlease supply Patient with a glucose monitoring kit that insurance will cover. -     blood glucose monitor strips; Testing once a day. Please dispense according to patients insurance., Disp-100 strip, R-3, Normal  -     Lancets 30G MISC; Disp-100 each, R-3, NormalTesting once a day. Please dispense according to patients insurance. -     dapagliflozin (FARXIGA) 10 MG tablet; Take 1 tablet by mouth every morning, Disp-30 tablet, R-5Normal  -     [facility] Medication Mgmt (Comprehensive Med Review - Clinical Pharmacy) - [place]  -     CBC; Future  -     Comprehensive Metabolic Panel; Future  -     Vitamin B12 & Folate; Future  -     IN OFFICE OUTPATIENT VISIT 25 MINUTES [10034]  Benign hypertension with CKD (chronic kidney disease) stage III (HCC)  -     CBC; Future  -     Comprehensive Metabolic Panel; Future  -     Magnesium; Future  -     Phosphorus; Future  -     Uric Acid; Future  -     furosemide (LASIX) 20 MG tablet; Take 1 tablet by mouth daily, Disp-90 tablet, R-3Normal  -     IN OFFICE OUTPATIENT VISIT 25 MINUTES [48376]  Bilateral leg edema  -     IN OFFICE OUTPATIENT VISIT 25 MINUTES [73062]   furosemide (LASIX) 20 MG tablet; Take 1 tablet by mouth daily, Disp-90 tablet, R-3Normal  Mixed hyperlipidemia  -     Lipid Panel;  Future  -     IN OFFICE OUTPATIENT VISIT 25 MINUTES [45186]  Mild episode of recurrent major depressive disorder (HCC)  -     IN OFFICE OUTPATIENT VISIT 25

## 2023-08-15 NOTE — PROGRESS NOTES
Visit Information    Have you changed or started any medications since your last visit including any over-the-counter medicines, vitamins, or herbal medicines? yes   Have you stopped taking any of your medications? Is so, why? -  yes -   Are you having any side effects from any of your medications? - yes    Have you seen any other physician or provider since your last visit? yes -    Have you had any other diagnostic tests since your last visit? Have you been seen in the emergency room and/or had an admission in a hospital since we last saw you?  yes -    Have you had your routine dental cleaning in the past 6 months?   yes    Do you have an active MyChart account? If no, what is the barrier?    no    Patient Care Team:  Tamiko Zhang MD as PCP - General (Family Medicine)  [facility]  Billy Smith MD as Consulting Physician (Gastroenterology)  Michel Martínez MD as Surgeon (Cardiology)  Alana Hearn MD as Consulting Physician (Endocrinology)  Miranda Acosta MD as Surgeon (Orthopedic Surgery)  Marti Torres MD as Consulting Physician (Rheumatology)  Billy Smith MD as Consulting Physician (Gastroenterology)  Nura Lopez MD as Consulting Physician (Urology)  James Rae MD as Consulting Physician (Neurology)  Roddy Lyn as Consulting Physician (Orthopedic Surgery)  Lara Whitehead DPM as Surgeon (Podiatry)  Jeovany Rodriguez MD as Consulting Physician (Pulmonary Disease)    Medical History Review  Past Medical, Family, and Social History reviewed and does contribute to the patient presenting condition    Health Maintenance   Topic Date Due    Diabetic retinal exam  Never done    DTaP/Tdap/Td vaccine (1 - Tdap) Never done    Diabetic foot exam  04/05/2023    Lipids  04/05/2023    Flu vaccine (1) 08/01/2023    Pneumococcal 65+ years Vaccine (3 - PPSV23 if available, else PCV20) 08/05/2023    Diabetic Alb to Cr ratio

## 2023-08-16 ENCOUNTER — HOSPITAL ENCOUNTER (OUTPATIENT)
Age: 68
Discharge: HOME | End: 2023-08-16
Payer: MEDICARE

## 2023-08-16 DIAGNOSIS — E78.2 MIXED HYPERLIPIDEMIA: ICD-10-CM

## 2023-08-16 DIAGNOSIS — N18.31 TYPE 2 DIABETES MELLITUS WITH STAGE 3A CHRONIC KIDNEY DISEASE, WITHOUT LONG-TERM CURRENT USE OF INSULIN (HCC): ICD-10-CM

## 2023-08-16 DIAGNOSIS — E11.22 TYPE 2 DIABETES MELLITUS WITH STAGE 3A CHRONIC KIDNEY DISEASE, WITHOUT LONG-TERM CURRENT USE OF INSULIN (HCC): ICD-10-CM

## 2023-08-16 LAB
CHOLEST SERPL-MCNC: 144 MG/DL
CHOLESTEROL/HDL RATIO: 5
CREAT UR-MCNC: 104.8 MG/DL (ref 39–259)
HDLC SERPL-MCNC: 29 MG/DL
LDLC SERPL CALC-MCNC: ABNORMAL MG/DL (ref 0–130)
LDLC SERPL DIRECT ASSAY-MCNC: 70 MG/DL
MICROALBUMIN UR-MCNC: 32 MG/L
MICROALBUMIN/CREAT UR-RTO: 31 MCG/MG CREAT
TRIGL SERPL-MCNC: 409 MG/DL

## 2023-08-16 PROCEDURE — 80061 LIPID PANEL: CPT

## 2023-08-16 PROCEDURE — 36415 COLL VENOUS BLD VENIPUNCTURE: CPT

## 2023-08-16 PROCEDURE — 83721 ASSAY OF BLOOD LIPOPROTEIN: CPT

## 2023-08-16 PROCEDURE — 82043 UR ALBUMIN QUANTITATIVE: CPT

## 2023-08-16 PROCEDURE — 82570 ASSAY OF URINE CREATININE: CPT

## 2023-08-17 NOTE — RESULT ENCOUNTER NOTE
Please notify patient: Worsening triglycerides  Cut down sweets, pop, juices  Proteins in the urine due to diabetes, continue lisinopril to help with that.       Otherwise labs within normal limits  continue current treatment    Future Appointments  8/31/2023  1:30 PM    STCZ MEDICATION MGMT       STC MED MGMT        [facility]  11/2/2023  9:30 AM    Sophie Lockett PA-C          [facility]  11/8/2023  10:30 AM   Shweta Arevalo, APR* GRT [facility] GI        [facility]  11/16/2023 9:00 AM    Pat Haque MD        Resp Spec           Lilian Eddy  12/22/2023 10:00 AM   MD linda [facility]  8/16/2024  10:00 AM   Navin Zhang MD     [facility]               Lilian Eddy

## 2023-08-24 ENCOUNTER — HOSPITAL ENCOUNTER (OUTPATIENT)
Age: 68
Discharge: HOME | End: 2023-08-24
Payer: MEDICARE

## 2023-08-24 DIAGNOSIS — R74.8 BLOOD ALKALINE PHOSPHATASE INCREASED COMPARED WITH PRIOR MEASUREMENT: Primary | ICD-10-CM

## 2023-08-24 DIAGNOSIS — I12.9 BENIGN HYPERTENSION WITH CKD (CHRONIC KIDNEY DISEASE) STAGE III (HCC): ICD-10-CM

## 2023-08-24 DIAGNOSIS — E11.22 TYPE 2 DIABETES MELLITUS WITH STAGE 3A CHRONIC KIDNEY DISEASE, WITHOUT LONG-TERM CURRENT USE OF INSULIN (HCC): ICD-10-CM

## 2023-08-24 DIAGNOSIS — I25.10 CORONARY ARTERY DISEASE INVOLVING NATIVE CORONARY ARTERY OF NATIVE HEART WITHOUT ANGINA PECTORIS: ICD-10-CM

## 2023-08-24 DIAGNOSIS — N18.30 BENIGN HYPERTENSION WITH CKD (CHRONIC KIDNEY DISEASE) STAGE III (HCC): ICD-10-CM

## 2023-08-24 DIAGNOSIS — N18.31 TYPE 2 DIABETES MELLITUS WITH STAGE 3A CHRONIC KIDNEY DISEASE, WITHOUT LONG-TERM CURRENT USE OF INSULIN (HCC): ICD-10-CM

## 2023-08-24 LAB
ALBUMIN SERPL-MCNC: 4.3 G/DL (ref 3.5–5.2)
ALP SERPL-CCNC: 143 U/L (ref 40–129)
ALT SERPL-CCNC: 41 U/L (ref 5–41)
ANION GAP SERPL CALCULATED.3IONS-SCNC: 10 MMOL/L (ref 9–17)
AST SERPL-CCNC: 33 U/L
BILIRUB SERPL-MCNC: 0.8 MG/DL (ref 0.3–1.2)
BUN SERPL-MCNC: 23 MG/DL (ref 8–23)
CALCIUM SERPL-MCNC: 9.3 MG/DL (ref 8.6–10.4)
CHLORIDE SERPL-SCNC: 102 MMOL/L (ref 98–107)
CO2 SERPL-SCNC: 25 MMOL/L (ref 20–31)
CREAT SERPL-MCNC: 1.1 MG/DL (ref 0.7–1.2)
ERYTHROCYTE [DISTWIDTH] IN BLOOD BY AUTOMATED COUNT: 15 % (ref 11.5–14.9)
FOLATE SERPL-MCNC: 9.8 NG/ML
GFR SERPL CREATININE-BSD FRML MDRD: >60 ML/MIN/1.73M2
GLUCOSE SERPL-MCNC: 257 MG/DL (ref 70–99)
HCT VFR BLD AUTO: 44.1 % (ref 41–53)
HGB BLD-MCNC: 14.9 G/DL (ref 13.5–17.5)
MAGNESIUM SERPL-MCNC: 1.8 MG/DL (ref 1.6–2.6)
MCH RBC QN AUTO: 30.1 PG (ref 26–34)
MCHC RBC AUTO-ENTMCNC: 33.8 G/DL (ref 31–37)
MCV RBC AUTO: 89.1 FL (ref 80–100)
PHOSPHATE SERPL-MCNC: 3.7 MG/DL (ref 2.5–4.5)
PLATELET # BLD AUTO: 200 K/UL (ref 150–450)
PMV BLD AUTO: 8.2 FL (ref 6–12)
POTASSIUM SERPL-SCNC: 4.9 MMOL/L (ref 3.7–5.3)
PROT SERPL-MCNC: 7.1 G/DL (ref 6.4–8.3)
RBC # BLD AUTO: 4.94 M/UL (ref 4.5–5.9)
SODIUM SERPL-SCNC: 137 MMOL/L (ref 135–144)
URATE SERPL-MCNC: 6.9 MG/DL (ref 3.4–7)
VIT B12 SERPL-MCNC: 472 PG/ML (ref 232–1245)
WBC OTHER # BLD: 6.5 K/UL (ref 3.5–11)

## 2023-08-24 PROCEDURE — 85027 COMPLETE CBC AUTOMATED: CPT

## 2023-08-24 PROCEDURE — 80053 COMPREHEN METABOLIC PANEL: CPT

## 2023-08-24 PROCEDURE — 84100 ASSAY OF PHOSPHORUS: CPT

## 2023-08-24 PROCEDURE — 83735 ASSAY OF MAGNESIUM: CPT

## 2023-08-24 PROCEDURE — 36415 COLL VENOUS BLD VENIPUNCTURE: CPT

## 2023-08-24 PROCEDURE — 84550 ASSAY OF BLOOD/URIC ACID: CPT

## 2023-08-24 PROCEDURE — 82746 ASSAY OF FOLIC ACID SERUM: CPT

## 2023-08-24 PROCEDURE — 82607 VITAMIN B-12: CPT

## 2023-08-24 RX ORDER — ASPIRIN 81 MG/1
TABLET, DELAYED RELEASE ORAL
Qty: 90 TABLET | Refills: 1 | Status: SHIPPED | OUTPATIENT
Start: 2023-08-24

## 2023-08-24 NOTE — TELEPHONE ENCOUNTER
Please Approve or Refuse.   Send to Pharmacy per Pt's Request: Elias Joy     Next Visit Date:  12/22/2023   Last Visit Date: 8/15/2023    Hemoglobin A1C (%)   Date Value   08/15/2023 8.9   01/28/2023 7.1 (H)   01/12/2023 6.9             ( goal A1C is < 7)   BP Readings from Last 3 Encounters:   08/15/23 132/82   08/07/23 138/86   05/11/23 (!) 156/95          (goal 120/80)  BUN   Date Value Ref Range Status   01/28/2023 13 8 - 23 mg/dL Final     Creatinine   Date Value Ref Range Status   05/15/2023 1.14 0.70 - 1.20 mg/dL Final     Potassium   Date Value Ref Range Status   01/28/2023 4.8 3.7 - 5.3 mmol/L Final

## 2023-08-24 NOTE — RESULT ENCOUNTER NOTE
Please notify patient: Very high blood glucose 257, is he drinking any pop  Alkaline phosphatase is high which is new I will order an ultrasound of the liver for him    Otherwise labs within normal limits  continue current treatment    Future Appointments  8/31/2023  1:30 PM    STCZ MEDICATION MGMT       STC MED MGMT        [facility]  11/2/2023  9:30 AM    Michael Love PA-C          [facility]  11/8/2023  10:30 AM   Marbella Arevalo, APR* GRT [facility] GI        [facility]  11/16/2023 9:00 AM    Saima Mcgill MD        Resp Spec           Huey December 12/22/2023 10:00 AM   Alberto White MD     [facility]  8/16/2024  10:00 AM   Alberto White MD     [facility] December

## 2023-08-31 ENCOUNTER — HOSPITAL ENCOUNTER (OUTPATIENT)
Dept: ULTRASOUND IMAGING | Age: 68
Discharge: HOME | End: 2023-09-02
Attending: FAMILY MEDICINE
Payer: MEDICARE

## 2023-08-31 ENCOUNTER — TELEPHONE (OUTPATIENT)
Dept: PHARMACY | Age: 68
End: 2023-08-31

## 2023-08-31 ENCOUNTER — HOSPITAL ENCOUNTER (OUTPATIENT)
Dept: PHARMACY | Age: 68
Setting detail: THERAPIES SERIES
Discharge: HOME | End: 2023-08-31
Payer: MEDICARE

## 2023-08-31 VITALS
HEART RATE: 119 BPM | BODY MASS INDEX: 34.07 KG/M2 | WEIGHT: 224.1 LBS | SYSTOLIC BLOOD PRESSURE: 141 MMHG | DIASTOLIC BLOOD PRESSURE: 89 MMHG | OXYGEN SATURATION: 94 %

## 2023-08-31 DIAGNOSIS — R74.8 BLOOD ALKALINE PHOSPHATASE INCREASED COMPARED WITH PRIOR MEASUREMENT: ICD-10-CM

## 2023-08-31 PROCEDURE — 99213 OFFICE O/P EST LOW 20 MIN: CPT

## 2023-08-31 PROCEDURE — 76705 ECHO EXAM OF ABDOMEN: CPT

## 2023-08-31 RX ORDER — INSULIN GLARGINE 100 [IU]/ML
4 INJECTION, SOLUTION SUBCUTANEOUS NIGHTLY
Qty: 5 ADJUSTABLE DOSE PRE-FILLED PEN SYRINGE | Refills: 1 | Status: SHIPPED | OUTPATIENT
Start: 2023-08-31

## 2023-08-31 NOTE — PROGRESS NOTES
Diabetic Medication Management Program  [facility]: CHAD LOVELACE  [address] [place], [address]  Phone: [phone]  Fax: [phone]    NAME: Dulce Hurst  MEDICAL RECORD NUMBER:  [id number]  AGE: 76 y.o. GENDER: male  : 1955  EPISODE DATE:  2023     Mr. Karla Ackerman was referred to [facility] Medication Management Services by Dr. Juniad Garay. Patient acknowledges working in consult agreement with clinical pharmacist and this provider. Goals per referral:   Fasting blood glucose: < 130  Peak postprandial glucose: < 180  A1C: < 7    SUBJECTIVE     Mr. Karla Ackerman is a 76 y.o. male here for the Diabetes Service for self-management education, medication review including over the counter medications and herbal products, overall wellbeing assessment, transition of care and any needed adjustments with updates and recommendations communicated to the referring physician.        Patient Findings:     Medication changes  no  Diet changes  no  Activity changes  no  Emergency Room Visit or Hospitalization  no  Acute Illness/new problems  no  Symptoms of hypoglycemia  no - none  Symptoms of hyperglycemia  no - none  Medication adverse reactions  none    Comments:    Weight 224.1 lb  /89  Pulse 119  O2 Sat 94%  Vaccines:  Covid   Primary and two Boosters  Flu        Plan   Pneumonia  Shingles  Eye Exam  Plan 2023  Foot Exam  2023  Non Smoker  No Alcohol  Labs:    Urine Micro-Alb  32  Chol   144  HDL   29  Triglycerides 409  LDL   70  A1c 8.9    Creatinine 1.1  Potassium 4.9  UP 3891-8784  BF 1111  [place], Soup  Lunch  Often Skip  Dinner  9692-4473  Wayne Barrientos [place], [employer] Snacks: Chips ,pie,watermelon  Drinks peach Tea  Mountain Dew  2-3 cans per week  Hard to exercise  Complains of back and knee pain         OBJECTIVE     PMHx:    Past Medical History:   Diagnosis Date    Abdominal hernia     Abnormal

## 2023-08-31 NOTE — RESULT ENCOUNTER NOTE
Please notify patient there are gallstones and fatty liver which can explain the increased alkaline phosphatase. Does he have any pain in the right upper quadrant? Nausea or vomiting related to food intake or meals?   If yes, then I will refer him to a general surgeon to remove the gallbladder    Otherwise we will monitor    Future Appointments  8/31/2023  1:30 PM    STCZ MEDICATION MGMT       STC MED MGMT        [facility]  11/2/2023  9:30 AM    Sophie Lockett PA-C          [facility]  11/8/2023  10:30 AM   Shweta Arevalo APR* GRT [facility] GI        [facility]  11/16/2023 9:00 AM    Pat Hqaue MD        Resp Spec           [facility]  12/22/2023 10:00 AM   MD linda [facility]  8/16/2024  10:00 AM   MD linda St[facility]

## 2023-08-31 NOTE — DISCHARGE INSTRUCTIONS
Continue Metformin 1000 mg twice daily  Continue Farxiga 10 mg daily  Add Lantus Insulin 4 units at bedtime  Try to eliminate chips and sweets from diet  Try eating more lean meats and veggies  Consider water aerobics for exercise

## 2023-09-01 DIAGNOSIS — K80.20 GALLSTONES: Primary | ICD-10-CM

## 2023-09-01 NOTE — PROGRESS NOTES
Diagnosis Orders   1.  Gallstones  Ethel Peguero DO, Hernia and [facility], [place]           Future Appointments   Date Time Provider [address]   9/28/2023  9:30 AM STCZ MEDICATION MGMT [address] MED MGMT [facility]   11/2/2023  9:30 AM Bree Villavicencio PA-C  derm [facility]   11/8/2023 10:30 AM SERA Larsen - NP [facility]RT [facility]   11/16/2023  9:00 AM Lizbeth Dow MD Resp Spec Chidi Vargas   12/22/2023 10:00 AM Allie Brantley MD [facility]   8/16/2024 10:00 AM Allie Brantley MD fp sc Chidi Vargas

## 2023-09-01 NOTE — RESULT ENCOUNTER NOTE
Please notify patient  Referral to surgeon placed, please give him contact info for gallstones    Future Appointments  9/28/2023  9:30 AM    STCZ MEDICATION MGMT       STC MED MGMT        [facility]  11/2/2023  9:30 AM    Fam Jeronimo PA-C          [facility]  11/8/2023  10:30 AM   Mallorie Arevalo, APR* GRT [facility] GI        [facility]  11/16/2023 9:00 AM    Jaren Gomez MD        Resp Spec           Olga Ortiz  12/22/2023 10:00 AM   Hugh Swartz MD     [facility]  8/16/2024  10:00 AM   Hugh Swartz MD     fp sc               Olga Ortiz

## 2023-09-08 DIAGNOSIS — K21.9 GASTROESOPHAGEAL REFLUX DISEASE WITHOUT ESOPHAGITIS: ICD-10-CM

## 2023-09-08 RX ORDER — PANTOPRAZOLE SODIUM 20 MG/1
20 TABLET, DELAYED RELEASE ORAL DAILY
Qty: 30 TABLET | Refills: 0 | Status: SHIPPED | OUTPATIENT
Start: 2023-09-08

## 2023-09-08 NOTE — TELEPHONE ENCOUNTER
Please Approve or Refuse.   Send to Pharmacy per Pt's Request:      Next Visit Date:  12/22/2023   Last Visit Date: 8/15/2023    Hemoglobin A1C (%)   Date Value   08/15/2023 8.9   01/28/2023 7.1 (H)   01/12/2023 6.9             ( goal A1C is < 7)   BP Readings from Last 3 Encounters:   08/31/23 (!) 141/89   08/15/23 132/82   08/07/23 138/86          (goal 120/80)  BUN   Date Value Ref Range Status   08/24/2023 23 8 - 23 mg/dL Final     Creatinine   Date Value Ref Range Status   08/24/2023 1.1 0.7 - 1.2 mg/dL Final     Potassium   Date Value Ref Range Status   08/24/2023 4.9 3.7 - 5.3 mmol/L Final

## 2023-09-12 ENCOUNTER — HOSPITAL ENCOUNTER (OUTPATIENT)
Dept: SURGERY | Age: 68
Discharge: HOME | End: 2023-09-12
Payer: MEDICARE

## 2023-09-12 VITALS
DIASTOLIC BLOOD PRESSURE: 93 MMHG | BODY MASS INDEX: 33.95 KG/M2 | HEART RATE: 90 BPM | WEIGHT: 224 LBS | HEIGHT: 68 IN | SYSTOLIC BLOOD PRESSURE: 130 MMHG | OXYGEN SATURATION: 98 %

## 2023-09-12 PROCEDURE — 99202 OFFICE O/P NEW SF 15 MIN: CPT

## 2023-09-28 ENCOUNTER — TELEPHONE (OUTPATIENT)
Dept: PHARMACY | Age: 68
End: 2023-09-28

## 2023-09-28 ENCOUNTER — HOSPITAL ENCOUNTER (OUTPATIENT)
Dept: PHARMACY | Age: 68
Setting detail: THERAPIES SERIES
Discharge: HOME | End: 2023-09-28
Payer: MEDICARE

## 2023-09-28 VITALS
SYSTOLIC BLOOD PRESSURE: 120 MMHG | BODY MASS INDEX: 34.33 KG/M2 | WEIGHT: 225.8 LBS | OXYGEN SATURATION: 94 % | DIASTOLIC BLOOD PRESSURE: 76 MMHG | HEART RATE: 84 BPM

## 2023-09-28 PROCEDURE — 99213 OFFICE O/P EST LOW 20 MIN: CPT

## 2023-09-28 RX ORDER — ACYCLOVIR 400 MG/1
1 TABLET ORAL ONCE
Qty: 1 EACH | Refills: 0 | Status: SHIPPED | OUTPATIENT
Start: 2023-09-28 | End: 2023-09-28

## 2023-09-28 RX ORDER — INSULIN GLARGINE 100 [IU]/ML
10 INJECTION, SOLUTION SUBCUTANEOUS NIGHTLY
Qty: 5 ADJUSTABLE DOSE PRE-FILLED PEN SYRINGE | Refills: 1 | Status: SHIPPED | OUTPATIENT
Start: 2023-09-28

## 2023-09-28 RX ORDER — ACYCLOVIR 400 MG/1
TABLET ORAL
Qty: 3 EACH | Refills: 5 | Status: SHIPPED | OUTPATIENT
Start: 2023-09-28 | End: 2023-09-29 | Stop reason: SDUPTHER

## 2023-09-28 NOTE — DISCHARGE INSTRUCTIONS
Continue Metformin 1000 mg twice daily  Continue Farxiga 10 mg daily    Increase Lantus to 8 units at bedtime. If your blood sugar in the morning is still above 150 on Monday, October 2, increase Lantus to 10 units at bedtime. 4.  Try to eliminate [facility]  from [facility]    5. Continue to check blood sugars and record these on the sheets provided. Bring these to every visit. 6.  Prescription for Dexcom G7 was sent to [address] [place] [address] in [facility]. Please let the clinic know if you get this prescription. We can bring you in earlier for an appointment to help you set it up.

## 2023-09-28 NOTE — PROGRESS NOTES
monitoring (FREESTYLE FREEDOM) kit, Please supply Patient with a glucose monitoring kit that insurance will cover. , Disp: 1 kit, Rfl: 0    blood glucose monitor strips, Testing once a day. Please dispense according to patients insurance., Disp: 100 strip, Rfl: 3    Lancets 30G MISC, Testing once a day. Please dispense according to patients insurance., Disp: 100 each, Rfl: 3    Glucosamine-Chondroitin-MSM (TRIPLE FLEX) 750-400-375 MG TABS, Take 2 tablets by mouth daily With food. NATURE MADE brand. Or BIOFLEX. (Patient not taking: Reported on 9/28/2023), Disp: 180 tablet, Rfl: 3    lisinopril (PRINIVIL;ZESTRIL) 10 MG tablet, Take 1 tablet by mouth every evening (Patient not taking: Reported on 8/31/2023), Disp: 90 tablet, Rfl: 3    Handicap Placard MISC, by Does not apply route Can't walk greater than 200 feet. Expires in 5 years. , Disp: 1 each, Rfl: 0    acetaminophen (TYLENOL) 500 MG tablet, Take 1 tablet by mouth every 6 hours as needed for Pain (Patient not taking: Reported on 9/28/2023), Disp: 120 tablet, Rfl: 3    camphor-menthol-methyl salicylate (BENGAY ULTRA STRENGTH) 4-10-30 % CREA cream, Apply topically 3 times daily as needed for Pain (Patient not taking: Reported on 8/15/2023), Disp: 113 g, Rfl: 0    Immunizations:   Most Recent Immunizations   Administered Date(s) Administered    COVID-19, MODERNA BLUE border, Primary or Immunocompromised, (age 12y+), IM, 100 mcg/0.5mL 11/02/2021    COVID-19, MODERNA Bivalent, (age 12y+), IM, 50 mcg/0.5 mL 09/13/2022    COVID-19, US Vaccine, Vaccine Unspecified 09/22/2023    Influenza Vaccine, unspecified formulation 10/15/2018    Influenza Virus Vaccine 10/29/2018    Influenza, FLUAD, (age 72 y+), Adjuvanted, 0.5mL 09/13/2022    Influenza, FLUARIX, FLULAVAL, FLUZONE (age 10 mo+) AND AFLURIA, (age 1 y+), PF, 0.5mL 10/29/2018    Influenza, FLUCELVAX, (age 10 mo+), MDCK, PF, 0.5mL 10/01/2019    Influenza, FLUZONE (age 72 y+), High Dose, 0.7mL 09/22/2023    Pneumococcal,

## 2023-09-28 NOTE — TELEPHONE ENCOUNTER
Prescription for Lantus with updated directions- 10 units nightly sent to [facility] in [place].    Rafi River, Pharm D, BCPS  [facility]  9/28/2023 11:50 AM

## 2023-09-29 ENCOUNTER — TELEPHONE (OUTPATIENT)
Dept: FAMILY MEDICINE CLINIC | Age: 68
End: 2023-09-29

## 2023-09-29 DIAGNOSIS — N18.31 TYPE 2 DIABETES MELLITUS WITH STAGE 3A CHRONIC KIDNEY DISEASE, WITH LONG-TERM CURRENT USE OF INSULIN (HCC): Primary | ICD-10-CM

## 2023-09-29 DIAGNOSIS — E11.22 TYPE 2 DIABETES MELLITUS WITH STAGE 3A CHRONIC KIDNEY DISEASE, WITH LONG-TERM CURRENT USE OF INSULIN (HCC): Primary | ICD-10-CM

## 2023-09-29 DIAGNOSIS — Z79.4 TYPE 2 DIABETES MELLITUS WITH STAGE 3A CHRONIC KIDNEY DISEASE, WITH LONG-TERM CURRENT USE OF INSULIN (HCC): Primary | ICD-10-CM

## 2023-09-29 RX ORDER — ACYCLOVIR 400 MG/1
TABLET ORAL
Qty: 3 EACH | Refills: 5 | Status: SHIPPED | OUTPATIENT
Start: 2023-09-29

## 2023-09-29 NOTE — TELEPHONE ENCOUNTER
Wife called in to let us know that a third party supplier for patients diabetic supplies will be contacting us. She states she needs this done quickly as they are leaving for [place] in a few weeks and the patient will need the supplies.      Wife states the company is called [employer]    PH: [phone]  [address] Mary Pablod: [phone]

## 2023-09-29 NOTE — TELEPHONE ENCOUNTER
Patient wife , called in regard to patient Is needing a order for a dexcom medical necessity , to send to [address] , [address] Continuous Blood Gluc Sensor ([address]) MISC [[id number]]     Order Details  Dose, Route, Frequency: As Directed   Dispense Quantity: 3 each Refills: 5          Sig: Use to check blood sugar.  Replace sensor every 10 days         Start Date: 09/28/23 End Date: --   Written Date: 09/28/23 Expiration Date: 09/27/24   Providers    Ordering and Authorizing Provider: Tamiko Zhang MD NPI: [id number]   Ordering User:  Alexa Gallagher [facility]          Pharmacy    [facility] [address] [facility] - [address] [facility] [address] [facility] [address] [place], [address] [address]   Phone:  [phone]  Fax:  [phone]

## 2023-09-29 NOTE — TELEPHONE ENCOUNTER
I will place the orders signed in epic, please fax where the wife is recommended     Diagnosis Orders   1.  Type 2 diabetes mellitus with stage 3a chronic kidney disease, with long-term current use of insulin ([address])  Continuous Blood Gluc Sensor (DEXCOM G7 SENSOR) [facility]           Future Appointments   Date Time Provider [address]   11/2/2023  9:30 AM Michael Love PA-C  derm [facility]   11/3/2023  9:30 AM STCZ MEDICATION MGMT ST MED MGMT [facility]   11/8/2023 10:30 AM SERA Kent - NP GRT [facility]   11/16/2023  9:00 AM Saima Mcgill MD Resp Spec Huey December 12/22/2023 10:00 AM Alberto White MD [facility]   8/16/2024 10:00 AM Alberto White MD [facility] Ghulam December

## 2023-10-06 ENCOUNTER — TELEPHONE (OUTPATIENT)
Dept: FAMILY MEDICINE CLINIC | Age: 68
End: 2023-10-06

## 2023-10-06 DIAGNOSIS — Z79.4 TYPE 2 DIABETES MELLITUS WITH STAGE 3A CHRONIC KIDNEY DISEASE, WITH LONG-TERM CURRENT USE OF INSULIN (HCC): Primary | ICD-10-CM

## 2023-10-06 DIAGNOSIS — N18.31 TYPE 2 DIABETES MELLITUS WITH STAGE 3A CHRONIC KIDNEY DISEASE, WITH LONG-TERM CURRENT USE OF INSULIN (HCC): Primary | ICD-10-CM

## 2023-10-06 DIAGNOSIS — E11.22 TYPE 2 DIABETES MELLITUS WITH STAGE 3A CHRONIC KIDNEY DISEASE, WITH LONG-TERM CURRENT USE OF INSULIN (HCC): Primary | ICD-10-CM

## 2023-10-06 RX ORDER — PROCHLORPERAZINE 25 MG/1
SUPPOSITORY RECTAL
Qty: 3 EACH | Refills: 5 | Status: SHIPPED | OUTPATIENT
Start: 2023-10-06

## 2023-10-06 NOTE — TELEPHONE ENCOUNTER
Patient was started on insulin on 8/31/2023 medication management clinical pharmacy  I have seen him on 8/15/2023, he was not on insulin at that time    would clinical pharmacy notes count?   I printed the first note from 8/31/2023, and 9/28/2023 from clinical pharmacy    Please let the pharmacy know, if clinical pharmacy notes do not count, then he needs an appointment with one of the providers for diabetes documentation--could be October 10 at lunchtime if no openings available    Future Appointments   Date Time Provider [address]   11/2/2023  9:30 AM Adwoa Harrington PA-C  derm [facility]   11/3/2023  9:30 AM STCZ MEDICATION MGMT [facility] MED [facility]   11/8/2023 10:30 AM Dallas Arevalo APRN - NP GRT [facility]   11/16/2023  9:00 AM [facility]   12/22/2023 10:00 AM MD linad Marte [facility]   8/16/2024 10:00 AM MD linda Marte sc [facility] [place]

## 2023-10-06 NOTE — TELEPHONE ENCOUNTER
Diagnosis Orders   1.  Type 2 diabetes mellitus with stage 3a chronic kidney disease, with long-term current use of insulin (HCC)  Continuous Blood Gluc Sensor (DEXCOM G6 SENSOR) [facility]           Future Appointments   Date Time Provider [address]   11/2/2023  9:30 AM Yeimi Barroso PA-C  derm [facility]   11/3/2023  9:30 AM STCZ MEDICATION MGMT STC MED MGMT [facility]   11/8/2023 10:30 AM Salomón Arevalo APRN - NP GRT [facility]   11/16/2023  9:00 AM Chula Knott MD Resp Spec [facility]   12/22/2023 10:00 AM Lorin Pitts MD [facility]   8/16/2024 10:00 AM Lorin Pitts MD [facility] Lexi Farrell

## 2023-10-06 NOTE — TELEPHONE ENCOUNTER
Received fax from Kay that patient will need a script for [facility] . Please advise.      Fax script and office notes back to [phone]

## 2023-10-11 NOTE — TELEPHONE ENCOUNTER
SPOKE WITH PATIENT REGARDING DEXCOM. STATED STILL WAITING TO HEAR BACK FOR MEDICAL SUPPLIES. I DID TELL HER TO CALL PHARMACY TO SEE WHERE THEY ARE AT WITH THE SCRIPT AND IF ANYTHING ELSE IS NEEDED FOR THIS.

## 2023-11-02 ENCOUNTER — OFFICE VISIT (OUTPATIENT)
Dept: DERMATOLOGY | Age: 68
End: 2023-11-02

## 2023-11-02 VITALS
TEMPERATURE: 97.3 F | DIASTOLIC BLOOD PRESSURE: 73 MMHG | SYSTOLIC BLOOD PRESSURE: 123 MMHG | WEIGHT: 222 LBS | BODY MASS INDEX: 33.65 KG/M2 | HEART RATE: 78 BPM | OXYGEN SATURATION: 97 % | HEIGHT: 68 IN

## 2023-11-02 DIAGNOSIS — L82.0 INFLAMED SEBORRHEIC KERATOSIS: Primary | ICD-10-CM

## 2023-11-02 DIAGNOSIS — K21.9 GASTROESOPHAGEAL REFLUX DISEASE WITHOUT ESOPHAGITIS: ICD-10-CM

## 2023-11-02 RX ORDER — PANTOPRAZOLE SODIUM 20 MG/1
20 TABLET, DELAYED RELEASE ORAL DAILY
Qty: 30 TABLET | Refills: 0 | OUTPATIENT
Start: 2023-11-02

## 2023-11-02 NOTE — TELEPHONE ENCOUNTER
Please Approve or Refuse.   Send to Pharmacy per Pt's Request:      Next Visit Date:  12/22/2023   Last Visit Date: 8/15/2023    Hemoglobin A1C (%)   Date Value   08/15/2023 8.9   01/28/2023 7.1 (H)   01/12/2023 6.9             ( goal A1C is < 7)   BP Readings from Last 3 Encounters:   11/02/23 123/73   09/28/23 120/76   09/12/23 (!) 130/93          (goal 120/80)  BUN   Date Value Ref Range Status   08/24/2023 23 8 - 23 mg/dL Final     Creatinine   Date Value Ref Range Status   08/24/2023 1.1 0.7 - 1.2 mg/dL Final     Potassium   Date Value Ref Range Status   08/24/2023 4.9 3.7 - 5.3 mmol/L Final

## 2023-11-02 NOTE — TELEPHONE ENCOUNTER
Pts wife called in inquiring as to why his refill request was denied. Writer adv provider inquiring about putting him on Pepcid and wife said that will be fine.

## 2023-11-02 NOTE — TELEPHONE ENCOUNTER
Can I change to pepcid?     Rx Refill Protocol Proton Pump Inhibitors Failed 11/02/2023 11:27 AM    No history of chronic kidney disease (CKD)

## 2023-11-02 NOTE — PROGRESS NOTES
Dermatology Patient Note  [address]  [address] [place] [address] [address] [address]  Dept: [phone]  Dept Fax: [phone]      VISITDATE: 11/2/2023   REFERRING PROVIDER: Mirtha Phelps MD      Melanie Esteves is a 76 y.o. male  who presents today in the office for:    New Patient (NP- lesion on his scalp for 6 months, no itching or pain. Feels crusty/scaly- )      HISTORY OF PRESENT ILLNESS:  As above. Pt does state that the lesion is intermittently pruritic. MEDICAL PROBLEMS:  Patient Active Problem List    Diagnosis Date Noted    S/p total knee replacement, bilateral 01/15/2023     Priority: Medium    Esophageal dysphagia 01/15/2023     Priority: Medium    Arthritis of multiple sites 01/15/2023     Priority: Medium    Thickening of pleura 09/21/2022     Priority: Medium    Cardiomegaly 09/21/2022     Priority: Medium    Hyperparathyroidism ([address]) 09/18/2022     Priority: Medium    Coronary artery disease involving native coronary artery of native heart without angina pectoris 09/18/2022     Priority: Medium    Benign hypertension with CKD (chronic kidney disease) stage III ([address]) 09/15/2022     Priority: Medium    Type 2 diabetes mellitus with stage 3a chronic kidney disease, with long-term current use of insulin ([address]) 09/15/2022     Priority: Medium    Post herpetic neuralgia left scalp 08/15/2023    Skin lesion of scalp left side 08/15/2023    Bilateral leg edema 08/15/2023    Severe obesity (BMI 35.0-39. 9) with comorbidity ([address]) 08/15/2023    Bilateral hearing loss 08/15/2023    Gallstones 05/20/2022    Calculus of kidney 05/20/2022    Dependence on other enabling machines and devices 05/20/2022    Difficulty in walking, not elsewhere classified 05/20/2022    Old myocardial infarction 05/20/2022    Pure hypercholesterolemia, unspecified 05/20/2022    Repeated falls 05/20/2022    Weakness 05/20/2022    Spinal

## 2023-11-03 RX ORDER — FAMOTIDINE 20 MG/1
20 TABLET, FILM COATED ORAL
Qty: 90 TABLET | Refills: 0 | Status: SHIPPED | OUTPATIENT
Start: 2023-11-03

## 2023-11-06 ENCOUNTER — TELEPHONE (OUTPATIENT)
Dept: PHARMACY | Age: 68
End: 2023-11-06

## 2023-11-06 NOTE — TELEPHONE ENCOUNTER
Patient's wife called to inform clinic that patient has not been able to obtain Dexcom yet because the medical supply company has not been able to process it due to missing information. Contacted [employer] [phone]) and was informed that evidence of insulin use was needed. Most recent documentation that they had on file was from before patient started insulin, so more recent documentation, including documentation of patient's insulin use, was faxed to the company at [phone].      Hemanth Dorado [facility], PharmD, BCACP  11/6/2023  11:22 AM

## 2023-11-06 NOTE — TELEPHONE ENCOUNTER
Attempted to fax twice to [phone]; fax line came back \"busy. \" Called Medical Service and received another fax number - [phone]. Made third attempt to fax documentation.     Oj Lopez [facility], PharmD, BCACP  11/6/2023  1:10 PM

## 2023-11-08 ENCOUNTER — TELEPHONE (OUTPATIENT)
Dept: GASTROENTEROLOGY | Age: 68
End: 2023-11-08

## 2023-11-08 ENCOUNTER — OFFICE VISIT (OUTPATIENT)
Dept: GASTROENTEROLOGY | Age: 68
End: 2023-11-08
Payer: MEDICARE

## 2023-11-08 VITALS
HEART RATE: 92 BPM | HEIGHT: 68 IN | BODY MASS INDEX: 33.83 KG/M2 | WEIGHT: 223.2 LBS | DIASTOLIC BLOOD PRESSURE: 69 MMHG | SYSTOLIC BLOOD PRESSURE: 111 MMHG

## 2023-11-08 DIAGNOSIS — R13.19 ESOPHAGEAL DYSPHAGIA: Primary | ICD-10-CM

## 2023-11-08 PROCEDURE — 1123F ACP DISCUSS/DSCN MKR DOCD: CPT | Performed by: NURSE PRACTITIONER

## 2023-11-08 PROCEDURE — G8427 DOCREV CUR MEDS BY ELIG CLIN: HCPCS | Performed by: NURSE PRACTITIONER

## 2023-11-08 PROCEDURE — G8484 FLU IMMUNIZE NO ADMIN: HCPCS | Performed by: NURSE PRACTITIONER

## 2023-11-08 PROCEDURE — 3017F COLORECTAL CA SCREEN DOC REV: CPT | Performed by: NURSE PRACTITIONER

## 2023-11-08 PROCEDURE — G8417 CALC BMI ABV UP PARAM F/U: HCPCS | Performed by: NURSE PRACTITIONER

## 2023-11-08 PROCEDURE — 1036F TOBACCO NON-USER: CPT | Performed by: NURSE PRACTITIONER

## 2023-11-08 PROCEDURE — 99214 OFFICE O/P EST MOD 30 MIN: CPT | Performed by: NURSE PRACTITIONER

## 2023-11-08 RX ORDER — LOSARTAN POTASSIUM 25 MG/1
25 TABLET ORAL DAILY
COMMUNITY
Start: 2023-09-15

## 2023-11-08 NOTE — PROGRESS NOTES
performed by María Elena More MD at [address]:    Current Outpatient Medications:     losartan (COZAAR) 25 MG tablet, Take 1 tablet by mouth daily, Disp: , Rfl:     famotidine (PEPCID) 20 MG tablet, Take 1 tablet by mouth every morning (before breakfast) Replacing pantoprazole, Disp: 90 tablet, Rfl: 0    Continuous Blood Gluc Sensor (DEXCOM G6 SENSOR) MISC, Patient needs to monitor frequently glucose, due to uncontrolled diabetes, Disp: 3 each, Rfl: 5    Continuous Blood Gluc Sensor (DEXCOM G7 SENSOR) MISC, Use to check blood sugar. Replace sensor every 10 days, Disp: 3 each, Rfl: 5    insulin glargine (LANTUS SOLOSTAR) 100 UNIT/ML injection pen, Inject 10 Units into the skin nightly, Disp: 5 Adjustable Dose Pre-filled Pen Syringe, Rfl: 1    SM ASPIRIN LOW DOSE 81 MG EC tablet, take 1 tablet by mouth once daily, Disp: 90 tablet, Rfl: 1    cinacalcet (SENSIPAR) 90 MG tablet, Take 1 tablet by mouth daily, Disp: , Rfl:     diclofenac (VOLTAREN) 75 MG EC tablet, Take 1 tablet by mouth every 48 hours, Disp: , Rfl:     lidocaine (LMX) 4 % cream, 5 g topical 2-3 times a day as needed for pain, maximum 20 g/day, Disp: 120 g, Rfl: 3    glucose monitoring (FREESTYLE FREEDOM) kit, Please supply Patient with a glucose monitoring kit that insurance will cover. , Disp: 1 kit, Rfl: 0    blood glucose monitor strips, Testing once a day. Please dispense according to patients insurance., Disp: 100 strip, Rfl: 3    Lancets 30G MISC, Testing once a day.   Please dispense according to patients insurance., Disp: 100 each, Rfl: 3    dapagliflozin (FARXIGA) 10 MG tablet, Take 1 tablet by mouth every morning, Disp: 30 tablet, Rfl: 5    furosemide (LASIX) 20 MG tablet, Take 1 tablet by mouth daily, Disp: 90 tablet, Rfl: 3    ciprofloxacin-dexamethasone (CIPRODEX) 0.3-0.1 % otic suspension, Place 4 drops into the left ear 2 times daily, Disp: 7.5 mL, Rfl: 0    amLODIPine (NORVASC) 5 MG tablet, Take 2 tablets by

## 2023-11-08 NOTE — TELEPHONE ENCOUNTER
Patient was seen for OV today with Shade, patient needs manometry esophageal to be scheduled however [facility]'s does not have staff right now for manometry esophageal's and wont until the beginning of next year-writer contacted wife (Julissa) to notify her of this information-writer stated to wife will route encounter to myself as a reminder to contact for scheduling.

## 2023-11-18 DIAGNOSIS — E78.2 MIXED HYPERLIPIDEMIA: ICD-10-CM

## 2023-11-18 RX ORDER — EZETIMIBE 10 MG/1
10 TABLET ORAL DAILY
Qty: 90 TABLET | Refills: 3 | Status: SHIPPED | OUTPATIENT
Start: 2023-11-18

## 2023-11-18 NOTE — TELEPHONE ENCOUNTER
Last seen 8/15/23    Next Visit Date:  Future Appointments   Date Time Provider [address]   11/21/2023  1:00 PM SERA Jones CNP fp [facility]   11/28/2023 10:00 AM SERA Smith CNP Resp Spec [facility]   11/29/2023 10:50 AM [facility] MEDICATION MGMT [facility]   12/22/2023 10:00 AM Adri Raman MD [facility]   8/16/2024 10:00 AM Adri Raman MD [facility] [address] Maintenance   Topic Date Due    Hepatitis B vaccine (1 of 3 - Risk 3-dose series) Never done    Pneumococcal 65+ years Vaccine (3 - PPSV23 or PCV20) 08/05/2023    COVID-19 Vaccine (9 - 2023-24 season) 11/17/2023    Diabetic foot exam  08/15/2024    A1C test (Diabetic or Prediabetic)  08/15/2024    Depression Monitoring  08/15/2024    Annual Wellness Visit (AWV)  08/15/2024    Diabetic Alb to Cr ratio (uACR) test  08/16/2024    Lipids  08/16/2024    Diabetic retinal exam  09/22/2024    GFR test (Diabetes, CKD 3-4, OR last GFR 15-59)  11/07/2024    Colorectal Cancer Screen  11/14/2024    DTaP/Tdap/Td vaccine (2 - Td or Tdap) 11/04/2033    Flu vaccine  Completed    Shingles vaccine  Completed    Hepatitis C screen  Completed    Hepatitis A vaccine  Aged Out    Hib vaccine  Aged Out    Meningococcal (ACWY) vaccine  Aged Out    Depression Screen  Discontinued    HIV screen  Discontinued    Prostate Specific Antigen (PSA) Screening or Monitoring  Discontinued       Hemoglobin A1C (%)   Date Value   08/15/2023 8.9   01/28/2023 7.1 (H)   01/12/2023 6.9             ( goal A1C is < 7)   No components found for: \"LABMICR\"  LDL Cholesterol (mg/dL)   Date Value   08/16/2023            (goal LDL is <100)   AST (U/L)   Date Value   11/07/2023 37     ALT (U/L)   Date Value   11/07/2023 50 (H)     BUN (MG/DL)   Date Value   11/07/2023 19     BP Readings from Last 3 Encounters:   11/08/23 111/69   11/02/23 123/73   09/28/23 120/76          (goal 120/80)    All Future Testing planned in [facility]  Lab Frequency Next

## 2023-11-21 ENCOUNTER — TELEPHONE (OUTPATIENT)
Dept: FAMILY MEDICINE CLINIC | Age: 68
End: 2023-11-21

## 2023-11-21 ENCOUNTER — OFFICE VISIT (OUTPATIENT)
Dept: FAMILY MEDICINE CLINIC | Age: 68
End: 2023-11-21
Payer: MEDICARE

## 2023-11-21 VITALS
TEMPERATURE: 98.6 F | HEART RATE: 92 BPM | OXYGEN SATURATION: 93 % | DIASTOLIC BLOOD PRESSURE: 84 MMHG | HEIGHT: 68 IN | SYSTOLIC BLOOD PRESSURE: 136 MMHG | BODY MASS INDEX: 33.65 KG/M2 | WEIGHT: 222 LBS

## 2023-11-21 DIAGNOSIS — E11.22 TYPE 2 DIABETES MELLITUS WITH STAGE 3A CHRONIC KIDNEY DISEASE, WITH LONG-TERM CURRENT USE OF INSULIN (HCC): Primary | ICD-10-CM

## 2023-11-21 DIAGNOSIS — N18.31 TYPE 2 DIABETES MELLITUS WITH STAGE 3A CHRONIC KIDNEY DISEASE, WITH LONG-TERM CURRENT USE OF INSULIN (HCC): Primary | ICD-10-CM

## 2023-11-21 DIAGNOSIS — Z79.4 TYPE 2 DIABETES MELLITUS WITH STAGE 3A CHRONIC KIDNEY DISEASE, WITH LONG-TERM CURRENT USE OF INSULIN (HCC): Primary | ICD-10-CM

## 2023-11-21 LAB — HBA1C MFR BLD: 9.7 %

## 2023-11-21 PROCEDURE — 3046F HEMOGLOBIN A1C LEVEL >9.0%: CPT | Performed by: NURSE PRACTITIONER

## 2023-11-21 PROCEDURE — 83036 HEMOGLOBIN GLYCOSYLATED A1C: CPT | Performed by: NURSE PRACTITIONER

## 2023-11-21 PROCEDURE — G8484 FLU IMMUNIZE NO ADMIN: HCPCS | Performed by: NURSE PRACTITIONER

## 2023-11-21 PROCEDURE — 1123F ACP DISCUSS/DSCN MKR DOCD: CPT | Performed by: NURSE PRACTITIONER

## 2023-11-21 PROCEDURE — G8427 DOCREV CUR MEDS BY ELIG CLIN: HCPCS | Performed by: NURSE PRACTITIONER

## 2023-11-21 PROCEDURE — G8417 CALC BMI ABV UP PARAM F/U: HCPCS | Performed by: NURSE PRACTITIONER

## 2023-11-21 PROCEDURE — 3017F COLORECTAL CA SCREEN DOC REV: CPT | Performed by: NURSE PRACTITIONER

## 2023-11-21 PROCEDURE — 1036F TOBACCO NON-USER: CPT | Performed by: NURSE PRACTITIONER

## 2023-11-21 PROCEDURE — 2022F DILAT RTA XM EVC RTNOPTHY: CPT | Performed by: NURSE PRACTITIONER

## 2023-11-21 PROCEDURE — 99215 OFFICE O/P EST HI 40 MIN: CPT | Performed by: NURSE PRACTITIONER

## 2023-11-21 RX ORDER — PROCHLORPERAZINE 25 MG/1
SUPPOSITORY RECTAL
Qty: 3 EACH | Refills: 5 | Status: SHIPPED | OUTPATIENT
Start: 2023-11-21 | End: 2023-11-21

## 2023-11-21 RX ORDER — PROCHLORPERAZINE 25 MG/1
SUPPOSITORY RECTAL
Qty: 1 EACH | Refills: 3 | Status: SHIPPED | OUTPATIENT
Start: 2023-11-21 | End: 2023-11-21

## 2023-11-21 RX ORDER — PROCHLORPERAZINE 25 MG/1
SUPPOSITORY RECTAL
Qty: 3 EACH | Refills: 5 | Status: SHIPPED | OUTPATIENT
Start: 2023-11-21 | End: 2023-11-29

## 2023-11-21 RX ORDER — PROCHLORPERAZINE 25 MG/1
SUPPOSITORY RECTAL
Qty: 1 EACH | Refills: 0 | Status: SHIPPED | OUTPATIENT
Start: 2023-11-21 | End: 2023-11-21

## 2023-11-21 RX ORDER — PROCHLORPERAZINE 25 MG/1
SUPPOSITORY RECTAL
Qty: 1 EACH | Refills: 0 | Status: SHIPPED | OUTPATIENT
Start: 2023-11-21 | End: 2023-11-29

## 2023-11-21 RX ORDER — PROCHLORPERAZINE 25 MG/1
SUPPOSITORY RECTAL
Qty: 1 EACH | Refills: 3 | Status: SHIPPED | OUTPATIENT
Start: 2023-11-21 | End: 2023-11-29

## 2023-11-21 SDOH — ECONOMIC STABILITY: FOOD INSECURITY: WITHIN THE PAST 12 MONTHS, THE FOOD YOU BOUGHT JUST DIDN'T LAST AND YOU DIDN'T HAVE MONEY TO GET MORE.: NEVER TRUE

## 2023-11-21 SDOH — ECONOMIC STABILITY: FOOD INSECURITY: WITHIN THE PAST 12 MONTHS, YOU WORRIED THAT YOUR FOOD WOULD RUN OUT BEFORE YOU GOT MONEY TO BUY MORE.: NEVER TRUE

## 2023-11-21 SDOH — ECONOMIC STABILITY: INCOME INSECURITY: HOW HARD IS IT FOR YOU TO PAY FOR THE VERY BASICS LIKE FOOD, HOUSING, MEDICAL CARE, AND HEATING?: SOMEWHAT HARD

## 2023-11-21 ASSESSMENT — ENCOUNTER SYMPTOMS
SHORTNESS OF BREATH: 0
BACK PAIN: 0
ABDOMINAL PAIN: 0
VOMITING: 0
COUGH: 0
CHEST TIGHTNESS: 0
NAUSEA: 0
DIARRHEA: 0
WHEEZING: 0
CONSTIPATION: 0
ABDOMINAL DISTENTION: 0

## 2023-11-21 ASSESSMENT — PATIENT HEALTH QUESTIONNAIRE - PHQ9
SUM OF ALL RESPONSES TO PHQ QUESTIONS 1-9: 0
SUM OF ALL RESPONSES TO PHQ9 QUESTIONS 1 & 2: 0
8. MOVING OR SPEAKING SO SLOWLY THAT OTHER PEOPLE COULD HAVE NOTICED. OR THE OPPOSITE, BEING SO FIGETY OR RESTLESS THAT YOU HAVE BEEN MOVING AROUND A LOT MORE THAN USUAL: 0
2. FEELING DOWN, DEPRESSED OR HOPELESS: 0
6. FEELING BAD ABOUT YOURSELF - OR THAT YOU ARE A FAILURE OR HAVE LET YOURSELF OR YOUR FAMILY DOWN: 0
5. POOR APPETITE OR OVEREATING: 0
SUM OF ALL RESPONSES TO PHQ QUESTIONS 1-9: 0
9. THOUGHTS THAT YOU WOULD BE BETTER OFF DEAD, OR OF HURTING YOURSELF: 0
1. LITTLE INTEREST OR PLEASURE IN DOING THINGS: 0
7. TROUBLE CONCENTRATING ON THINGS, SUCH AS READING THE NEWSPAPER OR WATCHING TELEVISION: 0
SUM OF ALL RESPONSES TO PHQ QUESTIONS 1-9: 0
SUM OF ALL RESPONSES TO PHQ QUESTIONS 1-9: 0
10. IF YOU CHECKED OFF ANY PROBLEMS, HOW DIFFICULT HAVE THESE PROBLEMS MADE IT FOR YOU TO DO YOUR WORK, TAKE CARE OF THINGS AT HOME, OR GET ALONG WITH OTHER PEOPLE: 0
3. TROUBLE FALLING OR STAYING ASLEEP: 0
4. FEELING TIRED OR HAVING LITTLE ENERGY: 0

## 2023-11-21 ASSESSMENT — COLUMBIA-SUICIDE SEVERITY RATING SCALE - C-SSRS
3. HAVE YOU BEEN THINKING ABOUT HOW YOU MIGHT KILL YOURSELF?: NO
7. DID THIS OCCUR IN THE LAST THREE MONTHS: NO
4. HAVE YOU HAD THESE THOUGHTS AND HAD SOME INTENTION OF ACTING ON THEM?: NO
5. HAVE YOU STARTED TO WORK OUT OR WORKED OUT THE DETAILS OF HOW TO KILL YOURSELF? DO YOU INTEND TO CARRY OUT THIS PLAN?: NO

## 2023-11-21 NOTE — TELEPHONE ENCOUNTER
PATIENT WANTED TO INFORM YOU THAT HE DID SPEAK WITH INNSURANCE AND THEYSTATED HE DID MEET ALL REQUIREMENTS FOR DEXCOM. WOULD LIKE ORDER SENT TO LOCAL PHARMACY.  STATED HE DID CHECK TO MAKE SURE MANE CARRIED THIS

## 2023-11-27 NOTE — PROGRESS NOTES
Subjective:      Patient ID: Benjie Mcdermott is a 76 y.o. male. HPI  Patient here for SHANT, obesity. Last seen in office per Dr. Wilfrid Green in May 2023    Compliance data reviewed 8/30/23 to 11/27/2023: 85 out of 90 days used, 74 days for more than 4 hours putting him at 82% compliant. He is on IPAP of 23 cm H2O EPAP of 18 cm H2O. Average use on days used is 6 hours and 9 minutes. Residual AHI is 3.2. DME company is [employer]. Patient is reporting that he is having difficulty with his mask seal that he is having frequent air leaks up at the bridge of his nose that is causing dryness in his eyes and that he will wake up with his mask malpositioned on his face. He states that he feels that the pressure is too high and when this happens he turns over and turns his machine off so that it restarts his ramp or he abandons the device in the middle of the night. Reviewed his titration study and adjusted his settings down to 22/16, he still had control of his apnea at this level. I do think that his mask needs to be refitted, I do not think that his mask is appropriate for his facial structure, he will likely still need a fullface mask but I think he needs a different style or perhaps a different size. Patient is instructed to reach out to his DME company to work with them on getting a different mask. I emphasized the importance of changing out his mask seal and headgear routinely as directed by the provider/Natty, this may also be a factor in his air leak. Medications:   No pulmonary meds      Smoking status:  Lifelong non-smoker    PRIOR WORKUP:  PFT:  PFT 9/27/2022: Normal lung mechanics, lung volumes, diffusion capacity. Evidence of air trapping and hyperinflation is not noted. No significant improvement postbronchodilator.     CT Imaging:  No CT imaging of chest on chart    Sleep Study:  Titration study 2/1/2023: Patient was titrated onto BiPAP with a final recommended setting of IPAP of 23 cm H2O and

## 2023-11-28 ENCOUNTER — OFFICE VISIT (OUTPATIENT)
Dept: PULMONOLOGY | Age: 68
End: 2023-11-28
Payer: MEDICARE

## 2023-11-28 VITALS
RESPIRATION RATE: 18 BRPM | OXYGEN SATURATION: 94 % | DIASTOLIC BLOOD PRESSURE: 73 MMHG | TEMPERATURE: 97.3 F | SYSTOLIC BLOOD PRESSURE: 115 MMHG | HEIGHT: 68 IN | BODY MASS INDEX: 33.46 KG/M2 | HEART RATE: 100 BPM | WEIGHT: 220.8 LBS

## 2023-11-28 DIAGNOSIS — Z79.4 TYPE 2 DIABETES MELLITUS WITH STAGE 3A CHRONIC KIDNEY DISEASE, WITH LONG-TERM CURRENT USE OF INSULIN (HCC): Primary | ICD-10-CM

## 2023-11-28 DIAGNOSIS — E11.22 TYPE 2 DIABETES MELLITUS WITH STAGE 3A CHRONIC KIDNEY DISEASE, WITH LONG-TERM CURRENT USE OF INSULIN (HCC): Primary | ICD-10-CM

## 2023-11-28 DIAGNOSIS — N18.31 TYPE 2 DIABETES MELLITUS WITH STAGE 3A CHRONIC KIDNEY DISEASE, WITH LONG-TERM CURRENT USE OF INSULIN (HCC): Primary | ICD-10-CM

## 2023-11-28 DIAGNOSIS — G47.33 OSA TREATED WITH BIPAP: Primary | ICD-10-CM

## 2023-11-28 PROCEDURE — 99214 OFFICE O/P EST MOD 30 MIN: CPT | Performed by: NURSE PRACTITIONER

## 2023-11-28 PROCEDURE — 1123F ACP DISCUSS/DSCN MKR DOCD: CPT | Performed by: NURSE PRACTITIONER

## 2023-11-28 PROCEDURE — G8417 CALC BMI ABV UP PARAM F/U: HCPCS | Performed by: NURSE PRACTITIONER

## 2023-11-28 PROCEDURE — G8427 DOCREV CUR MEDS BY ELIG CLIN: HCPCS | Performed by: NURSE PRACTITIONER

## 2023-11-28 PROCEDURE — G8484 FLU IMMUNIZE NO ADMIN: HCPCS | Performed by: NURSE PRACTITIONER

## 2023-11-28 PROCEDURE — 3017F COLORECTAL CA SCREEN DOC REV: CPT | Performed by: NURSE PRACTITIONER

## 2023-11-28 PROCEDURE — 1036F TOBACCO NON-USER: CPT | Performed by: NURSE PRACTITIONER

## 2023-11-28 RX ORDER — ACYCLOVIR 400 MG/1
TABLET ORAL
Qty: 3 EACH | Refills: 4 | Status: SHIPPED | OUTPATIENT
Start: 2023-11-28

## 2023-11-28 ASSESSMENT — ENCOUNTER SYMPTOMS
RESPIRATORY NEGATIVE: 1
GASTROINTESTINAL NEGATIVE: 1
ALLERGIC/IMMUNOLOGIC NEGATIVE: 1
EYES NEGATIVE: 1

## 2023-11-28 NOTE — TELEPHONE ENCOUNTER
Received fax from [address], patient is in need of a Dexcom G7  script along with office notes stating that he is on insulin. Please advise.

## 2023-11-29 ENCOUNTER — TELEPHONE (OUTPATIENT)
Dept: PHARMACY | Age: 68
End: 2023-11-29

## 2023-11-29 ENCOUNTER — HOSPITAL ENCOUNTER (OUTPATIENT)
Dept: PHARMACY | Age: 68
Setting detail: THERAPIES SERIES
Discharge: HOME | End: 2023-11-29
Payer: MEDICARE

## 2023-11-29 VITALS
DIASTOLIC BLOOD PRESSURE: 79 MMHG | BODY MASS INDEX: 33.56 KG/M2 | SYSTOLIC BLOOD PRESSURE: 122 MMHG | HEART RATE: 94 BPM | WEIGHT: 220.7 LBS | OXYGEN SATURATION: 94 %

## 2023-11-29 DIAGNOSIS — N18.31 TYPE 2 DIABETES MELLITUS WITH STAGE 3A CHRONIC KIDNEY DISEASE, WITHOUT LONG-TERM CURRENT USE OF INSULIN (HCC): ICD-10-CM

## 2023-11-29 DIAGNOSIS — E11.22 TYPE 2 DIABETES MELLITUS WITH STAGE 3A CHRONIC KIDNEY DISEASE, WITHOUT LONG-TERM CURRENT USE OF INSULIN (HCC): ICD-10-CM

## 2023-11-29 PROCEDURE — 99213 OFFICE O/P EST LOW 20 MIN: CPT

## 2023-11-29 RX ORDER — GLUCOSAMINE HCL/CHONDROITIN SU 500-400 MG
CAPSULE ORAL
Qty: 100 STRIP | Refills: 3 | Status: SHIPPED | OUTPATIENT
Start: 2023-11-29

## 2023-11-29 RX ORDER — PEN NEEDLE, DIABETIC 32GX 5/32"
NEEDLE, DISPOSABLE MISCELLANEOUS
Qty: 100 EACH | Refills: 3 | Status: SHIPPED | OUTPATIENT
Start: 2023-11-29

## 2023-11-29 RX ORDER — INSULIN GLARGINE 100 [IU]/ML
14 INJECTION, SOLUTION SUBCUTANEOUS NIGHTLY
Qty: 5 ADJUSTABLE DOSE PRE-FILLED PEN SYRINGE | Refills: 1 | Status: SHIPPED | OUTPATIENT
Start: 2023-11-29

## 2023-11-29 NOTE — TELEPHONE ENCOUNTER
Refills of lantus and pen needles sent to [facility] in [place]. Refill of glucose testing strips sent to [facility] on [facility]. Quinn Mitchell [facility],Pharm. D,, [facility]  11/29/2023  4:50 PM

## 2023-11-29 NOTE — PROGRESS NOTES
Collagen-Boron-Hyaluronic Acid (MOVE FREE ULTRA JOINT HEALTH) 40-5-3.3 MG TABS, Take 1 tablet by mouth daily, Disp: 90 tablet, Rfl: 0    coenzyme Q-10 100 MG capsule, Take 1 capsule by mouth every evening, Disp: 90 capsule, Rfl: 3    metoprolol succinate (TOPROL XL) 100 MG extended release tablet, Take 1 tablet by mouth daily Dose increased 9/15/2022 . Goal BP bellow 135/85 and above 115/65, heart rate 56 to 90 bpm, Disp: 90 tablet, Rfl: 3    lisinopril (PRINIVIL;ZESTRIL) 10 MG tablet, Take 1 tablet by mouth every evening, Disp: 90 tablet, Rfl: 3    metFORMIN (GLUCOPHAGE) 1000 MG tablet, Take 1 tablet by mouth 2 times daily (with meals), Disp: 180 tablet, Rfl: 3    pravastatin (PRAVACHOL) 40 MG tablet, Take 1 tablet by mouth Daily with supper, Disp: 90 tablet, Rfl: 3    Handicap Placard MISC, by Does not apply route Can't walk greater than 200 feet. Expires in 5 years. , Disp: 1 each, Rfl: 0    acetaminophen (TYLENOL) 500 MG tablet, Take 1 tablet by mouth every 6 hours as needed for Pain, Disp: 120 tablet, Rfl: 3    camphor-menthol-methyl salicylate (BENGAY ULTRA STRENGTH) 4-10-30 % CREA cream, Apply topically 3 times daily as needed for Pain, Disp: 113 g, Rfl: 0    E-400 400 UNITS capsule, TAKE TWO CAPSULES BY MOUTH DAILY, Disp: 60 capsule, Rfl: 10    nortriptyline (PAMELOR) 50 MG capsule, Take 1 capsule by mouth 2 times daily, Disp: , Rfl:     Immunizations:   Most Recent Immunizations   Administered Date(s) Administered    COVID-19, MODERNA BLUE border, Primary or Immunocompromised, (age 12y+), IM, 100 mcg/0.5mL 11/02/2021    COVID-19, MODERNA Bivalent, (age 12y+), IM, 50 mcg/0.5 mL 09/13/2022    COVID-19, PFIZER, (2023-24 formula), (age 12y+), IM, 30mcg/0.3mL 09/22/2023    COVID-19, US Vaccine, Vaccine Unspecified 09/22/2023    Influenza Vaccine, unspecified formulation 10/15/2018    Influenza Virus Vaccine 10/29/2018    Influenza, FLUAD, (age 72 y+), Adjuvanted, 0.5mL 09/13/2022    Influenza, FLUARIX, FLULAVAL,

## 2023-11-29 NOTE — DISCHARGE INSTRUCTIONS
Continue Metformin 1000 mg twice daily  Continue Farxiga 10 mg daily     Increase Lantus to 14 units at bedtime. 4.  Bring Dexcom sensors and  to next appointment. 5.  Consider getting a lanyard or over patches for Dexcom .

## 2023-12-04 DIAGNOSIS — E78.2 MIXED HYPERLIPIDEMIA: ICD-10-CM

## 2023-12-04 RX ORDER — PRAVASTATIN SODIUM 40 MG
TABLET ORAL
Qty: 90 TABLET | Refills: 3 | Status: SHIPPED | OUTPATIENT
Start: 2023-12-04

## 2023-12-04 NOTE — TELEPHONE ENCOUNTER
Please Approve or Refuse.   Send to Pharmacy per Pt's Request:      Next Visit Date:  12/22/2023   Last Visit Date: 11/21/2023    Hemoglobin A1C (%)   Date Value   11/21/2023 9.7   08/15/2023 8.9   01/28/2023 7.1 (H)             ( goal A1C is < 7)   BP Readings from Last 3 Encounters:   11/29/23 122/79   11/28/23 115/73   11/21/23 136/84          (goal 120/80)  BUN   Date Value Ref Range Status   11/07/2023 19 (9     - 20) MG/DL Final     Creatinine   Date Value Ref Range Status   11/07/2023 1.09 (0.66  - 1.25) MG/DL Final     Potassium   Date Value Ref Range Status   11/07/2023 4.7 (3.5   - 5.1) MMOL/L Final

## 2023-12-06 ENCOUNTER — HOSPITAL ENCOUNTER (OUTPATIENT)
Dept: PHARMACY | Age: 68
Setting detail: THERAPIES SERIES
Discharge: HOME | End: 2023-12-06
Payer: MEDICARE

## 2023-12-06 PROCEDURE — 99213 OFFICE O/P EST LOW 20 MIN: CPT | Performed by: PHARMACIST

## 2023-12-06 NOTE — PROGRESS NOTES
Diabetic Medication Management Program  [facility]  [address] [facility]. [place], [address]  Phone: [phone]  Fax: [phone]    NAME: Lionel Vargas  MEDICAL RECORD NUMBER:  [id number]  AGE: 76 y.o. GENDER: male  : 1955  EPISODE DATE:  2023     Mr. Brigette Castro was referred to [facility] by Dr. Ryann Pires. Patient acknowledges working in consult agreement with clinical pharmacist and this provider. Goals per referral:   Fasting blood glucose: < 130  Peak postprandial glucose: < 180  A1C: < 7    SUBJECTIVE     Mr. Brigette Castro is a 76 y.o. male here for the Diabetes Service for self-management education, medication review including over the counter medications and herbal products, overall wellbeing assessment, transition of care and any needed adjustments with updates and recommendations communicated to the referring physician. Patient Findings:     Medication changes  no  Diet changes  Yes: Cut out mountain dew. Has had some Vernors (regular) but limited to 2-3 per week. Activity changes  no  Emergency Room Visit or Hospitalization  no  Acute Illness/new problems  no  Symptoms of hypoglycemia  no - none  Symptoms of hyperglycemia  no - excessive thirst  Dry mouth all the time  Medication adverse reactions  none    Comments:    Patient brings in [address]  and sensor for help setting up. Patient educated on use of Dexcom G7 as to placement, application, preparation of site and possible methods to help with retaining sensor in place for total 10 days. Has Dexcom customer service number to contact if any issue with falling off. Patient also educated on Dexcom  to blood glucose value, trending arrow and glucose trend graph. Patient educated that Dexcom measures interstitial fluid and typical glucometer measures glucose in the blood. There may be a delay between glucose in the interstitial fluid and the blood.

## 2023-12-06 NOTE — DISCHARGE INSTRUCTIONS
Continue Metformin 1000 mg twice daily  Continue Farxiga 10 mg daily    Increase Lantus to 18 units at bedtime. 4.  Bring dexcom  to every appointment  5.  Dexcom customer service number- [phone]

## 2023-12-22 PROBLEM — E55.9 VITAMIN D DEFICIENCY: Status: ACTIVE | Noted: 2023-12-22

## 2023-12-22 PROBLEM — R60.0 BILATERAL LEG EDEMA: Status: RESOLVED | Noted: 2023-08-15 | Resolved: 2023-12-22

## 2023-12-22 PROBLEM — E11.65 TYPE 2 DIABETES MELLITUS WITH HYPERGLYCEMIA, WITH LONG-TERM CURRENT USE OF INSULIN (HCC): Status: ACTIVE | Noted: 2023-12-22

## 2023-12-22 PROBLEM — R13.19 ESOPHAGEAL DYSPHAGIA: Status: RESOLVED | Noted: 2023-01-15 | Resolved: 2023-12-22

## 2023-12-22 PROBLEM — H81.13 BENIGN PAROXYSMAL POSITIONAL VERTIGO DUE TO BILATERAL VESTIBULAR DISORDER: Status: ACTIVE | Noted: 2023-12-22

## 2023-12-22 PROBLEM — I25.2 OLD MYOCARDIAL INFARCTION: Status: RESOLVED | Noted: 2022-05-20 | Resolved: 2023-12-22

## 2023-12-22 PROBLEM — J41.1 MUCOPURULENT CHRONIC BRONCHITIS (HCC): Status: ACTIVE | Noted: 2023-12-22

## 2023-12-22 PROBLEM — N18.2 BENIGN HYPERTENSION WITH CKD (CHRONIC KIDNEY DISEASE), STAGE II: Status: ACTIVE | Noted: 2022-09-15

## 2023-12-22 PROBLEM — R53.1 WEAKNESS: Status: RESOLVED | Noted: 2022-05-20 | Resolved: 2023-12-22

## 2023-12-22 PROBLEM — J30.9 ALLERGIC RHINITIS DUE TO ALLERGEN: Status: ACTIVE | Noted: 2023-12-22

## 2023-12-22 PROBLEM — E66.01 SEVERE OBESITY (BMI 35.0-39.9) WITH COMORBIDITY (HCC): Status: RESOLVED | Noted: 2023-08-15 | Resolved: 2023-12-22

## 2023-12-22 PROBLEM — N18.2 TYPE 2 DIABETES MELLITUS WITH STAGE 2 CHRONIC KIDNEY DISEASE, WITH LONG-TERM CURRENT USE OF INSULIN (HCC): Status: ACTIVE | Noted: 2022-09-15

## 2023-12-22 PROBLEM — R26.2 DIFFICULTY IN WALKING, NOT ELSEWHERE CLASSIFIED: Status: RESOLVED | Noted: 2022-05-20 | Resolved: 2023-12-22

## 2023-12-22 PROBLEM — Z79.4 TYPE 2 DIABETES MELLITUS WITH HYPERGLYCEMIA, WITH LONG-TERM CURRENT USE OF INSULIN (HCC): Status: ACTIVE | Noted: 2023-12-22

## 2023-12-22 PROBLEM — Z99.89 DEPENDENCE ON OTHER ENABLING MACHINES AND DEVICES: Status: RESOLVED | Noted: 2022-05-20 | Resolved: 2023-12-22

## 2023-12-27 ENCOUNTER — TELEPHONE (OUTPATIENT)
Dept: FAMILY MEDICINE CLINIC | Age: 68
End: 2023-12-27

## 2023-12-27 NOTE — TELEPHONE ENCOUNTER
Patient was started on insulin sliding scale, which is given after meals as needed only, if the blood glucose is 140 or over. He has Dexcom, so is easy to check blood glucose 2 hours after meals and if the blood glucose is higher than 140 mg to use the sliding scale, and wait another 2 hours before using it again to prevent hypoglycemia. He reported very high readings, that is why I did start him on as needed insulin sliding scale short acting insulin. I also did advise him to get a notebook and write down the numbers 2 hours after eating: Breakfast lunch dinner, or any snacks--and if he needed to give himself any sliding scale and how many units--- to write down. This way when he goes to clinical pharmacist education, they can change the insulin sliding scale in exactly how many units he would need just before meals--but this will be the next step , not now-      insulin regular (HUMULIN R) 100 UNIT/ML injection Glucose: Dose: If <139 No Insulin ;140-199 2 Units ;200-249 4 Units ; 250-299 6 Units; 300-349 8 Units; 350-400 10 Units Above 400 12 Units. Dispense: 10 mL, Refills: 3 ordered --      12/22/2023 -- Latoya Sadler MD         Summary: Glucose: Dose: If <139 No Insulin ;140-199 2 Units ;200-249 4 Units ; 250-299 6 Units; 300-349 8 Units; 350-400 10 Units Above 400 12 Units. , Disp-10 mL, R-3 Normal  Guidelines: Med Note: Dx Associated: Different Encounter: Start Date & Time: 12/22/2023Ord/Sold: 12/22/2023 (O)Ordered On: 12/22/2023ReportPharmacy: [id number], [address] [place] [address] -  [phone]             Prior Authorization:   Request PA  Admin Instructions: Glucose: Dose: If <139 No Insulin ;140-199 2 Units ;200-249 4 Units ; 250-299 6 Units; 300-349 8 Units; 350-400 10 Units Above 400 12 Units.   Dose History

## 2023-12-27 NOTE — TELEPHONE ENCOUNTER
PATIENT CALLED STATED HE WAS CONFUSED ON THE DIRECTIONS FOR USE ON THE insulin regular (HUMULIN R) 100 UNIT/ML injection STATED HE WAS UNSURE OF WHEN HE WAS TO USE THE MEDICATION AND WHEN HE IS SUPPOSED TO CHECK HIS LEVELS AND USE THE MEDICATION

## 2024-01-04 ENCOUNTER — HOSPITAL ENCOUNTER (OUTPATIENT)
Age: 69
Discharge: HOME | End: 2024-01-04
Payer: MEDICARE

## 2024-01-04 ENCOUNTER — HOSPITAL ENCOUNTER (OUTPATIENT)
Age: 69
Discharge: HOME | End: 2024-01-06
Payer: MEDICARE

## 2024-01-04 ENCOUNTER — HOSPITAL ENCOUNTER (OUTPATIENT)
Dept: GENERAL RADIOLOGY | Age: 69
Discharge: HOME | End: 2024-01-06
Attending: FAMILY MEDICINE
Payer: MEDICARE

## 2024-01-04 DIAGNOSIS — Z79.4 TYPE 2 DIABETES MELLITUS WITH HYPERGLYCEMIA, WITH LONG-TERM CURRENT USE OF INSULIN (HCC): ICD-10-CM

## 2024-01-04 DIAGNOSIS — E21.3 HYPERPARATHYROIDISM (HCC): ICD-10-CM

## 2024-01-04 DIAGNOSIS — I12.9 BENIGN HYPERTENSION WITH CKD (CHRONIC KIDNEY DISEASE), STAGE II: ICD-10-CM

## 2024-01-04 DIAGNOSIS — E11.65 TYPE 2 DIABETES MELLITUS WITH HYPERGLYCEMIA, WITH LONG-TERM CURRENT USE OF INSULIN (HCC): ICD-10-CM

## 2024-01-04 DIAGNOSIS — N18.2 BENIGN HYPERTENSION WITH CKD (CHRONIC KIDNEY DISEASE), STAGE II: ICD-10-CM

## 2024-01-04 DIAGNOSIS — R31.9 HEMATURIA, UNSPECIFIED TYPE: Primary | ICD-10-CM

## 2024-01-04 DIAGNOSIS — Z79.4 TYPE 2 DIABETES MELLITUS WITH STAGE 2 CHRONIC KIDNEY DISEASE, WITH LONG-TERM CURRENT USE OF INSULIN (HCC): ICD-10-CM

## 2024-01-04 DIAGNOSIS — N18.2 TYPE 2 DIABETES MELLITUS WITH STAGE 2 CHRONIC KIDNEY DISEASE, WITH LONG-TERM CURRENT USE OF INSULIN (HCC): ICD-10-CM

## 2024-01-04 DIAGNOSIS — E55.9 VITAMIN D DEFICIENCY: ICD-10-CM

## 2024-01-04 DIAGNOSIS — E11.22 TYPE 2 DIABETES MELLITUS WITH STAGE 2 CHRONIC KIDNEY DISEASE, WITH LONG-TERM CURRENT USE OF INSULIN (HCC): ICD-10-CM

## 2024-01-04 DIAGNOSIS — E79.0 HYPERURICEMIA: ICD-10-CM

## 2024-01-04 DIAGNOSIS — Z12.5 PROSTATE CANCER SCREENING: ICD-10-CM

## 2024-01-04 DIAGNOSIS — I12.9 BENIGN HYPERTENSION WITH CKD (CHRONIC KIDNEY DISEASE) STAGE III (HCC): ICD-10-CM

## 2024-01-04 DIAGNOSIS — J41.1 MUCOPURULENT CHRONIC BRONCHITIS (HCC): ICD-10-CM

## 2024-01-04 DIAGNOSIS — N18.30 BENIGN HYPERTENSION WITH CKD (CHRONIC KIDNEY DISEASE) STAGE III (HCC): ICD-10-CM

## 2024-01-04 PROBLEM — J98.6 ACQUIRED ELEVATED HEMIDIAPHRAGM: Status: ACTIVE | Noted: 2024-01-04

## 2024-01-04 LAB
25(OH)D3 SERPL-MCNC: 31.4 NG/ML (ref 30–100)
ALBUMIN SERPL-MCNC: 4.4 G/DL (ref 3.5–5.2)
ALP SERPL-CCNC: 113 U/L (ref 40–129)
ALT SERPL-CCNC: 44 U/L (ref 5–41)
AMORPH SED URNS QL MICRO: ABNORMAL
ANION GAP SERPL CALCULATED.3IONS-SCNC: 11 MMOL/L (ref 9–17)
AST SERPL-CCNC: 26 U/L
BACTERIA URNS QL MICRO: ABNORMAL
BILIRUB SERPL-MCNC: 0.8 MG/DL (ref 0.3–1.2)
BILIRUB UR QL STRIP: NEGATIVE
BUN SERPL-MCNC: 21 MG/DL (ref 8–23)
CALCIUM SERPL-MCNC: 10 MG/DL (ref 8.6–10.4)
CHLORIDE SERPL-SCNC: 101 MMOL/L (ref 98–107)
CLARITY UR: CLEAR
CO2 SERPL-SCNC: 30 MMOL/L (ref 20–31)
COLOR UR: YELLOW
CREAT SERPL-MCNC: 1.5 MG/DL (ref 0.7–1.2)
CRYSTALS URNS MICRO: ABNORMAL /HPF
CRYSTALS URNS MICRO: ABNORMAL /HPF
EPI CELLS #/AREA URNS HPF: ABNORMAL /HPF
ERYTHROCYTE [DISTWIDTH] IN BLOOD BY AUTOMATED COUNT: 14.9 % (ref 11.5–14.9)
FOLATE SERPL-MCNC: 6.8 NG/ML (ref 4.8–24.2)
GFR SERPL CREATININE-BSD FRML MDRD: 50 ML/MIN/1.73M2
GLUCOSE SERPL-MCNC: 158 MG/DL (ref 70–99)
GLUCOSE UR STRIP-MCNC: ABNORMAL MG/DL
HCT VFR BLD AUTO: 44.4 % (ref 41–53)
HGB BLD-MCNC: 14.8 G/DL (ref 13.5–17.5)
HGB UR QL STRIP.AUTO: ABNORMAL
KETONES UR STRIP-MCNC: ABNORMAL MG/DL
LEUKOCYTE ESTERASE UR QL STRIP: ABNORMAL
MAGNESIUM SERPL-MCNC: 1.9 MG/DL (ref 1.6–2.6)
MCH RBC QN AUTO: 30.4 PG (ref 26–34)
MCHC RBC AUTO-ENTMCNC: 33.4 G/DL (ref 31–37)
MCV RBC AUTO: 91 FL (ref 80–100)
NITRITE UR QL STRIP: NEGATIVE
PH UR STRIP: 5.5 [PH] (ref 5–8)
PHOSPHATE SERPL-MCNC: 3.7 MG/DL (ref 2.5–4.5)
PLATELET # BLD AUTO: 224 K/UL (ref 150–450)
PMV BLD AUTO: 8.1 FL (ref 6–12)
POTASSIUM SERPL-SCNC: 4.1 MMOL/L (ref 3.7–5.3)
PROT SERPL-MCNC: 7.5 G/DL (ref 6.4–8.3)
PROT UR STRIP-MCNC: ABNORMAL MG/DL
PSA SERPL-MCNC: 1.1 NG/ML (ref 0–4)
RBC # BLD AUTO: 4.88 M/UL (ref 4.5–5.9)
RBC #/AREA URNS HPF: ABNORMAL /HPF
SODIUM SERPL-SCNC: 142 MMOL/L (ref 135–144)
SP GR UR STRIP: 1.02 (ref 1–1.03)
TSH SERPL DL<=0.05 MIU/L-ACNC: 3.12 UIU/ML (ref 0.3–5)
URATE SERPL-MCNC: 7.4 MG/DL (ref 3.4–7)
UROBILINOGEN UR STRIP-ACNC: NORMAL EU/DL (ref 0–1)
VIT B12 SERPL-MCNC: 466 PG/ML (ref 232–1245)
WBC #/AREA URNS HPF: ABNORMAL /HPF
WBC OTHER # BLD: 10 K/UL (ref 3.5–11)

## 2024-01-04 PROCEDURE — 81001 URINALYSIS AUTO W/SCOPE: CPT

## 2024-01-04 PROCEDURE — 84443 ASSAY THYROID STIM HORMONE: CPT

## 2024-01-04 PROCEDURE — 82306 VITAMIN D 25 HYDROXY: CPT

## 2024-01-04 PROCEDURE — 85027 COMPLETE CBC AUTOMATED: CPT

## 2024-01-04 PROCEDURE — 84100 ASSAY OF PHOSPHORUS: CPT

## 2024-01-04 PROCEDURE — 36415 COLL VENOUS BLD VENIPUNCTURE: CPT

## 2024-01-04 PROCEDURE — 82746 ASSAY OF FOLIC ACID SERUM: CPT

## 2024-01-04 PROCEDURE — G0103 PSA SCREENING: HCPCS

## 2024-01-04 PROCEDURE — 71046 X-RAY EXAM CHEST 2 VIEWS: CPT

## 2024-01-04 PROCEDURE — 82607 VITAMIN B-12: CPT

## 2024-01-04 PROCEDURE — 84550 ASSAY OF BLOOD/URIC ACID: CPT

## 2024-01-04 PROCEDURE — 83735 ASSAY OF MAGNESIUM: CPT

## 2024-01-04 PROCEDURE — 80053 COMPREHEN METABOLIC PANEL: CPT

## 2024-01-04 RX ORDER — ALLOPURINOL 100 MG/1
100 TABLET ORAL DAILY
Qty: 90 TABLET | Refills: 1 | Status: SHIPPED | OUTPATIENT
Start: 2024-01-04

## 2024-01-04 NOTE — RESULT ENCOUNTER NOTE
Please notify patient: Patient has chronic changes on chest x-ray     Chronic elevation of the left hemidiaphragm for which he follows with pulmonologist  Degenerative changes in his spine  There is no fluid in his lungs, and no pneumonia    Calcifications in the heart, for which he sees cardiologist      Future Appointments  1/12/2024  11:10 AM   [facility] MEDICATION MGMT       [facility] MED MGMT        [facility]  2/29/2024  8:30 AM    May Cole APRN * [facility]           [facility]  5/28/2024  10:00 AM   Antonio Farrar APRN - CNP Resp Spec           [facility]  8/16/2024  10:00 AM   [facility]

## 2024-01-04 NOTE — RESULT ENCOUNTER NOTE
Please notify patient: There is blood in the urine, follow-up with urology, does he have any flank pain or abdominal pain, might be a kidney stone    He needs to drink his water 6 x 8 ounce glasses of water every day  There are crystals in the urine  Uric acid is high, it was normal before, this can lead to gout and kidney stones  Blood glucose 158 but improved from prior  Kidney disease is decreased, kidney disease stage III now which is new, does he have any difficulty urinating, incomplete bladder emptying?  This can affect the kidneys, he is on Proscar and Flomax which should help urination  Patient does have hyperparathyroidism, on Sensipar per Dr. Muñoz note from 11/9/2020, report reviewed, I will update his history, this can predispose him to make kidney stones    I will order an ultrasound of the kidneys and an abdominal x-ray to look for kidney stones, I will refer him also to a nephrologist, and recheck blood work in 1 week for kidney function, orders in the computer, please give him contact information to schedule all.  Also allopurinol for high uric acid will be sent to the pharmacy, to absolutely stop drinking pop if he drinks any.    Liver test is slightly improved  Vitamin D slightly borderline low improved      Otherwise labs within normal limits  continue current treatment    Future Appointments  1/12/2024  11:10 AM   [facility] MEDICATION MGMT       ST MED MGMT        [facility]  2/29/2024  8:30 AM    May Cole APRN * [facility]           [facility]  [facility]  10:00 AM   Antonio Farrar APRN - CNP Resp Spec           [facility]  8/16/2024  10:00 AM   Rosa Lauren MD    [facility]

## 2024-01-10 ENCOUNTER — HOSPITAL ENCOUNTER (OUTPATIENT)
Dept: ULTRASOUND IMAGING | Age: 69
Discharge: HOME | End: 2024-01-12
Attending: FAMILY MEDICINE
Payer: MEDICARE

## 2024-01-10 DIAGNOSIS — N18.30 BENIGN HYPERTENSION WITH CKD (CHRONIC KIDNEY DISEASE) STAGE III (HCC): ICD-10-CM

## 2024-01-10 DIAGNOSIS — R31.9 HEMATURIA, UNSPECIFIED TYPE: ICD-10-CM

## 2024-01-10 DIAGNOSIS — I12.9 BENIGN HYPERTENSION WITH CKD (CHRONIC KIDNEY DISEASE) STAGE III (HCC): ICD-10-CM

## 2024-01-10 DIAGNOSIS — E21.3 HYPERPARATHYROIDISM (HCC): ICD-10-CM

## 2024-01-10 PROBLEM — N28.1 KIDNEY CYST, ACQUIRED: Status: ACTIVE | Noted: 2024-01-10

## 2024-01-10 PROCEDURE — 76775 US EXAM ABDO BACK WALL LIM: CPT

## 2024-01-10 NOTE — TELEPHONE ENCOUNTER
Writer contacted patient, provided motility study procedure date (2/9/24 @7am arrival time 6am) however, patient stated will be going on vacation from 1/28/24 to 2/26/24. Writer notified patient our office will have to get back with patient on any future dates as [facility]'s only has 2/9/24 antonietta at this time. Patient thanked writer, phone call ended.

## 2024-01-11 DIAGNOSIS — I25.10 CORONARY ARTERY DISEASE INVOLVING NATIVE CORONARY ARTERY OF NATIVE HEART WITHOUT ANGINA PECTORIS: ICD-10-CM

## 2024-01-11 DIAGNOSIS — I12.9 BENIGN HYPERTENSION WITH CKD (CHRONIC KIDNEY DISEASE) STAGE III (HCC): ICD-10-CM

## 2024-01-11 DIAGNOSIS — E78.2 MIXED HYPERLIPIDEMIA: ICD-10-CM

## 2024-01-11 DIAGNOSIS — N18.30 BENIGN HYPERTENSION WITH CKD (CHRONIC KIDNEY DISEASE) STAGE III (HCC): ICD-10-CM

## 2024-01-11 RX ORDER — METOPROLOL SUCCINATE 100 MG/1
100 TABLET, EXTENDED RELEASE ORAL DAILY
Qty: 90 TABLET | Refills: 3 | Status: SHIPPED | OUTPATIENT
Start: 2024-01-11

## 2024-01-11 RX ORDER — ACETAMINOPHEN 160 MG/1
100 TABLET, CHEWABLE ORAL EVERY EVENING
Qty: 90 CAPSULE | Refills: 3 | Status: SHIPPED | OUTPATIENT
Start: 2024-01-11

## 2024-01-11 NOTE — TELEPHONE ENCOUNTER
Please Approve or Refuse.  Send to Pharmacy per Pt's Request:      Next Visit Date:  Visit date not found   Last Visit Date: 12/22/2023    Hemoglobin A1C (%)   Date Value   11/21/2023 9.7   08/15/2023 8.9   01/28/2023 7.1 (H)             ( goal A1C is < 7)   BP Readings from Last 3 Encounters:   12/22/23 118/80   12/18/23 122/80   11/29/23 122/79          (goal 120/80)  BUN   Date Value Ref Range Status   01/04/2024 21 8 - 23 mg/dL Final     Creatinine   Date Value Ref Range Status   01/04/2024 1.5 (H) 0.7 - 1.2 mg/dL Final     Potassium   Date Value Ref Range Status   01/04/2024 4.1 3.7 - 5.3 mmol/L Final

## 2024-01-12 ENCOUNTER — HOSPITAL ENCOUNTER (OUTPATIENT)
Age: 69
Discharge: HOME | End: 2024-01-12
Payer: MEDICARE

## 2024-01-12 ENCOUNTER — HOSPITAL ENCOUNTER (OUTPATIENT)
Dept: PHARMACY | Age: 69
Setting detail: THERAPIES SERIES
Discharge: HOME | End: 2024-01-12
Attending: FAMILY MEDICINE
Payer: MEDICARE

## 2024-01-12 VITALS — HEART RATE: 93 BPM | OXYGEN SATURATION: 95 % | DIASTOLIC BLOOD PRESSURE: 77 MMHG | SYSTOLIC BLOOD PRESSURE: 129 MMHG

## 2024-01-12 LAB
25(OH)D3 SERPL-MCNC: 31.9 NG/ML (ref 30–100)
CA-I BLD-SCNC: 1.22 MMOL/L (ref 1.13–1.33)
CALCIUM SERPL-MCNC: 10 MG/DL (ref 8.6–10.4)
PTH-INTACT SERPL-MCNC: 34.8 PG/ML (ref 14–72)

## 2024-01-12 PROCEDURE — 99213 OFFICE O/P EST LOW 20 MIN: CPT | Performed by: PHARMACIST

## 2024-01-12 PROCEDURE — 82330 ASSAY OF CALCIUM: CPT

## 2024-01-12 PROCEDURE — 82310 ASSAY OF CALCIUM: CPT

## 2024-01-12 PROCEDURE — 82306 VITAMIN D 25 HYDROXY: CPT

## 2024-01-12 PROCEDURE — 36415 COLL VENOUS BLD VENIPUNCTURE: CPT

## 2024-01-12 PROCEDURE — 83970 ASSAY OF PARATHORMONE: CPT

## 2024-01-12 NOTE — DISCHARGE INSTRUCTIONS
Continue Metformin 1000 mg twice daily  Continue Farxiga 10 mg daily  Continue Trulicity 0.75 mg once weekly  Increase Lantus to 30 units at bedtime.   Continue Humulin R with meals No Insulin ;140-199 2 Units ;200-249 4 Units ; 250-299 6 Units; 300-349 8 Units; 350-400 10 Units Above 400  12 Units.   Take prior to your meals. Do not wait 2 hours after your meals to take.     Bring dexcom  to every appointment. Dexcom customer service number- [phone].  Whenever you feel different than the Dexcom reading, check blood sugar with your glucometer.

## 2024-01-12 NOTE — PROGRESS NOTES
daily  Trulicity 0.75 mg once weekly  Humulin R- sliding scale given by Dr. Lauren     Diabetic Regimen/Compliance: yes except taking Humulin R after meals.      Other Medication Compliance: yes    Refills needed (diabetes medications/testing supplies). no    Labs needed (A1c, lipids, microalbumin, SCr etc) no    Up to date with eye/foot exams Yes  Eye Exam  2023  Foot Exam 2023    Patient is on appropriate statin, ACE/ARB, and ASA Yes  Aspirin 81 mg daily  Lisinopril 10 mg daily  Pravachol 40 mg daily  PLAN     Diabetic Action Plan is shown below. Patient and provider worked together to create goals which patient will work toward prior to the next appointment.    Continue Metformin 1000 mg twice daily  Continue Farxiga 10 mg daily  Continue Trulicity 0.75 mg once weekly (has not been on for 4 weeks yet. Will re-visit increasing dose next visit)  Increase Lantus to 30 units at bedtime  Continue Humulin R with meals No Insulin ;140-199 2 Units ;200-249 4 Units ; 250-299 6 Units; 300-349 8 Units; 350-400 10 Units Above 400  12 Units.   Take prior to your meals. Do not wait 2 hours after your meals to take.     Bring dexcom  to every appointment. Dexcom customer service number- [phone].  Whenever you feel different than the Dexcom reading, check blood sugar with your glucometer.       Physician Follow-up:  As scheduled    Medication Management Follow-up:   Diabetes Service 2 weeks. Pt will be going to [place] for 6 weeks and will check in with patient in 2 weeks prior to leaving.     Electronically signed by Danette Miller RPH on 2024 at 12:50 PM  For Pharmacy Admin Tracking Only    Program: Medication Management  CPA in place:  Yes  Recommendation Provided To: Patient/Caregiver: 3 via In person  Intervention Detail: Adherence Monitorin and Dose Adjustment: 1, reason: Therapy Optimization  Intervention Accepted By: Patient/Caregiver: 3  Gap Closed?: Yes   Time Spent (min):

## 2024-01-18 ENCOUNTER — TELEPHONE (OUTPATIENT)
Dept: FAMILY MEDICINE CLINIC | Age: 69
End: 2024-01-18

## 2024-01-18 DIAGNOSIS — N18.2 TYPE 2 DIABETES MELLITUS WITH STAGE 2 CHRONIC KIDNEY DISEASE, WITH LONG-TERM CURRENT USE OF INSULIN (HCC): ICD-10-CM

## 2024-01-18 DIAGNOSIS — Z79.4 TYPE 2 DIABETES MELLITUS WITH STAGE 2 CHRONIC KIDNEY DISEASE, WITH LONG-TERM CURRENT USE OF INSULIN (HCC): ICD-10-CM

## 2024-01-18 DIAGNOSIS — Z79.4 TYPE 2 DIABETES MELLITUS WITH HYPERGLYCEMIA, WITH LONG-TERM CURRENT USE OF INSULIN (HCC): ICD-10-CM

## 2024-01-18 DIAGNOSIS — E11.65 TYPE 2 DIABETES MELLITUS WITH HYPERGLYCEMIA, WITH LONG-TERM CURRENT USE OF INSULIN (HCC): ICD-10-CM

## 2024-01-18 DIAGNOSIS — E11.22 TYPE 2 DIABETES MELLITUS WITH STAGE 2 CHRONIC KIDNEY DISEASE, WITH LONG-TERM CURRENT USE OF INSULIN (HCC): ICD-10-CM

## 2024-01-18 RX ORDER — INSULIN GLARGINE 100 [IU]/ML
30 INJECTION, SOLUTION SUBCUTANEOUS NIGHTLY
Qty: 10 ADJUSTABLE DOSE PRE-FILLED PEN SYRINGE | Refills: 3 | Status: SHIPPED | OUTPATIENT
Start: 2024-01-18

## 2024-01-18 RX ORDER — DULAGLUTIDE 0.75 MG/.5ML
0.75 INJECTION, SOLUTION SUBCUTANEOUS
Qty: 4 ADJUSTABLE DOSE PRE-FILLED PEN SYRINGE | Refills: 0 | Status: SHIPPED | OUTPATIENT
Start: 2024-01-18

## 2024-01-18 NOTE — TELEPHONE ENCOUNTER
Please Approve or Refuse.  Send to Pharmacy per Pt's Request: PATIENT STATED RX: FOR LANTUS NEEDS TO BE CHANGED TO 30 UNITS      Next Visit Date:  Visit date not found   Last Visit Date: 12/22/2023    Hemoglobin A1C (%)   Date Value   11/21/2023 9.7   08/15/2023 8.9   01/28/2023 7.1 (H)             ( goal A1C is < 7)   BP Readings from Last 3 Encounters:   01/12/24 129/77   12/22/23 118/80   12/18/23 122/80          (goal 120/80)  BUN   Date Value Ref Range Status   01/04/2024 21 8 - 23 mg/dL Final     Creatinine   Date Value Ref Range Status   01/04/2024 1.5 (H) 0.7 - 1.2 mg/dL Final     Potassium   Date Value Ref Range Status   01/04/2024 4.1 3.7 - 5.3 mmol/L Final

## 2024-01-18 NOTE — TELEPHONE ENCOUNTER
Please Approve or Refuse.  Send to Pharmacy per Pt's Request:      Next Visit Date:  Visit date not found   Last Visit Date: 12/22/2023    Hemoglobin A1C (%)   Date Value   11/21/2023 9.7   08/15/2023 8.9   01/28/2023 7.1 (H)             ( goal A1C is < 7)   BP Readings from Last 3 Encounters:   01/12/24 129/77   12/22/23 118/80   12/18/23 122/80          (goal 120/80)  BUN   Date Value Ref Range Status   01/04/2024 21 8 - 23 mg/dL Final     Creatinine   Date Value Ref Range Status   01/04/2024 1.5 (H) 0.7 - 1.2 mg/dL Final     Potassium   Date Value Ref Range Status   01/04/2024 4.1 3.7 - 5.3 mmol/L Final

## 2024-01-18 NOTE — TELEPHONE ENCOUNTER
Please let the patient know to  prescription from the pharmacy.    Orders Placed This Encounter   Medications    insulin glargine (LANTUS SOLOSTAR) 100 UNIT/ML injection pen     Sig: Inject 30 Units into the skin nightly Dose increased 1/12/24  30 units     Dispense:  10 Adjustable Dose Pre-filled Pen Syringe     Refill:  3         [id number] - [place], [place] - [address] [facility] - P [phone] - F [phone]  [address] [address] [address]  Phone: [phone] Fax: [phone]      No orders of the defined types were placed in this encounter.      Future Appointments   Date Time Provider Department Center   1/26/2024 10:10 AM ST MEDICATION MGMT ST MED [facility]   2/28/2024  9:00 AM Pancho Bellamy MD [facility] Se   2/29/2024  8:30 AM May Cole APRN - CNP [facility]   5/28/2024 10:00 AM Antonio Farrar APRN - CNP Resp Spec [facility]   8/16/2024 10:00 AM [facility]       Thank you!

## 2024-01-26 ENCOUNTER — TELEPHONE (OUTPATIENT)
Dept: PHARMACY | Age: 69
End: 2024-01-26

## 2024-01-26 ENCOUNTER — HOSPITAL ENCOUNTER (OUTPATIENT)
Dept: PHARMACY | Age: 69
Setting detail: THERAPIES SERIES
Discharge: HOME | End: 2024-01-26
Attending: FAMILY MEDICINE
Payer: MEDICARE

## 2024-01-26 ENCOUNTER — HOSPITAL ENCOUNTER (OUTPATIENT)
Age: 69
Setting detail: SPECIMEN
Discharge: HOME | End: 2024-01-26
Payer: MEDICARE

## 2024-01-26 VITALS
OXYGEN SATURATION: 96 % | HEART RATE: 116 BPM | DIASTOLIC BLOOD PRESSURE: 93 MMHG | BODY MASS INDEX: 33.63 KG/M2 | SYSTOLIC BLOOD PRESSURE: 122 MMHG | WEIGHT: 221.2 LBS

## 2024-01-26 DIAGNOSIS — N13.8 BPH WITH OBSTRUCTION/LOWER URINARY TRACT SYMPTOMS: ICD-10-CM

## 2024-01-26 DIAGNOSIS — K21.9 GASTROESOPHAGEAL REFLUX DISEASE WITHOUT ESOPHAGITIS: ICD-10-CM

## 2024-01-26 DIAGNOSIS — N40.1 BPH WITH OBSTRUCTION/LOWER URINARY TRACT SYMPTOMS: ICD-10-CM

## 2024-01-26 DIAGNOSIS — I25.10 CORONARY ARTERY DISEASE INVOLVING NATIVE CORONARY ARTERY OF NATIVE HEART WITHOUT ANGINA PECTORIS: ICD-10-CM

## 2024-01-26 LAB
ANION GAP SERPL CALCULATED.3IONS-SCNC: 13 MMOL/L (ref 9–17)
BUN SERPL-MCNC: 20 MG/DL (ref 8–23)
CALCIUM SERPL-MCNC: 10.3 MG/DL (ref 8.6–10.4)
CHLORIDE SERPL-SCNC: 101 MMOL/L (ref 98–107)
CO2 SERPL-SCNC: 26 MMOL/L (ref 20–31)
CREAT SERPL-MCNC: 1.3 MG/DL (ref 0.7–1.2)
CREAT UR-MCNC: 30.9 MG/DL (ref 39–259)
EST. AVERAGE GLUCOSE BLD GHB EST-MCNC: 194 MG/DL
GFR SERPL CREATININE-BSD FRML MDRD: 60 ML/MIN/1.73M2
GLUCOSE SERPL-MCNC: 214 MG/DL (ref 70–99)
HBA1C MFR BLD: 8.4 % (ref 4–6)
MICROALBUMIN UR-MCNC: <12 MG/L (ref 0–20)
MICROALBUMIN/CREAT UR-RTO: ABNORMAL MCG/MG CREAT (ref 0–17)
POTASSIUM SERPL-SCNC: 4.3 MMOL/L (ref 3.7–5.3)
SODIUM SERPL-SCNC: 140 MMOL/L (ref 135–144)

## 2024-01-26 PROCEDURE — 36415 COLL VENOUS BLD VENIPUNCTURE: CPT

## 2024-01-26 PROCEDURE — 82570 ASSAY OF URINE CREATININE: CPT

## 2024-01-26 PROCEDURE — 99213 OFFICE O/P EST LOW 20 MIN: CPT

## 2024-01-26 PROCEDURE — 80048 BASIC METABOLIC PNL TOTAL CA: CPT

## 2024-01-26 PROCEDURE — 83036 HEMOGLOBIN GLYCOSYLATED A1C: CPT

## 2024-01-26 PROCEDURE — 82043 UR ALBUMIN QUANTITATIVE: CPT

## 2024-01-26 RX ORDER — FAMOTIDINE 20 MG/1
TABLET, FILM COATED ORAL
Qty: 90 TABLET | Refills: 0 | OUTPATIENT
Start: 2024-01-26

## 2024-01-26 RX ORDER — ASPIRIN 81 MG/1
81 TABLET, COATED ORAL DAILY
Qty: 90 TABLET | Refills: 1 | OUTPATIENT
Start: 2024-01-26

## 2024-01-26 NOTE — TELEPHONE ENCOUNTER
New Prescription to [facility] in [facility] 10 mg #90  RF 1

## 2024-01-26 NOTE — PROGRESS NOTES
Diabetic Medication Management Program  [facility]  [address]   [place], [place] [address]  Phone: [phone]  Fax: [phone]    NAME: Colton Arroyo  MEDICAL RECORD NUMBER:  [id number]  AGE: 68 y.o.   GENDER: male  : 1955  EPISODE DATE:  2024     Mr. Arroyo was referred to [facility] by Dr. VIA Lauren.  Patient acknowledges working in consult agreement with clinical pharmacist and this provider.     Goals per referral:   Fasting blood glucose: < 130  Peak postprandial glucose: < 180  A1C: < 7    SUBJECTIVE     Mr. Arroyo is a 68 y.o. male here for the Diabetes Service for self-management education, medication review including over the counter medications and herbal products, overall wellbeing assessment, transition of care and any needed adjustments with updates and recommendations communicated to the referring physician.       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

## 2024-01-26 NOTE — TELEPHONE ENCOUNTER
Please Approve or Refuse.  Send to Pharmacy per Pt's Request:      Next Visit Date:  8/16/2024  Last Visit Date: 12/22/2023    Hemoglobin A1C (%)   Date Value   01/26/2024 8.4 (H)   11/21/2023 9.7   08/15/2023 8.9             ( goal A1C is < 7)   BP Readings from Last 3 Encounters:   01/26/24 (!) 122/93   01/12/24 129/77   12/22/23 118/80          (goal 120/80)  BUN   Date Value Ref Range Status   01/26/2024 20 8 - 23 mg/dL Final     Creatinine   Date Value Ref Range Status   01/26/2024 1.3 (H) 0.7 - 1.2 mg/dL Final     Potassium   Date Value Ref Range Status   01/26/2024 4.3 3.7 - 5.3 mmol/L Final

## 2024-01-26 NOTE — DISCHARGE INSTRUCTIONS
Continue Metformin 1000 mg twice daily  Restart Farxiga 10 mg daily- [facility] [place]  Increase Lantus to 35 units at bedtime  Continue Humulin R with meals;  No Insulin 140-199  2 units  200-249  4 units   250-299  6 units   300-349  8 units  350-400  10 units  Above 400

## 2024-01-27 RX ORDER — FAMOTIDINE 20 MG/1
20 TABLET, FILM COATED ORAL
Qty: 90 TABLET | Refills: 0 | Status: SHIPPED | OUTPATIENT
Start: 2024-01-27

## 2024-01-27 RX ORDER — ASPIRIN 81 MG/1
81 TABLET ORAL DAILY
Qty: 90 TABLET | Refills: 1 | Status: SHIPPED | OUTPATIENT
Start: 2024-01-27

## 2024-01-27 NOTE — RESULT ENCOUNTER NOTE
Please notify patient that A1c is improving, improving diabetes. Continue to follow with medication management. Kidney function unchanged.

## 2024-01-29 RX ORDER — FINASTERIDE 5 MG/1
TABLET, FILM COATED ORAL
Qty: 90 TABLET | Refills: 0 | Status: SHIPPED | OUTPATIENT
Start: 2024-01-29

## 2024-01-29 RX ORDER — TAMSULOSIN HYDROCHLORIDE 0.4 MG/1
CAPSULE ORAL
Qty: 180 CAPSULE | Refills: 0 | Status: SHIPPED | OUTPATIENT
Start: 2024-01-29

## 2024-01-29 NOTE — TELEPHONE ENCOUNTER
Last seen 2/15/23  Plan:   BPH symptoms stable on combo therapy.   Refills sent.   PSA stable, repeat 1 year.   Watch bladder irritants.   Return in 1 year or sooner if needed.     Return in about 1 year (around 2/15/2024), or if symptoms worsen or fail to improve, for PSA.  Next OV 2/29/24    Last filled for 1 yr 2/15/23

## 2024-02-26 ENCOUNTER — HOSPITAL ENCOUNTER (OUTPATIENT)
Age: 69
Discharge: HOME | End: 2024-02-26
Payer: MEDICARE

## 2024-02-26 LAB
ANION GAP SERPL CALCULATED.3IONS-SCNC: 12 MMOL/L (ref 9–17)
BACTERIA URNS QL MICRO: ABNORMAL
BASOPHILS # BLD: 0.1 K/UL (ref 0–0.2)
BASOPHILS NFR BLD: 1 % (ref 0–2)
BILIRUB UR QL STRIP: NEGATIVE
BUN SERPL-MCNC: 26 MG/DL (ref 8–23)
CALCIUM SERPL-MCNC: 10.1 MG/DL (ref 8.6–10.4)
CASTS #/AREA URNS LPF: ABNORMAL /LPF
CHLORIDE SERPL-SCNC: 99 MMOL/L (ref 98–107)
CLARITY UR: CLEAR
CO2 SERPL-SCNC: 29 MMOL/L (ref 20–31)
COLOR UR: YELLOW
CREAT SERPL-MCNC: 1.5 MG/DL (ref 0.7–1.2)
EOSINOPHIL # BLD: 0.5 K/UL (ref 0–0.4)
EOSINOPHILS RELATIVE PERCENT: 7 % (ref 0–4)
EPI CELLS #/AREA URNS HPF: ABNORMAL /HPF
ERYTHROCYTE [DISTWIDTH] IN BLOOD BY AUTOMATED COUNT: 14.9 % (ref 11.5–14.9)
GFR SERPL CREATININE-BSD FRML MDRD: 50 ML/MIN/1.73M2
GLUCOSE SERPL-MCNC: 303 MG/DL (ref 70–99)
GLUCOSE UR STRIP-MCNC: ABNORMAL MG/DL
HCT VFR BLD AUTO: 43.3 % (ref 41–53)
HGB BLD-MCNC: 14.4 G/DL (ref 13.5–17.5)
HGB UR QL STRIP.AUTO: NEGATIVE
KETONES UR STRIP-MCNC: ABNORMAL MG/DL
LEUKOCYTE ESTERASE UR QL STRIP: NEGATIVE
LYMPHOCYTES NFR BLD: 1.2 K/UL (ref 1–4.8)
LYMPHOCYTES RELATIVE PERCENT: 16 % (ref 24–44)
MAGNESIUM SERPL-MCNC: 1.8 MG/DL (ref 1.6–2.6)
MCH RBC QN AUTO: 30.2 PG (ref 26–34)
MCHC RBC AUTO-ENTMCNC: 33.3 G/DL (ref 31–37)
MCV RBC AUTO: 90.6 FL (ref 80–100)
MONOCYTES NFR BLD: 0.6 K/UL (ref 0.1–1.3)
MONOCYTES NFR BLD: 8 % (ref 1–7)
NEUTROPHILS NFR BLD: 68 % (ref 36–66)
NEUTS SEG NFR BLD: 5.2 K/UL (ref 1.3–9.1)
NITRITE UR QL STRIP: NEGATIVE
PH UR STRIP: 5 [PH] (ref 5–8)
PHOSPHATE SERPL-MCNC: 3.5 MG/DL (ref 2.5–4.5)
PLATELET # BLD AUTO: 194 K/UL (ref 150–450)
PMV BLD AUTO: 7.9 FL (ref 6–12)
POTASSIUM SERPL-SCNC: 4.2 MMOL/L (ref 3.7–5.3)
PROT UR STRIP-MCNC: ABNORMAL MG/DL
RBC # BLD AUTO: 4.78 M/UL (ref 4.5–5.9)
RBC #/AREA URNS HPF: ABNORMAL /HPF
SODIUM SERPL-SCNC: 140 MMOL/L (ref 135–144)
SP GR UR STRIP: 1.02 (ref 1–1.03)
UROBILINOGEN UR STRIP-ACNC: NORMAL EU/DL (ref 0–1)
WBC #/AREA URNS HPF: ABNORMAL /HPF
WBC OTHER # BLD: 7.5 K/UL (ref 3.5–11)

## 2024-02-26 PROCEDURE — 36415 COLL VENOUS BLD VENIPUNCTURE: CPT

## 2024-02-26 PROCEDURE — 83735 ASSAY OF MAGNESIUM: CPT

## 2024-02-26 PROCEDURE — 80048 BASIC METABOLIC PNL TOTAL CA: CPT

## 2024-02-26 PROCEDURE — 85025 COMPLETE CBC W/AUTO DIFF WBC: CPT

## 2024-02-26 PROCEDURE — 84100 ASSAY OF PHOSPHORUS: CPT

## 2024-02-26 PROCEDURE — 81001 URINALYSIS AUTO W/SCOPE: CPT

## 2024-02-28 ENCOUNTER — HOSPITAL ENCOUNTER (OUTPATIENT)
Dept: PHARMACY | Age: 69
Setting detail: THERAPIES SERIES
Discharge: HOME | End: 2024-02-28
Attending: FAMILY MEDICINE
Payer: MEDICARE

## 2024-02-28 VITALS
WEIGHT: 222 LBS | DIASTOLIC BLOOD PRESSURE: 78 MMHG | SYSTOLIC BLOOD PRESSURE: 120 MMHG | BODY MASS INDEX: 33.75 KG/M2 | HEART RATE: 78 BPM | OXYGEN SATURATION: 93 %

## 2024-02-28 PROCEDURE — 85610 PROTHROMBIN TIME: CPT | Performed by: PHARMACY

## 2024-02-28 PROCEDURE — 99213 OFFICE O/P EST LOW 20 MIN: CPT | Performed by: PHARMACY

## 2024-02-28 RX ORDER — LOSARTAN POTASSIUM 25 MG/1
25 TABLET ORAL DAILY
COMMUNITY

## 2024-02-28 NOTE — DISCHARGE INSTRUCTIONS
Increase Lantus to 40 units once daily.  Continue Humulin R with sliding scale and metformin 1000 mg twice daily.  Restart Farxiga 10 mg once daily and follow up with Dr. Lauren's office about Trulicity prior authorization.   Consider healthier snack options at nights, like almonds, cheese, or nuts. Try to avoid sugary snacks, like with chips.

## 2024-02-28 NOTE — PROGRESS NOTES
or losartan.      PLAN         Diabetic Action Plan is shown below. Patient and provider worked together to create goals which patient will work toward prior to the next appointment.    Increase Lantus to 40 units once daily.  Continue Humulin R with sliding scale and metformin 1000 mg twice daily.  Restart Farxiga 10 mg once daily and follow up with Dr. Lauren's office about Trulicity prior authorization.   Consider healthier snack options at nights, like almonds, cheese, or nuts. Try to avoid sugary snacks, like with chips.    Physician Follow-up:  As scheduled    Medication Management Follow-up:   Diabetes Service  1 month    Electronically signed by Tiffanie Diaz RPH on 2024 at 10:18 AM    For Pharmacy Admin Tracking Only    Program: Medication Management  CPA in place:  Yes  Recommendation Provided To: Patient/Caregiver: 1 via In person  Intervention Detail: Adherence Monitorin, Dose Adjustment: 1, reason: Therapy Optimization, and Scheduled Appointment  Intervention Accepted By: Patient/Caregiver: 3  Gap Closed?: Yes   Time Spent (min): 45    Tiffanie Diaz RPH, PharmD, BCACP  2024  11:40 AM

## 2024-02-29 ENCOUNTER — OFFICE VISIT (OUTPATIENT)
Dept: UROLOGY | Age: 69
End: 2024-02-29
Payer: MEDICARE

## 2024-02-29 VITALS
HEART RATE: 86 BPM | HEIGHT: 68 IN | DIASTOLIC BLOOD PRESSURE: 93 MMHG | TEMPERATURE: 97 F | BODY MASS INDEX: 33.65 KG/M2 | SYSTOLIC BLOOD PRESSURE: 132 MMHG | WEIGHT: 222 LBS

## 2024-02-29 DIAGNOSIS — Z12.5 PROSTATE CANCER SCREENING: Primary | ICD-10-CM

## 2024-02-29 DIAGNOSIS — N13.8 BPH WITH OBSTRUCTION/LOWER URINARY TRACT SYMPTOMS: ICD-10-CM

## 2024-02-29 DIAGNOSIS — N40.1 BPH WITH OBSTRUCTION/LOWER URINARY TRACT SYMPTOMS: ICD-10-CM

## 2024-02-29 PROCEDURE — 99214 OFFICE O/P EST MOD 30 MIN: CPT | Performed by: NURSE PRACTITIONER

## 2024-02-29 PROCEDURE — G8484 FLU IMMUNIZE NO ADMIN: HCPCS | Performed by: NURSE PRACTITIONER

## 2024-02-29 PROCEDURE — 1123F ACP DISCUSS/DSCN MKR DOCD: CPT | Performed by: NURSE PRACTITIONER

## 2024-02-29 PROCEDURE — G8417 CALC BMI ABV UP PARAM F/U: HCPCS | Performed by: NURSE PRACTITIONER

## 2024-02-29 PROCEDURE — 1036F TOBACCO NON-USER: CPT | Performed by: NURSE PRACTITIONER

## 2024-02-29 PROCEDURE — 3017F COLORECTAL CA SCREEN DOC REV: CPT | Performed by: NURSE PRACTITIONER

## 2024-02-29 PROCEDURE — G8427 DOCREV CUR MEDS BY ELIG CLIN: HCPCS | Performed by: NURSE PRACTITIONER

## 2024-02-29 RX ORDER — TAMSULOSIN HYDROCHLORIDE 0.4 MG/1
CAPSULE ORAL
Qty: 180 CAPSULE | Refills: 3 | Status: SHIPPED | OUTPATIENT
Start: 2024-02-29

## 2024-02-29 RX ORDER — FINASTERIDE 5 MG/1
TABLET, FILM COATED ORAL
Qty: 90 TABLET | Refills: 3 | Status: SHIPPED | OUTPATIENT
Start: 2024-02-29

## 2024-02-29 ASSESSMENT — ENCOUNTER SYMPTOMS
RESPIRATORY NEGATIVE: 1
ABDOMINAL PAIN: 0
NAUSEA: 0
EYE PAIN: 0
CONSTIPATION: 0
DIARRHEA: 0
EYES NEGATIVE: 1
VOMITING: 0
BACK PAIN: 0
GASTROINTESTINAL NEGATIVE: 1
SHORTNESS OF BREATH: 0
WHEEZING: 0
COUGH: 0
EYE REDNESS: 0

## 2024-02-29 NOTE — PROGRESS NOTES
Review of Systems   Constitutional: Negative.  Negative for appetite change, chills and fatigue.   Eyes: Negative.  Negative for pain, redness and visual disturbance.   Respiratory: Negative.  Negative for cough, shortness of breath and wheezing.    Cardiovascular: Negative.  Negative for chest pain and leg swelling.   Gastrointestinal: Negative.  Negative for abdominal pain, constipation, diarrhea, nausea and vomiting.   Genitourinary: Negative.  Negative for difficulty urinating, dysuria, flank pain, frequency, hematuria and urgency.   Musculoskeletal: Negative.  Negative for back pain, joint swelling and myalgias.   Skin: Negative.  Negative for rash and wound.   Neurological: Negative.  Negative for dizziness, weakness and numbness.   Hematological: Negative.  Does not bruise/bleed easily.     
Gluc Sensor (DEXCOM G7 SENSOR) MISC Use to check blood sugar. Replace sensor every 10 days 3 each 5    cinacalcet (SENSIPAR) 90 MG tablet Take 1 tablet by mouth daily      diclofenac (VOLTAREN) 75 MG EC tablet Take 1 tablet by mouth every 48 hours      lidocaine (LMX) 4 % cream 5 g topical 2-3 times a day as needed for pain, maximum 20 g/day 120 g 3    glucose monitoring (FREESTYLE FREEDOM) kit Please supply Patient with a glucose monitoring kit that insurance will cover. 1 kit 0    Lancets 30G MISC Testing once a day.  Please dispense according to patients insurance. 100 each 3    dapagliflozin (FARXIGA) 10 MG tablet Take 1 tablet by mouth every morning 30 tablet 5    furosemide (LASIX) 20 MG tablet Take 1 tablet by mouth daily 90 tablet 3    amLODIPine (NORVASC) 5 MG tablet Take 2 tablets by mouth daily 180 tablet 3    vitamin D (ERGOCALCIFEROL) 1.25 MG (53311 UT) CAPS capsule Take 1 capsule by mouth once a week      gabapentin (NEURONTIN) 600 MG tablet Take 1 tablet by mouth 3 times daily.      clopidogrel (PLAVIX) 75 MG tablet take 1 tablet by mouth once daily 90 tablet 3    Glucosamine-Chondroitin-MSM (TRIPLE FLEX) 750-400-375 MG TABS Take 2 tablets by mouth daily With food. NATURE MADE brand. Or BIOFLEX. 180 tablet 3    lisinopril (PRINIVIL;ZESTRIL) 10 MG tablet Take 1 tablet by mouth every evening 90 tablet 3    Handicap Placard MISC by Does not apply route Can't walk greater than 200 feet. Expires in 5 years. 1 each 0    acetaminophen (TYLENOL) 500 MG tablet Take 1 tablet by mouth every 6 hours as needed for Pain 120 tablet 3    camphor-menthol-methyl salicylate (BENGAY ULTRA STRENGTH) 4-10-30 % CREA cream Apply topically 3 times daily as needed for Pain 113 g 0    E-400 400 UNITS capsule TAKE TWO CAPSULES BY MOUTH DAILY 60 capsule 10    nortriptyline (PAMELOR) 50 MG capsule Take 1 capsule by mouth 2 times daily         Pcn [penicillins], Morphine, Adhesive tape, Codeine, and Tramadol  Social History

## 2024-03-13 ENCOUNTER — TELEPHONE (OUTPATIENT)
Dept: UROLOGY | Age: 69
End: 2024-03-13

## 2024-03-13 NOTE — TELEPHONE ENCOUNTER
Spoke with Dr. Zamora and discussed microhematuria. He is in high risk category, would advise cystoscopy. Please notify and get him set up for next available one in office.

## 2024-03-21 ENCOUNTER — HOSPITAL ENCOUNTER (OUTPATIENT)
Dept: ENDOSCOPY | Age: 69
Setting detail: OUTPATIENT SURGERY
Discharge: HOME | End: 2024-03-21

## 2024-03-21 VITALS
SYSTOLIC BLOOD PRESSURE: 126 MMHG | BODY MASS INDEX: 33.75 KG/M2 | RESPIRATION RATE: 16 BRPM | DIASTOLIC BLOOD PRESSURE: 94 MMHG | HEIGHT: 68 IN | OXYGEN SATURATION: 94 % | HEART RATE: 85 BPM

## 2024-03-21 ASSESSMENT — PAIN - FUNCTIONAL ASSESSMENT: PAIN_FUNCTIONAL_ASSESSMENT: NONE - DENIES PAIN

## 2024-03-21 NOTE — PROGRESS NOTES
Procedure explained. Informed consent obtained. Manometry probe placed @ 55cm via left nares. Patient tolerated procedure well. Discharged to home with spouse.

## 2024-03-27 ENCOUNTER — HOSPITAL ENCOUNTER (OUTPATIENT)
Dept: PHARMACY | Age: 69
Setting detail: THERAPIES SERIES
Discharge: HOME | End: 2024-03-27
Attending: FAMILY MEDICINE
Payer: MEDICARE

## 2024-03-27 ENCOUNTER — TELEPHONE (OUTPATIENT)
Dept: PHARMACY | Age: 69
End: 2024-03-27

## 2024-03-27 VITALS
DIASTOLIC BLOOD PRESSURE: 78 MMHG | WEIGHT: 219.8 LBS | HEART RATE: 82 BPM | BODY MASS INDEX: 33.42 KG/M2 | OXYGEN SATURATION: 92 % | SYSTOLIC BLOOD PRESSURE: 117 MMHG

## 2024-03-27 DIAGNOSIS — E11.22 TYPE 2 DIABETES MELLITUS WITH STAGE 2 CHRONIC KIDNEY DISEASE, WITH LONG-TERM CURRENT USE OF INSULIN (HCC): ICD-10-CM

## 2024-03-27 DIAGNOSIS — E11.65 TYPE 2 DIABETES MELLITUS WITH HYPERGLYCEMIA, WITH LONG-TERM CURRENT USE OF INSULIN (HCC): ICD-10-CM

## 2024-03-27 DIAGNOSIS — Z79.4 TYPE 2 DIABETES MELLITUS WITH STAGE 2 CHRONIC KIDNEY DISEASE, WITH LONG-TERM CURRENT USE OF INSULIN (HCC): ICD-10-CM

## 2024-03-27 DIAGNOSIS — N18.2 TYPE 2 DIABETES MELLITUS WITH STAGE 2 CHRONIC KIDNEY DISEASE, WITH LONG-TERM CURRENT USE OF INSULIN (HCC): ICD-10-CM

## 2024-03-27 DIAGNOSIS — Z79.4 TYPE 2 DIABETES MELLITUS WITH HYPERGLYCEMIA, WITH LONG-TERM CURRENT USE OF INSULIN (HCC): ICD-10-CM

## 2024-03-27 PROCEDURE — 99213 OFFICE O/P EST LOW 20 MIN: CPT

## 2024-03-27 RX ORDER — INSULIN GLARGINE 100 [IU]/ML
42 INJECTION, SOLUTION SUBCUTANEOUS NIGHTLY
Qty: 10 ADJUSTABLE DOSE PRE-FILLED PEN SYRINGE | Refills: 3 | Status: SHIPPED | OUTPATIENT
Start: 2024-03-27

## 2024-03-27 RX ORDER — INSULIN ASPART 100 [IU]/ML
INJECTION, SOLUTION INTRAVENOUS; SUBCUTANEOUS
Qty: 5 ADJUSTABLE DOSE PRE-FILLED PEN SYRINGE | Refills: 3 | Status: SHIPPED | OUTPATIENT
Start: 2024-03-27

## 2024-03-27 NOTE — DISCHARGE INSTRUCTIONS
Increase Lantus to 42 units once a day.    Increase Humulin R sliding scale to : Glucose: Dose: If <139  No Insulin ;140-199 3 Units ;200-249 5 Units ; 250-299 7 Units; 300-349 9 Units; 350-400 11 Units Above 400  13 Units.    Continue:  Farxiga 10mg once a day  Metformin 1000mg twice a day    Try to make healthier meal choices and avoid sugary beverages.

## 2024-03-27 NOTE — PROGRESS NOTES
Take 1 tablet by mouth 3 times daily., Disp: , Rfl:     clopidogrel (PLAVIX) 75 MG tablet, take 1 tablet by mouth once daily, Disp: 90 tablet, Rfl: 3    Glucosamine-Chondroitin-MSM (TRIPLE FLEX) 750-400-375 MG TABS, Take 2 tablets by mouth daily With food. NATURE MADE brand. Or BIOFLEX., Disp: 180 tablet, Rfl: 3    lisinopril (PRINIVIL;ZESTRIL) 10 MG tablet, Take 1 tablet by mouth every evening, Disp: 90 tablet, Rfl: 3    Handicap Placard MISC, by Does not apply route Can't walk greater than 200 feet. Expires in 5 years., Disp: 1 each, Rfl: 0    acetaminophen (TYLENOL) 500 MG tablet, Take 1 tablet by mouth every 6 hours as needed for Pain, Disp: 120 tablet, Rfl: 3    camphor-menthol-methyl salicylate (BENGAY ULTRA STRENGTH) 4-10-30 % CREA cream, Apply topically 3 times daily as needed for Pain, Disp: 113 g, Rfl: 0    E-400 400 UNITS capsule, TAKE TWO CAPSULES BY MOUTH DAILY, Disp: 60 capsule, Rfl: 10    nortriptyline (PAMELOR) 50 MG capsule, Take 1 capsule by mouth 2 times daily, Disp: , Rfl:     Immunizations:   Most Recent Immunizations   Administered Date(s) Administered    COVID-19, MODERNA BLUE border, Primary or Immunocompromised, (age 12y+), IM, 100 mcg/0.5mL 11/02/2021    COVID-19, MODERNA Bivalent, (age 12y+), IM, 50 mcg/0.5 mL 09/13/2022    COVID-19, PFIZER, (2023-24 formula), (age 12y+), IM, 30mcg/0.3mL 09/22/2023    COVID-19, US Vaccine, Vaccine Unspecified 09/22/2023    Influenza Vaccine, unspecified formulation 10/15/2018    Influenza Virus Vaccine 10/29/2018    Influenza, FLUAD, (age 65 y+), Adjuvanted, 0.5mL 09/13/2022    Influenza, FLUARIX, FLULAVAL, FLUZONE (age 6 mo+) AND AFLURIA, (age 3 y+), PF, 0.5mL 10/29/2018    Influenza, FLUCELVAX, (age 6 mo+), MDCK, PF, 0.5mL 10/01/2019    Influenza, FLUZONE (age 65 y+), High Dose, 0.7mL 09/22/2023    Pneumococcal, PCV-13, PREVNAR 13, (age 6w+), IM, 0.5mL 08/05/2022    Pneumococcal, PCV20, PREVNAR 20, (age 6w+), IM, 0.5mL 12/22/2023    Pneumococcal, PPSV23,

## 2024-03-27 NOTE — TELEPHONE ENCOUNTER
Rx for Lantus with updated dose [facility] [place].  RX for Novolog insulin pens to be used for sliding scale also sent.  Patient to stop using Humulin R.  Quinn Mitchell RPH,Pharm.D,, [facility], CACP  3/27/2024  12:21 PM

## 2024-03-28 ENCOUNTER — TELEPHONE (OUTPATIENT)
Dept: PHARMACY | Age: 69
End: 2024-03-28

## 2024-03-28 ENCOUNTER — TELEPHONE (OUTPATIENT)
Dept: GASTROENTEROLOGY | Age: 69
End: 2024-03-28

## 2024-03-28 NOTE — TELEPHONE ENCOUNTER
Gallo Read by Dr. Gutierrez, in [place], ok for patient to see Dr. Venegas tomorrow. Appt set up for 245pm. Patient aware. Thank you!

## 2024-03-28 NOTE — TELEPHONE ENCOUNTER
Called [facility] in [place] who was able to put thru the Trulcity RX as appears PA was closed/approved.    Patient does have high copay of $186.62 due to meeting deductible.     Called and informed patient who indicates he most likely plans to start this even due to high cost.     Will follow up with how this will hopefully improve blood sugar control at next visit.  Quinn Mitchell RPH,Pharm.D,, BCPS, CACP  3/28/2024  9:38 AM

## 2024-03-28 NOTE — TELEPHONE ENCOUNTER
Confirmed with [facility] in [place] that patient Novolog pens were filled and picked up by patient.  Quinn Mitchell RPH,Pharm.D,, BCPS, [facility]  3/28/2024  9:49 AM

## 2024-03-28 NOTE — TELEPHONE ENCOUNTER
Writer called and canceled patient appt fo tomorrow for dr angeles.  Per Shade they need to reschedule later in the week next week with dr angeles because they need to have dr steward read patients results.

## 2024-03-29 ENCOUNTER — OFFICE VISIT (OUTPATIENT)
Dept: GASTROENTEROLOGY | Age: 69
End: 2024-03-29
Payer: MEDICARE

## 2024-03-29 VITALS
WEIGHT: 217 LBS | BODY MASS INDEX: 32.89 KG/M2 | DIASTOLIC BLOOD PRESSURE: 69 MMHG | HEIGHT: 68 IN | HEART RATE: 89 BPM | SYSTOLIC BLOOD PRESSURE: 103 MMHG

## 2024-03-29 DIAGNOSIS — R13.19 ESOPHAGEAL DYSPHAGIA: Primary | ICD-10-CM

## 2024-03-29 PROCEDURE — 3017F COLORECTAL CA SCREEN DOC REV: CPT | Performed by: NURSE PRACTITIONER

## 2024-03-29 PROCEDURE — 99214 OFFICE O/P EST MOD 30 MIN: CPT | Performed by: NURSE PRACTITIONER

## 2024-03-29 PROCEDURE — G8427 DOCREV CUR MEDS BY ELIG CLIN: HCPCS | Performed by: NURSE PRACTITIONER

## 2024-03-29 PROCEDURE — 1036F TOBACCO NON-USER: CPT | Performed by: NURSE PRACTITIONER

## 2024-03-29 PROCEDURE — G8417 CALC BMI ABV UP PARAM F/U: HCPCS | Performed by: NURSE PRACTITIONER

## 2024-03-29 PROCEDURE — 1123F ACP DISCUSS/DSCN MKR DOCD: CPT | Performed by: NURSE PRACTITIONER

## 2024-03-29 PROCEDURE — G8484 FLU IMMUNIZE NO ADMIN: HCPCS | Performed by: NURSE PRACTITIONER

## 2024-03-29 ASSESSMENT — ENCOUNTER SYMPTOMS
TROUBLE SWALLOWING: 0
ABDOMINAL PAIN: 0
BLOOD IN STOOL: 0
COUGH: 0
VOICE CHANGE: 0
SORE THROAT: 0
VOMITING: 0
ANAL BLEEDING: 0
ABDOMINAL DISTENTION: 0
NAUSEA: 0
SINUS PRESSURE: 0
BACK PAIN: 0
RECTAL PAIN: 0
CONSTIPATION: 0
WHEEZING: 0
CHOKING: 0
DIARRHEA: 1

## 2024-03-29 NOTE — PROGRESS NOTES
GI OFFICE FOLLOW UP    INTERVAL HISTORY:   No referring provider defined for this encounter.    Chief Complaint   Patient presents with    Follow-up     Patient is here today to be seen for a follow up on his esophageal motility.     Dysphagia     Patient states that he doesn't feel like he has trouble swallowing, but he feels like his food packs up after eating.      HISTORY OF PRESENT ILLNESS:     Patient being seen for follow-up dysphagia.    Recently had high resolution manometry.  No HH.  No signs of achalasia.  IRP borderline.  Given his dysphagia, concern for possible esophageal gastric outlet obstruction.    Patient continues to reports some esophageal dysphagia.  Reports intermittent regurgitation of foods.  Most times able to resolve with fluids, time.  No s/sx of complete obstruction.    Past Medical,Family, and Social History reviewed and does contribute to the patient presenting condition.      Patient's PMH/PSH,SH,PSYCH Hx, MEDs, ALLERGIES, and ROS were all reviewed and updated in the appropriate sections. Yes      PAST MEDICAL HISTORY:  Past Medical History:   Diagnosis Date    Abdominal hernia     Abnormal cardiovascular stress test 10/2022    Acquired elevated hemidiaphragm, left 01/04/2024    Anesthesia     phrenic nerve damaged lt side    BPH (benign prostatic hyperplasia)     CAD (coronary artery disease)     Chronic back pain     Diabetes mellitus (HCC)     Type 2    Difficult intubation     per pt throat collapses, lt side phrenic nerve was damaged    TINOCO (dyspnea on exertion) 09/18/2022    Drug-induced constipation 05/24/2022    Ear infection     LT, being treated    Essential hypertension 10/23/2017    Fatty liver     Gallstones     GERD (gastroesophageal reflux disease)     Headache(784.0)     Hematoma 05/25/2022    History of MI (myocardial infarction)     Hypercholesteremia

## 2024-04-01 ENCOUNTER — TELEPHONE (OUTPATIENT)
Dept: PHARMACY | Age: 69
End: 2024-04-01

## 2024-04-01 NOTE — TELEPHONE ENCOUNTER
Received coverage denial for pen needles from patient's Rx insurance company. Re-sending a new prescription for pen needles with note to fill with generic pen needles that are covered by patient's insurance. Spoke with patient's insurance company. A prior authorization request is in due to this insurance company requiring step therapy before covering Novolog. Patient has tried Humalog in the past. Evidence of this trial has been faxed to insurance (case number [id number]). Phone number to call for coverage review is [phone].    Tiffanie Diaz RPH, PharmD, BCACP  4/1/2024  5:25 PM

## 2024-04-02 ENCOUNTER — HOSPITAL ENCOUNTER (OUTPATIENT)
Dept: GENERAL RADIOLOGY | Age: 69
Discharge: HOME | End: 2024-04-04
Payer: MEDICARE

## 2024-04-02 ENCOUNTER — TELEPHONE (OUTPATIENT)
Dept: FAMILY MEDICINE CLINIC | Age: 69
End: 2024-04-02

## 2024-04-02 DIAGNOSIS — E11.65 TYPE 2 DIABETES MELLITUS WITH HYPERGLYCEMIA, WITH LONG-TERM CURRENT USE OF INSULIN (HCC): ICD-10-CM

## 2024-04-02 DIAGNOSIS — Z79.4 TYPE 2 DIABETES MELLITUS WITH HYPERGLYCEMIA, WITH LONG-TERM CURRENT USE OF INSULIN (HCC): ICD-10-CM

## 2024-04-02 DIAGNOSIS — R13.19 ESOPHAGEAL DYSPHAGIA: ICD-10-CM

## 2024-04-02 DIAGNOSIS — Z79.4 TYPE 2 DIABETES MELLITUS WITH STAGE 3A CHRONIC KIDNEY DISEASE, WITH LONG-TERM CURRENT USE OF INSULIN (HCC): Primary | ICD-10-CM

## 2024-04-02 DIAGNOSIS — N18.31 TYPE 2 DIABETES MELLITUS WITH STAGE 3A CHRONIC KIDNEY DISEASE, WITH LONG-TERM CURRENT USE OF INSULIN (HCC): Primary | ICD-10-CM

## 2024-04-02 DIAGNOSIS — E11.22 TYPE 2 DIABETES MELLITUS WITH STAGE 3A CHRONIC KIDNEY DISEASE, WITH LONG-TERM CURRENT USE OF INSULIN (HCC): Primary | ICD-10-CM

## 2024-04-02 PROCEDURE — 2500000003 HC RX 250 WO HCPCS: Performed by: NURSE PRACTITIONER

## 2024-04-02 PROCEDURE — 74220 X-RAY XM ESOPHAGUS 1CNTRST: CPT

## 2024-04-02 RX ORDER — INSULIN ASPART 100 [IU]/ML
INJECTION, SOLUTION INTRAVENOUS; SUBCUTANEOUS
Qty: 5 ADJUSTABLE DOSE PRE-FILLED PEN SYRINGE | Refills: 3 | Status: SHIPPED | OUTPATIENT
Start: 2024-04-02

## 2024-04-02 RX ADMIN — BARIUM SULFATE 140 ML: 980 POWDER, FOR SUSPENSION ORAL at 09:52

## 2024-04-02 RX ADMIN — BARIUM SULFATE 160 ML: 960 POWDER, FOR SUSPENSION ORAL at 09:51

## 2024-04-16 ENCOUNTER — PROCEDURE VISIT (OUTPATIENT)
Dept: UROLOGY | Age: 69
End: 2024-04-16
Payer: MEDICARE

## 2024-04-16 ENCOUNTER — HOSPITAL ENCOUNTER (OUTPATIENT)
Age: 69
Setting detail: SPECIMEN
Discharge: HOME | End: 2024-04-16

## 2024-04-16 DIAGNOSIS — R31.29 MICROHEMATURIA: Primary | ICD-10-CM

## 2024-04-16 PROCEDURE — 52000 CYSTOURETHROSCOPY: CPT | Performed by: UROLOGY

## 2024-04-16 RX ORDER — SYRGE-NDL,INS 0.5 ML HALF MARK 30 G X1/2"
SYRINGE, EMPTY DISPOSABLE MISCELLANEOUS
COMMUNITY
Start: 2024-02-28

## 2024-04-16 NOTE — PROGRESS NOTES
Cystoscopy Operative Note (4/16/24)  Surgeon: Zachary Zamora MD  Anesthesia: Urethral 2% Xylocaine   Indications: BPH  Position: Dorsal Lithotomy    Findings:   Risks and Benefits discussed with patient prior to procedure.  The patient was prepped and draped in the usual sterile fashion.  The flexible cystoscope was advanced through the urethra and into the bladder.  The bladder was thoroughly inspected and the following was noted:    Residual Urine: mild  Urethra: normal appearing urethra with no masses, tenderness or lesions  Prostate: completely obstructing lateral lobes of prostate; median lobe present? yes.   Bladder: No tumors or CIS noted.  No bladder diverticulum.  There was mild trabeculation noted.  Ureters: Clear efflux from both ureters.  Orifices with normal configuration and location.    The cystoscope was removed.  The patient tolerated the procedure well.     Agree with the ROS entered by the MA.

## 2024-04-17 DIAGNOSIS — R31.29 MICROHEMATURIA: ICD-10-CM

## 2024-04-17 LAB — SURGICAL PATHOLOGY REPORT: NORMAL

## 2024-04-18 ENCOUNTER — TELEPHONE (OUTPATIENT)
Dept: FAMILY MEDICINE CLINIC | Age: 69
End: 2024-04-18

## 2024-04-18 LAB
CASE NUMBER:: NORMAL
SPECIMEN DESCRIPTION: NORMAL

## 2024-04-18 NOTE — TELEPHONE ENCOUNTER
Patient Care Team:  Rosa Lauren MD as PCP - General (Family Medicine)  Rosa Lauren MD as PCP - Empaneled Provider  Deandre Lucas MD as Surgeon (Cardiology)  Juno Muñoz MD as Consulting Physician (Endocrinology)  Aleksander Covarrubias MD as Surgeon (Orthopedic Surgery)  Adelia Rubio MD as Consulting Physician (Rheumatology)  Conor Matias MD as Consulting Physician (Gastroenterology)  Zachary Zamora MD as Consulting Physician (Urology)  Shelton Tadeo MD as Consulting Physician (Orthopedic Surgery)  Rafael Vieira, DPM as Surgeon (Podiatry)  John Sommer MD as Consulting Physician (Pulmonary Disease)  Antonio Farrar APRN - CNP as Nurse Practitioner (Pulmonology)  Max Carbajal PA-C as Physician Assistant (Dermatology)  Aleksander Rosas MD as Consulting Physician (Neurology)  Antonio Frarar APRN - CNP as Nurse Practitioner (Pulmonology)  Patient follows with multiple specialists including clinic or pharmacy management     Future Appointments   Date Time Provider Department Center   4/24/2024  9:10 AM [facility] MEDICATION MGMT [facility] MED [facility]   5/16/2024  8:00 AM [facility]   5/28/2024 10:00 AM Antonio Farrar APRN - CNP Resp Spec [facility]   8/16/2024 10:00 AM Rosa Lauren MD [facility]   10/15/2024  8:40 AM Zachary Zamora MD [facility]         FY  Care Coordination: Polypharmacy in Seniors Your older patient is receiving five or more medications from multiple prescribers based on claims records. Please review this patient's therapy. Polypharmacy can significantly increase the risk of preventable adverse drug events and negatively impact adherence; it has also been identified as an independent predictor of frailty. Reconciling diverse factors such as clinician agreement, patient perspective, deprescribing, adherence, and communication with other stakeholders can improve patient outcomes while simplifying

## 2024-04-24 ENCOUNTER — HOSPITAL ENCOUNTER (OUTPATIENT)
Age: 69
Setting detail: SPECIMEN
Discharge: HOME | End: 2024-04-24

## 2024-04-24 ENCOUNTER — TELEPHONE (OUTPATIENT)
Dept: PHARMACY | Age: 69
End: 2024-04-24

## 2024-04-24 ENCOUNTER — HOSPITAL ENCOUNTER (OUTPATIENT)
Age: 69
Discharge: HOME | End: 2024-04-24
Payer: MEDICARE

## 2024-04-24 ENCOUNTER — HOSPITAL ENCOUNTER (OUTPATIENT)
Dept: PHARMACY | Age: 69
Setting detail: THERAPIES SERIES
Discharge: HOME | End: 2024-04-24
Attending: FAMILY MEDICINE
Payer: MEDICARE

## 2024-04-24 ENCOUNTER — TELEPHONE (OUTPATIENT)
Dept: UROLOGY | Age: 69
End: 2024-04-24

## 2024-04-24 VITALS
OXYGEN SATURATION: 94 % | BODY MASS INDEX: 33.41 KG/M2 | HEART RATE: 78 BPM | DIASTOLIC BLOOD PRESSURE: 77 MMHG | SYSTOLIC BLOOD PRESSURE: 122 MMHG | WEIGHT: 219.7 LBS

## 2024-04-24 DIAGNOSIS — Z12.5 PROSTATE CANCER SCREENING: Primary | ICD-10-CM

## 2024-04-24 DIAGNOSIS — Z79.4 TYPE 2 DIABETES MELLITUS WITH HYPERGLYCEMIA, WITH LONG-TERM CURRENT USE OF INSULIN (HCC): ICD-10-CM

## 2024-04-24 DIAGNOSIS — N18.2 TYPE 2 DIABETES MELLITUS WITH STAGE 2 CHRONIC KIDNEY DISEASE, WITH LONG-TERM CURRENT USE OF INSULIN (HCC): ICD-10-CM

## 2024-04-24 DIAGNOSIS — Z79.4 TYPE 2 DIABETES MELLITUS WITH STAGE 2 CHRONIC KIDNEY DISEASE, WITH LONG-TERM CURRENT USE OF INSULIN (HCC): ICD-10-CM

## 2024-04-24 DIAGNOSIS — R39.15 URGENCY OF URINATION: ICD-10-CM

## 2024-04-24 DIAGNOSIS — R31.0 GROSS HEMATURIA: Primary | ICD-10-CM

## 2024-04-24 DIAGNOSIS — E11.22 TYPE 2 DIABETES MELLITUS WITH STAGE 2 CHRONIC KIDNEY DISEASE, WITH LONG-TERM CURRENT USE OF INSULIN (HCC): ICD-10-CM

## 2024-04-24 DIAGNOSIS — E11.65 TYPE 2 DIABETES MELLITUS WITH HYPERGLYCEMIA, WITH LONG-TERM CURRENT USE OF INSULIN (HCC): ICD-10-CM

## 2024-04-24 LAB
ANION GAP SERPL CALCULATED.3IONS-SCNC: 11 MMOL/L (ref 9–17)
BUN SERPL-MCNC: 26 MG/DL (ref 8–23)
CALCIUM SERPL-MCNC: 10.4 MG/DL (ref 8.6–10.4)
CHLORIDE SERPL-SCNC: 102 MMOL/L (ref 98–107)
CO2 SERPL-SCNC: 26 MMOL/L (ref 20–31)
CREAT SERPL-MCNC: 1.2 MG/DL (ref 0.7–1.2)
CREAT UR-MCNC: 86.1 MG/DL (ref 39–259)
EST. AVERAGE GLUCOSE BLD GHB EST-MCNC: 160 MG/DL
GFR SERPL CREATININE-BSD FRML MDRD: 66 ML/MIN/1.73M2
GLUCOSE SERPL-MCNC: 129 MG/DL (ref 70–99)
HBA1C MFR BLD: 7.2 % (ref 4–6)
MAGNESIUM SERPL-MCNC: 2.3 MG/DL (ref 1.6–2.6)
MICROALBUMIN UR-MCNC: 30 MG/L (ref 0–20)
MICROALBUMIN/CREAT UR-RTO: 35 MCG/MG CREAT (ref 0–17)
POTASSIUM SERPL-SCNC: 4.7 MMOL/L (ref 3.7–5.3)
PSA SERPL-MCNC: 1.9 NG/ML (ref 0–4)
SODIUM SERPL-SCNC: 139 MMOL/L (ref 135–144)

## 2024-04-24 PROCEDURE — 83735 ASSAY OF MAGNESIUM: CPT

## 2024-04-24 PROCEDURE — 82570 ASSAY OF URINE CREATININE: CPT

## 2024-04-24 PROCEDURE — 83036 HEMOGLOBIN GLYCOSYLATED A1C: CPT

## 2024-04-24 PROCEDURE — G0103 PSA SCREENING: HCPCS

## 2024-04-24 PROCEDURE — 99213 OFFICE O/P EST LOW 20 MIN: CPT

## 2024-04-24 PROCEDURE — 80048 BASIC METABOLIC PNL TOTAL CA: CPT

## 2024-04-24 PROCEDURE — 36415 COLL VENOUS BLD VENIPUNCTURE: CPT

## 2024-04-24 PROCEDURE — 82043 UR ALBUMIN QUANTITATIVE: CPT

## 2024-04-24 RX ORDER — DULAGLUTIDE 0.75 MG/.5ML
0.75 INJECTION, SOLUTION SUBCUTANEOUS
Qty: 4 ADJUSTABLE DOSE PRE-FILLED PEN SYRINGE | Refills: 1 | Status: SHIPPED | OUTPATIENT
Start: 2024-04-24

## 2024-04-24 NOTE — PROGRESS NOTES
Passive exposure: Never    Smokeless tobacco: Never   Substance Use Topics    Alcohol use: Never     Alcohol/week: 0.0 standard drinks of alcohol       Pertinent Labs:    Lab Results   Component Value Date    LABA1C 8.4 (H) 01/26/2024    LABA1C 9.7 11/21/2023    LABA1C 8.9 08/15/2023     Lab Results   Component Value Date    CHOL 144 08/16/2023    TRIG 409 (H) 08/16/2023    HDL 29 (L) 08/16/2023     Lab Results   Component Value Date    CREATININE 1.5 (H) 02/26/2024    BUN 26 (H) 02/26/2024     02/26/2024    K 4.2 02/26/2024    CL 99 02/26/2024    CO2 29 02/26/2024     Lab Results   Component Value Date/Time    ALT 44 01/04/2024 10:18 AM       Weight:  Wt Readings from Last 3 Encounters:   03/29/24 98.4 kg (217 lb)   03/27/24 99.7 kg (219 lb 12.8 oz)   02/29/24 100.7 kg (222 lb)       Blood Pressure:  BP Readings from Last 3 Encounters:   03/29/24 103/69   03/27/24 117/78   03/21/24 (!) 126/94       Current medications:    Current Outpatient Medications:     DROPLET INSULIN SYRINGE 30G X 1/2\" 0.5 ML MISC, , Disp: , Rfl:     insulin aspart (NOVOLOG FLEXPEN) 100 UNIT/ML injection pen, Use before each meal.  Dose: If <139 No Insulin ;140-199 3 Units ;200-249 5 Units ; 250-299 7 Units; 300-349 9 Units; 350-400 11 Units Above 400 13 Units.  Maximum of 39 units per day., Disp: 5 Adjustable Dose Pre-filled Pen Syringe, Rfl: 3    Insulin Pen Needle 32G X 4 MM MISC, Use to inject insulin glargine into the skin twice daily., Disp: 60 each, Rfl: 5    insulin glargine (LANTUS SOLOSTAR) 100 UNIT/ML injection pen, Inject 42 Units into the skin nightly Dose increased 1/12/24  30 units, Disp: 10 Adjustable Dose Pre-filled Pen Syringe, Rfl: 3    losartan (COZAAR) 25 MG tablet, take 1 tablet by mouth once daily, Disp: 90 tablet, Rfl: 1    finasteride (PROSCAR) 5 MG tablet, take 1 tablet by mouth once daily, Disp: 90 tablet, Rfl: 3    tamsulosin (FLOMAX) 0.4 MG capsule, take 1 capsule by mouth twice a day, Disp: 180 capsule,

## 2024-04-24 NOTE — TELEPHONE ENCOUNTER
Cysto notes reviewed. Increase water intake, may have irritation to urinary tract from the scope.  If he develops any other lower urinary tract symptoms- would check culture. Please notify

## 2024-04-24 NOTE — RESULT ENCOUNTER NOTE
Please notify patient: Hemoglobin A1c 7.2 greatly improved from prior, great job  Small amount of proteins in the urine, same as before, losartan and good diabetes control will help.  Blood glucose well-controlled 129  Kidney function is improved, now in stage II, he was in stage III before.  Improving diabetes helps the kidneys.  Otherwise labs within normal limits  continue current treatment    Future Appointments  5/16/2024  8:00 AM    [facility]  5/28/2024  10:00 AM   Antonio Farrar APRN - CNP Resp Spec           [facility]  5/29/2024  9:10 AM    [facility] MEDICATION MGMT       [facility] MGMT        [facility]  8/16/2024  10:00 AM   Rosa Lauren MD    [facility]  10/15/2024 8:40 AM    Zachary Zamora MD       [facility]           [facility]

## 2024-04-24 NOTE — TELEPHONE ENCOUNTER
Called patient to notify of PSA level, pt stated that he has had some dark red blood in his urine, patient had a cysto with Dr. Zamora on 4/16/2024. Patient did not state he was having urgency, or frequency, just blood in the urine. Writer advised a message would be sent to BILLY Olvera and the office would follow up. Patient verbalized understanding, call was ended.

## 2024-04-24 NOTE — TELEPHONE ENCOUNTER
Trulicity 0.75 mg sent to [facility] on [address]   4 adjustable pens, 1 RF     Umu Rooney, PharmD  PGY1 Pharmacy Resident   [facility]

## 2024-04-26 ENCOUNTER — HOSPITAL ENCOUNTER (OUTPATIENT)
Age: 69
Discharge: HOME | End: 2024-04-26
Payer: MEDICARE

## 2024-04-26 DIAGNOSIS — R39.15 URGENCY OF URINATION: ICD-10-CM

## 2024-04-26 DIAGNOSIS — R31.0 GROSS HEMATURIA: ICD-10-CM

## 2024-04-26 PROCEDURE — 87077 CULTURE AEROBIC IDENTIFY: CPT

## 2024-04-26 PROCEDURE — 87086 URINE CULTURE/COLONY COUNT: CPT

## 2024-04-26 PROCEDURE — 87186 SC STD MICRODIL/AGAR DIL: CPT

## 2024-04-26 NOTE — TELEPHONE ENCOUNTER
Patient is having urine urgency and is currently at lab. Urine culture is placed per note form BILLY Olvera

## 2024-04-27 LAB
MICROORGANISM SPEC CULT: ABNORMAL
SPECIMEN DESCRIPTION: ABNORMAL

## 2024-04-29 DIAGNOSIS — N30.01 ACUTE CYSTITIS WITH HEMATURIA: Primary | ICD-10-CM

## 2024-04-29 RX ORDER — LEVOFLOXACIN 500 MG/1
500 TABLET, FILM COATED ORAL DAILY
Qty: 7 TABLET | Refills: 0 | Status: SHIPPED | OUTPATIENT
Start: 2024-04-29 | End: 2024-05-06

## 2024-04-29 NOTE — PROGRESS NOTES
ontains abnormal data Culture, Urine  Order: [id number]  Status: Final result       Visible to patient: No (not released)       Dx: Urgency of urination; Gross hematuria    Specimen Information: Urine, clean catch   2 Result Notes      Component    Specimen Description .CLEAN CATCH URINE   Culture ENTEROCOCCUS FAECALIS 10 to 50,000 CFU/ML Identification by MALDI-TOF Abnormal    Resulting Agency [facility]        Susceptibility     Enterococcus faecalis     BACTERIAL SUSCEPTIBILITY PANEL MIRLELA     ampicillin <=2 Sensitive     ciprofloxacin 1 Sensitive     levofloxacin 2 Sensitive     nitrofurantoin <=16 Sensitive     tetracycline >=16 Resistant     vancomycin 1 Sensitive                  Specimen Collected: 04/26/24 09:47 EDT Last Resulted: 04/27/24 22:54 EDT

## 2024-04-29 NOTE — PROGRESS NOTES
Subjective:      Patient ID: Colton Arroyo is a 68 y.o. male.     Patient must see physician at next appointment    HPI  Patient here for follow-up for SHANT on BiPAP and obesity. Last seen in office per me in November 2023 and Dr. Sommer in May 2023    Patient was scheduled for follow-up with me in Dr. Sommer's absence.    Compliance data reviewed from 2/28/2024 through 5/27/2024 with 87 out of 90 days used, 78 days for more than 4 hours in 9 days for less than 4 hours putting him at 87% compliant.  He is on BiPAP with an inspiratory pressure of 21 cm H2O and an expiratory pressure of 16 cm H2O.  Residual AHI is 2.9.  DME provider is [facility].    Today's interview:  Patient indicates that he is still having some difficulties with his BiPAP, states that when he is lying in bed he does not immediately fall asleep, he notes that he can feel the pressure increasing on his mask and at times has to restart his machine in order to restart his ramp for 20-minute.  At times he also was removing his mask throughout the night while sleeping unintentionally.  States that when he does realize that he gets up and goes into the living room and sleeps in the recliner.  Lengthy discussion with patient regarding if he is lying in bed and not feeling sleepy that he should get up and occupy himself with other activities, if he is having difficulty falling asleep due to insomnia.  May need to consider adding a medication for sleep but at this time would like to hold off due to the potential side effects      Medications:   No pulmonary meds        Smoking status:  Lifelong non-smoker     PRIOR WORKUP:  PFT:  PFT 9/27/2022: Normal lung mechanics, lung volumes, diffusion capacity.  Evidence of air trapping and hyperinflation is not noted.  No significant improvement postbronchodilator.     CT Imaging:  No CT imaging of chest on chart     Sleep Study:  Titration study 2/1/2023: Patient was titrated onto BiPAP with a

## 2024-05-13 DIAGNOSIS — K21.9 GASTROESOPHAGEAL REFLUX DISEASE WITHOUT ESOPHAGITIS: ICD-10-CM

## 2024-05-13 RX ORDER — FAMOTIDINE 20 MG/1
20 TABLET, FILM COATED ORAL
Qty: 90 TABLET | Refills: 3 | Status: SHIPPED | OUTPATIENT
Start: 2024-05-13

## 2024-05-16 ENCOUNTER — OFFICE VISIT (OUTPATIENT)
Dept: FAMILY MEDICINE CLINIC | Age: 69
End: 2024-05-16
Payer: MEDICARE

## 2024-05-16 VITALS
WEIGHT: 222 LBS | OXYGEN SATURATION: 95 % | BODY MASS INDEX: 33.65 KG/M2 | HEART RATE: 86 BPM | SYSTOLIC BLOOD PRESSURE: 120 MMHG | HEIGHT: 68 IN | DIASTOLIC BLOOD PRESSURE: 72 MMHG | TEMPERATURE: 97.6 F

## 2024-05-16 DIAGNOSIS — N18.31 TYPE 2 DIABETES MELLITUS WITH STAGE 3A CHRONIC KIDNEY DISEASE, WITH LONG-TERM CURRENT USE OF INSULIN (HCC): ICD-10-CM

## 2024-05-16 DIAGNOSIS — I50.22 CHRONIC SYSTOLIC CONGESTIVE HEART FAILURE (HCC): ICD-10-CM

## 2024-05-16 DIAGNOSIS — R25.1 TREMORS OF NERVOUS SYSTEM: ICD-10-CM

## 2024-05-16 DIAGNOSIS — R41.3 MEMORY LOSS: ICD-10-CM

## 2024-05-16 DIAGNOSIS — G89.29 EXACERBATION OF CHRONIC BACK PAIN: Primary | ICD-10-CM

## 2024-05-16 DIAGNOSIS — M51.37 DDD (DEGENERATIVE DISC DISEASE), LUMBOSACRAL: ICD-10-CM

## 2024-05-16 DIAGNOSIS — M54.9 EXACERBATION OF CHRONIC BACK PAIN: Primary | ICD-10-CM

## 2024-05-16 DIAGNOSIS — G47.33 OSA TREATED WITH BIPAP: ICD-10-CM

## 2024-05-16 DIAGNOSIS — I12.9 BENIGN HYPERTENSION WITH CKD (CHRONIC KIDNEY DISEASE) STAGE III (HCC): ICD-10-CM

## 2024-05-16 DIAGNOSIS — N18.30 BENIGN HYPERTENSION WITH CKD (CHRONIC KIDNEY DISEASE) STAGE III (HCC): ICD-10-CM

## 2024-05-16 DIAGNOSIS — E11.22 TYPE 2 DIABETES MELLITUS WITH STAGE 3A CHRONIC KIDNEY DISEASE, WITH LONG-TERM CURRENT USE OF INSULIN (HCC): ICD-10-CM

## 2024-05-16 DIAGNOSIS — F33.1 MODERATE EPISODE OF RECURRENT MAJOR DEPRESSIVE DISORDER (HCC): ICD-10-CM

## 2024-05-16 DIAGNOSIS — Z79.4 TYPE 2 DIABETES MELLITUS WITH STAGE 3A CHRONIC KIDNEY DISEASE, WITH LONG-TERM CURRENT USE OF INSULIN (HCC): ICD-10-CM

## 2024-05-16 DIAGNOSIS — E21.3 HYPERPARATHYROIDISM (HCC): ICD-10-CM

## 2024-05-16 PROBLEM — M51.379 DDD (DEGENERATIVE DISC DISEASE), LUMBOSACRAL: Status: ACTIVE | Noted: 2024-05-16

## 2024-05-16 PROCEDURE — 3051F HG A1C>EQUAL 7.0%<8.0%: CPT | Performed by: FAMILY MEDICINE

## 2024-05-16 PROCEDURE — 1123F ACP DISCUSS/DSCN MKR DOCD: CPT | Performed by: FAMILY MEDICINE

## 2024-05-16 PROCEDURE — 3017F COLORECTAL CA SCREEN DOC REV: CPT | Performed by: FAMILY MEDICINE

## 2024-05-16 PROCEDURE — 1036F TOBACCO NON-USER: CPT | Performed by: FAMILY MEDICINE

## 2024-05-16 PROCEDURE — G8417 CALC BMI ABV UP PARAM F/U: HCPCS | Performed by: FAMILY MEDICINE

## 2024-05-16 PROCEDURE — G8427 DOCREV CUR MEDS BY ELIG CLIN: HCPCS | Performed by: FAMILY MEDICINE

## 2024-05-16 PROCEDURE — 2022F DILAT RTA XM EVC RTNOPTHY: CPT | Performed by: FAMILY MEDICINE

## 2024-05-16 PROCEDURE — 99215 OFFICE O/P EST HI 40 MIN: CPT | Performed by: FAMILY MEDICINE

## 2024-05-16 RX ORDER — BACLOFEN 10 MG/1
10 TABLET ORAL NIGHTLY PRN
Qty: 90 TABLET | Refills: 0 | Status: SHIPPED | OUTPATIENT
Start: 2024-05-16

## 2024-05-16 RX ORDER — YEAST,DRIED (S. CEREVISIAE)
1 POWDER (GRAM) ORAL DAILY
Qty: 90 TABLET | Refills: 0
Start: 2024-05-16

## 2024-05-16 RX ORDER — ASPIRIN 81 MG
TABLET,CHEWABLE ORAL
Qty: 56.6 G | Refills: 1
Start: 2024-05-16

## 2024-05-16 RX ORDER — FUROSEMIDE 20 MG/1
20 TABLET ORAL EVERY OTHER DAY
Qty: 45 TABLET | Refills: 3 | Status: SHIPPED | OUTPATIENT
Start: 2024-05-16

## 2024-05-16 SDOH — ECONOMIC STABILITY: FOOD INSECURITY: WITHIN THE PAST 12 MONTHS, THE FOOD YOU BOUGHT JUST DIDN'T LAST AND YOU DIDN'T HAVE MONEY TO GET MORE.: NEVER TRUE

## 2024-05-16 SDOH — ECONOMIC STABILITY: FOOD INSECURITY: WITHIN THE PAST 12 MONTHS, YOU WORRIED THAT YOUR FOOD WOULD RUN OUT BEFORE YOU GOT MONEY TO BUY MORE.: NEVER TRUE

## 2024-05-16 SDOH — ECONOMIC STABILITY: INCOME INSECURITY: HOW HARD IS IT FOR YOU TO PAY FOR THE VERY BASICS LIKE FOOD, HOUSING, MEDICAL CARE, AND HEATING?: NOT HARD AT ALL

## 2024-05-16 ASSESSMENT — PATIENT HEALTH QUESTIONNAIRE - PHQ9
7. TROUBLE CONCENTRATING ON THINGS, SUCH AS READING THE NEWSPAPER OR WATCHING TELEVISION: NOT AT ALL
4. FEELING TIRED OR HAVING LITTLE ENERGY: NEARLY EVERY DAY
1. LITTLE INTEREST OR PLEASURE IN DOING THINGS: MORE THAN HALF THE DAYS
5. POOR APPETITE OR OVEREATING: MORE THAN HALF THE DAYS
SUM OF ALL RESPONSES TO PHQ QUESTIONS 1-9: 12
SUM OF ALL RESPONSES TO PHQ QUESTIONS 1-9: 12
9. THOUGHTS THAT YOU WOULD BE BETTER OFF DEAD, OR OF HURTING YOURSELF: NOT AT ALL
SUM OF ALL RESPONSES TO PHQ9 QUESTIONS 1 & 2: 3
SUM OF ALL RESPONSES TO PHQ QUESTIONS 1-9: 12
6. FEELING BAD ABOUT YOURSELF - OR THAT YOU ARE A FAILURE OR HAVE LET YOURSELF OR YOUR FAMILY DOWN: SEVERAL DAYS
10. IF YOU CHECKED OFF ANY PROBLEMS, HOW DIFFICULT HAVE THESE PROBLEMS MADE IT FOR YOU TO DO YOUR WORK, TAKE CARE OF THINGS AT HOME, OR GET ALONG WITH OTHER PEOPLE: NOT DIFFICULT AT ALL
2. FEELING DOWN, DEPRESSED OR HOPELESS: SEVERAL DAYS
SUM OF ALL RESPONSES TO PHQ QUESTIONS 1-9: 12
8. MOVING OR SPEAKING SO SLOWLY THAT OTHER PEOPLE COULD HAVE NOTICED. OR THE OPPOSITE, BEING SO FIGETY OR RESTLESS THAT YOU HAVE BEEN MOVING AROUND A LOT MORE THAN USUAL: NEARLY EVERY DAY
3. TROUBLE FALLING OR STAYING ASLEEP: NOT AT ALL

## 2024-05-16 NOTE — PROGRESS NOTES
Visit Information    Have you changed or started any medications since your last visit including any over-the-counter medicines, vitamins, or herbal medicines? yes -    Have you stopped taking any of your medications? Is so, why? -  YES  Are you having any side effects from any of your medications? - no    Have you seen any other physician or provider since your last visit?  yes -    Have you had any other diagnostic tests since your last visit?  yes -    Have you been seen in the emergency room and/or had an admission in a hospital since we last saw you?  no   Have you had your routine dental cleaning in the past 6 months?  yes -      Do you have an active MyChart account? If no, what is the barrier?  No:     Patient Care Team:  Rosa Lauren MD as PCP - General (Family Medicine)  Rosa Lauren MD as PCP - Empaneled Provider  Deandre Lucas MD as Surgeon (Cardiology)  Juno Muñoz MD as Consulting Physician (Endocrinology)  Aleksander Covarrubias MD as Surgeon (Orthopedic Surgery)  Adelia Rubio MD as Consulting Physician (Rheumatology)  Conor Matias MD as Consulting Physician (Gastroenterology)  Zachary Zamora MD as Consulting Physician (Urology)  Shelton Tadeo MD as Consulting Physician (Orthopedic Surgery)  Rafael Vieira DPM as Surgeon (Podiatry)  John Sommer MD as Consulting Physician (Pulmonary Disease)  Antonio Farrar APRN - CNP as Nurse Practitioner (Pulmonology)  Max Carbajal PA-C as Physician Assistant (Dermatology)  Aleksander Rosas MD as Consulting Physician (Neurology)  Antonio Farrar APRN - CNP as Nurse Practitioner (Pulmonology)    Medical History Review  Past Medical, Family, and Social History reviewed and does contribute to the patient presenting condition    Health Maintenance   Topic Date Due    Diabetic foot exam  08/15/2024    Annual Wellness Visit (Medicare)  08/15/2024    Lipids  08/16/2024    Diabetic retinal exam  09/22/2024

## 2024-05-16 NOTE — PROGRESS NOTES
Colton Arroyo (:  1955) is a 68 y.o. male,Established patient, here for evaluation of the following chief complaint(s): Diabetes, Hypertension, Discuss Medications, Memory Loss (NOTICED CHANGES THE PAST 2 YEARS), and Shaking (IN B/L HANDS/HARD TO HOLD OBJECTS)      ASSESSMENT/PLAN:    1. Exacerbation of chronic back pain  Failing to improve  Reassurance given, muscle spasms right paraspinal  Stretching, repositioning, heating pad, start baclofen short-term, and over-the-counter supplements for degenerative changes  -    start Collagen-Boron-Hyaluronic Acid (MOVE FREE ULTRA JOINT HEALTH) 40-5-3.3 MG TABS; Take 1 tablet by mouth daily, Disp-90 tablet, R-0NO PRINT  -   start  Capsaicin 0.1 % CREA; Use topically every 8 hrs as needed for pain, Disp-56.6 g, R-1NO PRINT  - start    baclofen (LIORESAL) 10 MG tablet; Take 1 tablet by mouth nightly as needed (MUSCLE SPASMS) Causes sedation, do not drive while taking this medication, Disp-90 tablet, R-0Normal  2. Tremors of nervous system  Worsening  I advised him to make appointment with neurologist, Dr. Rosas   Could be a combined tremor, intentional tremor and resting tremor, neurologist to rule out Parkinson's disease, discussed with patient and his wife  Will also rule out electrolyte imbalance especially hypercalcemia, and hormonal imbalance, thyroid and parathyroid.  -     TSH; Future  -     PTH, Intact with Ionized Calcium; Future  3. Type 2 diabetes mellitus with stage 3a chronic kidney disease, with long-term current use of insulin ([facility])  Improved type 2 diabetes mellitus and well-controlled now  Chronic kidney disease stage IIIa improved  Lab Results   Component Value Date    LABA1C 7.2 (H) 2024    LABA1C 8.4 (H) 2024    LABA1C 9.7 2023       -     Comprehensive Metabolic Panel; Future  -     CBC with Auto Differential; Future  -     Vitamin B12 & Folate; Future  -     Uric Acid; Future  -     TSH; Future    -advised home

## 2024-05-17 ENCOUNTER — HOSPITAL ENCOUNTER (OUTPATIENT)
Age: 69
Discharge: HOME | End: 2024-05-17
Payer: MEDICARE

## 2024-05-17 DIAGNOSIS — E11.22 TYPE 2 DIABETES MELLITUS WITH STAGE 3A CHRONIC KIDNEY DISEASE, WITH LONG-TERM CURRENT USE OF INSULIN (HCC): ICD-10-CM

## 2024-05-17 DIAGNOSIS — I12.9 BENIGN HYPERTENSION WITH CKD (CHRONIC KIDNEY DISEASE) STAGE III (HCC): ICD-10-CM

## 2024-05-17 DIAGNOSIS — Z79.4 TYPE 2 DIABETES MELLITUS WITH STAGE 3A CHRONIC KIDNEY DISEASE, WITH LONG-TERM CURRENT USE OF INSULIN (HCC): ICD-10-CM

## 2024-05-17 DIAGNOSIS — N18.30 BENIGN HYPERTENSION WITH CKD (CHRONIC KIDNEY DISEASE) STAGE III (HCC): ICD-10-CM

## 2024-05-17 DIAGNOSIS — N18.31 TYPE 2 DIABETES MELLITUS WITH STAGE 3A CHRONIC KIDNEY DISEASE, WITH LONG-TERM CURRENT USE OF INSULIN (HCC): ICD-10-CM

## 2024-05-17 DIAGNOSIS — E21.3 HYPERPARATHYROIDISM (HCC): ICD-10-CM

## 2024-05-17 LAB
25(OH)D3 SERPL-MCNC: 28.1 NG/ML (ref 30–100)
ALBUMIN SERPL-MCNC: 4.6 G/DL (ref 3.5–5.2)
ALP SERPL-CCNC: 107 U/L (ref 40–129)
ALT SERPL-CCNC: 41 U/L (ref 5–41)
ANION GAP SERPL CALCULATED.3IONS-SCNC: 10 MMOL/L (ref 9–17)
AST SERPL-CCNC: 29 U/L
BASOPHILS # BLD: 0.06 K/UL (ref 0–0.2)
BASOPHILS NFR BLD: 1 % (ref 0–2)
BILIRUB SERPL-MCNC: 0.8 MG/DL (ref 0.3–1.2)
BUN SERPL-MCNC: 21 MG/DL (ref 8–23)
CA-I BLD-SCNC: 1.37 MMOL/L (ref 1.13–1.33)
CALCIUM SERPL-MCNC: 10.6 MG/DL (ref 8.6–10.4)
CHLORIDE SERPL-SCNC: 106 MMOL/L (ref 98–107)
CO2 SERPL-SCNC: 26 MMOL/L (ref 20–31)
CREAT SERPL-MCNC: 1.2 MG/DL (ref 0.7–1.2)
EOSINOPHIL # BLD: 0.35 K/UL (ref 0–0.4)
EOSINOPHILS RELATIVE PERCENT: 6 % (ref 0–4)
ERYTHROCYTE [DISTWIDTH] IN BLOOD BY AUTOMATED COUNT: 14.9 % (ref 11.5–14.9)
FOLATE SERPL-MCNC: 8.2 NG/ML (ref 4.8–24.2)
GFR, ESTIMATED: 66 ML/MIN/1.73M2
GLUCOSE SERPL-MCNC: 105 MG/DL (ref 70–99)
HCT VFR BLD AUTO: 46.1 % (ref 41–53)
HGB BLD-MCNC: 15.3 G/DL (ref 13.5–17.5)
LYMPHOCYTES NFR BLD: 1.28 K/UL (ref 1–4.8)
LYMPHOCYTES RELATIVE PERCENT: 22 % (ref 24–44)
MAGNESIUM SERPL-MCNC: 2.2 MG/DL (ref 1.6–2.6)
MCH RBC QN AUTO: 30.6 PG (ref 26–34)
MCHC RBC AUTO-ENTMCNC: 33.2 G/DL (ref 31–37)
MCV RBC AUTO: 92.2 FL (ref 80–100)
MONOCYTES NFR BLD: 0.7 K/UL (ref 0.1–1.3)
MONOCYTES NFR BLD: 12 % (ref 1–7)
MORPHOLOGY: ABNORMAL
NEUTROPHILS NFR BLD: 59 % (ref 36–66)
NEUTS SEG NFR BLD: 3.41 K/UL (ref 1.3–9.1)
PHOSPHATE SERPL-MCNC: 3.1 MG/DL (ref 2.5–4.5)
PLATELET # BLD AUTO: 194 K/UL (ref 150–450)
PMV BLD AUTO: 7.8 FL (ref 6–12)
POTASSIUM SERPL-SCNC: 4.6 MMOL/L (ref 3.7–5.3)
PROT SERPL-MCNC: 7.4 G/DL (ref 6.4–8.3)
PTH-INTACT SERPL-MCNC: 88 PG/ML (ref 15–65)
RBC # BLD AUTO: 5 M/UL (ref 4.5–5.9)
SODIUM SERPL-SCNC: 142 MMOL/L (ref 135–144)
TSH SERPL DL<=0.05 MIU/L-ACNC: 2.5 UIU/ML (ref 0.3–5)
URATE SERPL-MCNC: 5.6 MG/DL (ref 3.4–7)
VIT B12 SERPL-MCNC: 351 PG/ML (ref 232–1245)
WBC OTHER # BLD: 5.8 K/UL (ref 3.5–11)

## 2024-05-17 PROCEDURE — 82306 VITAMIN D 25 HYDROXY: CPT

## 2024-05-17 PROCEDURE — 82330 ASSAY OF CALCIUM: CPT

## 2024-05-17 PROCEDURE — 36415 COLL VENOUS BLD VENIPUNCTURE: CPT

## 2024-05-17 PROCEDURE — 80053 COMPREHEN METABOLIC PANEL: CPT

## 2024-05-17 PROCEDURE — 83970 ASSAY OF PARATHORMONE: CPT

## 2024-05-17 PROCEDURE — 83735 ASSAY OF MAGNESIUM: CPT

## 2024-05-17 PROCEDURE — 82746 ASSAY OF FOLIC ACID SERUM: CPT

## 2024-05-17 PROCEDURE — 82607 VITAMIN B-12: CPT

## 2024-05-17 PROCEDURE — 85025 COMPLETE CBC W/AUTO DIFF WBC: CPT

## 2024-05-17 PROCEDURE — 84443 ASSAY THYROID STIM HORMONE: CPT

## 2024-05-17 PROCEDURE — 84100 ASSAY OF PHOSPHORUS: CPT

## 2024-05-17 PROCEDURE — 84550 ASSAY OF BLOOD/URIC ACID: CPT

## 2024-05-17 NOTE — RESULT ENCOUNTER NOTE
Please notify patient: And fax results to Dr. Muñoz  Parathormone and calcium levels are high, worse than 4 months ago  To make sure to drink 8 x 8 ounce glasses of water every day    Vitamin D mildly low, however he needs to make an urgent appointment with Dr. Angelo and make sure he does not miss Sensipar, most likely the cause of his memory fog, increased achiness and pain and fatigue    Blood glucose well-controlled at 105  Uric acid greatly improved, continue allopurinol 100 mg daily  Vitamin B12 towards the lower side, cannot take vitamin B12 1000 mcg daily, from over-the-counter    Otherwise labs within normal limits  continue current treatment    Future Appointments  5/28/2024  10:00 AM   Antonio Farrar APRN - CNP Resp Spec           [facility]  5/29/2024  9:10 AM    [facility]       [facility]        [facility]  8/16/2024  10:00 AM   Rosa Lauren MD     sc               [facility]  10/15/2024 8:40 AM    Zachary Zamora MD       [facility]           [facility]

## 2024-05-20 PROBLEM — I50.22 CHRONIC SYSTOLIC CONGESTIVE HEART FAILURE (HCC): Status: ACTIVE | Noted: 2024-05-20

## 2024-05-20 PROBLEM — R41.3 MEMORY LOSS: Status: ACTIVE | Noted: 2024-05-20

## 2024-05-20 PROBLEM — R25.1 TREMORS OF NERVOUS SYSTEM: Status: ACTIVE | Noted: 2024-05-20

## 2024-05-28 ENCOUNTER — OFFICE VISIT (OUTPATIENT)
Dept: PULMONOLOGY | Age: 69
End: 2024-05-28
Payer: MEDICARE

## 2024-05-28 VITALS
HEART RATE: 86 BPM | RESPIRATION RATE: 17 BRPM | DIASTOLIC BLOOD PRESSURE: 86 MMHG | HEIGHT: 68 IN | WEIGHT: 217 LBS | OXYGEN SATURATION: 95 % | BODY MASS INDEX: 32.89 KG/M2 | SYSTOLIC BLOOD PRESSURE: 130 MMHG

## 2024-05-28 DIAGNOSIS — G47.33 OSA TREATED WITH BIPAP: Primary | ICD-10-CM

## 2024-05-28 PROCEDURE — G8417 CALC BMI ABV UP PARAM F/U: HCPCS | Performed by: NURSE PRACTITIONER

## 2024-05-28 PROCEDURE — 3017F COLORECTAL CA SCREEN DOC REV: CPT | Performed by: NURSE PRACTITIONER

## 2024-05-28 PROCEDURE — 1036F TOBACCO NON-USER: CPT | Performed by: NURSE PRACTITIONER

## 2024-05-28 PROCEDURE — 1123F ACP DISCUSS/DSCN MKR DOCD: CPT | Performed by: NURSE PRACTITIONER

## 2024-05-28 PROCEDURE — G8427 DOCREV CUR MEDS BY ELIG CLIN: HCPCS | Performed by: NURSE PRACTITIONER

## 2024-05-28 PROCEDURE — 99213 OFFICE O/P EST LOW 20 MIN: CPT | Performed by: NURSE PRACTITIONER

## 2024-05-28 ASSESSMENT — ENCOUNTER SYMPTOMS
ALLERGIC/IMMUNOLOGIC NEGATIVE: 1
RESPIRATORY NEGATIVE: 1
GASTROINTESTINAL NEGATIVE: 1
EYES NEGATIVE: 1

## 2024-05-29 ENCOUNTER — HOSPITAL ENCOUNTER (OUTPATIENT)
Dept: PHARMACY | Age: 69
Setting detail: THERAPIES SERIES
Discharge: HOME | End: 2024-05-29
Attending: FAMILY MEDICINE
Payer: MEDICARE

## 2024-05-29 VITALS
WEIGHT: 220 LBS | SYSTOLIC BLOOD PRESSURE: 112 MMHG | DIASTOLIC BLOOD PRESSURE: 71 MMHG | BODY MASS INDEX: 33.45 KG/M2 | OXYGEN SATURATION: 95 % | HEART RATE: 88 BPM

## 2024-05-29 PROCEDURE — 99213 OFFICE O/P EST LOW 20 MIN: CPT | Performed by: PHARMACIST

## 2024-05-29 NOTE — PROGRESS NOTES
dose, Disp: 45 tablet, Rfl: 3    famotidine (PEPCID) 20 MG tablet, take 1 tablet by mouth every morning before breakfast, Disp: 90 tablet, Rfl: 3    dulaglutide (TRULICITY) 0.75 MG/0.5ML SOPN SC injection, Inject 0.5 mLs into the skin every 7 days PLEASE READ BLACK BOX. ASK PHARMACIST FOR COUNSELING, Disp: 4 Adjustable Dose Pre-filled Pen Syringe, Rfl: 1    insulin aspart (NOVOLOG FLEXPEN) 100 UNIT/ML injection pen, Use before each meal.  Dose: If <139 No Insulin ;140-199 3 Units ;200-249 5 Units ; 250-299 7 Units; 300-349 9 Units; 350-400 11 Units Above 400 13 Units.  Maximum of 39 units per day., Disp: 5 Adjustable Dose Pre-filled Pen Syringe, Rfl: 3    insulin glargine (LANTUS SOLOSTAR) 100 UNIT/ML injection pen, Inject 42 Units into the skin nightly Dose increased 1/12/24  30 units, Disp: 10 Adjustable Dose Pre-filled Pen Syringe, Rfl: 3    losartan (COZAAR) 25 MG tablet, take 1 tablet by mouth once daily, Disp: 90 tablet, Rfl: 1    finasteride (PROSCAR) 5 MG tablet, take 1 tablet by mouth once daily, Disp: 90 tablet, Rfl: 3    tamsulosin (FLOMAX) 0.4 MG capsule, take 1 capsule by mouth twice a day, Disp: 180 capsule, Rfl: 3    aspirin (SM ASPIRIN LOW DOSE) 81 MG EC tablet, Take 1 tablet by mouth daily, Disp: 90 tablet, Rfl: 1    coenzyme Q10 (RA COENZYME Q-10) 100 MG CAPS capsule, take 1 capsule by mouth every evening, Disp: 90 capsule, Rfl: 3    metoprolol succinate (TOPROL XL) 100 MG extended release tablet, Take 1 tablet by mouth daily, Disp: 90 tablet, Rfl: 3    allopurinol (ZYLOPRIM) 100 MG tablet, Take 1 tablet by mouth daily For high uric acid, stop taking it if any rash develops, Disp: 90 tablet, Rfl: 1    metFORMIN (GLUCOPHAGE) 1000 MG tablet, Take 1 tablet by mouth 2 times daily (with meals), Disp: 180 tablet, Rfl: 3    pravastatin (PRAVACHOL) 40 MG tablet, take 1 tablet by mouth once daily with dinner, Disp: 90 tablet, Rfl: 3    Insulin Pen Needle (AUM MINI INSULIN PEN NEEDLE) 32G X 8 MM MISC, Use to

## 2024-05-29 NOTE — DISCHARGE INSTRUCTIONS
Continue Trulicity 0.5 mg injection once weekly on Wednesday.    Continue Farxiga 10 mg once daily  Continue insulin aspart siding scale  Continue Metformin 1000 mg twice daily  Continue Lantus 42 once daily  Consider obtaining protein shakes (Glucerna) for a good source of protein in the morning  Try to eat consistently - three meals daily - as you are able with swallowing issues   
none

## 2024-06-21 DIAGNOSIS — Z79.4 TYPE 2 DIABETES MELLITUS WITH HYPERGLYCEMIA, WITH LONG-TERM CURRENT USE OF INSULIN (HCC): ICD-10-CM

## 2024-06-21 DIAGNOSIS — E11.65 TYPE 2 DIABETES MELLITUS WITH HYPERGLYCEMIA, WITH LONG-TERM CURRENT USE OF INSULIN (HCC): ICD-10-CM

## 2024-06-21 DIAGNOSIS — E11.22 TYPE 2 DIABETES MELLITUS WITH STAGE 2 CHRONIC KIDNEY DISEASE, WITH LONG-TERM CURRENT USE OF INSULIN (HCC): ICD-10-CM

## 2024-06-21 DIAGNOSIS — Z79.4 TYPE 2 DIABETES MELLITUS WITH STAGE 2 CHRONIC KIDNEY DISEASE, WITH LONG-TERM CURRENT USE OF INSULIN (HCC): ICD-10-CM

## 2024-06-21 DIAGNOSIS — N18.2 TYPE 2 DIABETES MELLITUS WITH STAGE 2 CHRONIC KIDNEY DISEASE, WITH LONG-TERM CURRENT USE OF INSULIN (HCC): ICD-10-CM

## 2024-06-21 RX ORDER — DULAGLUTIDE 0.75 MG/.5ML
INJECTION, SOLUTION SUBCUTANEOUS
Qty: 2 ML | Refills: 0 | OUTPATIENT
Start: 2024-06-21

## 2024-06-21 NOTE — TELEPHONE ENCOUNTER
Please Approve or Refuse.  Send to Pharmacy per Pt's Request:      Next Visit Date:  8/16/2024   Last Visit Date: 5/16/2024    Hemoglobin A1C (%)   Date Value   04/24/2024 7.2 (H)   01/26/2024 8.4 (H)   11/21/2023 9.7             ( goal A1C is < 7)   BP Readings from Last 3 Encounters:   05/29/24 112/71   05/28/24 130/86   05/16/24 120/72          (goal 120/80)  BUN   Date Value Ref Range Status   05/17/2024 21 8 - 23 mg/dL Final     Creatinine   Date Value Ref Range Status   05/17/2024 1.2 0.7 - 1.2 mg/dL Final     Potassium   Date Value Ref Range Status   05/17/2024 4.6 3.7 - 5.3 mmol/L Final

## 2024-06-21 NOTE — TELEPHONE ENCOUNTER
Does he want Trulicity increased to the next step?  If no constipation or stomach pain and acid reflux, we could increase to the second step if he wants

## 2024-06-24 RX ORDER — DULAGLUTIDE 0.75 MG/.5ML
0.75 INJECTION, SOLUTION SUBCUTANEOUS
Qty: 4 ADJUSTABLE DOSE PRE-FILLED PEN SYRINGE | Refills: 1 | Status: SHIPPED | OUTPATIENT
Start: 2024-06-24

## 2024-06-24 NOTE — TELEPHONE ENCOUNTER
Pt would like to stay on same dose since he does not want to drop too low    Blood sugar today was 110  Morning today  130  Yesterday 106  Midday 140  Last night 170 before dinner

## 2024-07-04 DIAGNOSIS — E79.0 HYPERURICEMIA: ICD-10-CM

## 2024-07-05 RX ORDER — ALLOPURINOL 100 MG/1
TABLET ORAL
Qty: 90 TABLET | Refills: 3 | Status: SHIPPED | OUTPATIENT
Start: 2024-07-05

## 2024-07-17 ENCOUNTER — TELEPHONE (OUTPATIENT)
Dept: PHARMACY | Age: 69
End: 2024-07-17

## 2024-07-17 ENCOUNTER — HOSPITAL ENCOUNTER (OUTPATIENT)
Dept: PHARMACY | Age: 69
Setting detail: THERAPIES SERIES
Discharge: HOME | End: 2024-07-17
Attending: FAMILY MEDICINE
Payer: MEDICARE

## 2024-07-17 VITALS
SYSTOLIC BLOOD PRESSURE: 112 MMHG | OXYGEN SATURATION: 93 % | DIASTOLIC BLOOD PRESSURE: 85 MMHG | HEART RATE: 82 BPM | WEIGHT: 223.3 LBS | BODY MASS INDEX: 33.95 KG/M2

## 2024-07-17 DIAGNOSIS — N18.31 TYPE 2 DIABETES MELLITUS WITH STAGE 3A CHRONIC KIDNEY DISEASE, WITHOUT LONG-TERM CURRENT USE OF INSULIN (HCC): ICD-10-CM

## 2024-07-17 DIAGNOSIS — E11.22 TYPE 2 DIABETES MELLITUS WITH STAGE 3A CHRONIC KIDNEY DISEASE, WITH LONG-TERM CURRENT USE OF INSULIN (HCC): ICD-10-CM

## 2024-07-17 DIAGNOSIS — N18.2 TYPE 2 DIABETES MELLITUS WITH STAGE 2 CHRONIC KIDNEY DISEASE, WITH LONG-TERM CURRENT USE OF INSULIN (HCC): ICD-10-CM

## 2024-07-17 DIAGNOSIS — Z79.4 TYPE 2 DIABETES MELLITUS WITH STAGE 2 CHRONIC KIDNEY DISEASE, WITH LONG-TERM CURRENT USE OF INSULIN (HCC): ICD-10-CM

## 2024-07-17 DIAGNOSIS — E11.22 TYPE 2 DIABETES MELLITUS WITH STAGE 2 CHRONIC KIDNEY DISEASE, WITH LONG-TERM CURRENT USE OF INSULIN (HCC): ICD-10-CM

## 2024-07-17 DIAGNOSIS — Z79.4 TYPE 2 DIABETES MELLITUS WITH STAGE 3A CHRONIC KIDNEY DISEASE, WITH LONG-TERM CURRENT USE OF INSULIN (HCC): ICD-10-CM

## 2024-07-17 DIAGNOSIS — E11.22 TYPE 2 DIABETES MELLITUS WITH STAGE 3A CHRONIC KIDNEY DISEASE, WITHOUT LONG-TERM CURRENT USE OF INSULIN (HCC): ICD-10-CM

## 2024-07-17 DIAGNOSIS — Z79.4 TYPE 2 DIABETES MELLITUS WITH HYPERGLYCEMIA, WITH LONG-TERM CURRENT USE OF INSULIN (HCC): ICD-10-CM

## 2024-07-17 DIAGNOSIS — N18.31 TYPE 2 DIABETES MELLITUS WITH STAGE 3A CHRONIC KIDNEY DISEASE, WITH LONG-TERM CURRENT USE OF INSULIN (HCC): ICD-10-CM

## 2024-07-17 DIAGNOSIS — E11.65 TYPE 2 DIABETES MELLITUS WITH HYPERGLYCEMIA, WITH LONG-TERM CURRENT USE OF INSULIN (HCC): ICD-10-CM

## 2024-07-17 PROCEDURE — 99213 OFFICE O/P EST LOW 20 MIN: CPT

## 2024-07-17 RX ORDER — ACYCLOVIR 400 MG/1
TABLET ORAL
Qty: 9 EACH | Refills: 3 | Status: SHIPPED | OUTPATIENT
Start: 2024-07-17

## 2024-07-17 RX ORDER — GLUCOSAMINE HCL/CHONDROITIN SU 500-400 MG
CAPSULE ORAL
Qty: 100 STRIP | Refills: 3 | Status: SHIPPED | OUTPATIENT
Start: 2024-07-17

## 2024-07-17 RX ORDER — INSULIN GLARGINE 100 [IU]/ML
42 INJECTION, SOLUTION SUBCUTANEOUS NIGHTLY
Qty: 13 ADJUSTABLE DOSE PRE-FILLED PEN SYRINGE | Refills: 1 | Status: SHIPPED | OUTPATIENT
Start: 2024-07-17

## 2024-07-17 RX ORDER — DAPAGLIFLOZIN 10 MG/1
10 TABLET, FILM COATED ORAL EVERY MORNING
Qty: 90 TABLET | Refills: 1 | Status: SHIPPED | OUTPATIENT
Start: 2024-07-17

## 2024-07-17 NOTE — TELEPHONE ENCOUNTER
Rx for 90 day supply for insulin glargine, Jardiance, metformin, pen needles, Trulicity, glucose test strips, dexcom sensors sent to [facility] on [address].  Receipt confirmed for all RX sent.  Quinn Mitchell RP,Pharm.D,, [facility], CACP  7/17/2024  6:56 PM

## 2024-07-17 NOTE — DISCHARGE INSTRUCTIONS
Increase Trulicity to 1.5 mg injection once weekly on Wednesday.    Continue Farxiga 10 mg once daily  Stop Novolog insulin aspart siding scale  Continue Metformin 1000 mg twice daily  Continue Lantus 42 once daily  Take a Glucerna for breakfast most days.

## 2024-07-17 NOTE — PROGRESS NOTES
Diabetic Medication Management Program  [facility]  [address]   [place], [place] [address]  Phone: [phone]  Fax: [phone]    NAME: Colton Arroyo  MEDICAL RECORD NUMBER:  [id number]  AGE: 69 y.o.   GENDER: male  : 1955  EPISODE DATE:  2024     Mr. Arroyo was referred to [facility] by Dr. Lauren.  Patient acknowledges working in consult agreement with clinical pharmacist and this provider.     Goals per referral:   Fasting blood glucose: < 130  Peak postprandial glucose: < 180  A1C: < 7    SUBJECTIVE     Mr. Arroyo is a 69 y.o. male here for the Diabetes Service for self-management education, medication review including over the counter medications and herbal products, overall wellbeing assessment, transition of care and any needed adjustments with updates and recommendations communicated to the referring physician.       Patient Findings:     Medication changes  no  Diet changes  Yes: see below    Activity changes  Yes: Has been staying inside more due to heat / humidity  - Active outside. Working on [employer] on house and golf cart. No set exercise but will walk around the grocery about every other day.  When goes to [place] will be walking more.   Emergency Room Visit or Hospitalization  no  Acute Illness/new problems  no  Symptoms of hypoglycemia  No -  Has been a bit below 100 and did not feel shaky so doing better.  Symptoms of hyperglycemia  no - none  Medication adverse reactions  none    Comments:   Patient keeps forgetting to take baclofen. States it is in medicine cabinet but forgets to try it.  Was taken off diclofenac about 2 months ago and is using up to four (4) 500mg APAP per day for pain. Encouraged to try baclofen.    Leaves for [place] cruise to 8 different countries.   Leaves on  and back on 24.     Had issue with one Dexcom that came loose but never came off and never stopped working.   Got overpatch

## 2024-08-01 ENCOUNTER — HOSPITAL ENCOUNTER (OUTPATIENT)
Age: 69
Discharge: HOME | End: 2024-08-01
Payer: MEDICARE

## 2024-08-01 LAB
25(OH)D3 SERPL-MCNC: 39.8 NG/ML (ref 30–100)
CA-I BLD-SCNC: 1.04 MMOL/L (ref 1.13–1.33)
CALCIUM SERPL-MCNC: 7.9 MG/DL (ref 8.6–10.4)
PTH-INTACT SERPL-MCNC: 85 PG/ML (ref 15–65)

## 2024-08-01 PROCEDURE — 36415 COLL VENOUS BLD VENIPUNCTURE: CPT

## 2024-08-01 PROCEDURE — 83970 ASSAY OF PARATHORMONE: CPT

## 2024-08-01 PROCEDURE — 82306 VITAMIN D 25 HYDROXY: CPT

## 2024-08-01 PROCEDURE — 82310 ASSAY OF CALCIUM: CPT

## 2024-08-01 PROCEDURE — 82330 ASSAY OF CALCIUM: CPT

## 2024-08-06 ENCOUNTER — HOSPITAL ENCOUNTER (OUTPATIENT)
Age: 69
Discharge: HOME | End: 2024-08-06
Payer: MEDICARE

## 2024-08-06 LAB
CA-I BLD-SCNC: 1.14 MMOL/L (ref 1.13–1.33)
CALCIUM SERPL-MCNC: 8.8 MG/DL (ref 8.6–10.4)
PTH-INTACT SERPL-MCNC: 27 PG/ML (ref 15–65)

## 2024-08-06 PROCEDURE — 83970 ASSAY OF PARATHORMONE: CPT

## 2024-08-06 PROCEDURE — 36415 COLL VENOUS BLD VENIPUNCTURE: CPT

## 2024-08-06 PROCEDURE — 82310 ASSAY OF CALCIUM: CPT

## 2024-08-06 PROCEDURE — 82330 ASSAY OF CALCIUM: CPT

## 2024-08-16 ENCOUNTER — OFFICE VISIT (OUTPATIENT)
Dept: FAMILY MEDICINE CLINIC | Age: 69
End: 2024-08-16

## 2024-08-16 ENCOUNTER — ENROLLMENT (OUTPATIENT)
Dept: CARE COORDINATION | Age: 69
End: 2024-08-16

## 2024-08-16 VITALS
HEIGHT: 68 IN | TEMPERATURE: 98.4 F | WEIGHT: 223 LBS | OXYGEN SATURATION: 96 % | BODY MASS INDEX: 33.8 KG/M2 | SYSTOLIC BLOOD PRESSURE: 126 MMHG | HEART RATE: 85 BPM | DIASTOLIC BLOOD PRESSURE: 80 MMHG

## 2024-08-16 DIAGNOSIS — E21.3 HYPERPARATHYROIDISM (HCC): ICD-10-CM

## 2024-08-16 DIAGNOSIS — Z00.00 MEDICARE ANNUAL WELLNESS VISIT, SUBSEQUENT: Primary | ICD-10-CM

## 2024-08-16 DIAGNOSIS — E11.22 TYPE 2 DIABETES MELLITUS WITH STAGE 2 CHRONIC KIDNEY DISEASE, WITH LONG-TERM CURRENT USE OF INSULIN (HCC): ICD-10-CM

## 2024-08-16 DIAGNOSIS — Z79.4 TYPE 2 DIABETES MELLITUS WITH STAGE 2 CHRONIC KIDNEY DISEASE, WITH LONG-TERM CURRENT USE OF INSULIN (HCC): ICD-10-CM

## 2024-08-16 DIAGNOSIS — E53.8 VITAMIN B 12 DEFICIENCY: ICD-10-CM

## 2024-08-16 DIAGNOSIS — E78.2 MIXED HYPERLIPIDEMIA: ICD-10-CM

## 2024-08-16 DIAGNOSIS — B02.29 POSTHERPETIC NEURALGIA: ICD-10-CM

## 2024-08-16 DIAGNOSIS — R25.1 TREMORS OF NERVOUS SYSTEM: ICD-10-CM

## 2024-08-16 DIAGNOSIS — I12.9 BENIGN HYPERTENSION WITH CKD (CHRONIC KIDNEY DISEASE), STAGE II: ICD-10-CM

## 2024-08-16 DIAGNOSIS — N18.2 TYPE 2 DIABETES MELLITUS WITH STAGE 2 CHRONIC KIDNEY DISEASE, WITH LONG-TERM CURRENT USE OF INSULIN (HCC): ICD-10-CM

## 2024-08-16 DIAGNOSIS — M25.50 ARTHRALGIA OF MULTIPLE SITES: ICD-10-CM

## 2024-08-16 DIAGNOSIS — I25.10 CORONARY ARTERY DISEASE INVOLVING NATIVE CORONARY ARTERY OF NATIVE HEART WITHOUT ANGINA PECTORIS: ICD-10-CM

## 2024-08-16 DIAGNOSIS — I50.22 CHRONIC SYSTOLIC CONGESTIVE HEART FAILURE (HCC): ICD-10-CM

## 2024-08-16 DIAGNOSIS — N18.2 BENIGN HYPERTENSION WITH CKD (CHRONIC KIDNEY DISEASE), STAGE II: ICD-10-CM

## 2024-08-16 LAB — HBA1C MFR BLD: 6.1 %

## 2024-08-16 RX ORDER — NITROGLYCERIN 0.4 MG/1
0.4 TABLET SUBLINGUAL EVERY 5 MIN PRN
Qty: 25 TABLET | Refills: 3 | Status: SHIPPED | OUTPATIENT
Start: 2024-08-16

## 2024-08-16 RX ORDER — FUROSEMIDE 20 MG/1
20 TABLET ORAL DAILY
Qty: 90 TABLET | Refills: 3 | Status: SHIPPED | OUTPATIENT
Start: 2024-08-16

## 2024-08-16 RX ORDER — LOSARTAN POTASSIUM 25 MG/1
25 TABLET ORAL DAILY
Qty: 90 TABLET | Refills: 3 | Status: SHIPPED | OUTPATIENT
Start: 2024-08-16

## 2024-08-16 RX ORDER — CYANOCOBALAMIN 1000 UG/ML
1000 INJECTION, SOLUTION INTRAMUSCULAR; SUBCUTANEOUS ONCE
Status: COMPLETED | OUTPATIENT
Start: 2024-08-16 | End: 2024-08-16

## 2024-08-16 RX ORDER — PRAVASTATIN SODIUM 40 MG
40 TABLET ORAL NIGHTLY
Qty: 90 TABLET | Refills: 3 | Status: SHIPPED | OUTPATIENT
Start: 2024-08-16

## 2024-08-16 RX ORDER — CLOPIDOGREL BISULFATE 75 MG/1
75 TABLET ORAL DAILY
Qty: 90 TABLET | Refills: 3 | Status: SHIPPED | OUTPATIENT
Start: 2024-08-16

## 2024-08-16 RX ORDER — YEAST,DRIED (S. CEREVISIAE)
1 POWDER (GRAM) ORAL DAILY
Qty: 90 TABLET | Refills: 0
Start: 2024-08-16

## 2024-08-16 RX ORDER — EZETIMIBE 10 MG/1
10 TABLET ORAL DAILY
Qty: 90 TABLET | Refills: 3 | Status: SHIPPED | OUTPATIENT
Start: 2024-08-16

## 2024-08-16 RX ORDER — NORTRIPTYLINE HYDROCHLORIDE 50 MG/1
50 CAPSULE ORAL NIGHTLY
Qty: 30 CAPSULE | Refills: 0
Start: 2024-08-16

## 2024-08-16 RX ORDER — DULAGLUTIDE 1.5 MG/.5ML
1.5 INJECTION, SOLUTION SUBCUTANEOUS WEEKLY
Qty: 12 ADJUSTABLE DOSE PRE-FILLED PEN SYRINGE | Refills: 3 | Status: SHIPPED | OUTPATIENT
Start: 2024-08-16

## 2024-08-16 RX ORDER — METOPROLOL SUCCINATE 100 MG/1
100 TABLET, EXTENDED RELEASE ORAL DAILY
Qty: 90 TABLET | Refills: 3 | Status: SHIPPED | OUTPATIENT
Start: 2024-08-16

## 2024-08-16 RX ADMIN — CYANOCOBALAMIN 1000 MCG: 1000 INJECTION, SOLUTION INTRAMUSCULAR; SUBCUTANEOUS at 10:49

## 2024-08-16 SDOH — ECONOMIC STABILITY: FOOD INSECURITY: WITHIN THE PAST 12 MONTHS, YOU WORRIED THAT YOUR FOOD WOULD RUN OUT BEFORE YOU GOT MONEY TO BUY MORE.: NEVER TRUE

## 2024-08-16 SDOH — ECONOMIC STABILITY: FOOD INSECURITY: WITHIN THE PAST 12 MONTHS, THE FOOD YOU BOUGHT JUST DIDN'T LAST AND YOU DIDN'T HAVE MONEY TO GET MORE.: NEVER TRUE

## 2024-08-16 SDOH — ECONOMIC STABILITY: INCOME INSECURITY: HOW HARD IS IT FOR YOU TO PAY FOR THE VERY BASICS LIKE FOOD, HOUSING, MEDICAL CARE, AND HEATING?: NOT HARD AT ALL

## 2024-08-16 ASSESSMENT — ENCOUNTER SYMPTOMS
VOMITING: 0
NAUSEA: 0
WHEEZING: 0
CONSTIPATION: 0
APNEA: 1
SHORTNESS OF BREATH: 0
COUGH: 0
ABDOMINAL DISTENTION: 0
CHEST TIGHTNESS: 0
DIARRHEA: 0

## 2024-08-16 ASSESSMENT — LIFESTYLE VARIABLES
HOW MANY STANDARD DRINKS CONTAINING ALCOHOL DO YOU HAVE ON A TYPICAL DAY: PATIENT DOES NOT DRINK
HOW MANY STANDARD DRINKS CONTAINING ALCOHOL DO YOU HAVE ON A TYPICAL DAY: PATIENT DOES NOT DRINK
HOW OFTEN DO YOU HAVE A DRINK CONTAINING ALCOHOL: NEVER
HOW OFTEN DO YOU HAVE A DRINK CONTAINING ALCOHOL: NEVER

## 2024-08-16 ASSESSMENT — PATIENT HEALTH QUESTIONNAIRE - PHQ9
SUM OF ALL RESPONSES TO PHQ QUESTIONS 1-9: 0
SUM OF ALL RESPONSES TO PHQ QUESTIONS 1-9: 0
SUM OF ALL RESPONSES TO PHQ9 QUESTIONS 1 & 2: 4
SUM OF ALL RESPONSES TO PHQ9 QUESTIONS 1 & 2: 0
7. TROUBLE CONCENTRATING ON THINGS, SUCH AS READING THE NEWSPAPER OR WATCHING TELEVISION: NOT AT ALL
2. FEELING DOWN, DEPRESSED OR HOPELESS: MORE THAN HALF THE DAYS
5. POOR APPETITE OR OVEREATING: NOT AT ALL
4. FEELING TIRED OR HAVING LITTLE ENERGY: NOT AT ALL
6. FEELING BAD ABOUT YOURSELF - OR THAT YOU ARE A FAILURE OR HAVE LET YOURSELF OR YOUR FAMILY DOWN: NOT AT ALL
1. LITTLE INTEREST OR PLEASURE IN DOING THINGS: NOT AT ALL
7. TROUBLE CONCENTRATING ON THINGS, SUCH AS READING THE NEWSPAPER OR WATCHING TELEVISION: NOT AT ALL
3. TROUBLE FALLING OR STAYING ASLEEP: NOT AT ALL
SUM OF ALL RESPONSES TO PHQ QUESTIONS 1-9: 4
10. IF YOU CHECKED OFF ANY PROBLEMS, HOW DIFFICULT HAVE THESE PROBLEMS MADE IT FOR YOU TO DO YOUR WORK, TAKE CARE OF THINGS AT HOME, OR GET ALONG WITH OTHER PEOPLE: NOT DIFFICULT AT ALL
2. FEELING DOWN, DEPRESSED OR HOPELESS: NOT AT ALL
SUM OF ALL RESPONSES TO PHQ QUESTIONS 1-9: 4
1. LITTLE INTEREST OR PLEASURE IN DOING THINGS: MORE THAN HALF THE DAYS
5. POOR APPETITE OR OVEREATING: NOT AT ALL
9. THOUGHTS THAT YOU WOULD BE BETTER OFF DEAD, OR OF HURTING YOURSELF: NOT AT ALL
3. TROUBLE FALLING OR STAYING ASLEEP: NOT AT ALL
SUM OF ALL RESPONSES TO PHQ QUESTIONS 1-9: 4
6. FEELING BAD ABOUT YOURSELF - OR THAT YOU ARE A FAILURE OR HAVE LET YOURSELF OR YOUR FAMILY DOWN: NOT AT ALL
SUM OF ALL RESPONSES TO PHQ QUESTIONS 1-9: 0
10. IF YOU CHECKED OFF ANY PROBLEMS, HOW DIFFICULT HAVE THESE PROBLEMS MADE IT FOR YOU TO DO YOUR WORK, TAKE CARE OF THINGS AT HOME, OR GET ALONG WITH OTHER PEOPLE: NOT DIFFICULT AT ALL
8. MOVING OR SPEAKING SO SLOWLY THAT OTHER PEOPLE COULD HAVE NOTICED. OR THE OPPOSITE, BEING SO FIGETY OR RESTLESS THAT YOU HAVE BEEN MOVING AROUND A LOT MORE THAN USUAL: NOT AT ALL
9. THOUGHTS THAT YOU WOULD BE BETTER OFF DEAD, OR OF HURTING YOURSELF: NOT AT ALL
8. MOVING OR SPEAKING SO SLOWLY THAT OTHER PEOPLE COULD HAVE NOTICED. OR THE OPPOSITE, BEING SO FIGETY OR RESTLESS THAT YOU HAVE BEEN MOVING AROUND A LOT MORE THAN USUAL: NOT AT ALL
4. FEELING TIRED OR HAVING LITTLE ENERGY: NOT AT ALL
SUM OF ALL RESPONSES TO PHQ QUESTIONS 1-9: 0
SUM OF ALL RESPONSES TO PHQ QUESTIONS 1-9: 4

## 2024-08-16 ASSESSMENT — VISUAL ACUITY
OS_CC: 20/20
OD_CC: 20/20

## 2024-08-16 NOTE — PROGRESS NOTES
Visit Information    Have you changed or started any medications since your last visit including any over-the-counter medicines, vitamins, or herbal medicines? no   Are you having any side effects from any of your medications? -  no  Have you stopped taking any of your medications? Is so, why? -  no    Have you seen any other physician or provider since your last visit? No  Have you had any other diagnostic tests since your last visit? No  Have you been seen in the emergency room and/or had an admission to a hospital since we last saw you? No  Have you had your routine dental cleaning in the past 6 months? yes -    Have you activated your [employer] account? If not, what are your barriers? Yes     Patient Care Team:  Rosa Lauren MD as PCP - General (Family Medicine)  Rosa Lauren MD as PCP - Empaneled Provider  Deandre Lucas MD as Surgeon (Cardiology)  Juno Muñoz MD as Consulting Physician (Endocrinology)  Aleksander Covarrubias MD as Surgeon (Orthopedic Surgery)  Adelia Rubio MD as Consulting Physician (Rheumatology)  Zachary Zamora MD as Consulting Physician (Urology)  Shelton Tadeo MD as Consulting Physician (Orthopedic Surgery)  Rafael Vieira DPM as Surgeon (Podiatry)  John Sommer MD as Consulting Physician (Pulmonary Disease)  Max Carbajal PA-C as Physician Assistant (Dermatology)  Antonio Farrar APRN - CNP as Nurse Practitioner (Pulmonology)  Aleksander Rosas MD as Consulting Physician (Neurology)    Medical History Review  Past Medical, Family, and Social History reviewed and does contribute to the patient presenting condition    Health Maintenance   Topic Date Due    COVID-19 Vaccine (9 - 2023-24 season) 07/11/2024    Flu vaccine (1) 08/01/2024    Annual Wellness Visit (Medicare)  08/15/2024    Diabetic foot exam  08/15/2024    Lipids  08/16/2024    Diabetic retinal exam  09/22/2024    Colorectal Cancer Screen  11/14/2024    A1C test (Diabetic or 
Coronary artery disease involving native coronary artery of native heart without angina pectoris  Stable  Follow-up with new cardiologist  Continue current treatment  New prescription for nitroglycerin given  -     [place] OFFICE OUTPATIENT VISIT 25 MINUTES [39288]  -     clopidogrel (PLAVIX) 75 MG tablet; Take 1 tablet by mouth daily, Disp-90 tablet, R-3NEEDS TO CALL FOR AN APPOINTMENT FOR FURTHER REFILLSNormal  -     metoprolol succinate (TOPROL XL) 100 MG extended release tablet; Take 1 tablet by mouth daily, Disp-90 tablet, R-3Normal  -     nitroGLYCERIN (NITROSTAT) 0.4 MG SL tablet; Place 1 tablet under the tongue every 5 minutes as needed for Chest pain up to max of 3 total doses. If no relief after 1 dose, call 911., Disp-25 tablet, R-3Normal  Continue losartan, metoprolol XL, pravastatin, aspirin and Plavix  5. Type 2 diabetes mellitus with stage 2 chronic kidney disease, with long-term current use of insulin ([facility])  Improved type 2 diabetes mellitus  Improved chronic kidney disease, now stage II  -      DIABETES FOOT EXAM  -     POCT glycosylated hemoglobin (Hb A1C) 6.1, improved type 2 diabetes mellitus  Lab Results   Component Value Date    LABA1C 6.1 08/16/2024    LABA1C 7.2 (H) 04/24/2024    LABA1C 8.4 (H) 01/26/2024       -     [place] OFFICE OUTPATIENT VISIT 25 MINUTES [38947]  -     Ambulatory Referral to Care Management  -     CBC with Auto Differential; Future  -     Comprehensive Metabolic Panel; Future  -     Uric Acid; Future  -     Magnesium; Future  -     dulaglutide (TRULICITY) 1.5 MG/0.5ML SC injection; Inject 0.5 mLs into the skin once a week PLEASE READ BLACK BOX, Disp-12 Adjustable Dose Pre-filled Pen Syringe, R-3Normal  -advised home blood glucose testing multiple times daily with Dexcom G7  -daily feet exam, Foot care: advised to wash feet daily, pat dry and apply lotion at night, avoiding between toes. Need to look at feet daily and report to a physician any signs of inflammation or skin

## 2024-08-16 NOTE — RESULT ENCOUNTER NOTE
Addressed during office visit today, hemoglobin A1c 6.1, improved type 2 diabetes mellitus, continue treatment recommended during the office visit.

## 2024-08-17 PROBLEM — R31.9 HEMATURIA: Status: RESOLVED | Noted: 2024-01-04 | Resolved: 2024-08-17

## 2024-08-17 PROBLEM — I51.7 CARDIOMEGALY: Status: RESOLVED | Noted: 2022-09-21 | Resolved: 2024-08-17

## 2024-08-17 PROBLEM — R60.0 BILATERAL LEG EDEMA: Status: RESOLVED | Noted: 2023-08-15 | Resolved: 2024-08-17

## 2024-08-17 PROBLEM — E53.8 VITAMIN B 12 DEFICIENCY: Status: ACTIVE | Noted: 2024-08-17

## 2024-08-17 PROBLEM — M25.50 ARTHRALGIA OF MULTIPLE SITES: Status: ACTIVE | Noted: 2024-08-17

## 2024-08-17 PROBLEM — F33.42 RECURRENT MAJOR DEPRESSIVE DISORDER, IN FULL REMISSION (HCC): Status: ACTIVE | Noted: 2019-07-26

## 2024-08-17 ASSESSMENT — ENCOUNTER SYMPTOMS: BACK PAIN: 1

## 2024-08-22 ENCOUNTER — HOSPITAL ENCOUNTER (OUTPATIENT)
Age: 69
Discharge: HOME | End: 2024-08-22
Payer: MEDICARE

## 2024-08-22 DIAGNOSIS — N18.30 BENIGN HYPERTENSION WITH CKD (CHRONIC KIDNEY DISEASE) STAGE III (HCC): ICD-10-CM

## 2024-08-22 DIAGNOSIS — I12.9 BENIGN HYPERTENSION WITH CKD (CHRONIC KIDNEY DISEASE) STAGE III (HCC): ICD-10-CM

## 2024-08-22 DIAGNOSIS — E13.22 SECONDARY DIABETES MELLITUS WITH STAGE 3 CHRONIC KIDNEY DISEASE (HCC): ICD-10-CM

## 2024-08-22 DIAGNOSIS — N18.31 STAGE 3A CHRONIC KIDNEY DISEASE (HCC): ICD-10-CM

## 2024-08-22 DIAGNOSIS — N18.30 SECONDARY DIABETES MELLITUS WITH STAGE 3 CHRONIC KIDNEY DISEASE (HCC): ICD-10-CM

## 2024-08-22 LAB
ANION GAP SERPL CALCULATED.3IONS-SCNC: 10 MMOL/L (ref 9–17)
BACTERIA URNS QL MICRO: ABNORMAL
BASOPHILS # BLD: 0.1 K/UL (ref 0–0.2)
BASOPHILS NFR BLD: 1 % (ref 0–2)
BILIRUB UR QL STRIP: NEGATIVE
BUN SERPL-MCNC: 21 MG/DL (ref 8–23)
C3 SERPL-MCNC: 186 MG/DL (ref 90–180)
C4 SERPL-MCNC: 29 MG/DL (ref 10–40)
CALCIUM SERPL-MCNC: 9.7 MG/DL (ref 8.6–10.4)
CASTS #/AREA URNS LPF: ABNORMAL /LPF
CHLORIDE SERPL-SCNC: 101 MMOL/L (ref 98–107)
CLARITY UR: CLEAR
CO2 SERPL-SCNC: 29 MMOL/L (ref 20–31)
COLOR UR: YELLOW
CREAT SERPL-MCNC: 1.1 MG/DL (ref 0.7–1.2)
CREAT UR-MCNC: 153.3 MG/DL (ref 39–259)
EOSINOPHIL # BLD: 0.3 K/UL (ref 0–0.4)
EOSINOPHIL,URINE: NORMAL
EOSINOPHILS RELATIVE PERCENT: 5 % (ref 0–4)
EPI CELLS #/AREA URNS HPF: ABNORMAL /HPF
ERYTHROCYTE [DISTWIDTH] IN BLOOD BY AUTOMATED COUNT: 15.4 % (ref 11.5–14.9)
GFR, ESTIMATED: 73 ML/MIN/1.73M2
GLUCOSE SERPL-MCNC: 128 MG/DL (ref 70–99)
GLUCOSE UR STRIP-MCNC: ABNORMAL MG/DL
HCT VFR BLD AUTO: 47.4 % (ref 41–53)
HGB BLD-MCNC: 16.1 G/DL (ref 13.5–17.5)
HGB UR QL STRIP.AUTO: NEGATIVE
KETONES UR STRIP-MCNC: ABNORMAL MG/DL
LEUKOCYTE ESTERASE UR QL STRIP: NEGATIVE
LYMPHOCYTES NFR BLD: 1.1 K/UL (ref 1–4.8)
LYMPHOCYTES RELATIVE PERCENT: 17 % (ref 24–44)
MAGNESIUM SERPL-MCNC: 2.2 MG/DL (ref 1.6–2.6)
MCH RBC QN AUTO: 31.1 PG (ref 26–34)
MCHC RBC AUTO-ENTMCNC: 33.9 G/DL (ref 31–37)
MCV RBC AUTO: 91.9 FL (ref 80–100)
MONOCYTES NFR BLD: 0.6 K/UL (ref 0.1–1.3)
MONOCYTES NFR BLD: 9 % (ref 1–7)
NEUTROPHILS NFR BLD: 68 % (ref 36–66)
NEUTS SEG NFR BLD: 4.5 K/UL (ref 1.3–9.1)
NITRITE UR QL STRIP: NEGATIVE
PH UR STRIP: 5.5 [PH] (ref 5–8)
PHOSPHATE SERPL-MCNC: 3.6 MG/DL (ref 2.5–4.5)
PLATELET # BLD AUTO: 200 K/UL (ref 150–450)
PMV BLD AUTO: 8.2 FL (ref 6–12)
POTASSIUM SERPL-SCNC: 4.8 MMOL/L (ref 3.7–5.3)
PROT UR STRIP-MCNC: NEGATIVE MG/DL
RBC # BLD AUTO: 5.16 M/UL (ref 4.5–5.9)
RBC #/AREA URNS HPF: ABNORMAL /HPF
SODIUM SERPL-SCNC: 140 MMOL/L (ref 135–144)
SP GR UR STRIP: 1.04 (ref 1–1.03)
TOTAL PROTEIN, URINE: 12 MG/DL
URINE TOTAL PROTEIN CREATININE RATIO: 0.08 (ref 0–0.2)
UROBILINOGEN UR STRIP-ACNC: NORMAL EU/DL (ref 0–1)
WBC #/AREA URNS HPF: ABNORMAL /HPF
WBC OTHER # BLD: 6.6 K/UL (ref 3.5–11)

## 2024-08-22 PROCEDURE — 80048 BASIC METABOLIC PNL TOTAL CA: CPT

## 2024-08-22 PROCEDURE — 84156 ASSAY OF PROTEIN URINE: CPT

## 2024-08-22 PROCEDURE — 82570 ASSAY OF URINE CREATININE: CPT

## 2024-08-22 PROCEDURE — 85025 COMPLETE CBC W/AUTO DIFF WBC: CPT

## 2024-08-22 PROCEDURE — 83735 ASSAY OF MAGNESIUM: CPT

## 2024-08-22 PROCEDURE — 36415 COLL VENOUS BLD VENIPUNCTURE: CPT

## 2024-08-22 PROCEDURE — 86160 COMPLEMENT ANTIGEN: CPT

## 2024-08-22 PROCEDURE — 86225 DNA ANTIBODY NATIVE: CPT

## 2024-08-22 PROCEDURE — 87205 SMEAR GRAM STAIN: CPT

## 2024-08-22 PROCEDURE — 86162 COMPLEMENT TOTAL (CH50): CPT

## 2024-08-22 PROCEDURE — 84100 ASSAY OF PHOSPHORUS: CPT

## 2024-08-22 PROCEDURE — 86038 ANTINUCLEAR ANTIBODIES: CPT

## 2024-08-22 PROCEDURE — 83516 IMMUNOASSAY NONANTIBODY: CPT

## 2024-08-22 PROCEDURE — 81001 URINALYSIS AUTO W/SCOPE: CPT

## 2024-08-23 LAB
ANA SER QL IA: NEGATIVE
ANCA MYELOPEROXIDASE: <0.3 AU/ML (ref 0–3.5)
ANCA PROTEINASE 3: <0.7 AU/ML (ref 0–2)
DSDNA IGG SER QL IA: 1.1 IU/ML
GBM AB SER-ACNC: <1.9 AU/ML (ref 0–7)
NUCLEAR IGG SER IA-RTO: <0.1 U/ML

## 2024-08-24 LAB — COMPLEMENT TOTAL (CH50): >95 U/ML (ref 38.7–89.9)

## 2024-08-28 ENCOUNTER — HOSPITAL ENCOUNTER (OUTPATIENT)
Age: 69
Discharge: HOME | End: 2024-08-28
Payer: MEDICARE

## 2024-08-28 ENCOUNTER — HOSPITAL ENCOUNTER (OUTPATIENT)
Age: 69
Setting detail: SPECIMEN
Discharge: HOME | End: 2024-08-28

## 2024-08-28 ENCOUNTER — PHARMACY VISIT (OUTPATIENT)
Age: 69
End: 2024-08-28
Attending: FAMILY MEDICINE
Payer: MEDICARE

## 2024-08-28 VITALS
OXYGEN SATURATION: 95 % | WEIGHT: 219.9 LBS | DIASTOLIC BLOOD PRESSURE: 86 MMHG | SYSTOLIC BLOOD PRESSURE: 135 MMHG | BODY MASS INDEX: 33.44 KG/M2 | HEART RATE: 79 BPM

## 2024-08-28 DIAGNOSIS — I12.9 BENIGN HYPERTENSION WITH CKD (CHRONIC KIDNEY DISEASE), STAGE II: ICD-10-CM

## 2024-08-28 DIAGNOSIS — Z79.4 TYPE 2 DIABETES MELLITUS WITH HYPERGLYCEMIA, WITH LONG-TERM CURRENT USE OF INSULIN (HCC): Primary | ICD-10-CM

## 2024-08-28 DIAGNOSIS — I50.22 CHRONIC SYSTOLIC CONGESTIVE HEART FAILURE (HCC): ICD-10-CM

## 2024-08-28 DIAGNOSIS — E11.65 TYPE 2 DIABETES MELLITUS WITH HYPERGLYCEMIA, WITH LONG-TERM CURRENT USE OF INSULIN (HCC): Primary | ICD-10-CM

## 2024-08-28 DIAGNOSIS — E78.1 HYPERTRIGLYCERIDEMIA: Primary | ICD-10-CM

## 2024-08-28 DIAGNOSIS — Z79.4 TYPE 2 DIABETES MELLITUS WITH STAGE 2 CHRONIC KIDNEY DISEASE, WITH LONG-TERM CURRENT USE OF INSULIN (HCC): ICD-10-CM

## 2024-08-28 DIAGNOSIS — N18.2 TYPE 2 DIABETES MELLITUS WITH STAGE 2 CHRONIC KIDNEY DISEASE, WITH LONG-TERM CURRENT USE OF INSULIN (HCC): ICD-10-CM

## 2024-08-28 DIAGNOSIS — E11.22 TYPE 2 DIABETES MELLITUS WITH STAGE 2 CHRONIC KIDNEY DISEASE, WITH LONG-TERM CURRENT USE OF INSULIN (HCC): ICD-10-CM

## 2024-08-28 DIAGNOSIS — E78.2 MIXED HYPERLIPIDEMIA: ICD-10-CM

## 2024-08-28 DIAGNOSIS — N18.2 BENIGN HYPERTENSION WITH CKD (CHRONIC KIDNEY DISEASE), STAGE II: ICD-10-CM

## 2024-08-28 LAB
ALBUMIN SERPL-MCNC: 4.6 G/DL (ref 3.5–5.2)
ALP SERPL-CCNC: 100 U/L (ref 40–129)
ALT SERPL-CCNC: 34 U/L (ref 5–41)
ANION GAP SERPL CALCULATED.3IONS-SCNC: 10 MMOL/L (ref 9–17)
AST SERPL-CCNC: 21 U/L
BASOPHILS # BLD: 0 K/UL (ref 0–0.2)
BASOPHILS NFR BLD: 1 % (ref 0–2)
BILIRUB SERPL-MCNC: 0.8 MG/DL (ref 0.3–1.2)
BNP SERPL-MCNC: <36 PG/ML
BUN SERPL-MCNC: 25 MG/DL (ref 8–23)
CA-I BLD-SCNC: 1.26 MMOL/L (ref 1.13–1.33)
CALCIUM SERPL-MCNC: 9.8 MG/DL (ref 8.6–10.4)
CALCIUM SERPL-MCNC: 9.9 MG/DL (ref 8.6–10.4)
CHLORIDE SERPL-SCNC: 100 MMOL/L (ref 98–107)
CHOLEST SERPL-MCNC: 178 MG/DL
CHOLESTEROL/HDL RATIO: 5.7
CO2 SERPL-SCNC: 28 MMOL/L (ref 20–31)
CREAT SERPL-MCNC: 1.2 MG/DL (ref 0.7–1.2)
EOSINOPHIL # BLD: 0.3 K/UL (ref 0–0.4)
EOSINOPHILS RELATIVE PERCENT: 6 % (ref 0–4)
ERYTHROCYTE [DISTWIDTH] IN BLOOD BY AUTOMATED COUNT: 15.7 % (ref 11.5–14.9)
GFR, ESTIMATED: 65 ML/MIN/1.73M2
GLUCOSE SERPL-MCNC: 133 MG/DL (ref 70–99)
HCT VFR BLD AUTO: 47.2 % (ref 41–53)
HDLC SERPL-MCNC: 31 MG/DL
HGB BLD-MCNC: 15.9 G/DL (ref 13.5–17.5)
LDLC SERPL CALC-MCNC: ABNORMAL MG/DL (ref 0–130)
LYMPHOCYTES NFR BLD: 1.1 K/UL (ref 1–4.8)
LYMPHOCYTES RELATIVE PERCENT: 19 % (ref 24–44)
MAGNESIUM SERPL-MCNC: 2.1 MG/DL (ref 1.6–2.6)
MCH RBC QN AUTO: 30.9 PG (ref 26–34)
MCHC RBC AUTO-ENTMCNC: 33.7 G/DL (ref 31–37)
MCV RBC AUTO: 91.9 FL (ref 80–100)
MONOCYTES NFR BLD: 0.6 K/UL (ref 0.1–1.3)
MONOCYTES NFR BLD: 10 % (ref 1–7)
NEUTROPHILS NFR BLD: 64 % (ref 36–66)
NEUTS SEG NFR BLD: 3.9 K/UL (ref 1.3–9.1)
PLATELET # BLD AUTO: 180 K/UL (ref 150–450)
PMV BLD AUTO: 7.8 FL (ref 6–12)
POTASSIUM SERPL-SCNC: 4.8 MMOL/L (ref 3.7–5.3)
PROT SERPL-MCNC: 7.5 G/DL (ref 6.4–8.3)
PTH-INTACT SERPL-MCNC: 89 PG/ML (ref 15–65)
RBC # BLD AUTO: 5.13 M/UL (ref 4.5–5.9)
SODIUM SERPL-SCNC: 138 MMOL/L (ref 135–144)
TRIGL SERPL-MCNC: 625 MG/DL
URATE SERPL-MCNC: 6.2 MG/DL (ref 3.4–7)
WBC OTHER # BLD: 6 K/UL (ref 3.5–11)

## 2024-08-28 PROCEDURE — 99213 OFFICE O/P EST LOW 20 MIN: CPT

## 2024-08-28 PROCEDURE — 83735 ASSAY OF MAGNESIUM: CPT

## 2024-08-28 PROCEDURE — 82310 ASSAY OF CALCIUM: CPT

## 2024-08-28 PROCEDURE — 84550 ASSAY OF BLOOD/URIC ACID: CPT

## 2024-08-28 PROCEDURE — 85025 COMPLETE CBC W/AUTO DIFF WBC: CPT

## 2024-08-28 PROCEDURE — 80053 COMPREHEN METABOLIC PANEL: CPT

## 2024-08-28 PROCEDURE — 80061 LIPID PANEL: CPT

## 2024-08-28 PROCEDURE — 36415 COLL VENOUS BLD VENIPUNCTURE: CPT

## 2024-08-28 PROCEDURE — 83721 ASSAY OF BLOOD LIPOPROTEIN: CPT

## 2024-08-28 PROCEDURE — 83880 ASSAY OF NATRIURETIC PEPTIDE: CPT

## 2024-08-28 PROCEDURE — 82330 ASSAY OF CALCIUM: CPT

## 2024-08-28 PROCEDURE — 83970 ASSAY OF PARATHORMONE: CPT

## 2024-08-28 RX ORDER — OMEGA-3S/DHA/EPA/FISH OIL/D3 300MG-1000
400 CAPSULE ORAL DAILY
COMMUNITY

## 2024-08-28 RX ORDER — ICOSAPENT ETHYL 0.5 G/1
1 CAPSULE ORAL 2 TIMES DAILY
Qty: 360 CAPSULE | Refills: 3 | Status: SHIPPED | OUTPATIENT
Start: 2024-08-28 | End: 2024-11-26

## 2024-08-28 NOTE — RESULT ENCOUNTER NOTE
Please notify patient: Very high triglycerides, if he drinks any alcohol or pop, he needs to stop that, he can cause him to get pancreatitis.  To continue pravastatin and I will send a medication similar with fish oil to the pharmacy for his high triglycerides  Glucose 133 very well-controlled  Kidney disease stage IIa very mild, will continue to monitor  Otherwise labs within normal limits  continue current treatment    Future Appointments  8/29/2024  10:30 AM   Pancho Bellamy MD  [facility]        [facility]  10/9/2024  9:10 AM    [facility]       [facility]        [facility]  10/15/2024 8:40 AM    Zachary Zamora MD       [facility]           [facility]  10/25/2024 8:45 AM    Janusz Devi MD       [facility]        [facility]  11/22/2024 11:30 AM   Rosa Lauren MD    fp [facility] DEP  5/29/2025  9:00 AM    John Sommer MD       Resp Spec           [facility]  8/22/2025  10:00 AM   Rosa Lauren MD    [facility] DEP

## 2024-08-28 NOTE — PROGRESS NOTES
occasional ice cream and more fruit. Drinking more water.  Usually 2 meals a day  Breakfast- very rarely.  Maybe a piece of toast/ fruit  Lunch- If he eats breakfast will skip lunch.  Piece of cheese/ fruit  Dinner- A lot of time eating out. 4-5pm Discussed healthy choice- lean meat and vegetables  Snack 7-730- handful of chips or cheese crackers or peanuts.     Exercise patterns: No formal regimen - Limited due to back pain. Working outside on house and golf cart     Blood Glucose Testing: Patient is currently using CGM    Current Diabetic Medications: Metformin 1000 mg twice daily; Lantus 42 units once daily; Farxiga 10mg once a day; Trulicity 1.5 mg once weekly (Wednesdays).     Diabetic Regimen/Compliance: Yes     Other Medication Compliance: Yes    Refills needed (diabetes medications/testing supplies). - No    Labs needed (A1c, lipids, microalbumin, SCr etc) lipids     Up to date with eye/foot exams  Checks feet pretty regularly (Dr. Mckeon - May 10 2024); eye exam last in September 2023.      PLAN      Try taking famotidine (Pepcid) in the evening / bedtime.    Decrease Metformin 1000 mg once a day with dinner. If blood sugar remains controlled may try to discontinue at next appt.  Patient instructed to increase back to twice a day if blood sugar control declines while on cruise.      Continue Trulicity at 1.5 mg injection once weekly on Wednesday.    Continue Farxiga 10 mg once daily  Continue Lantus 42 once daily  Try Glucerna for breakfast if you are not eating.     Follow up with Dr. Lauren if you do not hear from her about your elevated triglyceride levels. Triglyceride levels have increased to 625 from 409 on 8/16/23.      Physician Follow-up:  As scheduled    Medication Management Follow-up:   Diabetes Service  6 weeks - which will be 2 weeks after returning from the cruise.     For Pharmacy Admin Tracking Only    Program: Medication Management  CPA in place:  Yes  Recommendation Provided To:  Patient/Caregiver: 4 via In person  Intervention Detail: Adherence Monitorin, Dose Adjustment: 1, reason: Therapy Optimization, and Lab(s) Ordered  Intervention Accepted By: Patient/Caregiver: 4  Time Spent (min): 45     Electronically signed by Quinn Mitchell RPH on 2024 at 10:03 AM

## 2024-08-28 NOTE — PATIENT INSTRUCTIONS
Try taking famotidine (Pepcid) in the evening / bedtime.    Decrease Metformin 1000 mg once a day with dinner.      Continue Trulicity at 1.5 mg injection once weekly on Wednesday.    Continue Farxiga 10 mg once daily  Continue Lantus 42 once daily  Try Glucerna for breakfast if you are not eating.    Follow up with Dr. Lauren if you do not hear from her about your elevated triglyceride levels.

## 2024-08-29 ENCOUNTER — CARE COORDINATION (OUTPATIENT)
Dept: CARE COORDINATION | Age: 69
End: 2024-08-29

## 2024-08-29 DIAGNOSIS — I25.10 CORONARY ARTERY DISEASE INVOLVING NATIVE CORONARY ARTERY OF NATIVE HEART WITHOUT ANGINA PECTORIS: ICD-10-CM

## 2024-08-29 LAB — LDLC SERPL DIRECT ASSAY-MCNC: 73 MG/DL

## 2024-08-29 RX ORDER — ASPIRIN 81 MG/1
81 TABLET ORAL DAILY
Qty: 90 TABLET | Refills: 3 | Status: SHIPPED | OUTPATIENT
Start: 2024-08-29

## 2024-08-29 NOTE — CARE COORDINATION
Ambulatory Care Coordination Note     8/29/2024 2:28 PM     Patient outreach attempt by this AC today to offer care management services. AC was unable to reach the patient by telephone today; left voice message requesting a return phone call to this ACM.  letter mailed requesting patient to contact this ACM.      ACM: Soha Meyer RN     Care Summary Note: n/a    PCP/Specialist follow up:   Future Appointments         Provider Specialty Dept Phone    10/9/2024 9:10 AM [facility] [phone]    10/15/2024 8:40 AM Zachary Zamora MD Urology [phone]    10/25/2024 8:45 AM Janusz Devi MD Cardiology [phone]    11/22/2024 11:30 AM Rosa Lauren MD Family Medicine [phone]    5/29/2025 9:00 AM John Smomer MD Pulmonology [phone]    8/22/2025 10:00 AM Rosa Lauren MD Family Medicine [phone]            Follow Up:   Plan for next ACM outreach in approximately 1 week to complete:  - outreach attempt to offer care management services.

## 2024-08-29 NOTE — TELEPHONE ENCOUNTER
Colton Arroyo is calling to request a refill on the following medication(s):    Last Visit Date (If Applicable):  8/16/2024    Next Visit Date:    11/22/2024    Medication Request:  Requested Prescriptions     Pending Prescriptions Disp Refills    aspirin (SM ASPIRIN LOW DOSE) 81 MG EC tablet 90 tablet 1     Sig: Take 1 tablet by mouth daily

## 2024-08-30 NOTE — CARE COORDINATION
Ambulatory Care Coordination Note     2024 11:48 PM     Patient Current Location:  Home: [address] [id number]     This patient was received as a referral from Provider.    Patient contacted the ACM by telephone. Verified name and  with patient as identifiers. Provided introduction to self, and explanation of the ACM role.   Patient declined care management services at this time.          ACM: Soha Meyer RN     Challenges to be reviewed by the provider   Additional needs identified to be addressed with provider No  none               Method of communication with provider: none.    Care Summary Note: Spoke with Colton. Explained reason for call/referral. Explained ACM and RPM programs. He declines need for RPM. He is monitoring at home. Discussed his medications. He stated that his diabetic medications have been expensive. He said the Trulicity and Farxiga are the most expensive. ACM states that she will look at PAP to see if he is eligible for any. He follows with Medication Management. He said they are going out of town for a month and won't be back to the end of September. He was agreeable to ACM calling when they return.     Offered patient enrollment in the Remote Patient Monitoring (RPM) program for in-home monitoring: Yes, but did not enroll at this time: already monitoring with home equipment.    Medications Reviewed:   Not completed during this call: discussed medication costs with his diabetic medications     Advance Care Planning:   Not reviewed during this call     Care Planning:   Not completed during this call    PCP/Specialist follow up:   Future Appointments         Provider Specialty Dept Phone    10/9/2024 9:10 AM [facility] [phone]    10/15/2024 8:40 AM Zachary Zamora MD Urology [phone]    10/25/2024 8:45 AM Janusz Devi MD Cardiology [phone]    2024 11:30 AM Rosa Lauren MD Family Medicine [phone]

## 2024-09-29 NOTE — RESULT ENCOUNTER NOTE
Please notify patient: Fatty liver and bilateral kidney cysts, follow-up with urology to discuss report    For fatty liver, avoid pop, fried food and fatty foods, and continue to work on improving diabetes    Lab Results       Component                Value               Date                       LABA1C                   9.7                 11/21/2023                 LABA1C                   8.9                 08/15/2023                 LABA1C                   7.1 (H)             01/28/2023                Future Appointments  1/12/2024  11:10 AM   [facility]       [facility]        [facility]  2/29/2024  8:30 AM    May Cole APRN * [facility]           [facility]  5/28/2024  10:00 AM   Antonio Farrar APRN - CNP Resp Spec           [facility]  8/16/2024  10:00 AM   Rosa Lauren MD    [facility]  
impaired balance/decreased flexibility/decreased strength

## 2024-10-09 ENCOUNTER — PHARMACY VISIT (OUTPATIENT)
Age: 69
End: 2024-10-09
Attending: FAMILY MEDICINE
Payer: MEDICARE

## 2024-10-09 VITALS
BODY MASS INDEX: 33.77 KG/M2 | SYSTOLIC BLOOD PRESSURE: 141 MMHG | DIASTOLIC BLOOD PRESSURE: 92 MMHG | HEART RATE: 78 BPM | WEIGHT: 222.1 LBS | OXYGEN SATURATION: 93 %

## 2024-10-09 DIAGNOSIS — E11.22 TYPE 2 DIABETES MELLITUS WITH STAGE 2 CHRONIC KIDNEY DISEASE, WITH LONG-TERM CURRENT USE OF INSULIN (HCC): Primary | ICD-10-CM

## 2024-10-09 DIAGNOSIS — N18.2 TYPE 2 DIABETES MELLITUS WITH STAGE 2 CHRONIC KIDNEY DISEASE, WITH LONG-TERM CURRENT USE OF INSULIN (HCC): Primary | ICD-10-CM

## 2024-10-09 DIAGNOSIS — Z79.4 TYPE 2 DIABETES MELLITUS WITH STAGE 2 CHRONIC KIDNEY DISEASE, WITH LONG-TERM CURRENT USE OF INSULIN (HCC): Primary | ICD-10-CM

## 2024-10-09 PROCEDURE — 99213 OFFICE O/P EST LOW 20 MIN: CPT | Performed by: PHARMACY

## 2024-10-09 NOTE — PROGRESS NOTES
Diabetic Medication Management Program  [facility]  [address]   [place], [place] [address]  Phone: [phone]  Fax: [phone]    NAME: Colton Arroyo  MEDICAL RECORD NUMBER:  [id number]  AGE: 69 y.o.   GENDER: male  : 1955  EPISODE DATE:  10/9/2024     Mr. Arroyo was referred to [facility] by Dr. Lauren.  Patient acknowledges working in consult agreement with clinical pharmacist and this provider.     Goals per referral:   Fasting blood glucose: < 130  Peak postprandial glucose: < 180  A1C: < 7    SUBJECTIVE     Mr. Arroyo is a 69 y.o. male here for the Diabetes Service for self-management education, medication review including over the counter medications and herbal products, overall wellbeing assessment, transition of care and any needed adjustments with updates and recommendations communicated to the referring physician.       Patient Findings:     Medication changes   Decreased metformin to 1000 mg daily.  Patient stopped furosemide on his own while on the trip and hasn't restarted yet.   Diet changes   \"Not anything dramatic\" - patient was on a cruise and tried to maintain healthy eating habits. Patient has been eating dinner later recently and eats larger meals.    Activity changes   Patient did a lot of walking while on trip (about three weeks).   Patient has been \"on slow side\" since returning home.  Emergency Room Visit or Hospitalization  no  Acute Illness/new problems  no  Symptoms of hypoglycemia  Yes -  Patient felt shaky and \"really off\" while at [employer] and ate a [employer] bar. Patient was advised to keep source of sugar on hand. Patient says he keeps Mountain Dew at home but not always with him.   Symptoms of hyperglycemia  no - none  Medication adverse reactions  none    Comments:   Diet - Patient continues to eat very little breakfast or lunch other than some fruit. Just does not have an appetite. Breakfast foods do

## 2024-10-09 NOTE — PATIENT INSTRUCTIONS
Continue metformin 1000 mg once daily. If blood sugar numbers continue to increase, an increase in the metformin dose may need to be reconsidered.    Continue Trulicity at 1.5 mg injection once weekly on Wednesday.    Continue Farxiga 10 mg once daily  Continue Lantus 42 once daily  Try Glucerna for breakfast if you are not eating.Try to incorporate more protein and fiber into your meals.

## 2024-10-15 ENCOUNTER — OFFICE VISIT (OUTPATIENT)
Dept: UROLOGY | Age: 69
End: 2024-10-15
Payer: MEDICARE

## 2024-10-15 VITALS
TEMPERATURE: 97.9 F | WEIGHT: 222 LBS | BODY MASS INDEX: 33.65 KG/M2 | DIASTOLIC BLOOD PRESSURE: 82 MMHG | OXYGEN SATURATION: 96 % | HEIGHT: 68 IN | SYSTOLIC BLOOD PRESSURE: 118 MMHG | HEART RATE: 81 BPM

## 2024-10-15 DIAGNOSIS — R31.29 MICROHEMATURIA: Primary | ICD-10-CM

## 2024-10-15 DIAGNOSIS — Z12.5 PROSTATE CANCER SCREENING: ICD-10-CM

## 2024-10-15 PROCEDURE — 3017F COLORECTAL CA SCREEN DOC REV: CPT | Performed by: UROLOGY

## 2024-10-15 PROCEDURE — 99213 OFFICE O/P EST LOW 20 MIN: CPT | Performed by: UROLOGY

## 2024-10-15 PROCEDURE — G8482 FLU IMMUNIZE ORDER/ADMIN: HCPCS | Performed by: UROLOGY

## 2024-10-15 PROCEDURE — G8417 CALC BMI ABV UP PARAM F/U: HCPCS | Performed by: UROLOGY

## 2024-10-15 PROCEDURE — G8427 DOCREV CUR MEDS BY ELIG CLIN: HCPCS | Performed by: UROLOGY

## 2024-10-15 PROCEDURE — 1123F ACP DISCUSS/DSCN MKR DOCD: CPT | Performed by: UROLOGY

## 2024-10-15 PROCEDURE — 1036F TOBACCO NON-USER: CPT | Performed by: UROLOGY

## 2024-10-15 ASSESSMENT — ENCOUNTER SYMPTOMS
EYES NEGATIVE: 1
NAUSEA: 0
VOMITING: 0
COUGH: 0
EYE PAIN: 0
WHEEZING: 0
ABDOMINAL PAIN: 0
RESPIRATORY NEGATIVE: 1
BACK PAIN: 0
COLOR CHANGE: 0
EYE REDNESS: 0
GASTROINTESTINAL NEGATIVE: 1
SHORTNESS OF BREATH: 0
ALLERGIC/IMMUNOLOGIC NEGATIVE: 1

## 2024-10-15 NOTE — PROGRESS NOTES
[facility]  [address]  [facility] [address]  Dept: [phone]    [facility] Urology Office Note - Established    Patient:  Colton Arroyo  YOB: 1955  Date: 10/15/2024    The patient is a 69 y.o. male who presents todayfor evaluation of the following problems:   Chief Complaint   Patient presents with    Prostate cancer screening      6m fu -Microhematuria       HPI  Here in follow up for microhematuria.   Doing well.   No recent hematuria or dysuria.   U/A from August showed no blood.   Cysto, cytology, and ultrasound were negative.     Summary of old records: N/A    Additional History: N/A    Procedures Today: N/A    Urinalysis today:  No results found for this visit on 10/15/24.  Last several PSA's:  Lab Results   Component Value Date    PSA 1.90 04/24/2024    PSA 1.10 01/04/2024    PSA 1.04 01/28/2023     Last total testosterone:  No results found for: \"TESTOSTERONE\"    AUA Symptom Score (10/15/2024):                               Last BUN and creatinine:  Lab Results   Component Value Date    BUN 25 (H) 08/28/2024     Lab Results   Component Value Date    CREATININE 1.2 08/28/2024       Additional Lab/Culture results: none    Imaging Reviewed during this Office Visit: none  (results were independently reviewed by physician and radiology report verified)    PAST MEDICAL, FAMILY AND SOCIAL HISTORY UPDATE:  Past Medical History:   Diagnosis Date    Abdominal hernia     Abnormal cardiovascular stress test 10/2022    Acquired elevated hemidiaphragm, left 01/04/2024    Anesthesia     phrenic nerve damaged lt side    Bilateral leg edema 08/15/2023    BPH (benign prostatic hyperplasia)     CAD (coronary artery disease)     Cardiomegaly 09/21/2022    Chronic back pain     Diabetes mellitus (HCC)     Type 2    Difficult intubation     per pt throat collapses, lt side phrenic nerve was damaged    TINOCO (dyspnea on

## 2024-10-23 NOTE — TELEPHONE ENCOUNTER
Please Approve or Refuse.  Send to Pharmacy per Pt's Request:      Next Visit Date:  11/22/2024   Last Visit Date: 8/16/2024    Hemoglobin A1C (%)   Date Value   08/16/2024 6.1   04/24/2024 7.2 (H)   01/26/2024 8.4 (H)             ( goal A1C is < 7)   BP Readings from Last 3 Encounters:   10/15/24 118/82   10/09/24 (!) 141/92   08/29/24 122/76          (goal 120/80)  BUN   Date Value Ref Range Status   08/28/2024 25 (H) 8 - 23 mg/dL Final     Creatinine   Date Value Ref Range Status   08/28/2024 1.2 0.7 - 1.2 mg/dL Final     Potassium   Date Value Ref Range Status   08/28/2024 4.8 3.7 - 5.3 mmol/L Final

## 2024-11-06 ENCOUNTER — PHARMACY VISIT (OUTPATIENT)
Age: 69
End: 2024-11-06
Attending: FAMILY MEDICINE
Payer: MEDICARE

## 2024-11-06 VITALS
WEIGHT: 222.8 LBS | SYSTOLIC BLOOD PRESSURE: 135 MMHG | OXYGEN SATURATION: 94 % | DIASTOLIC BLOOD PRESSURE: 85 MMHG | BODY MASS INDEX: 33.88 KG/M2

## 2024-11-06 DIAGNOSIS — E11.22 TYPE 2 DIABETES MELLITUS WITH STAGE 2 CHRONIC KIDNEY DISEASE, WITH LONG-TERM CURRENT USE OF INSULIN (HCC): Primary | ICD-10-CM

## 2024-11-06 DIAGNOSIS — N18.2 TYPE 2 DIABETES MELLITUS WITH STAGE 2 CHRONIC KIDNEY DISEASE, WITH LONG-TERM CURRENT USE OF INSULIN (HCC): Primary | ICD-10-CM

## 2024-11-06 DIAGNOSIS — Z79.4 TYPE 2 DIABETES MELLITUS WITH STAGE 2 CHRONIC KIDNEY DISEASE, WITH LONG-TERM CURRENT USE OF INSULIN (HCC): Primary | ICD-10-CM

## 2024-11-06 PROCEDURE — 99212 OFFICE O/P EST SF 10 MIN: CPT

## 2024-11-06 RX ORDER — INSULIN ASPART 100 [IU]/ML
INJECTION, SOLUTION INTRAVENOUS; SUBCUTANEOUS 3 TIMES DAILY
COMMUNITY

## 2024-11-06 NOTE — PROGRESS NOTES
tablet, Rfl: 3    nortriptyline (PAMELOR) 50 MG capsule, Take 1 capsule by mouth nightly Decreased from BID to once a day (Patient taking differently: Take 1 capsule by mouth 2 times daily Decreased from BID to once a day), Disp: 30 capsule, Rfl: 0    pravastatin (PRAVACHOL) 40 MG tablet, Take 1 tablet by mouth at bedtime, Disp: 90 tablet, Rfl: 3    nitroGLYCERIN (NITROSTAT) 0.4 MG SL tablet, Place 1 tablet under the tongue every 5 minutes as needed for Chest pain up to max of 3 total doses. If no relief after 1 dose, call 911., Disp: 25 tablet, Rfl: 3    blood glucose monitor strips, Testing up to four times per day when CGM is not working or does not match symptoms., Disp: 100 strip, Rfl: 3    Continuous Glucose Sensor (DEXCOM G7 SENSOR) MISC, Use to check blood sugar. Replace sensor every 10 days, Disp: 9 each, Rfl: 3    dapagliflozin (FARXIGA) 10 MG tablet, Take 1 tablet by mouth every morning, Disp: 90 tablet, Rfl: 1    insulin glargine (LANTUS SOLOSTAR) 100 UNIT/ML injection pen, Inject 42 Units into the skin nightly, Disp: 13 Adjustable Dose Pre-filled Pen Syringe, Rfl: 1    metFORMIN (GLUCOPHAGE) 1000 MG tablet, Take 1 tablet by mouth 2 times daily (with meals) (Patient taking differently: Take 1 tablet by mouth 2 times daily (with meals) 1000 mg once daily), Disp: 180 tablet, Rfl: 1    amLODIPine (NORVASC) 5 MG tablet, take 2 tablets by mouth once daily, Disp: 180 tablet, Rfl: 3    allopurinol (ZYLOPRIM) 100 MG tablet, take 1 tablet by mouth once daily FOR HIGH URIC ACID (STOP TAKING IF ANY RASH DEVELOPS), Disp: 90 tablet, Rfl: 3    baclofen (LIORESAL) 10 MG tablet, Take 1 tablet by mouth nightly as needed (MUSCLE SPASMS) Causes sedation, do not drive while taking this medication, Disp: 90 tablet, Rfl: 0    famotidine (PEPCID) 20 MG tablet, take 1 tablet by mouth every morning before breakfast, Disp: 90 tablet, Rfl: 3    finasteride (PROSCAR) 5 MG tablet, take 1 tablet by mouth once daily, Disp: 90 tablet,

## 2024-11-06 NOTE — PATIENT INSTRUCTIONS
Increase metformin 1000 mg twice daily.   Continue Trulicity at 1.5 mg injection once weekly on Wednesday.    Continue Farxiga 10 mg once daily  Continue Lantus 42 once daily  Start Insulin Aspart 100 unit/mL - use after nighttime snacking, Dose if <139 no insulin; 140-199 3 units; 200-249 5 units; 250-299 7 units; 300-349 9 units; 350-400 11 units, above 400 13 units. Maximum of 39 units per day.   Try Glucerna for breakfast if you are not eating.Try to incorporate more protein and fiber into your meals.

## 2024-11-22 ENCOUNTER — OFFICE VISIT (OUTPATIENT)
Dept: FAMILY MEDICINE CLINIC | Age: 69
End: 2024-11-22

## 2024-11-22 VITALS
TEMPERATURE: 98.6 F | BODY MASS INDEX: 33.74 KG/M2 | HEIGHT: 68 IN | WEIGHT: 222.6 LBS | DIASTOLIC BLOOD PRESSURE: 82 MMHG | OXYGEN SATURATION: 97 % | HEART RATE: 87 BPM | SYSTOLIC BLOOD PRESSURE: 124 MMHG

## 2024-11-22 DIAGNOSIS — E78.2 MIXED HYPERLIPIDEMIA: ICD-10-CM

## 2024-11-22 DIAGNOSIS — R19.8 ALTERNATING CONSTIPATION AND DIARRHEA: ICD-10-CM

## 2024-11-22 DIAGNOSIS — N18.2 BENIGN HYPERTENSION WITH CKD (CHRONIC KIDNEY DISEASE), STAGE II: ICD-10-CM

## 2024-11-22 DIAGNOSIS — N18.2 TYPE 2 DIABETES MELLITUS WITH STAGE 2 CHRONIC KIDNEY DISEASE, WITH LONG-TERM CURRENT USE OF INSULIN (HCC): Primary | ICD-10-CM

## 2024-11-22 DIAGNOSIS — Z79.4 TYPE 2 DIABETES MELLITUS WITH STAGE 2 CHRONIC KIDNEY DISEASE, WITH LONG-TERM CURRENT USE OF INSULIN (HCC): Primary | ICD-10-CM

## 2024-11-22 DIAGNOSIS — I12.9 BENIGN HYPERTENSION WITH CKD (CHRONIC KIDNEY DISEASE), STAGE II: ICD-10-CM

## 2024-11-22 DIAGNOSIS — H10.89 OTHER CONJUNCTIVITIS OF BOTH EYES: ICD-10-CM

## 2024-11-22 DIAGNOSIS — F33.41 RECURRENT MAJOR DEPRESSIVE DISORDER, IN PARTIAL REMISSION (HCC): ICD-10-CM

## 2024-11-22 DIAGNOSIS — G47.33 OSA TREATED WITH BIPAP: ICD-10-CM

## 2024-11-22 DIAGNOSIS — Z71.89 DNR (DO NOT RESUSCITATE) DISCUSSION: ICD-10-CM

## 2024-11-22 DIAGNOSIS — E11.22 TYPE 2 DIABETES MELLITUS WITH STAGE 2 CHRONIC KIDNEY DISEASE, WITH LONG-TERM CURRENT USE OF INSULIN (HCC): Primary | ICD-10-CM

## 2024-11-22 DIAGNOSIS — E21.3 HYPERPARATHYROIDISM (HCC): ICD-10-CM

## 2024-11-22 DIAGNOSIS — I25.10 CORONARY ARTERY DISEASE INVOLVING NATIVE CORONARY ARTERY OF NATIVE HEART WITHOUT ANGINA PECTORIS: ICD-10-CM

## 2024-11-22 DIAGNOSIS — M15.0 PRIMARY OSTEOARTHRITIS INVOLVING MULTIPLE JOINTS: ICD-10-CM

## 2024-11-22 PROBLEM — G43.909 MIGRAINE HEADACHE: Status: ACTIVE | Noted: 2021-10-07

## 2024-11-22 PROBLEM — M19.049 HAND ARTHRITIS: Status: ACTIVE | Noted: 2021-07-08

## 2024-11-22 LAB — HBA1C MFR BLD: 5.8 %

## 2024-11-22 RX ORDER — OFLOXACIN 3 MG/ML
1 SOLUTION/ DROPS OPHTHALMIC 4 TIMES DAILY
Qty: 10 ML | Refills: 1 | Status: SHIPPED | OUTPATIENT
Start: 2024-11-22 | End: 2024-12-02

## 2024-11-22 RX ORDER — DOCUSATE SODIUM 100 MG/1
100 CAPSULE, LIQUID FILLED ORAL 2 TIMES DAILY
Qty: 180 CAPSULE | Refills: 3 | Status: SHIPPED | OUTPATIENT
Start: 2024-11-22

## 2024-11-22 RX ORDER — DULAGLUTIDE 1.5 MG/.5ML
INJECTION, SOLUTION SUBCUTANEOUS
COMMUNITY
Start: 2024-11-06

## 2024-11-22 RX ORDER — VITAMIN E 268 MG
400 CAPSULE ORAL 2 TIMES DAILY
COMMUNITY
End: 2024-11-28 | Stop reason: ALTCHOICE

## 2024-11-22 RX ORDER — FENOFIBRATE 200 MG/1
200 CAPSULE ORAL
COMMUNITY

## 2024-11-22 RX ORDER — FERROUS SULFATE 325(65) MG
325 TABLET ORAL
COMMUNITY

## 2024-11-22 SDOH — ECONOMIC STABILITY: INCOME INSECURITY: HOW HARD IS IT FOR YOU TO PAY FOR THE VERY BASICS LIKE FOOD, HOUSING, MEDICAL CARE, AND HEATING?: NOT VERY HARD

## 2024-11-22 SDOH — ECONOMIC STABILITY: FOOD INSECURITY: WITHIN THE PAST 12 MONTHS, THE FOOD YOU BOUGHT JUST DIDN'T LAST AND YOU DIDN'T HAVE MONEY TO GET MORE.: NEVER TRUE

## 2024-11-22 SDOH — ECONOMIC STABILITY: FOOD INSECURITY: WITHIN THE PAST 12 MONTHS, YOU WORRIED THAT YOUR FOOD WOULD RUN OUT BEFORE YOU GOT MONEY TO BUY MORE.: NEVER TRUE

## 2024-11-22 ASSESSMENT — PATIENT HEALTH QUESTIONNAIRE - PHQ9
6. FEELING BAD ABOUT YOURSELF - OR THAT YOU ARE A FAILURE OR HAVE LET YOURSELF OR YOUR FAMILY DOWN: SEVERAL DAYS
9. THOUGHTS THAT YOU WOULD BE BETTER OFF DEAD, OR OF HURTING YOURSELF: NOT AT ALL
SUM OF ALL RESPONSES TO PHQ QUESTIONS 1-9: 5
1. LITTLE INTEREST OR PLEASURE IN DOING THINGS: NOT AT ALL
10. IF YOU CHECKED OFF ANY PROBLEMS, HOW DIFFICULT HAVE THESE PROBLEMS MADE IT FOR YOU TO DO YOUR WORK, TAKE CARE OF THINGS AT HOME, OR GET ALONG WITH OTHER PEOPLE: SOMEWHAT DIFFICULT
SUM OF ALL RESPONSES TO PHQ QUESTIONS 1-9: 5
7. TROUBLE CONCENTRATING ON THINGS, SUCH AS READING THE NEWSPAPER OR WATCHING TELEVISION: SEVERAL DAYS
2. FEELING DOWN, DEPRESSED OR HOPELESS: NOT AT ALL
5. POOR APPETITE OR OVEREATING: MORE THAN HALF THE DAYS
3. TROUBLE FALLING OR STAYING ASLEEP: NOT AT ALL
SUM OF ALL RESPONSES TO PHQ QUESTIONS 1-9: 5
SUM OF ALL RESPONSES TO PHQ QUESTIONS 1-9: 5
4. FEELING TIRED OR HAVING LITTLE ENERGY: SEVERAL DAYS
8. MOVING OR SPEAKING SO SLOWLY THAT OTHER PEOPLE COULD HAVE NOTICED. OR THE OPPOSITE, BEING SO FIGETY OR RESTLESS THAT YOU HAVE BEEN MOVING AROUND A LOT MORE THAN USUAL: NOT AT ALL
SUM OF ALL RESPONSES TO PHQ9 QUESTIONS 1 & 2: 0

## 2024-11-22 NOTE — ASSESSMENT & PLAN NOTE
Chronic, unsure if well-controlled  PTH 89 on 8/28/2024, it was 27 on 8/6/2024, 85 on 8/1/2024, 88 on 5/17/2024  Will check labs  Continue Sensipar 90 Mg daily per endocrinologist  Orders:    Vitamin D 25 Hydroxy; Future    PTH, Intact with Ionized Calcium; Future

## 2024-11-22 NOTE — ASSESSMENT & PLAN NOTE
Chronic, worsening, due to wear-and-tear nature of the disease  Pain control with gabapentin 600 Mg twice a day per rheumatologist, baclofen 10 Mg once a day as needed,    Advised supplement from over-the-counter with collagen like move free ultra joint, Tylenol as needed up to 4 a day, topical creams like Bengay  Okay to stop vitamin E

## 2024-11-22 NOTE — ASSESSMENT & PLAN NOTE
Chronic, not getting better, advised to adjust Colace until able to have a bowel movement every day  Will refer to have colonoscopy and EGD  Orders:    Zhang Chanel MD, General Surgery, [place]    docusate sodium (COLACE) 100 MG capsule; Take 1 capsule by mouth 2 times daily For constipation

## 2024-11-22 NOTE — PROGRESS NOTES
Consulting Physician (Nephrology)  Janusz Devi MD as Consulting Physician (Cardiology)    Medical History Review  Past Medical, Family, and Social History reviewed and does contribute to the patient presenting condition    Health Maintenance   Topic Date Due    Diabetic retinal exam  09/22/2024    COVID-19 Vaccine (10 - 2023-24 season) 10/24/2024    Colorectal Cancer Screen  11/14/2024    Diabetic Alb to Cr ratio (uACR) test  04/24/2025    Diabetic foot exam  08/16/2025    A1C test (Diabetic or Prediabetic)  08/16/2025    Depression Monitoring  08/16/2025    Annual Wellness Visit (Medicare)  08/17/2025    Lipids  08/28/2025    GFR test (Diabetes, CKD 3-4, OR last GFR 15-59)  08/28/2025    DTaP/Tdap/Td vaccine (2 - Td or Tdap) 11/04/2033    Flu vaccine  Completed    Shingles vaccine  Completed    Pneumococcal 65+ years Vaccine  Completed    Respiratory Syncytial Virus (RSV) Pregnant or age 60 yrs+  Completed    Hepatitis C screen  Completed    Hepatitis A vaccine  Aged Out    Hepatitis B vaccine  Aged Out    Hib vaccine  Aged Out    Polio vaccine  Aged Out    Meningococcal (ACWY) vaccine  Aged Out    Depression Screen  Discontinued    HIV screen  Discontinued    Prostate Specific Antigen (PSA) Screening or Monitoring  Discontinued             
0.4 k/uL Final    Basophils Absolute 08/28/2024 0.00  0.0 - 0.2 k/uL Final    Sodium 08/28/2024 138  135 - 144 mmol/L Final    Potassium 08/28/2024 4.8  3.7 - 5.3 mmol/L Final    Chloride 08/28/2024 100  98 - 107 mmol/L Final    CO2 08/28/2024 28  20 - 31 mmol/L Final    Anion Gap 08/28/2024 10  9 - 17 mmol/L Final    Glucose 08/28/2024 133 (H)  70 - 99 mg/dL Final    BUN 08/28/2024 25 (H)  8 - 23 mg/dL Final    Creatinine 08/28/2024 1.2  0.7 - 1.2 mg/dL Final    Est, Glom Filt Rate 08/28/2024 65  >60 mL/min/1.73m2 Final    Comment:       These results are not intended for use in patients <18 years of age.        eGFR results are calculated without a race factor using the 2021 CKD-EPI equation.  Careful clinical correlation is recommended, particularly when comparing to results   calculated using previous equations.  The CKD-EPI equation is less accurate in patients with extremes of muscle mass, extra-renal   metabolism of creatine, excessive creatine ingestion, or following therapy that affects   renal tubular secretion.      Calcium 08/28/2024 9.9  8.6 - 10.4 mg/dL Final    Total Protein 08/28/2024 7.5  6.4 - 8.3 g/dL Final    Albumin 08/28/2024 4.6  3.5 - 5.2 g/dL Final    Total Bilirubin 08/28/2024 0.8  0.3 - 1.2 mg/dL Final    Alkaline Phosphatase 08/28/2024 100  40 - 129 U/L Final    ALT 08/28/2024 34  5 - 41 U/L Final    AST 08/28/2024 21  <40 U/L Final    Uric Acid 08/28/2024 6.2  3.4 - 7.0 mg/dL Final    Cholesterol, Total 08/28/2024 178  <200 mg/dL Final    Comment:    Cholesterol Guidelines:      <200  Desirable   200-240  Borderline      >240  Undesirable         HDL 08/28/2024 31 (L)  >40 mg/dL Final    Comment:    HDL Guidelines:    <40     Undesirable   40-59    Borderline    >59     Desirable         LDL Cholesterol 08/28/2024       0 - 130 mg/dL Final    Comment: Calculation not valid for Triglyceride value greater than 400 mg/dL.  Direct LDL reflexed           LDL Guidelines:     <100

## 2024-11-22 NOTE — ASSESSMENT & PLAN NOTE
Chronic, at goal (stable), continue current treatment plan  Patient has 3 stents.  Had cardiac cath 10/28/2022 with patent stents  Has appointment coming up with cardiologist  Continue amlodipine 5 Mg daily, Plavix 75 Mg daily, pravastatin 40 Mg daily, Zetia 10 Mg daily, metoprolol  Mg daily, losartan 25 Mg daily

## 2024-11-22 NOTE — ASSESSMENT & PLAN NOTE
Chronic, improved, at goal, patient benefits from BiPAP, compliance with BiPAP discussed  Follow-up with pulmonologist who manages the BiPAP

## 2024-11-22 NOTE — ASSESSMENT & PLAN NOTE
Chronic hypertension, stable, at goal  Chronic kidney disease stage II stable  Check labs  Discussed low salt diet and BP and pulse monitoring.  Continue amlodipine 5 Mg daily, furosemide 20 Mg daily, losartan 25 Mg daily, metoprolol  Mg daily    Orders:    CBC with Auto Differential; Future    Comprehensive Metabolic Panel; Future    Vitamin B12 & Folate; Future    Uric Acid; Future    TSH; Future    Magnesium; Future

## 2024-11-22 NOTE — ASSESSMENT & PLAN NOTE
Chronic, improved with medications, almost at goal  Continue Zetia 10 Mg daily, pravastatin 40 Mg daily, fenofibrate 200 mg capsule  Low-fat diet, low-carb diet, exercise, check lipid panel  Orders:    Lipid Panel; Future

## 2024-11-22 NOTE — RESULT ENCOUNTER NOTE
Addressed during office visit today, hemoglobin A1c 5.8, improved from prior, continue treatment recommended during the office visit.

## 2024-11-22 NOTE — ASSESSMENT & PLAN NOTE
Chronic, improved type 2 diabetes mellitus, very well-controlled  Chronic kidney disease stage II stable    Lab Results   Component Value Date    LABA1C 5.8 11/22/2024    LABA1C 6.1 08/16/2024    LABA1C 7.2 (H) 04/24/2024     Will check labs    Orders:    POCT glycosylated hemoglobin (Hb A1C)    CBC with Auto Differential; Future    Comprehensive Metabolic Panel; Future    Vitamin B12 & Folate; Future    Uric Acid; Future    TSH; Future    Magnesium; Future    Microalbumin / Creatinine Urine Ratio; Future      -advised home blood glucose testing 4-6 times a day with Dexcom G7  -daily feet exam, Foot care: advised to wash feet daily, pat dry and apply lotion at night, avoiding between toes. Need to look at feet daily and report to a physician any signs of inflammation or skin damage  -annual dilated eye exam  -Low carb, low fat diet, increase fruits and vegetables, and exercise 4-5 times a day 30-40 minutes a day discussed  -continue current treatment:  Farxiga 10 mg  Insulin sliding scale NovoLog  Lantus 42 units at night  Metformin 1000 Mg twice a day  Trulicity 1.5 Mg weekly    -continue ARB losartan and statin pravastatin

## 2024-11-26 ENCOUNTER — CARE COORDINATION (OUTPATIENT)
Dept: CARE COORDINATION | Age: 69
End: 2024-11-26

## 2024-11-26 NOTE — CARE COORDINATION
Ambulatory Care Coordination Note     11/26/2024 1:36 PM     Patient outreach attempt by this AC today to offer care management services. ACM was unable to reach the patient by telephone today;   left voice message requesting a return phone call to this ACM.     ACM: Soha Meyer RN     Care Summary Note: Patient is following with Medication Management for his diabetes. His last A1C was 5.8.     PCP/Specialist follow up:   Future Appointments         Provider Specialty Dept Phone    12/4/2024 10:50 AM [facility] [phone]    1/9/2025 12:00 PM Stacie South, APRN - [facility] General Surgery [phone]    3/4/2025 10:30 AM May Cole APRN - [facility] Urology [phone]    3/25/2025 8:30 AM Rosa Lauren MD Family Medicine [phone]    5/29/2025 9:00 AM John Sommer MD Pulmonology [phone]    8/22/2025 10:00 AM Rosa Lauren MD Family Medicine [phone]            Follow Up:   Plan for next ACM outreach in approximately 1 week to complete:  - outreach attempt to offer care management services.

## 2024-11-28 PROBLEM — E78.00 PURE HYPERCHOLESTEROLEMIA, UNSPECIFIED: Status: RESOLVED | Noted: 2022-05-20 | Resolved: 2024-11-28

## 2024-11-28 ASSESSMENT — ENCOUNTER SYMPTOMS
DIARRHEA: 1
BLOOD IN STOOL: 0
NAUSEA: 1
CONSTIPATION: 1

## 2024-12-03 ENCOUNTER — CARE COORDINATION (OUTPATIENT)
Dept: CARE COORDINATION | Age: 69
End: 2024-12-03

## 2024-12-03 NOTE — CARE COORDINATION
Ambulatory Care Coordination Note     12/3/2024 1:06 PM     patient outreach attempt by this ACM today to offer care management services. AC was unable to reach the patient by telephone today;   left voice message requesting a return phone call to this ACM.     Patient closed (patient declined) from the High Risk Care Management program on 12/3/2024.  Patient has the ability to self manage at this time..  Care management goals have been completed. No further Ambulatory Care Manager follow up scheduled.

## 2024-12-04 ENCOUNTER — HOSPITAL ENCOUNTER (OUTPATIENT)
Age: 69
Discharge: HOME | End: 2024-12-04
Payer: MEDICARE

## 2024-12-04 ENCOUNTER — PHARMACY VISIT (OUTPATIENT)
Age: 69
End: 2024-12-04
Attending: FAMILY MEDICINE
Payer: MEDICARE

## 2024-12-04 VITALS
OXYGEN SATURATION: 92 % | HEART RATE: 87 BPM | DIASTOLIC BLOOD PRESSURE: 83 MMHG | WEIGHT: 221.3 LBS | BODY MASS INDEX: 33.65 KG/M2 | SYSTOLIC BLOOD PRESSURE: 119 MMHG

## 2024-12-04 DIAGNOSIS — N18.2 TYPE 2 DIABETES MELLITUS WITH STAGE 2 CHRONIC KIDNEY DISEASE, WITH LONG-TERM CURRENT USE OF INSULIN (HCC): Primary | ICD-10-CM

## 2024-12-04 DIAGNOSIS — Z79.4 TYPE 2 DIABETES MELLITUS WITH STAGE 2 CHRONIC KIDNEY DISEASE, WITH LONG-TERM CURRENT USE OF INSULIN (HCC): Primary | ICD-10-CM

## 2024-12-04 DIAGNOSIS — E11.22 TYPE 2 DIABETES MELLITUS WITH STAGE 2 CHRONIC KIDNEY DISEASE, WITH LONG-TERM CURRENT USE OF INSULIN (HCC): Primary | ICD-10-CM

## 2024-12-04 LAB
25(OH)D3 SERPL-MCNC: 22.9 NG/ML (ref 30–100)
CA-I BLD-SCNC: 1.23 MMOL/L (ref 1.13–1.33)
CALCIUM SERPL-MCNC: 9.7 MG/DL (ref 8.6–10.4)
PTH-INTACT SERPL-MCNC: 34 PG/ML (ref 15–65)

## 2024-12-04 PROCEDURE — 83970 ASSAY OF PARATHORMONE: CPT

## 2024-12-04 PROCEDURE — 99213 OFFICE O/P EST LOW 20 MIN: CPT | Performed by: PHARMACY

## 2024-12-04 PROCEDURE — 82310 ASSAY OF CALCIUM: CPT

## 2024-12-04 PROCEDURE — 82330 ASSAY OF CALCIUM: CPT

## 2024-12-04 PROCEDURE — 82306 VITAMIN D 25 HYDROXY: CPT

## 2024-12-04 PROCEDURE — 36415 COLL VENOUS BLD VENIPUNCTURE: CPT

## 2024-12-04 RX ORDER — OFLOXACIN 3 MG/ML
SOLUTION/ DROPS OPHTHALMIC
COMMUNITY
Start: 2024-11-22

## 2024-12-04 RX ORDER — ICOSAPENT ETHYL 0.5 G/1
CAPSULE ORAL
COMMUNITY
Start: 2024-11-23

## 2024-12-04 NOTE — PATIENT INSTRUCTIONS
Continue metformin 1000 mg twice daily.   Continue Trulicity at 1.5 mg injection once weekly on Wednesday.    Continue Farxiga 10 mg once daily  Increase Lantus to 44 units once daily  Continue Insulin Aspart (Novolog) 100 unit/mL - Dose if <139 no insulin; 140-199 3 units; 200-249 5 units; 250-299 7 units; 300-349 9 units; 350-400 11 units, above 400 13 units. Maximum of 39 units per day.   Try to incorporate more protein and fiber into your meals. Consider healthier snack options for snacking.   Call the clinic [phone] if blood sugar readings are really low (below 70) or really high (above 200).

## 2024-12-04 NOTE — PROGRESS NOTES
Diabetic Medication Management Program  [facility]  [address]   [place], [place] [address]  Phone: [phone]  Fax: [phone]    NAME: Colton Arroyo  MEDICAL RECORD NUMBER:  [id number]  AGE: 69 y.o.   GENDER: male  : 1955  EPISODE DATE:  2024     Mr. Arroyo was referred to [facility] by Dr. Lauren.  Patient acknowledges working in consult agreement with clinical pharmacist and this provider.     Goals per referral:   Fasting blood glucose: < 130  Peak postprandial glucose: < 180  A1C: < 7    SUBJECTIVE     Mr. Arroyo is a 69 y.o. male here for the Diabetes Service for self-management education, medication review including over the counter medications and herbal products, overall wellbeing assessment, transition of care and any needed adjustments with updates and recommendations communicated to the referring physician.       Patient Findings:     Medication changes   Metformin 1000 mg twice daily and restarted restart Insulin Aspart 100 unit/mL - to use after nighttime snacking, Dose if <139 no insulin; 140-199 3 units; 200-249 5 units; 250-299 7 units; 300-349 9 units; 350-400 11 units, above 400 13 units. Maximum of 39 units per day.     Diet changes   Dinner very late some nights and some increased sweets.    Activity changes   Walking around stores twice a week.    Emergency Room Visit or Hospitalization  no  Acute Illness/new problems   Eye infection that is being treated with eye drops  Symptoms of hypoglycemia  No - none  Symptoms of hyperglycemia  no - none  Medication adverse reactions  none    Comments:   See below.    OBJECTIVE     PMHx:    Past Medical History:   Diagnosis Date    Abdominal hernia     Abnormal cardiovascular stress test 10/2022    Acquired elevated hemidiaphragm, left 2024    Anesthesia     phrenic nerve damaged lt side    Bilateral leg edema 08/15/2023    BPH (benign prostatic hyperplasia)

## 2024-12-23 DIAGNOSIS — Z79.4 TYPE 2 DIABETES MELLITUS WITH HYPERGLYCEMIA, WITH LONG-TERM CURRENT USE OF INSULIN (HCC): ICD-10-CM

## 2024-12-23 DIAGNOSIS — Z79.4 TYPE 2 DIABETES MELLITUS WITH STAGE 2 CHRONIC KIDNEY DISEASE, WITH LONG-TERM CURRENT USE OF INSULIN (HCC): ICD-10-CM

## 2024-12-23 DIAGNOSIS — N18.2 TYPE 2 DIABETES MELLITUS WITH STAGE 2 CHRONIC KIDNEY DISEASE, WITH LONG-TERM CURRENT USE OF INSULIN (HCC): ICD-10-CM

## 2024-12-23 DIAGNOSIS — E11.65 TYPE 2 DIABETES MELLITUS WITH HYPERGLYCEMIA, WITH LONG-TERM CURRENT USE OF INSULIN (HCC): ICD-10-CM

## 2024-12-23 DIAGNOSIS — E11.22 TYPE 2 DIABETES MELLITUS WITH STAGE 2 CHRONIC KIDNEY DISEASE, WITH LONG-TERM CURRENT USE OF INSULIN (HCC): ICD-10-CM

## 2024-12-24 RX ORDER — INSULIN GLARGINE 100 [IU]/ML
INJECTION, SOLUTION SUBCUTANEOUS
Qty: 15 ML | Refills: 5 | Status: SHIPPED | OUTPATIENT
Start: 2024-12-24

## 2024-12-24 NOTE — TELEPHONE ENCOUNTER
Please Approve or Refuse.  Send to Pharmacy per Pt's Request:      Next Visit Date:  3/25/2025   Last Visit Date: 11/22/2024    Hemoglobin A1C (%)   Date Value   11/22/2024 5.8   08/16/2024 6.1   04/24/2024 7.2 (H)             ( goal A1C is < 7)   BP Readings from Last 3 Encounters:   12/04/24 119/83   11/25/24 138/86   11/22/24 124/82          (goal 120/80)  BUN   Date Value Ref Range Status   08/28/2024 25 (H) 8 - 23 mg/dL Final     Creatinine   Date Value Ref Range Status   08/28/2024 1.2 0.7 - 1.2 mg/dL Final     Potassium   Date Value Ref Range Status   08/28/2024 4.8 3.7 - 5.3 mmol/L Final

## 2025-01-02 ENCOUNTER — PHARMACY VISIT (OUTPATIENT)
Age: 70
End: 2025-01-02
Attending: FAMILY MEDICINE
Payer: MEDICARE

## 2025-01-02 VITALS
OXYGEN SATURATION: 95 % | SYSTOLIC BLOOD PRESSURE: 123 MMHG | WEIGHT: 221 LBS | HEART RATE: 89 BPM | BODY MASS INDEX: 33.6 KG/M2 | DIASTOLIC BLOOD PRESSURE: 77 MMHG

## 2025-01-02 DIAGNOSIS — I25.10 CORONARY ARTERY DISEASE INVOLVING NATIVE CORONARY ARTERY OF NATIVE HEART WITHOUT ANGINA PECTORIS: ICD-10-CM

## 2025-01-02 DIAGNOSIS — E11.22 TYPE 2 DIABETES MELLITUS WITH STAGE 3A CHRONIC KIDNEY DISEASE, WITHOUT LONG-TERM CURRENT USE OF INSULIN (HCC): ICD-10-CM

## 2025-01-02 DIAGNOSIS — N18.2 TYPE 2 DIABETES MELLITUS WITH STAGE 2 CHRONIC KIDNEY DISEASE, WITH LONG-TERM CURRENT USE OF INSULIN (HCC): ICD-10-CM

## 2025-01-02 DIAGNOSIS — Z79.4 TYPE 2 DIABETES MELLITUS WITH STAGE 2 CHRONIC KIDNEY DISEASE, WITH LONG-TERM CURRENT USE OF INSULIN (HCC): ICD-10-CM

## 2025-01-02 DIAGNOSIS — E11.22 TYPE 2 DIABETES MELLITUS WITH STAGE 2 CHRONIC KIDNEY DISEASE, WITH LONG-TERM CURRENT USE OF INSULIN (HCC): ICD-10-CM

## 2025-01-02 DIAGNOSIS — N18.2 BENIGN HYPERTENSION WITH CKD (CHRONIC KIDNEY DISEASE), STAGE II: ICD-10-CM

## 2025-01-02 DIAGNOSIS — N40.1 BPH WITH OBSTRUCTION/LOWER URINARY TRACT SYMPTOMS: ICD-10-CM

## 2025-01-02 DIAGNOSIS — N18.31 TYPE 2 DIABETES MELLITUS WITH STAGE 3A CHRONIC KIDNEY DISEASE, WITHOUT LONG-TERM CURRENT USE OF INSULIN (HCC): ICD-10-CM

## 2025-01-02 DIAGNOSIS — I12.9 BENIGN HYPERTENSION WITH CKD (CHRONIC KIDNEY DISEASE), STAGE II: ICD-10-CM

## 2025-01-02 DIAGNOSIS — K21.9 GASTROESOPHAGEAL REFLUX DISEASE WITHOUT ESOPHAGITIS: ICD-10-CM

## 2025-01-02 DIAGNOSIS — E11.22 TYPE 2 DIABETES MELLITUS WITH STAGE 3A CHRONIC KIDNEY DISEASE, WITH LONG-TERM CURRENT USE OF INSULIN (HCC): ICD-10-CM

## 2025-01-02 DIAGNOSIS — E79.0 HYPERURICEMIA: ICD-10-CM

## 2025-01-02 DIAGNOSIS — N18.2 TYPE 2 DIABETES MELLITUS WITH STAGE 2 CHRONIC KIDNEY DISEASE, WITH LONG-TERM CURRENT USE OF INSULIN (HCC): Primary | ICD-10-CM

## 2025-01-02 DIAGNOSIS — Z79.4 TYPE 2 DIABETES MELLITUS WITH HYPERGLYCEMIA, WITH LONG-TERM CURRENT USE OF INSULIN (HCC): ICD-10-CM

## 2025-01-02 DIAGNOSIS — N18.31 TYPE 2 DIABETES MELLITUS WITH STAGE 3A CHRONIC KIDNEY DISEASE, WITH LONG-TERM CURRENT USE OF INSULIN (HCC): ICD-10-CM

## 2025-01-02 DIAGNOSIS — Z79.4 TYPE 2 DIABETES MELLITUS WITH STAGE 3A CHRONIC KIDNEY DISEASE, WITH LONG-TERM CURRENT USE OF INSULIN (HCC): ICD-10-CM

## 2025-01-02 DIAGNOSIS — E78.2 MIXED HYPERLIPIDEMIA: ICD-10-CM

## 2025-01-02 DIAGNOSIS — E11.65 TYPE 2 DIABETES MELLITUS WITH HYPERGLYCEMIA, WITH LONG-TERM CURRENT USE OF INSULIN (HCC): ICD-10-CM

## 2025-01-02 DIAGNOSIS — B02.29 POSTHERPETIC NEURALGIA: ICD-10-CM

## 2025-01-02 DIAGNOSIS — Z79.4 TYPE 2 DIABETES MELLITUS WITH STAGE 2 CHRONIC KIDNEY DISEASE, WITH LONG-TERM CURRENT USE OF INSULIN (HCC): Primary | ICD-10-CM

## 2025-01-02 DIAGNOSIS — N13.8 BPH WITH OBSTRUCTION/LOWER URINARY TRACT SYMPTOMS: ICD-10-CM

## 2025-01-02 DIAGNOSIS — E11.22 TYPE 2 DIABETES MELLITUS WITH STAGE 2 CHRONIC KIDNEY DISEASE, WITH LONG-TERM CURRENT USE OF INSULIN (HCC): Primary | ICD-10-CM

## 2025-01-02 PROCEDURE — 99213 OFFICE O/P EST LOW 20 MIN: CPT

## 2025-01-02 RX ORDER — EZETIMIBE AND SIMVASTATIN 10; 10 MG/1; MG/1
TABLET ORAL
Refills: 0 | OUTPATIENT
Start: 2025-01-02

## 2025-01-02 RX ORDER — METOPROLOL SUCCINATE 100 MG/1
100 TABLET, EXTENDED RELEASE ORAL DAILY
Qty: 90 TABLET | Refills: 3 | Status: SHIPPED | OUTPATIENT
Start: 2025-01-02

## 2025-01-02 RX ORDER — DAPAGLIFLOZIN 10 MG/1
10 TABLET, FILM COATED ORAL EVERY MORNING
Qty: 90 TABLET | Refills: 3 | Status: SHIPPED | OUTPATIENT
Start: 2025-01-02

## 2025-01-02 RX ORDER — FAMOTIDINE 20 MG/1
10 TABLET, FILM COATED ORAL
Qty: 90 TABLET | Refills: 3 | Status: SHIPPED | OUTPATIENT
Start: 2025-01-02

## 2025-01-02 RX ORDER — FUROSEMIDE 20 MG/1
20 TABLET ORAL EVERY MORNING
Qty: 90 TABLET | Refills: 3 | Status: SHIPPED | OUTPATIENT
Start: 2025-01-02

## 2025-01-02 RX ORDER — NORTRIPTYLINE HYDROCHLORIDE 50 MG/1
50 CAPSULE ORAL NIGHTLY
Qty: 90 CAPSULE | Refills: 0 | Status: SHIPPED | OUTPATIENT
Start: 2025-01-02

## 2025-01-02 RX ORDER — PRAVASTATIN SODIUM 40 MG
40 TABLET ORAL NIGHTLY
Qty: 90 TABLET | Refills: 3 | Status: SHIPPED | OUTPATIENT
Start: 2025-01-02

## 2025-01-02 RX ORDER — INSULIN ASPART 100 [IU]/ML
INJECTION, SOLUTION INTRAVENOUS; SUBCUTANEOUS
Qty: 30 ML | Refills: 3 | Status: SHIPPED | OUTPATIENT
Start: 2025-01-02

## 2025-01-02 RX ORDER — TAMSULOSIN HYDROCHLORIDE 0.4 MG/1
0.4 CAPSULE ORAL DAILY
Qty: 90 CAPSULE | Refills: 3 | Status: SHIPPED | OUTPATIENT
Start: 2025-01-02

## 2025-01-02 RX ORDER — AMLODIPINE BESYLATE 5 MG/1
10 TABLET ORAL DAILY
Qty: 180 TABLET | Refills: 3 | Status: SHIPPED | OUTPATIENT
Start: 2025-01-02

## 2025-01-02 RX ORDER — CLOPIDOGREL BISULFATE 75 MG/1
75 TABLET ORAL DAILY
Qty: 90 TABLET | Refills: 3 | Status: SHIPPED | OUTPATIENT
Start: 2025-01-02

## 2025-01-02 RX ORDER — DULAGLUTIDE 1.5 MG/.5ML
1.5 INJECTION, SOLUTION SUBCUTANEOUS WEEKLY
Qty: 12 ADJUSTABLE DOSE PRE-FILLED PEN SYRINGE | Refills: 3 | Status: SHIPPED | OUTPATIENT
Start: 2025-01-02

## 2025-01-02 RX ORDER — INSULIN GLARGINE 100 [IU]/ML
44 INJECTION, SOLUTION SUBCUTANEOUS NIGHTLY
Qty: 45 ML | Refills: 3 | Status: SHIPPED | OUTPATIENT
Start: 2025-01-02

## 2025-01-02 RX ORDER — ALLOPURINOL 100 MG/1
100 TABLET ORAL DAILY
Qty: 90 TABLET | Refills: 3 | Status: SHIPPED | OUTPATIENT
Start: 2025-01-02

## 2025-01-02 RX ORDER — ICOSAPENT ETHYL 0.5 G/1
2 CAPSULE ORAL 2 TIMES DAILY
Qty: 360 CAPSULE | Refills: 3 | Status: SHIPPED | OUTPATIENT
Start: 2025-01-02

## 2025-01-02 RX ORDER — INSULIN ASPART 100 [IU]/ML
INJECTION, SOLUTION INTRAVENOUS; SUBCUTANEOUS
Refills: 0 | OUTPATIENT
Start: 2025-01-02

## 2025-01-02 RX ORDER — FINASTERIDE 5 MG/1
5 TABLET, FILM COATED ORAL DAILY
Qty: 90 TABLET | Refills: 3 | Status: SHIPPED | OUTPATIENT
Start: 2025-01-02

## 2025-01-02 RX ORDER — LOSARTAN POTASSIUM 25 MG/1
25 TABLET ORAL DAILY
Qty: 90 TABLET | Refills: 3 | Status: SHIPPED | OUTPATIENT
Start: 2025-01-02

## 2025-01-02 NOTE — PATIENT INSTRUCTIONS
Continue metformin 1000 mg twice daily.   Continue Trulicity at 1.5 mg injection once weekly on Wednesday.    Continue Farxiga 10 mg once daily  Continue Lantus to 44 units once daily  Continue Insulin Aspart (Novolog) 100 unit/mL - Dose if <139 no insulin; 140-199 3 units; 200-249 5 units; 250-299 7 units; 300-349 9 units; 350-400 11 units, above 400 13 units. Maximum of 39 units per day.   Try to incorporate more protein and fiber into your meals. Consider healthier snack options for snacking.   Call the clinic [phone] if blood sugar readings are really low (below 70) or really high (above 200).

## 2025-01-02 NOTE — TELEPHONE ENCOUNTER
Current Outpatient Medications on File Prior to Visit   Medication Sig Dispense Refill    insulin glargine (LANTUS SOLOSTAR) 100 UNIT/ML injection pen Inject 42 Units under the skin nightly (Patient taking differently: 44 Units nightly) 15 mL 5    ofloxacin (OCUFLOX) 0.3 % solution INSTILL 1 DROP 4 TIMES A DAY INTO EACH EYE FOR 10 DAYS (Patient not taking: Reported on 1/2/2025)      Icosapent Ethyl 0.5 g CAPS TAKE 2 CAPSULES BY MOUTH IN THE MORNING AND AT BEDTIME      fenofibrate micronized (LOFIBRA) 200 MG capsule Take 1 capsule by mouth      ferrous sulfate (IRON 325) 325 (65 Fe) MG tablet Take 1 tablet by mouth      TRULICITY 1.5 MG/0.5ML SC injection INJECT 0.5 MLS INTO THE SKIN ONCE A WEEK. PLEASE READ BLACK BOX      docusate sodium (COLACE) 100 MG capsule Take 1 capsule by mouth 2 times daily For constipation (Patient not taking: Reported on 1/2/2025) 180 capsule 3    insulin aspart (NOVOLOG) 100 UNIT/ML injection vial Inject into the skin 3 times daily Dose if <139 no insulin; 140-199 3 units; 200-249 5 units; 250-299 7 units; 300-349 9 units; 350-400 11 units, above 400 13 units. Maximum of 39 units per day. After nighttime snacking      Insulin Pen Needle 32G X 4 MM MISC Use to inject insulin glargine into the skin daily 90 each 3    clopidogrel (PLAVIX) 75 MG tablet Take 1 tablet by mouth daily 90 tablet 3    ezetimibe (ZETIA) 10 MG tablet Take 1 tablet by mouth daily 90 tablet 3    furosemide (LASIX) 20 MG tablet Take 1 tablet by mouth daily Increased dosage 8/16/2024 90 tablet 3    losartan (COZAAR) 25 MG tablet Take 1 tablet by mouth daily 90 tablet 3    metoprolol succinate (TOPROL XL) 100 MG extended release tablet Take 1 tablet by mouth daily 90 tablet 3    nortriptyline (PAMELOR) 50 MG capsule Take 1 capsule by mouth nightly Decreased from BID to once a day 30 capsule 0    pravastatin (PRAVACHOL) 40 MG tablet Take 1 tablet by mouth at bedtime 90 tablet 3    nitroGLYCERIN (NITROSTAT) 0.4 MG SL tablet

## 2025-01-02 NOTE — TELEPHONE ENCOUNTER
Please Approve or Refuse.  Send to Pharmacy per Pt's Request:      Next Visit Date:  3/25/2025   Last Visit Date: 11/22/2024    Hemoglobin A1C (%)   Date Value   11/22/2024 5.8   08/16/2024 6.1   04/24/2024 7.2 (H)             ( goal A1C is < 7)   BP Readings from Last 3 Encounters:   01/02/25 123/77   12/04/24 119/83   11/25/24 138/86          (goal 120/80)  BUN   Date Value Ref Range Status   08/28/2024 25 (H) 8 - 23 mg/dL Final     Creatinine   Date Value Ref Range Status   08/28/2024 1.2 0.7 - 1.2 mg/dL Final     Potassium   Date Value Ref Range Status   08/28/2024 4.8 3.7 - 5.3 mmol/L Final

## 2025-01-06 ENCOUNTER — TELEPHONE (OUTPATIENT)
Dept: FAMILY MEDICINE CLINIC | Age: 70
End: 2025-01-06

## 2025-01-06 NOTE — TELEPHONE ENCOUNTER
Please let the patient know I received notification from his insurance NovoLog is not covered this year, he needs to call his insurance and find out which short acting insulin medication is it covered, and in what form, vial or pen, he needs to write down the instructions

## 2025-01-09 ENCOUNTER — OFFICE VISIT (OUTPATIENT)
Age: 70
End: 2025-01-09
Payer: MEDICARE

## 2025-01-09 VITALS
DIASTOLIC BLOOD PRESSURE: 81 MMHG | WEIGHT: 221 LBS | OXYGEN SATURATION: 96 % | SYSTOLIC BLOOD PRESSURE: 136 MMHG | HEART RATE: 82 BPM | HEIGHT: 68 IN | TEMPERATURE: 98.1 F | BODY MASS INDEX: 33.49 KG/M2

## 2025-01-09 DIAGNOSIS — R11.10 REGURGITATION OF FOOD: ICD-10-CM

## 2025-01-09 DIAGNOSIS — R11.0 CHRONIC NAUSEA: ICD-10-CM

## 2025-01-09 DIAGNOSIS — R19.4 CHANGE IN BOWEL HABITS: Primary | ICD-10-CM

## 2025-01-09 DIAGNOSIS — K21.9 GASTROESOPHAGEAL REFLUX DISEASE WITHOUT ESOPHAGITIS: ICD-10-CM

## 2025-01-09 DIAGNOSIS — R14.0 BLOATING: ICD-10-CM

## 2025-01-09 PROCEDURE — 1123F ACP DISCUSS/DSCN MKR DOCD: CPT | Performed by: NURSE PRACTITIONER

## 2025-01-09 PROCEDURE — 99203 OFFICE O/P NEW LOW 30 MIN: CPT | Performed by: NURSE PRACTITIONER

## 2025-01-09 ASSESSMENT — ENCOUNTER SYMPTOMS
SHORTNESS OF BREATH: 0
SORE THROAT: 0
RHINORRHEA: 0
COUGH: 0

## 2025-01-09 NOTE — PROGRESS NOTES
UGI W AIR CONTRAST      3. Regurgitation of food  R11.10 FL UGI W AIR CONTRAST      4. Gastroesophageal reflux disease without esophagitis  K21.9 FL UGI W AIR CONTRAST      5. Chronic nausea  R11.0 FL UGI W AIR CONTRAST          Return for Follow up after EGD/colonscopy to discuss results.    The patient, Colton Arroyo is a 69 y.o. male, was seen with a total time spent of 35 minutes for the visit on this date of service by the E/M provider. The time component had both face to face and non face to face time spent in determining the total time component.  Counseling and education regarding the patient's diagnosis listed below and the patient's options regarding those diagnoses were also included in determining the time component.    Please note that portions of this note were completed with Dragon dictation, the computer voice recognition software.  Quite often unanticipated grammatical, syntax, homophones, and other interpretive errors are inadvertently transcribed by the computer software.  Please disregard these errors and any other errors that may have escaped final proofreading.     Electronically signed by SERA Keith CNP  on 1/11/2025 at 3:22 PM

## 2025-01-09 NOTE — PATIENT INSTRUCTIONS
Please contact radiology scheduling at [phone] to schedule the upper GI xray and we will call you with results or follow up on them at your visit after the colonoscopy and EGD.     Our surgery schedulers will contact you to schedule,If you do not hear from our office in 1 week please call us at [phone]          [facility]  Zhang Dumont MD, FACS  Stacie South, APRN-CNP  [address], [address]  [place] [address]  Phone: [phone]  Fax: [phone]      Miralax (Polyethylene Glycol)  STOP Aspirin 7 Days prior to Procedure  STOP all other Blood Thinners 5 Days prior to Procedure      **DO NOT EAT ANY SOLID FOOD THE DAY BEFORE YOUR PROCEDURE**  **YOU MUST BE ON A CLEAR LIQUID DIET ONLY**    Approved Clear Liquids:  Any flavor of water or soda except Red or Purple  Fruit Juice without pulp  Coffee or tea without dairy products  Jell-O without fruit or toppings, No Red or Purple  Pop-zoltan or Italian Ice, No Red or Purple  Chicken or Beef broth and bouillon      DRINK PLENTY OF WATER THROUGHOUT THE DAY    At 10:00 am the day before your procedure, take all 4 Dulcolax Tablets.  At 6:00 pm the day before your procedure, mix the ENTIRE bottle of Miralax (238 gram or Close to it) with 64 ounces of Gatorade, Gatorade Zero or Propel Water (NOT RED or PURPLE).  You must consume the entire 64 ounces by 8:00 pm.  Continue clear liquid diet up until midnight.    NOTHING TO EAT, DRINK, SMOKE, OR CHEW AFTER MIDNIGHT    You may brush your teeth, rinse, gargle, and spit.  Heart or Blood pressure medications ONLY with a small sip of water, unless otherwise directed.  Shower with regular soap and water.    YOU MUST HAVE AN ADULT EITHER DRIVE YOU OR RIDE IN A CAB WITH YOU

## 2025-01-11 PROBLEM — R19.4 CHANGE IN BOWEL HABITS: Status: ACTIVE | Noted: 2025-01-11

## 2025-01-11 PROBLEM — R11.0 CHRONIC NAUSEA: Status: ACTIVE | Noted: 2025-01-11

## 2025-01-11 PROBLEM — R14.0 BLOATING: Status: ACTIVE | Noted: 2025-01-11

## 2025-01-11 PROBLEM — R11.10 REGURGITATION OF FOOD: Status: ACTIVE | Noted: 2025-01-11

## 2025-01-13 ENCOUNTER — TELEPHONE (OUTPATIENT)
Age: 70
End: 2025-01-13

## 2025-01-13 ENCOUNTER — PREP FOR PROCEDURE (OUTPATIENT)
Age: 70
End: 2025-01-13

## 2025-01-13 DIAGNOSIS — N18.2 TYPE 2 DIABETES MELLITUS WITH STAGE 2 CHRONIC KIDNEY DISEASE, WITH LONG-TERM CURRENT USE OF INSULIN (HCC): ICD-10-CM

## 2025-01-13 DIAGNOSIS — E11.22 TYPE 2 DIABETES MELLITUS WITH STAGE 2 CHRONIC KIDNEY DISEASE, WITH LONG-TERM CURRENT USE OF INSULIN (HCC): ICD-10-CM

## 2025-01-13 DIAGNOSIS — Z79.4 TYPE 2 DIABETES MELLITUS WITH STAGE 2 CHRONIC KIDNEY DISEASE, WITH LONG-TERM CURRENT USE OF INSULIN (HCC): ICD-10-CM

## 2025-01-13 DIAGNOSIS — Z79.4 TYPE 2 DIABETES MELLITUS WITH HYPERGLYCEMIA, WITH LONG-TERM CURRENT USE OF INSULIN (HCC): ICD-10-CM

## 2025-01-13 DIAGNOSIS — E11.65 TYPE 2 DIABETES MELLITUS WITH HYPERGLYCEMIA, WITH LONG-TERM CURRENT USE OF INSULIN (HCC): ICD-10-CM

## 2025-01-13 PROBLEM — K21.9 GASTROESOPHAGEAL REFLUX DISEASE: Status: ACTIVE | Noted: 2025-01-13

## 2025-01-13 RX ORDER — ONDANSETRON 4 MG/1
4 TABLET, FILM COATED ORAL EVERY 6 HOURS PRN
Qty: 10 TABLET | Refills: 0 | Status: SHIPPED | OUTPATIENT
Start: 2025-01-13

## 2025-01-13 RX ORDER — DAPAGLIFLOZIN 10 MG/1
10 TABLET, FILM COATED ORAL EVERY MORNING
Qty: 90 TABLET | Refills: 3 | Status: SHIPPED | OUTPATIENT
Start: 2025-01-13

## 2025-01-13 RX ORDER — BISACODYL 5 MG/1
TABLET, DELAYED RELEASE ORAL
Qty: 4 TABLET | Refills: 0 | Status: SHIPPED | OUTPATIENT
Start: 2025-01-13

## 2025-01-13 RX ORDER — POLYETHYLENE GLYCOL 3350 17 G/17G
POWDER, FOR SOLUTION ORAL
Qty: 238 G | Refills: 0 | Status: SHIPPED | OUTPATIENT
Start: 2025-01-13

## 2025-01-13 NOTE — TELEPHONE ENCOUNTER
Please Approve or Refuse.  Send to Pharmacy per Pt's Request:      Next Visit Date:  3/25/2025   Last Visit Date: 11/22/2024    Hemoglobin A1C (%)   Date Value   11/22/2024 5.8   08/16/2024 6.1   04/24/2024 7.2 (H)             ( goal A1C is < 7)   BP Readings from Last 3 Encounters:   01/09/25 136/81   01/02/25 123/77   12/04/24 119/83          (goal 120/80)  BUN   Date Value Ref Range Status   08/28/2024 25 (H) 8 - 23 mg/dL Final     Creatinine   Date Value Ref Range Status   08/28/2024 1.2 0.7 - 1.2 mg/dL Final     Potassium   Date Value Ref Range Status   08/28/2024 4.8 3.7 - 5.3 mmol/L Final

## 2025-01-13 NOTE — TELEPHONE ENCOUNTER
Patient wife Julissa (HIPAA) called and scheduled a procedure.    Patient instructed to   STOP Plavix 5 days prior to procedure.    MY EXAM IS SCHEDULED FOR:Endoscopy and Diagnostic Colonoscopy    Date of Procedure: Thursday 2/27/2025   Time: 9:00 am  Arrival Time:  7:00 am  Location:  [facility]    Your scripts have been sent to:  [facility]    Pre-Testing:  Friday 2/21/2025 at 8:00 am a Nurse from [facility] will call you.     Follow up in our office [facility] on: 3/13/2025 at 9:30 am      Patient fully Instructed/mailed 1/13/2025    Orders pending

## 2025-01-20 DIAGNOSIS — N40.1 BPH WITH OBSTRUCTION/LOWER URINARY TRACT SYMPTOMS: ICD-10-CM

## 2025-01-20 DIAGNOSIS — N13.8 BPH WITH OBSTRUCTION/LOWER URINARY TRACT SYMPTOMS: ICD-10-CM

## 2025-01-20 RX ORDER — TAMSULOSIN HYDROCHLORIDE 0.4 MG/1
0.4 CAPSULE ORAL DAILY
Qty: 90 CAPSULE | Refills: 3 | Status: SHIPPED | OUTPATIENT
Start: 2025-01-20 | End: 2025-01-21 | Stop reason: SDUPTHER

## 2025-01-20 NOTE — TELEPHONE ENCOUNTER
Please Approve or Refuse.  Send to Pharmacy per Pt's Request:      Next Visit Date:  3/25/2025   Last Visit Date: 11/22/2024    Hemoglobin A1C (%)   Date Value   11/22/2024 5.8   08/16/2024 6.1   04/24/2024 7.2 (H)             ( goal A1C is < 7)   BP Readings from Last 3 Encounters:   01/09/25 136/81   01/02/25 123/77   12/04/24 119/83          (goal 120/80)  BUN   Date Value Ref Range Status   08/28/2024 25 (H) 8 - 23 mg/dL Final     Creatinine   Date Value Ref Range Status   08/28/2024 1.2 0.7 - 1.2 mg/dL Final     Potassium   Date Value Ref Range Status   08/28/2024 4.8 3.7 - 5.3 mmol/L Final       Pt wife called starting the script was sent wrong pt is suppose to be taking this medication twice a day , and the script was wrote for once daily if he could kwame called in a script change

## 2025-01-21 DIAGNOSIS — N13.8 BPH WITH OBSTRUCTION/LOWER URINARY TRACT SYMPTOMS: ICD-10-CM

## 2025-01-21 DIAGNOSIS — N40.1 BPH WITH OBSTRUCTION/LOWER URINARY TRACT SYMPTOMS: ICD-10-CM

## 2025-01-21 RX ORDER — TAMSULOSIN HYDROCHLORIDE 0.4 MG/1
0.4 CAPSULE ORAL 2 TIMES DAILY
Qty: 180 CAPSULE | Refills: 3 | Status: SHIPPED | OUTPATIENT
Start: 2025-01-21

## 2025-01-21 NOTE — TELEPHONE ENCOUNTER
Plan:      Assessment & Plan  Doing well.   No recent issues.   Microhematuria has resolved.      Return in about 1 year (around 10/15/2025).

## 2025-01-22 ENCOUNTER — TELEPHONE (OUTPATIENT)
Age: 70
End: 2025-01-22

## 2025-01-22 RX ORDER — ONDANSETRON 4 MG/1
4 TABLET, FILM COATED ORAL EVERY 6 HOURS PRN
Qty: 10 TABLET | Refills: 0 | Status: SHIPPED | OUTPATIENT
Start: 2025-01-22

## 2025-01-22 RX ORDER — BISACODYL 5 MG/1
TABLET, DELAYED RELEASE ORAL
Qty: 4 TABLET | Refills: 0 | Status: SHIPPED | OUTPATIENT
Start: 2025-01-22

## 2025-01-22 RX ORDER — POLYETHYLENE GLYCOL 3350 17 G/17G
POWDER, FOR SOLUTION ORAL
Qty: 238 G | Refills: 0 | Status: SHIPPED | OUTPATIENT
Start: 2025-01-22

## 2025-01-22 NOTE — TELEPHONE ENCOUNTER
PATIENT'S WIFE STATED THEIR INSURANCE WILL NOT COVER [facility] AND REQUEST BOWEL PREP TO BE ORDERED THRU [facility]

## 2025-01-23 ENCOUNTER — HOSPITAL ENCOUNTER (OUTPATIENT)
Dept: GENERAL RADIOLOGY | Age: 70
Discharge: HOME | End: 2025-01-25
Payer: MEDICARE

## 2025-01-23 DIAGNOSIS — K21.9 GASTROESOPHAGEAL REFLUX DISEASE WITHOUT ESOPHAGITIS: ICD-10-CM

## 2025-01-23 DIAGNOSIS — R11.0 CHRONIC NAUSEA: ICD-10-CM

## 2025-01-23 DIAGNOSIS — R11.10 REGURGITATION OF FOOD: ICD-10-CM

## 2025-01-23 DIAGNOSIS — R14.0 BLOATING: ICD-10-CM

## 2025-01-23 PROCEDURE — 74246 X-RAY XM UPR GI TRC 2CNTRST: CPT

## 2025-01-23 PROCEDURE — 2500000003 HC RX 250 WO HCPCS: Performed by: NURSE PRACTITIONER

## 2025-01-23 RX ADMIN — BARIUM SULFATE 160 ML: 960 POWDER, FOR SUSPENSION ORAL at 09:14

## 2025-01-23 RX ADMIN — BARIUM SULFATE 140 ML: 980 POWDER, FOR SUSPENSION ORAL at 09:14

## 2025-02-10 ENCOUNTER — TELEPHONE (OUTPATIENT)
Dept: FAMILY MEDICINE CLINIC | Age: 70
End: 2025-02-10

## 2025-02-10 DIAGNOSIS — K21.9 GASTROESOPHAGEAL REFLUX DISEASE WITHOUT ESOPHAGITIS: ICD-10-CM

## 2025-02-10 DIAGNOSIS — B02.29 POSTHERPETIC NEURALGIA: Primary | ICD-10-CM

## 2025-02-10 RX ORDER — FAMOTIDINE 20 MG/1
20 TABLET, FILM COATED ORAL
Qty: 90 TABLET | Refills: 3 | Status: SHIPPED | OUTPATIENT
Start: 2025-02-10

## 2025-02-10 RX ORDER — NORTRIPTYLINE HYDROCHLORIDE 50 MG/1
100 CAPSULE ORAL NIGHTLY
Qty: 180 CAPSULE | Refills: 0 | Status: SHIPPED | OUTPATIENT
Start: 2025-02-10

## 2025-02-10 NOTE — TELEPHONE ENCOUNTER
Spoke with Patients wife and the patient, he understood about the medication and was fine with that. He wanted to know if you could send in the 1 tablet a day for pepcid.

## 2025-02-10 NOTE — TELEPHONE ENCOUNTER
Completed  Orders Placed This Encounter   Medications    nortriptyline (PAMELOR) 50 MG capsule     Sig: Take 2 capsules by mouth nightly     Dispense:  180 capsule     Refill:  0    famotidine (PEPCID) 20 MG tablet     Sig: Take 1 tablet by mouth every morning (before breakfast) Corrected dosage     Dispense:  90 tablet     Refill:  3       [facility] - [place], [place] - [address] [place] [address] - P [phone] - F [phone]  [address] [address]  Phone: [phone] Fax: [phone]       Diagnosis Orders   1. Postherpetic neuralgia  nortriptyline (PAMELOR) 50 MG capsule      2. Gastroesophageal reflux disease without esophagitis  famotidine (PEPCID) 20 MG tablet           Future Appointments   Date Time Provider Department Center   2/13/2025 10:10 AM [facility] MEDICATION MGMT ST MED [facility]   2/21/2025  8:00 AM STCZ PAT RM 4 [facility]CZ PAT [facility]   3/4/2025 10:30 AM May Cole APRN - CNP [facility]   3/13/2025  9:30 AM Stacie South APRN - CNP MB Gen Surg [facility]   3/25/2025  8:30 AM Rosa Lauren MD fp [facility] DEP   5/29/2025  9:00 AM John Sommer MD Resp Spec [facility]   8/22/2025 10:00 AM Rosa Lauren MD fp [facility] DEP

## 2025-02-10 NOTE — TELEPHONE ENCOUNTER
Please talk with his wife, I had him on Pamelor 50 Mg at night in November, we decreased the dosage from twice a day to once a day     too much Pamelor it affects his memory, and can make him very tired as well, if at this time he takes 3 times a day Pamelor, which is usually prescribed as a once a day medication, I can send it for 2 a day for the next 90 days, but he needs to take it at bedtime and only once a day      Please let me know so I can send the Pamelor 50 Mg 2 tablets at nighttime    And at the next refill will go down to 50 Mg, is better for his memory and energy level

## 2025-02-10 NOTE — TELEPHONE ENCOUNTER
Patient called in and said his scripts are wrote wrong...    Pamelor that was sent in was sent in for 50mg 1x a day and he takes it 2x a day morning and night 50mg.    Pepcid he said was wrote for .05 tablet and he said he normally takes 1 tablet.    [facility] .

## 2025-02-13 ENCOUNTER — HOSPITAL ENCOUNTER (OUTPATIENT)
Age: 70
Setting detail: SPECIMEN
Discharge: HOME | End: 2025-02-13
Payer: MEDICARE

## 2025-02-13 ENCOUNTER — PHARMACY VISIT (OUTPATIENT)
Age: 70
End: 2025-02-13
Attending: FAMILY MEDICINE
Payer: MEDICARE

## 2025-02-13 VITALS
HEART RATE: 91 BPM | SYSTOLIC BLOOD PRESSURE: 122 MMHG | BODY MASS INDEX: 33.57 KG/M2 | DIASTOLIC BLOOD PRESSURE: 85 MMHG | WEIGHT: 220.8 LBS | OXYGEN SATURATION: 93 %

## 2025-02-13 DIAGNOSIS — Z79.4 TYPE 2 DIABETES MELLITUS WITH HYPERGLYCEMIA, WITH LONG-TERM CURRENT USE OF INSULIN (HCC): Primary | ICD-10-CM

## 2025-02-13 DIAGNOSIS — E11.65 TYPE 2 DIABETES MELLITUS WITH HYPERGLYCEMIA, WITH LONG-TERM CURRENT USE OF INSULIN (HCC): Primary | ICD-10-CM

## 2025-02-13 LAB
CREAT UR-MCNC: 136 MG/DL (ref 39–259)
MICROALBUMIN UR-MCNC: 31 MG/L (ref 0–20)
MICROALBUMIN/CREAT UR-RTO: 23 MCG/MG CREAT (ref 0–17)

## 2025-02-13 PROCEDURE — 82043 UR ALBUMIN QUANTITATIVE: CPT

## 2025-02-13 PROCEDURE — 99212 OFFICE O/P EST SF 10 MIN: CPT | Performed by: PHARMACY

## 2025-02-13 PROCEDURE — 82570 ASSAY OF URINE CREATININE: CPT

## 2025-02-13 NOTE — RESULT ENCOUNTER NOTE
Please notify patient: Small amount of proteins in the urine same as before, related to diabetes and mild kidney disease continue losartan for that          Future Appointments  2/21/2025  8:00 AM    [facility] PAT RM 4              [facility] PAT            [facility]  3/4/2025   10:30 AM   May Cole APRN * [facility]           [facility]  3/13/2025  9:30 AM    Stacie South APRN - C* MB Gen Surg         [facility]  3/18/2025  9:50 AM    ST[facility] MEDICATION MGMT       STC MED MGMT        [facility]  3/25/2025  8:30 AM    Rosa Lauren MD    fp [facility] DEP  5/29/2025  9:00 AM    John Sommer MD       Resp Spec           [facility]  8/22/2025  10:00 AM   Rosa Lauren MD    fp [facility]CC DEP

## 2025-02-13 NOTE — PROGRESS NOTES
Diabetic Medication Management Program  [facility]  [address]   [place], [place] [address]  Phone: [phone]  Fax: [phone]    NAME: Colton Arroyo  MEDICAL RECORD NUMBER:  [id number]  AGE: 69 y.o.   GENDER: male  : 1955  EPISODE DATE:  2025     Mr. Arroyo was referred to [facility] by Dr. Lauren.  Patient acknowledges working in consult agreement with clinical pharmacist and this provider.     Goals per referral:   Fasting blood glucose: < 130  Peak postprandial glucose: < 180  A1C: < 7    SUBJECTIVE     Mr. Arroyo is a 69 y.o. male here for the Diabetes Service for self-management education, medication review including over the counter medications and herbal products, overall wellbeing assessment, transition of care and any needed adjustments with updates and recommendations communicated to the referring physician.       Patient Findings:     Medication changes   Patient states he is taking 50 mg of nortriptyline once daily; this.     Diet changes   No changes     Activity changes   Walks around the stores    Emergency Room Visit or Hospitalization  no  Acute Illness/new problems  no  Symptoms of hypoglycemia  No -  shakiness  Symptoms of hyperglycemia  no - none  Medication adverse reactions  none    Comments:   Reports he did restart insulin Novolog, but has not had to use it due to blood sugar readings. Doesn't always take Novolog if he doesn't see his blood sugar numbers.    Patient reports he will be switching pharmacies towards the end of the month or February. Patient states he is doing his research and will let the clinic know if he needs any additional help or new RX sent.     OBJECTIVE     PMHx:    Past Medical History:   Diagnosis Date    Abdominal hernia     Abnormal cardiovascular stress test 10/2022    Acquired elevated hemidiaphragm, left 2024    Anesthesia     phrenic nerve damaged lt side    Bilateral leg

## 2025-02-13 NOTE — PATIENT INSTRUCTIONS
Continue:   Metformin 1000 mg twice daily.  Trulicity at 1.5 mg injection once weekly on Wednesday.  Continue Farxiga 10 mg once daily.  Lantus to 44 units once daily.  Continue Insulin Aspart (Novolog) 100 unit/mL - Dose if <139 no insulin; 140-199 3 units; 200-249 5 units; 250-299 7 units; 300-349 9 units; 350-400 11 units, above 400 13 units. Maximum of 39 units per day.

## 2025-02-18 ENCOUNTER — TELEPHONE (OUTPATIENT)
Dept: FAMILY MEDICINE CLINIC | Age: 70
End: 2025-02-18

## 2025-02-18 DIAGNOSIS — B02.29 POSTHERPETIC NEURALGIA: Primary | ICD-10-CM

## 2025-02-18 NOTE — TELEPHONE ENCOUNTER
Diagnosis Orders   1. Postherpetic neuralgia             Future Appointments   Date Time Provider Department Center   2/21/2025  8:00 AM STC[facility] PAT RM 4 STCZ PAT [facility]   3/4/2025 10:30 AM [facility]   3/13/2025  9:30 AM Stacie South APRN - CNP MB Gen Surg [facility]   3/18/2025  9:50 AM STCZ MEDICATION MGMT STC MED MGMT [facility]   3/25/2025  8:30 AM Rosa Lauren MD fp [facility] ECC DEP   5/29/2025  9:00 AM [facility]   8/22/2025 10:00 AM Rosa Lauren MD [facility] ECC DEP       FYI    High Risk Medication: NORTRIPTYLINE HCL Use in Seniors Your older patient is receiving NORTRIPTYLINE HCL based on claims records. The Beers criteria advise that tricyclic antidepressants should generally be avoided in seniors due to their highly anticholinergic and sedating properties. In addition, there is a potential for orthostatic hypotension and arrhythmias. Continued use of antidepressants with anticholinergic properties in older patients has been associated with a nearly two-fold higher risk of cognitive decline.  Patient Demographics

## 2025-02-21 ENCOUNTER — HOSPITAL ENCOUNTER (OUTPATIENT)
Dept: PREADMISSION TESTING | Age: 70
Discharge: HOME | End: 2025-02-25

## 2025-02-21 VITALS — WEIGHT: 219 LBS | HEIGHT: 68 IN | BODY MASS INDEX: 33.19 KG/M2

## 2025-02-21 NOTE — PROGRESS NOTES
Pre-op Instructions For Out-Patient Endoscopy Surgery    Medication Instructions:  Please stop herbs and any supplements now (includes vitamins and minerals).    For these medications:  Dulaglutide (Trulicity), Exenatide (Byetta and Bydureon, Liraglutide (Victoza), Lixisenatide (Adlyxin), Semaglutide (Ozempic and Rybelsus), Tirzepatide (Mounjaro, Zepbound)- Stop 1 week prior if taking weekly or 1 day prior if taking every 12 hours or daily.     Please contact your surgeon and prescribing physician for pre-op instructions for any blood thinners. STOP CLOPIDOGREL AS DIRECTED    If you have inhalers/aerosol treatments at home, please use them the morning of your surgery and bring the inhalers with you to the hospital.    Please take the following medications the morning of your surgery with a sip of water:    Amlodipine, Gabapentin, and Metoprolol succinate    Surgery Instructions:  After midnight before surgery:  Do not eat or drink anything, including water, mints, gum, and hard candy.  You may brush your teeth without swallowing.  No smoking, chewing tobacco, or street drugs.     ** Please Follow Bowel Prep instructions if given by surgeon's office**    Please shower or bathe before surgery.       Please do not wear any cologne, lotion, powder, jewelry, piercings, perfume, makeup, nail polish, hair accessories, or hair spray on the day of surgery.  Wear loose comfortable clothing.    Leave your valuables at home but bring a payment source for any after-surgery prescriptions you plan to fill at [facility].  Bring a storage case for any glasses/contacts.    An adult who is responsible for you MUST drive you home and should be with you for the first 24 hours after surgery.     The Day of Surgery:  Arrive at [facility] Surgery Entrance at the time directed by your surgeon and check in at the desk.     If you have a living will or healthcare power of , please bring a copy.    You will be

## 2025-02-25 NOTE — PRE-PROCEDURE INSTRUCTIONS
No answer, left message ?   Yes left detailed message                          Unable to leave message ?    When were you told to arrive at hospital ?    0700/0900  Do you have a  ?    Are you on any blood thinners ?                     If yes when did you stop taking ?    Do you have your prep Rx filled and instruction ?      Nothing to eat the day before , only clear liquids.    Are you experiencing any covid symptoms ?     Do you have any infections or rash we should be aware of ?      Do you have the Hibiclens soap to use the night before and the morning of surgery ?    Nothing to eat or drink after midnight, only a sip of water to take any medication instructed to take the night before.  Wear comfortable clothing, leave any valuables at home, remove any jewelry and body piercing .

## 2025-02-26 ENCOUNTER — ANESTHESIA EVENT (OUTPATIENT)
Dept: OPERATING ROOM | Age: 70
End: 2025-02-26
Payer: MEDICARE

## 2025-02-26 DIAGNOSIS — E78.2 MIXED HYPERLIPIDEMIA: ICD-10-CM

## 2025-02-26 RX ORDER — EZETIMIBE 10 MG/1
10 TABLET ORAL DAILY
Qty: 90 TABLET | Refills: 3 | Status: SHIPPED | OUTPATIENT
Start: 2025-02-26

## 2025-02-26 NOTE — TELEPHONE ENCOUNTER
Please Approve or Refuse.  [facility]     Next Visit Date:  3/25/2025   Last Visit Date: 11/22/2024    Hemoglobin A1C (%)   Date Value   11/22/2024 5.8   08/16/2024 6.1   04/24/2024 7.2 (H)             ( goal A1C is < 7)   BP Readings from Last 3 Encounters:   02/13/25 122/85   01/09/25 136/81   01/02/25 123/77          (goal 120/80)  BUN   Date Value Ref Range Status   08/28/2024 25 (H) 8 - 23 mg/dL Final     Creatinine   Date Value Ref Range Status   08/28/2024 1.2 0.7 - 1.2 mg/dL Final     Potassium   Date Value Ref Range Status   08/28/2024 4.8 3.7 - 5.3 mmol/L Final

## 2025-02-27 ENCOUNTER — ANESTHESIA (OUTPATIENT)
Dept: OPERATING ROOM | Age: 70
End: 2025-02-27
Payer: MEDICARE

## 2025-02-27 ENCOUNTER — TELEPHONE (OUTPATIENT)
Dept: UROLOGY | Age: 70
End: 2025-02-27

## 2025-02-27 ENCOUNTER — HOSPITAL ENCOUNTER (OUTPATIENT)
Age: 70
Setting detail: OUTPATIENT SURGERY
Discharge: HOME | End: 2025-02-27
Attending: SURGERY | Admitting: SURGERY
Payer: MEDICARE

## 2025-02-27 VITALS
BODY MASS INDEX: 33.19 KG/M2 | HEART RATE: 94 BPM | SYSTOLIC BLOOD PRESSURE: 152 MMHG | WEIGHT: 219 LBS | HEIGHT: 68 IN | OXYGEN SATURATION: 100 % | TEMPERATURE: 96.4 F | RESPIRATION RATE: 16 BRPM | DIASTOLIC BLOOD PRESSURE: 88 MMHG

## 2025-02-27 DIAGNOSIS — K21.9 GASTROESOPHAGEAL REFLUX DISEASE: ICD-10-CM

## 2025-02-27 DIAGNOSIS — R19.4 CHANGE IN BOWEL HABITS: ICD-10-CM

## 2025-02-27 DIAGNOSIS — R11.10 REGURGITATION OF FOOD: ICD-10-CM

## 2025-02-27 DIAGNOSIS — R11.0 CHRONIC NAUSEA: ICD-10-CM

## 2025-02-27 PROBLEM — R13.19 ESOPHAGEAL DYSPHAGIA: Status: ACTIVE | Noted: 2025-02-27

## 2025-02-27 LAB — GLUCOSE BLD-MCNC: 115 MG/DL (ref 75–110)

## 2025-02-27 PROCEDURE — 2500000003 HC RX 250 WO HCPCS: Performed by: ANESTHESIOLOGY

## 2025-02-27 PROCEDURE — 2500000003 HC RX 250 WO HCPCS: Performed by: NURSE ANESTHETIST, CERTIFIED REGISTERED

## 2025-02-27 PROCEDURE — 43239 EGD BIOPSY SINGLE/MULTIPLE: CPT | Performed by: SURGERY

## 2025-02-27 PROCEDURE — 6360000002 HC RX W HCPCS: Performed by: NURSE ANESTHETIST, CERTIFIED REGISTERED

## 2025-02-27 PROCEDURE — C1889 IMPLANT/INSERT DEVICE, NOC: HCPCS | Performed by: SURGERY

## 2025-02-27 PROCEDURE — 45380 COLONOSCOPY AND BIOPSY: CPT | Performed by: SURGERY

## 2025-02-27 PROCEDURE — 6360000002 HC RX W HCPCS: Performed by: ANESTHESIOLOGY

## 2025-02-27 PROCEDURE — 3609012400 HC EGD TRANSORAL BIOPSY SINGLE/MULTIPLE: Performed by: SURGERY

## 2025-02-27 PROCEDURE — 7100000030 HC ASPR PHASE II RECOVERY - FIRST 15 MIN: Performed by: SURGERY

## 2025-02-27 PROCEDURE — 7100000000 HC PACU RECOVERY - FIRST 15 MIN: Performed by: SURGERY

## 2025-02-27 PROCEDURE — 7100000011 HC PHASE II RECOVERY - ADDTL 15 MIN: Performed by: SURGERY

## 2025-02-27 PROCEDURE — 3609010600 HC COLONOSCOPY POLYPECTOMY SNARE/COLD BIOPSY: Performed by: SURGERY

## 2025-02-27 PROCEDURE — 7100000010 HC PHASE II RECOVERY - FIRST 15 MIN: Performed by: SURGERY

## 2025-02-27 PROCEDURE — 88305 TISSUE EXAM BY PATHOLOGIST: CPT

## 2025-02-27 PROCEDURE — 45385 COLONOSCOPY W/LESION REMOVAL: CPT | Performed by: SURGERY

## 2025-02-27 PROCEDURE — 7100000001 HC PACU RECOVERY - ADDTL 15 MIN: Performed by: SURGERY

## 2025-02-27 PROCEDURE — 3700000001 HC ADD 15 MINUTES (ANESTHESIA): Performed by: SURGERY

## 2025-02-27 PROCEDURE — 3700000000 HC ANESTHESIA ATTENDED CARE: Performed by: SURGERY

## 2025-02-27 PROCEDURE — 2709999900 HC NON-CHARGEABLE SUPPLY: Performed by: SURGERY

## 2025-02-27 PROCEDURE — 7100000031 HC ASPR PHASE II RECOVERY - ADDTL 15 MIN: Performed by: SURGERY

## 2025-02-27 PROCEDURE — 2580000003 HC RX 258: Performed by: ANESTHESIOLOGY

## 2025-02-27 PROCEDURE — 82947 ASSAY GLUCOSE BLOOD QUANT: CPT

## 2025-02-27 DEVICE — WORKING LENGTH 235CM, WORKING CHANNEL 2.8MM
Type: IMPLANTABLE DEVICE | Site: RECTUM | Status: FUNCTIONAL
Brand: RESOLUTION 360 CLIP

## 2025-02-27 RX ORDER — LIDOCAINE HYDROCHLORIDE 20 MG/ML
15 SOLUTION OROPHARYNGEAL ONCE
Status: DISCONTINUED | OUTPATIENT
Start: 2025-02-27 | End: 2025-02-27 | Stop reason: HOSPADM

## 2025-02-27 RX ORDER — NALOXONE HYDROCHLORIDE 0.4 MG/ML
INJECTION, SOLUTION INTRAMUSCULAR; INTRAVENOUS; SUBCUTANEOUS PRN
Status: DISCONTINUED | OUTPATIENT
Start: 2025-02-27 | End: 2025-02-27 | Stop reason: HOSPADM

## 2025-02-27 RX ORDER — ONDANSETRON 2 MG/ML
4 INJECTION INTRAMUSCULAR; INTRAVENOUS
Status: COMPLETED | OUTPATIENT
Start: 2025-02-27 | End: 2025-02-27

## 2025-02-27 RX ORDER — SODIUM CHLORIDE 9 MG/ML
INJECTION, SOLUTION INTRAVENOUS PRN
Status: DISCONTINUED | OUTPATIENT
Start: 2025-02-27 | End: 2025-02-27 | Stop reason: HOSPADM

## 2025-02-27 RX ORDER — METOPROLOL TARTRATE 1 MG/ML
5 INJECTION, SOLUTION INTRAVENOUS ONCE
Status: COMPLETED | OUTPATIENT
Start: 2025-02-27 | End: 2025-02-27

## 2025-02-27 RX ORDER — ESMOLOL HYDROCHLORIDE 10 MG/ML
INJECTION INTRAVENOUS
Status: DISCONTINUED | OUTPATIENT
Start: 2025-02-27 | End: 2025-02-27 | Stop reason: SDUPTHER

## 2025-02-27 RX ORDER — SODIUM CHLORIDE 0.9 % (FLUSH) 0.9 %
5-40 SYRINGE (ML) INJECTION EVERY 12 HOURS SCHEDULED
Status: DISCONTINUED | OUTPATIENT
Start: 2025-02-27 | End: 2025-02-27 | Stop reason: HOSPADM

## 2025-02-27 RX ORDER — METOPROLOL TARTRATE 1 MG/ML
INJECTION, SOLUTION INTRAVENOUS
Status: DISCONTINUED | OUTPATIENT
Start: 2025-02-27 | End: 2025-02-27 | Stop reason: SDUPTHER

## 2025-02-27 RX ORDER — PROCHLORPERAZINE EDISYLATE 5 MG/ML
5 INJECTION INTRAMUSCULAR; INTRAVENOUS
Status: DISCONTINUED | OUTPATIENT
Start: 2025-02-27 | End: 2025-02-27 | Stop reason: HOSPADM

## 2025-02-27 RX ORDER — LIDOCAINE HYDROCHLORIDE 10 MG/ML
1 INJECTION, SOLUTION EPIDURAL; INFILTRATION; INTRACAUDAL; PERINEURAL
Status: COMPLETED | OUTPATIENT
Start: 2025-02-27 | End: 2025-02-27

## 2025-02-27 RX ORDER — ROCURONIUM BROMIDE 10 MG/ML
INJECTION, SOLUTION INTRAVENOUS
Status: DISCONTINUED | OUTPATIENT
Start: 2025-02-27 | End: 2025-02-27 | Stop reason: SDUPTHER

## 2025-02-27 RX ORDER — PROPOFOL 10 MG/ML
INJECTION, EMULSION INTRAVENOUS
Status: DISCONTINUED | OUTPATIENT
Start: 2025-02-27 | End: 2025-02-27 | Stop reason: SDUPTHER

## 2025-02-27 RX ORDER — DIPHENHYDRAMINE HYDROCHLORIDE 50 MG/ML
12.5 INJECTION INTRAMUSCULAR; INTRAVENOUS
Status: DISCONTINUED | OUTPATIENT
Start: 2025-02-27 | End: 2025-02-27 | Stop reason: HOSPADM

## 2025-02-27 RX ORDER — DEXAMETHASONE SODIUM PHOSPHATE 4 MG/ML
INJECTION, SOLUTION INTRA-ARTICULAR; INTRALESIONAL; INTRAMUSCULAR; INTRAVENOUS; SOFT TISSUE
Status: DISCONTINUED | OUTPATIENT
Start: 2025-02-27 | End: 2025-02-27 | Stop reason: SDUPTHER

## 2025-02-27 RX ORDER — SODIUM CHLORIDE 0.9 % (FLUSH) 0.9 %
5-40 SYRINGE (ML) INJECTION PRN
Status: DISCONTINUED | OUTPATIENT
Start: 2025-02-27 | End: 2025-02-27 | Stop reason: HOSPADM

## 2025-02-27 RX ORDER — ONDANSETRON 2 MG/ML
INJECTION INTRAMUSCULAR; INTRAVENOUS
Status: DISCONTINUED | OUTPATIENT
Start: 2025-02-27 | End: 2025-02-27 | Stop reason: SDUPTHER

## 2025-02-27 RX ORDER — SODIUM CHLORIDE 9 MG/ML
INJECTION, SOLUTION INTRAVENOUS CONTINUOUS
Status: DISCONTINUED | OUTPATIENT
Start: 2025-02-27 | End: 2025-02-27 | Stop reason: HOSPADM

## 2025-02-27 RX ADMIN — SUGAMMADEX 200 MG: 100 INJECTION, SOLUTION INTRAVENOUS at 10:25

## 2025-02-27 RX ADMIN — METOPROLOL TARTRATE 5 MG: 1 INJECTION, SOLUTION INTRAVENOUS at 07:52

## 2025-02-27 RX ADMIN — ONDANSETRON 4 MG: 2 INJECTION, SOLUTION INTRAMUSCULAR; INTRAVENOUS at 10:19

## 2025-02-27 RX ADMIN — ROCURONIUM BROMIDE 30 MG: 10 INJECTION INTRAVENOUS at 09:51

## 2025-02-27 RX ADMIN — SODIUM CHLORIDE: 9 INJECTION, SOLUTION INTRAVENOUS at 09:14

## 2025-02-27 RX ADMIN — METOPROLOL TARTRATE 5 MG: 1 INJECTION, SOLUTION INTRAVENOUS at 09:33

## 2025-02-27 RX ADMIN — ROCURONIUM BROMIDE 70 MG: 10 INJECTION INTRAVENOUS at 09:17

## 2025-02-27 RX ADMIN — DEXAMETHASONE SODIUM PHOSPHATE 8 MG: 4 INJECTION INTRA-ARTICULAR; INTRALESIONAL; INTRAMUSCULAR; INTRAVENOUS; SOFT TISSUE at 09:23

## 2025-02-27 RX ADMIN — PROPOFOL 200 MG: 10 INJECTION, EMULSION INTRAVENOUS at 09:17

## 2025-02-27 RX ADMIN — ONDANSETRON 4 MG: 2 INJECTION, SOLUTION INTRAMUSCULAR; INTRAVENOUS at 11:03

## 2025-02-27 RX ADMIN — LIDOCAINE HYDROCHLORIDE 1 ML: 10 INJECTION, SOLUTION EPIDURAL; INFILTRATION; INTRACAUDAL; PERINEURAL at 07:50

## 2025-02-27 RX ADMIN — ESMOLOL HYDROCHLORIDE 10 MG: 10 INJECTION INTRAVENOUS at 09:29

## 2025-02-27 ASSESSMENT — ENCOUNTER SYMPTOMS
SINUS PRESSURE: 0
SHORTNESS OF BREATH: 0
NAUSEA: 0
ABDOMINAL PAIN: 0
SHORTNESS OF BREATH: 1

## 2025-02-27 ASSESSMENT — PAIN DESCRIPTION - LOCATION
LOCATION_2: BACK
LOCATION_2: BACK
LOCATION: ABDOMEN

## 2025-02-27 ASSESSMENT — PAIN - FUNCTIONAL ASSESSMENT
PAIN_FUNCTIONAL_ASSESSMENT: 0-10
PAIN_FUNCTIONAL_ASSESSMENT: NONE - DENIES PAIN
PAIN_FUNCTIONAL_ASSESSMENT: 0-10

## 2025-02-27 ASSESSMENT — PAIN DESCRIPTION - DESCRIPTORS
DESCRIPTORS_2: ACHING
DESCRIPTORS: CRAMPING
DESCRIPTORS_2: ACHING

## 2025-02-27 ASSESSMENT — PAIN DESCRIPTION - INTENSITY
RATING_2: 10
RATING_2: 1

## 2025-02-27 ASSESSMENT — PAIN SCALES - GENERAL: PAINLEVEL_OUTOF10: 3

## 2025-02-27 ASSESSMENT — PAIN DESCRIPTION - ORIENTATION
ORIENTATION: LOWER
ORIENTATION_2: LOWER

## 2025-02-27 ASSESSMENT — PAIN DESCRIPTION - PAIN TYPE: TYPE: SURGICAL PAIN

## 2025-02-27 NOTE — ANESTHESIA POSTPROCEDURE EVALUATION
Department of Anesthesiology  Postprocedure Note    Patient: Colton Arroyo  MRN: [id number]  YOB: 1955  Date of evaluation: 2/27/2025    Procedure Summary       Date: 02/27/25 Room / Location: [address]    Anesthesia Start: 0914 Anesthesia Stop: 1039    Procedures:       ESOPHAGOGASTRODUODENOSCOPY BIOPSY (Esophagus)      COLONOSCOPY COLD SNARE POLYPECTOMY WITH CLIP PLACEMENT X4 (Rectum) Diagnosis:       Gastroesophageal reflux disease      Chronic nausea      Regurgitation of food      Change in bowel habits      (Gastroesophageal reflux disease [K21.9])      (Chronic nausea [R11.0])      (Regurgitation of food [R11.10])      (Change in bowel habits [R19.4])    Surgeons: Zhang Dumont MD Responsible Provider: Isatu Mendez MD    Anesthesia Type: general ASA Status: 3            Anesthesia Type: No value filed.    Robert Phase I: Robert Score: 10    Robert Phase II: Robert Score: 10    Anesthesia Post Evaluation    Comments: POST- ANESTHESIA EVALUATION       Pt Name: Colton Arroyo  MRN: [id number]  YOB: 1955  Date of evaluation: 2/27/2025  Time:  2:07 PM      BP (!) 152/88   Pulse 94   Temp (!) 96.4 °F (35.8 °C) (Infrared)   Resp 16   Ht 1.727 m (5' 8\")   Wt 99.3 kg (219 lb)   SpO2 100%   BMI 33.30 kg/m²      Consciousness Level  Awake  Cardiopulmonary Status  Stable  Pain Adequately Treated YES  Nausea / Vomiting  NO  Adequate Hydration  YES  Anesthesia Related Complications NONE      Electronically signed by Isatu Mendez MD on 2/27/2025 at 2:07 PM      No notable events documented.

## 2025-02-27 NOTE — OP NOTE
[facility] General Surgery   Zhang Dumont MD, FACS  Stacie South, APRN-CNP  [address], [address]  [place] [address]  P: [phone], F: [phone]    PROCEDURE NOTE    DATE OF PROCEDURE: 2/27/2025    SURGEON: Zhang Dumont MD    ASSISTANT: None    PREOPERATIVE DIAGNOSIS: Change in bowel habits    POSTOPERATIVE DIAGNOSIS: Hypertrophied anal papilla.  Grade 1 internal hemorrhoid.  Sigmoid polyp.  Sigmoid diverticulosis.  Left colon polyp.  Transverse colon polyp.  Hepatic flexure polyp.  Cecal polyps    OPERATION: Total colonoscopy to cecum.  Multiple polypectomies with cold snare polypectomy technique and resolution clip applications and polypectomies with cold biopsy forceps    ANESTHESIA: General    ESTIMATED BLOOD LOSS: None    COMPLICATIONS: None     SPECIMENS:  Was Obtained: Cecal polyps.  Hepatic flexure polyp.  Transverse colon polyp.  Left colon polyp.  Sigmoid polyp.    HISTORY: The patient is a 69 y.o. year old male with history of above preop diagnosis.  I recommended colonoscopy with possible biopsy or polypectomy and I explained the risk, benefits, expected outcome, and alternatives to the procedure.  Risks included but are not limited to bleeding, infection, respiratory distress, hypotension, and perforation of the colon and possibility of missing a lesion.  The patient understands and is in agreement.      PROCEDURE: The patient was given IV conscious sedation.  The patient's SPO2 remained above 90% throughout the procedure. Digital rectal exam was normal.  The colonoscope was inserted through the anus into the rectum and advanced under direct vision to the cecum without difficulty.  Terminal ileum was examined for approximately 2 inches.  The prep was good.      Findings:    Cecum/Ascending colon: abnormal: Cecal polyps removed with large cold biopsy forceps and resolution clip application performed x 2    Transverse colon: abnormal: Hepatic flexure polyp removed  with cold snare polypectomy technique and resolution clip application performed.  Transverse colon polyp removed with large cold biopsy forceps    Descending/Sigmoid colon: abnormal: Sigmoid diverticulosis.  Left colon polyp removed with cold biopsy forceps.  Sigmoid polyp removed with cold snare polypectomy technique and resolution clip application performed    Rectum/Anus: examined in normal and retroflexed positions and was abnormal: Grade 1 internal hemorrhoid.  Hypertrophied anal papilla    Withdrawal Time was (minutes): 30      Next screening colonoscopy: 1 years.  If screening is less than 10 years the recommended reason is due: Multiple colon polyp    The colon was decompressed.  While withdrawing the scope the above findings were verified and the scope was removed.  The patient tolerated the procedure and conscious sedation without unusual events.    In the recovery room patient was examined and remains hemodynamically stable.  Discharge home when criteria met.    Recommendations/Plan:   F/U Biopsies  F/U In Office as instructed  Discussed with the family  High fiber diet   Precautions to avoid constipation    Electronically signed by Zhang Dumont MD  on 2/27/2025 at 10:26 AM

## 2025-02-27 NOTE — TELEPHONE ENCOUNTER
Called patient to remind him of appointment on /4/25 at 10:30am, and also informed him that an PSA needs to be completed before this appointment.

## 2025-02-27 NOTE — OP NOTE
[facility] Surgery   Zhang Dumont MD, FACS  Stacie MEDRANOLisa Brannon, APRN-CNP  [address], [address]  [place] [address]  P: [phone], F: [phone]      ESOPHAGOGASTRODUODENOSCOPY   ( EGD )  DATE OF PROCEDURE: 2/27/2025     SURGEON: Zhang Dumont MD    ASSISTANT: None    PREOPERATIVE DIAGNOSIS: Esophageal dysphagia    POSTOPERATIVE DIAGNOSIS: Mild reflux esophagitis.  Benign-appearing gastric polyps    OPERATION: Upper GI endoscopy with Biopsy    ANESTHESIA: Moderate Sedation     ESTIMATED BLOOD LOSS: None    COMPLICATIONS: None.     SPECIMENS:  Was Obtained: GE junction biopsy.  Gastric polyp biopsy    HISTORY: The patient is a 69 y.o. year old male with history of above preop diagnosis.  I recommended esophagogastroduodenoscopy with possible biopsy and I explained the risk, benefits, expected outcome, and alternatives to the procedure.  Risks included but are not limited to bleeding, infection, respiratory distress, hypotension, and perforation of the esophagus, stomach, or duodenum.  Patient understands and is in agreement.    PROCEDURE: The patient was given IV conscious sedation.  The patient's SPO2 remained above 90% throughout the procedure. Cetacaine spray given.  Patient placed in left lateral position.  Olympus  videogastroscope was inserted orally under vision into the esophagus without difficulty and advanced into the stomach then through the pylorus up to the second part of duodenum.      Findings:    Retropharyngeal area was grossly normal appearing    Esophagus: abnormal: Mild reflux esophagitis.  No mass or obstruction.    Stomach:    Fundus and Cardia Examined in Retroflexed View: normal    Body: abnormal: Likely benign appearing polyps biopsy obtained    Antrum: normal    Duodenum:     Descending: normal    Bulb: normal    While withdrawing the scope the above findings were verified and the scope was removed.  The patient tolerated the procedure and  conscious sedation without unusual events.    In the recovery room patient was examined and remains hemodynamically stable.  Discharge home when criteria met.     Recommendations/Plan:   F/U Biopsies  F/U In Office as instructed  Discussed with the family  To proceed with colonoscopy today as scheduled    Electronically signed by Zhang Dumont MD  on 2/27/2025 at 9:48 AM

## 2025-02-27 NOTE — DISCHARGE INSTRUCTIONS
DISCHARGE INSTRUCTIONS FOR COLONOSCOPY AND EGD    In order to continue your care at home, please follow the instructions below.    For General Anesthesia:  Do not drink any alcoholic beverages or make any legal or important decisions for 24 hours.    Do not drive or operate machinery for 24 hours.  You may return to work after 24 hours.        Diet    Drink plenty of fluids after surgery, unless you are on a fluid restriction.   After general anesthesia, start out eating lightly (broth, soup, bread, etc.) advancing as tolerated to your usual diet.  Try to avoid spicy or greasy/fatty foods for 48 hours due to the EGD.   Avoid milk/milk product for several hours.     If your gag reflex has not returned but you are able to swallow, cut food into small bites, chew food thoroughly and drink fluids carefully for the next 2 hours.    Medications  Take medications as ordered by your surgeon.    Activities  Limit your activities for 24 hours.  Walk around to pass gas.  You may shower.    Call your surgeon for the following:  If you have abdominal pain that is not relieved by passing gas.   For an oral temperature (by mouth) is 101 degrees or higher, chills or excessive sweating.  Persistent nausea or vomiting  Vomiting blood (may be red or black)  Rectal bleeding (may be red, maroon, or black) or change in your bowel habits.  Severe sore throat or neck pain  For any questions or concerns you may have

## 2025-02-27 NOTE — H&P
HISTORY and PHYSICAL  [facility]       NAME:  Colton Arroyo  MRN: [id number]   YOB: 1955   Date: 2/27/2025   Age: 69 y.o.  Gender: male       COMPLAINT AND PRESENT HISTORY:       Colton Arroyo is 69 y.o.,  male, will be having a   EGD ESOPHAGOGASTRODUODENOSCOPY, COLONOSCOPY.       Pt is being seen for hx of : Pre-Op Diagnosis Codes:      * Gastroesophageal reflux disease     * Chronic nausea      * Regurgitation of food     * Change in bowel habits      Prior Colonoscopy last in 2014.    EGD was done last in 2023    Pt  admits to left sided   abdominal pain .   Nausea  yes, often      Changes in bowel habits : yes including    Diarrhea and constipation:  No .   Blood in stools, BRBPR or  black tarry stools.      changes in appetite : yes, poor appetite.       heartburn, indigestion,  acid reflux: no    Pt is taking Pepcid, pravastatin  that is managing sxs.    Dysphagia : no     Family Hx of colon cancer : no . FH of Esophageal cancer: no    Medical history reviewed:  CAD.  S/p stent 2,  TINOCO  Denies current chest pain/pressure/palpations. Pt reports  SOB.       blood thinners :   plavix,      pt held his Trucility    Patient states  has completed and followed prescribed prep with clear outcome.      Patient  has hx of PONV or prolonged emergence.     RECENT LABS, IMAGING AND TESTING     Lab Results   Component Value Date    WBC 6.0 08/28/2024    RBC 5.13 08/28/2024    HGB 15.9 08/28/2024    HCT 47.2 08/28/2024    MCV 91.9 08/28/2024    MCH 30.9 08/28/2024    MCHC 33.7 08/28/2024    RDW 15.7 (H) 08/28/2024     08/28/2024    MPV 7.8 08/28/2024        Lab Results   Component Value Date     08/28/2024    K 4.8 08/28/2024     08/28/2024    CO2 28 08/28/2024    BUN 25 (H) 08/28/2024    CREATININE 1.2 08/28/2024    GLUCOSE 133 (H) 08/28/2024    CALCIUM 9.7 12/04/2024    BILITOT 0.8 08/28/2024    ALKPHOS 100 08/28/2024    AST 21 08/28/2024    ALT 34 08/28/2024

## 2025-02-27 NOTE — ANESTHESIA PRE PROCEDURE
Department of Anesthesiology  Preprocedure Note       Name:  Colton Arroyo   Age:  69 y.o.  :  1955                                          MRN:  [id number]         Date:  2025      Surgeon: Surgeon(s):  Zhang Dumont MD    Procedure: Procedure(s):  ESOPHAGOGASTRODUODENOSCOPY BIOPSY  COLONOSCOPY DIAGNOSTIC    Medications prior to admission:   Prior to Admission medications    Medication Sig Start Date End Date Taking? Authorizing Provider   ezetimibe (ZETIA) 10 MG tablet Take 1 tablet by mouth daily 25  Yes Rosa Lauren MD   famotidine (PEPCID) 20 MG tablet Take 1 tablet by mouth every morning (before breakfast) Corrected dosage 2/10/25  Yes Rosa Lauren MD   ondansetron (ZOFRAN) 4 MG tablet Take 1 tablet by mouth every 6 hours as needed for Nausea or Vomiting 25  Yes Stacie South, SERA - CNP   tamsulosin (FLOMAX) 0.4 MG capsule Take 1 capsule by mouth 2 times daily 25  Yes Zachary Zamora MD   dapagliflozin (FARXIGA) 10 MG tablet Take 1 tablet by mouth every morning 25  Yes Rosa Lauren MD   metFORMIN (GLUCOPHAGE) 1000 MG tablet Take 1 tablet by mouth 2 times daily (with meals) 25  Yes Rosa Lauren MD   insulin glargine (LANTUS SOLOSTAR) 100 UNIT/ML injection pen Inject 44 Units into the skin nightly 25  Yes Rosa Lauren MD   allopurinol (ZYLOPRIM) 100 MG tablet Take 1 tablet by mouth daily 25  Yes Rosa Lauren MD   losartan (COZAAR) 25 MG tablet Take 1 tablet by mouth daily 25  Yes Rosa Lauren MD   Icosapent Ethyl 0.5 g CAPS Take 2 tablets by mouth in the morning and at bedtime 25  Yes Rosa Lauren MD   finasteride (PROSCAR) 5 MG tablet Take 1 tablet by mouth daily 25  Yes Rosa Lauren MD   pravastatin (PRAVACHOL) 40 MG tablet Take 1 tablet by mouth at bedtime 25  Yes Rosa Lauren MD   furosemide (LASIX) 20 MG tablet Take 1 tablet by mouth every morning 25  Yes

## 2025-02-28 ENCOUNTER — HOSPITAL ENCOUNTER (OUTPATIENT)
Age: 70
Discharge: HOME | End: 2025-02-28
Payer: MEDICARE

## 2025-02-28 ENCOUNTER — TELEPHONE (OUTPATIENT)
Age: 70
End: 2025-02-28

## 2025-02-28 LAB — PSA SERPL-MCNC: 1.47 NG/ML (ref 0–4)

## 2025-02-28 PROCEDURE — 36415 COLL VENOUS BLD VENIPUNCTURE: CPT

## 2025-02-28 PROCEDURE — G0103 PSA SCREENING: HCPCS

## 2025-02-28 NOTE — TELEPHONE ENCOUNTER
Pt had ECG/colonoscopy procedure on 2/27/25. No new prescriptions.   Next appt for diabetes therapy management 3/18/25.   Anneliese Nuno PharmD, BCPS 2/28/2025 1:59 PM

## 2025-03-01 LAB — SURGICAL PATHOLOGY REPORT: NORMAL

## 2025-03-04 ENCOUNTER — OFFICE VISIT (OUTPATIENT)
Dept: UROLOGY | Age: 70
End: 2025-03-04
Payer: MEDICARE

## 2025-03-04 VITALS
SYSTOLIC BLOOD PRESSURE: 116 MMHG | HEIGHT: 68 IN | WEIGHT: 219 LBS | DIASTOLIC BLOOD PRESSURE: 78 MMHG | OXYGEN SATURATION: 100 % | BODY MASS INDEX: 33.19 KG/M2 | HEART RATE: 98 BPM | TEMPERATURE: 97.7 F

## 2025-03-04 DIAGNOSIS — R31.29 MICROHEMATURIA: ICD-10-CM

## 2025-03-04 DIAGNOSIS — N13.8 BPH WITH OBSTRUCTION/LOWER URINARY TRACT SYMPTOMS: Primary | ICD-10-CM

## 2025-03-04 DIAGNOSIS — N40.1 BPH WITH OBSTRUCTION/LOWER URINARY TRACT SYMPTOMS: Primary | ICD-10-CM

## 2025-03-04 DIAGNOSIS — Z12.5 PROSTATE CANCER SCREENING: ICD-10-CM

## 2025-03-04 PROCEDURE — 99214 OFFICE O/P EST MOD 30 MIN: CPT | Performed by: NURSE PRACTITIONER

## 2025-03-04 PROCEDURE — 1123F ACP DISCUSS/DSCN MKR DOCD: CPT | Performed by: NURSE PRACTITIONER

## 2025-03-04 PROCEDURE — 1159F MED LIST DOCD IN RCRD: CPT | Performed by: NURSE PRACTITIONER

## 2025-03-04 ASSESSMENT — ENCOUNTER SYMPTOMS
BACK PAIN: 0
VOMITING: 0
ABDOMINAL PAIN: 0
EYE PAIN: 0
EYE REDNESS: 0
SHORTNESS OF BREATH: 0
COLOR CHANGE: 0
COUGH: 0
WHEEZING: 0
NAUSEA: 0
GASTROINTESTINAL NEGATIVE: 1
EYES NEGATIVE: 1
RESPIRATORY NEGATIVE: 1
ALLERGIC/IMMUNOLOGIC NEGATIVE: 1

## 2025-03-04 NOTE — RESULT ENCOUNTER NOTE
Management per surgeon, has appointment to discuss, benign polyps, but multiple precancerous polyps      Future Appointments  3/4/2025   10:20 AM   May Cole APRN * [facility]           [facility]  3/13/2025  9:30 AM    Stacie South APRN - C* MB Gen Surg         [facility]  3/18/2025  9:50 AM    [facility] MEDICATION MGMT       [facility] MED MGMT        [facility]  3/25/2025  8:30 AM    Rosa Lauren MD    fp [facility] ECC DEP  5/29/2025  9:00 AM    John Sommer MD       Resp Spec           [facility]  8/22/2025  10:00 AM   Rosa Lauren MD    fp [facility] DEP

## 2025-03-04 NOTE — PROGRESS NOTES
Review of Systems   Constitutional: Negative.  Negative for appetite change, chills and fever.   HENT: Negative.     Eyes: Negative.  Negative for pain, redness and visual disturbance.   Respiratory: Negative.  Negative for cough, shortness of breath and wheezing.    Cardiovascular: Negative.  Negative for chest pain and leg swelling.   Gastrointestinal: Negative.  Negative for abdominal pain, nausea and vomiting.   Endocrine: Negative.    Genitourinary: Negative.  Negative for difficulty urinating, dysuria, flank pain, frequency, hematuria, testicular pain and urgency.   Musculoskeletal: Negative.  Negative for back pain, joint swelling and myalgias.   Skin: Negative.  Negative for color change, rash and wound.   Allergic/Immunologic: Negative.    Neurological: Negative.  Negative for dizziness, tremors, weakness, numbness and headaches.   Hematological: Negative.  Negative for adenopathy. Does not bruise/bleed easily.   Psychiatric/Behavioral: Negative.       
tape, Codeine, and Tramadol  Social History     Tobacco Use   Smoking Status Never    Passive exposure: Past   Smokeless Tobacco Never     (Ifpatient a smoker, smoking cessation counseling offered)    Social History     Substance and Sexual Activity   Alcohol Use Never    Alcohol/week: 0.0 standard drinks of alcohol       REVIEW OF SYSTEMS:  Review of Systems    Physical Exam:      Vitals:    03/04/25 1018   BP: 116/78   Pulse: 98   Temp: 97.7 °F (36.5 °C)   SpO2: 100%     Body mass index is 33.3 kg/m².  Patient is a 69 y.o. male in no acute distress and alert and oriented to person, place and time.  Physical Exam  Constitutional: Patient in no acute distress.  Neuro: Alert and oriented to person, place and time.  Psych: Mood normal, affect normal    Assessment and Plan      1. BPH with obstruction/lower urinary tract symptoms    2. Prostate cancer screening    3. Microhematuria           Plan:    Assessment & Plan   BPH is chronic and stable.   Continue combo therapy.   Hx elevated psa with negative bx in past.   PSA is stable, repeat in 1 year.   Microhematuria in past, workup was negative.  UA was negative 6 mos ago, will repeat.   Call ua results.  Fu 1 year or sooner if needed.     Return in about 1 year (around 3/4/2026).    Prescriptions Ordered:  No orders of the defined types were placed in this encounter.    Orders Placed:  Orders Placed This Encounter   Procedures    Urinalysis with Microscopic     Standing Status:   Future     Standing Expiration Date:   3/4/2026     Order Specific Question:   SPECIFY(EX-CATH,MIDSTREAM,CYSTO,ETC)?     Answer:   mid stream    PSA Screening     Standing Status:   Future     Standing Expiration Date:   9/4/2026           SERA Gillespie CNP    Reviewed and agree with the ROS entered by the MA.

## 2025-03-05 ENCOUNTER — HOSPITAL ENCOUNTER (OUTPATIENT)
Age: 70
Discharge: HOME | End: 2025-03-05
Payer: MEDICARE

## 2025-03-05 ENCOUNTER — HOSPITAL ENCOUNTER (OUTPATIENT)
Age: 70
Setting detail: SPECIMEN
Discharge: HOME | End: 2025-03-05

## 2025-03-05 LAB
25(OH)D3 SERPL-MCNC: 57.4 NG/ML (ref 30–100)
CALCIUM SERPL-MCNC: 8.3 MG/DL (ref 8.6–10.4)
PTH-INTACT SERPL-MCNC: 36 PG/ML (ref 15–65)

## 2025-03-05 PROCEDURE — 83970 ASSAY OF PARATHORMONE: CPT

## 2025-03-05 PROCEDURE — 36415 COLL VENOUS BLD VENIPUNCTURE: CPT

## 2025-03-05 PROCEDURE — 82310 ASSAY OF CALCIUM: CPT

## 2025-03-05 PROCEDURE — 82306 VITAMIN D 25 HYDROXY: CPT

## 2025-03-06 DIAGNOSIS — R31.29 MICROHEMATURIA: ICD-10-CM

## 2025-03-06 LAB
BACTERIA URNS QL MICRO: ABNORMAL
BILIRUB UR QL STRIP: NEGATIVE
CASTS #/AREA URNS LPF: ABNORMAL /LPF (ref 0–8)
CLARITY UR: CLEAR
COLOR UR: YELLOW
EPI CELLS #/AREA URNS HPF: ABNORMAL /HPF (ref 0–5)
GLUCOSE UR STRIP-MCNC: ABNORMAL MG/DL
HGB UR QL STRIP.AUTO: NEGATIVE
KETONES UR STRIP-MCNC: NEGATIVE MG/DL
LEUKOCYTE ESTERASE UR QL STRIP: NEGATIVE
NITRITE UR QL STRIP: NEGATIVE
PH UR STRIP: 6.5 [PH] (ref 5–8)
PROT UR STRIP-MCNC: NEGATIVE MG/DL
RBC #/AREA URNS HPF: ABNORMAL /HPF (ref 0–4)
SP GR UR STRIP: 1.02 (ref 1–1.03)
UROBILINOGEN UR STRIP-ACNC: NORMAL EU/DL (ref 0–1)
WBC #/AREA URNS HPF: ABNORMAL /HPF (ref 0–5)

## 2025-03-13 ENCOUNTER — OFFICE VISIT (OUTPATIENT)
Age: 70
End: 2025-03-13
Payer: MEDICARE

## 2025-03-13 VITALS
OXYGEN SATURATION: 94 % | HEART RATE: 84 BPM | SYSTOLIC BLOOD PRESSURE: 127 MMHG | WEIGHT: 216.6 LBS | HEIGHT: 68 IN | TEMPERATURE: 98.2 F | DIASTOLIC BLOOD PRESSURE: 78 MMHG | BODY MASS INDEX: 32.83 KG/M2

## 2025-03-13 DIAGNOSIS — K80.20 GALLSTONES: ICD-10-CM

## 2025-03-13 DIAGNOSIS — K57.90 DIVERTICULOSIS: ICD-10-CM

## 2025-03-13 DIAGNOSIS — K21.9 GASTROESOPHAGEAL REFLUX DISEASE WITHOUT ESOPHAGITIS: ICD-10-CM

## 2025-03-13 DIAGNOSIS — R11.10 REGURGITATION OF FOOD: ICD-10-CM

## 2025-03-13 DIAGNOSIS — R14.0 BLOATING: ICD-10-CM

## 2025-03-13 DIAGNOSIS — R11.0 CHRONIC NAUSEA: ICD-10-CM

## 2025-03-13 DIAGNOSIS — Z98.890 STATUS POST COLONOSCOPY WITH POLYPECTOMY: ICD-10-CM

## 2025-03-13 DIAGNOSIS — R19.4 CHANGE IN BOWEL HABITS: ICD-10-CM

## 2025-03-13 DIAGNOSIS — K63.5 BENIGN COLON POLYP: Primary | ICD-10-CM

## 2025-03-13 PROCEDURE — 99214 OFFICE O/P EST MOD 30 MIN: CPT | Performed by: NURSE PRACTITIONER

## 2025-03-13 PROCEDURE — 1123F ACP DISCUSS/DSCN MKR DOCD: CPT | Performed by: NURSE PRACTITIONER

## 2025-03-13 RX ORDER — FAMOTIDINE 20 MG/1
20 TABLET, FILM COATED ORAL NIGHTLY
Qty: 30 TABLET | Refills: 1 | Status: SHIPPED | OUTPATIENT
Start: 2025-03-13 | End: 2025-03-13 | Stop reason: CLARIF

## 2025-03-13 RX ORDER — ESOMEPRAZOLE MAGNESIUM 40 MG/1
40 CAPSULE, DELAYED RELEASE ORAL
Qty: 30 CAPSULE | Refills: 1 | Status: SHIPPED | OUTPATIENT
Start: 2025-03-13 | End: 2025-03-13 | Stop reason: CLARIF

## 2025-03-13 RX ORDER — PANTOPRAZOLE SODIUM 40 MG/1
40 TABLET, DELAYED RELEASE ORAL
Qty: 30 TABLET | Refills: 1 | Status: SHIPPED | OUTPATIENT
Start: 2025-03-13 | End: 2025-03-13 | Stop reason: CLARIF

## 2025-03-13 RX ORDER — FAMOTIDINE 20 MG/1
20 TABLET, FILM COATED ORAL 2 TIMES DAILY
Qty: 180 TABLET | Refills: 0 | Status: SHIPPED | OUTPATIENT
Start: 2025-03-13

## 2025-03-13 RX ORDER — ESOMEPRAZOLE MAGNESIUM 40 MG/1
40 CAPSULE, DELAYED RELEASE ORAL
Qty: 30 CAPSULE | Refills: 1 | Status: SHIPPED | OUTPATIENT
Start: 2025-03-13 | End: 2025-03-13 | Stop reason: ALTCHOICE

## 2025-03-13 ASSESSMENT — ENCOUNTER SYMPTOMS
SORE THROAT: 0
COUGH: 0
SHORTNESS OF BREATH: 0
RHINORRHEA: 0

## 2025-03-13 NOTE — PROGRESS NOTES
Outpatient Medications:     famotidine (PEPCID) 20 MG tablet, Take 1 tablet by mouth 2 times daily, Disp: 180 tablet, Rfl: 0    ezetimibe (ZETIA) 10 MG tablet, Take 1 tablet by mouth daily, Disp: 90 tablet, Rfl: 3    nortriptyline (PAMELOR) 50 MG capsule, Take 2 capsules by mouth nightly (Patient taking differently: Take 2 capsules by mouth nightly Patient states he is taking 50 mg once daily.), Disp: 180 capsule, Rfl: 0    ondansetron (ZOFRAN) 4 MG tablet, Take 1 tablet by mouth every 6 hours as needed for Nausea or Vomiting, Disp: 10 tablet, Rfl: 0    tamsulosin (FLOMAX) 0.4 MG capsule, Take 1 capsule by mouth 2 times daily, Disp: 180 capsule, Rfl: 3    dapagliflozin (FARXIGA) 10 MG tablet, Take 1 tablet by mouth every morning, Disp: 90 tablet, Rfl: 3    metFORMIN (GLUCOPHAGE) 1000 MG tablet, Take 1 tablet by mouth 2 times daily (with meals), Disp: 180 tablet, Rfl: 3    insulin glargine (LANTUS SOLOSTAR) 100 UNIT/ML injection pen, Inject 44 Units into the skin nightly, Disp: 45 mL, Rfl: 3    allopurinol (ZYLOPRIM) 100 MG tablet, Take 1 tablet by mouth daily, Disp: 90 tablet, Rfl: 3    losartan (COZAAR) 25 MG tablet, Take 1 tablet by mouth daily, Disp: 90 tablet, Rfl: 3    clopidogrel (PLAVIX) 75 MG tablet, Take 1 tablet by mouth daily, Disp: 90 tablet, Rfl: 3    Icosapent Ethyl 0.5 g CAPS, Take 2 tablets by mouth in the morning and at bedtime, Disp: 360 capsule, Rfl: 3    finasteride (PROSCAR) 5 MG tablet, Take 1 tablet by mouth daily, Disp: 90 tablet, Rfl: 3    pravastatin (PRAVACHOL) 40 MG tablet, Take 1 tablet by mouth at bedtime, Disp: 90 tablet, Rfl: 3    furosemide (LASIX) 20 MG tablet, Take 1 tablet by mouth every morning, Disp: 90 tablet, Rfl: 3    metoprolol succinate (TOPROL XL) 100 MG extended release tablet, Take 1 tablet by mouth daily, Disp: 90 tablet, Rfl: 3    TRULICITY 1.5 MG/0.5ML SC injection, Inject 0.5 mLs into the skin once a week, Disp: 12 Adjustable Dose Pre-filled Pen Syringe, Rfl: 3

## 2025-03-15 ENCOUNTER — TELEPHONE (OUTPATIENT)
Age: 70
End: 2025-03-15

## 2025-03-15 DIAGNOSIS — R14.0 BLOATING: ICD-10-CM

## 2025-03-15 DIAGNOSIS — K80.10 CALCULUS OF GALLBLADDER WITH CHRONIC CHOLECYSTITIS WITHOUT OBSTRUCTION: Primary | ICD-10-CM

## 2025-03-15 NOTE — TELEPHONE ENCOUNTER
I saw this patient in the office for endoscopy follow-up.  Please see EGD results and pathology below.  He is quite symptomatic with nausea, bloating, feels like he is regurgitating what he eats.  He did have a right upper quadrant ultrasound August 2023 which did reveal gallstones.  I will plan to repeat this.    Findings, 2-27-25:     Retropharyngeal area was grossly normal appearing     Esophagus: abnormal: Mild reflux esophagitis.  No mass or obstruction.     Stomach:    Fundus and Cardia Examined in Retroflexed View: normal    Body: abnormal: Likely benign appearing polyps biopsy obtained    Antrum: normal     Duodenum:     Descending: normal    Bulb: normal    A.  GE junction, biopsy:   -Squamocolumnar mucosa and separate gastric mucosa with acute and   chronic inflammation erosion, and reactive features, negative for   intestinal metaplasia and dysplasia.     B.  Gastric polyp, biopsy:   -Fundic gland polyp.     Upper GI x-ray was normal aside from elevated left hemidiaphragm which patient states is chronic.    He is on Pepcid, used to be on daily PPI which his PCP wanted him to avoid due to chronic use and kidney issues.  I increased the Pepcid to 20 mg twice daily, but was unsure if he needed anything else with pathology commenting on erosion?  He also had multiple benign colon polyps and will be due for follow-up colonoscopy in 1 year.  I have him returning to the office in 2 months to see you.  Let me know if you would like anything else in the meantime.

## 2025-03-18 ENCOUNTER — RESULTS FOLLOW-UP (OUTPATIENT)
Dept: LAB | Age: 70
End: 2025-03-18

## 2025-03-18 ENCOUNTER — HOSPITAL ENCOUNTER (OUTPATIENT)
Age: 70
Setting detail: SPECIMEN
Discharge: HOME | End: 2025-03-18
Payer: MEDICARE

## 2025-03-18 ENCOUNTER — PHARMACY VISIT (OUTPATIENT)
Age: 70
End: 2025-03-18
Attending: FAMILY MEDICINE
Payer: MEDICARE

## 2025-03-18 VITALS
SYSTOLIC BLOOD PRESSURE: 132 MMHG | HEART RATE: 106 BPM | WEIGHT: 216.2 LBS | OXYGEN SATURATION: 94 % | DIASTOLIC BLOOD PRESSURE: 91 MMHG | BODY MASS INDEX: 32.87 KG/M2

## 2025-03-18 DIAGNOSIS — Z79.4 TYPE 2 DIABETES MELLITUS WITH HYPERGLYCEMIA, WITH LONG-TERM CURRENT USE OF INSULIN (HCC): Primary | ICD-10-CM

## 2025-03-18 DIAGNOSIS — E11.65 TYPE 2 DIABETES MELLITUS WITH HYPERGLYCEMIA, WITH LONG-TERM CURRENT USE OF INSULIN (HCC): Primary | ICD-10-CM

## 2025-03-18 LAB
CREAT SERPL-MCNC: 1.2 MG/DL (ref 0.7–1.2)
EST. AVERAGE GLUCOSE BLD GHB EST-MCNC: 137 MG/DL
GFR, ESTIMATED: 65 ML/MIN/1.73M2
HBA1C MFR BLD: 6.4 % (ref 4–6)

## 2025-03-18 PROCEDURE — 83036 HEMOGLOBIN GLYCOSYLATED A1C: CPT

## 2025-03-18 PROCEDURE — 99213 OFFICE O/P EST LOW 20 MIN: CPT | Performed by: PHARMACY

## 2025-03-18 PROCEDURE — 36415 COLL VENOUS BLD VENIPUNCTURE: CPT

## 2025-03-18 PROCEDURE — 82565 ASSAY OF CREATININE: CPT

## 2025-03-18 NOTE — PATIENT INSTRUCTIONS
Increase Trulicity to 3 mg mg injection once weekly on Wednesday. Use up the 1.5 mg injections and we will send in a prescription for the 3 mg dose to [facility] in [place].    Continue metformin 1000 mg twice daily.   Continue Farxiga 10 mg once daily  Continue Lantus to 44 units once daily  Continue Insulin Aspart (Novolog) 100 unit/mL - Dose if <139 no insulin; 140-199 3 units; 200-249 5 units; 250-299 7 units; 300-349 9 units; 350-400 11 units, above 400 13 units. Maximum of 39 units per day.   Try to incorporate more protein and fiber into your meals. Consider healthier snack options for snacking.   Call the clinic [phone] if blood sugar readings are really low (below 70) or really high (above 200).

## 2025-03-18 NOTE — RESULT ENCOUNTER NOTE
Please notify patient: Diabetes well-controlled, slightly worse than before hemoglobin A1c 6.4, but very well-controlled  Kidney function stage II stable from 6 months ago, great job    Future Appointments  3/25/2025  8:30 AM    Rosa Lauren MD    fp [facility] ECC DEP  4/14/2025  2:30 PM    [facility] MEDICATION MGMT       [facility] MED MGMT        [facility]  5/14/2025  10:00 AM   Zhang Dumont MD         [facility] Gen Surg         [facility]  5/29/2025  9:00 AM    John Sommer MD       Resp Spec           [facility]  8/22/2025  10:00 AM   Rosa Lauren MD    fp [facility] DEP

## 2025-03-22 PROBLEM — T14.90XA TRAUMATIC INJURY: Status: ACTIVE | Noted: 2019-07-19

## 2025-03-22 PROBLEM — Z79.82 LONG TERM CURRENT USE OF ASPIRIN: Status: ACTIVE | Noted: 2022-05-20

## 2025-03-22 PROBLEM — F33.9 RECURRENT MAJOR DEPRESSION: Status: ACTIVE | Noted: 2019-07-26

## 2025-03-22 PROBLEM — Z79.01 LONG TERM CURRENT USE OF ANTICOAGULANT THERAPY: Status: ACTIVE | Noted: 2019-07-19

## 2025-03-22 PROBLEM — G56.00 CARPAL TUNNEL SYNDROME: Status: ACTIVE | Noted: 2022-05-20

## 2025-03-24 ENCOUNTER — TELEPHONE (OUTPATIENT)
Age: 70
End: 2025-03-24

## 2025-03-24 DIAGNOSIS — R14.0 BLOATING: ICD-10-CM

## 2025-03-24 DIAGNOSIS — K80.10 CALCULUS OF GALLBLADDER WITH CHRONIC CHOLECYSTITIS WITHOUT OBSTRUCTION: Primary | ICD-10-CM

## 2025-03-24 NOTE — TELEPHONE ENCOUNTER
Please have patient complete the gallbladder ultrasound that I have previously ordered, I also added a HIDA scan.  After he has completed this testing, please have him follow-up to see Dr. Dumont for further discussion.

## 2025-03-24 NOTE — TELEPHONE ENCOUNTER
Pt states he has seen a provider for his gallbladder about year ago. Pt states they were then told he could have surgery but that provider did not think it would help. Pt could not tell me name of provider but states it was in [place].     Pt states he has concerns of foods \"sticking\" after he eats and has to lean towards one side or side. Pt states no choking while eating. Pt denies fevers, chills, and states he has regular bowel movements.   Pt was requesting advice for next steps

## 2025-03-25 ENCOUNTER — OFFICE VISIT (OUTPATIENT)
Dept: FAMILY MEDICINE CLINIC | Age: 70
End: 2025-03-25

## 2025-03-25 VITALS
TEMPERATURE: 98.6 F | OXYGEN SATURATION: 95 % | SYSTOLIC BLOOD PRESSURE: 110 MMHG | HEIGHT: 69 IN | WEIGHT: 216.6 LBS | HEART RATE: 82 BPM | DIASTOLIC BLOOD PRESSURE: 82 MMHG | BODY MASS INDEX: 32.08 KG/M2

## 2025-03-25 DIAGNOSIS — I12.9 BENIGN HYPERTENSION WITH CKD (CHRONIC KIDNEY DISEASE), STAGE II: ICD-10-CM

## 2025-03-25 DIAGNOSIS — J44.9 CHRONIC OBSTRUCTIVE PULMONARY DISEASE, UNSPECIFIED COPD TYPE (HCC): ICD-10-CM

## 2025-03-25 DIAGNOSIS — F33.0 MILD EPISODE OF RECURRENT MAJOR DEPRESSIVE DISORDER: ICD-10-CM

## 2025-03-25 DIAGNOSIS — N18.2 TYPE 2 DIABETES MELLITUS WITH STAGE 2 CHRONIC KIDNEY DISEASE, WITH LONG-TERM CURRENT USE OF INSULIN (HCC): Primary | ICD-10-CM

## 2025-03-25 DIAGNOSIS — E11.22 TYPE 2 DIABETES MELLITUS WITH STAGE 2 CHRONIC KIDNEY DISEASE, WITH LONG-TERM CURRENT USE OF INSULIN (HCC): Primary | ICD-10-CM

## 2025-03-25 DIAGNOSIS — I50.22 CHRONIC SYSTOLIC CONGESTIVE HEART FAILURE (HCC): ICD-10-CM

## 2025-03-25 DIAGNOSIS — E78.2 MIXED HYPERLIPIDEMIA: ICD-10-CM

## 2025-03-25 DIAGNOSIS — J01.80 ACUTE NON-RECURRENT SINUSITIS OF OTHER SINUS: ICD-10-CM

## 2025-03-25 DIAGNOSIS — N18.2 BENIGN HYPERTENSION WITH CKD (CHRONIC KIDNEY DISEASE), STAGE II: ICD-10-CM

## 2025-03-25 DIAGNOSIS — Z79.4 TYPE 2 DIABETES MELLITUS WITH STAGE 2 CHRONIC KIDNEY DISEASE, WITH LONG-TERM CURRENT USE OF INSULIN (HCC): Primary | ICD-10-CM

## 2025-03-25 PROBLEM — N18.30 BENIGN HYPERTENSION WITH CKD (CHRONIC KIDNEY DISEASE) STAGE III (HCC): Status: RESOLVED | Noted: 2022-09-15 | Resolved: 2025-03-25

## 2025-03-25 PROBLEM — F33.1 MODERATE EPISODE OF RECURRENT MAJOR DEPRESSIVE DISORDER (HCC): Status: ACTIVE | Noted: 2019-07-26

## 2025-03-25 PROBLEM — F33.1 MODERATE EPISODE OF RECURRENT MAJOR DEPRESSIVE DISORDER (HCC): Status: RESOLVED | Noted: 2019-07-26 | Resolved: 2025-03-25

## 2025-03-25 RX ORDER — AZITHROMYCIN 250 MG/1
TABLET, FILM COATED ORAL
Qty: 6 TABLET | Refills: 0 | Status: SHIPPED | OUTPATIENT
Start: 2025-03-25 | End: 2025-03-30

## 2025-03-25 RX ORDER — ALBUTEROL SULFATE 90 UG/1
2 INHALANT RESPIRATORY (INHALATION) EVERY 6 HOURS PRN
Qty: 8 G | Refills: 0 | Status: SHIPPED | OUTPATIENT
Start: 2025-03-25

## 2025-03-25 SDOH — ECONOMIC STABILITY: FOOD INSECURITY: WITHIN THE PAST 12 MONTHS, THE FOOD YOU BOUGHT JUST DIDN'T LAST AND YOU DIDN'T HAVE MONEY TO GET MORE.: NEVER TRUE

## 2025-03-25 SDOH — ECONOMIC STABILITY: FOOD INSECURITY: WITHIN THE PAST 12 MONTHS, YOU WORRIED THAT YOUR FOOD WOULD RUN OUT BEFORE YOU GOT MONEY TO BUY MORE.: NEVER TRUE

## 2025-03-25 ASSESSMENT — PATIENT HEALTH QUESTIONNAIRE - PHQ9
2. FEELING DOWN, DEPRESSED OR HOPELESS: NOT AT ALL
5. POOR APPETITE OR OVEREATING: NOT AT ALL
SUM OF ALL RESPONSES TO PHQ QUESTIONS 1-9: 5
4. FEELING TIRED OR HAVING LITTLE ENERGY: SEVERAL DAYS
1. LITTLE INTEREST OR PLEASURE IN DOING THINGS: NOT AT ALL
SUM OF ALL RESPONSES TO PHQ QUESTIONS 1-9: 5
9. THOUGHTS THAT YOU WOULD BE BETTER OFF DEAD, OR OF HURTING YOURSELF: NOT AT ALL
SUM OF ALL RESPONSES TO PHQ QUESTIONS 1-9: 5
6. FEELING BAD ABOUT YOURSELF - OR THAT YOU ARE A FAILURE OR HAVE LET YOURSELF OR YOUR FAMILY DOWN: NOT AT ALL
8. MOVING OR SPEAKING SO SLOWLY THAT OTHER PEOPLE COULD HAVE NOTICED. OR THE OPPOSITE, BEING SO FIGETY OR RESTLESS THAT YOU HAVE BEEN MOVING AROUND A LOT MORE THAN USUAL: NOT AT ALL
7. TROUBLE CONCENTRATING ON THINGS, SUCH AS READING THE NEWSPAPER OR WATCHING TELEVISION: NEARLY EVERY DAY
SUM OF ALL RESPONSES TO PHQ QUESTIONS 1-9: 5
10. IF YOU CHECKED OFF ANY PROBLEMS, HOW DIFFICULT HAVE THESE PROBLEMS MADE IT FOR YOU TO DO YOUR WORK, TAKE CARE OF THINGS AT HOME, OR GET ALONG WITH OTHER PEOPLE: NOT DIFFICULT AT ALL
3. TROUBLE FALLING OR STAYING ASLEEP: SEVERAL DAYS

## 2025-03-25 ASSESSMENT — ENCOUNTER SYMPTOMS
BACK PAIN: 1
SHORTNESS OF BREATH: 0
ABDOMINAL DISTENTION: 0
RHINORRHEA: 1
NAUSEA: 0
COUGH: 1
CONSTIPATION: 1
ABDOMINAL PAIN: 0
CHEST TIGHTNESS: 0
WHEEZING: 0
DIARRHEA: 1
VOMITING: 0
APNEA: 1

## 2025-03-25 NOTE — PROGRESS NOTES
Visit Information    Have you changed or started any medications since your last visit including any over-the-counter medicines, vitamins, or herbal medicines? yes -    Have you stopped taking any of your medications? Is so, why? -  no  Are you having any side effects from any of your medications? - no    Have you seen any other physician or provider since your last visit?  yes -    Have you had any other diagnostic tests since your last visit?  yes -    Have you been seen in the emergency room and/or had an admission in a hospital since we last saw you?  no   Have you had your routine dental cleaning in the past 6 months?  yes -      Do you have an active MyChart account? If no, what is the barrier?  Yes    Patient Care Team:  Rosa Lauren MD as PCP - General (Family Medicine)  Rosa Lauren MD as PCP - Empaneled Provider  Juno Muñoz MD as Consulting Physician (Endocrinology)  Aleksander Covarrubias MD as Surgeon (Orthopedic Surgery)  Adelia Rubio MD as Consulting Physician (Rheumatology)  Zachary Zamora MD as Consulting Physician (Urology)  Rafael Vieira DPM as Surgeon (Podiatry)  John Sommer MD as Consulting Physician (Pulmonary Disease)  Max Carbajal PA-C as Physician Assistant (Dermatology)  Antonio Farrar APRN - CNP as Nurse Practitioner (Pulmonology)  Aleksander Rosas MD as Consulting Physician (Neurology)  Pancho Bellamy MD as Consulting Physician (Nephrology)  Janusz Devi MD as Consulting Physician (Cardiology)  Shelton Tadeo MD as Consulting Physician (Orthopedic Surgery)  Vanessa Lloyd MD as Consulting Physician (Interventional Cardiology)    Medical History Review  Past Medical, Family, and Social History reviewed and does contribute to the patient presenting condition    Health Maintenance   Topic Date Due    COVID-19 Vaccine (11 - 2024-25 season) 10/24/2024    Diabetic foot exam  08/16/2025    Annual Wellness Visit (Medicare)

## 2025-03-25 NOTE — ASSESSMENT & PLAN NOTE
Chronic and mild    His depression is currently mild. He reports occasional trouble sleeping and fatigue about once a week but no significant issues with concentration.  - He will continue his current treatment plan.  - Advised to reduce his nortriptyline (Pamelor) dosage to 50 mg once daily. If he experiences any issues with the reduced dosage, he may revert to the previous dosage of 50 mg twice daily.  - Regular follow-up to assess mental health status and medication effectiveness.

## 2025-03-25 NOTE — ASSESSMENT & PLAN NOTE
Chronic with recent mild exacerbation  - He has been experiencing symptoms for over a month, including coughing with phlegm that was sometimes brown with dark speckles.  - An antibiotic (Z-Hardy) will be prescribed and sent to [facility] pharmacy.  - An albuterol inhaler will also be prescribed for use during coughing spells to help open the airway and expel mucus.  - Continued monitoring of symptoms and response to treatment is necessary.  Orders:    azithromycin (ZITHROMAX) 250 MG tablet; 500 mg orally on day one followed by 250 mg daily on days two through five    albuterol sulfate HFA (PROVENTIL HFA) 108 (90 Base) MCG/ACT inhaler; Inhale 2 puffs into the lungs every 6 hours as needed for Wheezing or Shortness of Breath Okay to substitute

## 2025-03-25 NOTE — ASSESSMENT & PLAN NOTE
Chronic hypertension and well-controlled  His kidney function has improved to stage II with a glomerular filtration rate of 65.  - He will continue to be monitored. If kidney function worsens, discontinuation of metformin may be necessary.  - Regular blood work and monitoring of kidney function are planned.  - No new symptoms or changes in condition noted.  Orders:    CBC with Auto Differential; Future    Comprehensive Metabolic Panel; Future    Uric Acid; Future    TSH; Future    Phosphorus; Future    Magnesium; Future

## 2025-03-25 NOTE — ASSESSMENT & PLAN NOTE
Chronic, inadequately controlled, continue pravastatin 40 Mg daily, check lipid panel    Orders:    Lipid Panel; Future

## 2025-03-25 NOTE — PROGRESS NOTES
Colton Arroyo (:  1955) is a 69 y.o. male, Established patient, here for evaluation of the following chief complaint(s):   Diabetes, Hyperlipidemia, Congestive Heart Failure, Cough, and Depression           Assessment & Plan        Assessment & Plan  Type 2 diabetes mellitus with stage 2 chronic kidney disease, with long-term current use of insulin (HCC)   Chronic type 2 diabetes mellitus well-controlled and slightly worse than before, but at goal  Chronic kidney disease stage II, improved from prior, will continue to monitor     His A1c level is currently at 6.4, indicating well-controlled diabetes. He is advised to engage in physical activity such as walking after meals to help manage blood sugar levels.  - He will continue his current medication regimen: Trulicity 3 mg weekly, Farxiga 10 mg daily, NovoLog insulin sliding scale , Lantus 44 units at night, and metformin 1000 mg twice daily.  - Fasting blood work has been ordered, including CBC, CMP, vitamin B12, uric acid, thyroid, phosphorus, magnesium, urine microalbumin, and cholesterol levels.  - He is advised not to take insulin before the blood work and to bring a snack to the appointment in case of hypoglycemia.  Lab Results   Component Value Date    LABA1C 6.4 (H) 2025    LABA1C 5.8 2024    LABA1C 6.1 2024   Check labs    Orders:    CBC with Auto Differential; Future    Comprehensive Metabolic Panel; Future    Vitamin B12 & Folate; Future    Uric Acid; Future    TSH; Future    Albumin/Creatinine Ratio, Urine; Future    Chronic systolic congestive heart failure (HCC)    Chronic and stable  - He reports no shortness of breath, chest pain, or significant leg swelling.  - He is advised to wear compression stockings to help manage minimal leg swelling.  - He will continue his current medications: Farxiga 10 mg daily, furosemide 20 mg every morning, losartan 25 mg every morning, and metoprolol  mg daily.  - No new

## 2025-03-25 NOTE — ASSESSMENT & PLAN NOTE
Chronic type 2 diabetes mellitus well-controlled and slightly worse than before, but at goal  Chronic kidney disease stage II, improved from prior, will continue to monitor     His A1c level is currently at 6.4, indicating well-controlled diabetes. He is advised to engage in physical activity such as walking after meals to help manage blood sugar levels.  - He will continue his current medication regimen: Trulicity 3 mg weekly, Farxiga 10 mg daily, NovoLog insulin sliding scale , Lantus 44 units at night, and metformin 1000 mg twice daily.  - Fasting blood work has been ordered, including CBC, CMP, vitamin B12, uric acid, thyroid, phosphorus, magnesium, urine microalbumin, and cholesterol levels.  - He is advised not to take insulin before the blood work and to bring a snack to the appointment in case of hypoglycemia.  Lab Results   Component Value Date    LABA1C 6.4 (H) 03/18/2025    LABA1C 5.8 11/22/2024    LABA1C 6.1 08/16/2024   Check labs    Orders:    CBC with Auto Differential; Future    Comprehensive Metabolic Panel; Future    Vitamin B12 & Folate; Future    Uric Acid; Future    TSH; Future    Albumin/Creatinine Ratio, Urine; Future

## 2025-03-25 NOTE — TELEPHONE ENCOUNTER
Pt's spouse and patient both took the call, with it being on speaker phone. They stated they were traveling and would call our office to get the number for central scheduling.

## 2025-03-25 NOTE — ASSESSMENT & PLAN NOTE
Chronic and stable  - He reports no shortness of breath, chest pain, or significant leg swelling.  - He is advised to wear compression stockings to help manage minimal leg swelling.  - He will continue his current medications: Farxiga 10 mg daily, furosemide 20 mg every morning, losartan 25 mg every morning, and metoprolol  mg daily.  - No new symptoms or changes in condition noted.  Check labs  Lab Results   Component Value Date    LVEF 40 04/25/2023           Orders:    Brain Natriuretic Peptide; Future

## 2025-03-29 PROBLEM — T14.90XA TRAUMATIC INJURY: Status: RESOLVED | Noted: 2019-07-19 | Resolved: 2025-03-29

## 2025-03-31 ENCOUNTER — HOSPITAL ENCOUNTER (OUTPATIENT)
Dept: ULTRASOUND IMAGING | Age: 70
Discharge: HOME | End: 2025-04-02
Payer: MEDICARE

## 2025-03-31 ENCOUNTER — HOSPITAL ENCOUNTER (OUTPATIENT)
Dept: NUCLEAR MEDICINE | Age: 70
Discharge: HOME | End: 2025-04-02
Payer: MEDICARE

## 2025-03-31 DIAGNOSIS — K80.10 CALCULUS OF GALLBLADDER WITH CHRONIC CHOLECYSTITIS WITHOUT OBSTRUCTION: ICD-10-CM

## 2025-03-31 DIAGNOSIS — R14.0 BLOATING: ICD-10-CM

## 2025-03-31 PROCEDURE — 76705 ECHO EXAM OF ABDOMEN: CPT

## 2025-03-31 PROCEDURE — A9537 TC99M MEBROFENIN: HCPCS | Performed by: NURSE PRACTITIONER

## 2025-03-31 PROCEDURE — 3430000000 HC RX DIAGNOSTIC RADIOPHARMACEUTICAL: Performed by: NURSE PRACTITIONER

## 2025-03-31 PROCEDURE — 78227 HEPATOBIL SYST IMAGE W/DRUG: CPT

## 2025-03-31 RX ORDER — SODIUM CHLORIDE 0.9 % (FLUSH) 0.9 %
10 SYRINGE (ML) INJECTION PRN
Status: DISCONTINUED | OUTPATIENT
Start: 2025-03-31 | End: 2025-04-03 | Stop reason: HOSPADM

## 2025-03-31 RX ADMIN — Medication 5 MILLICURIE: at 10:05

## 2025-04-01 ENCOUNTER — HOSPITAL ENCOUNTER (OUTPATIENT)
Age: 70
Discharge: HOME | End: 2025-04-01
Payer: MEDICARE

## 2025-04-01 ENCOUNTER — RESULTS FOLLOW-UP (OUTPATIENT)
Dept: SURGERY | Age: 70
End: 2025-04-01

## 2025-04-01 LAB
25(OH)D3 SERPL-MCNC: 46.1 NG/ML (ref 30–100)
CALCIUM SERPL-MCNC: 9.6 MG/DL (ref 8.6–10.4)
PTH-INTACT SERPL-MCNC: 26 PG/ML (ref 17.9–58.6)

## 2025-04-01 PROCEDURE — 36415 COLL VENOUS BLD VENIPUNCTURE: CPT

## 2025-04-01 PROCEDURE — 82306 VITAMIN D 25 HYDROXY: CPT

## 2025-04-01 PROCEDURE — 82310 ASSAY OF CALCIUM: CPT

## 2025-04-01 PROCEDURE — 83970 ASSAY OF PARATHORMONE: CPT

## 2025-04-14 ENCOUNTER — PHARMACY VISIT (OUTPATIENT)
Age: 70
End: 2025-04-14
Attending: FAMILY MEDICINE
Payer: MEDICARE

## 2025-04-14 VITALS
WEIGHT: 212.3 LBS | HEART RATE: 94 BPM | BODY MASS INDEX: 31.35 KG/M2 | DIASTOLIC BLOOD PRESSURE: 73 MMHG | OXYGEN SATURATION: 93 % | SYSTOLIC BLOOD PRESSURE: 109 MMHG

## 2025-04-14 DIAGNOSIS — N18.2 TYPE 2 DIABETES MELLITUS WITH STAGE 2 CHRONIC KIDNEY DISEASE, WITH LONG-TERM CURRENT USE OF INSULIN (HCC): Primary | ICD-10-CM

## 2025-04-14 DIAGNOSIS — E11.22 TYPE 2 DIABETES MELLITUS WITH STAGE 2 CHRONIC KIDNEY DISEASE, WITH LONG-TERM CURRENT USE OF INSULIN (HCC): Primary | ICD-10-CM

## 2025-04-14 DIAGNOSIS — Z79.4 TYPE 2 DIABETES MELLITUS WITH STAGE 2 CHRONIC KIDNEY DISEASE, WITH LONG-TERM CURRENT USE OF INSULIN (HCC): Primary | ICD-10-CM

## 2025-04-14 PROCEDURE — 99213 OFFICE O/P EST LOW 20 MIN: CPT

## 2025-04-14 NOTE — PATIENT INSTRUCTIONS
Increase:Lantus to 48 units once daily  Continue Trulicity to 3 mg mg injection once weekly on Wednesday.   Continue metformin 1000 mg twice daily.   Continue Farxiga 10 mg once daily  Continue Insulin Aspart (Novolog) 100 unit/mL - Dose if <139 no insulin; 140-199 3 units; 200-249 5 units; 250-299 7 units; 300-349 9 units; 350-400 11 units, above 400 13 units. Maximum of 39 units per day.   Try to incorporate more protein and fiber into your meals. Consider healthier snack options for snacking.   Call the clinic [phone] if blood sugar readings are really low (below 70) or really high (above 200).

## 2025-04-14 NOTE — PROGRESS NOTES
Diabetic Medication Management Program  [facility]  [address]   [place], [place] [address]  Phone: [phone]  Fax: [phone]    NAME: Colton Arroyo  MEDICAL RECORD NUMBER:  [id number]  AGE: 69 y.o.   GENDER: male  : 1955  EPISODE DATE:  2025     Mr. Arroyo was referred to [facility] by Dr. Lauren.  Patient acknowledges working in consult agreement with clinical pharmacist and this provider.     Goals per referral:   Fasting blood glucose: < 130  Peak postprandial glucose: < 180  A1C: < 7    SUBJECTIVE     Mr. Arroyo is a 69 y.o. male here for the Diabetes Service for self-management education, medication review including over the counter medications and herbal products, overall wellbeing assessment, transition of care and any needed adjustments with updates and recommendations communicated to the referring physician.       Patient Findings:     Medication changes  Yes: started Trulicity 3 mg 2 weeks ago     Diet changes   No changes     Activity changes   Walks around the stores    Emergency Room Visit or Hospitalization  no  Acute Illness/new problems  no  Symptoms of hypoglycemia  No -  shakiness , confusion - for BG below 100   Symptoms of hyperglycemia  no - none  Medication adverse reactions  none    Comments:     Pt reports having hypoglycemia symptoms of shakiness and drowsiness with BG around 100.   Discussed gradual approach to improving BG and also explained goals of BG for diabetic patients.     Discussed hypoglycemia treatment. Pt expressed understanding.      Pt thinks he is having more diarrhea with using higher dose of Trulicity, he is still tolerating mecation though.       Blood glucose trends noted: BG control is worth compared to last appt. 62 % at goal, 35 % high, 3 % low. BG is mostly elevated around meal times. Pt admits to eating double servings of mashed potatoes, backed potatoes. Discussed moderation.

## 2025-04-30 NOTE — TELEPHONE ENCOUNTER
Patient with recent gallbladder ultrasound and HIDA scan results reviewed.  Please see result notes.  Plan of care to be to discuss further with Dr. Dumont at next appointment which is scheduled for May 7.

## 2025-05-07 ENCOUNTER — OFFICE VISIT (OUTPATIENT)
Age: 70
End: 2025-05-07
Payer: MEDICARE

## 2025-05-07 VITALS
HEIGHT: 68 IN | SYSTOLIC BLOOD PRESSURE: 104 MMHG | DIASTOLIC BLOOD PRESSURE: 70 MMHG | OXYGEN SATURATION: 96 % | BODY MASS INDEX: 32.89 KG/M2 | WEIGHT: 217 LBS | HEART RATE: 83 BPM | TEMPERATURE: 98.1 F

## 2025-05-07 DIAGNOSIS — K42.9 UMBILICAL HERNIA WITHOUT OBSTRUCTION OR GANGRENE: ICD-10-CM

## 2025-05-07 DIAGNOSIS — K57.90 DIVERTICULOSIS: ICD-10-CM

## 2025-05-07 DIAGNOSIS — K80.10 CALCULUS OF GALLBLADDER WITH CHRONIC CHOLECYSTITIS WITHOUT OBSTRUCTION: Primary | ICD-10-CM

## 2025-05-07 DIAGNOSIS — R11.0 CHRONIC NAUSEA: ICD-10-CM

## 2025-05-07 DIAGNOSIS — K21.9 GASTROESOPHAGEAL REFLUX DISEASE WITHOUT ESOPHAGITIS: ICD-10-CM

## 2025-05-07 DIAGNOSIS — K63.5 BENIGN COLON POLYP: ICD-10-CM

## 2025-05-07 DIAGNOSIS — K31.7 FUNDIC GLAND POLYPOSIS OF STOMACH: ICD-10-CM

## 2025-05-07 DIAGNOSIS — R14.0 BLOATING: ICD-10-CM

## 2025-05-07 PROCEDURE — 99214 OFFICE O/P EST MOD 30 MIN: CPT | Performed by: SURGERY

## 2025-05-07 PROCEDURE — 1123F ACP DISCUSS/DSCN MKR DOCD: CPT | Performed by: SURGERY

## 2025-05-07 NOTE — PATIENT INSTRUCTIONS
The office will be calling you soon to schedule surgery, please call our office if you do not receive a phone call within 1 week.   Will obtain medical and cardiac clearance.  Lifting restrictions post surgery: No lifting more than 10 pounds, no excessive twisting/bending for 2 weeks.

## 2025-05-08 ENCOUNTER — PHARMACY VISIT (OUTPATIENT)
Age: 70
End: 2025-05-08
Attending: FAMILY MEDICINE
Payer: MEDICARE

## 2025-05-08 ENCOUNTER — TELEPHONE (OUTPATIENT)
Age: 70
End: 2025-05-08

## 2025-05-08 VITALS
BODY MASS INDEX: 32.96 KG/M2 | HEART RATE: 83 BPM | DIASTOLIC BLOOD PRESSURE: 89 MMHG | OXYGEN SATURATION: 95 % | SYSTOLIC BLOOD PRESSURE: 133 MMHG | WEIGHT: 216.8 LBS

## 2025-05-08 DIAGNOSIS — E11.69 TYPE 2 DIABETES MELLITUS WITH OTHER SPECIFIED COMPLICATION, WITH LONG-TERM CURRENT USE OF INSULIN (HCC): Primary | ICD-10-CM

## 2025-05-08 DIAGNOSIS — Z79.4 TYPE 2 DIABETES MELLITUS WITH HYPERGLYCEMIA, WITH LONG-TERM CURRENT USE OF INSULIN (HCC): ICD-10-CM

## 2025-05-08 DIAGNOSIS — E11.22 TYPE 2 DIABETES MELLITUS WITH STAGE 2 CHRONIC KIDNEY DISEASE, WITH LONG-TERM CURRENT USE OF INSULIN (HCC): ICD-10-CM

## 2025-05-08 DIAGNOSIS — Z79.4 TYPE 2 DIABETES MELLITUS WITH OTHER SPECIFIED COMPLICATION, WITH LONG-TERM CURRENT USE OF INSULIN (HCC): Primary | ICD-10-CM

## 2025-05-08 DIAGNOSIS — E11.65 TYPE 2 DIABETES MELLITUS WITH HYPERGLYCEMIA, WITH LONG-TERM CURRENT USE OF INSULIN (HCC): ICD-10-CM

## 2025-05-08 DIAGNOSIS — N18.2 TYPE 2 DIABETES MELLITUS WITH STAGE 2 CHRONIC KIDNEY DISEASE, WITH LONG-TERM CURRENT USE OF INSULIN (HCC): ICD-10-CM

## 2025-05-08 DIAGNOSIS — Z79.4 TYPE 2 DIABETES MELLITUS WITH STAGE 2 CHRONIC KIDNEY DISEASE, WITH LONG-TERM CURRENT USE OF INSULIN (HCC): ICD-10-CM

## 2025-05-08 PROCEDURE — 99213 OFFICE O/P EST LOW 20 MIN: CPT

## 2025-05-08 RX ORDER — INSULIN GLARGINE 100 [IU]/ML
48 INJECTION, SOLUTION SUBCUTANEOUS NIGHTLY
Qty: 45 ML | Refills: 3 | Status: SHIPPED | OUTPATIENT
Start: 2025-05-08

## 2025-05-08 RX ORDER — INSULIN GLARGINE 100 [IU]/ML
48 INJECTION, SOLUTION SUBCUTANEOUS NIGHTLY
Qty: 45 ML | Refills: 3 | Status: SHIPPED | OUTPATIENT
Start: 2025-05-08 | End: 2025-05-08

## 2025-05-08 NOTE — PATIENT INSTRUCTIONS
Continue:Lantus to 48 units once daily  Increase: Trulicity to 4.5 mg mg injection once weekly on Wednesday.   Continue metformin 1000 mg twice daily.   Continue Farxiga 10 mg once daily  Continue Insulin Aspart (Novolog) 100 unit/mL - Dose if <139 no insulin; 140-199 3 units; 200-249 5 units; 250-299 7 units; 300-349 9 units; 350-400 11 units, above 400 13 units. Maximum of 39 units per day.   Try to incorporate more protein and fiber into your meals. Consider healthier snack options for snacking.   Call the clinic [phone] if blood sugar readings are really low (below 70) or really high (above 200).

## 2025-05-08 NOTE — PROGRESS NOTES
Patient is planning gallbladder removal surgery. Gallbladder isssues are not new, started prior to initiation of Trulicity. Pt was explained that Trulicity may precipitate GI side effects, but pt is agreeable to  continue with Trulicity therapy.        OBJECTIVE     PMHx:    Past Medical History:   Diagnosis Date    Abdominal hernia     Abnormal cardiovascular stress test 10/2022    Acquired elevated hemidiaphragm, left 01/04/2024    Anesthesia     phrenic nerve damaged lt side    Bilateral leg edema 08/15/2023    BPH (benign prostatic hyperplasia)     CAD (coronary artery disease)     Cardiomegaly 09/21/2022    Chronic back pain     Diabetes mellitus (HCC)     Type 2    Difficult intubation     per pt throat collapses, lt side phrenic nerve was damaged    TINOCO (dyspnea on exertion) 09/18/2022    Drug-induced constipation 05/24/2022    Ear infection     LT, being treated    Essential hypertension 10/23/2017    Fatty liver     Gallstones     GERD (gastroesophageal reflux disease)     Headache     Headache(784.0)     Hematoma 05/25/2022    Hematuria 01/04/2024    History of MI (myocardial infarction)     Hypercholesteremia     Hyperlipidemia     Hyperparathyroidism     Hypertension     Kidney stones     Old myocardial infarction 05/20/2022    Osteoarthritis     all joints    Osteoarthritis     PONV (postoperative nausea and vomiting)     Primary osteoarthritis of both knees 09/13/2022    Formatting of this note might be different from the original. Added automatically from request for surgery [id number]    Prolonged emergence from general anesthesia     Pure hypercholesterolemia, unspecified 05/20/2022    S/P cardiac catheterization 01/02/2019    Severe obesity (BMI 35.0-39.9) with comorbidity ([facility]) 08/15/2023    Sleep apnea     BIPAP nightly    Type 2 diabetes mellitus with stage 2 chronic kidney disease, with long-term current use of insulin ([facility]) 09/15/2022    Type 2 diabetes mellitus with stage 3a chronic kidney  [Well Developed] : well developed [Well Nourished] : well nourished [No Acute Distress] : no acute distress [Normal Conjunctiva] : normal conjunctiva [Normal Venous Pressure] : normal venous pressure [No Carotid Bruit] : no carotid bruit [Normal S1, S2] : normal S1, S2 [No Rub] : no rub [No Gallop] : no gallop [Clear Lung Fields] : clear lung fields [Good Air Entry] : good air entry [No Respiratory Distress] : no respiratory distress  [Soft] : abdomen soft [Non Tender] : non-tender [No Masses/organomegaly] : no masses/organomegaly [Normal Bowel Sounds] : normal bowel sounds [Normal Gait] : normal gait [No Edema] : no edema [No Cyanosis] : no cyanosis [No Clubbing] : no clubbing [No Varicosities] : no varicosities [No Rash] : no rash [No Skin Lesions] : no skin lesions [Moves all extremities] : moves all extremities [No Focal Deficits] : no focal deficits [Normal Speech] : normal speech [Alert and Oriented] : alert and oriented [Normal memory] : normal memory [de-identified] : +sys murmur

## 2025-05-08 NOTE — TELEPHONE ENCOUNTER
New Rx to [facility] pharmacy   Lantus 15 mlR3  Insulin pen needles #90 r3    Anneliese MatthewD, BCPS 5/8/2025 6:22 PM

## 2025-05-10 ASSESSMENT — ENCOUNTER SYMPTOMS
COUGH: 0
SORE THROAT: 0
SHORTNESS OF BREATH: 0
RHINORRHEA: 0

## 2025-05-10 NOTE — PROGRESS NOTES
artery disease involving native coronary artery of native heart without angina pectoris    Thickening of pleura    S/p total knee replacement, bilateral    Arthritis of multiple sites    Gallstones    Calculus of kidney    Recurrent major depressive disorder, in full remission    Repeated falls    Postherpetic neuralgia    Skin lesion of scalp left side    Bilateral hearing loss    Type 2 diabetes mellitus with hyperglycemia, with long-term current use of insulin (HCC)    Mucopurulent chronic bronchitis (HCC)    Vitamin D deficiency    Allergic rhinitis due to allergen    Benign paroxysmal positional vertigo due to bilateral vestibular disorder    Hyperuricemia    Acquired elevated hemidiaphragm, left    Kidney cyst, acquired    DDD (degenerative disc disease), lumbosacral    Chronic systolic congestive heart failure (HCC)    Tremors of nervous system    Memory loss    Arthralgia of multiple sites    Vitamin B 12 deficiency    Hypertriglyceridemia    Migraine headache    Hand arthritis    Alternating constipation and diarrhea    Change in bowel habits    Bloating    Regurgitation of food    Chronic nausea    Gastroesophageal reflux disease    Esophageal dysphagia    Carpal tunnel syndrome    Long term current use of anticoagulant therapy    Long term current use of aspirin       SURGICAL HISTORY       Past Surgical History:   Procedure Laterality Date    BACK SURGERY  07/11/2019    BICEPS TENDON REPAIR Left     CARDIAC CATHETERIZATION  10/28/2022    Mild nonobstructive CAD otherwise, unchanged from last angiogram in 2019  /  PATENT STENT IN DISTAL LAD    CARPAL TUNNEL RELEASE Right     CERVICAL FUSION      COLONOSCOPY  11/14/2014    normal    COLONOSCOPY N/A 2/27/2025    COLONOSCOPY COLD SNARE POLYPECTOMY WITH CLIP PLACEMENT X4 performed by Zhang Dumont MD at [facility] OR    CORONARY ANGIOPLASTY WITH STENT PLACEMENT  01/02/2019    LAD: 40% proximal stenosis. 80% mid stenosis reduced to 0% using 2.5x15 and 2.5x8 mini

## 2025-05-12 DIAGNOSIS — K21.9 GASTROESOPHAGEAL REFLUX DISEASE WITHOUT ESOPHAGITIS: ICD-10-CM

## 2025-05-12 RX ORDER — FAMOTIDINE 20 MG/1
20 TABLET, FILM COATED ORAL 2 TIMES DAILY
Qty: 180 TABLET | Refills: 3 | Status: SHIPPED | OUTPATIENT
Start: 2025-05-12

## 2025-05-12 NOTE — TELEPHONE ENCOUNTER
Please Approve or Refuse.  Send to Pharmacy per Pt's Request:      Next Visit Date:  8/22/2025   Last Visit Date: 3/25/2025    Hemoglobin A1C (%)   Date Value   03/18/2025 6.4 (H)   11/22/2024 5.8   08/16/2024 6.1             ( goal A1C is < 7)   BP Readings from Last 3 Encounters:   05/08/25 133/89   05/07/25 104/70   04/28/25 118/72          (goal 120/80)  BUN   Date Value Ref Range Status   08/28/2024 25 (H) 8 - 23 mg/dL Final     Creatinine   Date Value Ref Range Status   03/18/2025 1.2 0.7 - 1.2 mg/dL Final     Potassium   Date Value Ref Range Status   08/28/2024 4.8 3.7 - 5.3 mmol/L Final

## 2025-05-14 ENCOUNTER — TELEPHONE (OUTPATIENT)
Dept: FAMILY MEDICINE CLINIC | Age: 70
End: 2025-05-14

## 2025-05-14 NOTE — TELEPHONE ENCOUNTER
Lab Results   Component Value Date    LVEF 40 04/25/2023     Patient is on losartan, not on Aldactone due to high risk of hyperkalemia  We will continue to monitor, if blood pressure permitting we may add Aldactone    He does have gout, on allopurinol      BP Readings from Last 3 Encounters:   05/08/25 133/89   05/07/25 104/70   04/28/25 118/72         Lab Results   Component Value Date/Time     08/28/2024 08:59 AM    K 4.8 08/28/2024 08:59 AM     08/28/2024 08:59 AM    CO2 28 08/28/2024 08:59 AM    BUN 25 08/28/2024 08:59 AM    CREATININE 1.2 03/18/2025 09:37 AM    GLUCOSE 133 08/28/2024 08:59 AM    GLUCOSE 342 11/07/2023 02:31 PM    CALCIUM 9.6 04/01/2025 11:25 AM    LABGLOM 65 03/18/2025 09:37 AM    LABGLOM 66 04/24/2024 09:14 AM        Lab Results   Component Value Date    URICACID 6.2 08/28/2024             Therapy Consideration: Mineralocorticoid Receptor Antagonist in Heart Failure Your patient has heart failure and may not be receiving a mineralocorticoid receptor antagonist based on claims records. Guidelines recommend the addition of spironolactone or eplerenone in patients with symptomatic heart failure. Monitoring of potassium and renal function is recommended in order to minimize risk of hyperkalemia and renal insufficiency. Use of guideline-recommended quadruple therapy (i.e., ARNI, beta blocker, mineralocorticoid receptor antagonist, and SGLT2 inhibitor) has been shown to improve life expectancy as well as clinical outcomes in heart failure patients.  Patient Demographics

## 2025-05-19 ENCOUNTER — TELEPHONE (OUTPATIENT)
Age: 70
End: 2025-05-19

## 2025-05-20 ENCOUNTER — PREP FOR PROCEDURE (OUTPATIENT)
Age: 70
End: 2025-05-20

## 2025-05-20 ENCOUNTER — TELEPHONE (OUTPATIENT)
Age: 70
End: 2025-05-20

## 2025-05-20 DIAGNOSIS — K80.10 CALCULUS OF GALLBLADDER WITH CHRONIC CHOLECYSTITIS WITHOUT OBSTRUCTION: ICD-10-CM

## 2025-05-20 DIAGNOSIS — Z01.818 PREOP EXAMINATION: Primary | ICD-10-CM

## 2025-05-20 DIAGNOSIS — K81.1 CHRONIC CHOLECYSTITIS: ICD-10-CM

## 2025-05-20 DIAGNOSIS — K21.9 GASTROESOPHAGEAL REFLUX DISEASE WITHOUT ESOPHAGITIS: ICD-10-CM

## 2025-05-20 DIAGNOSIS — K42.9 UMBILICAL HERNIA WITHOUT OBSTRUCTION AND WITHOUT GANGRENE: ICD-10-CM

## 2025-05-20 RX ORDER — FAMOTIDINE 20 MG/1
20 TABLET, FILM COATED ORAL 2 TIMES DAILY
Qty: 180 TABLET | Refills: 3 | Status: SHIPPED | OUTPATIENT
Start: 2025-05-20 | End: 2025-05-22 | Stop reason: SDUPTHER

## 2025-05-20 NOTE — TELEPHONE ENCOUNTER
PAT orders per anesthesia protocol, additional lab orders placed.  Pre-op antibiotic (IV Ancef) ordered for day of surgery.  Signing encounter.

## 2025-05-20 NOTE — TELEPHONE ENCOUNTER
Called spouse Julissa(HIPAA) to schedule a surgery.    STOP PLAVIX 5 DAYS PRIOR TO PROCEDURE  STOP VITAMINS 7 DAYS  PRIOR TO PROCEDURE      STOP ASPIRIN 7 DAYS PRIOR TO PROCEDURE  STOP PLAVIX 5 DAYS PRIOR TO PROCEDURE  STOP VITAMINS 7 DAYS  PRIOR TO PROCEDURE  STOP ALL OTHER BLOOD THINNERS 5 DAYS PRIOR TO PROCEDURE  NOTHING TO EAT, DRINK, SMOKE, OR CHEW AFTER MIDNIGHT  You may brush your teeth, rinse gargle, and spit  Heart or blood pressure medication ONLY with a  small sip of water, unless otherwise directed  Shower with regular soap and water  YOU MUST HAVE AN ADULT EITHER DRIVE YOU OR RIDE IN A CAB WITH YOU    MY EXAM IS SCHEDULED FOR;Robotic Laparoscopic Cholecystectomy      Pre-testin2025 AT 3:00 PM, at LOCATION:  [facility] [place]    SURGERY DATE:  2025      TIME: 1:00 pm     ARRIVAL TIME:  11:00 am    LOCATION:  [facility] [place]    Post Op appointment at [facility]:  2025 at 11:00 am        PATIENT FULLY INSTRUCTED/EMAILED 25    PLEASE SEND ORDERS

## 2025-05-21 ENCOUNTER — TELEPHONE (OUTPATIENT)
Dept: FAMILY MEDICINE CLINIC | Age: 70
End: 2025-05-21

## 2025-05-21 NOTE — TELEPHONE ENCOUNTER
Please let the patient know I will clear him for surgery as moderate risk due to congestive heart failure and coronary artery disease  To write down the instructions, needs to stop Farxiga 3 days prior to surgery, Trulicity 2 weeks prior to surgery, and Plavix 5 days prior to surgery which is scheduled for 6/4/2025      FYI  Needs clearance for robotic laparoscopic cholecystectomy open umbilical hernia repair with mesh with general anesthesia  He has significant cardiac history, CHF, 3 stents coronary artery disease, last cardiac cath 10/28/2022 patent stents, CKD  Will need clearance from cardiologist then we will clear him--seen by cardiologist 4/28/2025, aspirin was stopped but continued with Plavix.  Request from general surgery was also sent yesterday to cardiologist pending cardiology clearance      BP Readings from Last 3 Encounters:   05/08/25 133/89   05/07/25 104/70   04/28/25 118/72       Lab Results   Component Value Date    LVEF 40 04/25/2023           Lab Results   Component Value Date    LABA1C 6.4 (H) 03/18/2025    LABA1C 5.8 11/22/2024    LABA1C 6.1 08/16/2024     Future Appointments   Date Time Provider Department Center   5/29/2025  9:00 AM John Sommer MD Resp Spec [facility]   5/30/2025  3:00 PM [facility] PAT RM 2 [facility] PAT [facility]   6/5/2025  8:30 AM [facility] MEDICATION MGMT [facility]   6/19/2025 11:00 AM Stacie South APRN - CNP MB Gen Surg [facility]   8/22/2025 10:00 AM Rosa Lauren MD fp sc [facility] DEP

## 2025-05-21 NOTE — TELEPHONE ENCOUNTER
Called Dr. Lloyd office spoke to Michelle CONTRERAS and she did not receive the fax for patient Cardiac clearance.    Sent the Cardiac clearance today at 8:20 am 5/21/25             Patient can undergo surgery with acceptable cardiac risk, low risk.  Plavix can be stopped 5 days before the procedure and restart as soon as possible.      MD Gabino  Interventional cardiologist.

## 2025-05-22 DIAGNOSIS — K21.9 GASTROESOPHAGEAL REFLUX DISEASE WITHOUT ESOPHAGITIS: ICD-10-CM

## 2025-05-22 RX ORDER — FAMOTIDINE 20 MG/1
20 TABLET, FILM COATED ORAL 2 TIMES DAILY
Qty: 180 TABLET | Refills: 3 | Status: SHIPPED | OUTPATIENT
Start: 2025-05-22

## 2025-05-29 ENCOUNTER — OFFICE VISIT (OUTPATIENT)
Dept: PULMONOLOGY | Age: 70
End: 2025-05-29
Payer: MEDICARE

## 2025-05-29 VITALS
SYSTOLIC BLOOD PRESSURE: 117 MMHG | HEART RATE: 75 BPM | OXYGEN SATURATION: 93 % | HEIGHT: 68 IN | WEIGHT: 214 LBS | BODY MASS INDEX: 32.43 KG/M2 | RESPIRATION RATE: 16 BRPM | DIASTOLIC BLOOD PRESSURE: 80 MMHG

## 2025-05-29 DIAGNOSIS — Z79.4 TYPE 2 DIABETES MELLITUS WITH STAGE 2 CHRONIC KIDNEY DISEASE, WITH LONG-TERM CURRENT USE OF INSULIN (HCC): Primary | ICD-10-CM

## 2025-05-29 DIAGNOSIS — N18.2 TYPE 2 DIABETES MELLITUS WITH STAGE 2 CHRONIC KIDNEY DISEASE, WITH LONG-TERM CURRENT USE OF INSULIN (HCC): Primary | ICD-10-CM

## 2025-05-29 DIAGNOSIS — M54.16 LUMBAR RADICULOPATHY, CHRONIC: ICD-10-CM

## 2025-05-29 DIAGNOSIS — I25.10 CORONARY ARTERY DISEASE INVOLVING NATIVE CORONARY ARTERY OF NATIVE HEART WITHOUT ANGINA PECTORIS: ICD-10-CM

## 2025-05-29 DIAGNOSIS — N18.2 BENIGN HYPERTENSION WITH CKD (CHRONIC KIDNEY DISEASE), STAGE II: ICD-10-CM

## 2025-05-29 DIAGNOSIS — E11.22 TYPE 2 DIABETES MELLITUS WITH STAGE 2 CHRONIC KIDNEY DISEASE, WITH LONG-TERM CURRENT USE OF INSULIN (HCC): Primary | ICD-10-CM

## 2025-05-29 DIAGNOSIS — I12.9 BENIGN HYPERTENSION WITH CKD (CHRONIC KIDNEY DISEASE), STAGE II: ICD-10-CM

## 2025-05-29 PROCEDURE — 3044F HG A1C LEVEL LT 7.0%: CPT | Performed by: INTERNAL MEDICINE

## 2025-05-29 PROCEDURE — 1123F ACP DISCUSS/DSCN MKR DOCD: CPT | Performed by: INTERNAL MEDICINE

## 2025-05-29 PROCEDURE — 99214 OFFICE O/P EST MOD 30 MIN: CPT | Performed by: INTERNAL MEDICINE

## 2025-05-29 ASSESSMENT — SLEEP AND FATIGUE QUESTIONNAIRES
HOW LIKELY ARE YOU TO NOD OFF OR FALL ASLEEP WHILE LYING DOWN TO REST IN THE AFTERNOON WHEN CIRCUMSTANCES PERMIT: WOULD NEVER DOZE
HOW LIKELY ARE YOU TO NOD OFF OR FALL ASLEEP WHILE SITTING AND TALKING TO SOMEONE: WOULD NEVER DOZE
HOW LIKELY ARE YOU TO NOD OFF OR FALL ASLEEP WHEN YOU ARE A PASSENGER IN A CAR FOR AN HOUR WITHOUT A BREAK: WOULD NEVER DOZE
HOW LIKELY ARE YOU TO NOD OFF OR FALL ASLEEP WHILE WATCHING TV: WOULD NEVER DOZE
HOW LIKELY ARE YOU TO NOD OFF OR FALL ASLEEP WHILE SITTING QUIETLY AFTER LUNCH WITHOUT ALCOHOL: WOULD NEVER DOZE
HOW LIKELY ARE YOU TO NOD OFF OR FALL ASLEEP WHILE SITTING INACTIVE IN A PUBLIC PLACE: WOULD NEVER DOZE
HOW LIKELY ARE YOU TO NOD OFF OR FALL ASLEEP IN A CAR, WHILE STOPPED FOR A FEW MINUTES IN TRAFFIC: WOULD NEVER DOZE
ESS TOTAL SCORE: 1
HOW LIKELY ARE YOU TO NOD OFF OR FALL ASLEEP WHILE SITTING AND READING: SLIGHT CHANCE OF DOZING

## 2025-05-29 NOTE — PROGRESS NOTES
comorbidity (HCC) 08/15/2023    Sleep apnea     BIPAP nightly    Type 2 diabetes mellitus with stage 2 chronic kidney disease, with long-term current use of insulin (HCC) 09/15/2022    Type 2 diabetes mellitus with stage 3a chronic kidney disease, without long-term current use of insulin (HCC) 09/15/2022     Past Surgical History:   Procedure Laterality Date    BACK SURGERY  07/11/2019    BICEPS TENDON REPAIR Left     CARDIAC CATHETERIZATION  10/28/2022    Mild nonobstructive CAD otherwise, unchanged from last angiogram in 2019  /  PATENT STENT IN DISTAL LAD    CARPAL TUNNEL RELEASE Right     CERVICAL FUSION      COLONOSCOPY  11/14/2014    normal    COLONOSCOPY N/A 2/27/2025    COLONOSCOPY COLD SNARE POLYPECTOMY WITH CLIP PLACEMENT X4 performed by Zhang Dumont MD at [facility] OR    CORONARY ANGIOPLASTY WITH STENT PLACEMENT  01/02/2019    LAD: 40% proximal stenosis. 80% mid stenosis reduced to 0% using 2.5x15 and 2.5x8 mini vision bare metal stent. The distal LAD has 70% stenosis around the apex and will be treated medically    ELBOW FRACTURE SURGERY Right     right arm/fracture    ENDOSCOPY, COLON, DIAGNOSTIC      several times    FRACTURE SURGERY Right     elbow    KNEE ARTHROSCOPY Bilateral     LIVER BIOPSY  08/14/2015    moderate steatosis, grade 2, lobular and portal inflammation: Grade 1 total score =3, fibrosis score 1A    LUMBAR FUSION N/A 07/10/2019    LUMBAR LAMINECTOMY FUSION POSTERIOR - L3 SACRUM POSTERIOR LUMBAR DECOMPRESSION  & FUSION performed by Aleksander Covarrubias MD at [facility] OR    LUMBAR FUSION N/A 05/18/2022    L2-3 POSTERIOR LUMBAR DECOMPRESSION & INSTRUMENTED FUSION POSSIBLE REVISION INSTRUMENTATION L3-5 performed by Aleksander Covarrubias MD at [facility] OR    OTHER SURGICAL HISTORY  11/07/2018    lumbar myelogram    ROTATOR CUFF REPAIR Right     SPINE SURGERY N/A 05/26/2022    IRRIGATION AND DEBRIDEMENT EPIDURAL HEMATOMA performed by Aleksander Covarrubias MD at [facility] OR    TOTAL KNEE ARTHROPLASTY Left 11/22/2022    Done in

## 2025-05-30 ENCOUNTER — HOSPITAL ENCOUNTER (OUTPATIENT)
Dept: PREADMISSION TESTING | Age: 70
Discharge: HOME | End: 2025-05-30
Payer: MEDICARE

## 2025-05-30 VITALS
WEIGHT: 216 LBS | HEIGHT: 68 IN | HEART RATE: 85 BPM | SYSTOLIC BLOOD PRESSURE: 115 MMHG | TEMPERATURE: 98.1 F | DIASTOLIC BLOOD PRESSURE: 81 MMHG | OXYGEN SATURATION: 94 % | RESPIRATION RATE: 16 BRPM | BODY MASS INDEX: 32.74 KG/M2

## 2025-05-30 DIAGNOSIS — Z01.818 PREOP EXAMINATION: ICD-10-CM

## 2025-05-30 LAB
ALBUMIN SERPL-MCNC: 4.4 G/DL (ref 3.5–5.2)
ALP SERPL-CCNC: 100 U/L (ref 40–129)
ALT SERPL-CCNC: 51 U/L (ref 10–50)
AMYLASE SERPL-CCNC: 98 U/L (ref 28–100)
ANION GAP SERPL CALCULATED.3IONS-SCNC: 14 MMOL/L (ref 9–16)
AST SERPL-CCNC: 41 U/L (ref 10–50)
BASOPHILS # BLD: 0.06 K/UL (ref 0–0.2)
BASOPHILS NFR BLD: 1 % (ref 0–2)
BILIRUB DIRECT SERPL-MCNC: 0.2 MG/DL (ref 0–0.3)
BILIRUB INDIRECT SERPL-MCNC: 0.3 MG/DL (ref 0–1)
BILIRUB SERPL-MCNC: 0.5 MG/DL (ref 0–1.2)
BUN SERPL-MCNC: 27 MG/DL (ref 8–23)
CALCIUM SERPL-MCNC: 9.2 MG/DL (ref 8.6–10.4)
CHLORIDE SERPL-SCNC: 102 MMOL/L (ref 98–107)
CO2 SERPL-SCNC: 23 MMOL/L (ref 20–31)
CREAT SERPL-MCNC: 1.3 MG/DL (ref 0.7–1.2)
EOSINOPHIL # BLD: 0.34 K/UL (ref 0–0.44)
EOSINOPHILS RELATIVE PERCENT: 4 % (ref 0–4)
ERYTHROCYTE [DISTWIDTH] IN BLOOD BY AUTOMATED COUNT: 14.7 % (ref 11.5–14.9)
GFR, ESTIMATED: 59 ML/MIN/1.73M2
GLUCOSE SERPL-MCNC: 137 MG/DL (ref 74–99)
HCT VFR BLD AUTO: 45.8 % (ref 41–53)
HGB BLD-MCNC: 15.6 G/DL (ref 13.5–17.5)
IMM GRANULOCYTES # BLD AUTO: 0.03 K/UL (ref 0–0.3)
IMM GRANULOCYTES NFR BLD: 0 %
LIPASE SERPL-CCNC: 47 U/L (ref 13–60)
LYMPHOCYTES NFR BLD: 1.52 K/UL (ref 1.1–3.7)
LYMPHOCYTES RELATIVE PERCENT: 18 % (ref 24–44)
MCH RBC QN AUTO: 31.2 PG (ref 26–34)
MCHC RBC AUTO-ENTMCNC: 34.1 G/DL (ref 31–37)
MCV RBC AUTO: 91.6 FL (ref 80–100)
MONOCYTES NFR BLD: 0.69 K/UL (ref 0.1–1.2)
MONOCYTES NFR BLD: 8 % (ref 3–12)
NEUTROPHILS NFR BLD: 69 % (ref 36–66)
NEUTS SEG NFR BLD: 5.62 K/UL (ref 1.5–8.1)
NRBC BLD-RTO: 0 PER 100 WBC
PLATELET # BLD AUTO: 218 K/UL (ref 150–450)
PMV BLD AUTO: 9.6 FL (ref 8–13.5)
POTASSIUM SERPL-SCNC: 5.1 MMOL/L (ref 3.7–5.3)
PROT SERPL-MCNC: 7.1 G/DL (ref 6.6–8.7)
RBC # BLD AUTO: 5 M/UL (ref 4.21–5.77)
SODIUM SERPL-SCNC: 139 MMOL/L (ref 136–145)
WBC OTHER # BLD: 8.3 K/UL (ref 3.5–11)

## 2025-05-30 PROCEDURE — 82150 ASSAY OF AMYLASE: CPT

## 2025-05-30 PROCEDURE — 80048 BASIC METABOLIC PNL TOTAL CA: CPT

## 2025-05-30 PROCEDURE — 83690 ASSAY OF LIPASE: CPT

## 2025-05-30 PROCEDURE — 80076 HEPATIC FUNCTION PANEL: CPT

## 2025-05-30 PROCEDURE — 85025 COMPLETE CBC W/AUTO DIFF WBC: CPT

## 2025-05-30 ASSESSMENT — PAIN DESCRIPTION - PAIN TYPE: TYPE: CHRONIC PAIN

## 2025-05-30 ASSESSMENT — PAIN SCALES - GENERAL: PAINLEVEL_OUTOF10: 2

## 2025-05-30 ASSESSMENT — PAIN DESCRIPTION - LOCATION: LOCATION: GENERALIZED

## 2025-05-30 ASSESSMENT — PAIN DESCRIPTION - DESCRIPTORS: DESCRIPTORS: ACHING

## 2025-05-30 NOTE — DISCHARGE INSTRUCTIONS
Pre-op Instructions For Out-Patient Surgery    Medication Instructions:  Please stop herbs and any supplements now (includes vitamins and minerals).    For these medications:  Dulaglutide (Trulicity), Exenatide (Byetta and Bydureon, Liraglutide (Victoza), Lixisenatide (Adlyxin), Semaglutide (Ozempic and Rybelsus), Tirzepatide (Mounjaro, Zepbound, Wegovy)- Stop 1 week prior if taking weekly or 1 day prior if taking every 12 hours or daily.     Please contact your surgeon and prescribing physician for pre-op instructions for any blood thinners. Stop Plavix 5 days prior to surgery and Farxiga 3 days prior and Trulicity 2 weeks prior to surgery    If you have inhalers/aerosol treatments at home, please use them the morning of your surgery and bring the inhalers with you to the hospital.    Please take the following medications the morning of your surgery with a sip of water:    inhaler, metoprolol, amlodipine, pepcid    Surgery Instructions:  After midnight before surgery:  Do not eat or drink anything, including water, mints, gum, and hard candy.  You may brush your teeth without swallowing.  No smoking, chewing tobacco, or street drugs.    Please shower or bathe before surgery.  If you were given Surgical Scrub Chlorhexidine Gluconate Liquid (CHG), please shower the night before and the morning of your surgery following the detailed instructions you received during your pre-admission visit.     Please do not wear any cologne, lotion, powder, deodorant, jewelry, piercings, perfume, makeup, nail polish, hair accessories, or hair spray on the day of surgery.  Wear loose comfortable clothing.    Leave your valuables at home but bring a payment source for any after-surgery prescriptions you plan to fill at [facility].  Bring a storage case for any glasses/contacts.    An adult who is responsible for you MUST remain the in the hospital and drive you home and should be with you for the

## 2025-05-31 ENCOUNTER — RESULTS FOLLOW-UP (OUTPATIENT)
Dept: SURGERY | Age: 70
End: 2025-05-31

## 2025-06-03 ENCOUNTER — ANESTHESIA EVENT (OUTPATIENT)
Dept: OPERATING ROOM | Age: 70
End: 2025-06-03
Payer: MEDICARE

## 2025-06-03 NOTE — PRE-PROCEDURE INSTRUCTIONS
No answer, left message ? -YES                            Unable to leave message ?    When were you told to arrive at hospital ? -1100     Do you have a  ?    Are you on any blood thinners ?                     If yes when did you stop taking ?    Do you have your prep Rx filled and instruction ?      Nothing to eat the day before , only clear liquids.    Are you experiencing any covid symptoms ?     Do you have any infections or rash we should be aware of ?      Do you have the Hibiclens soap to use the night before and the morning of surgery ?    Nothing to eat or drink after midnight, only a sip of water to take any medication instructed to take the night before.  Wear comfortable clothing, leave any valuables at home, remove any jewelry and body piercing .

## 2025-06-04 ENCOUNTER — HOSPITAL ENCOUNTER (OUTPATIENT)
Age: 70
Setting detail: OUTPATIENT SURGERY
Discharge: HOME | End: 2025-06-04
Attending: SURGERY | Admitting: SURGERY
Payer: MEDICARE

## 2025-06-04 ENCOUNTER — ANESTHESIA (OUTPATIENT)
Dept: OPERATING ROOM | Age: 70
End: 2025-06-04
Payer: MEDICARE

## 2025-06-04 VITALS
RESPIRATION RATE: 20 BRPM | DIASTOLIC BLOOD PRESSURE: 84 MMHG | HEIGHT: 68 IN | BODY MASS INDEX: 32.74 KG/M2 | TEMPERATURE: 98.1 F | WEIGHT: 216 LBS | HEART RATE: 96 BPM | SYSTOLIC BLOOD PRESSURE: 131 MMHG | OXYGEN SATURATION: 93 %

## 2025-06-04 DIAGNOSIS — K80.10 CALCULUS OF GALLBLADDER WITH CHRONIC CHOLECYSTITIS WITHOUT OBSTRUCTION: Primary | ICD-10-CM

## 2025-06-04 DIAGNOSIS — K81.1 CHRONIC CHOLECYSTITIS: ICD-10-CM

## 2025-06-04 DIAGNOSIS — K42.9 UMBILICAL HERNIA WITHOUT OBSTRUCTION AND WITHOUT GANGRENE: ICD-10-CM

## 2025-06-04 LAB
GLUCOSE BLD-MCNC: 117 MG/DL (ref 75–110)
GLUCOSE BLD-MCNC: 187 MG/DL (ref 75–110)

## 2025-06-04 PROCEDURE — S2900 ROBOTIC SURGICAL SYSTEM: HCPCS | Performed by: SURGERY

## 2025-06-04 PROCEDURE — 2580000003 HC RX 258: Performed by: NURSE ANESTHETIST, CERTIFIED REGISTERED

## 2025-06-04 PROCEDURE — 6360000002 HC RX W HCPCS: Performed by: NURSE PRACTITIONER

## 2025-06-04 PROCEDURE — 2580000003 HC RX 258: Performed by: ANESTHESIOLOGY

## 2025-06-04 PROCEDURE — 6360000002 HC RX W HCPCS: Performed by: NURSE ANESTHETIST, CERTIFIED REGISTERED

## 2025-06-04 PROCEDURE — 6370000000 HC RX 637 (ALT 250 FOR IP): Performed by: ANESTHESIOLOGY

## 2025-06-04 PROCEDURE — 7100000011 HC PHASE II RECOVERY - ADDTL 15 MIN: Performed by: SURGERY

## 2025-06-04 PROCEDURE — 2500000003 HC RX 250 WO HCPCS: Performed by: SURGERY

## 2025-06-04 PROCEDURE — 6360000002 HC RX W HCPCS: Performed by: SURGERY

## 2025-06-04 PROCEDURE — 88304 TISSUE EXAM BY PATHOLOGIST: CPT

## 2025-06-04 PROCEDURE — 7100000000 HC PACU RECOVERY - FIRST 15 MIN: Performed by: SURGERY

## 2025-06-04 PROCEDURE — 2709999900 HC NON-CHARGEABLE SUPPLY: Performed by: SURGERY

## 2025-06-04 PROCEDURE — 3600000009 HC SURGERY ROBOT BASE: Performed by: SURGERY

## 2025-06-04 PROCEDURE — 47562 LAPAROSCOPIC CHOLECYSTECTOMY: CPT | Performed by: SURGERY

## 2025-06-04 PROCEDURE — 7100000031 HC ASPR PHASE II RECOVERY - ADDTL 15 MIN: Performed by: SURGERY

## 2025-06-04 PROCEDURE — 3700000000 HC ANESTHESIA ATTENDED CARE: Performed by: SURGERY

## 2025-06-04 PROCEDURE — 2720000010 HC SURG SUPPLY STERILE: Performed by: SURGERY

## 2025-06-04 PROCEDURE — 6360000002 HC RX W HCPCS: Performed by: ANESTHESIOLOGY

## 2025-06-04 PROCEDURE — 2500000003 HC RX 250 WO HCPCS: Performed by: NURSE ANESTHETIST, CERTIFIED REGISTERED

## 2025-06-04 PROCEDURE — 7100000001 HC PACU RECOVERY - ADDTL 15 MIN: Performed by: SURGERY

## 2025-06-04 PROCEDURE — 3700000001 HC ADD 15 MINUTES (ANESTHESIA): Performed by: SURGERY

## 2025-06-04 PROCEDURE — 82947 ASSAY GLUCOSE BLOOD QUANT: CPT

## 2025-06-04 PROCEDURE — 3600000019 HC SURGERY ROBOT ADDTL 15MIN: Performed by: SURGERY

## 2025-06-04 PROCEDURE — 7100000030 HC ASPR PHASE II RECOVERY - FIRST 15 MIN: Performed by: SURGERY

## 2025-06-04 PROCEDURE — 7100000010 HC PHASE II RECOVERY - FIRST 15 MIN: Performed by: SURGERY

## 2025-06-04 RX ORDER — FENTANYL CITRATE 0.05 MG/ML
50 INJECTION, SOLUTION INTRAMUSCULAR; INTRAVENOUS EVERY 5 MIN PRN
Status: DISCONTINUED | OUTPATIENT
Start: 2025-06-04 | End: 2025-06-04 | Stop reason: HOSPADM

## 2025-06-04 RX ORDER — SODIUM CHLORIDE 9 MG/ML
INJECTION, SOLUTION INTRAVENOUS CONTINUOUS
Status: DISCONTINUED | OUTPATIENT
Start: 2025-06-04 | End: 2025-06-04 | Stop reason: HOSPADM

## 2025-06-04 RX ORDER — INDOCYANINE GREEN AND WATER 25 MG
5 KIT INJECTION ONCE
Status: COMPLETED | OUTPATIENT
Start: 2025-06-04 | End: 2025-06-04

## 2025-06-04 RX ORDER — SODIUM CHLORIDE 9 MG/ML
INJECTION, SOLUTION INTRAVENOUS PRN
Status: DISCONTINUED | OUTPATIENT
Start: 2025-06-04 | End: 2025-06-04 | Stop reason: HOSPADM

## 2025-06-04 RX ORDER — SODIUM CHLORIDE 0.9 % (FLUSH) 0.9 %
5-40 SYRINGE (ML) INJECTION PRN
Status: DISCONTINUED | OUTPATIENT
Start: 2025-06-04 | End: 2025-06-04 | Stop reason: HOSPADM

## 2025-06-04 RX ORDER — LIDOCAINE HYDROCHLORIDE 10 MG/ML
1 INJECTION, SOLUTION EPIDURAL; INFILTRATION; INTRACAUDAL; PERINEURAL
Status: COMPLETED | OUTPATIENT
Start: 2025-06-04 | End: 2025-06-04

## 2025-06-04 RX ORDER — BUPIVACAINE HYDROCHLORIDE 5 MG/ML
INJECTION, SOLUTION EPIDURAL; INTRACAUDAL; PERINEURAL PRN
Status: DISCONTINUED | OUTPATIENT
Start: 2025-06-04 | End: 2025-06-04 | Stop reason: ALTCHOICE

## 2025-06-04 RX ORDER — ONDANSETRON 4 MG/1
TABLET, FILM COATED ORAL
Qty: 20 TABLET | Refills: 0 | Status: SHIPPED | OUTPATIENT
Start: 2025-06-04

## 2025-06-04 RX ORDER — OXYCODONE AND ACETAMINOPHEN 5; 325 MG/1; MG/1
1 TABLET ORAL EVERY 6 HOURS PRN
Qty: 28 TABLET | Refills: 0 | Status: SHIPPED | OUTPATIENT
Start: 2025-06-04 | End: 2025-06-11

## 2025-06-04 RX ORDER — GABAPENTIN 100 MG/1
100 CAPSULE ORAL ONCE
Status: COMPLETED | OUTPATIENT
Start: 2025-06-04 | End: 2025-06-04

## 2025-06-04 RX ORDER — SCOPOLAMINE 1 MG/3D
1 PATCH, EXTENDED RELEASE TRANSDERMAL ONCE
Status: DISCONTINUED | OUTPATIENT
Start: 2025-06-04 | End: 2025-06-04 | Stop reason: HOSPADM

## 2025-06-04 RX ORDER — ONDANSETRON 2 MG/ML
4 INJECTION INTRAMUSCULAR; INTRAVENOUS
Status: DISCONTINUED | OUTPATIENT
Start: 2025-06-04 | End: 2025-06-04 | Stop reason: HOSPADM

## 2025-06-04 RX ORDER — FENTANYL CITRATE 50 UG/ML
INJECTION, SOLUTION INTRAMUSCULAR; INTRAVENOUS
Status: DISCONTINUED | OUTPATIENT
Start: 2025-06-04 | End: 2025-06-04 | Stop reason: SDUPTHER

## 2025-06-04 RX ORDER — ACETAMINOPHEN 500 MG
1000 TABLET ORAL ONCE
Status: COMPLETED | OUTPATIENT
Start: 2025-06-04 | End: 2025-06-04

## 2025-06-04 RX ORDER — SODIUM CHLORIDE 0.9 % (FLUSH) 0.9 %
5-40 SYRINGE (ML) INJECTION EVERY 12 HOURS SCHEDULED
Status: DISCONTINUED | OUTPATIENT
Start: 2025-06-04 | End: 2025-06-04 | Stop reason: HOSPADM

## 2025-06-04 RX ORDER — PROPOFOL 10 MG/ML
INJECTION, EMULSION INTRAVENOUS
Status: DISCONTINUED | OUTPATIENT
Start: 2025-06-04 | End: 2025-06-04 | Stop reason: SDUPTHER

## 2025-06-04 RX ORDER — ROCURONIUM BROMIDE 10 MG/ML
INJECTION, SOLUTION INTRAVENOUS
Status: DISCONTINUED | OUTPATIENT
Start: 2025-06-04 | End: 2025-06-04 | Stop reason: SDUPTHER

## 2025-06-04 RX ORDER — MIDAZOLAM HYDROCHLORIDE 2 MG/2ML
INJECTION, SOLUTION INTRAMUSCULAR; INTRAVENOUS
Status: DISCONTINUED | OUTPATIENT
Start: 2025-06-04 | End: 2025-06-04 | Stop reason: SDUPTHER

## 2025-06-04 RX ORDER — LIDOCAINE HYDROCHLORIDE 20 MG/ML
INJECTION, SOLUTION EPIDURAL; INFILTRATION; INTRACAUDAL; PERINEURAL
Status: DISCONTINUED | OUTPATIENT
Start: 2025-06-04 | End: 2025-06-04 | Stop reason: SDUPTHER

## 2025-06-04 RX ORDER — CEPHALEXIN 500 MG/1
CAPSULE ORAL
Qty: 21 CAPSULE | Refills: 0 | Status: SHIPPED | OUTPATIENT
Start: 2025-06-04

## 2025-06-04 RX ORDER — DIPHENHYDRAMINE HYDROCHLORIDE 50 MG/ML
12.5 INJECTION, SOLUTION INTRAMUSCULAR; INTRAVENOUS
Status: DISCONTINUED | OUTPATIENT
Start: 2025-06-04 | End: 2025-06-04 | Stop reason: HOSPADM

## 2025-06-04 RX ADMIN — FENTANYL CITRATE 50 MCG: 50 INJECTION INTRAMUSCULAR; INTRAVENOUS at 13:46

## 2025-06-04 RX ADMIN — MIDAZOLAM HYDROCHLORIDE 2 MG: 1 INJECTION, SOLUTION INTRAMUSCULAR; INTRAVENOUS at 13:08

## 2025-06-04 RX ADMIN — Medication 2000 MG: at 13:17

## 2025-06-04 RX ADMIN — FENTANYL CITRATE 25 MCG: 50 INJECTION INTRAMUSCULAR; INTRAVENOUS at 15:17

## 2025-06-04 RX ADMIN — PROPOFOL 230 MG: 10 INJECTION, EMULSION INTRAVENOUS at 13:11

## 2025-06-04 RX ADMIN — LIDOCAINE HYDROCHLORIDE 100 MG: 20 INJECTION, SOLUTION EPIDURAL; INFILTRATION; INTRACAUDAL; PERINEURAL at 13:11

## 2025-06-04 RX ADMIN — ROCURONIUM BROMIDE 20 MG: 10 INJECTION INTRAVENOUS at 14:07

## 2025-06-04 RX ADMIN — PHENYLEPHRINE HYDROCHLORIDE 100 MCG: 50 INJECTION INTRAVENOUS at 15:10

## 2025-06-04 RX ADMIN — ACETAMINOPHEN 1000 MG: 500 TABLET ORAL at 12:17

## 2025-06-04 RX ADMIN — FENTANYL CITRATE 50 MCG: 50 INJECTION INTRAMUSCULAR; INTRAVENOUS at 13:11

## 2025-06-04 RX ADMIN — LIDOCAINE HYDROCHLORIDE 1 ML: 10 INJECTION, SOLUTION EPIDURAL; INFILTRATION; INTRACAUDAL; PERINEURAL at 12:37

## 2025-06-04 RX ADMIN — FENTANYL CITRATE 50 MCG: 0.05 INJECTION, SOLUTION INTRAMUSCULAR; INTRAVENOUS at 16:36

## 2025-06-04 RX ADMIN — ROCURONIUM BROMIDE 20 MG: 10 INJECTION INTRAVENOUS at 13:20

## 2025-06-04 RX ADMIN — FENTANYL CITRATE 25 MCG: 50 INJECTION INTRAMUSCULAR; INTRAVENOUS at 14:43

## 2025-06-04 RX ADMIN — SODIUM CHLORIDE: 9 INJECTION, SOLUTION INTRAVENOUS at 12:37

## 2025-06-04 RX ADMIN — INDOCYANINE GREEN AND WATER 5 MG: KIT at 12:43

## 2025-06-04 RX ADMIN — GABAPENTIN 100 MG: 100 CAPSULE ORAL at 12:17

## 2025-06-04 RX ADMIN — ROCURONIUM BROMIDE 60 MG: 10 INJECTION INTRAVENOUS at 13:11

## 2025-06-04 ASSESSMENT — PAIN - FUNCTIONAL ASSESSMENT
PAIN_FUNCTIONAL_ASSESSMENT: NONE - DENIES PAIN
PAIN_FUNCTIONAL_ASSESSMENT: 0-10

## 2025-06-04 ASSESSMENT — PAIN SCALES - GENERAL: PAINLEVEL_OUTOF10: 6

## 2025-06-04 ASSESSMENT — PAIN DESCRIPTION - DESCRIPTORS: DESCRIPTORS: ACHING

## 2025-06-04 ASSESSMENT — ENCOUNTER SYMPTOMS
CONSTIPATION: 1
SHORTNESS OF BREATH: 1
NAUSEA: 1
BACK PAIN: 1
ABDOMINAL PAIN: 1
RESPIRATORY NEGATIVE: 1
DIARRHEA: 1

## 2025-06-04 ASSESSMENT — PAIN DESCRIPTION - LOCATION: LOCATION: ABDOMEN

## 2025-06-04 ASSESSMENT — PAIN DESCRIPTION - ORIENTATION: ORIENTATION: MID

## 2025-06-04 NOTE — PROGRESS NOTES
CLINICAL PHARMACY NOTE: MEDS TO BEDS    Total # of Prescriptions Filled: 3   The following medications were delivered to the patient:  Cephalexin 500MG Capsules   Ondansetron HCL 4MG Tablets   Oxycodone/APAP 5-325MG Tablets     Additional Documentation:  Picked up at the pharmacy by Em Arroyo at 3:14PM 6/4/25

## 2025-06-04 NOTE — BRIEF OP NOTE
Brief Postoperative Note      Patient: Colton Arroyo  YOB: 1955  MRN: [id number]    Date of Procedure: 6/4/2025    Pre-Op Diagnosis Codes:      * Chronic cholecystitis [K81.1]     * Umbilical hernia without obstruction and without gangrene [K42.9]     * Calculus of gallbladder with chronic cholecystitis without obstruction [K80.10]    Post-Op Diagnosis: Same       Procedure(s):  XI ROBOTIC LAPAROSCOPIC CHOLECYSTECTOMY POSSIBLE  OPEN  HERNIA UMBILICAL REPAIR    Surgeon(s):  Zhang Dumont MD Thusay, Manish, MD    Assistant:  * No surgical staff found *    Anesthesia: General    Estimated Blood Loss (mL): less than 50     Complications: None    Specimens:   * No specimens in log *    Implants:  * No implants in log *      Drains: * No LDAs found *    Findings:  Infection Present At Time Of Surgery (PATOS) (choose all levels that have infection present):  No infection present  Other Findings: dictated    Electronically signed by Zhang Dumont MD on 6/4/2025 at 12:34 PM

## 2025-06-04 NOTE — H&P
01/11/2025    Alternating constipation and diarrhea 11/22/2024    Hypertriglyceridemia 08/28/2024    Arthralgia of multiple sites 08/17/2024    Vitamin B 12 deficiency 08/17/2024    Chronic systolic congestive heart failure (HCC) 05/20/2024    Tremors of nervous system 05/20/2024    Memory loss 05/20/2024    DDD (degenerative disc disease), lumbosacral 05/16/2024    Kidney cyst, acquired 01/10/2024    Hyperuricemia 01/04/2024    Acquired elevated hemidiaphragm, left 01/04/2024    Type 2 diabetes mellitus with hyperglycemia, with long-term current use of insulin (Formerly [facility]) 12/22/2023    Mucopurulent chronic bronchitis (Formerly [facility]) 12/22/2023    Vitamin D deficiency 12/22/2023    Allergic rhinitis due to allergen 12/22/2023    Benign paroxysmal positional vertigo due to bilateral vestibular disorder 12/22/2023    Postherpetic neuralgia 08/15/2023    Skin lesion of scalp left side 08/15/2023    Bilateral hearing loss 08/15/2023    Gallstones 05/20/2022    Calculus of kidney 05/20/2022    Repeated falls 05/20/2022    Carpal tunnel syndrome 05/20/2022    Long term current use of aspirin 05/20/2022    Migraine headache 10/07/2021    Hand arthritis 07/08/2021    Spinal stenosis in cervical region 01/30/2020    Spondylolisthesis, lumbar region 01/30/2020    Foraminal stenosis of cervical region 01/30/2020    Cervical radiculopathy 01/30/2020    Cervical spondylosis with myelopathy 01/21/2020    Bilateral carpal tunnel syndrome 01/21/2020    Recurrent major depressive disorder, in full remission 07/26/2019    Long term current use of anticoagulant therapy 07/19/2019    Lumbar disc herniation 10/25/2018    Chronic low back pain 10/25/2018    Degenerative disc disease, lumbar     Lumbar spinal stenosis     Pars defect with spondylolisthesis     Osteoarthritis of multiple joints 06/06/2017    Osteoarthritis of lumbar spine     Lumbar radiculopathy, chronic     Fatty liver 09/01/2015    BPH (benign prostatic hyperplasia) 06/26/2015    Mixed  hyperlipidemia     Primary osteoarthritis involving multiple joints     GERD (gastroesophageal reflux disease)     SHANT treated with BiPAP            SERA Gaytan CNP on 6/4/2025 at 11:56 AM

## 2025-06-04 NOTE — ANESTHESIA PRE PROCEDURE
with long-term current use of insulin (Formerly [facility]) E11.22, N18.2, Z79.4   • Hyperparathyroidism E21.3   • Coronary artery disease involving native coronary artery of native heart without angina pectoris I25.10   • Thickening of pleura J92.9   • S/p total knee replacement, bilateral Z96.653   • Arthritis of multiple sites M13.0   • Gallstones K80.20   • Calculus of kidney N20.0   • Recurrent major depressive disorder, in full remission F33.42   • Repeated falls R29.6   • Postherpetic neuralgia B02.29   • Skin lesion of scalp left side L98.9   • Bilateral hearing loss H91.93   • Type 2 diabetes mellitus with hyperglycemia, with long-term current use of insulin (Formerly [facility]) E11.65, Z79.4   • Mucopurulent chronic bronchitis (Formerly [facility]) J41.1   • Vitamin D deficiency E55.9   • Allergic rhinitis due to allergen J30.9   • Benign paroxysmal positional vertigo due to bilateral vestibular disorder H81.13   • Hyperuricemia E79.0   • Acquired elevated hemidiaphragm, left J98.6   • Kidney cyst, acquired N28.1   • DDD (degenerative disc disease), lumbosacral M51.379   • Chronic systolic congestive heart failure (Formerly [facility]) I50.22   • Tremors of nervous system R25.1   • Memory loss R41.3   • Arthralgia of multiple sites M25.50   • Vitamin B 12 deficiency E53.8   • Hypertriglyceridemia E78.1   • Migraine headache G43.909   • Hand arthritis M19.049   • Alternating constipation and diarrhea R19.8   • Change in bowel habits R19.4   • Bloating R14.0   • Regurgitation of food R11.10   • Chronic nausea R11.0   • Gastroesophageal reflux disease K21.9   • Esophageal dysphagia R13.19   • Carpal tunnel syndrome G56.00   • Long term current use of anticoagulant therapy Z79.01   • Long term current use of aspirin Z79.82   • Chronic cholecystitis K81.1   • Umbilical hernia without obstruction and without gangrene K42.9   • Calculus of gallbladder with chronic cholecystitis without obstruction K80.10       Past Medical History:        Diagnosis Date   • Abdominal hernia

## 2025-06-04 NOTE — DISCHARGE INSTRUCTIONS
Dr. Dumont Post-Op Orders for Outpatient    1.) Diet:    [x]  As tolerated  []  Special     2.) Activity:    [x]  No lifting more than five to ten pounds 4 weeks  [x]  May Shower tomorrow  [x]  No driving until off pain medications     3.) Dressing:    [x]  Remove top dressing in 48 hours   [x]  Leave steri strips on     [x]  Remove and change dressing sooner if soaked    4.) Medication:    [x]  Take medication as prescribed   []  Resume blood thinners in  days    [x]  Resume home medications per AVS Summary    5.) Office visit: Follow up with Dr. Dumont. Call to schedule an appointment if one has not been made.     6.) Call [phone] if you have any questions or concerns.    Electronically signed by Zhang Dumont MD on 6/4/2025 at 12:35 PM  DISCHARGE INSTRUCTIONS FOR ABDOMINAL SURGERY    In order to continue your care at home, please follow the instructions below.    For General Anesthesia  Do not drink any alcoholic beverages or make any legal or important decisions for 24 hours.      Diet    Drink plenty of fluids after surgery, unless you are on a fluid restriction.  After general anesthesia, start out eating lightly (broth, soup, crackers, toast, etc.) advancing as tolerated to your usual diet.  Try to avoid spicy or greasy/fatty foods for 24 hours.  Avoid milk/milk product for several hours.  You may notice that your bowel movements are not regular right after your surgery. This is common. Avoid constipation and straining with bowel movements. You may want to take a fiber supplement every day. If you have not had a bowel movement after a couple of days, ask your doctor about taking a mild laxative.       Medications  Take medications as instructed by your surgeon.   Please do not take prescribed pain medication with alcoholic beverages.  When cleared to drive by your surgeon, please do not drive or operate machinery while taking any prescribed pain medication.    Activities  As instructed by your  an infection:  increased swelling, redness, warmth, or hardness around surgery area or yellow or green drainage from incision.  Persistent nausea or vomiting and can’t keep fluids down.  You have signs of a blood clot, such as: Pain in your calf, back of the knee, thigh, or groin.  Redness and swelling in your leg or groin.  If you have trouble passing urine or stool, especially if you have mild pain or swelling in your lower belly.  Redness or swelling at IV site.  For any questions or concerns you may have.

## 2025-06-04 NOTE — ANESTHESIA POSTPROCEDURE EVALUATION
Department of Anesthesiology  Postprocedure Note    Patient: Colton Arroyo  MRN: [id number]  YOB: 1955  Date of evaluation: 6/4/2025    Procedure Summary       Date: 06/04/25 Room / Location: [address]    Anesthesia Start: 1308 Anesthesia Stop: 1559    Procedures:       XI ROBOTIC LAPAROSCOPIC CHOLECYSTECTOMY POSSIBLE  OPEN (Abdomen)      HERNIA UMBILICAL REPAIR (Abdomen) Diagnosis:       Chronic cholecystitis      Umbilical hernia without obstruction and without gangrene      Calculus of gallbladder with chronic cholecystitis without obstruction      (Chronic cholecystitis [K81.1])      (Umbilical hernia without obstruction and without gangrene [K42.9])      (Calculus of gallbladder with chronic cholecystitis without obstruction [K80.10])    Surgeons: Zhang Dumont MD Responsible Provider: Tami Connelly MD    Anesthesia Type: general ASA Status: 3            Anesthesia Type: No value filed.    Robert Phase I: Robert Score: 3    Robert Phase II:      Anesthesia Post Evaluation    Comments: POST- ANESTHESIA EVALUATION       Pt Name: Colton Arroyo  MRN: [id number]  YOB: 1955  Date of evaluation: 6/4/2025  Time:  4:16 PM      /79   Pulse (!) 101   Temp 97.1 °F (36.2 °C) (Infrared)   Resp 20   Ht 1.727 m (5' 7.99\")   Wt 98 kg (216 lb)   SpO2 93%   BMI 32.85 kg/m²      Consciousness Level  Awake  Cardiopulmonary Status  Stable  Pain Adequately Treated YES  Nausea / Vomiting  NO  Adequate Hydration  YES  Anesthesia Related Complications NONE      Electronically signed by Tami Connelly MD on 6/4/2025 at 4:16 PM           No notable events documented.

## 2025-06-05 ENCOUNTER — PHARMACY VISIT (OUTPATIENT)
Age: 70
End: 2025-06-05
Attending: FAMILY MEDICINE
Payer: MEDICARE

## 2025-06-05 VITALS — HEART RATE: 77 BPM | DIASTOLIC BLOOD PRESSURE: 81 MMHG | SYSTOLIC BLOOD PRESSURE: 140 MMHG | OXYGEN SATURATION: 93 %

## 2025-06-05 DIAGNOSIS — E11.22 TYPE 2 DIABETES MELLITUS WITH STAGE 2 CHRONIC KIDNEY DISEASE, WITH LONG-TERM CURRENT USE OF INSULIN (HCC): ICD-10-CM

## 2025-06-05 DIAGNOSIS — Z79.4 TYPE 2 DIABETES MELLITUS WITH STAGE 2 CHRONIC KIDNEY DISEASE, WITH LONG-TERM CURRENT USE OF INSULIN (HCC): ICD-10-CM

## 2025-06-05 DIAGNOSIS — N18.2 TYPE 2 DIABETES MELLITUS WITH STAGE 2 CHRONIC KIDNEY DISEASE, WITH LONG-TERM CURRENT USE OF INSULIN (HCC): ICD-10-CM

## 2025-06-05 PROCEDURE — 99213 OFFICE O/P EST LOW 20 MIN: CPT | Performed by: PHARMACIST

## 2025-06-05 RX ORDER — INSULIN ASPART 100 [IU]/ML
INJECTION, SOLUTION INTRAVENOUS; SUBCUTANEOUS
Qty: 1 ADJUSTABLE DOSE PRE-FILLED PEN SYRINGE | Refills: 1 | Status: SHIPPED | OUTPATIENT
Start: 2025-06-05

## 2025-06-05 NOTE — PATIENT INSTRUCTIONS
Continue:Lantus to 48 units once daily  Continue: Trulicity to 4.5 mg mg injection once weekly on Wednesday.   Continue metformin 1000 mg twice daily.   Continue Farxiga 10 mg once daily  Continue Insulin Aspart (Novolog) 100 unit/mL - Dose if <139 no insulin; 140-199 3 units; 200-249 5 units; 250-299 7 units; 300-349 9 units; 350-400 11 units, above 400 13 units. Maximum of 39 units per day. Please  the refill  Call the clinic [phone] if blood sugar readings are really low (below 70) or really high (above 200).

## 2025-06-05 NOTE — PROGRESS NOTES
Diabetic Medication Management Program  [facility]  [address]   [place], [place] [address]  Phone: [phone]  Fax: [phone]    NAME: Colton Arroyo  MEDICAL RECORD NUMBER:  [id number]  AGE: 69 y.o.   GENDER: male  : 1955  EPISODE DATE:  2025     Mr. Arroyo was referred to [facility] by Dr. Lauren.  Patient acknowledges working in consult agreement with clinical pharmacist and this provider.     Goals per referral:   Fasting blood glucose: < 130  [facility] postprandial glucose: < 180  A1C: < 7    SUBJECTIVE     Mr. Arroyo is a 69 y.o. male here for the Diabetes Service for self-management education, medication review including over the counter medications and herbal products, overall wellbeing assessment, transition of care and any needed adjustments with updates and recommendations communicated to the referring physician.       Patient Findings:     Medication changes  Yes: Trulicity 4.5 mg weekly increased on 25. Pt took 2 doses before holding for procedure. Notes that he has had more constipation.   He is also taking Percocet now for pain from procedure. Encouraged patient to use a stool softener.     Diet changes   Not eating as much. Feeling full. Is now taking leftovers home from restaurant and would not in the past. Has nausea with some foods, but this was occurring prior to Trulicity.     Activity changes  Yes: Not currently allowed to drive, lift.  He states he was encouraged to move about and walk around.      Emergency Room Visit or Hospitalization  Yes: Patient had hernia repair and gallbladder removed yesterday at [facility].    Acute Illness/new problems  no    Symptoms of hypoglycemia yes -  shakiness for BG below 100   Symptoms of hyperglycemia  no - none  Medication adverse reactions  none    Comments:   Patient was off of Farxiga 5 days prior to procedure on 25. Will restart this today. Pt's Trulicity was held

## 2025-06-05 NOTE — OP NOTE
[facility] General Surgery   Zhang Dumont MD, FACS  Stacie MEDRANOLisa Brannon, APRN-CNP  [address], [address]  [place] [address]  P: [phone], F: [phone]    Preoperative diagnosis: Chronic cholecystitis    Postoperative diagnosis: Same.  Hernia defect less than 3 cm    Procedure: 1.Robotic cholecystectomy  2.Primary umbilical hernia repair without mesh    Surgeon: Dr. Dumont    Asst.: None    Anesthesia: General    Preparation: Chloraprep    EBL: Less than 25 mL    Specimen: Gallbladder    Procedure: Informed consent was obtained.  Preoperative antibiotic was given.  Patient was taken to the operating room.  General anesthesia was given.  Abdomen was prepped and draped in usual sterile fashion.  Timeout was done.  Subumbilical incision was made.  Patient was found to have small umbilical hernia.  Hernia contents were preperitoneal fat and those were excised.  Fascial edges were grasped with help of Kocher clamps.  At this point a pursestring suture was placed on the fascia using 0 PDS suture.  Figure-of-eight 0 Vicryl suture was placed as well.  Proras port was introduced using the open technique without any difficulty.  CO2 insufflation was carried out.  Scope was introduced.  Patient was placed in a reverse Trendelenburg position.  3 additional ports were placed in usual fashion.  Robot was brought in.  All the ports were docked in usual fashion.  Camera and the ancillary instruments were advanced towards the target anatomy.  Assistant grabbed the fundus of the gallbladder and that was retracted anterosuperiorly.  Gallbladder was fairly distended and thin-walled.  Tissues were dissecting fairly easily.  Careful dissection was continued in the triangle of calot.  Cystic duct was isolated was skeletonized.  Cystic artery was isolated was skeletonized.  Critical view was obtained.  Anatomy was confirmed.  After confirming the anatomy cystic duct was clipped and divided.  Cystic artery was  clipped and divided.  Gallbladder was peeled off the liver bed using the hook cautery.  Complete hemostasis was confirmed.  Given the operative findings I decided to place a Luis Daniel-Olivo drain in the gallbladder fossa which was accomplished.  Drain was brought out through the 5 mm port site and secured.  Satisfactory drain placement was confirmed.  A small piece of Surgicel was placed on the liver bed.  Complete hemostasis was again confirmed.  All the clips were found to be intact.  At this point instruments were removed and the robot was undocked.  Gallbladder was retrieved using an Endo Catch and sent to pathology for further evaluation.  All the port sites are visualized.  No bleeding noted.  All the ports were removed.     I decided to proceed with umbilical hernia repair primarily without mesh.  At this point the previously placed pursestring suture and figure-of-eight 0 Vicryl suture was pulled and tied and approximated.  Umbilical skin was tacked down to the fascia using Vicryl suture as well.  Primary umbilical hernia repair was accomplished without use of mesh.  Hernia defect was less than 3 cm.  All the wounds were approximated using absorbable sutures.  Drain was already secured.  Local anesthetic was infiltrated.  Dermabond was applied.  Steri-Strips applied.  Sterile dressing was applied.  Patient tolerated procedure well and was transferred to the recovery room in stable condition.    -  Recommendations: Operative findings discussed with the patient's family at length.  Postoperative course recovery restrictions follow-up were all discussed.  Prescriptions called in.  Discharge instructions in the chart.

## 2025-06-06 ENCOUNTER — RESULTS FOLLOW-UP (OUTPATIENT)
Dept: FAMILY MEDICINE CLINIC | Age: 70
End: 2025-06-06

## 2025-06-06 LAB — SURGICAL PATHOLOGY REPORT: NORMAL

## 2025-06-12 ENCOUNTER — TELEPHONE (OUTPATIENT)
Age: 70
End: 2025-06-12

## 2025-06-12 RX ORDER — INSULIN LISPRO 100 [IU]/ML
INJECTION, SOLUTION INTRAVENOUS; SUBCUTANEOUS
Qty: 5 ADJUSTABLE DOSE PRE-FILLED PEN SYRINGE | Refills: 1 | Status: SHIPPED | OUTPATIENT
Start: 2025-06-12

## 2025-06-12 NOTE — TELEPHONE ENCOUNTER
[facility] Medication Management Clinic Note   Pharmacy called to inform that Novolog is no longer covered by patient's insurance but Humalog is.     Sending updated prescription for Humalog (sliding scale): Check blood sugar IF <139 no insulin; 140-199 3 units; 200-249 5 units; 250-299 7 units; 300-349 9 units; 350-400 11 units, above 400 13 units. Maximum of 40 units per day. [facility] pharmacy employee sent test claim that showed no copay for Humalog.    Patient informed of insulin change. Medication Management appointment changed for quicker follow-up with insulin change.     Tiffanie Diaz [facility], PharmD, BCACP  6/12/2025  1:14 PM

## 2025-06-19 ENCOUNTER — OFFICE VISIT (OUTPATIENT)
Age: 70
End: 2025-06-19

## 2025-06-19 VITALS
OXYGEN SATURATION: 98 % | WEIGHT: 212 LBS | HEART RATE: 89 BPM | DIASTOLIC BLOOD PRESSURE: 82 MMHG | BODY MASS INDEX: 33.27 KG/M2 | TEMPERATURE: 97.7 F | HEIGHT: 67 IN | SYSTOLIC BLOOD PRESSURE: 117 MMHG

## 2025-06-19 DIAGNOSIS — K42.9 UMBILICAL HERNIA WITHOUT OBSTRUCTION AND WITHOUT GANGRENE: ICD-10-CM

## 2025-06-19 DIAGNOSIS — K57.90 DIVERTICULOSIS: ICD-10-CM

## 2025-06-19 DIAGNOSIS — Z09 STATUS POST UMBILICAL HERNIA REPAIR, FOLLOW-UP EXAM: ICD-10-CM

## 2025-06-19 DIAGNOSIS — K80.10 CALCULUS OF GALLBLADDER WITH CHRONIC CHOLECYSTITIS WITHOUT OBSTRUCTION: Primary | ICD-10-CM

## 2025-06-19 DIAGNOSIS — Z90.49 STATUS POST LAPAROSCOPIC CHOLECYSTECTOMY: ICD-10-CM

## 2025-06-19 DIAGNOSIS — K31.7 FUNDIC GLAND POLYPOSIS OF STOMACH: ICD-10-CM

## 2025-06-19 DIAGNOSIS — K21.9 GASTROESOPHAGEAL REFLUX DISEASE WITHOUT ESOPHAGITIS: ICD-10-CM

## 2025-06-19 DIAGNOSIS — K63.5 BENIGN COLON POLYP: ICD-10-CM

## 2025-06-19 DIAGNOSIS — L53.9 SKIN ERYTHEMA: ICD-10-CM

## 2025-06-19 PROCEDURE — 99024 POSTOP FOLLOW-UP VISIT: CPT | Performed by: NURSE PRACTITIONER

## 2025-06-19 RX ORDER — CEPHALEXIN 500 MG/1
CAPSULE ORAL
Qty: 21 CAPSULE | Refills: 0 | Status: SHIPPED | OUTPATIENT
Start: 2025-06-19

## 2025-06-19 NOTE — PROGRESS NOTES
GASTROINTESTINAL ENDOSCOPY N/A 03/10/2023    EGD BIOPSY AND DILATATION (56 FR MARTINEZ DILATOR) performed by Conor Matias MD at [facility] ENDO    UPPER GASTROINTESTINAL ENDOSCOPY N/A 2/27/2025    ESOPHAGOGASTRODUODENOSCOPY BIOPSY performed by Zhang Dumont MD at [facility] OR    WISDOM TOOTH EXTRACTION         MEDICATIONS     Current Outpatient Medications:     cephALEXin (KEFLEX) 500 MG capsule, 500 mgTake three times daily, Disp: 21 capsule, Rfl: 0    insulin lispro, 1 Unit Dial, (HUMALOG KWIKPEN) 100 UNIT/ML SOPN, Check blood sugar. IF <139 no insulin; 140-199 3 units; 200-249 5 units; 250-299 7 units; 300-349 9 units; 350-400 11 units, above 400 13 units. Maximum of 40 units per day., Disp: 5 Adjustable Dose Pre-filled Pen Syringe, Rfl: 1    insulin aspart (NOVOLOG FLEXPEN) 100 UNIT/ML injection pen, Check blood sugar IF <139 no insulin; 140-199 3 units; 200-249 5 units; 250-299 7 units; 300-349 9 units; 350-400 11 units, above 400 13 units. Maximum of 40 units per day., Disp: 1 Adjustable Dose Pre-filled Pen Syringe, Rfl: 1    famotidine (PEPCID) 20 MG tablet, Take 1 tablet by mouth 2 times daily, Disp: 180 tablet, Rfl: 3    Dulaglutide 4.5 MG/0.5ML SOAJ, Inject 4.5 mg into the skin once a week, Disp: 2 mL, Rfl: 2    Insulin Pen Needle 32G X 4 MM MISC, Use to inject insulin glargine into the skin daily, Disp: 90 each, Rfl: 3    insulin glargine (LANTUS SOLOSTAR) 100 UNIT/ML injection pen, Inject 48 Units into the skin nightly, Disp: 45 mL, Rfl: 3    albuterol sulfate HFA (PROVENTIL HFA) 108 (90 Base) MCG/ACT inhaler, Inhale 2 puffs into the lungs every 6 hours as needed for Wheezing or Shortness of Breath Okay to substitute, Disp: 8 g, Rfl: 0    ezetimibe (ZETIA) 10 MG tablet, Take 1 tablet by mouth daily, Disp: 90 tablet, Rfl: 3    nortriptyline (PAMELOR) 50 MG capsule, Take 2 capsules by mouth nightly, Disp: 180 capsule, Rfl: 0    ondansetron (ZOFRAN) 4 MG tablet, Take 1 tablet by mouth every 6 hours as needed

## 2025-06-21 PROBLEM — K63.5 BENIGN COLON POLYP: Status: ACTIVE | Noted: 2025-06-21

## 2025-06-21 PROBLEM — K31.7 FUNDIC GLAND POLYPOSIS OF STOMACH: Status: ACTIVE | Noted: 2025-06-21

## 2025-06-21 PROBLEM — K57.90 DIVERTICULOSIS: Status: ACTIVE | Noted: 2025-06-21

## 2025-06-25 ENCOUNTER — TELEPHONE (OUTPATIENT)
Age: 70
End: 2025-06-25

## 2025-06-30 ENCOUNTER — HOSPITAL ENCOUNTER (OUTPATIENT)
Age: 70
Discharge: HOME | End: 2025-06-30
Payer: MEDICARE

## 2025-06-30 ENCOUNTER — PHARMACY VISIT (OUTPATIENT)
Age: 70
End: 2025-06-30
Attending: FAMILY MEDICINE
Payer: MEDICARE

## 2025-06-30 VITALS
WEIGHT: 212 LBS | HEART RATE: 78 BPM | SYSTOLIC BLOOD PRESSURE: 115 MMHG | BODY MASS INDEX: 33.2 KG/M2 | DIASTOLIC BLOOD PRESSURE: 75 MMHG | OXYGEN SATURATION: 93 %

## 2025-06-30 DIAGNOSIS — N18.2 TYPE 2 DIABETES MELLITUS WITH STAGE 2 CHRONIC KIDNEY DISEASE, WITH LONG-TERM CURRENT USE OF INSULIN (HCC): Primary | ICD-10-CM

## 2025-06-30 DIAGNOSIS — E11.22 TYPE 2 DIABETES MELLITUS WITH STAGE 2 CHRONIC KIDNEY DISEASE, WITH LONG-TERM CURRENT USE OF INSULIN (HCC): Primary | ICD-10-CM

## 2025-06-30 DIAGNOSIS — Z79.4 TYPE 2 DIABETES MELLITUS WITH STAGE 2 CHRONIC KIDNEY DISEASE, WITH LONG-TERM CURRENT USE OF INSULIN (HCC): Primary | ICD-10-CM

## 2025-06-30 LAB
25(OH)D3 SERPL-MCNC: 49.7 NG/ML (ref 30–100)
CALCIUM SERPL-MCNC: 10.4 MG/DL (ref 8.6–10.4)
PTH-INTACT SERPL-MCNC: 74 PG/ML (ref 17.9–58.6)

## 2025-06-30 PROCEDURE — 99213 OFFICE O/P EST LOW 20 MIN: CPT | Performed by: PHARMACIST

## 2025-06-30 PROCEDURE — 82306 VITAMIN D 25 HYDROXY: CPT

## 2025-06-30 PROCEDURE — 36415 COLL VENOUS BLD VENIPUNCTURE: CPT

## 2025-06-30 PROCEDURE — 83970 ASSAY OF PARATHORMONE: CPT

## 2025-06-30 PROCEDURE — 82310 ASSAY OF CALCIUM: CPT

## 2025-06-30 NOTE — PATIENT INSTRUCTIONS
Continue:Lantus to 48 units once daily  Continue: Trulicity to 4.5 mg mg injection once weekly on Wednesday.       - Please let the clinic know if the nausea, vomiting, diarrhea, constipation does not improve and we can send in a prescription for lower dose of Trulicity (3 mg weekly)  Continue metformin 1000 mg twice daily.   Continue: Farxiga 10 mg once daily  Continue Insulin lipro (Humalog) 100 unit/mL - Dose if <139 no insulin; 140-199 3 units; 200-249 5 units; 250-299 7 units; 300-349 9 units; 350-400 11 units, above 400 13 units. Maximum of 39 units per day. Please  the refill. Once the pen is in use, it is only good for 28 days.   If eating a higher carb meal such as pasta OR hot dog with rootbeer float, use 2 units even if sliding scale does not call for any insulin.  Call the clinic [phone] if blood sugar readings are low (below 70) or really high (above 200).

## 2025-06-30 NOTE — PROGRESS NOTES
Diabetic Medication Management Program  [facility]  [address]   [place], [place] [address]  Phone: [phone]  Fax: [phone]    NAME: Colton Arroyo  MEDICAL RECORD NUMBER:  [id number]  AGE: 70 y.o.   GENDER: male  : 1955  EPISODE DATE:  2025     Mr. Arroyo was referred to [facility] by Dr. Lauren.  Patient acknowledges working in consult agreement with clinical pharmacist and this provider.     Goals per referral:   Fasting blood glucose: < 130  [facility] postprandial glucose: < 180  A1C: < 7    SUBJECTIVE     Mr. Arroyo is a 70 y.o. male here for the Diabetes Service for self-management education, medication review including over the counter medications and herbal products, overall wellbeing assessment, transition of care and any needed adjustments with updates and recommendations communicated to the referring physician.       Patient Findings:     Medication changes  Yes: Pt has restarted Farxiga 10 mg daily and Trulicity 4.5 mg weekly after procedure. Novolog was switched to Humalog due to insurance formulary. Is now taking Zofran as needed for nausea. Novolog was switched to Humalog due to insurance coverage.     Diet changes  Not eating as much. Feeling full and nauseous. Has a hard time finding anything that sounds good. Has had ice cream 2 nights a week for the past several weeks. He did have hot dog with rootbeer float last night and blood sugar was in the 300s. Discussed adding 2 units with higher carb foods like this.    Activity changes  Yes: Currently has restrictions from surgeon to not lift more than 10 pounds. He states he was encouraged to move about and walk around.     Emergency Room Visit or Hospitalization  Yes: Patient had hernia repair and gallbladder removed 25 at [facility].    Acute Illness/new problems  no    Symptoms of hypoglycemia yes - sweating and unstablefor BG below 100   Symptoms of hyperglycemia

## 2025-07-03 ENCOUNTER — OFFICE VISIT (OUTPATIENT)
Age: 70
End: 2025-07-03

## 2025-07-03 VITALS
HEIGHT: 67 IN | OXYGEN SATURATION: 94 % | TEMPERATURE: 97.2 F | DIASTOLIC BLOOD PRESSURE: 80 MMHG | SYSTOLIC BLOOD PRESSURE: 144 MMHG | BODY MASS INDEX: 33.81 KG/M2 | WEIGHT: 215.4 LBS | HEART RATE: 78 BPM

## 2025-07-03 DIAGNOSIS — K42.9 UMBILICAL HERNIA WITHOUT OBSTRUCTION AND WITHOUT GANGRENE: ICD-10-CM

## 2025-07-03 DIAGNOSIS — K21.9 GASTROESOPHAGEAL REFLUX DISEASE WITHOUT ESOPHAGITIS: ICD-10-CM

## 2025-07-03 DIAGNOSIS — K80.10 CALCULUS OF GALLBLADDER WITH CHRONIC CHOLECYSTITIS WITHOUT OBSTRUCTION: Primary | ICD-10-CM

## 2025-07-03 DIAGNOSIS — R11.2 NAUSEA AND VOMITING, UNSPECIFIED VOMITING TYPE: ICD-10-CM

## 2025-07-03 DIAGNOSIS — Z09 STATUS POST UMBILICAL HERNIA REPAIR, FOLLOW-UP EXAM: ICD-10-CM

## 2025-07-03 DIAGNOSIS — H11.31 SUBCONJUNCTIVAL HEMORRHAGE OF RIGHT EYE: ICD-10-CM

## 2025-07-03 DIAGNOSIS — R11.0 CHRONIC NAUSEA: ICD-10-CM

## 2025-07-03 DIAGNOSIS — Z90.49 STATUS POST LAPAROSCOPIC CHOLECYSTECTOMY: ICD-10-CM

## 2025-07-03 PROCEDURE — 99024 POSTOP FOLLOW-UP VISIT: CPT | Performed by: NURSE PRACTITIONER

## 2025-07-03 ASSESSMENT — ENCOUNTER SYMPTOMS
RHINORRHEA: 0
SHORTNESS OF BREATH: 0
SORE THROAT: 0
COUGH: 0

## 2025-07-03 NOTE — PROGRESS NOTES
been exposed to tobacco smoke. He has never used smokeless tobacco. He reports that he does not drink alcohol and does not use drugs.    MEDICATIONS     Current Outpatient Medications:     insulin lispro, 1 Unit Dial, (HUMALOG KWIKPEN) 100 UNIT/ML SOPN, Check blood sugar. IF <139 no insulin; 140-199 3 units; 200-249 5 units; 250-299 7 units; 300-349 9 units; 350-400 11 units, above 400 13 units. Maximum of 40 units per day., Disp: 5 Adjustable Dose Pre-filled Pen Syringe, Rfl: 1    ondansetron (ZOFRAN) 4 MG tablet, Take every six hours as needed, Disp: 20 tablet, Rfl: 0    famotidine (PEPCID) 20 MG tablet, Take 1 tablet by mouth 2 times daily, Disp: 180 tablet, Rfl: 3    Dulaglutide 4.5 MG/0.5ML SOAJ, Inject 4.5 mg into the skin once a week, Disp: 2 mL, Rfl: 2    Insulin Pen Needle 32G X 4 MM MISC, Use to inject insulin glargine into the skin daily, Disp: 90 each, Rfl: 3    insulin glargine (LANTUS SOLOSTAR) 100 UNIT/ML injection pen, Inject 48 Units into the skin nightly, Disp: 45 mL, Rfl: 3    albuterol sulfate HFA (PROVENTIL HFA) 108 (90 Base) MCG/ACT inhaler, Inhale 2 puffs into the lungs every 6 hours as needed for Wheezing or Shortness of Breath Okay to substitute, Disp: 8 g, Rfl: 0    ezetimibe (ZETIA) 10 MG tablet, Take 1 tablet by mouth daily, Disp: 90 tablet, Rfl: 3    nortriptyline (PAMELOR) 50 MG capsule, Take 2 capsules by mouth nightly, Disp: 180 capsule, Rfl: 0    ondansetron (ZOFRAN) 4 MG tablet, Take 1 tablet by mouth every 6 hours as needed for Nausea or Vomiting, Disp: 10 tablet, Rfl: 0    tamsulosin (FLOMAX) 0.4 MG capsule, Take 1 capsule by mouth 2 times daily, Disp: 180 capsule, Rfl: 3    dapagliflozin (FARXIGA) 10 MG tablet, Take 1 tablet by mouth every morning, Disp: 90 tablet, Rfl: 3    metFORMIN (GLUCOPHAGE) 1000 MG tablet, Take 1 tablet by mouth 2 times daily (with meals), Disp: 180 tablet, Rfl: 3    allopurinol (ZYLOPRIM) 100 MG tablet, Take 1 tablet by mouth daily, Disp: 90 tablet, Rfl:

## 2025-07-10 ENCOUNTER — TELEPHONE (OUTPATIENT)
Age: 70
End: 2025-07-10

## 2025-07-10 NOTE — TELEPHONE ENCOUNTER
[facility] Medication Management Clinic Note   Wife called on behalf of patient stating that yesterday he stopped taking Trulicity due to stomach upset/GI upset. Stated that they will continue to monitor his BG while off of medication and call with update.     Umu Santos, PharmD, [facility]

## 2025-07-28 ENCOUNTER — HOSPITAL ENCOUNTER (OUTPATIENT)
Age: 70
Setting detail: SPECIMEN
Discharge: HOME | End: 2025-07-28
Payer: MEDICARE

## 2025-07-28 ENCOUNTER — PHARMACY VISIT (OUTPATIENT)
Age: 70
End: 2025-07-28
Attending: FAMILY MEDICINE
Payer: MEDICARE

## 2025-07-28 VITALS
DIASTOLIC BLOOD PRESSURE: 88 MMHG | HEART RATE: 73 BPM | OXYGEN SATURATION: 93 % | SYSTOLIC BLOOD PRESSURE: 135 MMHG | WEIGHT: 216.3 LBS | BODY MASS INDEX: 33.88 KG/M2

## 2025-07-28 DIAGNOSIS — N18.2 TYPE 2 DIABETES MELLITUS WITH STAGE 2 CHRONIC KIDNEY DISEASE, WITH LONG-TERM CURRENT USE OF INSULIN (HCC): Primary | ICD-10-CM

## 2025-07-28 DIAGNOSIS — E11.22 TYPE 2 DIABETES MELLITUS WITH STAGE 2 CHRONIC KIDNEY DISEASE, WITH LONG-TERM CURRENT USE OF INSULIN (HCC): Primary | ICD-10-CM

## 2025-07-28 DIAGNOSIS — Z79.4 TYPE 2 DIABETES MELLITUS WITH STAGE 2 CHRONIC KIDNEY DISEASE, WITH LONG-TERM CURRENT USE OF INSULIN (HCC): Primary | ICD-10-CM

## 2025-07-28 LAB
EST. AVERAGE GLUCOSE BLD GHB EST-MCNC: 146 MG/DL
HBA1C MFR BLD: 6.7 % (ref 4–6)

## 2025-07-28 PROCEDURE — 83036 HEMOGLOBIN GLYCOSYLATED A1C: CPT

## 2025-07-28 PROCEDURE — 36415 COLL VENOUS BLD VENIPUNCTURE: CPT

## 2025-07-28 PROCEDURE — 99212 OFFICE O/P EST SF 10 MIN: CPT

## 2025-07-28 NOTE — PROGRESS NOTES
Diabetic Medication Management Program  [facility]  [address]   [place], [place] [address]  Phone: [phone]  Fax: [phone]    NAME: Colton Arroyo  MEDICAL RECORD NUMBER:  [id number]  AGE: 70 y.o.   GENDER: male  : 1955  EPISODE DATE:  2025     Mr. Arroyo was referred to [facility] by Dr. IVA Lauren.  Patient acknowledges working in consult agreement with clinical pharmacist and this provider.     Goals per referral:   Fasting blood glucose: < 130  Peak postprandial glucose: < 180  A1C: < 7    SUBJECTIVE     Mr. Arroyo is a 70 y.o. male here for the Diabetes Service for self-management education, medication review including over the counter medications and herbal products, overall wellbeing assessment, transition of care and any needed adjustments with updates and recommendations communicated to the referring physician.       Patient Findings:     Medication changes  Yes: Not taking Trulicity while having diarrhea/nausea  Diet changes  Yes: This week snacking more : Ice cream, popcorn, Potato Salad, Mountain Dew   Activity changes  Digging a footer for garage  Emergency Room Visit or Hospitalization  no  Acute Illness/new problems  no  Symptoms of hypoglycemia  yes - confusion and jitteriness  Symptoms of hyperglycemia  no -    Medication adverse reactions  Possibly  :Diarrhea/Nausea continues 2 weeks after stopping Trulicity    Comments:   Weight : 216.3 lb  /88  Pulse 73  O2 Sat 93%  Todays Labs: A1c 6.7  Exercise: Digging footer for garage  Non-Smoker  Plan Flu vaccine in September  Nausea /Diarrhea since Gallbladder  Stopped Trulicity two weeks ago with no relief  Diet:  Brunch: 8313-7286  1/2 [place] Salad  1300: Soup  1700: Dinner: Chinese  Snack: Ice cream, Popcorn, Potato Salad  Drinks Snappel      OBJECTIVE     PMHx:    Past Medical History:   Diagnosis Date    Abdominal hernia     Abnormal cardiovascular stress

## 2025-07-28 NOTE — PATIENT INSTRUCTIONS
Continue:  Lantus 48 units daily  Humalog sliding scale  Metformin 1000 mg twice daily  Farxiga 10 mg daily  Resume Trulicity when diarrhea/nausea dissipates  Eat more lean meats and veggies  Avoid ice cream, potatoes, lima beans, corn  Exercise as able when weather cools

## 2025-08-04 ENCOUNTER — HOSPITAL ENCOUNTER (OUTPATIENT)
Age: 70
Discharge: HOME | End: 2025-08-04
Payer: MEDICARE

## 2025-08-04 DIAGNOSIS — B02.29 POSTHERPETIC NEURALGIA: ICD-10-CM

## 2025-08-04 LAB
CALCIUM SERPL-MCNC: 9.7 MG/DL (ref 8.6–10.4)
PTH-INTACT SERPL-MCNC: 23 PG/ML (ref 17.9–58.6)

## 2025-08-04 PROCEDURE — 36415 COLL VENOUS BLD VENIPUNCTURE: CPT

## 2025-08-04 PROCEDURE — 82310 ASSAY OF CALCIUM: CPT

## 2025-08-04 PROCEDURE — 83970 ASSAY OF PARATHORMONE: CPT

## 2025-08-04 RX ORDER — NORTRIPTYLINE HYDROCHLORIDE 50 MG/1
CAPSULE ORAL
Qty: 180 CAPSULE | Refills: 3 | Status: SHIPPED | OUTPATIENT
Start: 2025-08-04

## 2025-08-11 ENCOUNTER — OFFICE VISIT (OUTPATIENT)
Dept: GASTROENTEROLOGY | Age: 70
End: 2025-08-11
Payer: MEDICARE

## 2025-08-11 ENCOUNTER — TELEPHONE (OUTPATIENT)
Dept: GASTROENTEROLOGY | Age: 70
End: 2025-08-11

## 2025-08-11 VITALS
HEART RATE: 99 BPM | OXYGEN SATURATION: 93 % | SYSTOLIC BLOOD PRESSURE: 117 MMHG | TEMPERATURE: 97.8 F | RESPIRATION RATE: 16 BRPM | HEIGHT: 67 IN | BODY MASS INDEX: 33.93 KG/M2 | DIASTOLIC BLOOD PRESSURE: 79 MMHG | WEIGHT: 216.2 LBS

## 2025-08-11 DIAGNOSIS — R19.4 ALTERED BOWEL HABITS: Primary | ICD-10-CM

## 2025-08-11 PROCEDURE — 99214 OFFICE O/P EST MOD 30 MIN: CPT | Performed by: INTERNAL MEDICINE

## 2025-08-11 RX ORDER — DICYCLOMINE HYDROCHLORIDE 10 MG/1
10 CAPSULE ORAL
Qty: 60 CAPSULE | Refills: 2 | Status: SHIPPED | OUTPATIENT
Start: 2025-08-11

## 2025-08-15 ENCOUNTER — TELEPHONE (OUTPATIENT)
Dept: FAMILY MEDICINE CLINIC | Age: 70
End: 2025-08-15

## 2025-08-15 ENCOUNTER — HOSPITAL ENCOUNTER (OUTPATIENT)
Dept: GENERAL RADIOLOGY | Age: 70
Discharge: HOME | End: 2025-08-17
Payer: MEDICARE

## 2025-08-15 DIAGNOSIS — R19.4 ALTERED BOWEL HABITS: ICD-10-CM

## 2025-08-15 DIAGNOSIS — E78.2 MIXED HYPERLIPIDEMIA: ICD-10-CM

## 2025-08-15 PROCEDURE — 74018 RADEX ABDOMEN 1 VIEW: CPT

## 2025-08-15 RX ORDER — PRAVASTATIN SODIUM 40 MG
40 TABLET ORAL NIGHTLY
Qty: 90 TABLET | Refills: 3 | Status: SHIPPED | OUTPATIENT
Start: 2025-08-15

## 2025-08-17 RX ORDER — CINACALCET 60 MG/1
120 TABLET, FILM COATED ORAL DAILY
COMMUNITY
Start: 2025-08-07

## 2025-08-17 RX ORDER — CINACALCET 30 MG/1
30 TABLET, FILM COATED ORAL DAILY
COMMUNITY
Start: 2025-07-07 | End: 2025-08-22 | Stop reason: DRUGHIGH

## 2025-08-17 RX ORDER — SODIUM FLUORIDE 5 MG/G
GEL, DENTIFRICE DENTAL
COMMUNITY
Start: 2025-07-10 | End: 2025-08-22 | Stop reason: ALTCHOICE

## 2025-08-17 RX ORDER — DICLOFENAC SODIUM 75 MG/1
75 TABLET, DELAYED RELEASE ORAL
COMMUNITY
Start: 2025-07-13 | End: 2025-08-22 | Stop reason: ALTCHOICE

## 2025-08-20 ENCOUNTER — HOSPITAL ENCOUNTER (OUTPATIENT)
Dept: LAB | Age: 70
Discharge: HOME | End: 2025-08-20
Payer: MEDICARE

## 2025-08-20 DIAGNOSIS — N18.2 CKD (CHRONIC KIDNEY DISEASE), STAGE II: ICD-10-CM

## 2025-08-20 DIAGNOSIS — N18.2 SECONDARY DIABETES MELLITUS WITH STAGE 2 CHRONIC KIDNEY DISEASE (GFR 60-89) (HCC): ICD-10-CM

## 2025-08-20 DIAGNOSIS — N18.2 BENIGN HYPERTENSION WITH CKD (CHRONIC KIDNEY DISEASE), STAGE II: ICD-10-CM

## 2025-08-20 DIAGNOSIS — N18.2 TYPE 2 DIABETES MELLITUS WITH STAGE 2 CHRONIC KIDNEY DISEASE, WITH LONG-TERM CURRENT USE OF INSULIN (HCC): ICD-10-CM

## 2025-08-20 DIAGNOSIS — I50.22 CHRONIC SYSTOLIC CONGESTIVE HEART FAILURE (HCC): ICD-10-CM

## 2025-08-20 DIAGNOSIS — E11.22 TYPE 2 DIABETES MELLITUS WITH STAGE 2 CHRONIC KIDNEY DISEASE, WITH LONG-TERM CURRENT USE OF INSULIN (HCC): ICD-10-CM

## 2025-08-20 DIAGNOSIS — E13.22 SECONDARY DIABETES MELLITUS WITH STAGE 2 CHRONIC KIDNEY DISEASE (GFR 60-89) (HCC): ICD-10-CM

## 2025-08-20 DIAGNOSIS — E78.2 MIXED HYPERLIPIDEMIA: ICD-10-CM

## 2025-08-20 DIAGNOSIS — I12.9 BENIGN HYPERTENSION WITH CKD (CHRONIC KIDNEY DISEASE), STAGE II: ICD-10-CM

## 2025-08-20 DIAGNOSIS — Z79.4 TYPE 2 DIABETES MELLITUS WITH STAGE 2 CHRONIC KIDNEY DISEASE, WITH LONG-TERM CURRENT USE OF INSULIN (HCC): ICD-10-CM

## 2025-08-20 LAB
ALBUMIN SERPL-MCNC: 4.3 G/DL (ref 3.5–5.2)
ALP SERPL-CCNC: 113 U/L (ref 40–129)
ALT SERPL-CCNC: 73 U/L (ref 10–50)
ANION GAP SERPL CALCULATED.3IONS-SCNC: 10 MMOL/L (ref 9–16)
ANION GAP SERPL CALCULATED.3IONS-SCNC: 12 MMOL/L (ref 9–16)
AST SERPL-CCNC: 54 U/L (ref 10–50)
BACTERIA URNS QL MICRO: ABNORMAL
BASOPHILS # BLD: 0.03 K/UL (ref 0–0.2)
BASOPHILS # BLD: 0.04 K/UL (ref 0–0.2)
BASOPHILS NFR BLD: 0 % (ref 0–2)
BASOPHILS NFR BLD: 1 % (ref 0–2)
BILIRUB SERPL-MCNC: 0.9 MG/DL (ref 0–1.2)
BILIRUB UR QL STRIP: NEGATIVE
BNP SERPL-MCNC: 45 PG/ML (ref 0–300)
BUN SERPL-MCNC: 21 MG/DL (ref 8–23)
BUN SERPL-MCNC: 21 MG/DL (ref 8–23)
CALCIUM SERPL-MCNC: 9.4 MG/DL (ref 8.6–10.4)
CALCIUM SERPL-MCNC: 9.5 MG/DL (ref 8.6–10.4)
CASTS #/AREA URNS LPF: ABNORMAL /LPF
CHLORIDE SERPL-SCNC: 103 MMOL/L (ref 98–107)
CHLORIDE SERPL-SCNC: 103 MMOL/L (ref 98–107)
CHOLEST SERPL-MCNC: 151 MG/DL (ref 0–199)
CHOLESTEROL/HDL RATIO: 4.6
CLARITY UR: CLEAR
CO2 SERPL-SCNC: 27 MMOL/L (ref 20–31)
CO2 SERPL-SCNC: 28 MMOL/L (ref 20–31)
COLOR UR: YELLOW
COMMENT: ABNORMAL
CREAT SERPL-MCNC: 1.3 MG/DL (ref 0.7–1.2)
CREAT SERPL-MCNC: 1.3 MG/DL (ref 0.7–1.2)
CREAT UR-MCNC: 153 MG/DL (ref 39–259)
EOSINOPHIL # BLD: 0.52 K/UL (ref 0–0.44)
EOSINOPHIL # BLD: 0.58 K/UL (ref 0–0.44)
EOSINOPHILS RELATIVE PERCENT: 6 % (ref 0–4)
EOSINOPHILS RELATIVE PERCENT: 7 % (ref 0–4)
EPI CELLS #/AREA URNS HPF: ABNORMAL /HPF
ERYTHROCYTE [DISTWIDTH] IN BLOOD BY AUTOMATED COUNT: 14.6 % (ref 11.5–14.9)
ERYTHROCYTE [DISTWIDTH] IN BLOOD BY AUTOMATED COUNT: 14.8 % (ref 11.5–14.9)
FOLATE SERPL-MCNC: 7.5 NG/ML (ref 4.8–24.2)
GFR, ESTIMATED: 59 ML/MIN/1.73M2
GFR, ESTIMATED: 59 ML/MIN/1.73M2
GLUCOSE SERPL-MCNC: 139 MG/DL (ref 74–99)
GLUCOSE SERPL-MCNC: 140 MG/DL (ref 74–99)
GLUCOSE UR STRIP-MCNC: ABNORMAL MG/DL
HCT VFR BLD AUTO: 45.3 % (ref 41–53)
HCT VFR BLD AUTO: 46.8 % (ref 41–53)
HDLC SERPL-MCNC: 33 MG/DL
HGB BLD-MCNC: 15 G/DL (ref 13.5–17.5)
HGB BLD-MCNC: 15 G/DL (ref 13.5–17.5)
HGB UR QL STRIP.AUTO: NEGATIVE
IMM GRANULOCYTES # BLD AUTO: 0.03 K/UL (ref 0–0.3)
IMM GRANULOCYTES # BLD AUTO: 0.04 K/UL (ref 0–0.3)
IMM GRANULOCYTES NFR BLD: 0 %
IMM GRANULOCYTES NFR BLD: 1 %
KETONES UR STRIP-MCNC: ABNORMAL MG/DL
LDLC SERPL CALC-MCNC: 60 MG/DL (ref 0–100)
LEUKOCYTE ESTERASE UR QL STRIP: NEGATIVE
LYMPHOCYTES NFR BLD: 1.33 K/UL (ref 1.1–3.7)
LYMPHOCYTES NFR BLD: 1.49 K/UL (ref 1.1–3.7)
LYMPHOCYTES RELATIVE PERCENT: 15 % (ref 24–44)
LYMPHOCYTES RELATIVE PERCENT: 17 % (ref 24–44)
MAGNESIUM SERPL-MCNC: 1.9 MG/DL (ref 1.6–2.4)
MAGNESIUM SERPL-MCNC: 2 MG/DL (ref 1.6–2.4)
MCH RBC QN AUTO: 29.5 PG (ref 26–34)
MCH RBC QN AUTO: 30.5 PG (ref 26–34)
MCHC RBC AUTO-ENTMCNC: 32.1 G/DL (ref 31–37)
MCHC RBC AUTO-ENTMCNC: 33.1 G/DL (ref 31–37)
MCV RBC AUTO: 92.1 FL (ref 80–100)
MCV RBC AUTO: 92.3 FL (ref 80–100)
MICROALBUMIN UR-MCNC: <12 MG/L (ref 0–20)
MICROALBUMIN/CREAT UR-RTO: NORMAL MCG/MG CREAT (ref 0–17)
MONOCYTES NFR BLD: 0.64 K/UL (ref 0.1–1.2)
MONOCYTES NFR BLD: 0.69 K/UL (ref 0.1–1.2)
MONOCYTES NFR BLD: 7 % (ref 3–12)
MONOCYTES NFR BLD: 8 % (ref 3–12)
NEUTROPHILS NFR BLD: 67 % (ref 36–66)
NEUTROPHILS NFR BLD: 71 % (ref 36–66)
NEUTS SEG NFR BLD: 5.87 K/UL (ref 1.5–8.1)
NEUTS SEG NFR BLD: 6.01 K/UL (ref 1.5–8.1)
NITRITE UR QL STRIP: NEGATIVE
NRBC BLD-RTO: 0 PER 100 WBC
NRBC BLD-RTO: 0 PER 100 WBC
PH UR STRIP: 5.5 [PH] (ref 5–8)
PHOSPHATE SERPL-MCNC: 3.9 MG/DL (ref 2.5–4.5)
PHOSPHATE SERPL-MCNC: 4 MG/DL (ref 2.5–4.5)
PLATELET # BLD AUTO: 229 K/UL (ref 150–450)
PLATELET # BLD AUTO: 237 K/UL (ref 150–450)
PMV BLD AUTO: 9.7 FL (ref 8–13.5)
PMV BLD AUTO: 9.8 FL (ref 8–13.5)
POTASSIUM SERPL-SCNC: 4.7 MMOL/L (ref 3.7–5.3)
POTASSIUM SERPL-SCNC: 4.7 MMOL/L (ref 3.7–5.3)
PROT SERPL-MCNC: 7.3 G/DL (ref 6.6–8.7)
PROT UR STRIP-MCNC: NEGATIVE MG/DL
RBC # BLD AUTO: 4.91 M/UL (ref 4.21–5.77)
RBC # BLD AUTO: 5.08 M/UL (ref 4.21–5.77)
RBC #/AREA URNS HPF: ABNORMAL /HPF
SODIUM SERPL-SCNC: 141 MMOL/L (ref 136–145)
SODIUM SERPL-SCNC: 142 MMOL/L (ref 136–145)
SP GR UR STRIP: 1.03 (ref 1–1.03)
TRIGL SERPL-MCNC: 288 MG/DL (ref 0–149)
TSH SERPL DL<=0.05 MIU/L-ACNC: 2.29 UIU/ML (ref 0.27–4.2)
URATE SERPL-MCNC: 6 MG/DL (ref 3.4–7)
UROBILINOGEN UR STRIP-ACNC: NORMAL EU/DL (ref 0–1)
VIT B12 SERPL-MCNC: 527 PG/ML (ref 232–1245)
WBC #/AREA URNS HPF: ABNORMAL /HPF
WBC OTHER # BLD: 8.6 K/UL (ref 3.5–11)
WBC OTHER # BLD: 8.7 K/UL (ref 3.5–11)

## 2025-08-20 PROCEDURE — 80061 LIPID PANEL: CPT

## 2025-08-20 PROCEDURE — 83880 ASSAY OF NATRIURETIC PEPTIDE: CPT

## 2025-08-20 PROCEDURE — 81003 URINALYSIS AUTO W/O SCOPE: CPT

## 2025-08-20 PROCEDURE — 82746 ASSAY OF FOLIC ACID SERUM: CPT

## 2025-08-20 PROCEDURE — 84550 ASSAY OF BLOOD/URIC ACID: CPT

## 2025-08-20 PROCEDURE — 36415 COLL VENOUS BLD VENIPUNCTURE: CPT

## 2025-08-20 PROCEDURE — 84443 ASSAY THYROID STIM HORMONE: CPT

## 2025-08-20 PROCEDURE — 82043 UR ALBUMIN QUANTITATIVE: CPT

## 2025-08-20 PROCEDURE — 81015 MICROSCOPIC EXAM OF URINE: CPT

## 2025-08-20 PROCEDURE — 82607 VITAMIN B-12: CPT

## 2025-08-20 PROCEDURE — 82570 ASSAY OF URINE CREATININE: CPT

## 2025-08-20 PROCEDURE — 84100 ASSAY OF PHOSPHORUS: CPT

## 2025-08-20 PROCEDURE — 85025 COMPLETE CBC W/AUTO DIFF WBC: CPT

## 2025-08-20 PROCEDURE — 80053 COMPREHEN METABOLIC PANEL: CPT

## 2025-08-20 PROCEDURE — 83735 ASSAY OF MAGNESIUM: CPT

## 2025-08-21 ENCOUNTER — RESULTS FOLLOW-UP (OUTPATIENT)
Dept: GASTROENTEROLOGY | Age: 70
End: 2025-08-21

## 2025-08-22 ENCOUNTER — OFFICE VISIT (OUTPATIENT)
Dept: FAMILY MEDICINE CLINIC | Age: 70
End: 2025-08-22

## 2025-08-22 VITALS
WEIGHT: 213 LBS | HEIGHT: 68 IN | HEART RATE: 84 BPM | SYSTOLIC BLOOD PRESSURE: 110 MMHG | BODY MASS INDEX: 32.28 KG/M2 | DIASTOLIC BLOOD PRESSURE: 76 MMHG | OXYGEN SATURATION: 98 % | TEMPERATURE: 97.8 F

## 2025-08-22 DIAGNOSIS — Z79.4 TYPE 2 DIABETES MELLITUS WITH STAGE 3A CHRONIC KIDNEY DISEASE, WITH LONG-TERM CURRENT USE OF INSULIN (HCC): ICD-10-CM

## 2025-08-22 DIAGNOSIS — N18.31 TYPE 2 DIABETES MELLITUS WITH STAGE 3A CHRONIC KIDNEY DISEASE, WITH LONG-TERM CURRENT USE OF INSULIN (HCC): ICD-10-CM

## 2025-08-22 DIAGNOSIS — Z79.4 TYPE 2 DIABETES MELLITUS WITH HYPERGLYCEMIA, WITH LONG-TERM CURRENT USE OF INSULIN (HCC): ICD-10-CM

## 2025-08-22 DIAGNOSIS — N18.30 BENIGN HYPERTENSION WITH CKD (CHRONIC KIDNEY DISEASE) STAGE III (HCC): ICD-10-CM

## 2025-08-22 DIAGNOSIS — Z00.00 MEDICARE ANNUAL WELLNESS VISIT, SUBSEQUENT: Primary | ICD-10-CM

## 2025-08-22 DIAGNOSIS — I12.9 BENIGN HYPERTENSION WITH CKD (CHRONIC KIDNEY DISEASE) STAGE III (HCC): ICD-10-CM

## 2025-08-22 DIAGNOSIS — I50.22 CHRONIC SYSTOLIC CONGESTIVE HEART FAILURE (HCC): ICD-10-CM

## 2025-08-22 DIAGNOSIS — E11.65 TYPE 2 DIABETES MELLITUS WITH HYPERGLYCEMIA, WITH LONG-TERM CURRENT USE OF INSULIN (HCC): ICD-10-CM

## 2025-08-22 DIAGNOSIS — E11.22 TYPE 2 DIABETES MELLITUS WITH STAGE 3A CHRONIC KIDNEY DISEASE, WITH LONG-TERM CURRENT USE OF INSULIN (HCC): ICD-10-CM

## 2025-08-22 DIAGNOSIS — K91.5 POSTCHOLECYSTECTOMY SYNDROME: ICD-10-CM

## 2025-08-22 PROBLEM — J44.9 COPD (CHRONIC OBSTRUCTIVE PULMONARY DISEASE) (HCC): Status: ACTIVE | Noted: 2023-12-22

## 2025-08-22 RX ORDER — MECLIZINE HYDROCHLORIDE 25 MG/1
25 TABLET ORAL 3 TIMES DAILY PRN
Qty: 30 TABLET | Refills: 0 | Status: SHIPPED | OUTPATIENT
Start: 2025-08-22 | End: 2025-09-01

## 2025-08-22 RX ORDER — ONDANSETRON 4 MG/1
4 TABLET, ORALLY DISINTEGRATING ORAL 3 TIMES DAILY PRN
Qty: 21 TABLET | Refills: 0 | Status: SHIPPED | OUTPATIENT
Start: 2025-08-22

## 2025-08-22 SDOH — ECONOMIC STABILITY: FOOD INSECURITY: WITHIN THE PAST 12 MONTHS, THE FOOD YOU BOUGHT JUST DIDN'T LAST AND YOU DIDN'T HAVE MONEY TO GET MORE.: NEVER TRUE

## 2025-08-22 SDOH — ECONOMIC STABILITY: FOOD INSECURITY: WITHIN THE PAST 12 MONTHS, YOU WORRIED THAT YOUR FOOD WOULD RUN OUT BEFORE YOU GOT MONEY TO BUY MORE.: NEVER TRUE

## 2025-08-22 ASSESSMENT — PATIENT HEALTH QUESTIONNAIRE - PHQ9
5. POOR APPETITE OR OVEREATING: NOT AT ALL
7. TROUBLE CONCENTRATING ON THINGS, SUCH AS READING THE NEWSPAPER OR WATCHING TELEVISION: NOT AT ALL
6. FEELING BAD ABOUT YOURSELF - OR THAT YOU ARE A FAILURE OR HAVE LET YOURSELF OR YOUR FAMILY DOWN: NOT AT ALL
SUM OF ALL RESPONSES TO PHQ QUESTIONS 1-9: 2
SUM OF ALL RESPONSES TO PHQ QUESTIONS 1-9: 2
4. FEELING TIRED OR HAVING LITTLE ENERGY: MORE THAN HALF THE DAYS
SUM OF ALL RESPONSES TO PHQ QUESTIONS 1-9: 2
2. FEELING DOWN, DEPRESSED OR HOPELESS: NOT AT ALL
SUM OF ALL RESPONSES TO PHQ QUESTIONS 1-9: 2
8. MOVING OR SPEAKING SO SLOWLY THAT OTHER PEOPLE COULD HAVE NOTICED. OR THE OPPOSITE, BEING SO FIGETY OR RESTLESS THAT YOU HAVE BEEN MOVING AROUND A LOT MORE THAN USUAL: NOT AT ALL
10. IF YOU CHECKED OFF ANY PROBLEMS, HOW DIFFICULT HAVE THESE PROBLEMS MADE IT FOR YOU TO DO YOUR WORK, TAKE CARE OF THINGS AT HOME, OR GET ALONG WITH OTHER PEOPLE: NOT DIFFICULT AT ALL
1. LITTLE INTEREST OR PLEASURE IN DOING THINGS: NOT AT ALL
3. TROUBLE FALLING OR STAYING ASLEEP: NOT AT ALL
9. THOUGHTS THAT YOU WOULD BE BETTER OFF DEAD, OR OF HURTING YOURSELF: NOT AT ALL

## 2025-08-22 ASSESSMENT — ENCOUNTER SYMPTOMS
VOMITING: 1
NAUSEA: 1
APNEA: 1
ABDOMINAL PAIN: 1
DIARRHEA: 1
WHEEZING: 0
COUGH: 0
CONSTIPATION: 1
ABDOMINAL DISTENTION: 0
CHEST TIGHTNESS: 0
SHORTNESS OF BREATH: 0
BACK PAIN: 1

## 2025-08-22 ASSESSMENT — VISUAL ACUITY
OS_CC: 20/20
OD_CC: 20/25

## 2025-08-27 ENCOUNTER — OFFICE VISIT (OUTPATIENT)
Dept: UROLOGY | Age: 70
End: 2025-08-27
Payer: MEDICARE

## 2025-08-27 VITALS
HEART RATE: 72 BPM | HEIGHT: 68 IN | DIASTOLIC BLOOD PRESSURE: 76 MMHG | BODY MASS INDEX: 32.28 KG/M2 | WEIGHT: 213 LBS | SYSTOLIC BLOOD PRESSURE: 118 MMHG | TEMPERATURE: 97.4 F | OXYGEN SATURATION: 95 %

## 2025-08-27 DIAGNOSIS — N40.1 BPH WITH OBSTRUCTION/LOWER URINARY TRACT SYMPTOMS: Primary | ICD-10-CM

## 2025-08-27 DIAGNOSIS — R31.29 MICROHEMATURIA: ICD-10-CM

## 2025-08-27 DIAGNOSIS — N13.8 BPH WITH OBSTRUCTION/LOWER URINARY TRACT SYMPTOMS: Primary | ICD-10-CM

## 2025-08-27 DIAGNOSIS — Z12.5 PROSTATE CANCER SCREENING: ICD-10-CM

## 2025-08-27 PROCEDURE — 1159F MED LIST DOCD IN RCRD: CPT | Performed by: NURSE PRACTITIONER

## 2025-08-27 PROCEDURE — 99214 OFFICE O/P EST MOD 30 MIN: CPT | Performed by: NURSE PRACTITIONER

## 2025-08-27 PROCEDURE — 1123F ACP DISCUSS/DSCN MKR DOCD: CPT | Performed by: NURSE PRACTITIONER

## 2025-08-27 ASSESSMENT — ENCOUNTER SYMPTOMS
ABDOMINAL PAIN: 0
NAUSEA: 0
EYES NEGATIVE: 1
GASTROINTESTINAL NEGATIVE: 1
SHORTNESS OF BREATH: 0
COLOR CHANGE: 0
BACK PAIN: 0
RESPIRATORY NEGATIVE: 1
EYE PAIN: 0
EYE REDNESS: 0
COUGH: 0
ALLERGIC/IMMUNOLOGIC NEGATIVE: 1
VOMITING: 0
WHEEZING: 0

## (undated) DEVICE — BUR RND DIA COARSE 5.0MM

## (undated) DEVICE — TROCARS: Brand: KII® BALLOON BLUNT TIP SYSTEM

## (undated) DEVICE — SUTURE VCRL + SZ 2 L27IN ABSRB UD L40MM CP 1/2 CIR REV CUT VCP195H

## (undated) DEVICE — AGENT HEMOSTATIC SURG ORIGINAL ABS 2X3IN LOOSE KNIT 12/CA

## (undated) DEVICE — GOWN,AURORA,NONREINFORCED,LARGE: Brand: MEDLINE

## (undated) DEVICE — SUTURE NONABSORBABLE MONOFILAMENT 6-0 C-1 1X30 IN PROLENE 8706H

## (undated) DEVICE — DRESSING HYDROCOLLOID BORDER 35X10 IN ALUM PRIMASEAL

## (undated) DEVICE — LIQUIBAND RAPID ADHESIVE 36/CS 0.8ML: Brand: MEDLINE

## (undated) DEVICE — BLANKET WRM W40.2XL55.9IN IORT LO BODY + MISTRAL AIR

## (undated) DEVICE — HYBRID CO2 TUBING/CAP SET FOR OLYMPUS® SCOPES & UCR: Brand: ERBE

## (undated) DEVICE — DEFENDO AIR WATER SUCTION AND BIOPSY VALVE KIT FOR  OLYMPUS: Brand: DEFENDO AIR/WATER/SUCTION AND BIOPSY VALVE

## (undated) DEVICE — SINGLE PORT MANIFOLD: Brand: NEPTUNE 2

## (undated) DEVICE — DURASEAL® DURAL SEALANT SYSTEM 5ML 5 PACK: Brand: DURASEAL®

## (undated) DEVICE — ENDOSCOPY PORT CONNECTOR FOR OLYMPUS® SCOPES: Brand: ERBE

## (undated) DEVICE — SOLUTION IV IRRIG WATER 1000ML POUR BRL 2F7114

## (undated) DEVICE — HANDPIECE SET WITH BONE CLEANING TIP AND SUCTION TUBE: Brand: INTERPULSE

## (undated) DEVICE — GLOVE ORTHO 8   MSG9480

## (undated) DEVICE — SOLUTION IRRIG 1000ML 0.9% SOD CHL USP POUR PLAS BTL

## (undated) DEVICE — MONITORING NEURO WO INTERPRETATION SYS PRICING

## (undated) DEVICE — SOLUTION IV 1000 ML 0.9 NACL INJ USP EXCEL PLAS CONTAINER

## (undated) DEVICE — COVER LT HNDL BLU PLAS

## (undated) DEVICE — KIT CLN UP LIN W/ STD SAHARA TBL SHT 40X60IN DRAW/LIFT SHT

## (undated) DEVICE — SUTURE PERMAHAND SZ 0 L30IN NONABSORBABLE BLK FSL L30MM 3/8 680H

## (undated) DEVICE — CATHETER FOL 2 W 16 FR 5 CC URETH SPEC TIEM BARDX IC

## (undated) DEVICE — SPONGE,NEURO,0.5"X3",XR,STRL,LF,10/PK: Brand: MEDLINE

## (undated) DEVICE — Z DISCONTINUED PER MEDLINE USE 2741943 DRESSING AQUACEL 10 IN ALG W9XL25CM SIL CVR WTRPRF VIR BACT BARR ANTIMIC

## (undated) DEVICE — SUTURE PDS II SZ 0 L27IN ABSRB VLT UR-6 L26MM 1/2 CIR D7185

## (undated) DEVICE — SUTURE VCRL + SZ 2-0 L27IN ABSRB UD CP-1 1/2 CIR REV CUT VCP266H

## (undated) DEVICE — TROCAR: Brand: KII FIOS FIRST ENTRY

## (undated) DEVICE — STERILE PVP: Brand: MEDLINE INDUSTRIES, INC.

## (undated) DEVICE — ENDO KIT W/SYRINGE: Brand: MEDLINE INDUSTRIES, INC.

## (undated) DEVICE — KIT DRN FLAT W/ 100CC EVAC 10MM FULL PERF

## (undated) DEVICE — Z DISCONTINUED PER MEDLINE USE 2741942 DRESSING AQUACEL 6 IN ALG W9XL15CM SIL CVR WTRPRF VIR BACT BARR ANTIMIC

## (undated) DEVICE — GLOVE ORANGE PI 7 1/2   MSG9075

## (undated) DEVICE — GLOVE ORANGE PI 7   MSG9070

## (undated) DEVICE — SPONGE DRN W4XL4IN RAYON/POLYESTER 6 PLY NONWOVEN PRECUT

## (undated) DEVICE — 3M™ WARMING BLANKET, LOWER BODY, 10 PER CASE, 42568: Brand: BAIR HUGGER™

## (undated) DEVICE — ARM DRAPE

## (undated) DEVICE — TOTAL TRAY, DB, 100% SILI FOLEY, 16FR 10: Brand: MEDLINE

## (undated) DEVICE — SOLUTION IRRIG 1000ML 09% SOD CHL USP PIC PLAS CONTAINER

## (undated) DEVICE — BITEBLOCK 54FR W/ DENT RIM BLOX

## (undated) DEVICE — GLOVE ORTHO 7 1/2   MSG9475

## (undated) DEVICE — C-ARMOR C-ARM EQUIPMENT COVERS CLEAR STERILE UNIVERSAL FIT 12 PER CASE: Brand: C-ARMOR

## (undated) DEVICE — SNARE ENDOSCP AD SM L240CM LOOP W1.3CM SHTH DIA2.4MM POLYP

## (undated) DEVICE — YANKAUER,FLEXIBLE HANDLE,FINE CAPACITY: Brand: MEDLINE

## (undated) DEVICE — SUTURE VICRYL + SZ 3-0 L27IN ABSRB UD L26MM SH 1/2 CIR VCP416H

## (undated) DEVICE — 5.0MM X-COARSE DIAMOND

## (undated) DEVICE — GLOVE SURG SZ 8 L12IN FNGR THK79MIL GRN LTX FREE

## (undated) DEVICE — PAD,NON-ADHERENT,3X8,STERILE,LF,1/PK: Brand: MEDLINE

## (undated) DEVICE — FORCEPS BX L240CM WRK CHN 2.8MM STD CAP W/ NDL MIC MESH

## (undated) DEVICE — CATHETER FOL 2 W 18 FR 5 CC URETH SPEC TIEM BARDX IC

## (undated) DEVICE — CATHETER FOL 2 W 12 FR 5 CC URETH SPEC TIEM BARDX IC

## (undated) DEVICE — Device

## (undated) DEVICE — SOLUTION IRRIG 1000ML H2O PIC PLAS SHATTERPROOF CONTAINER

## (undated) DEVICE — TISSUE RETRIEVAL SYSTEM: Brand: INZII RETRIEVAL SYSTEM

## (undated) DEVICE — SUTURE VICRYL + SZ 0 L27IN ABSRB VLT L26MM UR-6 5/8 CIR VCP603H

## (undated) DEVICE — BLADELESS OBTURATOR: Brand: WECK VISTA

## (undated) DEVICE — INSUFFLATION NEEDLE TO ESTABLISH PNEUMOPERITONEUM.: Brand: INSUFFLATION NEEDLE

## (undated) DEVICE — SUTURE MONOCRYL + SZ 4-0 L27IN ABSRB UD L19MM PS-2 3/8 CIR MCP426H

## (undated) DEVICE — ELECTRODE ES L3IN S STL BLDE INSUL DISP VALLEYLAB EDGE

## (undated) DEVICE — ST CHARLES GEN LAPAROSCOPY: Brand: MEDLINE INDUSTRIES, INC.

## (undated) DEVICE — SUTURE ETHLN SZ 3-0 L18IN NONABSORBABLE BLK FS-1 L24MM 3/8 663H

## (undated) DEVICE — SUCTION IRRIGATOR: Brand: ENDOWRIST

## (undated) DEVICE — GLOVE SURG SZ 8 L12IN FNGR THK13MIL BRN LTX SYN POLYMER W

## (undated) DEVICE — DRAPE,LAP,CHOLE,W/TROUGHS,STERILE: Brand: MEDLINE

## (undated) DEVICE — BLANKET WRM W29.9XL79.1IN UP BODY FORC AIR MISTRAL-AIR

## (undated) DEVICE — FLOSEAL HEMOSTATIC MATRIX, 10ML: Brand: FLOSEAL HEMOSTATIC MATRIX

## (undated) DEVICE — SPONGE DRN W4XL4IN RAYON/POLYESTER 6 PLY NONWOVEN PRECUT 2 PER PK

## (undated) DEVICE — 3.2MM X 18.3MM METAL CUTTING HELIOCOIDAL RASP

## (undated) DEVICE — FLOSEAL HEMOSTATIC MATRIX, 5 ML: Brand: FLOSEAL

## (undated) DEVICE — FORCEPS BX L240CM JAW DIA2.8MM L CAP W/ NDL MIC MESH TOOTH

## (undated) DEVICE — SEAL

## (undated) DEVICE — ST. CHARLES BASIC SET UP: Brand: MEDLINE INDUSTRIES, INC.

## (undated) DEVICE — INSUFFLATION TUBING SET WITH FILTER, FUNNEL CONNECTOR AND LUER LOCK: Brand: JOSNOE MEDICAL INC

## (undated) DEVICE — KIT DRN FLAT W/ 100CC EVAC 7MM FULL PERF

## (undated) DEVICE — SYRINGE MED 20ML STD CLR PLAS LUERSLIP TIP N CTRL DISP

## (undated) DEVICE — COVER,MAYO STAND,STERILE: Brand: MEDLINE

## (undated) DEVICE — DRESSING TRNSPAR W5XL4.5IN FLM SHT SEMIPERMEABLE WIND

## (undated) DEVICE — SOLUTION IV IRRIG POUR BRL 0.9% SODIUM CHL 2F7124